# Patient Record
Sex: FEMALE | Race: WHITE | Employment: OTHER | URBAN - METROPOLITAN AREA
[De-identification: names, ages, dates, MRNs, and addresses within clinical notes are randomized per-mention and may not be internally consistent; named-entity substitution may affect disease eponyms.]

---

## 2017-12-01 PROBLEM — L71.9 ROSACEA: Status: ACTIVE | Noted: 2017-12-01

## 2018-12-26 DIAGNOSIS — E11.9 TYPE 2 DIABETES MELLITUS WITHOUT COMPLICATION, WITH LONG-TERM CURRENT USE OF INSULIN (HCC): ICD-10-CM

## 2018-12-26 DIAGNOSIS — Z79.4 TYPE 2 DIABETES MELLITUS WITHOUT COMPLICATION, WITH LONG-TERM CURRENT USE OF INSULIN (HCC): ICD-10-CM

## 2018-12-27 RX ORDER — GLIPIZIDE 10 MG/1
10 TABLET ORAL
Qty: 60 TABLET | Refills: 2 | Status: SHIPPED | OUTPATIENT
Start: 2018-12-27 | End: 2019-03-24 | Stop reason: SDUPTHER

## 2018-12-27 RX ORDER — LISINOPRIL AND HYDROCHLOROTHIAZIDE 25; 20 MG/1; MG/1
1 TABLET ORAL DAILY
Qty: 30 TABLET | Refills: 2 | Status: SHIPPED | OUTPATIENT
Start: 2018-12-27 | End: 2019-03-24 | Stop reason: SDUPTHER

## 2018-12-27 RX ORDER — ATORVASTATIN CALCIUM 10 MG/1
10 TABLET, FILM COATED ORAL DAILY
Qty: 30 TABLET | Refills: 2 | Status: SHIPPED | OUTPATIENT
Start: 2018-12-27 | End: 2019-03-24 | Stop reason: SDUPTHER

## 2019-01-07 ENCOUNTER — OFFICE VISIT (OUTPATIENT)
Dept: PRIMARY CARE CLINIC | Age: 64
End: 2019-01-07
Payer: COMMERCIAL

## 2019-01-07 ENCOUNTER — TELEPHONE (OUTPATIENT)
Dept: PRIMARY CARE CLINIC | Age: 64
End: 2019-01-07

## 2019-01-07 VITALS
SYSTOLIC BLOOD PRESSURE: 140 MMHG | BODY MASS INDEX: 44.76 KG/M2 | OXYGEN SATURATION: 98 % | DIASTOLIC BLOOD PRESSURE: 82 MMHG | HEART RATE: 90 BPM | WEIGHT: 221.6 LBS

## 2019-01-07 DIAGNOSIS — I10 ESSENTIAL HYPERTENSION: ICD-10-CM

## 2019-01-07 DIAGNOSIS — K74.69 OTHER CIRRHOSIS OF LIVER (HCC): ICD-10-CM

## 2019-01-07 DIAGNOSIS — Z79.4 TYPE 2 DIABETES MELLITUS WITHOUT COMPLICATION, WITH LONG-TERM CURRENT USE OF INSULIN (HCC): ICD-10-CM

## 2019-01-07 DIAGNOSIS — E11.9 TYPE 2 DIABETES MELLITUS WITHOUT COMPLICATION, WITH LONG-TERM CURRENT USE OF INSULIN (HCC): ICD-10-CM

## 2019-01-07 LAB
CREATININE URINE: 85.44 MG/DL
HBA1C MFR BLD: 11.2 %
MICROALBUMIN/CREAT 24H UR: 76.5 MG/G{CREAT}

## 2019-01-07 PROCEDURE — 36416 COLLJ CAPILLARY BLOOD SPEC: CPT | Performed by: FAMILY MEDICINE

## 2019-01-07 PROCEDURE — 99214 OFFICE O/P EST MOD 30 MIN: CPT | Performed by: FAMILY MEDICINE

## 2019-01-07 PROCEDURE — 83036 HEMOGLOBIN GLYCOSYLATED A1C: CPT | Performed by: FAMILY MEDICINE

## 2019-01-07 RX ORDER — MELOXICAM 15 MG/1
15 TABLET ORAL DAILY
Qty: 10 TABLET | Refills: 0 | Status: SHIPPED | OUTPATIENT
Start: 2019-01-07 | End: 2019-03-29 | Stop reason: ALTCHOICE

## 2019-01-07 ASSESSMENT — ENCOUNTER SYMPTOMS
RHINORRHEA: 0
SHORTNESS OF BREATH: 0
EYE REDNESS: 0
DIARRHEA: 0
ABDOMINAL PAIN: 0
SORE THROAT: 0
VOMITING: 0
COUGH: 0
WHEEZING: 0
NAUSEA: 0
EYE DISCHARGE: 0

## 2019-01-09 DIAGNOSIS — E11.9 TYPE 2 DIABETES MELLITUS WITHOUT COMPLICATION, WITH LONG-TERM CURRENT USE OF INSULIN (HCC): ICD-10-CM

## 2019-01-09 DIAGNOSIS — Z79.4 TYPE 2 DIABETES MELLITUS WITHOUT COMPLICATION, WITH LONG-TERM CURRENT USE OF INSULIN (HCC): ICD-10-CM

## 2019-03-07 RX ORDER — GABAPENTIN 300 MG/1
CAPSULE ORAL
Qty: 180 CAPSULE | Refills: 3 | Status: SHIPPED | OUTPATIENT
Start: 2019-03-07 | End: 2019-07-04 | Stop reason: SDUPTHER

## 2019-03-26 RX ORDER — GLIPIZIDE 10 MG/1
TABLET ORAL
Qty: 60 TABLET | Refills: 2 | Status: SHIPPED | OUTPATIENT
Start: 2019-03-26 | End: 2019-06-27 | Stop reason: SDUPTHER

## 2019-03-26 RX ORDER — SITAGLIPTIN AND METFORMIN HYDROCHLORIDE 1000; 50 MG/1; MG/1
TABLET, FILM COATED, EXTENDED RELEASE ORAL
Qty: 60 TABLET | Refills: 3 | Status: SHIPPED | OUTPATIENT
Start: 2019-03-26 | End: 2019-07-25 | Stop reason: SDUPTHER

## 2019-03-26 RX ORDER — LISINOPRIL AND HYDROCHLOROTHIAZIDE 25; 20 MG/1; MG/1
TABLET ORAL
Qty: 30 TABLET | Refills: 2 | Status: SHIPPED | OUTPATIENT
Start: 2019-03-26 | End: 2019-06-27 | Stop reason: SDUPTHER

## 2019-03-26 RX ORDER — ATORVASTATIN CALCIUM 10 MG/1
TABLET, FILM COATED ORAL
Qty: 30 TABLET | Refills: 2 | Status: SHIPPED | OUTPATIENT
Start: 2019-03-26 | End: 2019-06-27 | Stop reason: SDUPTHER

## 2019-03-29 ENCOUNTER — OFFICE VISIT (OUTPATIENT)
Dept: PRIMARY CARE CLINIC | Age: 64
End: 2019-03-29
Payer: COMMERCIAL

## 2019-03-29 VITALS
DIASTOLIC BLOOD PRESSURE: 80 MMHG | HEART RATE: 99 BPM | WEIGHT: 222 LBS | TEMPERATURE: 99.4 F | SYSTOLIC BLOOD PRESSURE: 150 MMHG | OXYGEN SATURATION: 96 % | BODY MASS INDEX: 44.84 KG/M2

## 2019-03-29 DIAGNOSIS — H66.002 ACUTE SUPPURATIVE OTITIS MEDIA OF LEFT EAR WITHOUT SPONTANEOUS RUPTURE OF TYMPANIC MEMBRANE, RECURRENCE NOT SPECIFIED: Primary | ICD-10-CM

## 2019-03-29 DIAGNOSIS — J20.9 ACUTE BRONCHITIS, UNSPECIFIED ORGANISM: ICD-10-CM

## 2019-03-29 PROCEDURE — 99213 OFFICE O/P EST LOW 20 MIN: CPT | Performed by: PHYSICIAN ASSISTANT

## 2019-03-29 RX ORDER — AMOXICILLIN AND CLAVULANATE POTASSIUM 875; 125 MG/1; MG/1
1 TABLET, FILM COATED ORAL 2 TIMES DAILY
Qty: 20 TABLET | Refills: 0 | Status: SHIPPED | OUTPATIENT
Start: 2019-03-29 | End: 2019-04-08

## 2019-03-29 RX ORDER — BENZONATATE 100 MG/1
100 CAPSULE ORAL 3 TIMES DAILY PRN
Qty: 30 CAPSULE | Refills: 0 | Status: SHIPPED | OUTPATIENT
Start: 2019-03-29 | End: 2019-08-09 | Stop reason: ALTCHOICE

## 2019-03-29 ASSESSMENT — ENCOUNTER SYMPTOMS
SHORTNESS OF BREATH: 0
CHEST TIGHTNESS: 0
WHEEZING: 0
COUGH: 1

## 2019-03-29 ASSESSMENT — PATIENT HEALTH QUESTIONNAIRE - PHQ9
SUM OF ALL RESPONSES TO PHQ QUESTIONS 1-9: 0
2. FEELING DOWN, DEPRESSED OR HOPELESS: 0
SUM OF ALL RESPONSES TO PHQ QUESTIONS 1-9: 0
SUM OF ALL RESPONSES TO PHQ9 QUESTIONS 1 & 2: 0
1. LITTLE INTEREST OR PLEASURE IN DOING THINGS: 0

## 2019-03-29 NOTE — PROGRESS NOTES
[address] [facility] PRIMARY CARE  [id number] [facility]  Dept: [phone]    Leda Radford is a 58 y.o. female who presents today for her medical conditions/complaints as noted below. Chief Complaint   Patient presents with    URI     Pt c/o chest congestion with a productive cough.  Otalgia     Left ear pain x1 day.  Nasal Congestion     x 1 day       HPI:     HPI   Cough for over 1 week, but yesterday her ears became plugged. Productive cough with clear sputum. Left ear pain started yesterday- throbbing, hard to sleep. Congestion started yesterday. Slight runny nose. Slight sinus pressure. Sight sore throat. +PND just started. Cough has not improved. No hx of asthma. No known fever- temp of 99.4 currently. Has not taken any OTC cold or pain meds.        LDL Cholesterol (mg/dL)   Date Value   12/31/2015 71   11/24/2015 129   05/28/2014 124     LDL Calculated (mg/dL)   Date Value   09/12/2018 84   12/30/2016 84       (goal LDL is <100)   AST (U/L)   Date Value   12/31/2015 33 (H)     ALT (U/L)   Date Value   12/31/2015 34 (H)     BUN (mg/dL)   Date Value   11/24/2015 16     BP Readings from Last 3 Encounters:   03/29/19 (!) 150/80   01/07/19 (!) 140/82   11/15/18 138/76          (goal 120/80)    Past Medical History:   Diagnosis Date    Abdominal swelling     Autoimmune hepatitis ([address]     Flatus     H/O acute sinusitis     H/O cholelithiasis     LLQ abdominal tenderness     RUQ abdominal pain     Tingling     Vaginal candidiasis       Past Surgical History:   Procedure Laterality Date    CHOLECYSTECTOMY, LAPAROSCOPIC  2012       Family History   Problem Relation Age of Onset    Cancer Mother         breast    Diabetes Mother     Stroke Mother     Cancer Father     Cancer Sister        Social History     Tobacco Use    Smoking status: Never Smoker    Smokeless tobacco: Never Used   Substance Use Topics    Alcohol use: Not on file      Current Outpatient Medications   Medication Sig Dispense Refill    amoxicillin-clavulanate (AUGMENTIN) 875-125 MG per tablet Take 1 tablet by mouth 2 times daily for 10 days 20 tablet 0    benzonatate (TESSALON) 100 MG capsule Take 1 capsule by mouth 3 times daily as needed for Cough 30 capsule 0    JANUMET XR  MG TB24 per extended release tablet take 1 tablet by mouth twice a day 60 tablet 3    atorvastatin (LIPITOR) 10 MG tablet take 1 tablet by mouth once daily 30 tablet 2    glipiZIDE (GLUCOTROL) 10 MG tablet take 1 tablet by mouth twice a day before meals 60 tablet 2    lisinopril-hydrochlorothiazide (PRINZIDE;ZESTORETIC) 20-25 MG per tablet take 1 tablet by mouth once daily 30 tablet 2    gabapentin (NEURONTIN) 300 MG capsule take 2 capsules by mouth three times a day 180 capsule 3    insulin lispro (HUMALOG) 100 UNIT/ML pen Inject into the skin 4 times daily (before meals) Indications: per sliding scale ac and hs,Also give pen needles for qid checks 5 pen 3    insulin glargine (TOUJEO SOLOSTAR) 300 UNIT/ML injection pen Inject 40 Units into the skin 2 times daily 8 pen 3    B-D ULTRAFINE III SHORT PEN 31G X 8 MM MISC use as directed three times a day 100 each 3    FREESTYLE LITE strip TEST three times a day 100 strip 2    Cholecalciferol (VITAMIN D3) 2000 UNITS CAPS Take 1 capsule by mouth daily      Lancets MISC Test 4 times daily for uncontrolled  each 3    aspirin 81 MG tablet Take 81 mg by mouth daily      Calcium-Vitamin D-Vitamin K (CALCIUM + D + K PO) Take by mouth Indications: take as directed      Lutein 20 MG TABS Take 1 tablet by mouth daily      Ascorbic Acid (VITAMIN C) 500 MG tablet Take by mouth daily Indications: as directed       No current facility-administered medications for this visit.       No Known Allergies    Health Maintenance   Topic Date Due    Hepatitis A vaccine (1 of 2 - Risk 2-dose series) 09[birth year]04/1955    HIV screen  09/04/1969    Hepatitis B Vaccine (1 of 3 - Risk 3-dose series) 09/04/1973    DTaP/Tdap/Td vaccine (1 - Tdap) 09/04/1973    Cervical cancer screen  09/04/1975    Shingles Vaccine (1 of 2) 09/04/2004    Diabetic foot exam  03/20/2018    A1C test (Diabetic or Prediabetic)  04/07/2019    Lipid screen  09/12/2019    Potassium monitoring  09/12/2019    Creatinine monitoring  09/12/2019    Diabetic microalbuminuria test  01/07/2020    Diabetic retinal exam  01/25/2020    Breast cancer screen  11/23/2020    Colon cancer screen colonoscopy  03/30/2022    Flu vaccine  Completed    Pneumococcal 0-64 years at Risk Vaccine  Completed    Hepatitis C screen  Completed       Subjective:      Review of Systems   HENT: Positive for congestion, ear pain, postnasal drip, rhinorrhea, sinus pressure and sore throat. Respiratory: Positive for cough. Negative for chest tightness, shortness of breath and wheezing. Objective:     BP (!) 150/80   Pulse 99   Temp 99.4 °F (37.4 °C)   Wt 222 lb (100.7 kg)   SpO2 96%   BMI 44.84 kg/m²   Physical Exam   Constitutional: She appears well-developed and well-nourished. No distress. HENT:   Head: Normocephalic and atraumatic. Right Ear: External ear and ear canal normal. A middle ear effusion (slight white posteriorly) is present. Left Ear: External ear and ear canal normal. Tympanic membrane is erythematous (superior TM). Nose: Right sinus exhibits no maxillary sinus tenderness and no frontal sinus tenderness. Left sinus exhibits no maxillary sinus tenderness and no frontal sinus tenderness. Mouth/Throat: No oropharyngeal exudate. Cardiovascular: Normal rate, regular rhythm and normal heart sounds. Pulmonary/Chest: Effort normal and breath sounds normal. No respiratory distress. She has no wheezes. Lymphadenopathy:     She has no cervical adenopathy. Skin: Skin is warm and dry. No rash noted. She is not diaphoretic. Nursing note and vitals reviewed.     Assessment: Diagnosis Orders   1. Acute suppurative otitis media of left ear without spontaneous rupture of tympanic membrane, recurrence not specified  amoxicillin-clavulanate (AUGMENTIN) 875-125 MG per tablet   2. Acute bronchitis, unspecified organism  amoxicillin-clavulanate (AUGMENTIN) 875-125 MG per tablet    benzonatate (TESSALON) 100 MG capsule        Plan:     -Rx for Augmentin for left OM and for possible bronchitis. Tessalon Perles prn for cough. Can take OTC meds for ear pain, such as ibuprofen. Return if symptoms worsen or fail to improve. No orders of the defined types were placed in this encounter. Orders Placed This Encounter   Medications    amoxicillin-clavulanate (AUGMENTIN) 875-125 MG per tablet     Sig: Take 1 tablet by mouth 2 times daily for 10 days     Dispense:  20 tablet     Refill:  0    benzonatate (TESSALON) 100 MG capsule     Sig: Take 1 capsule by mouth 3 times daily as needed for Cough     Dispense:  30 capsule     Refill:  0       Patient given educationalmaterials - see patient instructions. Discussed use, benefit, and side effectsof prescribed medications. All patient questions answered. Pt voiced understanding. Reviewed health maintenance. Instructed to continue current medications, diet andexercise. Patient agreed with treatment plan. Follow up as directed.      Electronicallysigned by Aimee Dejesus PA-C on 4/4/2019 at 11:25 PM

## 2019-04-04 ASSESSMENT — ENCOUNTER SYMPTOMS
SORE THROAT: 1
SINUS PRESSURE: 1
RHINORRHEA: 1

## 2019-04-15 ENCOUNTER — OFFICE VISIT (OUTPATIENT)
Dept: PRIMARY CARE CLINIC | Age: 64
End: 2019-04-15
Payer: COMMERCIAL

## 2019-04-15 VITALS
SYSTOLIC BLOOD PRESSURE: 136 MMHG | DIASTOLIC BLOOD PRESSURE: 80 MMHG | BODY MASS INDEX: 45.12 KG/M2 | OXYGEN SATURATION: 98 % | WEIGHT: 223.4 LBS | HEART RATE: 83 BPM

## 2019-04-15 DIAGNOSIS — E78.2 MIXED HYPERLIPIDEMIA: ICD-10-CM

## 2019-04-15 DIAGNOSIS — E11.9 TYPE 2 DIABETES MELLITUS WITHOUT COMPLICATION, WITH LONG-TERM CURRENT USE OF INSULIN (HCC): Primary | ICD-10-CM

## 2019-04-15 DIAGNOSIS — I10 ESSENTIAL HYPERTENSION: ICD-10-CM

## 2019-04-15 DIAGNOSIS — K74.69 OTHER CIRRHOSIS OF LIVER (HCC): ICD-10-CM

## 2019-04-15 DIAGNOSIS — Z79.4 TYPE 2 DIABETES MELLITUS WITHOUT COMPLICATION, WITH LONG-TERM CURRENT USE OF INSULIN (HCC): Primary | ICD-10-CM

## 2019-04-15 LAB — HBA1C MFR BLD: 11.6 %

## 2019-04-15 PROCEDURE — 83036 HEMOGLOBIN GLYCOSYLATED A1C: CPT | Performed by: FAMILY MEDICINE

## 2019-04-15 PROCEDURE — 99214 OFFICE O/P EST MOD 30 MIN: CPT | Performed by: FAMILY MEDICINE

## 2019-04-15 ASSESSMENT — ENCOUNTER SYMPTOMS
ABDOMINAL PAIN: 0
NAUSEA: 0
VOMITING: 0
DIARRHEA: 0
RHINORRHEA: 0
WHEEZING: 0
EYE DISCHARGE: 0
SORE THROAT: 0
COUGH: 1
EYE REDNESS: 0
SHORTNESS OF BREATH: 0

## 2019-04-15 NOTE — PROGRESS NOTES
[address] [facility] PRIMARY CARE  [id number] [facility]  Dept: [phone]    Alexi Leyva is a 58 y.o. female who presents today for her medical conditions/complaintsas noted below. Alexi Leyva is c/o of   Chief Complaint   Patient presents with    Diabetes     3 month follow up. Pt states her sugars have been up and down.  Ear Fullness     left ear fullness/hearing loss. Pt was here on 3/29 with URI sx. HPI:     HPI  Was sick all last week with URI and then stomach on top of it. Ear is still plugged up but otherwise getting better. No refills needed.   Due for food exam.     Hemoglobin A1C (%)   Date Value   04/15/2019 11.6   01/07/2019 11.2   10/05/2018 10.4             ( goal A1C is < 7)   No results found for: LABMICR  LDL Cholesterol (mg/dL)   Date Value   12/31/2015 71   11/24/2015 129   05/28/2014 124     LDL Calculated (mg/dL)   Date Value   09/12/2018 84   12/30/2016 84       (goal LDL is <100)   AST (U/L)   Date Value   12/31/2015 33 (H)     ALT (U/L)   Date Value   12/31/2015 34 (H)     BUN (mg/dL)   Date Value   11/24/2015 16     BP Readings from Last 3 Encounters:   04/15/19 136/80   03/29/19 (!) 150/80   01/07/19 (!) 140/82          (goal 140/90)    Past Medical History:   Diagnosis Date    Abdominal swelling     Autoimmune hepatitis ([address]     Flatus     H/O acute sinusitis     H/O cholelithiasis     LLQ abdominal tenderness     RUQ abdominal pain     Tingling     Vaginal candidiasis         Social History     Tobacco Use    Smoking status: Never Smoker    Smokeless tobacco: Never Used   Substance Use Topics    Alcohol use: Not on file      Current Outpatient Medications   Medication Sig Dispense Refill    insulin glargine (TOUJEO SOLOSTAR) 300 UNIT/ML injection pen Inject 40 Units into the skin 2 times daily 8 pen 3    Semaglutide (OZEMPIC) 0.25 or 0.5 MG/DOSE SOPN Inject 0.25 mg into the skin once a week 1 pen 3  JANUMET XR  MG TB24 per extended release tablet take 1 tablet by mouth twice a day 60 tablet 3    atorvastatin (LIPITOR) 10 MG tablet take 1 tablet by mouth once daily 30 tablet 2    glipiZIDE (GLUCOTROL) 10 MG tablet take 1 tablet by mouth twice a day before meals 60 tablet 2    lisinopril-hydrochlorothiazide (PRINZIDE;ZESTORETIC) 20-25 MG per tablet take 1 tablet by mouth once daily 30 tablet 2    gabapentin (NEURONTIN) 300 MG capsule take 2 capsules by mouth three times a day 180 capsule 3    insulin lispro (HUMALOG) 100 UNIT/ML pen Inject into the skin 4 times daily (before meals) Indications: per sliding scale ac and hs,Also give pen needles for qid checks 5 pen 3    B-D ULTRAFINE III SHORT PEN 31G X 8 MM MISC use as directed three times a day 100 each 3    FREESTYLE LITE strip TEST three times a day 100 strip 2    Cholecalciferol (VITAMIN D3) 2000 UNITS CAPS Take 1 capsule by mouth daily      Lancets MISC Test 4 times daily for uncontrolled  each 3    aspirin 81 MG tablet Take 81 mg by mouth daily      Lutein 20 MG TABS Take 1 tablet by mouth daily      Ascorbic Acid (VITAMIN C) 500 MG tablet Take by mouth daily Indications: as directed      benzonatate (TESSALON) 100 MG capsule Take 1 capsule by mouth 3 times daily as needed for Cough 30 capsule 0     No current facility-administered medications for this visit.       No Known Allergies    Health Maintenance   Topic Date Due    Hepatitis A vaccine (1 of 2 - Risk 2-dose series) 09[birth year]04/1955    HIV screen  09/04/1969    Hepatitis B Vaccine (1 of 3 - Risk 3-dose series) 09/04/1973    DTaP/Tdap/Td vaccine (1 - Tdap) 09/04/1973    Cervical cancer screen  09/04/1975    Shingles Vaccine (1 of 2) 09/04/2004    Diabetic foot exam  03/20/2018    A1C test (Diabetic or Prediabetic)  04/07/2019    Lipid screen  09/12/2019    Potassium monitoring  09/12/2019    Creatinine monitoring  09/12/2019    Diabetic microalbuminuria test calluses multiple- heels and MTPs. No cyanosis or clubbing. sensation intact on 4 of 6 test points with the 10 gram filament. Dorsalis pedis pulses intact bilaterally. Capillary refill at the toes was less than 2 seconds. No skin breakdown, erythema, blisters, scaling, or ulcers. No evidence of fungal infection. Skin: Skin is warm and dry. No rash noted. Psychiatric: She has a normal mood and affect. Her behavior is normal. Thought content normal.   Nursing note and vitals reviewed. Assessment:       Diagnosis Orders   1. Type 2 diabetes mellitus without complication, with long-term current use of insulin (HCC)  insulin glargine (TOUJEO SOLOSTAR) 300 UNIT/ML injection pen    MD COLLECTION CAPILLARY BLOOD SPECIMEN     DIABETES FOOT EXAM    POCT glycosylated hemoglobin (Hb A1C)   2. Other cirrhosis of liver (HCC)     3. Essential hypertension     4. Mixed hyperlipidemia          Plan:    has no f/u with Dr. David Aragon. Liver enzymes have looked okay last few checks. Continue 40 bid of Toujeo. Will try ozempic and if covered then will d/c janumet and only use metformin    Return in about 3 months (around 7/15/2019) for DM check, HTN f/u. Orders Placed This Encounter   Procedures    POCT glycosylated hemoglobin (Hb A1C)     DIABETES FOOT EXAM    MD COLLECTION CAPILLARY BLOOD SPECIMEN     Orders Placed This Encounter   Medications    insulin glargine (TOUJEO SOLOSTAR) 300 UNIT/ML injection pen     Sig: Inject 40 Units into the skin 2 times daily     Dispense:  8 pen     Refill:  3    Semaglutide (OZEMPIC) 0.25 or 0.5 MG/DOSE SOPN     Sig: Inject 0.25 mg into the skin once a week     Dispense:  1 pen     Refill:  3       Patient given educationalmaterials - see patient instructions. Discussed use, benefit, and side effectsof prescribed medications. All patient questions answered. Pt voiced understanding. Reviewed health maintenance. Instructed to continue current medications, diet andexercise. Patient agreed with treatment plan. Follow up as directed.      Electronicallysigned by Sruthi Pierre MD on 4/15/2019 at 9:46 AM

## 2019-04-18 ENCOUNTER — TELEPHONE (OUTPATIENT)
Dept: PRIMARY CARE CLINIC | Age: 64
End: 2019-04-18

## 2019-04-18 RX ORDER — SULFAMETHOXAZOLE AND TRIMETHOPRIM 800; 160 MG/1; MG/1
1 TABLET ORAL 2 TIMES DAILY
Qty: 10 TABLET | Refills: 0 | Status: SHIPPED | OUTPATIENT
Start: 2019-04-18 | End: 2019-04-23

## 2019-06-27 RX ORDER — GLIPIZIDE 10 MG/1
TABLET ORAL
Qty: 60 TABLET | Refills: 2 | Status: SHIPPED | OUTPATIENT
Start: 2019-06-27 | End: 2019-09-25 | Stop reason: SDUPTHER

## 2019-06-27 RX ORDER — LISINOPRIL AND HYDROCHLOROTHIAZIDE 25; 20 MG/1; MG/1
TABLET ORAL
Qty: 30 TABLET | Refills: 2 | Status: SHIPPED | OUTPATIENT
Start: 2019-06-27 | End: 2019-09-25 | Stop reason: SDUPTHER

## 2019-06-27 RX ORDER — ATORVASTATIN CALCIUM 10 MG/1
TABLET, FILM COATED ORAL
Qty: 30 TABLET | Refills: 2 | Status: SHIPPED | OUTPATIENT
Start: 2019-06-27 | End: 2019-09-25 | Stop reason: SDUPTHER

## 2019-07-08 RX ORDER — GABAPENTIN 300 MG/1
CAPSULE ORAL
Qty: 180 CAPSULE | Refills: 3 | Status: SHIPPED | OUTPATIENT
Start: 2019-07-08 | End: 2019-11-13 | Stop reason: SDUPTHER

## 2019-07-25 RX ORDER — SITAGLIPTIN AND METFORMIN HYDROCHLORIDE 1000; 50 MG/1; MG/1
TABLET, FILM COATED, EXTENDED RELEASE ORAL
Qty: 60 TABLET | Refills: 3 | Status: SHIPPED | OUTPATIENT
Start: 2019-07-25 | End: 2019-11-25 | Stop reason: SDUPTHER

## 2019-08-09 ENCOUNTER — OFFICE VISIT (OUTPATIENT)
Dept: PRIMARY CARE CLINIC | Age: 64
End: 2019-08-09
Payer: COMMERCIAL

## 2019-08-09 VITALS
DIASTOLIC BLOOD PRESSURE: 74 MMHG | BODY MASS INDEX: 45.32 KG/M2 | OXYGEN SATURATION: 98 % | WEIGHT: 224.4 LBS | SYSTOLIC BLOOD PRESSURE: 136 MMHG | HEART RATE: 82 BPM

## 2019-08-09 DIAGNOSIS — G47.9 SLEEP DISTURBANCES: ICD-10-CM

## 2019-08-09 DIAGNOSIS — R20.0 NUMBNESS: ICD-10-CM

## 2019-08-09 DIAGNOSIS — E11.9 TYPE 2 DIABETES MELLITUS WITHOUT COMPLICATION, WITH LONG-TERM CURRENT USE OF INSULIN (HCC): Primary | ICD-10-CM

## 2019-08-09 DIAGNOSIS — Z79.4 TYPE 2 DIABETES MELLITUS WITHOUT COMPLICATION, WITH LONG-TERM CURRENT USE OF INSULIN (HCC): Primary | ICD-10-CM

## 2019-08-09 DIAGNOSIS — F51.01 PRIMARY INSOMNIA: ICD-10-CM

## 2019-08-09 DIAGNOSIS — E78.2 MIXED HYPERLIPIDEMIA: ICD-10-CM

## 2019-08-09 DIAGNOSIS — I10 ESSENTIAL HYPERTENSION: ICD-10-CM

## 2019-08-09 LAB — HBA1C MFR BLD: 12 %

## 2019-08-09 PROCEDURE — 99214 OFFICE O/P EST MOD 30 MIN: CPT | Performed by: FAMILY MEDICINE

## 2019-08-09 PROCEDURE — 83036 HEMOGLOBIN GLYCOSYLATED A1C: CPT | Performed by: FAMILY MEDICINE

## 2019-08-09 ASSESSMENT — ENCOUNTER SYMPTOMS
EYE DISCHARGE: 0
SORE THROAT: 0
SHORTNESS OF BREATH: 0
WHEEZING: 0
VOMITING: 0
COUGH: 0
DIARRHEA: 0
NAUSEA: 0
RHINORRHEA: 0
ABDOMINAL PAIN: 0
EYE REDNESS: 0

## 2019-08-09 NOTE — PROGRESS NOTES
[address] [place] [address] PRIMARY CARE  [address] [place] [facility] [address]  [address] [address]  Dept: 1 [address] Chikis Riggins Box Radha is a 58 y.o. female who presents today for her medical conditions/complaintsas noted below. Faby Ramos is c/o of   Chief Complaint   Patient presents with    Diabetes     3 month follow up. Pt states her sugars are usually around 180. HPI:     HPI-insurance would not cover ozempic. Sugar has gone up again. Agreeable to go to specialist now. No low blood sugars. Having more trouble with her legs- more numb. Having fatigue during the day and insomnia. Doesn't think she snores.        Hemoglobin A1C (%)   Date Value   08/09/2019 12.0   04/15/2019 11.6   01/07/2019 11.2             ( goal A1C is < 7)   No results found for: LABMICR  LDL Cholesterol (mg/dL)   Date Value   12/31/2015 71   11/24/2015 129   05/28/2014 124     LDL Calculated (mg/dL)   Date Value   09/12/2018 84   12/30/2016 84       (goal LDL is <100)   AST (U/L)   Date Value   12/31/2015 33 (H)     ALT (U/L)   Date Value   12/31/2015 34 (H)     BUN (mg/dL)   Date Value   11/24/2015 16     BP Readings from Last 3 Encounters:   08/09/19 136/74   04/15/19 136/80   03/29/19 (!) 150/80          (goal 140/90)    Past Medical History:   Diagnosis Date    Abdominal swelling     Autoimmune hepatitis ([address]     Flatus     H/O acute sinusitis     H/O cholelithiasis     LLQ abdominal tenderness     RUQ abdominal pain     Tingling     Vaginal candidiasis         Social History     Tobacco Use    Smoking status: Never Smoker    Smokeless tobacco: Never Used   Substance Use Topics    Alcohol use: Not on file      Current Outpatient Medications   Medication Sig Dispense Refill    JANUMET XR  MG TB24 per extended release tablet take 1 tablet by mouth twice a day 60 tablet 3    gabapentin (NEURONTIN) 300 MG capsule take 2 capsules by mouth three times a day 180 capsule 3    lisinopril-hydrochlorothiazide (PRINZIDE;ZESTORETIC) 20-25 MG per tablet take 1 tablet by mouth once daily 30 tablet 2    atorvastatin (LIPITOR) 10 MG tablet take 1 tablet by mouth once daily 30 tablet 2    glipiZIDE (GLUCOTROL) 10 MG tablet take 1 tablet by mouth twice a day before meals 60 tablet 2    insulin glargine (TOUJEO SOLOSTAR) 300 UNIT/ML injection pen Inject 40 Units into the skin 2 times daily 8 pen 3    insulin lispro (HUMALOG) 100 UNIT/ML pen Inject into the skin 4 times daily (before meals) Indications: per sliding scale ac and hs,Also give pen needles for qid checks 5 pen 3    B-D ULTRAFINE III SHORT PEN 31G X 8 MM MISC use as directed three times a day 100 each 3    FREESTYLE LITE strip TEST three times a day 100 strip 2    Cholecalciferol (VITAMIN D3) 2000 UNITS CAPS Take 1 capsule by mouth daily      Lancets MISC Test 4 times daily for uncontrolled  each 3    aspirin 81 MG tablet Take 81 mg by mouth daily      Lutein 20 MG TABS Take 1 tablet by mouth daily      Ascorbic Acid (VITAMIN C) 500 MG tablet Take by mouth daily Indications: as directed       No current facility-administered medications for this visit.       No Known Allergies    Health Maintenance   Topic Date Due    Hepatitis A vaccine (1 of 2 - Risk 2-dose series) 09[birth year]04/1955    HIV screen  09/04/1969    Hepatitis B Vaccine (1 of 3 - Risk 3-dose series) 09/04/1973    DTaP/Tdap/Td vaccine (1 - Tdap) 09/04/1973    Cervical cancer screen  09/04/1975    Shingles Vaccine (1 of 2) 09/04/2004    A1C test (Diabetic or Prediabetic)  07/15/2019    Flu vaccine (1) 09/01/2019    Lipid screen  09/12/2019    Potassium monitoring  09/12/2019    Creatinine monitoring  09/12/2019    Diabetic microalbuminuria test  01/07/2020    Diabetic retinal exam  01/25/2020    Diabetic foot exam  04/15/2020    Breast cancer screen  11/23/2020    Colon cancer screen colonoscopy  03/30/2022    Pneumococcal 0-64 years Vaccine  Completed Specific Question:   Pre-Study Patient Questions: Answer:   Complains of daytime sleepiness     Order Specific Question:   Pre-Study Patient Questions: Answer:   Reports multiple awakenings throughout the night    MO COLLECTION CAPILLARY BLOOD SPECIMEN     No orders of the defined types were placed in this encounter. Patient given educationalmaterials - see patient instructions. Discussed use, benefit, and side effectsof prescribed medications. All patient questions answered. Pt voiced understanding. Reviewed health maintenance. Instructed to continue current medications, diet andexercise. Patient agreed with treatment plan. Follow up as directed.      Electronicallysigned by Morenita Jackson MD on 8/9/2019 at 11:17 AM

## 2019-09-19 LAB
AVERAGE GLUCOSE: 238
HBA1C MFR BLD: 11 %

## 2019-09-25 RX ORDER — LISINOPRIL AND HYDROCHLOROTHIAZIDE 25; 20 MG/1; MG/1
TABLET ORAL
Qty: 30 TABLET | Refills: 2 | Status: SHIPPED | OUTPATIENT
Start: 2019-09-25 | End: 2019-12-30

## 2019-09-25 RX ORDER — ATORVASTATIN CALCIUM 10 MG/1
TABLET, FILM COATED ORAL
Qty: 30 TABLET | Refills: 2 | Status: SHIPPED | OUTPATIENT
Start: 2019-09-25 | End: 2019-12-30

## 2019-09-25 RX ORDER — GLIPIZIDE 10 MG/1
TABLET ORAL
Qty: 60 TABLET | Refills: 2 | Status: SHIPPED | OUTPATIENT
Start: 2019-09-25 | End: 2019-12-30

## 2019-10-23 ENCOUNTER — HOSPITAL ENCOUNTER (OUTPATIENT)
Age: 64
Discharge: HOME | End: 2019-10-23
Payer: MEDICARE

## 2019-10-23 DIAGNOSIS — Z79.4 TYPE 2 DIABETES MELLITUS WITHOUT COMPLICATION, WITH LONG-TERM CURRENT USE OF INSULIN (HCC): ICD-10-CM

## 2019-10-23 DIAGNOSIS — I10 ESSENTIAL HYPERTENSION: ICD-10-CM

## 2019-10-23 DIAGNOSIS — E78.2 MIXED HYPERLIPIDEMIA: ICD-10-CM

## 2019-10-23 DIAGNOSIS — E11.9 TYPE 2 DIABETES MELLITUS WITHOUT COMPLICATION, WITH LONG-TERM CURRENT USE OF INSULIN (HCC): ICD-10-CM

## 2019-10-23 LAB
ABSOLUTE EOS #: 0.1 K/UL (ref 0–0.4)
ABSOLUTE IMMATURE GRANULOCYTE: ABNORMAL K/UL (ref 0–0.3)
ABSOLUTE LYMPH #: 1.2 K/UL (ref 1–4.8)
ABSOLUTE MONO #: 0.3 K/UL (ref 0.1–1.3)
ALBUMIN SERPL-MCNC: 3.7 G/DL (ref 3.5–5.2)
ALBUMIN SERPL-MCNC: 3.7 G/DL (ref 3.5–5.2)
ALBUMIN/GLOBULIN RATIO: ABNORMAL (ref 1–2.5)
ALBUMIN/GLOBULIN RATIO: ABNORMAL (ref 1–2.5)
ALP BLD-CCNC: 113 U/L (ref 35–104)
ALP BLD-CCNC: 113 U/L (ref 35–104)
ALT SERPL-CCNC: 28 U/L (ref 5–33)
ALT SERPL-CCNC: 28 U/L (ref 5–33)
ANION GAP SERPL CALCULATED.3IONS-SCNC: 11 MMOL/L (ref 9–17)
ANION GAP SERPL CALCULATED.3IONS-SCNC: 11 MMOL/L (ref 9–17)
AST SERPL-CCNC: 27 U/L
AST SERPL-CCNC: 27 U/L
BASOPHILS # BLD: 1 % (ref 0–2)
BASOPHILS ABSOLUTE: 0 K/UL (ref 0–0.2)
BILIRUB SERPL-MCNC: 0.42 MG/DL (ref 0.3–1.2)
BILIRUB SERPL-MCNC: 0.42 MG/DL (ref 0.3–1.2)
BUN BLDV-MCNC: 14 MG/DL (ref 8–23)
BUN BLDV-MCNC: 14 MG/DL (ref 8–23)
BUN/CREAT BLD: ABNORMAL (ref 9–20)
BUN/CREAT BLD: ABNORMAL (ref 9–20)
CALCIUM SERPL-MCNC: 9.9 MG/DL (ref 8.6–10.4)
CALCIUM SERPL-MCNC: 9.9 MG/DL (ref 8.6–10.4)
CHLORIDE BLD-SCNC: 100 MMOL/L (ref 98–107)
CHLORIDE BLD-SCNC: 100 MMOL/L (ref 98–107)
CHOLESTEROL/HDL RATIO: 2.2
CHOLESTEROL/HDL RATIO: 2.2
CHOLESTEROL: 184 MG/DL
CHOLESTEROL: 184 MG/DL
CO2: 26 MMOL/L (ref 20–31)
CO2: 26 MMOL/L (ref 20–31)
CREAT SERPL-MCNC: 0.53 MG/DL (ref 0.5–0.9)
CREAT SERPL-MCNC: 0.53 MG/DL (ref 0.5–0.9)
DIFFERENTIAL TYPE: ABNORMAL
EOSINOPHILS RELATIVE PERCENT: 3 % (ref 0–4)
GFR AFRICAN AMERICAN: >60 ML/MIN
GFR AFRICAN AMERICAN: >60 ML/MIN
GFR NON-AFRICAN AMERICAN: >60 ML/MIN
GFR NON-AFRICAN AMERICAN: >60 ML/MIN
GFR SERPL CREATININE-BSD FRML MDRD: ABNORMAL ML/MIN/{1.73_M2}
GLUCOSE BLD-MCNC: 236 MG/DL (ref 70–99)
GLUCOSE BLD-MCNC: 236 MG/DL (ref 70–99)
HCT VFR BLD CALC: 36.7 % (ref 36–46)
HDLC SERPL-MCNC: 85 MG/DL
HDLC SERPL-MCNC: 85 MG/DL
HEMOGLOBIN: 11.9 G/DL (ref 12–16)
IMMATURE GRANULOCYTES: ABNORMAL %
LDL CHOLESTEROL: 74 MG/DL (ref 0–130)
LDL CHOLESTEROL: 74 MG/DL (ref 0–130)
LYMPHOCYTES # BLD: 25 % (ref 24–44)
MCH RBC QN AUTO: 29.5 PG (ref 26–34)
MCHC RBC AUTO-ENTMCNC: 32.4 G/DL (ref 31–37)
MCV RBC AUTO: 91.2 FL (ref 80–100)
MONOCYTES # BLD: 6 % (ref 1–7)
NRBC AUTOMATED: ABNORMAL PER 100 WBC
PDW BLD-RTO: 14.6 % (ref 11.5–14.9)
PLATELET # BLD: 161 K/UL (ref 150–450)
PLATELET ESTIMATE: ABNORMAL
PMV BLD AUTO: 8.9 FL (ref 6–12)
POTASSIUM SERPL-SCNC: 4.6 MMOL/L (ref 3.7–5.3)
POTASSIUM SERPL-SCNC: 4.6 MMOL/L (ref 3.7–5.3)
RBC # BLD: 4.03 M/UL (ref 4–5.2)
RBC # BLD: ABNORMAL 10*6/UL
SEG NEUTROPHILS: 65 % (ref 36–66)
SEGMENTED NEUTROPHILS ABSOLUTE COUNT: 3.1 K/UL (ref 1.3–9.1)
SODIUM BLD-SCNC: 137 MMOL/L (ref 135–144)
SODIUM BLD-SCNC: 137 MMOL/L (ref 135–144)
THYROXINE, FREE: 1.05 NG/DL (ref 0.93–1.7)
TOTAL PROTEIN: 7.1 G/DL (ref 6.4–8.3)
TOTAL PROTEIN: 7.1 G/DL (ref 6.4–8.3)
TRIGL SERPL-MCNC: 127 MG/DL
TRIGL SERPL-MCNC: 127 MG/DL
TSH SERPL DL<=0.05 MIU/L-ACNC: 2.66 MIU/L (ref 0.3–5)
TSH SERPL DL<=0.05 MIU/L-ACNC: 2.66 MIU/L (ref 0.3–5)
VITAMIN B-12: 395 PG/ML (ref 232–1245)
VITAMIN D 25-HYDROXY: 31.1 NG/ML (ref 30–100)
VLDLC SERPL CALC-MCNC: NORMAL MG/DL (ref 1–30)
VLDLC SERPL CALC-MCNC: NORMAL MG/DL (ref 1–30)
WBC # BLD: 4.7 K/UL (ref 3.5–11)
WBC # BLD: ABNORMAL 10*3/UL

## 2019-10-23 PROCEDURE — 80061 LIPID PANEL: CPT

## 2019-10-23 PROCEDURE — 85025 COMPLETE CBC W/AUTO DIFF WBC: CPT

## 2019-10-23 PROCEDURE — 84443 ASSAY THYROID STIM HORMONE: CPT

## 2019-10-23 PROCEDURE — 84439 ASSAY OF FREE THYROXINE: CPT

## 2019-10-23 PROCEDURE — 80053 COMPREHEN METABOLIC PANEL: CPT

## 2019-10-23 PROCEDURE — 36415 COLL VENOUS BLD VENIPUNCTURE: CPT

## 2019-10-23 PROCEDURE — 82607 VITAMIN B-12: CPT

## 2019-10-23 PROCEDURE — 82306 VITAMIN D 25 HYDROXY: CPT

## 2019-11-04 LAB
AVERAGE GLUCOSE: 316
HBA1C MFR BLD: 10.8 %

## 2019-11-04 RX ORDER — PEN NEEDLE, DIABETIC 31 GX5/16"
NEEDLE, DISPOSABLE MISCELLANEOUS
Qty: 100 EACH | Refills: 3 | Status: SHIPPED | OUTPATIENT
Start: 2019-11-04 | End: 2020-07-06

## 2019-11-13 RX ORDER — GABAPENTIN 300 MG/1
CAPSULE ORAL
Qty: 180 CAPSULE | Refills: 3 | Status: SHIPPED | OUTPATIENT
Start: 2019-11-13 | End: 2019-11-15 | Stop reason: SDUPTHER

## 2019-11-15 ENCOUNTER — OFFICE VISIT (OUTPATIENT)
Dept: PRIMARY CARE CLINIC | Age: 64
End: 2019-11-15
Payer: MEDICARE

## 2019-11-15 VITALS
BODY MASS INDEX: 45.28 KG/M2 | HEART RATE: 88 BPM | DIASTOLIC BLOOD PRESSURE: 72 MMHG | WEIGHT: 224.2 LBS | OXYGEN SATURATION: 97 % | SYSTOLIC BLOOD PRESSURE: 132 MMHG

## 2019-11-15 DIAGNOSIS — I10 ESSENTIAL HYPERTENSION: ICD-10-CM

## 2019-11-15 DIAGNOSIS — Z79.4 TYPE 2 DIABETES MELLITUS WITHOUT COMPLICATION, WITH LONG-TERM CURRENT USE OF INSULIN (HCC): Primary | ICD-10-CM

## 2019-11-15 DIAGNOSIS — E11.9 TYPE 2 DIABETES MELLITUS WITHOUT COMPLICATION, WITH LONG-TERM CURRENT USE OF INSULIN (HCC): Primary | ICD-10-CM

## 2019-11-15 PROCEDURE — 2022F DILAT RTA XM EVC RTNOPTHY: CPT | Performed by: FAMILY MEDICINE

## 2019-11-15 PROCEDURE — 4040F PNEUMOC VAC/ADMIN/RCVD: CPT | Performed by: FAMILY MEDICINE

## 2019-11-15 PROCEDURE — 1036F TOBACCO NON-USER: CPT | Performed by: FAMILY MEDICINE

## 2019-11-15 PROCEDURE — 1123F ACP DISCUSS/DSCN MKR DOCD: CPT | Performed by: FAMILY MEDICINE

## 2019-11-15 PROCEDURE — G8427 DOCREV CUR MEDS BY ELIG CLIN: HCPCS | Performed by: FAMILY MEDICINE

## 2019-11-15 PROCEDURE — 90653 IIV ADJUVANT VACCINE IM: CPT | Performed by: FAMILY MEDICINE

## 2019-11-15 PROCEDURE — 3017F COLORECTAL CA SCREEN DOC REV: CPT | Performed by: FAMILY MEDICINE

## 2019-11-15 PROCEDURE — 1090F PRES/ABSN URINE INCON ASSESS: CPT | Performed by: FAMILY MEDICINE

## 2019-11-15 PROCEDURE — 3046F HEMOGLOBIN A1C LEVEL >9.0%: CPT | Performed by: FAMILY MEDICINE

## 2019-11-15 PROCEDURE — G0008 ADMIN INFLUENZA VIRUS VAC: HCPCS | Performed by: FAMILY MEDICINE

## 2019-11-15 PROCEDURE — G9899 SCRN MAM PERF RSLTS DOC: HCPCS | Performed by: FAMILY MEDICINE

## 2019-11-15 PROCEDURE — G8417 CALC BMI ABV UP PARAM F/U: HCPCS | Performed by: FAMILY MEDICINE

## 2019-11-15 PROCEDURE — 99213 OFFICE O/P EST LOW 20 MIN: CPT | Performed by: FAMILY MEDICINE

## 2019-11-15 PROCEDURE — G8482 FLU IMMUNIZE ORDER/ADMIN: HCPCS | Performed by: FAMILY MEDICINE

## 2019-11-15 PROCEDURE — G8400 PT W/DXA NO RESULTS DOC: HCPCS | Performed by: FAMILY MEDICINE

## 2019-11-15 RX ORDER — GABAPENTIN 300 MG/1
600 CAPSULE ORAL 3 TIMES DAILY
Qty: 180 CAPSULE | Refills: 3
Start: 2019-11-15 | End: 2020-02-24

## 2019-11-15 RX ORDER — DULAGLUTIDE 0.75 MG/.5ML
INJECTION, SOLUTION SUBCUTANEOUS
Refills: 0 | COMMUNITY
Start: 2019-09-20 | End: 2022-02-08 | Stop reason: SDUPTHER

## 2019-11-15 RX ORDER — ACETAMINOPHEN 160 MG
2 TABLET,DISINTEGRATING ORAL DAILY
Qty: 30 CAPSULE | COMMUNITY
Start: 2019-11-15

## 2019-11-15 ASSESSMENT — ENCOUNTER SYMPTOMS
WHEEZING: 0
NAUSEA: 0
SHORTNESS OF BREATH: 0
EYE DISCHARGE: 0
ABDOMINAL PAIN: 0
EYE REDNESS: 0
RHINORRHEA: 0
VOMITING: 0
COUGH: 0
SORE THROAT: 0
DIARRHEA: 0

## 2019-11-25 DIAGNOSIS — Z12.31 SCREENING MAMMOGRAM, ENCOUNTER FOR: Primary | ICD-10-CM

## 2019-11-25 RX ORDER — SITAGLIPTIN AND METFORMIN HYDROCHLORIDE 1000; 50 MG/1; MG/1
TABLET, FILM COATED, EXTENDED RELEASE ORAL
Qty: 60 TABLET | Refills: 3 | Status: SHIPPED | OUTPATIENT
Start: 2019-11-25 | End: 2020-03-09

## 2019-12-09 DIAGNOSIS — E11.9 TYPE 2 DIABETES MELLITUS WITHOUT COMPLICATION, WITH LONG-TERM CURRENT USE OF INSULIN (HCC): ICD-10-CM

## 2019-12-09 DIAGNOSIS — Z79.4 TYPE 2 DIABETES MELLITUS WITHOUT COMPLICATION, WITH LONG-TERM CURRENT USE OF INSULIN (HCC): ICD-10-CM

## 2019-12-09 RX ORDER — INSULIN GLARGINE 300 U/ML
INJECTION, SOLUTION SUBCUTANEOUS
Qty: 12 ML | Refills: 3 | Status: SHIPPED | OUTPATIENT
Start: 2019-12-09 | End: 2020-08-06

## 2019-12-17 LAB
AVERAGE GLUCOSE: 119
HBA1C MFR BLD: 9.8 %

## 2019-12-20 ENCOUNTER — HOSPITAL ENCOUNTER (OUTPATIENT)
Dept: WOMENS IMAGING | Age: 64
Discharge: HOME | End: 2019-12-22
Payer: MEDICARE

## 2019-12-20 DIAGNOSIS — Z12.31 SCREENING MAMMOGRAM, ENCOUNTER FOR: ICD-10-CM

## 2019-12-20 PROCEDURE — 77063 BREAST TOMOSYNTHESIS BI: CPT

## 2019-12-27 ENCOUNTER — TELEPHONE (OUTPATIENT)
Dept: ONCOLOGY | Age: 64
End: 2019-12-27

## 2019-12-30 RX ORDER — ATORVASTATIN CALCIUM 10 MG/1
TABLET, FILM COATED ORAL
Qty: 30 TABLET | Refills: 2 | Status: SHIPPED | OUTPATIENT
Start: 2019-12-30 | End: 2020-03-09

## 2019-12-30 RX ORDER — LISINOPRIL AND HYDROCHLOROTHIAZIDE 25; 20 MG/1; MG/1
TABLET ORAL
Qty: 30 TABLET | Refills: 2 | Status: SHIPPED | OUTPATIENT
Start: 2019-12-30 | End: 2020-03-09

## 2019-12-30 RX ORDER — GLIPIZIDE 10 MG/1
TABLET ORAL
Qty: 60 TABLET | Refills: 2 | Status: SHIPPED | OUTPATIENT
Start: 2019-12-30 | End: 2020-10-21

## 2020-01-31 RX ORDER — INSULIN LISPRO 100 [IU]/ML
INJECTION, SOLUTION INTRAVENOUS; SUBCUTANEOUS
Qty: 15 ML | Refills: 1 | Status: SHIPPED | OUTPATIENT
Start: 2020-01-31 | End: 2020-10-02

## 2020-02-24 RX ORDER — GABAPENTIN 300 MG/1
CAPSULE ORAL
Qty: 180 CAPSULE | Refills: 3 | Status: SHIPPED | OUTPATIENT
Start: 2020-02-24 | End: 2020-07-21

## 2020-03-09 RX ORDER — ATORVASTATIN CALCIUM 10 MG/1
TABLET, FILM COATED ORAL
Qty: 30 TABLET | Refills: 2 | Status: SHIPPED | OUTPATIENT
Start: 2020-03-09 | End: 2020-03-30

## 2020-03-09 RX ORDER — SITAGLIPTIN AND METFORMIN HYDROCHLORIDE 1000; 50 MG/1; MG/1
TABLET, FILM COATED, EXTENDED RELEASE ORAL
Qty: 60 TABLET | Refills: 2 | Status: SHIPPED | OUTPATIENT
Start: 2020-03-09 | End: 2020-03-30

## 2020-03-09 RX ORDER — LISINOPRIL AND HYDROCHLOROTHIAZIDE 25; 20 MG/1; MG/1
TABLET ORAL
Qty: 30 TABLET | Refills: 2 | Status: SHIPPED | OUTPATIENT
Start: 2020-03-09 | End: 2020-03-30

## 2020-03-16 ENCOUNTER — OFFICE VISIT (OUTPATIENT)
Dept: PRIMARY CARE CLINIC | Age: 65
End: 2020-03-16
Payer: COMMERCIAL

## 2020-03-16 VITALS
WEIGHT: 223 LBS | DIASTOLIC BLOOD PRESSURE: 76 MMHG | OXYGEN SATURATION: 98 % | SYSTOLIC BLOOD PRESSURE: 124 MMHG | HEART RATE: 87 BPM | BODY MASS INDEX: 45.04 KG/M2

## 2020-03-16 PROBLEM — E66.01 MORBIDLY OBESE (HCC): Status: ACTIVE | Noted: 2020-03-16

## 2020-03-16 LAB — HBA1C MFR BLD: 9.8 %

## 2020-03-16 PROCEDURE — 83036 HEMOGLOBIN GLYCOSYLATED A1C: CPT | Performed by: FAMILY MEDICINE

## 2020-03-16 PROCEDURE — 99213 OFFICE O/P EST LOW 20 MIN: CPT | Performed by: FAMILY MEDICINE

## 2020-03-16 PROCEDURE — 90471 IMMUNIZATION ADMIN: CPT | Performed by: FAMILY MEDICINE

## 2020-03-16 PROCEDURE — 90670 PCV13 VACCINE IM: CPT | Performed by: FAMILY MEDICINE

## 2020-03-16 ASSESSMENT — ENCOUNTER SYMPTOMS
SHORTNESS OF BREATH: 0
ABDOMINAL PAIN: 0
RHINORRHEA: 0
WHEEZING: 0
EYE REDNESS: 0
COUGH: 0
SORE THROAT: 0
VOMITING: 0
DIARRHEA: 0
EYE DISCHARGE: 0
NAUSEA: 0

## 2020-03-16 ASSESSMENT — PATIENT HEALTH QUESTIONNAIRE - PHQ9
SUM OF ALL RESPONSES TO PHQ9 QUESTIONS 1 & 2: 0
2. FEELING DOWN, DEPRESSED OR HOPELESS: 0
SUM OF ALL RESPONSES TO PHQ QUESTIONS 1-9: 0
SUM OF ALL RESPONSES TO PHQ QUESTIONS 1-9: 0
1. LITTLE INTEREST OR PLEASURE IN DOING THINGS: 0

## 2020-03-16 NOTE — PROGRESS NOTES
(PRINZIDE;ZESTORETIC) 20-25 MG per tablet take 1 tablet by mouth once daily 30 tablet 2    gabapentin (NEURONTIN) 300 MG capsule take 2 capsules by mouth three times a day 180 capsule 3    HUMALOG KWIKPEN 100 UNIT/ML SOPN inject PER SLIDING SCALE BEFORE MEALS AND AT BEDTIME 4 TIMES DAILY, MAX 25 UNITS DAILY 15 mL 1    glipiZIDE (GLUCOTROL) 10 MG tablet take 1 tablet by mouth twice a day before meals 60 tablet 2    TOUJEO SOLOSTAR 300 UNIT/ML SOPN inject 40 units subcutaneously twice a day 12 mL 3    TRULICITY 1.07 YR/5.2YP SOPN   0    Cholecalciferol (VITAMIN D3) 50 MCG (2000 UT) CAPS Take 2 capsules by mouth daily 30 capsule     B-D UF III MINI PEN NEEDLES 31G X 5 MM MISC use four times a day WITH HUMALOG 100 each 3    FREESTYLE LITE strip TEST three times a day 100 strip 2    Lancets MISC Test 4 times daily for uncontrolled  each 3    aspirin 81 MG tablet Take 81 mg by mouth daily      Lutein 20 MG TABS Take 1 tablet by mouth daily      Ascorbic Acid (VITAMIN C) 500 MG tablet Take by mouth daily Indications: as directed       No current facility-administered medications for this visit.       No Known Allergies    Health Maintenance   Topic Date Due    Hepatitis A vaccine (1 of 2 - Risk 2-dose series) 09[birth year]04/1955    HIV screen  09/04/1969    Hepatitis B vaccine (1 of 3 - Risk 3-dose series) 09/04/1973    DTaP/Tdap/Td vaccine (1 - Tdap) 09/04/1973    Cervical cancer screen  09/04/1975    Shingles Vaccine (1 of 2) 09/04/2004    DEXA (modify frequency per FRAX score)  09/04/2019    Pneumococcal 65+ years Vaccine (1 of 1 - PPSV23) 09/04/2019    Annual Wellness Visit (AWV)  10/23/2019    Diabetic microalbuminuria test  01/07/2020    A1C test (Diabetic or Prediabetic)  03/17/2020    Lipid screen  10/23/2020    Potassium monitoring  10/23/2020    Creatinine monitoring  10/23/2020    Diabetic foot exam  11/04/2020    Diabetic retinal exam  02/03/2021    Breast cancer screen  12/20/2021    Thought Content: Thought content normal.         Assessment:       Diagnosis Orders   1. Type 2 diabetes mellitus without complication, with long-term current use of insulin (HCC)     2. Other cirrhosis of liver ([facility]     3. Diabetic retinopathy associated with type 2 diabetes mellitus, macular edema presence unspecified, unspecified laterality, unspecified retinopathy severity ([facility]     4. Morbidly obese ([facility]     5. Essential hypertension          Plan:    continue to f/u with endo,     Return in about 3 months (around 6/16/2020) for HTN f/u. Orders Placed This Encounter   Procedures    Pneumococcal conjugate vaccine 13-valent     No orders of the defined types were placed in this encounter. Patient given educationalmaterials - see patient instructions. Discussed use, benefit, and side effectsof prescribed medications. All patient questions answered. Pt voiced understanding. Reviewed health maintenance. Instructed to continue current medications, diet andexercise. Patient agreed with treatment plan. Follow up as directed.      Electronicallysigned by Oswald Gandhi MD on 3/16/2020 at 1:39 PM

## 2020-03-30 RX ORDER — LISINOPRIL AND HYDROCHLOROTHIAZIDE 25; 20 MG/1; MG/1
TABLET ORAL
Qty: 30 TABLET | Refills: 2 | Status: SHIPPED | OUTPATIENT
Start: 2020-03-30 | End: 2020-09-30

## 2020-03-30 RX ORDER — SITAGLIPTIN AND METFORMIN HYDROCHLORIDE 1000; 50 MG/1; MG/1
TABLET, FILM COATED, EXTENDED RELEASE ORAL
Qty: 60 TABLET | Refills: 2 | Status: SHIPPED | OUTPATIENT
Start: 2020-03-30 | End: 2020-10-08

## 2020-03-30 RX ORDER — ATORVASTATIN CALCIUM 10 MG/1
TABLET, FILM COATED ORAL
Qty: 30 TABLET | Refills: 2 | Status: SHIPPED | OUTPATIENT
Start: 2020-03-30 | End: 2020-09-30

## 2020-07-06 RX ORDER — PEN NEEDLE, DIABETIC 31 GX5/16"
NEEDLE, DISPOSABLE MISCELLANEOUS
Qty: 100 EACH | Refills: 3 | Status: SHIPPED | OUTPATIENT
Start: 2020-07-06 | End: 2021-02-01

## 2020-07-21 RX ORDER — GABAPENTIN 300 MG/1
CAPSULE ORAL
Qty: 180 CAPSULE | Refills: 3 | Status: SHIPPED | OUTPATIENT
Start: 2020-07-21 | End: 2020-11-19

## 2020-08-06 RX ORDER — INSULIN GLARGINE 300 U/ML
INJECTION, SOLUTION SUBCUTANEOUS
Qty: 12 ML | Refills: 3 | Status: SHIPPED | OUTPATIENT
Start: 2020-08-06 | End: 2021-01-25

## 2020-08-07 ENCOUNTER — TELEPHONE (OUTPATIENT)
Dept: PRIMARY CARE CLINIC | Age: 65
End: 2020-08-07

## 2020-09-30 RX ORDER — ATORVASTATIN CALCIUM 10 MG/1
TABLET, FILM COATED ORAL
Qty: 30 TABLET | Refills: 2 | Status: SHIPPED | OUTPATIENT
Start: 2020-09-30 | End: 2020-12-28

## 2020-09-30 RX ORDER — LISINOPRIL AND HYDROCHLOROTHIAZIDE 25; 20 MG/1; MG/1
TABLET ORAL
Qty: 30 TABLET | Refills: 2 | Status: SHIPPED | OUTPATIENT
Start: 2020-09-30 | End: 2020-12-28

## 2020-10-02 RX ORDER — INSULIN LISPRO 100 [IU]/ML
INJECTION, SOLUTION INTRAVENOUS; SUBCUTANEOUS
Qty: 15 ML | Refills: 3 | Status: SHIPPED | OUTPATIENT
Start: 2020-10-02 | End: 2022-10-06

## 2020-10-08 RX ORDER — SITAGLIPTIN AND METFORMIN HYDROCHLORIDE 1000; 50 MG/1; MG/1
TABLET, FILM COATED, EXTENDED RELEASE ORAL
Qty: 60 TABLET | Refills: 2 | Status: SHIPPED | OUTPATIENT
Start: 2020-10-08 | End: 2021-01-11

## 2020-10-12 ENCOUNTER — OFFICE VISIT (OUTPATIENT)
Dept: PRIMARY CARE CLINIC | Age: 65
End: 2020-10-12
Payer: COMMERCIAL

## 2020-10-12 VITALS
BODY MASS INDEX: 44.84 KG/M2 | SYSTOLIC BLOOD PRESSURE: 142 MMHG | OXYGEN SATURATION: 98 % | WEIGHT: 222 LBS | DIASTOLIC BLOOD PRESSURE: 76 MMHG | HEART RATE: 89 BPM | TEMPERATURE: 97 F

## 2020-10-12 PROCEDURE — 99213 OFFICE O/P EST LOW 20 MIN: CPT | Performed by: PHYSICIAN ASSISTANT

## 2020-10-12 RX ORDER — TIZANIDINE 2 MG/1
2 TABLET ORAL NIGHTLY PRN
Qty: 30 TABLET | Refills: 0 | Status: SHIPPED | OUTPATIENT
Start: 2020-10-12 | End: 2022-01-14

## 2020-10-12 RX ORDER — IBUPROFEN 600 MG/1
600 TABLET ORAL EVERY 6 HOURS PRN
Qty: 60 TABLET | Refills: 0 | Status: SHIPPED | OUTPATIENT
Start: 2020-10-12

## 2020-10-12 NOTE — PROGRESS NOTES
[address] [place] [address] PRIMARY CARE  [address]  [address] [address]  Dept: [address] [place] [address]ha is a 76 y.o. female who presents today for her medical conditions/complaintsas noted below. Chief Complaint   Patient presents with    Back Pain     right side lower back pain. Pt c/o not being able to sleep or sit in a chair       HPI:     HPI   Sees endo, thinks she had a more recent A1C this summer. Back pain:   Had right sided lumbar pain for a long time. Says she had xrays a long time ago that showed arthritis (Pt has lumbar xray from 1/2012 in her chart that showed advanced degenerative changes). Says her pain worsened last Thursday. No known injury, pt is retired. However, she was lifting some potted mums last week and carrying more grocery bags. Aggravated with standing for long periods of time. No comfortable position- feels pain even when she is sitting. No sciatic pain. Has some lower leg diabetic neuropathy, but she said the numbness has not increased. Tried a heating pad, which helps. Tried an ice pack, which felt good. Tried a lidocaine patch, but that did not help. Has liver cirrhosis, so she did not want to take too many meds, but she took ibuprofen 1 tab 2x daily, which helped.    Massage and traction have helped her back pain in the past.         LDL Cholesterol (mg/dL)   Date Value   10/23/2019 74   10/23/2019 74   12/31/2015 71     LDL Calculated (mg/dL)   Date Value   09/12/2018 84   12/30/2016 84       (goal LDL is <100)   AST (U/L)   Date Value   10/23/2019 27     ALT (U/L)   Date Value   10/23/2019 28     BUN (mg/dL)   Date Value   10/23/2019 14     BP Readings from Last 3 Encounters:   10/12/20 (!) 142/76   03/16/20 124/76   11/15/19 132/72          (goal 120/80)    Past Medical History:   Diagnosis Date    Abdominal swelling     Autoimmune hepatitis ([address]     Flatus     H/O acute sinusitis     H/O cholelithiasis     LLQ abdominal tenderness     RUQ abdominal pain     Tingling     Vaginal candidiasis       Past Surgical History:   Procedure Laterality Date    CHOLECYSTECTOMY, LAPAROSCOPIC  2012       Family History   Problem Relation Age of Onset    Cancer Mother         breast    Diabetes Mother     Stroke Mother     Cancer Father     Cancer Sister        Social History     Tobacco Use    Smoking status: Never Smoker    Smokeless tobacco: Never Used   Substance Use Topics    Alcohol use: Not on file      Current Outpatient Medications   Medication Sig Dispense Refill    ibuprofen (ADVIL;MOTRIN) 600 MG tablet Take 1 tablet by mouth every 6 hours as needed for Pain With food 60 tablet 0    tiZANidine (ZANAFLEX) 2 MG tablet Take 1 tablet by mouth nightly as needed (back pain) 30 tablet 0    JANUMET XR  MG TB24 per extended release tablet take 1 tablet by mouth twice a day 60 tablet 2    HUMALOG KWIKPEN 100 UNIT/ML SOPN inject PER SLIDING SCALE BEFORE MEALS AND AT BEDTIME 4 TIMES DAILY, MAX 25 UNITS DAILY 15 mL 3    atorvastatin (LIPITOR) 10 MG tablet take 1 tablet by mouth once daily 30 tablet 2    lisinopril-hydroCHLOROthiazide (PRINZIDE;ZESTORETIC) 20-25 MG per tablet take 1 tablet by mouth once daily 30 tablet 2    TOUJEO SOLOSTAR 300 UNIT/ML SOPN inject 40 units subcutaneously twice a day 12 mL 3    gabapentin (NEURONTIN) 300 MG capsule take 2 capsules by mouth three times a day 180 capsule 3    B-D UF III MINI PEN NEEDLES 31G X 5 MM MISC use 1 PEN NEEDLE to inject MEDICATION subcutaneously four times a day with HUMALOG 100 each 3    glipiZIDE (GLUCOTROL) 10 MG tablet take 1 tablet by mouth twice a day before meals 60 tablet 2    TRULICITY 6.84 KV/8.9SQ SOPN   0    Cholecalciferol (VITAMIN D3) 50 MCG (2000 UT) CAPS Take 2 capsules by mouth daily 30 capsule     FREESTYLE LITE strip TEST three times a day 100 strip 2    Lancets MISC Test 4 times daily for uncontrolled  each 3    aspirin 81 MG tablet Take 81 mg by mouth daily      Lutein 20 MG TABS Take 1 tablet by mouth daily      Ascorbic Acid (VITAMIN C) 500 MG tablet Take by mouth daily Indications: as directed       No current facility-administered medications for this visit. No Known Allergies    Health Maintenance   Topic Date Due    Hepatitis A vaccine (1 of 2 - Risk 2-dose series) 09/04/1956    DTaP/Tdap/Td vaccine (1 - Tdap) 09/04/1973    Shingles Vaccine (1 of 2) 09/04/2004    DEXA (modify frequency per FRAX score)  09/04/2009    Diabetic microalbuminuria test  01/07/2020    A1C test (Diabetic or Prediabetic)  06/16/2020    Lipid screen  10/23/2020    Potassium monitoring  10/23/2020    Creatinine monitoring  10/23/2020    Diabetic foot exam  11/04/2020    Diabetic retinal exam  02/03/2021    Pneumococcal 65+ years Vaccine (2 of 2 - PPSV23) 03/16/2021    Breast cancer screen  12/20/2021    Colon cancer screen colonoscopy  03/30/2022    Flu vaccine  Completed    Hepatitis C screen  Completed    Hib vaccine  Aged Out    Meningococcal (ACWY) vaccine  Aged Out       Subjective:      Review of Systems   Musculoskeletal: Positive for back pain. Neurological: Positive for numbness (chronic in lower legs d/t DM). Objective:     BP (!) 142/76   Pulse 89   Temp 97 °F (36.1 °C)   Wt 222 lb (100.7 kg)   SpO2 98%   BMI 44.84 kg/m²   Physical Exam  Vitals signs and nursing note reviewed. Constitutional:       Appearance: Normal appearance. HENT:      Head: Normocephalic and atraumatic. Cardiovascular:      Rate and Rhythm: Normal rate and regular rhythm. Pulmonary:      Effort: Pulmonary effort is normal.      Breath sounds: Normal breath sounds. Musculoskeletal:      Lumbar back: She exhibits decreased range of motion, bony tenderness (around L2/L3, also tender on right SI joint) and pain (with right lateral flexion and left rotation).       Comments: Pt unable to get on exam table for SLR or reflex testing Neurological:      Mental Status: She is alert. Assessment:       Diagnosis Orders   1. Acute exacerbation of chronic low back pain  External Referral To Physical Therapy    ibuprofen (ADVIL;MOTRIN) 600 MG tablet    tiZANidine (ZANAFLEX) 2 MG tablet   2. Sacroiliac pain  External Referral To Physical Therapy    ibuprofen (ADVIL;MOTRIN) 600 MG tablet    tiZANidine (ZANAFLEX) 2 MG tablet        Plan:    -Advised pt to only take the ibuprofen short-term for 1-2 weeks d/t her hx of liver cirrhosis. -also given rx for zanaflex    -Referral to physical therapy. Return in about 2 weeks (around 10/26/2020) for BP check with Dr. Som Navarro . Orders Placed This Encounter   Procedures    External Referral To Physical Therapy     Referral Priority:   Routine     Referral Type:   Eval and Treat     Referral Reason:   Specialty Services Required     Requested Specialty:   Physical Therapy     Number of Visits Requested:   1     Orders Placed This Encounter   Medications    ibuprofen (ADVIL;MOTRIN) 600 MG tablet     Sig: Take 1 tablet by mouth every 6 hours as needed for Pain With food     Dispense:  60 tablet     Refill:  0    tiZANidine (ZANAFLEX) 2 MG tablet     Sig: Take 1 tablet by mouth nightly as needed (back pain)     Dispense:  30 tablet     Refill:  0       Patient given educationalmaterials - see patient instructions. Discussed use, benefit, and side effectsof prescribed medications. All patient questions answered. Pt voiced understanding. Reviewed health maintenance. Instructed to continue current medications, diet andexercise. Patient agreed with treatment plan. Follow up as directed.      Electronicallysigned by Sylvia Leyva PA-C on 10/12/2020 at 2:14 PM

## 2020-10-17 ENCOUNTER — TELEPHONE (OUTPATIENT)
Dept: PRIMARY CARE CLINIC | Age: 65
End: 2020-10-17

## 2020-10-17 ASSESSMENT — ENCOUNTER SYMPTOMS: BACK PAIN: 1

## 2020-10-17 NOTE — TELEPHONE ENCOUNTER
[place],   Did you call Dr. Mary Mcgill office to see if pt had a more recent A1C than March? Thanks!

## 2020-10-21 RX ORDER — GLIPIZIDE 10 MG/1
TABLET ORAL
Qty: 60 TABLET | Refills: 2 | Status: SHIPPED | OUTPATIENT
Start: 2020-10-21 | End: 2021-01-19

## 2020-10-26 ENCOUNTER — OFFICE VISIT (OUTPATIENT)
Dept: PRIMARY CARE CLINIC | Age: 65
End: 2020-10-26
Payer: COMMERCIAL

## 2020-10-26 VITALS
SYSTOLIC BLOOD PRESSURE: 156 MMHG | WEIGHT: 226 LBS | HEART RATE: 91 BPM | OXYGEN SATURATION: 98 % | BODY MASS INDEX: 45.65 KG/M2 | TEMPERATURE: 96.9 F | DIASTOLIC BLOOD PRESSURE: 78 MMHG

## 2020-10-26 LAB
CREATININE URINE POCT: NORMAL
HBA1C MFR BLD: 10.1 %
MICROALBUMIN/CREAT 24H UR: NORMAL MG/G{CREAT}
MICROALBUMIN/CREAT UR-RTO: NORMAL

## 2020-10-26 PROCEDURE — 99213 OFFICE O/P EST LOW 20 MIN: CPT | Performed by: FAMILY MEDICINE

## 2020-10-26 PROCEDURE — 82044 UR ALBUMIN SEMIQUANTITATIVE: CPT | Performed by: FAMILY MEDICINE

## 2020-10-26 PROCEDURE — 83036 HEMOGLOBIN GLYCOSYLATED A1C: CPT | Performed by: FAMILY MEDICINE

## 2020-10-26 ASSESSMENT — ENCOUNTER SYMPTOMS
VOMITING: 0
RHINORRHEA: 0
SHORTNESS OF BREATH: 0
ABDOMINAL PAIN: 0
DIARRHEA: 0
NAUSEA: 0
BACK PAIN: 1
WHEEZING: 0
EYE REDNESS: 0
SORE THROAT: 0
COUGH: 0
EYE DISCHARGE: 0

## 2020-10-26 NOTE — PROGRESS NOTES
for Pain With food 60 tablet 0    JANUMET XR  MG TB24 per extended release tablet take 1 tablet by mouth twice a day 60 tablet 2    HUMALOG KWIKPEN 100 UNIT/ML SOPN inject PER SLIDING SCALE BEFORE MEALS AND AT BEDTIME 4 TIMES DAILY, MAX 25 UNITS DAILY 15 mL 3    atorvastatin (LIPITOR) 10 MG tablet take 1 tablet by mouth once daily 30 tablet 2    lisinopril-hydroCHLOROthiazide (PRINZIDE;ZESTORETIC) 20-25 MG per tablet take 1 tablet by mouth once daily 30 tablet 2    TOUJEO SOLOSTAR 300 UNIT/ML SOPN inject 40 units subcutaneously twice a day 12 mL 3    gabapentin (NEURONTIN) 300 MG capsule take 2 capsules by mouth three times a day 180 capsule 3    B-D UF III MINI PEN NEEDLES 31G X 5 MM MISC use 1 PEN NEEDLE to inject MEDICATION subcutaneously four times a day with HUMALOG 836 each 3    TRULICITY 8.45 PA/8.0OB SOPN   0    Cholecalciferol (VITAMIN D3) 50 MCG (2000 UT) CAPS Take 2 capsules by mouth daily 30 capsule     FREESTYLE LITE strip TEST three times a day 100 strip 2    Lancets MISC Test 4 times daily for uncontrolled  each 3    aspirin 81 MG tablet Take 81 mg by mouth daily      Lutein 20 MG TABS Take 1 tablet by mouth daily      Ascorbic Acid (VITAMIN C) 500 MG tablet Take by mouth daily Indications: as directed      tiZANidine (ZANAFLEX) 2 MG tablet Take 1 tablet by mouth nightly as needed (back pain) (Patient not taking: Reported on 10/26/2020) 30 tablet 0     No current facility-administered medications for this visit.       No Known Allergies    Health Maintenance   Topic Date Due    Hepatitis A vaccine (1 of 2 - Risk 2-dose series) 09/04/1956    DTaP/Tdap/Td vaccine (1 - Tdap) 09/04/1973    Shingles Vaccine (1 of 2) 09/04/2004    DEXA (modify frequency per FRAX score)  09/04/2009    Diabetic microalbuminuria test  01/07/2020    A1C test (Diabetic or Prediabetic)  06/16/2020    Potassium monitoring  10/23/2020    Creatinine monitoring  10/23/2020    Lipid screen 10/23/2020    Diabetic foot exam  11/04/2020    Diabetic retinal exam  02/03/2021    Pneumococcal 65+ years Vaccine (2 of 2 - PPSV23) 03/16/2021    Breast cancer screen  12/20/2021    Colon cancer screen colonoscopy  03/30/2022    Flu vaccine  Completed    Hepatitis C screen  Completed    Hib vaccine  Aged Out    Meningococcal (ACWY) vaccine  Aged Out       Subjective:     Review of Systems   Constitutional: Negative for chills and fever. HENT: Negative for rhinorrhea and sore throat. Eyes: Negative for discharge and redness. Respiratory: Negative for cough, shortness of breath and wheezing. Cardiovascular: Negative for chest pain and palpitations. Gastrointestinal: Negative for abdominal pain, diarrhea, nausea and vomiting. Genitourinary: Positive for frequency (up at night). Negative for dysuria. Musculoskeletal: Positive for back pain. Negative for arthralgias and myalgias. Neurological: Negative for dizziness, light-headedness and headaches. Psychiatric/Behavioral: Positive for sleep disturbance (back is hurting). Objective:     BP (!) 166/86   Pulse 91   Temp 96.9 °F (36.1 °C)   Wt 226 lb (102.5 kg)   SpO2 98%   BMI 45.65 kg/m²   Physical Exam  Vitals signs and nursing note reviewed. Constitutional:       General: She is not in acute distress. Appearance: She is well-developed. She is not ill-appearing. HENT:      Head: Normocephalic and atraumatic. Right Ear: External ear normal.      Left Ear: External ear normal.   Eyes:      General: No scleral icterus. Right eye: No discharge. Left eye: No discharge. Conjunctiva/sclera: Conjunctivae normal.      Pupils: Pupils are equal, round, and reactive to light. Neck:      Thyroid: No thyromegaly. Trachea: No tracheal deviation. Cardiovascular:      Rate and Rhythm: Normal rate and regular rhythm. Heart sounds: Normal heart sounds.    Pulmonary:      Effort: Pulmonary effort is normal. No respiratory distress. Breath sounds: Normal breath sounds. No wheezing. Lymphadenopathy:      Cervical: No cervical adenopathy. Skin:     General: Skin is warm. Findings: No rash. Neurological:      Mental Status: She is alert and oriented to person, place, and time. Psychiatric:         Mood and Affect: Mood normal.         Behavior: Behavior normal.         Thought Content: Thought content normal.         Assessment:       Diagnosis Orders   1. Type 2 diabetes mellitus without complication, with long-term current use of insulin (HCC)  POCT glycosylated hemoglobin (Hb A1C)    POCT microalbumin   2. Essential hypertension     3. Sciatica of right side          Plan:    stretches for back pain  Massage therapy   Keep log of sugars after eating    Return in about 3 months (around 1/26/2021) for DM check. Orders Placed This Encounter   Procedures    POCT glycosylated hemoglobin (Hb A1C)    POCT microalbumin     No orders of the defined types were placed in this encounter. Patient given educationalmaterials - see patient instructions. Discussed use, benefit, and side effectsof prescribed medications. All patient questions answered. Pt voiced understanding. Reviewed health maintenance. Instructed to continue current medications, diet andexercise. Patient agreed with treatment plan. Follow up as directed.      Electronicallysigned by Aye Simeon MD on 10/26/2020 at 3:53 PM

## 2020-11-19 RX ORDER — GABAPENTIN 300 MG/1
CAPSULE ORAL
Qty: 180 CAPSULE | Refills: 3 | Status: SHIPPED | OUTPATIENT
Start: 2020-11-19 | End: 2021-03-23

## 2020-12-22 ENCOUNTER — HOSPITAL ENCOUNTER (OUTPATIENT)
Dept: WOMENS IMAGING | Age: 65
Discharge: HOME | End: 2020-12-24
Payer: COMMERCIAL

## 2020-12-22 PROCEDURE — 77063 BREAST TOMOSYNTHESIS BI: CPT

## 2020-12-28 RX ORDER — LISINOPRIL AND HYDROCHLOROTHIAZIDE 25; 20 MG/1; MG/1
TABLET ORAL
Qty: 30 TABLET | Refills: 2 | Status: SHIPPED | OUTPATIENT
Start: 2020-12-28 | End: 2021-03-25

## 2020-12-28 RX ORDER — ATORVASTATIN CALCIUM 10 MG/1
TABLET, FILM COATED ORAL
Qty: 30 TABLET | Refills: 2 | Status: SHIPPED | OUTPATIENT
Start: 2020-12-28 | End: 2021-03-25

## 2021-01-11 RX ORDER — SITAGLIPTIN AND METFORMIN HYDROCHLORIDE 1000; 50 MG/1; MG/1
TABLET, FILM COATED, EXTENDED RELEASE ORAL
Qty: 60 TABLET | Refills: 2 | Status: SHIPPED | OUTPATIENT
Start: 2021-01-11 | End: 2021-04-29

## 2021-01-19 RX ORDER — GLIPIZIDE 10 MG/1
TABLET ORAL
Qty: 60 TABLET | Refills: 2 | Status: SHIPPED | OUTPATIENT
Start: 2021-01-19 | End: 2021-07-19

## 2021-01-25 DIAGNOSIS — Z79.4 TYPE 2 DIABETES MELLITUS WITHOUT COMPLICATION, WITH LONG-TERM CURRENT USE OF INSULIN (HCC): ICD-10-CM

## 2021-01-25 DIAGNOSIS — E11.9 TYPE 2 DIABETES MELLITUS WITHOUT COMPLICATION, WITH LONG-TERM CURRENT USE OF INSULIN (HCC): ICD-10-CM

## 2021-01-25 RX ORDER — INSULIN GLARGINE 300 U/ML
INJECTION, SOLUTION SUBCUTANEOUS
Qty: 12 ML | Refills: 3 | Status: SHIPPED | OUTPATIENT
Start: 2021-01-25 | End: 2021-12-10

## 2021-02-01 RX ORDER — PEN NEEDLE, DIABETIC 31 GX5/16"
NEEDLE, DISPOSABLE MISCELLANEOUS
Qty: 100 EACH | Refills: 3 | Status: SHIPPED | OUTPATIENT
Start: 2021-02-01 | End: 2021-10-04

## 2021-02-22 ENCOUNTER — TELEPHONE (OUTPATIENT)
Dept: PRIMARY CARE CLINIC | Age: 66
End: 2021-02-22

## 2021-02-22 NOTE — TELEPHONE ENCOUNTER
Pt is scheduled to get her 1st covid vaccine on fri. A little scared because of her cirrhosis dx's. Asking if she should still get the vaccine?  Can ASTER @ [phone]

## 2021-03-04 ENCOUNTER — HOSPITAL ENCOUNTER (OUTPATIENT)
Age: 66
Setting detail: SPECIMEN
Discharge: HOME | End: 2021-03-04
Payer: COMMERCIAL

## 2021-03-04 DIAGNOSIS — E78.2 MIXED HYPERLIPIDEMIA: ICD-10-CM

## 2021-03-04 DIAGNOSIS — Z79.4 TYPE 2 DIABETES MELLITUS WITHOUT COMPLICATION, WITH LONG-TERM CURRENT USE OF INSULIN (HCC): ICD-10-CM

## 2021-03-04 DIAGNOSIS — M54.31 SCIATICA OF RIGHT SIDE: ICD-10-CM

## 2021-03-04 DIAGNOSIS — I10 ESSENTIAL HYPERTENSION: ICD-10-CM

## 2021-03-04 DIAGNOSIS — E11.9 TYPE 2 DIABETES MELLITUS WITHOUT COMPLICATION, WITH LONG-TERM CURRENT USE OF INSULIN (HCC): ICD-10-CM

## 2021-03-04 DIAGNOSIS — K74.69 OTHER CIRRHOSIS OF LIVER (HCC): ICD-10-CM

## 2021-03-04 LAB
ABSOLUTE EOS #: 0.18 K/UL (ref 0–0.44)
ABSOLUTE IMMATURE GRANULOCYTE: <0.03 K/UL (ref 0–0.3)
ABSOLUTE LYMPH #: 1.51 K/UL (ref 1.1–3.7)
ABSOLUTE MONO #: 0.31 K/UL (ref 0.1–1.2)
ALBUMIN SERPL-MCNC: 3.8 G/DL (ref 3.5–5.2)
ALBUMIN/GLOBULIN RATIO: 1.1 (ref 1–2.5)
ALP BLD-CCNC: 140 U/L (ref 35–104)
ALT SERPL-CCNC: 45 U/L (ref 5–33)
ANION GAP SERPL CALCULATED.3IONS-SCNC: 14 MMOL/L (ref 9–17)
AST SERPL-CCNC: 60 U/L
BASOPHILS # BLD: 1 % (ref 0–2)
BASOPHILS ABSOLUTE: 0.03 K/UL (ref 0–0.2)
BILIRUB SERPL-MCNC: 0.4 MG/DL (ref 0.3–1.2)
BUN BLDV-MCNC: 15 MG/DL (ref 8–23)
BUN/CREAT BLD: ABNORMAL (ref 9–20)
CALCIUM SERPL-MCNC: 9.8 MG/DL (ref 8.6–10.4)
CHLORIDE BLD-SCNC: 103 MMOL/L (ref 98–107)
CHOLESTEROL/HDL RATIO: 2
CHOLESTEROL: 185 MG/DL
CO2: 25 MMOL/L (ref 20–31)
CREAT SERPL-MCNC: 0.61 MG/DL (ref 0.5–0.9)
DIFFERENTIAL TYPE: ABNORMAL
EOSINOPHILS RELATIVE PERCENT: 3 % (ref 1–4)
GFR AFRICAN AMERICAN: >60 ML/MIN
GFR NON-AFRICAN AMERICAN: >60 ML/MIN
GFR SERPL CREATININE-BSD FRML MDRD: ABNORMAL ML/MIN/{1.73_M2}
GFR SERPL CREATININE-BSD FRML MDRD: ABNORMAL ML/MIN/{1.73_M2}
GLUCOSE BLD-MCNC: 111 MG/DL (ref 70–99)
HCT VFR BLD CALC: 36.7 % (ref 36.3–47.1)
HDLC SERPL-MCNC: 92 MG/DL
HEMOGLOBIN: 11.7 G/DL (ref 11.9–15.1)
IMMATURE GRANULOCYTES: 0 %
LDL CHOLESTEROL: 68 MG/DL (ref 0–130)
LYMPHOCYTES # BLD: 28 % (ref 24–43)
MCH RBC QN AUTO: 29.5 PG (ref 25.2–33.5)
MCHC RBC AUTO-ENTMCNC: 31.9 G/DL (ref 28.4–34.8)
MCV RBC AUTO: 92.4 FL (ref 82.6–102.9)
MONOCYTES # BLD: 6 % (ref 3–12)
NRBC AUTOMATED: 0 PER 100 WBC
PDW BLD-RTO: 13.9 % (ref 11.8–14.4)
PLATELET # BLD: ABNORMAL K/UL (ref 138–453)
PLATELET ESTIMATE: ABNORMAL
PLATELET, FLUORESCENCE: NORMAL K/UL (ref 138–453)
PMV BLD AUTO: ABNORMAL FL (ref 8.1–13.5)
POTASSIUM SERPL-SCNC: 5 MMOL/L (ref 3.7–5.3)
RBC # BLD: 3.97 M/UL (ref 3.95–5.11)
RBC # BLD: ABNORMAL 10*6/UL
SEG NEUTROPHILS: 62 % (ref 36–65)
SEGMENTED NEUTROPHILS ABSOLUTE COUNT: 3.28 K/UL (ref 1.5–8.1)
SODIUM BLD-SCNC: 142 MMOL/L (ref 135–144)
TOTAL PROTEIN: 7.2 G/DL (ref 6.4–8.3)
TRIGL SERPL-MCNC: 124 MG/DL
VITAMIN D 25-HYDROXY: 49.4 NG/ML (ref 30–100)
VLDLC SERPL CALC-MCNC: NORMAL MG/DL (ref 1–30)
WBC # BLD: 5.3 K/UL (ref 3.5–11.3)
WBC # BLD: ABNORMAL 10*3/UL

## 2021-03-08 ENCOUNTER — OFFICE VISIT (OUTPATIENT)
Dept: PRIMARY CARE CLINIC | Age: 66
End: 2021-03-08
Payer: COMMERCIAL

## 2021-03-08 VITALS
HEIGHT: 59 IN | DIASTOLIC BLOOD PRESSURE: 74 MMHG | TEMPERATURE: 97 F | WEIGHT: 219.2 LBS | OXYGEN SATURATION: 98 % | BODY MASS INDEX: 44.19 KG/M2 | HEART RATE: 84 BPM | SYSTOLIC BLOOD PRESSURE: 142 MMHG

## 2021-03-08 DIAGNOSIS — Z79.4 TYPE 2 DIABETES MELLITUS WITHOUT COMPLICATION, WITH LONG-TERM CURRENT USE OF INSULIN (HCC): Primary | ICD-10-CM

## 2021-03-08 DIAGNOSIS — R79.89 ELEVATED LFTS: ICD-10-CM

## 2021-03-08 DIAGNOSIS — E11.319 DIABETIC RETINOPATHY ASSOCIATED WITH TYPE 2 DIABETES MELLITUS, MACULAR EDEMA PRESENCE UNSPECIFIED, UNSPECIFIED LATERALITY, UNSPECIFIED RETINOPATHY SEVERITY (HCC): ICD-10-CM

## 2021-03-08 DIAGNOSIS — K74.69 OTHER CIRRHOSIS OF LIVER (HCC): ICD-10-CM

## 2021-03-08 DIAGNOSIS — E11.9 TYPE 2 DIABETES MELLITUS WITHOUT COMPLICATION, WITH LONG-TERM CURRENT USE OF INSULIN (HCC): Primary | ICD-10-CM

## 2021-03-08 DIAGNOSIS — E66.01 MORBIDLY OBESE (HCC): ICD-10-CM

## 2021-03-08 DIAGNOSIS — I10 ESSENTIAL HYPERTENSION: ICD-10-CM

## 2021-03-08 LAB — HBA1C MFR BLD: 9.5 %

## 2021-03-08 PROCEDURE — 99213 OFFICE O/P EST LOW 20 MIN: CPT | Performed by: FAMILY MEDICINE

## 2021-03-08 PROCEDURE — 83036 HEMOGLOBIN GLYCOSYLATED A1C: CPT | Performed by: FAMILY MEDICINE

## 2021-03-08 SDOH — ECONOMIC STABILITY: TRANSPORTATION INSECURITY
IN THE PAST 12 MONTHS, HAS THE LACK OF TRANSPORTATION KEPT YOU FROM MEDICAL APPOINTMENTS OR FROM GETTING MEDICATIONS?: NO

## 2021-03-08 SDOH — ECONOMIC STABILITY: FOOD INSECURITY: WITHIN THE PAST 12 MONTHS, YOU WORRIED THAT YOUR FOOD WOULD RUN OUT BEFORE YOU GOT MONEY TO BUY MORE.: NEVER TRUE

## 2021-03-08 SDOH — ECONOMIC STABILITY: FOOD INSECURITY: WITHIN THE PAST 12 MONTHS, THE FOOD YOU BOUGHT JUST DIDN'T LAST AND YOU DIDN'T HAVE MONEY TO GET MORE.: NEVER TRUE

## 2021-03-08 ASSESSMENT — ENCOUNTER SYMPTOMS
COUGH: 0
CONSTIPATION: 0
DIARRHEA: 0
NAUSEA: 0
SHORTNESS OF BREATH: 0
ABDOMINAL PAIN: 0
BLOOD IN STOOL: 0
VOMITING: 0

## 2021-03-08 ASSESSMENT — PATIENT HEALTH QUESTIONNAIRE - PHQ9
10. IF YOU CHECKED OFF ANY PROBLEMS, HOW DIFFICULT HAVE THESE PROBLEMS MADE IT FOR YOU TO DO YOUR WORK, TAKE CARE OF THINGS AT HOME, OR GET ALONG WITH OTHER PEOPLE: 2
6. FEELING BAD ABOUT YOURSELF - OR THAT YOU ARE A FAILURE OR HAVE LET YOURSELF OR YOUR FAMILY DOWN: 0
SUM OF ALL RESPONSES TO PHQ9 QUESTIONS 1 & 2: 6
7. TROUBLE CONCENTRATING ON THINGS, SUCH AS READING THE NEWSPAPER OR WATCHING TELEVISION: 1
2. FEELING DOWN, DEPRESSED OR HOPELESS: 3
3. TROUBLE FALLING OR STAYING ASLEEP: 3

## 2021-03-08 ASSESSMENT — COLUMBIA-SUICIDE SEVERITY RATING SCALE - C-SSRS
2. HAVE YOU ACTUALLY HAD ANY THOUGHTS OF KILLING YOURSELF?: NO
1. WITHIN THE PAST MONTH, HAVE YOU WISHED YOU WERE DEAD OR WISHED YOU COULD GO TO SLEEP AND NOT WAKE UP?: NO

## 2021-03-08 NOTE — PROGRESS NOTES
[address] [place] [address] PRIMARY CARE  [address]  [address] [address]  Dept: [address] is a 76 y.o. female Established patient, who presents today for her medical conditions/complaintsas noted below  Chief Complaint   Patient presents with    Diabetes     Patient checks blood sugar bid at home    Medication Check    Hypertension     Patient doesn't check blood pressure at home       HPI:     HPI  Pt doing okay ut has been more down the last few months. [place] [employer] is opened back up and sun is shining again and feeling a little better. Would like to wait and see how things go.     Reviewed prior notes None  Reviewed previous Labs    Hemoglobin A1C (%)   Date Value   03/08/2021 9.5   10/26/2020 10.1   03/16/2020 9.8             ( goal A1C is < 7)   No results found for: LABMICR  LDL Cholesterol (mg/dL)   Date Value   03/04/2021 68   10/23/2019 74   10/23/2019 74     LDL Calculated (mg/dL)   Date Value   09/12/2018 84   12/30/2016 84       (goal LDL is <100)   AST (U/L)   Date Value   03/04/2021 60 (H)     ALT (U/L)   Date Value   03/04/2021 45 (H)     BUN (mg/dL)   Date Value   03/04/2021 15     BP Readings from Last 3 Encounters:   03/08/21 (!) 160/80   10/26/20 (!) 156/78   10/12/20 (!) 142/76          (goal 140/90)    Past Medical History:   Diagnosis Date    Abdominal swelling     Autoimmune hepatitis ([address]     Flatus     H/O acute sinusitis     H/O cholelithiasis     LLQ abdominal tenderness     RUQ abdominal pain     Tingling     Vaginal candidiasis         Social History     Tobacco Use    Smoking status: Never Smoker    Smokeless tobacco: Never Used   Substance Use Topics    Alcohol use: Not on file      Current Outpatient Medications   Medication Sig Dispense Refill    B-D UF III MINI PEN NEEDLES 31G X 5 MM MISC use 1 PEN NEEDLE to inject MEDICATION subcutaneously four times a day with HUMALOG 100 each 3    TOUJEO SOLOSTAR 300 UNIT/ML SOPN inject 40 units subcutaneously twice a day 12 mL 3    glipiZIDE (GLUCOTROL) 10 MG tablet take 1 tablet by mouth twice a day with meals 60 tablet 2    JANUMET XR  MG TB24 per extended release tablet take 1 tablet by mouth twice a day 60 tablet 2    atorvastatin (LIPITOR) 10 MG tablet take 1 tablet by mouth once daily 30 tablet 2    lisinopril-hydroCHLOROthiazide (PRINZIDE;ZESTORETIC) 20-25 MG per tablet take 1 tablet by mouth once daily 30 tablet 2    gabapentin (NEURONTIN) 300 MG capsule take 2 capsules by mouth three times a day 180 capsule 3    ibuprofen (ADVIL;MOTRIN) 600 MG tablet Take 1 tablet by mouth every 6 hours as needed for Pain With food 60 tablet 0    HUMALOG KWIKPEN 100 UNIT/ML SOPN inject PER SLIDING SCALE BEFORE MEALS AND AT BEDTIME 4 TIMES DAILY, MAX 25 UNITS DAILY 15 mL 3    TRULICITY 7.62 KR/6.3DD SOPN   0    Cholecalciferol (VITAMIN D3) 50 MCG (2000 UT) CAPS Take 2 capsules by mouth daily 30 capsule     FREESTYLE LITE strip TEST three times a day 100 strip 2    Lancets MISC Test 4 times daily for uncontrolled  each 3    aspirin 81 MG tablet Take 81 mg by mouth daily      Lutein 20 MG TABS Take 1 tablet by mouth daily      Ascorbic Acid (VITAMIN C) 500 MG tablet Take by mouth daily Indications: as directed      tiZANidine (ZANAFLEX) 2 MG tablet Take 1 tablet by mouth nightly as needed (back pain) (Patient not taking: Reported on 3/8/2021) 30 tablet 0     No current facility-administered medications for this visit.       No Known Allergies    Health Maintenance   Topic Date Due    Hepatitis A vaccine (1 of 2 - Risk 2-dose series) Never done    DTaP/Tdap/Td vaccine (1 - Tdap) Never done    Shingles Vaccine (1 of 2) Never done    DEXA (modify frequency per FRAX score)  Never done    Diabetic foot exam  11/04/2020    Diabetic retinal exam  02/03/2021    Pneumococcal 65+ years Vaccine (2 of 2 - PPSV23) 03/16/2021    COVID-19 Vaccine (2 of 2 - Moderna series) 03/26/2021    A1C test (Diabetic or Prediabetic)  06/08/2021    Diabetic microalbuminuria test  10/26/2021    Lipid screen  03/04/2022    Potassium monitoring  03/04/2022    Creatinine monitoring  03/04/2022    Colon cancer screen colonoscopy  03/30/2022    Breast cancer screen  12/22/2022    Flu vaccine  Completed    Hepatitis C screen  Completed    Hib vaccine  Aged Out    Meningococcal (ACWY) vaccine  Aged Out       Subjective:     Review of Systems   Constitutional: Negative for chills and fever. HENT: Negative for congestion. Respiratory: Negative for cough and shortness of breath. Cardiovascular: Negative for chest pain and palpitations. Gastrointestinal: Negative for abdominal pain, blood in stool, constipation, diarrhea, nausea and vomiting. Genitourinary: Negative for flank pain. Neurological: Positive for headaches (ccas). Negative for dizziness and light-headedness. Psychiatric/Behavioral: Positive for sleep disturbance. Objective:     BP (!) 160/80   Pulse 84   Temp 97 °F (36.1 °C)   Ht 4' 11\" (1.499 m)   Wt 219 lb 3.2 oz (99.4 kg)   SpO2 98%   BMI 44.27 kg/m²   Physical Exam  Vitals signs and nursing note reviewed. Constitutional:       General: She is not in acute distress. Appearance: She is well-developed. She is not ill-appearing. HENT:      Head: Normocephalic and atraumatic. Right Ear: External ear normal.      Left Ear: External ear normal.   Eyes:      General: No scleral icterus. Right eye: No discharge. Left eye: No discharge. Conjunctiva/sclera: Conjunctivae normal.      Pupils: Pupils are equal, round, and reactive to light. Neck:      Thyroid: No thyromegaly. Trachea: No tracheal deviation. Cardiovascular:      Rate and Rhythm: Normal rate and regular rhythm. Heart sounds: Normal heart sounds. Pulmonary:      Effort: Pulmonary effort is normal. No respiratory distress.       Breath sounds: Normal breath sounds. No wheezing. Lymphadenopathy:      Cervical: No cervical adenopathy. Skin:     General: Skin is warm. Findings: No rash. Neurological:      Mental Status: She is alert and oriented to person, place, and time. Psychiatric:         Mood and Affect: Mood normal.         Behavior: Behavior normal.         Thought Content: Thought content normal.         Assessment:       Diagnosis Orders   1. Type 2 diabetes mellitus without complication, with long-term current use of insulin (HCC)  POCT glycosylated hemoglobin (Hb A1C)   2. Essential hypertension     3. Other cirrhosis of liver [facility])  External Referral To Gastroenterology   4. Diabetic retinopathy associated with type 2 diabetes mellitus, macular edema presence unspecified, unspecified laterality, unspecified retinopathy severity ([facility]     5. Morbidly obese ([facility]     6. Elevated LFTs  External Referral To Gastroenterology        Plan:      Return in about 3 months (around 6/8/2021) for DM check. Orders Placed This Encounter   Procedures    External Referral To Gastroenterology     Referral Priority:   Routine     Referral Type:   Consult for Advice and Opinion     Referral Reason:   Specialty Services Required     Referred to Provider:   Phil Patel MD     Requested Specialty:   Gastroenterology     Number of Visits Requested:   1    POCT glycosylated hemoglobin (Hb A1C)     No orders of the defined types were placed in this encounter. Patient given educationalmaterials - see patient instructions. Discussed use, benefit, and side effectsof prescribed medications. All patient questions answered. Pt voiced understanding. Reviewed health maintenance. Instructed to continue current medications, diet andexercise. Patient agreed with treatment plan. Follow up as directed.      Electronicallysigned by Nic Sofia MD on 3/8/2021 at 12:18 PM

## 2021-03-23 RX ORDER — GABAPENTIN 300 MG/1
CAPSULE ORAL
Qty: 180 CAPSULE | Refills: 3 | Status: SHIPPED | OUTPATIENT
Start: 2021-03-23 | End: 2021-07-21

## 2021-03-25 RX ORDER — LISINOPRIL AND HYDROCHLOROTHIAZIDE 25; 20 MG/1; MG/1
TABLET ORAL
Qty: 90 TABLET | Refills: 3 | Status: SHIPPED | OUTPATIENT
Start: 2021-03-25 | End: 2022-03-21

## 2021-03-25 RX ORDER — ATORVASTATIN CALCIUM 10 MG/1
TABLET, FILM COATED ORAL
Qty: 90 TABLET | Refills: 3 | Status: SHIPPED | OUTPATIENT
Start: 2021-03-25 | End: 2022-03-21

## 2021-03-29 ENCOUNTER — NURSE ONLY (OUTPATIENT)
Dept: PRIMARY CARE CLINIC | Age: 66
End: 2021-03-29

## 2021-03-29 VITALS
DIASTOLIC BLOOD PRESSURE: 78 MMHG | WEIGHT: 219.2 LBS | BODY MASS INDEX: 44.27 KG/M2 | TEMPERATURE: 97.6 F | OXYGEN SATURATION: 98 % | SYSTOLIC BLOOD PRESSURE: 132 MMHG

## 2021-03-29 DIAGNOSIS — I10 ESSENTIAL HYPERTENSION: Primary | ICD-10-CM

## 2021-03-29 NOTE — PROGRESS NOTES
Pt here for a 2 week BP check. Pt was here on 3/8/2021. Pts BP was 160/80 and then 142/74. Pt takes Lisinopril-hydrochlorothiazide 20-25mg daily. Today the patients BP was 132/78. Per Dr. Reyna Patel- continue same and follow up in 3 months for medication/HTN check.

## 2021-04-29 RX ORDER — SITAGLIPTIN AND METFORMIN HYDROCHLORIDE 1000; 50 MG/1; MG/1
TABLET, FILM COATED, EXTENDED RELEASE ORAL
Qty: 60 TABLET | Refills: 2 | Status: SHIPPED | OUTPATIENT
Start: 2021-04-29 | End: 2021-07-30

## 2021-06-09 ENCOUNTER — OFFICE VISIT (OUTPATIENT)
Dept: PRIMARY CARE CLINIC | Age: 66
End: 2021-06-09
Payer: COMMERCIAL

## 2021-06-09 VITALS
HEART RATE: 82 BPM | OXYGEN SATURATION: 98 % | WEIGHT: 213 LBS | SYSTOLIC BLOOD PRESSURE: 146 MMHG | DIASTOLIC BLOOD PRESSURE: 82 MMHG | BODY MASS INDEX: 43.02 KG/M2

## 2021-06-09 DIAGNOSIS — E11.9 TYPE 2 DIABETES MELLITUS WITHOUT COMPLICATION, WITH LONG-TERM CURRENT USE OF INSULIN (HCC): Primary | ICD-10-CM

## 2021-06-09 DIAGNOSIS — Z79.4 TYPE 2 DIABETES MELLITUS WITHOUT COMPLICATION, WITH LONG-TERM CURRENT USE OF INSULIN (HCC): Primary | ICD-10-CM

## 2021-06-09 LAB — HBA1C MFR BLD: 8.9 %

## 2021-06-09 PROCEDURE — 83036 HEMOGLOBIN GLYCOSYLATED A1C: CPT | Performed by: FAMILY MEDICINE

## 2021-06-09 PROCEDURE — 99213 OFFICE O/P EST LOW 20 MIN: CPT | Performed by: FAMILY MEDICINE

## 2021-06-09 PROCEDURE — 3052F HG A1C>EQUAL 8.0%<EQUAL 9.0%: CPT | Performed by: FAMILY MEDICINE

## 2021-06-09 ASSESSMENT — ENCOUNTER SYMPTOMS
SHORTNESS OF BREATH: 0
EYE DISCHARGE: 0
EYE REDNESS: 0
WHEEZING: 0
SORE THROAT: 0
COUGH: 0
NAUSEA: 0
ABDOMINAL PAIN: 0
DIARRHEA: 0
RHINORRHEA: 0
VOMITING: 0

## 2021-07-19 RX ORDER — GLIPIZIDE 10 MG/1
TABLET ORAL
Qty: 60 TABLET | Refills: 2 | Status: SHIPPED | OUTPATIENT
Start: 2021-07-19 | End: 2021-10-14

## 2021-07-19 NOTE — TELEPHONE ENCOUNTER
Natalia Pulido is calling to request a refill on the following medication(s):    Last Visit Date (If Applicable):  7/2/3454    Next Visit Date:    9/9/2021    Medication Request:  Requested Prescriptions     Pending Prescriptions Disp Refills    glipiZIDE (GLUCOTROL) 10 MG tablet [Pharmacy Med Name: GLIPIZIDE 10 MG TABLET] 60 tablet 2     Sig: take 1 tablet by mouth twice a day with meals

## 2021-07-21 RX ORDER — GABAPENTIN 300 MG/1
CAPSULE ORAL
Qty: 180 CAPSULE | Refills: 3 | Status: SHIPPED | OUTPATIENT
Start: 2021-07-21 | End: 2021-11-19

## 2021-07-30 RX ORDER — SITAGLIPTIN AND METFORMIN HYDROCHLORIDE 1000; 50 MG/1; MG/1
TABLET, FILM COATED, EXTENDED RELEASE ORAL
Qty: 60 TABLET | Refills: 2 | Status: SHIPPED | OUTPATIENT
Start: 2021-07-30 | End: 2021-11-05

## 2021-09-09 ENCOUNTER — OFFICE VISIT (OUTPATIENT)
Dept: PRIMARY CARE CLINIC | Age: 66
End: 2021-09-09
Payer: COMMERCIAL

## 2021-09-09 VITALS
BODY MASS INDEX: 42.54 KG/M2 | WEIGHT: 210.6 LBS | OXYGEN SATURATION: 97 % | DIASTOLIC BLOOD PRESSURE: 80 MMHG | HEART RATE: 84 BPM | SYSTOLIC BLOOD PRESSURE: 128 MMHG

## 2021-09-09 DIAGNOSIS — Z79.4 TYPE 2 DIABETES MELLITUS WITHOUT COMPLICATION, WITH LONG-TERM CURRENT USE OF INSULIN (HCC): Primary | ICD-10-CM

## 2021-09-09 DIAGNOSIS — E11.9 TYPE 2 DIABETES MELLITUS WITHOUT COMPLICATION, WITH LONG-TERM CURRENT USE OF INSULIN (HCC): Primary | ICD-10-CM

## 2021-09-09 DIAGNOSIS — M54.16 LUMBAR RADICULOPATHY: ICD-10-CM

## 2021-09-09 DIAGNOSIS — K74.69 OTHER CIRRHOSIS OF LIVER (HCC): ICD-10-CM

## 2021-09-09 DIAGNOSIS — I10 ESSENTIAL HYPERTENSION: ICD-10-CM

## 2021-09-09 DIAGNOSIS — Z23 NEED FOR INFLUENZA VACCINATION: ICD-10-CM

## 2021-09-09 LAB — HBA1C MFR BLD: 8.5 %

## 2021-09-09 PROCEDURE — 90471 IMMUNIZATION ADMIN: CPT | Performed by: FAMILY MEDICINE

## 2021-09-09 PROCEDURE — 90694 VACC AIIV4 NO PRSRV 0.5ML IM: CPT | Performed by: FAMILY MEDICINE

## 2021-09-09 PROCEDURE — 3052F HG A1C>EQUAL 8.0%<EQUAL 9.0%: CPT | Performed by: FAMILY MEDICINE

## 2021-09-09 PROCEDURE — 99214 OFFICE O/P EST MOD 30 MIN: CPT | Performed by: FAMILY MEDICINE

## 2021-09-09 PROCEDURE — 83036 HEMOGLOBIN GLYCOSYLATED A1C: CPT | Performed by: FAMILY MEDICINE

## 2021-09-09 ASSESSMENT — ENCOUNTER SYMPTOMS
SHORTNESS OF BREATH: 0
DIARRHEA: 0
VOMITING: 0
SORE THROAT: 0
EYE DISCHARGE: 0
COUGH: 0
WHEEZING: 0
ABDOMINAL PAIN: 0
EYE REDNESS: 0
NAUSEA: 0
RHINORRHEA: 0

## 2021-09-09 ASSESSMENT — PATIENT HEALTH QUESTIONNAIRE - PHQ9
SUM OF ALL RESPONSES TO PHQ QUESTIONS 1-9: 0
SUM OF ALL RESPONSES TO PHQ9 QUESTIONS 1 & 2: 0
1. LITTLE INTEREST OR PLEASURE IN DOING THINGS: 0
2. FEELING DOWN, DEPRESSED OR HOPELESS: 0

## 2021-09-09 NOTE — PROGRESS NOTES
[address] [place] [address] PRIMARY CARE  [address]  [address] [address]  Dept: [address]ha is a 78 y.o. female Established patient, who presents today for her medical conditions/complaints as noted below  Chief Complaint   Patient presents with    Diabetes     Patient checks blood sugar at home    Hypertension     Patient doesn't check B/P at home    Anxiety     Patient states that anxiety is controlled currently     Medication Check       HPI:     HPI  Sugars running a little lower at home. Has been feeling okay. No new issues. No probs with meds. No refills needed. Saw Gi doc in August.  No changes with them.         Reviewed prior notes None  Reviewed previous Labs    Hemoglobin A1C (%)   Date Value   09/09/2021 8.5   06/09/2021 8.9   03/08/2021 9.5             ( goal A1C is < 7)   No results found for: LABMICR  LDL Cholesterol (mg/dL)   Date Value   03/04/2021 68   10/23/2019 74   10/23/2019 74     LDL Calculated (mg/dL)   Date Value   09/12/2018 84   12/30/2016 84       (goal LDL is <100)   AST (U/L)   Date Value   03/04/2021 60 (H)     ALT (U/L)   Date Value   03/04/2021 45 (H)     BUN (mg/dL)   Date Value   03/04/2021 15     BP Readings from Last 3 Encounters:   09/09/21 128/80   06/09/21 (!) 146/82   03/29/21 132/78          (goal 140/90)    Past Medical History:   Diagnosis Date    Abdominal swelling     Autoimmune hepatitis ([address]     Flatus     H/O acute sinusitis     H/O cholelithiasis     LLQ abdominal tenderness     RUQ abdominal pain     Tingling     Vaginal candidiasis         Social History     Tobacco Use    Smoking status: Never Smoker    Smokeless tobacco: Never Used   Substance Use Topics    Alcohol use: Not on file      Current Outpatient Medications   Medication Sig Dispense Refill    JANUMET XR  MG TB24 per extended release tablet take 1 tablet by mouth twice a day 60 tablet 2    gabapentin (NEURONTIN) 300 microalbuminuria test  10/26/2021    Diabetic retinal exam  03/02/2022    Lipid screen  03/04/2022    Potassium monitoring  03/04/2022    Creatinine monitoring  03/04/2022    Colon cancer screen colonoscopy  03/30/2022    A1C test (Diabetic or Prediabetic)  09/09/2022    Breast cancer screen  12/22/2022    COVID-19 Vaccine  Completed    Hepatitis C screen  Completed    Hib vaccine  Aged Out    Meningococcal (ACWY) vaccine  Aged Out       Subjective:     Review of Systems   Constitutional: Negative for chills and fever. HENT: Negative for rhinorrhea and sore throat. Eyes: Negative for discharge and redness. Respiratory: Negative for cough, shortness of breath and wheezing. Cardiovascular: Negative for chest pain and palpitations. Gastrointestinal: Negative for abdominal pain, diarrhea, nausea and vomiting. Genitourinary: Negative for dysuria and frequency. Musculoskeletal: Negative for arthralgias and myalgias. Neurological: Negative for dizziness, light-headedness and headaches. Psychiatric/Behavioral: Negative for sleep disturbance. Objective:     /80   Pulse 84   Wt 210 lb 9.6 oz (95.5 kg)   SpO2 97%   BMI 42.54 kg/m²   Physical Exam  Vitals and nursing note reviewed. Constitutional:       General: She is not in acute distress. Appearance: She is well-developed. She is not ill-appearing. HENT:      Head: Normocephalic and atraumatic. Right Ear: External ear normal.      Left Ear: External ear normal.   Eyes:      General: No scleral icterus. Right eye: No discharge. Left eye: No discharge. Conjunctiva/sclera: Conjunctivae normal.      Pupils: Pupils are equal, round, and reactive to light. Neck:      Thyroid: No thyromegaly. Trachea: No tracheal deviation. Cardiovascular:      Rate and Rhythm: Normal rate and regular rhythm. Heart sounds: Normal heart sounds.    Pulmonary:      Effort: Pulmonary effort is normal. No respiratory distress. Breath sounds: Normal breath sounds. No wheezing. Lymphadenopathy:      Cervical: No cervical adenopathy. Skin:     General: Skin is warm. Findings: No rash. Neurological:      Mental Status: She is alert and oriented to person, place, and time. Psychiatric:         Mood and Affect: Mood normal.         Behavior: Behavior normal.         Thought Content: Thought content normal.         Assessment/Plan:   1. Type 2 diabetes mellitus without complication, with long-term current use of insulin (HCC)  -     POCT glycosylated hemoglobin (Hb A1C)  2. Essential hypertension  Assessment & Plan:   Well-controlled, continue current medications  3. Lumbar radiculopathy  Assessment & Plan:   Well-controlled, continue current medications  4. Other cirrhosis of liver [facility])  Assessment & Plan:   Monitored by specialist- no acute findings meriting change in the plan     Return in about 3 months (around 12/9/2021) for DM check, HTN f/u. Orders Placed This Encounter   Procedures    POCT glycosylated hemoglobin (Hb A1C)     No orders of the defined types were placed in this encounter. Patient given educational materials - see patient instructions. Discussed use, benefit, and side effects of prescribed medications. All patient questions answered. Pt voiced understanding. Reviewed health maintenance. Instructed to continue current medications, diet and exercise. Patient agreed with treatment plan. Follow up as directed.      Electronicallysigned by Ton Coyle MD on 9/9/2021 at 2:27 PM

## 2021-10-04 RX ORDER — PEN NEEDLE, DIABETIC 31 GX5/16"
NEEDLE, DISPOSABLE MISCELLANEOUS
Qty: 100 EACH | Refills: 3 | Status: SHIPPED | OUTPATIENT
Start: 2021-10-04 | End: 2022-07-08

## 2021-10-14 RX ORDER — GLIPIZIDE 10 MG/1
TABLET ORAL
Qty: 60 TABLET | Refills: 2 | Status: SHIPPED | OUTPATIENT
Start: 2021-10-14 | End: 2022-01-03

## 2021-11-05 RX ORDER — SITAGLIPTIN AND METFORMIN HYDROCHLORIDE 1000; 50 MG/1; MG/1
TABLET, FILM COATED, EXTENDED RELEASE ORAL
Qty: 60 TABLET | Refills: 2 | Status: SHIPPED | OUTPATIENT
Start: 2021-11-05 | End: 2022-02-07

## 2021-11-19 RX ORDER — GABAPENTIN 300 MG/1
CAPSULE ORAL
Qty: 180 CAPSULE | Refills: 3 | Status: SHIPPED | OUTPATIENT
Start: 2021-11-19 | End: 2022-03-24

## 2021-12-09 ENCOUNTER — OFFICE VISIT (OUTPATIENT)
Dept: PRIMARY CARE CLINIC | Age: 66
End: 2021-12-09
Payer: COMMERCIAL

## 2021-12-09 VITALS
SYSTOLIC BLOOD PRESSURE: 160 MMHG | BODY MASS INDEX: 41.77 KG/M2 | DIASTOLIC BLOOD PRESSURE: 68 MMHG | OXYGEN SATURATION: 97 % | HEART RATE: 84 BPM | WEIGHT: 206.8 LBS

## 2021-12-09 DIAGNOSIS — G89.29 CHRONIC RIGHT SHOULDER PAIN: ICD-10-CM

## 2021-12-09 DIAGNOSIS — Z79.4 TYPE 2 DIABETES MELLITUS WITHOUT COMPLICATION, WITH LONG-TERM CURRENT USE OF INSULIN (HCC): Primary | ICD-10-CM

## 2021-12-09 DIAGNOSIS — E11.9 TYPE 2 DIABETES MELLITUS WITHOUT COMPLICATION, WITH LONG-TERM CURRENT USE OF INSULIN (HCC): Primary | ICD-10-CM

## 2021-12-09 DIAGNOSIS — H91.92 HEARING LOSS OF LEFT EAR, UNSPECIFIED HEARING LOSS TYPE: ICD-10-CM

## 2021-12-09 DIAGNOSIS — M25.511 CHRONIC RIGHT SHOULDER PAIN: ICD-10-CM

## 2021-12-09 DIAGNOSIS — Z12.31 ENCOUNTER FOR SCREENING MAMMOGRAM FOR BREAST CANCER: ICD-10-CM

## 2021-12-09 DIAGNOSIS — M77.8 TENDONITIS OF SHOULDER, RIGHT: ICD-10-CM

## 2021-12-09 DIAGNOSIS — I10 ESSENTIAL HYPERTENSION: ICD-10-CM

## 2021-12-09 DIAGNOSIS — E55.9 VITAMIN D DEFICIENCY: ICD-10-CM

## 2021-12-09 LAB
CREATININE URINE POCT: 200
HBA1C MFR BLD: 9.6 %
MICROALBUMIN/CREAT 24H UR: 150 MG/G{CREAT}
MICROALBUMIN/CREAT UR-RTO: >300

## 2021-12-09 PROCEDURE — 3052F HG A1C>EQUAL 8.0%<EQUAL 9.0%: CPT | Performed by: FAMILY MEDICINE

## 2021-12-09 PROCEDURE — 82044 UR ALBUMIN SEMIQUANTITATIVE: CPT | Performed by: FAMILY MEDICINE

## 2021-12-09 PROCEDURE — 83036 HEMOGLOBIN GLYCOSYLATED A1C: CPT | Performed by: FAMILY MEDICINE

## 2021-12-09 PROCEDURE — 99214 OFFICE O/P EST MOD 30 MIN: CPT | Performed by: FAMILY MEDICINE

## 2021-12-09 ASSESSMENT — ENCOUNTER SYMPTOMS
DIARRHEA: 0
SORE THROAT: 0
SHORTNESS OF BREATH: 0
RHINORRHEA: 0
VOMITING: 0
NAUSEA: 0
ABDOMINAL PAIN: 0
EYE REDNESS: 0
WHEEZING: 0
EYE DISCHARGE: 0
COUGH: 0

## 2021-12-09 NOTE — PROGRESS NOTES
[facility] PRIMARY CARE  [address]  [address] [address]  Dept: [address]ha is a 78 y.o. female Established patient, who presents today for her medical conditions/complaints as noted below  Chief Complaint   Patient presents with    Diabetes     Patient checks blood sugar at home     Hypertension     Patient doesn't check B/P at home    Medication Check    Shoulder Pain     Right shoulder pain. ROM decreased       HPI:     HPI  Sugars at home running pretty good. Mornings are - usually in 100's. Afternoon 140's. Shoulder has been hurting. ROM is not very good. Has been about 6 months. No injury that she knows of.      C/o some left ear discomfort- hearing is decreased and feels like it is plugged. Can't hear as well out of it. No ringing. No pain, just hearing is strange on that side.       Reviewed prior notes None  Reviewed previous Labs    Hemoglobin A1C (%)   Date Value   09/09/2021 8.5   06/09/2021 8.9   03/08/2021 9.5             ( goal A1C is < 7)   No results found for: LABMICR  LDL Cholesterol (mg/dL)   Date Value   03/04/2021 68   10/23/2019 74   10/23/2019 74     LDL Calculated (mg/dL)   Date Value   09/12/2018 84   12/30/2016 84       (goal LDL is <100)   AST (U/L)   Date Value   03/04/2021 60 (H)     ALT (U/L)   Date Value   03/04/2021 45 (H)     BUN (mg/dL)   Date Value   03/04/2021 15     BP Readings from Last 3 Encounters:   12/09/21 (!) 172/76   09/09/21 128/80   06/09/21 (!) 146/82          (goal 140/90)    Past Medical History:   Diagnosis Date    Abdominal swelling     Autoimmune hepatitis ([address]     Flatus     H/O acute sinusitis     H/O cholelithiasis     LLQ abdominal tenderness     RUQ abdominal pain     Tingling     Vaginal candidiasis         Social History     Tobacco Use    Smoking status: Never Smoker    Smokeless tobacco: Never Used   Substance Use Topics    Alcohol use: Not on file Current Outpatient Medications   Medication Sig Dispense Refill    gabapentin (NEURONTIN) 300 MG capsule take 2 capsules by mouth three times a day 180 capsule 3    JANUMET XR  MG TB24 per extended release tablet take 1 tablet by mouth twice a day 60 tablet 2    glipiZIDE (GLUCOTROL) 10 MG tablet take 1 tablet by mouth twice a day with meals 60 tablet 2    B-D UF III MINI PEN NEEDLES 31G X 5 MM MISC use 1 PEN NEEDLE to inject MEDICATION subcutaneously four times a day with HUMALOG 100 each 3    atorvastatin (LIPITOR) 10 MG tablet take 1 tablet by mouth once daily 90 tablet 3    lisinopril-hydroCHLOROthiazide (PRINZIDE;ZESTORETIC) 20-25 MG per tablet take 1 tablet by mouth once daily 90 tablet 3    TOUJEO SOLOSTAR 300 UNIT/ML SOPN inject 40 units subcutaneously twice a day 12 mL 3    ibuprofen (ADVIL;MOTRIN) 600 MG tablet Take 1 tablet by mouth every 6 hours as needed for Pain With food 60 tablet 0    HUMALOG KWIKPEN 100 UNIT/ML SOPN inject PER SLIDING SCALE BEFORE MEALS AND AT BEDTIME 4 TIMES DAILY, MAX 25 UNITS DAILY 15 mL 3    TRULICITY 6.67 ZW/5.5FO SOPN   0    Cholecalciferol (VITAMIN D3) 50 MCG (2000 UT) CAPS Take 2 capsules by mouth daily 30 capsule     FREESTYLE LITE strip TEST three times a day 100 strip 2    Lancets MISC Test 4 times daily for uncontrolled  each 3    aspirin 81 MG tablet Take 81 mg by mouth daily      Lutein 20 MG TABS Take 1 tablet by mouth daily      Ascorbic Acid (VITAMIN C) 500 MG tablet Take by mouth daily Indications: as directed      tiZANidine (ZANAFLEX) 2 MG tablet Take 1 tablet by mouth nightly as needed (back pain) (Patient not taking: Reported on 6/9/2021) 30 tablet 0     No current facility-administered medications for this visit.      No Known Allergies    Health Maintenance   Topic Date Due    Hepatitis A vaccine (1 of 2 - Risk 2-dose series) Never done    DTaP/Tdap/Td vaccine (1 - Tdap) Never done    Shingles Vaccine (1 of 2) Never done    DEXA (modify frequency per FRAX score)  Never done    Diabetic foot exam  11/04/2020    Pneumococcal 65+ years Vaccine (2 of 2 - PPSV23) 03/16/2021    Diabetic microalbuminuria test  10/26/2021    Diabetic retinal exam  03/02/2022    Lipid screen  03/04/2022    Potassium monitoring  03/04/2022    Creatinine monitoring  03/04/2022    Colon cancer screen colonoscopy  03/30/2022    A1C test (Diabetic or Prediabetic)  09/09/2022    Breast cancer screen  12/22/2022    Flu vaccine  Completed    COVID-19 Vaccine  Completed    Hepatitis C screen  Completed    Hib vaccine  Aged Out    Meningococcal (ACWY) vaccine  Aged Out       Subjective:     Review of Systems   Constitutional: Negative for chills and fever. HENT: Negative for rhinorrhea and sore throat. Eyes: Negative for discharge and redness. Respiratory: Negative for cough, shortness of breath and wheezing. Cardiovascular: Negative for chest pain and palpitations. Gastrointestinal: Negative for abdominal pain, diarrhea, nausea and vomiting. Genitourinary: Negative for dysuria and frequency. Musculoskeletal: Positive for arthralgias (right shoulder). Negative for myalgias. Neurological: Negative for dizziness, light-headedness and headaches. Psychiatric/Behavioral: Positive for sleep disturbance (started taking some melatonin and that is helping). Objective:     BP (!) 172/76   Pulse 84   Wt 206 lb 12.8 oz (93.8 kg)   SpO2 97%   BMI 41.77 kg/m²   Physical Exam  Vitals and nursing note reviewed. Constitutional:       General: She is not in acute distress. Appearance: She is well-developed. She is not ill-appearing. HENT:      Head: Normocephalic and atraumatic. Right Ear: External ear normal.      Left Ear: External ear normal.   Eyes:      General: No scleral icterus. Right eye: No discharge. Left eye: No discharge.       Conjunctiva/sclera: Conjunctivae normal.      Pupils: Pupils are equal, round, and reactive to light. Neck:      Thyroid: No thyromegaly. Trachea: No tracheal deviation. Cardiovascular:      Rate and Rhythm: Normal rate and regular rhythm. Heart sounds: Normal heart sounds. Pulmonary:      Effort: Pulmonary effort is normal. No respiratory distress. Breath sounds: Normal breath sounds. No wheezing. Musculoskeletal:      Comments: Decreased ROM right shoulder and tender over AC joint and biceps tendon   Lymphadenopathy:      Cervical: No cervical adenopathy. Skin:     General: Skin is warm. Findings: No rash. Neurological:      Mental Status: She is alert and oriented to person, place, and time. Psychiatric:         Mood and Affect: Mood normal.         Behavior: Behavior normal.         Thought Content: Thought content normal.         Assessment/Plan:   1. Type 2 diabetes mellitus without complication, with long-term current use of insulin (HCC)  Assessment & Plan:   start humolog 8 units with each meal and continue toujeo as well. Orders:  -     POCT glycosylated hemoglobin (Hb A1C)  -     POCT microalbumin  -     Comprehensive Metabolic Panel; Future  -     CBC Auto Differential; Future  -     Vitamin D 25 Hydroxy; Future  2. Chronic right shoulder pain  Assessment & Plan:   Ibuprofen 600 mg three times a day for 3 days for shoulder and start stretches. 3. Essential hypertension  Assessment & Plan:   Well-controlled, continue current medications and recheck better  Orders:  -     Comprehensive Metabolic Panel; Future  -     CBC Auto Differential; Future  -     Vitamin D 25 Hydroxy; Future  4. Encounter for screening mammogram for breast cancer  -     TROY DIGITAL SCREEN W OR WO CAD BILATERAL; Future  5. Vitamin D deficiency  -     Comprehensive Metabolic Panel; Future  -     CBC Auto Differential; Future  -     Vitamin D 25 Hydroxy; Future  6. Tendonitis of shoulder, right     Return in about 3 months (around 3/9/2022) for DM check, HTN f/u.     Orders Placed This Encounter   Procedures    TROY DIGITAL SCREEN W OR WO CAD BILATERAL     Standing Status:   Future     Standing Expiration Date:   2/9/2023    Comprehensive Metabolic Panel     Standing Status:   Future     Standing Expiration Date:   12/10/2022    CBC Auto Differential     Standing Status:   Future     Standing Expiration Date:   12/10/2022    Vitamin D 25 Hydroxy     Standing Status:   Future     Standing Expiration Date:   12/9/2022    POCT glycosylated hemoglobin (Hb A1C)    POCT microalbumin     No orders of the defined types were placed in this encounter. Patient given educational materials - see patient instructions. Discussed use, benefit, and side effects of prescribed medications. All patient questions answered. Pt voiced understanding. Reviewed health maintenance. Instructed to continue current medications, diet and exercise. Patient agreed with treatment plan. Follow up as directed.      Electronicallysigned by Joe Matthews MD on 12/9/2021 at 1:35 PM

## 2021-12-10 DIAGNOSIS — Z79.4 TYPE 2 DIABETES MELLITUS WITHOUT COMPLICATION, WITH LONG-TERM CURRENT USE OF INSULIN (HCC): ICD-10-CM

## 2021-12-10 DIAGNOSIS — E11.9 TYPE 2 DIABETES MELLITUS WITHOUT COMPLICATION, WITH LONG-TERM CURRENT USE OF INSULIN (HCC): ICD-10-CM

## 2021-12-10 RX ORDER — BLOOD SUGAR DIAGNOSTIC
STRIP MISCELLANEOUS
Qty: 300 STRIP | Refills: 2 | Status: SHIPPED | OUTPATIENT
Start: 2021-12-10

## 2021-12-10 RX ORDER — INSULIN GLARGINE 300 U/ML
INJECTION, SOLUTION SUBCUTANEOUS
Qty: 12 ML | Refills: 2 | Status: SHIPPED | OUTPATIENT
Start: 2021-12-10

## 2021-12-30 ENCOUNTER — HOSPITAL ENCOUNTER (OUTPATIENT)
Dept: WOMENS IMAGING | Age: 66
Discharge: HOME | End: 2022-01-01
Payer: COMMERCIAL

## 2021-12-30 DIAGNOSIS — Z12.31 ENCOUNTER FOR SCREENING MAMMOGRAM FOR BREAST CANCER: ICD-10-CM

## 2021-12-30 PROCEDURE — 77063 BREAST TOMOSYNTHESIS BI: CPT

## 2022-01-03 RX ORDER — GLIPIZIDE 10 MG/1
TABLET ORAL
Qty: 60 TABLET | Refills: 2 | Status: SHIPPED | OUTPATIENT
Start: 2022-01-03 | End: 2022-04-06

## 2022-01-14 ENCOUNTER — PROCEDURE VISIT (OUTPATIENT)
Dept: PRIMARY CARE CLINIC | Age: 67
End: 2022-01-14
Payer: COMMERCIAL

## 2022-01-14 VITALS
DIASTOLIC BLOOD PRESSURE: 60 MMHG | HEIGHT: 59 IN | OXYGEN SATURATION: 98 % | HEART RATE: 84 BPM | TEMPERATURE: 97.2 F | SYSTOLIC BLOOD PRESSURE: 138 MMHG | BODY MASS INDEX: 42.25 KG/M2 | WEIGHT: 209.6 LBS

## 2022-01-14 DIAGNOSIS — L71.9 ROSACEA: Primary | ICD-10-CM

## 2022-01-14 DIAGNOSIS — L91.8 SKIN TAGS, MULTIPLE ACQUIRED: ICD-10-CM

## 2022-01-14 DIAGNOSIS — I78.1 SPIDER VEINS: ICD-10-CM

## 2022-01-14 DIAGNOSIS — L85.1 STUCCO KERATOSES: ICD-10-CM

## 2022-01-14 DIAGNOSIS — L57.0 ACTINIC KERATOSIS: ICD-10-CM

## 2022-01-14 DIAGNOSIS — D18.01 CHERRY ANGIOMA: ICD-10-CM

## 2022-01-14 DIAGNOSIS — D22.9 MULTIPLE BENIGN NEVI: ICD-10-CM

## 2022-01-14 DIAGNOSIS — L81.4 SOLAR LENTIGO: ICD-10-CM

## 2022-01-14 DIAGNOSIS — L82.1 SEBORRHEIC KERATOSES: ICD-10-CM

## 2022-01-14 DIAGNOSIS — L81.4 LENTIGINES: ICD-10-CM

## 2022-01-14 PROCEDURE — 99214 OFFICE O/P EST MOD 30 MIN: CPT | Performed by: PHYSICIAN ASSISTANT

## 2022-01-14 PROCEDURE — 17000 DESTRUCT PREMALG LESION: CPT | Performed by: PHYSICIAN ASSISTANT

## 2022-01-14 RX ORDER — AZELAIC ACID 0.15 G/G
GEL TOPICAL
Qty: 45 G | Refills: 1 | Status: SHIPPED | OUTPATIENT
Start: 2022-01-14

## 2022-01-14 NOTE — PROGRESS NOTES
[facility] Primary Care  [address]  Phone: [phone]  Fax: [phone]    Alberto Hartley is a 78 y.o. female who presents today for her Complete Skin Check. Personal history ofskin cancer:  No  Family history of skin cancer? Yes Type? Unspecified type on face/head in brother. Any Concerning Lesions? On Nose  Do you wear Sunscreen:  Yes  Do you work or play outsideregularly:  No    /60   Pulse 84   Temp 97.2 °F (36.2 °C) (Temporal)   Ht 4' 11\" (1.499 m)   Wt 209 lb 9.6 oz (95.1 kg)   SpO2 98%   BMI 42.33 kg/m²      Physical Exam   Dry all over   Face:  Solar lentigo on left temple. Rosacea on bilateral cheeks and submental area. One AK on right side of nose. Dry. Ears:  Dry. No suspicious lesions, no wounds or color changes  Neck:  No suspicious lesions, no wounds, no dryness or color changes, Has several skin tags around neck   Chest:  Chest appears Normal with few scattered red papules and spider veins throughout. Skin tags near right axilla. Abdomen:Spider veins scattered. Few cherry angiomas. Bruise on left side of abdomen. Stretch marks across the bottom of abdomen. Back:  Back appears normal with a few scattered cherry angiomas. Scattered SKs with few lentigines. One skin tag noted near left flank. Several spider veins throughout. Dry. Arms:  Lentigines throughout with several stucco keratoses on forearms. Several SKs throughout bilateral arms. Dry. No suspicious lesions, no wounds or color changes. Legs:  SKs scattered throughout bilateral legs. Spider veins throughout. Several well circumscribed nevi ranging from 1 mm-3 mm. Dry. No wounds or color changes. Hands:  Dry. No Callus, Ulcers, Abnormal nails or suspicious lesions  Feet:  Cracked heels on bilateral feet. Scar on left ankle. Dry. No Ulcers, has mycotic nails. no suspicious lesions  Breast: Peeling and dry skin under breasts. Diagnosis Orders   1.  Rosacea 2. Seborrheic keratoses     3. Stucco keratoses     4. Cherry angioma     5. Actinic keratosis  WA DESTRUC PREMALIGNANT, FIRST LESION   6. Spider veins     7. Solar lentigo     8. Skin tags, multiple acquired     9. Lentigines     10. Multiple benign nevi         Plan:  Sample for Xeracalm for dry heels. Order finacea for rosacea. Discussed good skin care. Discussed proper sun Protection. Patienteducation given on skin cancer and precancers.     Return in about 2 years (around 1/14/2024) for Complete skin exam.    [facility]

## 2022-01-14 NOTE — PROGRESS NOTES
[facility] Keratoses destruction procedure note:  1/14/2022  Mike Farley  1955    /60   Pulse 84   Temp 97.2 °F (36.2 °C) (Temporal)   Ht 4' 11\" (1.499 m)   Wt 209 lb 9.6 oz (95.1 kg)   SpO2 98%   BMI 42.33 kg/m²   ALL VITALS REVIEWED    No Known Allergies    Chief Complaint   Patient presents with    Procedure     Patient is here today for full body skin check      Written consent was obtained. Lesion(s) cleansed with Alcohol. Location:  Right side of nose. Histofreeze applied with applicator. Numberlesions treated: 1    Physical Exam  HENT:      Nose:        Comments: 1 AK treated. Patient tolerated procedure well. Diagnosis Orders   1. Rosacea     2. Seborrheic keratoses     3. Stucco keratoses     4. Cherry angioma     5. Actinic keratosis  AR DESTRUC PREMALIGNANT, FIRST LESION   6. Spider veins     7. Solar lentigo     8. Skin tags, multiple acquired     9. Lentigines     10. Multiple benign nevi         Follow Up: Wound care discussed. Keep well moisturized. Watch for signs of infection which would include:  Redness, swelling, fever. If signs appear, please return to office.   Return in about 2 years (around 1/14/2024) for Complete skin exam.    Electronically signed by Jessica Srivastava PA-C on 1/14/2022 at 11:49 AM

## 2022-02-07 RX ORDER — SITAGLIPTIN AND METFORMIN HYDROCHLORIDE 1000; 50 MG/1; MG/1
TABLET, FILM COATED, EXTENDED RELEASE ORAL
Qty: 60 TABLET | Refills: 2 | Status: SHIPPED | OUTPATIENT
Start: 2022-02-07 | End: 2022-05-05

## 2022-02-08 ENCOUNTER — TELEPHONE (OUTPATIENT)
Dept: PRIMARY CARE CLINIC | Age: 67
End: 2022-02-08

## 2022-02-08 RX ORDER — DULAGLUTIDE 0.75 MG/.5ML
0.75 INJECTION, SOLUTION SUBCUTANEOUS WEEKLY
Qty: 4 PEN | Refills: 5 | Status: SHIPPED | OUTPATIENT
Start: 2022-02-08 | End: 2022-03-11 | Stop reason: SDUPTHER

## 2022-02-08 NOTE — TELEPHONE ENCOUNTER
No longer seeing endo, too far and unable to make appt-they will not fill her meds anymore. Pt asking if you will take over her DM meds?  If so, she needs a refill on trulicity RA [place]    DM meds verified-  humalog 8 units 3 times daily with meals  trulicity once weekly  toujeo 40 units bid

## 2022-03-03 DIAGNOSIS — E55.9 VITAMIN D DEFICIENCY: ICD-10-CM

## 2022-03-03 DIAGNOSIS — Z79.4 TYPE 2 DIABETES MELLITUS WITHOUT COMPLICATION, WITH LONG-TERM CURRENT USE OF INSULIN (HCC): ICD-10-CM

## 2022-03-03 DIAGNOSIS — E11.9 TYPE 2 DIABETES MELLITUS WITHOUT COMPLICATION, WITH LONG-TERM CURRENT USE OF INSULIN (HCC): ICD-10-CM

## 2022-03-03 DIAGNOSIS — I10 ESSENTIAL HYPERTENSION: ICD-10-CM

## 2022-03-03 LAB
ABSOLUTE EOS #: 0.17 K/UL (ref 0–0.44)
ABSOLUTE IMMATURE GRANULOCYTE: <0.03 K/UL (ref 0–0.3)
ABSOLUTE LYMPH #: 1.28 K/UL (ref 1.1–3.7)
ABSOLUTE MONO #: 0.33 K/UL (ref 0.1–1.2)
ALBUMIN SERPL-MCNC: 4 G/DL (ref 3.5–5.2)
ALBUMIN/GLOBULIN RATIO: 1.4 (ref 1–2.5)
ALP BLD-CCNC: 174 U/L (ref 35–104)
ALT SERPL-CCNC: 41 U/L (ref 5–33)
ANION GAP SERPL CALCULATED.3IONS-SCNC: 10 MMOL/L (ref 9–17)
AST SERPL-CCNC: 55 U/L
BASOPHILS # BLD: 1 % (ref 0–2)
BASOPHILS ABSOLUTE: 0.04 K/UL (ref 0–0.2)
BILIRUB SERPL-MCNC: 0.47 MG/DL (ref 0.3–1.2)
BUN BLDV-MCNC: 22 MG/DL (ref 8–23)
BUN/CREAT BLD: ABNORMAL (ref 9–20)
CALCIUM SERPL-MCNC: 10.1 MG/DL (ref 8.6–10.4)
CHLORIDE BLD-SCNC: 100 MMOL/L (ref 98–107)
CO2: 28 MMOL/L (ref 20–31)
CREAT SERPL-MCNC: 0.6 MG/DL (ref 0.5–0.9)
DIFFERENTIAL TYPE: ABNORMAL
EOSINOPHILS RELATIVE PERCENT: 4 % (ref 1–4)
GFR AFRICAN AMERICAN: >60 ML/MIN
GFR NON-AFRICAN AMERICAN: >60 ML/MIN
GFR SERPL CREATININE-BSD FRML MDRD: ABNORMAL ML/MIN/{1.73_M2}
GFR SERPL CREATININE-BSD FRML MDRD: ABNORMAL ML/MIN/{1.73_M2}
GLUCOSE BLD-MCNC: 112 MG/DL (ref 70–99)
HCT VFR BLD CALC: 37.3 % (ref 36.3–47.1)
HEMOGLOBIN: 11.8 G/DL (ref 11.9–15.1)
IMMATURE GRANULOCYTES: 0 %
LYMPHOCYTES # BLD: 26 % (ref 24–43)
MCH RBC QN AUTO: 30.1 PG (ref 25.2–33.5)
MCHC RBC AUTO-ENTMCNC: 31.6 G/DL (ref 28.4–34.8)
MCV RBC AUTO: 95.2 FL (ref 82.6–102.9)
MONOCYTES # BLD: 7 % (ref 3–12)
NRBC AUTOMATED: 0 PER 100 WBC
PDW BLD-RTO: 14.2 % (ref 11.8–14.4)
PLATELET # BLD: 150 K/UL (ref 138–453)
PLATELET ESTIMATE: ABNORMAL
PMV BLD AUTO: 11.9 FL (ref 8.1–13.5)
POTASSIUM SERPL-SCNC: 4.8 MMOL/L (ref 3.7–5.3)
RBC # BLD: 3.92 M/UL (ref 3.95–5.11)
RBC # BLD: ABNORMAL 10*6/UL
SEG NEUTROPHILS: 62 % (ref 36–65)
SEGMENTED NEUTROPHILS ABSOLUTE COUNT: 3.05 K/UL (ref 1.5–8.1)
SODIUM BLD-SCNC: 138 MMOL/L (ref 135–144)
TOTAL PROTEIN: 6.9 G/DL (ref 6.4–8.3)
VITAMIN D 25-HYDROXY: 44 NG/ML
WBC # BLD: 4.9 K/UL (ref 3.5–11.3)
WBC # BLD: ABNORMAL 10*3/UL

## 2022-03-10 ENCOUNTER — OFFICE VISIT (OUTPATIENT)
Dept: PRIMARY CARE CLINIC | Age: 67
End: 2022-03-10
Payer: MEDICARE

## 2022-03-10 VITALS
SYSTOLIC BLOOD PRESSURE: 150 MMHG | WEIGHT: 209.2 LBS | HEART RATE: 83 BPM | OXYGEN SATURATION: 97 % | DIASTOLIC BLOOD PRESSURE: 80 MMHG | BODY MASS INDEX: 42.25 KG/M2

## 2022-03-10 DIAGNOSIS — Z79.4 TYPE 2 DIABETES MELLITUS WITHOUT COMPLICATION, WITH LONG-TERM CURRENT USE OF INSULIN (HCC): Primary | ICD-10-CM

## 2022-03-10 DIAGNOSIS — K74.69 OTHER CIRRHOSIS OF LIVER (HCC): ICD-10-CM

## 2022-03-10 DIAGNOSIS — E11.9 TYPE 2 DIABETES MELLITUS WITHOUT COMPLICATION, WITH LONG-TERM CURRENT USE OF INSULIN (HCC): Primary | ICD-10-CM

## 2022-03-10 DIAGNOSIS — E66.01 OBESITY, CLASS III, BMI 40-49.9 (MORBID OBESITY) (HCC): ICD-10-CM

## 2022-03-10 DIAGNOSIS — Z23 NEED FOR VACCINATION: ICD-10-CM

## 2022-03-10 DIAGNOSIS — E11.319 DIABETIC RETINOPATHY ASSOCIATED WITH TYPE 2 DIABETES MELLITUS, MACULAR EDEMA PRESENCE UNSPECIFIED, UNSPECIFIED LATERALITY, UNSPECIFIED RETINOPATHY SEVERITY (HCC): ICD-10-CM

## 2022-03-10 PROCEDURE — 90732 PPSV23 VACC 2 YRS+ SUBQ/IM: CPT | Performed by: FAMILY MEDICINE

## 2022-03-10 PROCEDURE — 99213 OFFICE O/P EST LOW 20 MIN: CPT | Performed by: FAMILY MEDICINE

## 2022-03-10 PROCEDURE — G0009 ADMIN PNEUMOCOCCAL VACCINE: HCPCS | Performed by: FAMILY MEDICINE

## 2022-03-10 RX ORDER — LIRAGLUTIDE 6 MG/ML
INJECTION, SOLUTION SUBCUTANEOUS
Qty: 4 PEN | Refills: 3 | Status: SHIPPED | OUTPATIENT
Start: 2022-03-10 | End: 2022-03-11

## 2022-03-10 SDOH — ECONOMIC STABILITY: FOOD INSECURITY: WITHIN THE PAST 12 MONTHS, YOU WORRIED THAT YOUR FOOD WOULD RUN OUT BEFORE YOU GOT MONEY TO BUY MORE.: NEVER TRUE

## 2022-03-10 SDOH — ECONOMIC STABILITY: FOOD INSECURITY: WITHIN THE PAST 12 MONTHS, THE FOOD YOU BOUGHT JUST DIDN'T LAST AND YOU DIDN'T HAVE MONEY TO GET MORE.: NEVER TRUE

## 2022-03-10 ASSESSMENT — ENCOUNTER SYMPTOMS
SORE THROAT: 0
WHEEZING: 0
RHINORRHEA: 0
DIARRHEA: 0
VOMITING: 0
ABDOMINAL PAIN: 0
EYE REDNESS: 0
COUGH: 0
NAUSEA: 0
EYE DISCHARGE: 0
SHORTNESS OF BREATH: 0

## 2022-03-10 ASSESSMENT — SOCIAL DETERMINANTS OF HEALTH (SDOH): HOW HARD IS IT FOR YOU TO PAY FOR THE VERY BASICS LIKE FOOD, HOUSING, MEDICAL CARE, AND HEATING?: NOT HARD AT ALL

## 2022-03-10 NOTE — PROGRESS NOTES
[facility] PRIMARY CARE  [address]  [address] [address]  Dept: [address] Radha is a 78 y.o. female Established patient, who presents today for her medical conditions/complaints as noted below  Chief Complaint   Patient presents with    Diabetes     Patient check blood sugar 2 times a day     Hypertension     Patient doens't check B/P at home    Medication Check       HPI:     HPI  Saw liver doc today and her liver enzymes were up.   Wants her on different weight loss program.        Reviewed prior notes None  Reviewed previous Labs    Hemoglobin A1C (%)   Date Value   12/09/2021 9.6   09/09/2021 8.5   06/09/2021 8.9             ( goal A1C is < 7)   No results found for: LABMICR  LDL Cholesterol (mg/dL)   Date Value   03/04/2021 68   10/23/2019 74   10/23/2019 74     LDL Calculated (mg/dL)   Date Value   09/12/2018 84   12/30/2016 84       (goal LDL is <100)   AST (U/L)   Date Value   03/03/2022 55 (H)     ALT (U/L)   Date Value   03/03/2022 41 (H)     BUN (mg/dL)   Date Value   03/03/2022 22     BP Readings from Last 3 Encounters:   03/10/22 (!) 200/78   01/14/22 138/60   12/09/21 (!) 160/68          (goal 140/90)    Past Medical History:   Diagnosis Date    Abdominal swelling     Autoimmune hepatitis ([address]     Flatus     H/O acute sinusitis     H/O cholelithiasis     LLQ abdominal tenderness     RUQ abdominal pain     Tingling     Vaginal candidiasis         Social History     Tobacco Use    Smoking status: Never Smoker    Smokeless tobacco: Never Used   Substance Use Topics    Alcohol use: Never     Alcohol/week: 0.0 standard drinks      Current Outpatient Medications   Medication Sig Dispense Refill    liraglutide-weight management (SAXENDA) 18 MG/3ML SOPN 0.6 mg daily for 1 week, then 1.2 mg daily for 1 week, and increase by 0.6 mg daily each week - max of 3.0 mg daily 4 pen 3    TRULICITY 5.65 ZG/2.7SA SOPN Inject 0.75 mg into the skin once a week 4 pen 5    JANUMET XR  MG TB24 per extended release tablet take 1 tablet by mouth twice a day 60 tablet 2    Azelaic Acid (FINACEA) 15 % GEL Apply once daily 45 g 1    glipiZIDE (GLUCOTROL) 10 MG tablet take 1 tablet by mouth twice a day with meals 60 tablet 2    FREESTYLE TEST STRIPS strip TEST three times a day 300 strip 2    TOUJEO MAX SOLOSTAR 300 UNIT/ML SOPN inject 40 units subcutaneously twice a day 12 mL 2    gabapentin (NEURONTIN) 300 MG capsule take 2 capsules by mouth three times a day 180 capsule 3    B-D UF III MINI PEN NEEDLES 31G X 5 MM MISC use 1 PEN NEEDLE to inject MEDICATION subcutaneously four times a day with HUMALOG 100 each 3    atorvastatin (LIPITOR) 10 MG tablet take 1 tablet by mouth once daily 90 tablet 3    lisinopril-hydroCHLOROthiazide (PRINZIDE;ZESTORETIC) 20-25 MG per tablet take 1 tablet by mouth once daily 90 tablet 3    ibuprofen (ADVIL;MOTRIN) 600 MG tablet Take 1 tablet by mouth every 6 hours as needed for Pain With food 60 tablet 0    HUMALOG KWIKPEN 100 UNIT/ML SOPN inject PER SLIDING SCALE BEFORE MEALS AND AT BEDTIME 4 TIMES DAILY, MAX 25 UNITS DAILY 15 mL 3    Cholecalciferol (VITAMIN D3) 50 MCG (2000 UT) CAPS Take 2 capsules by mouth daily 30 capsule     Lancets MISC Test 4 times daily for uncontrolled  each 3    aspirin 81 MG tablet Take 81 mg by mouth daily      Lutein 20 MG TABS Take 1 tablet by mouth daily      Ascorbic Acid (VITAMIN C) 500 MG tablet Take by mouth daily Indications: as directed       No current facility-administered medications for this visit.      No Known Allergies    Health Maintenance   Topic Date Due    Hepatitis A vaccine (1 of 2 - Risk 2-dose series) Never done    DTaP/Tdap/Td vaccine (1 - Tdap) Never done    Shingles Vaccine (1 of 2) Never done    DEXA (modify frequency per FRAX score)  Never done    Diabetic foot exam  11/04/2020    Pneumococcal 65+ years Vaccine (2 of 2 - PPSV23) 03/16/2021    Annual Wellness Visit (AWV)  03/03/2022    Lipid screen  03/04/2022    A1C test (Diabetic or Prediabetic)  03/09/2022    Colorectal Cancer Screen  03/30/2022    Depression Screen  09/09/2022    Diabetic microalbuminuria test  12/09/2022    Breast cancer screen  12/30/2022    Diabetic retinal exam  01/12/2023    Potassium monitoring  03/03/2023    Creatinine monitoring  03/03/2023    Flu vaccine  Completed    COVID-19 Vaccine  Completed    Hepatitis C screen  Completed    Hib vaccine  Aged Out    Meningococcal (ACWY) vaccine  Aged Out       Subjective:     Review of Systems   Constitutional: Negative for chills and fever. HENT: Negative for rhinorrhea and sore throat. Eyes: Negative for discharge and redness. Respiratory: Negative for cough, shortness of breath and wheezing. Cardiovascular: Negative for chest pain and palpitations. Gastrointestinal: Negative for abdominal pain, diarrhea, nausea and vomiting. Genitourinary: Negative for dysuria and frequency. Musculoskeletal: Negative for arthralgias and myalgias. Neurological: Negative for dizziness, light-headedness and headaches. Psychiatric/Behavioral: Negative for sleep disturbance. Objective:     BP (!) 200/78   Pulse 83   Wt 209 lb 3.2 oz (94.9 kg)   SpO2 97%   BMI 42.25 kg/m²   Physical Exam  Vitals and nursing note reviewed. Constitutional:       General: She is not in acute distress. Appearance: She is well-developed. She is not ill-appearing. HENT:      Head: Normocephalic and atraumatic. Right Ear: External ear normal.      Left Ear: External ear normal.   Eyes:      General: No scleral icterus. Right eye: No discharge. Left eye: No discharge. Conjunctiva/sclera: Conjunctivae normal.      Pupils: Pupils are equal, round, and reactive to light. Neck:      Thyroid: No thyromegaly. Trachea: No tracheal deviation.    Cardiovascular:      Rate and Rhythm: Normal rate and regular rhythm. Heart sounds: Normal heart sounds. Pulmonary:      Effort: Pulmonary effort is normal. No respiratory distress. Breath sounds: Normal breath sounds. No wheezing. Lymphadenopathy:      Cervical: No cervical adenopathy. Skin:     General: Skin is warm. Findings: No rash. Neurological:      Mental Status: She is alert and oriented to person, place, and time. Psychiatric:         Mood and Affect: Mood normal.         Behavior: Behavior normal.         Thought Content: Thought content normal.         Assessment/Plan:   1. Type 2 diabetes mellitus without complication, with long-term current use of insulin (HCC)  -     POCT glycosylated hemoglobin (Hb A1C)  2. Other cirrhosis of liver (HCC)  Assessment & Plan:   Monitored by specialist- no acute findings meriting change in the plan    3. Diabetic retinopathy associated with type 2 diabetes mellitus, macular edema presence unspecified, unspecified laterality, unspecified retinopathy severity ([facility]  Assessment & Plan:   Monitored by specialist- no acute findings meriting change in the plan    4. Obesity, Class III, BMI 40-49.9 (morbid obesity) ([facility]     Return in about 4 weeks (around 2022) for weight check, medication f/u. Orders Placed This Encounter   Procedures    POCT glycosylated hemoglobin (Hb A1C)     Orders Placed This Encounter   Medications    liraglutide-weight management (SAXENDA) 18 MG/3ML SOPN     Si.6 mg daily for 1 week, then 1.2 mg daily for 1 week, and increase by 0.6 mg daily each week - max of 3.0 mg daily     Dispense:  4 pen     Refill:  3       Patient given educational materials - see patient instructions. Discussed use, benefit, and side effects of prescribed medications. All patient questions answered. Pt voiced understanding. Reviewed health maintenance. Instructed to continue current medications, diet and exercise. Patient agreed with treatment plan. Follow up as directed. Electronicallysigned by Fermin Schaffer MD on 3/10/2022 at 2:29 PM

## 2022-03-11 ENCOUNTER — TELEPHONE (OUTPATIENT)
Dept: PRIMARY CARE CLINIC | Age: 67
End: 2022-03-11

## 2022-03-11 RX ORDER — DULAGLUTIDE 1.5 MG/.5ML
1.5 INJECTION, SOLUTION SUBCUTANEOUS WEEKLY
Qty: 4 PEN | Refills: 0 | Status: SHIPPED | OUTPATIENT
Start: 2022-03-11 | End: 2022-04-18 | Stop reason: SDUPTHER

## 2022-03-11 RX ORDER — DULAGLUTIDE 0.75 MG/.5ML
0.75 INJECTION, SOLUTION SUBCUTANEOUS WEEKLY
Qty: 4 PEN | Refills: 5 | Status: SHIPPED | OUTPATIENT
Start: 2022-03-11 | End: 2022-03-11 | Stop reason: DRUGHIGH

## 2022-03-11 NOTE — TELEPHONE ENCOUNTER
trulicity sent in, but let pt know that I was incorrect when I told her there was only that low dose of it. It does come in different dose pens- I was not aware, so I increased the dose to 1.5 and we will see how she tolerates that.

## 2022-03-11 NOTE — TELEPHONE ENCOUNTER
Pt calling to let you know that she cannot afford the Saxenda. The cost is 1,200 dollars, ins will not cover it. Asking if you would put her back on the Trulicity? Uses [facility].

## 2022-03-21 RX ORDER — ATORVASTATIN CALCIUM 10 MG/1
TABLET, FILM COATED ORAL
Qty: 90 TABLET | Refills: 3 | Status: SHIPPED | OUTPATIENT
Start: 2022-03-21

## 2022-03-21 RX ORDER — LISINOPRIL AND HYDROCHLOROTHIAZIDE 25; 20 MG/1; MG/1
TABLET ORAL
Qty: 90 TABLET | Refills: 3 | Status: SHIPPED | OUTPATIENT
Start: 2022-03-21

## 2022-03-24 RX ORDER — GABAPENTIN 300 MG/1
CAPSULE ORAL
Qty: 180 CAPSULE | Refills: 3 | Status: SHIPPED | OUTPATIENT
Start: 2022-03-24 | End: 2022-07-22

## 2022-04-06 RX ORDER — GLIPIZIDE 10 MG/1
TABLET ORAL
Qty: 60 TABLET | Refills: 2 | Status: SHIPPED | OUTPATIENT
Start: 2022-04-06 | End: 2022-05-19 | Stop reason: SINTOL

## 2022-04-18 ENCOUNTER — OFFICE VISIT (OUTPATIENT)
Dept: PRIMARY CARE CLINIC | Age: 67
End: 2022-04-18
Payer: MEDICARE

## 2022-04-18 VITALS
DIASTOLIC BLOOD PRESSURE: 72 MMHG | OXYGEN SATURATION: 97 % | HEART RATE: 77 BPM | HEIGHT: 59 IN | BODY MASS INDEX: 42.25 KG/M2 | WEIGHT: 209.6 LBS | SYSTOLIC BLOOD PRESSURE: 130 MMHG

## 2022-04-18 DIAGNOSIS — E11.21 DIABETIC NEPHROPATHY ASSOCIATED WITH TYPE 2 DIABETES MELLITUS (HCC): ICD-10-CM

## 2022-04-18 DIAGNOSIS — E55.9 VITAMIN D DEFICIENCY: ICD-10-CM

## 2022-04-18 DIAGNOSIS — Z00.00 INITIAL MEDICARE ANNUAL WELLNESS VISIT: Primary | ICD-10-CM

## 2022-04-18 DIAGNOSIS — Z00.00 MEDICARE ANNUAL WELLNESS VISIT, SUBSEQUENT: ICD-10-CM

## 2022-04-18 DIAGNOSIS — Z78.0 MENOPAUSE: ICD-10-CM

## 2022-04-18 PROBLEM — K76.0 NAFLD (NONALCOHOLIC FATTY LIVER DISEASE): Status: ACTIVE | Noted: 2022-03-10

## 2022-04-18 PROBLEM — R79.89 ELEVATED LFTS: Status: ACTIVE | Noted: 2022-03-10

## 2022-04-18 PROBLEM — M77.8 TENDONITIS OF SHOULDER, RIGHT: Status: RESOLVED | Noted: 2021-12-09 | Resolved: 2022-04-18

## 2022-04-18 PROBLEM — E11.42 DIABETIC PERIPHERAL NEUROPATHY ASSOCIATED WITH TYPE 2 DIABETES MELLITUS (HCC): Status: ACTIVE | Noted: 2022-04-18

## 2022-04-18 PROBLEM — E11.3399 MODERATE NONPROLIFERATIVE DIABETIC RETINOPATHY ASSOCIATED WITH TYPE 2 DIABETES MELLITUS (HCC): Status: ACTIVE | Noted: 2020-10-14

## 2022-04-18 PROCEDURE — G0438 PPPS, INITIAL VISIT: HCPCS | Performed by: FAMILY MEDICINE

## 2022-04-18 RX ORDER — PHENTERMINE HYDROCHLORIDE 37.5 MG/1
37.5 TABLET ORAL
Qty: 30 TABLET | Refills: 0 | Status: SHIPPED | OUTPATIENT
Start: 2022-04-18 | End: 2022-05-19 | Stop reason: SDUPTHER

## 2022-04-18 RX ORDER — DULAGLUTIDE 1.5 MG/.5ML
3 INJECTION, SOLUTION SUBCUTANEOUS WEEKLY
Qty: 4 PEN | Refills: 0
Start: 2022-04-18 | End: 2022-05-19

## 2022-04-18 ASSESSMENT — PATIENT HEALTH QUESTIONNAIRE - PHQ9
SUM OF ALL RESPONSES TO PHQ QUESTIONS 1-9: 0
7. TROUBLE CONCENTRATING ON THINGS, SUCH AS READING THE NEWSPAPER OR WATCHING TELEVISION: 0
1. LITTLE INTEREST OR PLEASURE IN DOING THINGS: 0
10. IF YOU CHECKED OFF ANY PROBLEMS, HOW DIFFICULT HAVE THESE PROBLEMS MADE IT FOR YOU TO DO YOUR WORK, TAKE CARE OF THINGS AT HOME, OR GET ALONG WITH OTHER PEOPLE: 0
8. MOVING OR SPEAKING SO SLOWLY THAT OTHER PEOPLE COULD HAVE NOTICED. OR THE OPPOSITE, BEING SO FIGETY OR RESTLESS THAT YOU HAVE BEEN MOVING AROUND A LOT MORE THAN USUAL: 0
6. FEELING BAD ABOUT YOURSELF - OR THAT YOU ARE A FAILURE OR HAVE LET YOURSELF OR YOUR FAMILY DOWN: 0
9. THOUGHTS THAT YOU WOULD BE BETTER OFF DEAD, OR OF HURTING YOURSELF: 0
3. TROUBLE FALLING OR STAYING ASLEEP: 0
SUM OF ALL RESPONSES TO PHQ QUESTIONS 1-9: 0
4. FEELING TIRED OR HAVING LITTLE ENERGY: 0
SUM OF ALL RESPONSES TO PHQ QUESTIONS 1-9: 0
2. FEELING DOWN, DEPRESSED OR HOPELESS: 0
5. POOR APPETITE OR OVEREATING: 0
SUM OF ALL RESPONSES TO PHQ9 QUESTIONS 1 & 2: 0
SUM OF ALL RESPONSES TO PHQ QUESTIONS 1-9: 0

## 2022-04-18 ASSESSMENT — COLUMBIA-SUICIDE SEVERITY RATING SCALE - C-SSRS
2. HAVE YOU ACTUALLY HAD ANY THOUGHTS OF KILLING YOURSELF?: NO
1. WITHIN THE PAST MONTH, HAVE YOU WISHED YOU WERE DEAD OR WISHED YOU COULD GO TO SLEEP AND NOT WAKE UP?: NO
6. HAVE YOU EVER DONE ANYTHING, STARTED TO DO ANYTHING, OR PREPARED TO DO ANYTHING TO END YOUR LIFE?: NO

## 2022-04-18 ASSESSMENT — LIFESTYLE VARIABLES: HOW OFTEN DO YOU HAVE A DRINK CONTAINING ALCOHOL: NEVER

## 2022-04-18 NOTE — PROGRESS NOTES
Medicare Annual Wellness Visit    Ralf Wadsworth is here for Medicare AWV (Patient was given the words respect elephant and green to remember. She was given the time of 3:45 to draw )    Assessment & Plan   Medicare annual wellness visit, subsequent  Menopause  -     Vitamin D 25 Hydroxy; Future  -     DEXA BONE DENSITY 2 SITES; Future  Diabetic nephropathy associated with type 2 diabetes mellitus ([facility]  -     CBC with Auto Differential; Future  -     Comprehensive Metabolic Panel; Future  -     Vitamin D 25 Hydroxy; Future  -     Dulaglutide (TRULICITY) 1.5 CE/5.2OD SOPN; Inject 3 mg into the skin once a week, Disp-4 pen, R-0Disregard rx for 1.5 mgAdjust Sig  -     phentermine (ADIPEX-P) 37.5 MG tablet; Take 1 tablet by mouth every morning (before breakfast) for 30 days. , Disp-30 tablet, R-0Normal  -     DEXA BONE DENSITY 2 SITES; Future  Vitamin D deficiency  -     Vitamin D 25 Hydroxy; Future  -     DEXA BONE DENSITY 2 SITES; Future      Recommendations for Preventive Services Due: see orders and patient instructions/AVS.  Recommended screening schedule for the next 5-10 years is provided to the patient in written form: see Patient Instructions/AVS.     Return in about 1 year (around 4/18/2023) for [address] Subjective   The following acute and/or chronic problems were also addressed today:  Weight loss and DM- start phentermine and increase trulicity    Patient's complete Health Risk Assessment and screening values have been reviewed and are found in Flowsheets. The following problems were reviewed today and where indicated follow up appointments were made and/or referrals ordered.     Positive Risk Factor Screenings with Interventions:               General Health and ACP:  General  In general, how would you say your health is?: Good  In the past 7 days, have you experienced any of the following: New or Increased Pain, New or Increased Fatigue, Loneliness, Social Isolation, Stress or Anger?: No  Do you get the social and emotional support that you need?: Yes  Do you have a Living Will?: (!) No    Advance Directives     Power of Nicho Tang Will ACP-Advance Directive ACP-Power of     Not on File Not on File Not on File Not on File      General Health Risk Interventions:  · does not have POA- HC    Health Habits/Nutrition:     Physical Activity: Sufficiently Active    Days of Exercise per Week: 3 days    Minutes of Exercise per Session: 60 min     Have you lost any weight without trying in the past 3 months?: No  Body mass index: (!) 42.33  Have you seen the dentist within the past year?: (!) No    Health Habits/Nutrition Interventions:  · Dental exam overdue:  does not have insurance so has been avoiding             Objective   Vitals:    04/18/22 0927   BP: 130/72   Pulse: 77   SpO2: 97%   Weight: 209 lb 9.6 oz (95.1 kg)   Height: 4' 11\" (1.499 m)      Body mass index is 42.33 kg/m². Physical Exam  Vitals and nursing note reviewed. Constitutional:       General: She is not in acute distress. Appearance: She is well-developed. She is not ill-appearing. HENT:      Head: Normocephalic and atraumatic. Right Ear: External ear normal.      Left Ear: External ear normal.   Eyes:      General: No scleral icterus. Right eye: No discharge. Left eye: No discharge. Conjunctiva/sclera: Conjunctivae normal.      Pupils: Pupils are equal, round, and reactive to light. Neck:      Thyroid: No thyromegaly. Trachea: No tracheal deviation. Cardiovascular:      Rate and Rhythm: Normal rate and regular rhythm. Heart sounds: Normal heart sounds. Pulmonary:      Effort: Pulmonary effort is normal. No respiratory distress. Breath sounds: Normal breath sounds. No wheezing. Lymphadenopathy:      Cervical: No cervical adenopathy. Skin:     General: Skin is warm. Findings: No rash.    Neurological:      Mental Status: She is alert and oriented to person, place, and time.   Psychiatric:         Mood and Affect: Mood normal.         Behavior: Behavior normal.         Thought Content: Thought content normal.            No Known Allergies  Prior to Visit Medications    Medication Sig Taking? Authorizing Provider   Vitamin Mixture (VITAMIN E COMPLETE PO) Take by mouth Yes Historical Provider, MD   Dulaglutide (TRULICITY) 1.5 GV/5.9RB SOPN Inject 3 mg into the skin once a week Yes Alexandre Marin MD   phentermine (ADIPEX-P) 37.5 MG tablet Take 1 tablet by mouth every morning (before breakfast) for 30 days.  Yes Alexandre Marin MD   glipiZIDE (GLUCOTROL) 10 MG tablet take 1 tablet by mouth twice a day with meals Yes Alexandre Marin MD   gabapentin (NEURONTIN) 300 MG capsule take 2 capsules by mouth three times a day Yes Alexandre Marin MD   atorvastatin (LIPITOR) 10 MG tablet take 1 tablet by mouth once daily Yes Alexandre Marin MD   lisinopril-hydroCHLOROthiazide (PRINZIDE;ZESTORETIC) 20-25 MG per tablet take 1 tablet by mouth once daily Yes Alexandre Marin MD   JANUMET XR  MG TB24 per extended release tablet take 1 tablet by mouth twice a day Yes Alexandre Marin MD   Azelaic Acid (FINACEA) 15 % GEL Apply once daily Yes Mumtaz Hays PA-C   FREESTYLE TEST STRIPS strip TEST three times a day Yes MD CHARLIE [place] MAX SOLOSTAR 300 UNIT/ML SOPN inject 40 units subcutaneously twice a day Yes Alexandre Marin MD   B-D UF III MINI PEN NEEDLES 31G X 5 MM MISC use 1 PEN NEEDLE to inject MEDICATION subcutaneously four times a day with HUMALOG Yes Alexandre Marin MD   ibuprofen (ADVIL;MOTRIN) 600 MG tablet Take 1 tablet by mouth every 6 hours as needed for Pain With food Yes Matty Wilson PA-C   HUMALOG KWIKPEN 100 UNIT/ML SOPN inject PER SLIDING SCALE BEFORE MEALS AND AT BEDTIME 4 TIMES DAILY, MAX 25 UNITS DAILY Yes Alexandre Marin MD   Cholecalciferol (VITAMIN D3) 50 MCG (2000 UT) CAPS Take 2 capsules by mouth daily Yes Alexandre Marin MD   Lancets MISC Test 4 times daily for uncontrolled BS Yes Alexandre Marin MD   aspirin 81 MG tablet Take 81 mg by mouth daily Yes Historical Provider, MD   Lutein 20 MG TABS Take 1 tablet by mouth daily Yes Historical Provider, MD   Ascorbic Acid (VITAMIN C) 500 MG tablet Take by mouth daily Indications: as directed Yes Historical Provider, MD       CareTeam (Including outside providers/suppliers regularly involved in providing care):   Patient Care Team:  Alexandre Marin MD as PCP - Radha Pereira MD as PCP - [facility] Empaneled Provider    Reviewed and updated this visit:  Tobacco  Allergies  Meds  Med Hx  Surg Hx  Soc Hx  Fam Hx

## 2022-04-18 NOTE — PATIENT INSTRUCTIONS
Increase trulicity to 3.0 weekly  Start phentermine  Personalized Preventive Plan for Pillo Ramesh - 4/18/2022  Medicare offers a range of preventive health benefits. Some of the tests and screenings are paid in full while other may be subject to a deductible, co-insurance, and/or copay. Some of these benefits include a comprehensive review of your medical history including lifestyle, illnesses that may run in your family, and various assessments and screenings as appropriate. After reviewing your medical record and screening and assessments performed today your provider may have ordered immunizations, labs, imaging, and/or referrals for you. A list of these orders (if applicable) as well as your Preventive Care list are included within your After Visit Summary for your review. Other Preventive Recommendations:    · A preventive eye exam performed by an eye specialist is recommended every 1-2 years to screen for glaucoma; cataracts, macular degeneration, and other eye disorders. · A preventive dental visit is recommended every 6 months. · Try to get at least 150 minutes of exercise per week or 10,000 steps per day on a pedometer . · Order or download the FREE \"Exercise & Physical Activity: Your Everyday Guide\" from [employer]. Call 5-323.683.9203 or search [employer] online. · You need 4498-5978 mg of calcium and 6058-6680 IU of vitamin D per day. It is possible to meet your calcium requirement with diet alone, but a vitamin D supplement is usually necessary to meet this goal.  · When exposed to the sun, use a sunscreen that protects against both UVA and UVB radiation with an SPF of 30 or greater. Reapply every 2 to 3 hours or after sweating, drying off with a towel, or swimming. · Always wear a seat belt when traveling in a car. Always wear a helmet when riding a bicycle or motorcycle.

## 2022-04-27 ENCOUNTER — HOSPITAL ENCOUNTER (OUTPATIENT)
Dept: WOMENS IMAGING | Age: 67
Discharge: HOME | End: 2022-04-29
Payer: MEDICARE

## 2022-04-27 DIAGNOSIS — E11.21 DIABETIC NEPHROPATHY ASSOCIATED WITH TYPE 2 DIABETES MELLITUS (HCC): ICD-10-CM

## 2022-04-27 DIAGNOSIS — E55.9 VITAMIN D DEFICIENCY: ICD-10-CM

## 2022-04-27 DIAGNOSIS — Z78.0 MENOPAUSE: ICD-10-CM

## 2022-04-27 PROCEDURE — 77080 DXA BONE DENSITY AXIAL: CPT

## 2022-05-05 RX ORDER — SITAGLIPTIN AND METFORMIN HYDROCHLORIDE 1000; 50 MG/1; MG/1
TABLET, FILM COATED, EXTENDED RELEASE ORAL
Qty: 60 TABLET | Refills: 2 | Status: SHIPPED | OUTPATIENT
Start: 2022-05-05 | End: 2022-08-02

## 2022-05-18 DIAGNOSIS — E11.21 DIABETIC NEPHROPATHY ASSOCIATED WITH TYPE 2 DIABETES MELLITUS (HCC): ICD-10-CM

## 2022-05-19 ENCOUNTER — OFFICE VISIT (OUTPATIENT)
Dept: PRIMARY CARE CLINIC | Age: 67
End: 2022-05-19
Payer: MEDICARE

## 2022-05-19 ENCOUNTER — TELEPHONE (OUTPATIENT)
Dept: PRIMARY CARE CLINIC | Age: 67
End: 2022-05-19

## 2022-05-19 VITALS
OXYGEN SATURATION: 98 % | SYSTOLIC BLOOD PRESSURE: 136 MMHG | DIASTOLIC BLOOD PRESSURE: 80 MMHG | WEIGHT: 197.2 LBS | BODY MASS INDEX: 39.76 KG/M2 | HEART RATE: 90 BPM | HEIGHT: 59 IN

## 2022-05-19 DIAGNOSIS — E16.2 HYPOGLYCEMIA: ICD-10-CM

## 2022-05-19 DIAGNOSIS — E11.21 DIABETIC NEPHROPATHY ASSOCIATED WITH TYPE 2 DIABETES MELLITUS (HCC): ICD-10-CM

## 2022-05-19 DIAGNOSIS — I10 ESSENTIAL HYPERTENSION: Primary | ICD-10-CM

## 2022-05-19 DIAGNOSIS — R63.5 WEIGHT GAIN: ICD-10-CM

## 2022-05-19 PROCEDURE — 99213 OFFICE O/P EST LOW 20 MIN: CPT | Performed by: FAMILY MEDICINE

## 2022-05-19 RX ORDER — PHENTERMINE HYDROCHLORIDE 37.5 MG/1
37.5 TABLET ORAL
Qty: 30 TABLET | Refills: 0 | Status: SHIPPED | OUTPATIENT
Start: 2022-05-19 | End: 2022-06-15 | Stop reason: SDUPTHER

## 2022-05-19 RX ORDER — DULAGLUTIDE 1.5 MG/.5ML
INJECTION, SOLUTION SUBCUTANEOUS
Qty: 4 ML | Refills: 3 | Status: SHIPPED | OUTPATIENT
Start: 2022-05-19 | End: 2022-07-27 | Stop reason: DRUGHIGH

## 2022-05-19 ASSESSMENT — ENCOUNTER SYMPTOMS
COUGH: 0
WHEEZING: 0
NAUSEA: 0
SHORTNESS OF BREATH: 0
EYE DISCHARGE: 0
RHINORRHEA: 0
DIARRHEA: 0
ABDOMINAL PAIN: 0
SORE THROAT: 0
EYE REDNESS: 0
VOMITING: 0

## 2022-05-19 NOTE — PROGRESS NOTES
[address] [place] [address] PRIMARY CARE  [address]  [address] [address]  Dept: [facility] Stephen Riggins Box Radha is a 78 y.o. female Established patient, who presents today for her medical conditions/complaints as noted below. Chief Complaint   Patient presents with    Weight Loss     1 month follow up adipex        HPI:     HPI- has lost weight with adipex. Clothes fitting different. Gets dry mouth- using biotene, drinking a lot of water. Almost has to force herself to eat because food doesn't really look good. BS going down in the mornings because she is not eating.       Reviewed prior notes: None   Reviewed previous:  na    LDL Cholesterol (mg/dL)   Date Value   2021 68   10/23/2019 74   10/23/2019 74     LDL Calculated (mg/dL)   Date Value   2018 84   2016 84       (goal LDL is <100)   AST (U/L)   Date Value   2022 55 (H)     ALT (U/L)   Date Value   2022 41 (H)     BUN (mg/dL)   Date Value   2022 22     Hemoglobin A1C (%)   Date Value   2021 9.6     TSH (mIU/L)   Date Value   10/23/2019 2.66     BP Readings from Last 3 Encounters:   22 136/80   22 130/72   03/10/22 (!) 150/80          (goal 120/80)    Past Medical History:   Diagnosis Date    Abdominal swelling     Autoimmune hepatitis ([address]     Diabetic retinopathy ([facility] [address] [facility] 2015    Flatus     H/O acute sinusitis     H/O cholelithiasis     LLQ abdominal tenderness     RUQ abdominal pain     Tendonitis of shoulder, right 2021    Tingling     Vaginal candidiasis       Past Surgical History:   Procedure Laterality Date    ANKLE FRACTURE SURGERY Left 2006    no retained metal    CATARACT REMOVAL WITH IMPLANT Bilateral 2018     SECTION      x2, 1988, 1991    CHOLECYSTECTOMY, LAPAROSCOPIC         Family History   Problem Relation Age of Onset    Breast Cancer Mother     Diabetes Mother     Stroke Mother     Cancer Father prostate    Cancer Sister         cervical    Breast Cancer Sister     Diabetes Other         multiple relatives on Mom's side       Social History     Tobacco Use    Smoking status: Never Smoker    Smokeless tobacco: Never Used   Substance Use Topics    Alcohol use: Never     Alcohol/week: 0.0 standard drinks      Current Outpatient Medications   Medication Sig Dispense Refill    phentermine (ADIPEX-P) 37.5 MG tablet Take 1 tablet by mouth every morning (before breakfast) for 30 days.  30 tablet 0    JANUMET XR  MG TB24 per extended release tablet take 1 tablet by mouth twice a day 60 tablet 2    Vitamin Mixture (VITAMIN E COMPLETE PO) Take by mouth      Dulaglutide (TRULICITY) 1.5 PN/3.2CA SOPN Inject 3 mg into the skin once a week 4 pen 0    gabapentin (NEURONTIN) 300 MG capsule take 2 capsules by mouth three times a day 180 capsule 3    atorvastatin (LIPITOR) 10 MG tablet take 1 tablet by mouth once daily 90 tablet 3    lisinopril-hydroCHLOROthiazide (PRINZIDE;ZESTORETIC) 20-25 MG per tablet take 1 tablet by mouth once daily 90 tablet 3    Azelaic Acid (FINACEA) 15 % GEL Apply once daily 45 g 1    FREESTYLE TEST STRIPS strip TEST three times a day 300 strip 2    TOUJEO MAX SOLOSTAR 300 UNIT/ML SOPN inject 40 units subcutaneously twice a day 12 mL 2    B-D UF III MINI PEN NEEDLES 31G X 5 MM MISC use 1 PEN NEEDLE to inject MEDICATION subcutaneously four times a day with HUMALOG 100 each 3    ibuprofen (ADVIL;MOTRIN) 600 MG tablet Take 1 tablet by mouth every 6 hours as needed for Pain With food 60 tablet 0    HUMALOG KWIKPEN 100 UNIT/ML SOPN inject PER SLIDING SCALE BEFORE MEALS AND AT BEDTIME 4 TIMES DAILY, MAX 25 UNITS DAILY 15 mL 3    Cholecalciferol (VITAMIN D3) 50 MCG (2000 UT) CAPS Take 2 capsules by mouth daily 30 capsule     Lancets MISC Test 4 times daily for uncontrolled  each 3    aspirin 81 MG tablet Take 81 mg by mouth daily      Lutein 20 MG TABS Take 1 tablet by mouth daily      Ascorbic Acid (VITAMIN C) 500 MG tablet Take by mouth daily Indications: as directed       No current facility-administered medications for this visit. No Known Allergies    Health Maintenance   Topic Date Due    Hepatitis A vaccine (1 of 2 - Risk 2-dose series) Never done    DTaP/Tdap/Td vaccine (1 - Tdap) Never done    Diabetic foot exam  11/04/2020    Lipids  03/04/2022    A1C test (Diabetic or Prediabetic)  03/09/2022    Colorectal Cancer Screen  03/30/2022    Shingles vaccine (2 of 2) 06/13/2022    Breast cancer screen  12/30/2022    Diabetic retinal exam  03/30/2023    Depression Screen  04/18/2023    Annual Wellness Visit (AWV)  04/19/2023    DEXA (modify frequency per FRAX score)  Completed    Flu vaccine  Completed    Pneumococcal 65+ years Vaccine  Completed    COVID-19 Vaccine  Completed    Hepatitis C screen  Completed    Hib vaccine  Aged Out    Meningococcal (ACWY) vaccine  Aged Out       Subjective:      Review of Systems   Constitutional: Negative for chills and fever. HENT: Negative for rhinorrhea and sore throat. Eyes: Negative for discharge and redness. Respiratory: Negative for cough, shortness of breath and wheezing. Cardiovascular: Negative for chest pain and palpitations. Gastrointestinal: Negative for abdominal pain, diarrhea, nausea and vomiting. Genitourinary: Negative for dysuria and frequency. Musculoskeletal: Negative for arthralgias and myalgias. Neurological: Positive for light-headedness (just with low sugar). Negative for dizziness and headaches. Psychiatric/Behavioral: Negative for sleep disturbance. Objective:     /80   Pulse 90   Ht 4' 11\" (1.499 m)   Wt 197 lb 3.2 oz (89.4 kg)   SpO2 98%   BMI 39.83 kg/m²   Physical Exam  Vitals and nursing note reviewed. Constitutional:       General: She is not in acute distress. Appearance: She is well-developed. She is not ill-appearing.    HENT:      Head: Normocephalic and atraumatic. Right Ear: External ear normal.      Left Ear: External ear normal.   Eyes:      General: No scleral icterus. Right eye: No discharge. Left eye: No discharge. Conjunctiva/sclera: Conjunctivae normal.   Neck:      Thyroid: No thyromegaly. Trachea: No tracheal deviation. Cardiovascular:      Rate and Rhythm: Normal rate and regular rhythm. Heart sounds: Normal heart sounds. Pulmonary:      Effort: Pulmonary effort is normal. No respiratory distress. Breath sounds: Normal breath sounds. No wheezing. Lymphadenopathy:      Cervical: No cervical adenopathy. Skin:     General: Skin is warm. Findings: No rash. Neurological:      Mental Status: She is alert and oriented to person, place, and time. Psychiatric:         Mood and Affect: Mood normal.         Behavior: Behavior normal.         Thought Content: Thought content normal.         Assessment/Plan:   1. Essential hypertension  Assessment & Plan:   Well-controlled, continue current medications  2. Hypoglycemia  Assessment & Plan:   due to new diet with adipex and glipizide- d/c glipizide and watch sugars. 3. Diabetic nephropathy associated with type 2 diabetes mellitus (HCC)  -     phentermine (ADIPEX-P) 37.5 MG tablet; Take 1 tablet by mouth every morning (before breakfast) for 30 days. , Disp-30 tablet, R-0Normal  4. Weight gain       Return in about 4 weeks (around 6/16/2022) for weight check. Data Unavailable     No orders of the defined types were placed in this encounter. Orders Placed This Encounter   Medications    phentermine (ADIPEX-P) 37.5 MG tablet     Sig: Take 1 tablet by mouth every morning (before breakfast) for 30 days. Dispense:  30 tablet     Refill:  0       Patient given educational materials - see patient instructions. Discussed use, benefit, and side effects of prescribed medications. All patient questions answered. Pt voiced understanding.  Reviewed health maintenance. Instructed to continue current medications, diet and exercise. Patient agreed with treatment plan. Follow up as directed.      Electronically signed by Adriana Patel MD on 5/19/2022 at 11:11 AM

## 2022-05-19 NOTE — TELEPHONE ENCOUNTER
----- Message from Tri Chávez sent at 5/18/2022  4:35 PM EDT -----  Subject: Medication Problem    QUESTIONS  Name of Medication? phentermine (ADIPEX-P) 37.5 MG tablet  Patient-reported dosage and instructions? 37.5MG taken once a day before   breakfast  What question or problem do you have with the medication? ECC received a   call from Pt? Jensen Jorgensen. Pt needs to come into the office to get   weighed by a RN sometime late tomorrow if possible due to the Rx she takes   for weight loss. Preferred Pharmacy? [facility] [phone]  Pharmacy phone number (if available)? [phone]  Additional Information for Provider?   ---------------------------------------------------------------------------  --------------  CALL BACK INFO  What is the best way for the office to contact you? OK to leave message on   voicemail  Preferred Call Back Phone Number? [phone]  ---------------------------------------------------------------------------  --------------  SCRIPT ANSWERS  Relationship to Patient?  Self

## 2022-06-03 DIAGNOSIS — E11.21 DIABETIC NEPHROPATHY ASSOCIATED WITH TYPE 2 DIABETES MELLITUS (HCC): ICD-10-CM

## 2022-06-03 DIAGNOSIS — Z78.0 MENOPAUSE: ICD-10-CM

## 2022-06-03 DIAGNOSIS — E55.9 VITAMIN D DEFICIENCY: ICD-10-CM

## 2022-06-03 LAB
ABSOLUTE EOS #: 0.19 K/UL (ref 0–0.44)
ABSOLUTE IMMATURE GRANULOCYTE: <0.03 K/UL (ref 0–0.3)
ABSOLUTE LYMPH #: 1.21 K/UL (ref 1.1–3.7)
ABSOLUTE MONO #: 0.29 K/UL (ref 0.1–1.2)
BASOPHILS # BLD: 1 % (ref 0–2)
BASOPHILS ABSOLUTE: 0.04 K/UL (ref 0–0.2)
DIFFERENTIAL TYPE: ABNORMAL
EOSINOPHILS RELATIVE PERCENT: 4 % (ref 1–4)
HCT VFR BLD CALC: 34.2 % (ref 36.3–47.1)
HEMOGLOBIN: 11.3 G/DL (ref 11.9–15.1)
IMMATURE GRANULOCYTES: 0 %
LYMPHOCYTES # BLD: 28 % (ref 24–43)
MCH RBC QN AUTO: 29.9 PG (ref 25.2–33.5)
MCHC RBC AUTO-ENTMCNC: 33 G/DL (ref 28.4–34.8)
MCV RBC AUTO: 90.5 FL (ref 82.6–102.9)
MONOCYTES # BLD: 7 % (ref 3–12)
NRBC AUTOMATED: 0 PER 100 WBC
PDW BLD-RTO: 13.9 % (ref 11.8–14.4)
PLATELET # BLD: 151 K/UL (ref 138–453)
PLATELET ESTIMATE: ABNORMAL
PMV BLD AUTO: 11.3 FL (ref 8.1–13.5)
RBC # BLD: 3.78 M/UL (ref 3.95–5.11)
RBC # BLD: ABNORMAL 10*6/UL
SEG NEUTROPHILS: 60 % (ref 36–65)
SEGMENTED NEUTROPHILS ABSOLUTE COUNT: 2.59 K/UL (ref 1.5–8.1)
WBC # BLD: 4.3 K/UL (ref 3.5–11.3)
WBC # BLD: ABNORMAL 10*3/UL

## 2022-06-04 LAB
ALBUMIN SERPL-MCNC: 4 G/DL (ref 3.5–5.2)
ALBUMIN/GLOBULIN RATIO: 1.2 (ref 1–2.5)
ALP BLD-CCNC: 150 U/L (ref 35–104)
ALT SERPL-CCNC: 33 U/L (ref 5–33)
ANION GAP SERPL CALCULATED.3IONS-SCNC: 12 MMOL/L (ref 9–17)
AST SERPL-CCNC: 36 U/L
BILIRUB SERPL-MCNC: 0.4 MG/DL (ref 0.3–1.2)
BUN BLDV-MCNC: 16 MG/DL (ref 8–23)
BUN/CREAT BLD: ABNORMAL (ref 9–20)
CALCIUM SERPL-MCNC: 10.2 MG/DL (ref 8.6–10.4)
CHLORIDE BLD-SCNC: 103 MMOL/L (ref 98–107)
CO2: 26 MMOL/L (ref 20–31)
CREAT SERPL-MCNC: 0.69 MG/DL (ref 0.5–0.9)
GFR AFRICAN AMERICAN: >60 ML/MIN
GFR NON-AFRICAN AMERICAN: >60 ML/MIN
GFR SERPL CREATININE-BSD FRML MDRD: ABNORMAL ML/MIN/{1.73_M2}
GFR SERPL CREATININE-BSD FRML MDRD: ABNORMAL ML/MIN/{1.73_M2}
GLUCOSE BLD-MCNC: 171 MG/DL (ref 70–99)
POTASSIUM SERPL-SCNC: 5.2 MMOL/L (ref 3.7–5.3)
SODIUM BLD-SCNC: 141 MMOL/L (ref 135–144)
TOTAL PROTEIN: 7.3 G/DL (ref 6.4–8.3)
VITAMIN D 25-HYDROXY: 58.9 NG/ML

## 2022-06-15 ENCOUNTER — OFFICE VISIT (OUTPATIENT)
Dept: PRIMARY CARE CLINIC | Age: 67
End: 2022-06-15
Payer: MEDICARE

## 2022-06-15 VITALS
DIASTOLIC BLOOD PRESSURE: 82 MMHG | SYSTOLIC BLOOD PRESSURE: 170 MMHG | HEART RATE: 86 BPM | OXYGEN SATURATION: 98 % | WEIGHT: 194.6 LBS | BODY MASS INDEX: 39.3 KG/M2

## 2022-06-15 DIAGNOSIS — Z79.4 TYPE 2 DIABETES MELLITUS WITHOUT COMPLICATION, WITH LONG-TERM CURRENT USE OF INSULIN (HCC): Primary | ICD-10-CM

## 2022-06-15 DIAGNOSIS — E11.21 DIABETIC NEPHROPATHY ASSOCIATED WITH TYPE 2 DIABETES MELLITUS (HCC): ICD-10-CM

## 2022-06-15 DIAGNOSIS — E66.01 MORBIDLY OBESE (HCC): ICD-10-CM

## 2022-06-15 DIAGNOSIS — E11.9 TYPE 2 DIABETES MELLITUS WITHOUT COMPLICATION, WITH LONG-TERM CURRENT USE OF INSULIN (HCC): Primary | ICD-10-CM

## 2022-06-15 LAB — HBA1C MFR BLD: 7.7 %

## 2022-06-15 PROCEDURE — 3051F HG A1C>EQUAL 7.0%<8.0%: CPT | Performed by: FAMILY MEDICINE

## 2022-06-15 PROCEDURE — 1123F ACP DISCUSS/DSCN MKR DOCD: CPT | Performed by: FAMILY MEDICINE

## 2022-06-15 PROCEDURE — 99213 OFFICE O/P EST LOW 20 MIN: CPT | Performed by: FAMILY MEDICINE

## 2022-06-15 PROCEDURE — 83036 HEMOGLOBIN GLYCOSYLATED A1C: CPT | Performed by: FAMILY MEDICINE

## 2022-06-15 RX ORDER — PHENTERMINE HYDROCHLORIDE 37.5 MG/1
37.5 TABLET ORAL
Qty: 30 TABLET | Refills: 0 | Status: SHIPPED | OUTPATIENT
Start: 2022-06-15 | End: 2022-07-15

## 2022-06-15 ASSESSMENT — ENCOUNTER SYMPTOMS
VOMITING: 0
WHEEZING: 0
ABDOMINAL PAIN: 0
EYE DISCHARGE: 0
EYE REDNESS: 0
RHINORRHEA: 0
SHORTNESS OF BREATH: 0
SORE THROAT: 0
NAUSEA: 0
COUGH: 0
DIARRHEA: 0

## 2022-06-15 NOTE — PROGRESS NOTES
[address] [place] [address] PRIMARY CARE  [address]  [address] [address]  Dept: [address] Stephen Riggins Box Radha is a 78 y.o. female Established patient, who presents today for her medical conditions/complaints as noted below. Chief Complaint   Patient presents with    Diabetes     Patient checks blood sugar at home. The last few mornings, blood sugar is low     Hypertension     Patient doesn't check B/P at home     Other     Weight check. Patient on 2nd month of adipex       HPI:     HPI    She presents to the office for a 1-month follow-up visit. She has been on Adipex for 2 months and has lost 15 pounds since starting it. She admits appetite loss with Adipex. She states she is trying to eat healthy foods. She exercises by walking, swimming, and working in the yards. She reports one episode of tachycardia while out shopping 4-5 days ago that resolved after resting. She admits dry mouth - uses Biotene, drinks more water. Denies constipation, trouble sleeping, dizziness, behavior changes, nausea, or vomiting. Doing good and wants to continue med. She notices more space in her clothes. She has a history of DM for which she checks her blood glucose levels 2 times a day. She states it has been running low in the 50s in the AM and around 120-135 in the PM. She eats dinner around 6-7 PM and tries to eat a snack at night. She states she ate toast last night to see if it would increase her morning reading and it was 120.      Last did blood work on 6/3/22 with Dr. Chato Rodrigez    Reviewed prior notes: None   Reviewed previous:  Labs    LDL Cholesterol (mg/dL)   Date Value   03/04/2021 68   10/23/2019 74   10/23/2019 74     LDL Calculated (mg/dL)   Date Value   09/12/2018 84   12/30/2016 84       (goal LDL is <100)   AST (U/L)   Date Value   06/03/2022 36 (H)   06/03/2022 36 (H)     ALT (U/L)   Date Value   06/03/2022 33   06/03/2022 33     BUN (mg/dL)   Date Value   06/03/2022 16 2022 16     Hemoglobin A1C (%)   Date Value   06/15/2022 7.7     TSH (mIU/L)   Date Value   10/23/2019 2.66     BP Readings from Last 3 Encounters:   06/15/22 (!) 178/90   22 136/80   22 130/72          (goal 120/80)    Past Medical History:   Diagnosis Date    Abdominal swelling     Autoimmune hepatitis ([facility] [address] [facility]     Diabetic retinopathy ([facility] [address] 2015    Flatus     H/O acute sinusitis     H/O cholelithiasis     LLQ abdominal tenderness     RUQ abdominal pain     Tendonitis of shoulder, right 2021    Tingling     Vaginal candidiasis       Past Surgical History:   Procedure Laterality Date    ANKLE FRACTURE SURGERY Left 2006    no retained metal    CATARACT REMOVAL WITH IMPLANT Bilateral 2018     SECTION      x2, 1988,     CHOLECYSTECTOMY, LAPAROSCOPIC         Family History   Problem Relation Age of Onset    Breast Cancer Mother     Diabetes Mother     Stroke Mother     Cancer Father         prostate    Cancer Sister         cervical    Breast Cancer Sister     Diabetes Other         multiple relatives on Mom's side       Social History     Tobacco Use    Smoking status: Never Smoker    Smokeless tobacco: Never Used   Substance Use Topics    Alcohol use: Never     Alcohol/week: 0.0 standard drinks      Current Outpatient Medications   Medication Sig Dispense Refill    phentermine (ADIPEX-P) 37.5 MG tablet Take 1 tablet by mouth every morning (before breakfast) for 30 days.  30 tablet 0    TRULICITY 1.5 VH/6.6RK SOPN inject 0.5 milliliters ( 1 AND 1/2 milligrams ) subcutaneously every week 4 mL 3    JANUMET XR  MG TB24 per extended release tablet take 1 tablet by mouth twice a day 60 tablet 2    Vitamin Mixture (VITAMIN E COMPLETE PO) Take by mouth      gabapentin (NEURONTIN) 300 MG capsule take 2 capsules by mouth three times a day 180 capsule 3    atorvastatin (LIPITOR) 10 MG tablet take 1 tablet by mouth once daily 90 tablet 3    lisinopril-hydroCHLOROthiazide (PRINZIDE;ZESTORETIC) 20-25 MG per tablet take 1 tablet by mouth once daily 90 tablet 3    Azelaic Acid (FINACEA) 15 % GEL Apply once daily 45 g 1    FREESTYLE TEST STRIPS strip TEST three times a day 300 strip 2    TOUJEO MAX SOLOSTAR 300 UNIT/ML SOPN inject 40 units subcutaneously twice a day 12 mL 2    B-D UF III MINI PEN NEEDLES 31G X 5 MM MISC use 1 PEN NEEDLE to inject MEDICATION subcutaneously four times a day with HUMALOG 100 each 3    ibuprofen (ADVIL;MOTRIN) 600 MG tablet Take 1 tablet by mouth every 6 hours as needed for Pain With food 60 tablet 0    HUMALOG KWIKPEN 100 UNIT/ML SOPN inject PER SLIDING SCALE BEFORE MEALS AND AT BEDTIME 4 TIMES DAILY, MAX 25 UNITS DAILY 15 mL 3    Cholecalciferol (VITAMIN D3) 50 MCG (2000 UT) CAPS Take 2 capsules by mouth daily 30 capsule     Lancets MISC Test 4 times daily for uncontrolled  each 3    aspirin 81 MG tablet Take 81 mg by mouth daily      Lutein 20 MG TABS Take 1 tablet by mouth daily      Ascorbic Acid (VITAMIN C) 500 MG tablet Take by mouth daily Indications: as directed       No current facility-administered medications for this visit.      No Known Allergies    Health Maintenance   Topic Date Due    Hepatitis A vaccine (1 of 2 - Risk 2-dose series) Never done    DTaP/Tdap/Td vaccine (1 - Tdap) Never done    Diabetic foot exam  11/04/2020    Lipids  03/04/2022    Colorectal Cancer Screen  03/30/2022    Shingles vaccine (2 of 2) 06/13/2022    A1C test (Diabetic or Prediabetic)  09/15/2022    Breast cancer screen  12/30/2022    Diabetic retinal exam  03/30/2023    Depression Screen  04/18/2023    Annual Wellness Visit (AWV)  04/19/2023    DEXA (modify frequency per FRAX score)  Completed    Flu vaccine  Completed    Pneumococcal 65+ years Vaccine  Completed    COVID-19 Vaccine  Completed    Hepatitis C screen  Completed    Hib vaccine  Aged Out    Meningococcal (ACWY) vaccine  Aged Out Assessment/Plan:   1. Type 2 diabetes mellitus without complication, with long-term current use of insulin (HCC)  -     POCT glycosylated hemoglobin (Hb A1C)  2. Diabetic nephropathy associated with type 2 diabetes mellitus (HCC)  -     phentermine (ADIPEX-P) 37.5 MG tablet; Take 1 tablet by mouth every morning (before breakfast) for 30 days. , Disp-30 tablet, R-0Normal  3. Morbidly obese ([address]       Return in about 1 month (around 7/15/2022) for DM check, HTN f/u. Data Unavailable     Orders Placed This Encounter   Procedures    POCT glycosylated hemoglobin (Hb A1C)     Orders Placed This Encounter   Medications    phentermine (ADIPEX-P) 37.5 MG tablet     Sig: Take 1 tablet by mouth every morning (before breakfast) for 30 days. Dispense:  30 tablet     Refill:  0       Patient given educational materials - see patient instructions. Discussed use, benefit, and side effects of prescribed medications. All patient questions answered. Pt voiced understanding. Reviewed health maintenance. Instructed to continue current medications, diet and exercise. Patient agreed with treatment plan. Follow up as directed.      Electronically signed by Aide Betts MD on 6/15/2022 at 11:51 AM

## 2022-07-08 RX ORDER — PEN NEEDLE, DIABETIC 31 GX5/16"
NEEDLE, DISPOSABLE MISCELLANEOUS
Qty: 100 EACH | Refills: 3 | Status: SHIPPED | OUTPATIENT
Start: 2022-07-08

## 2022-07-22 RX ORDER — GABAPENTIN 300 MG/1
CAPSULE ORAL
Qty: 180 CAPSULE | Refills: 3 | Status: SHIPPED | OUTPATIENT
Start: 2022-07-22 | End: 2022-11-19

## 2022-07-27 ENCOUNTER — OFFICE VISIT (OUTPATIENT)
Dept: PRIMARY CARE CLINIC | Age: 67
End: 2022-07-27
Payer: MEDICARE

## 2022-07-27 VITALS
SYSTOLIC BLOOD PRESSURE: 147 MMHG | WEIGHT: 197 LBS | OXYGEN SATURATION: 97 % | DIASTOLIC BLOOD PRESSURE: 75 MMHG | HEART RATE: 84 BPM | BODY MASS INDEX: 39.79 KG/M2

## 2022-07-27 DIAGNOSIS — E11.21 DIABETIC NEPHROPATHY ASSOCIATED WITH TYPE 2 DIABETES MELLITUS (HCC): ICD-10-CM

## 2022-07-27 DIAGNOSIS — I10 ESSENTIAL HYPERTENSION: ICD-10-CM

## 2022-07-27 PROCEDURE — 99213 OFFICE O/P EST LOW 20 MIN: CPT | Performed by: FAMILY MEDICINE

## 2022-07-27 PROCEDURE — 1123F ACP DISCUSS/DSCN MKR DOCD: CPT | Performed by: FAMILY MEDICINE

## 2022-07-27 PROCEDURE — 3051F HG A1C>EQUAL 7.0%<8.0%: CPT | Performed by: FAMILY MEDICINE

## 2022-07-27 RX ORDER — DULAGLUTIDE 3 MG/.5ML
3 INJECTION, SOLUTION SUBCUTANEOUS WEEKLY
Qty: 4 PEN | Refills: 1 | Status: SHIPPED | OUTPATIENT
Start: 2022-07-27

## 2022-07-27 ASSESSMENT — ENCOUNTER SYMPTOMS
EYE DISCHARGE: 0
ABDOMINAL PAIN: 0
EYE REDNESS: 0
SHORTNESS OF BREATH: 0
SORE THROAT: 0
VOMITING: 0
COUGH: 0
DIARRHEA: 0
RHINORRHEA: 0
NAUSEA: 0
WHEEZING: 0

## 2022-07-27 NOTE — PROGRESS NOTES
[address] [place] [address] PRIMARY CARE  [address]  [address] [address]  Dept: [facility] Stephen Riggins Box Radha is a 78 y.o. female Established patient, who presents today for her medical conditions/complaints as noted below  Chief Complaint   Patient presents with    Other     Patient finished 3 months of adipex. Hypertension     Patient doesn't check B/P at home        HPI:     HPI  Pt not checking BP at home. Doing okay with meds. Off phentermine for about a week. Has been feeling okay.         Reviewed prior notes:  None  Reviewed previous:  Labs    Hemoglobin A1C (%)   Date Value   06/15/2022 7.7   12/09/2021 9.6   09/09/2021 8.5             ( goal A1C is < 7)   No results found for: LABMICR  LDL Cholesterol (mg/dL)   Date Value   03/04/2021 68   10/23/2019 74   10/23/2019 74     LDL Calculated (mg/dL)   Date Value   09/12/2018 84   12/30/2016 84       (goal LDL is <100)   AST (U/L)   Date Value   06/03/2022 36 (H)   06/03/2022 36 (H)     ALT (U/L)   Date Value   06/03/2022 33   06/03/2022 33     BUN (mg/dL)   Date Value   06/03/2022 16   06/03/2022 16     BP Readings from Last 3 Encounters:   07/27/22 (!) 160/78   06/15/22 (!) 170/82   05/19/22 136/80          (goal 140/90)    Past Medical History:   Diagnosis Date    Abdominal swelling     Autoimmune hepatitis ([address]     Diabetic retinopathy ([facility] [address] 11/28/2015    Flatus     H/O acute sinusitis     H/O cholelithiasis     LLQ abdominal tenderness     RUQ abdominal pain     Tendonitis of shoulder, right 12/9/2021    Tingling     Vaginal candidiasis         Social History     Tobacco Use    Smoking status: Never    Smokeless tobacco: Never   Substance Use Topics    Alcohol use: Never     Alcohol/week: 0.0 standard drinks      Current Outpatient Medications   Medication Sig Dispense Refill    Dulaglutide (TRULICITY) 3 SC/2.2IL SOPN Inject 3 mg into the skin once a week 4 pen 1    gabapentin (NEURONTIN) 300 MG capsule take 2 capsules by mouth three times a day 180 capsule 3    B-D UF III MINI PEN NEEDLES 31G X 5 MM MISC use 1 PEN NEEDLE to inject MEDICATION subcutaneously four times a day with HUMALOG 100 each 3    JANUMET XR  MG TB24 per extended release tablet take 1 tablet by mouth twice a day 60 tablet 2    Vitamin Mixture (VITAMIN E COMPLETE PO) Take by mouth      atorvastatin (LIPITOR) 10 MG tablet take 1 tablet by mouth once daily 90 tablet 3    lisinopril-hydroCHLOROthiazide (PRINZIDE;ZESTORETIC) 20-25 MG per tablet take 1 tablet by mouth once daily 90 tablet 3    FREESTYLE TEST STRIPS strip TEST three times a day 300 strip 2    TOUJEO MAX SOLOSTAR 300 UNIT/ML SOPN inject 40 units subcutaneously twice a day 12 mL 2    ibuprofen (ADVIL;MOTRIN) 600 MG tablet Take 1 tablet by mouth every 6 hours as needed for Pain With food 60 tablet 0    HUMALOG KWIKPEN 100 UNIT/ML SOPN inject PER SLIDING SCALE BEFORE MEALS AND AT BEDTIME 4 TIMES DAILY, MAX 25 UNITS DAILY 15 mL 3    Cholecalciferol (VITAMIN D3) 50 MCG (2000 UT) CAPS Take 2 capsules by mouth daily 30 capsule     Lancets MISC Test 4 times daily for uncontrolled  each 3    aspirin 81 MG tablet Take 81 mg by mouth daily      Lutein 20 MG TABS Take 1 tablet by mouth daily      Ascorbic Acid (VITAMIN C) 500 MG tablet Take by mouth daily Indications: as directed      Azelaic Acid (FINACEA) 15 % GEL Apply once daily (Patient not taking: Reported on 7/27/2022) 45 g 1     No current facility-administered medications for this visit.      No Known Allergies    Health Maintenance   Topic Date Due    Hepatitis A vaccine (1 of 2 - Risk 2-dose series) Never done    DTaP/Tdap/Td vaccine (1 - Tdap) Never done    Diabetic foot exam  11/04/2020    Lipids  03/04/2022    COVID-19 Vaccine (4 - Booster for Moderna series) 03/19/2022    Colorectal Cancer Screen  03/30/2022    Flu vaccine (1) 09/01/2022    Breast cancer screen  12/30/2022    Diabetic retinal exam  03/30/2023    Depression Screen  04/18/2023    Annual Wellness Visit (AWV)  04/19/2023    A1C test (Diabetic or Prediabetic)  06/15/2023    DEXA (modify frequency per FRAX score)  Completed    Shingles vaccine  Completed    Pneumococcal 65+ years Vaccine  Completed    Hepatitis C screen  Completed    Hib vaccine  Aged Out    Meningococcal (ACWY) vaccine  Aged Out       Subjective:     Review of Systems   Constitutional:  Negative for chills, diaphoresis and fever. HENT:  Negative for rhinorrhea and sore throat. Eyes:  Negative for discharge and redness. Respiratory:  Negative for cough, shortness of breath and wheezing. Cardiovascular:  Negative for chest pain and palpitations. Gastrointestinal:  Negative for abdominal pain, diarrhea, nausea and vomiting. Genitourinary:  Positive for frequency (her normal). Negative for dysuria. Musculoskeletal:  Negative for arthralgias and myalgias. Neurological:  Negative for dizziness, light-headedness and headaches. Psychiatric/Behavioral:  Negative for sleep disturbance. Objective:     BP (!) 160/78   Pulse 84   Wt 197 lb (89.4 kg)   SpO2 97%   BMI 39.79 kg/m²   Physical Exam  Vitals and nursing note reviewed. Constitutional:       General: She is not in acute distress. Appearance: She is well-developed. She is not ill-appearing. HENT:      Head: Normocephalic and atraumatic. Right Ear: External ear normal.      Left Ear: External ear normal.   Eyes:      General: No scleral icterus. Right eye: No discharge. Left eye: No discharge. Conjunctiva/sclera: Conjunctivae normal.   Neck:      Thyroid: No thyromegaly. Trachea: No tracheal deviation. Cardiovascular:      Rate and Rhythm: Normal rate and regular rhythm. Heart sounds: Normal heart sounds. Pulmonary:      Effort: Pulmonary effort is normal. No respiratory distress. Breath sounds: Normal breath sounds. No wheezing.    Lymphadenopathy:      Cervical: No cervical adenopathy. Skin:     General: Skin is warm. Findings: No rash. Neurological:      Mental Status: She is alert and oriented to person, place, and time. Psychiatric:         Mood and Affect: Mood normal.         Behavior: Behavior normal.         Thought Content: Thought content normal.       Assessment/Plan:   1. Diabetic nephropathy associated with type 2 diabetes mellitus ([facility]  2. Essential hypertension  Assessment & Plan:   Borderline controlled, continue current medications   Return in about 3 months (around 10/27/2022) for DM check, HTN f/u. Data Unavailable     No orders of the defined types were placed in this encounter. Orders Placed This Encounter   Medications    Dulaglutide (TRULICITY) 3 CASTRO/6.5UT SOPN     Sig: Inject 3 mg into the skin once a week     Dispense:  4 pen     Refill:  1       Patient given educational materials - see patient instructions. Discussed use, benefit, and side effects of prescribed medications. All patient questions answered. Pt voiced understanding. Reviewed health maintenance. Instructed to continue current medications, diet and exercise. Patient agreed with treatment plan. Follow up as directed.      Electronicallysigned by Richy Trinh MD on 7/27/2022 at 11:25 AM

## 2022-08-02 RX ORDER — SITAGLIPTIN AND METFORMIN HYDROCHLORIDE 1000; 50 MG/1; MG/1
TABLET, FILM COATED, EXTENDED RELEASE ORAL
Qty: 60 TABLET | Refills: 2 | Status: SHIPPED | OUTPATIENT
Start: 2022-08-02 | End: 2022-11-02

## 2022-10-06 RX ORDER — INSULIN LISPRO 100 [IU]/ML
INJECTION, SOLUTION INTRAVENOUS; SUBCUTANEOUS
Qty: 15 ML | Refills: 3 | Status: SHIPPED | OUTPATIENT
Start: 2022-10-06

## 2022-11-02 RX ORDER — SITAGLIPTIN AND METFORMIN HYDROCHLORIDE 1000; 50 MG/1; MG/1
TABLET, FILM COATED, EXTENDED RELEASE ORAL
Qty: 60 TABLET | Refills: 2 | Status: SHIPPED | OUTPATIENT
Start: 2022-11-02

## 2022-11-14 DIAGNOSIS — Z12.31 ENCOUNTER FOR SCREENING MAMMOGRAM FOR BREAST CANCER: Primary | ICD-10-CM

## 2022-11-15 DIAGNOSIS — Z79.4 TYPE 2 DIABETES MELLITUS WITHOUT COMPLICATION, WITH LONG-TERM CURRENT USE OF INSULIN (HCC): ICD-10-CM

## 2022-11-15 DIAGNOSIS — E11.9 TYPE 2 DIABETES MELLITUS WITHOUT COMPLICATION, WITH LONG-TERM CURRENT USE OF INSULIN (HCC): ICD-10-CM

## 2022-11-16 RX ORDER — INSULIN GLARGINE 300 U/ML
INJECTION, SOLUTION SUBCUTANEOUS
Qty: 12 ML | Refills: 2 | Status: SHIPPED | OUTPATIENT
Start: 2022-11-16

## 2022-11-23 RX ORDER — GABAPENTIN 300 MG/1
CAPSULE ORAL
Qty: 180 CAPSULE | Refills: 3 | Status: SHIPPED | OUTPATIENT
Start: 2022-11-23 | End: 2023-03-23

## 2023-01-03 ENCOUNTER — HOSPITAL ENCOUNTER (OUTPATIENT)
Dept: WOMENS IMAGING | Age: 68
Discharge: HOME | End: 2023-01-05
Payer: MEDICARE

## 2023-01-03 DIAGNOSIS — Z12.31 ENCOUNTER FOR SCREENING MAMMOGRAM FOR BREAST CANCER: ICD-10-CM

## 2023-01-03 PROCEDURE — 77063 BREAST TOMOSYNTHESIS BI: CPT

## 2023-01-03 RX ORDER — DULAGLUTIDE 3 MG/.5ML
INJECTION, SOLUTION SUBCUTANEOUS
Qty: 4 ML | Refills: 2 | Status: SHIPPED | OUTPATIENT
Start: 2023-01-03

## 2023-01-11 ENCOUNTER — OFFICE VISIT (OUTPATIENT)
Dept: PRIMARY CARE CLINIC | Age: 68
End: 2023-01-11

## 2023-01-11 VITALS
WEIGHT: 196.2 LBS | HEART RATE: 80 BPM | SYSTOLIC BLOOD PRESSURE: 160 MMHG | OXYGEN SATURATION: 98 % | BODY MASS INDEX: 39.63 KG/M2 | DIASTOLIC BLOOD PRESSURE: 82 MMHG

## 2023-01-11 DIAGNOSIS — K74.69 OTHER CIRRHOSIS OF LIVER (HCC): ICD-10-CM

## 2023-01-11 DIAGNOSIS — E55.9 VITAMIN D DEFICIENCY: ICD-10-CM

## 2023-01-11 DIAGNOSIS — E11.9 TYPE 2 DIABETES MELLITUS WITHOUT COMPLICATION, WITH LONG-TERM CURRENT USE OF INSULIN (HCC): Primary | ICD-10-CM

## 2023-01-11 DIAGNOSIS — E11.21 DIABETIC NEPHROPATHY ASSOCIATED WITH TYPE 2 DIABETES MELLITUS (HCC): ICD-10-CM

## 2023-01-11 DIAGNOSIS — Z79.4 TYPE 2 DIABETES MELLITUS WITHOUT COMPLICATION, WITH LONG-TERM CURRENT USE OF INSULIN (HCC): Primary | ICD-10-CM

## 2023-01-11 LAB
CREATININE URINE POCT: 200
HBA1C MFR BLD: 10.2 %
MICROALBUMIN/CREAT 24H UR: 150 MG/G{CREAT}
MICROALBUMIN/CREAT UR-RTO: NORMAL

## 2023-01-11 RX ORDER — DULAGLUTIDE 4.5 MG/.5ML
4.5 INJECTION, SOLUTION SUBCUTANEOUS WEEKLY
Qty: 4 ADJUSTABLE DOSE PRE-FILLED PEN SYRINGE | Refills: 3 | Status: SHIPPED | OUTPATIENT
Start: 2023-01-11

## 2023-01-11 RX ORDER — VITAMIN E 268 MG
400 CAPSULE ORAL DAILY
COMMUNITY

## 2023-01-11 ASSESSMENT — ENCOUNTER SYMPTOMS
DIARRHEA: 0
COUGH: 0
VOMITING: 0
SHORTNESS OF BREATH: 0
NAUSEA: 0

## 2023-01-11 ASSESSMENT — PATIENT HEALTH QUESTIONNAIRE - PHQ9
SUM OF ALL RESPONSES TO PHQ QUESTIONS 1-9: 0
SUM OF ALL RESPONSES TO PHQ9 QUESTIONS 1 & 2: 0
1. LITTLE INTEREST OR PLEASURE IN DOING THINGS: 0
SUM OF ALL RESPONSES TO PHQ QUESTIONS 1-9: 0
2. FEELING DOWN, DEPRESSED OR HOPELESS: 0
SUM OF ALL RESPONSES TO PHQ QUESTIONS 1-9: 0
SUM OF ALL RESPONSES TO PHQ QUESTIONS 1-9: 0

## 2023-01-11 NOTE — PROGRESS NOTES
[facility] PRIMARY CARE  [address]  [address] [address]  Dept: [phone] is a 75 y.o. female Established patient, who presents today for her medical conditions/complaints as noted below  Chief Complaint   Patient presents with    Hypertension     Patient doesn't check B/P at home    Diabetes     Patient checks blood sugar at home    Hyperlipidemia    Fall     Patient c/o right leg leg that radiates down back of leg. Patient fell twice in December. HPI:     HPI  Pt feeling okay other than the fall. Daniel Kays right before [place] and pulled hamstring and had significant bruising in her leg- clearing up now. Using cane for balance now to make sure. Pt actually did not fall the first time- just tripped on a tree branch and pulled her muscle and the next time a few weeks later fell. No other injuries. DM- sugars running high- holidays and dog passed away. Has not been eating well.        Reviewed prior notes:  None  Reviewed previous:  Labs    Hemoglobin A1C (%)   Date Value   01/11/2023 10.2   06/15/2022 7.7   12/09/2021 9.6             ( goal A1C is < 7)   No results found for: LABMICR  LDL Cholesterol (mg/dL)   Date Value   03/04/2021 68   10/23/2019 74   10/23/2019 74     LDL Calculated (mg/dL)   Date Value   09/12/2018 84   12/30/2016 84       (goal LDL is <100)   AST (U/L)   Date Value   06/03/2022 36 (H)   06/03/2022 36 (H)     ALT (U/L)   Date Value   06/03/2022 33   06/03/2022 33     BUN (mg/dL)   Date Value   06/03/2022 16   06/03/2022 16     BP Readings from Last 3 Encounters:   01/11/23 (!) 160/82   07/27/22 (!) 147/75   06/15/22 (!) 170/82          (goal 140/90)    Past Medical History:   Diagnosis Date    Abdominal swelling     Autoimmune hepatitis ([address]     Diabetic retinopathy ([facility] 11/28/2015    Flatus     H/O acute sinusitis     H/O cholelithiasis     LLQ abdominal tenderness     RUQ abdominal pain     Tendonitis of shoulder, right 12/9/2021    Tingling     Vaginal candidiasis         Social History     Tobacco Use    Smoking status: Never    Smokeless tobacco: Never   Substance Use Topics    Alcohol use: Never     Alcohol/week: 0.0 standard drinks      Current Outpatient Medications   Medication Sig Dispense Refill    vitamin E 180 MG (400 UNIT) CAPS capsule Take 400 Units by mouth daily      Dulaglutide (TRULICITY) 4.5 OE/0.8NQ SOPN Inject 4.5 mg into the skin once a week 4 Adjustable Dose Pre-filled Pen Syringe 3    gabapentin (NEURONTIN) 300 MG capsule take 2 capsules by mouth three times a day 180 capsule 3    TOUJEO MAX SOLOSTAR 300 UNIT/ML SOPN inject 40 units subcutaneously twice a day 12 mL 2    JANUMET XR  MG TB24 per extended release tablet take 1 tablet by mouth twice a day 60 tablet 2    HUMALOG KWIKPEN 100 UNIT/ML SOPN inject PER SLIDING SCALE BEFORE MEALS AND AT BEDTIME 4 TIMES DAILY, MAX 25 UNITS DAILY 15 mL 3    B-D UF III MINI PEN NEEDLES 31G X 5 MM MISC use 1 PEN NEEDLE to inject MEDICATION subcutaneously four times a day with HUMALOG 100 each 3    Vitamin Mixture (VITAMIN E COMPLETE PO) Take by mouth      atorvastatin (LIPITOR) 10 MG tablet take 1 tablet by mouth once daily 90 tablet 3    lisinopril-hydroCHLOROthiazide (PRINZIDE;ZESTORETIC) 20-25 MG per tablet take 1 tablet by mouth once daily 90 tablet 3    FREESTYLE TEST STRIPS strip TEST three times a day 300 strip 2    ibuprofen (ADVIL;MOTRIN) 600 MG tablet Take 1 tablet by mouth every 6 hours as needed for Pain With food 60 tablet 0    Cholecalciferol (VITAMIN D3) 50 MCG (2000 UT) CAPS Take 2 capsules by mouth daily 30 capsule     Lancets MISC Test 4 times daily for uncontrolled  each 3    aspirin 81 MG tablet Take 81 mg by mouth daily      Lutein 20 MG TABS Take 1 tablet by mouth daily      Ascorbic Acid (VITAMIN C) 500 MG tablet Take by mouth daily Indications: as directed      Azelaic Acid (FINACEA) 15 % GEL Apply once daily (Patient not taking: No sig reported) 45 g 1     No current facility-administered medications for this visit. No Known Allergies    Health Maintenance   Topic Date Due    Hepatitis A vaccine (1 of 2 - Risk 2-dose series) Never done    Hepatitis B vaccine (1 of 3 - Risk 3-dose series) 09/04/1973    DTaP/Tdap/Td vaccine (1 - Tdap) Never done    Diabetic foot exam  11/04/2020    COVID-19 Vaccine (4 - Booster for Moderna series) 01/14/2022    Lipids  03/04/2022    Colorectal Cancer Screen  03/30/2022    Diabetic Alb to Cr ratio (uACR) test  12/09/2022    Diabetic retinal exam  03/30/2023    A1C test (Diabetic or Prediabetic)  04/11/2023    Depression Screen  04/18/2023    Annual Wellness Visit (AWV)  04/19/2023    GFR test (Diabetes, CKD 3-4, OR last GFR 15-59)  06/03/2023    Breast cancer screen  01/03/2024    DEXA (modify frequency per FRAX score)  Completed    Flu vaccine  Completed    Shingles vaccine  Completed    Pneumococcal 65+ years Vaccine  Completed    Hepatitis C screen  Completed    Hib vaccine  Aged Out    Meningococcal (ACWY) vaccine  Aged Out       Subjective:     Review of Systems   Constitutional:  Negative for chills and fever. HENT:  Negative for congestion. Respiratory:  Negative for cough and shortness of breath. Cardiovascular:  Negative for chest pain and palpitations. Gastrointestinal:  Negative for diarrhea, nausea and vomiting. Neurological:  Negative for dizziness, light-headedness and headaches. Psychiatric/Behavioral:  Positive for sleep disturbance (up to bathroom). Objective:     BP (!) 160/82   Pulse 80   Wt 196 lb 3.2 oz (89 kg)   SpO2 98%   BMI 39.63 kg/m²   Physical Exam  Vitals and nursing note reviewed. Constitutional:       General: She is not in acute distress. Appearance: She is well-developed. She is not ill-appearing. HENT:      Head: Normocephalic and atraumatic.       Right Ear: External ear normal.      Left Ear: External ear normal.   Eyes: General: No scleral icterus. Right eye: No discharge. Left eye: No discharge. Conjunctiva/sclera: Conjunctivae normal.   Neck:      Thyroid: No thyromegaly. Trachea: No tracheal deviation. Cardiovascular:      Rate and Rhythm: Normal rate and regular rhythm. Heart sounds: Normal heart sounds. Pulmonary:      Effort: Pulmonary effort is normal. No respiratory distress. Breath sounds: Normal breath sounds. No wheezing. Lymphadenopathy:      Cervical: No cervical adenopathy. Skin:     General: Skin is warm. Findings: No rash. Neurological:      Mental Status: She is alert and oriented to person, place, and time. Psychiatric:         Mood and Affect: Mood normal.         Behavior: Behavior normal.         Thought Content: Thought content normal.       Assessment/Plan:   1. Type 2 diabetes mellitus without complication, with long-term current use of insulin (HCC)  Comments:  icrease trulicity to 4.5   Orders:  -     POCT glycosylated hemoglobin (Hb A1C)  -     POCT microalbumin  -     Vitamin D 25 Hydroxy; Future  -     Comprehensive Metabolic Panel; Future  -     CBC with Auto Differential; Future  2. Other cirrhosis of liver ([facility]  Assessment & Plan:   Monitored by specialist- no acute findings meriting change in the plan    Orders:  -     Vitamin D 25 Hydroxy; Future  -     Comprehensive Metabolic Panel; Future  -     CBC with Auto Differential; Future  3. Diabetic nephropathy associated with type 2 diabetes mellitus ([facility]  Assessment & Plan:   Well-controlled, continue current medications    Orders:  -     Vitamin D 25 Hydroxy; Future  -     Comprehensive Metabolic Panel; Future  -     CBC with Auto Differential; Future  4. Vitamin D deficiency  -     Vitamin D 25 Hydroxy; Future  -     Comprehensive Metabolic Panel; Future  -     CBC with Auto Differential; Future     Return in about 3 months (around 4/11/2023) for DM check, HTN f/u, MWV.   April 19th or later Orders Placed This Encounter   Procedures    Vitamin D 25 Hydroxy     Standing Status:   Future     Standing Expiration Date:   1/11/2024    Comprehensive Metabolic Panel     Standing Status:   Future     Standing Expiration Date:   1/12/2024    CBC with Auto Differential     Standing Status:   Future     Standing Expiration Date:   1/12/2024    POCT glycosylated hemoglobin (Hb A1C)    POCT microalbumin     Orders Placed This Encounter   Medications    Dulaglutide (TRULICITY) 4.5 OG/3.0OR SOPN     Sig: Inject 4.5 mg into the skin once a week     Dispense:  4 Adjustable Dose Pre-filled Pen Syringe     Refill:  3       Patient given educational materials - see patient instructions. Discussed use, benefit, and side effects of prescribed medications. All patient questions answered. Pt voiced understanding. Reviewed health maintenance. Instructed to continue current medications, diet and exercise. Patient agreed with treatment plan. Follow up as directed.      Electronicallysigned by Lamont Kaye MD on 1/11/2023 at 10:12 AM

## 2023-01-30 RX ORDER — SITAGLIPTIN AND METFORMIN HYDROCHLORIDE 1000; 50 MG/1; MG/1
TABLET, FILM COATED, EXTENDED RELEASE ORAL
Qty: 60 TABLET | Refills: 2 | Status: SHIPPED | OUTPATIENT
Start: 2023-01-30

## 2023-02-17 DIAGNOSIS — Z79.4 TYPE 2 DIABETES MELLITUS WITHOUT COMPLICATION, WITH LONG-TERM CURRENT USE OF INSULIN (HCC): ICD-10-CM

## 2023-02-17 DIAGNOSIS — E11.9 TYPE 2 DIABETES MELLITUS WITHOUT COMPLICATION, WITH LONG-TERM CURRENT USE OF INSULIN (HCC): ICD-10-CM

## 2023-02-17 DIAGNOSIS — K74.69 OTHER CIRRHOSIS OF LIVER (HCC): ICD-10-CM

## 2023-02-17 DIAGNOSIS — E55.9 VITAMIN D DEFICIENCY: ICD-10-CM

## 2023-02-17 DIAGNOSIS — E11.21 DIABETIC NEPHROPATHY ASSOCIATED WITH TYPE 2 DIABETES MELLITUS (HCC): ICD-10-CM

## 2023-02-17 LAB
ABSOLUTE EOS #: 0.14 K/UL (ref 0–0.44)
ABSOLUTE IMMATURE GRANULOCYTE: <0.03 K/UL (ref 0–0.3)
ABSOLUTE LYMPH #: 0.98 K/UL (ref 1.1–3.7)
ABSOLUTE MONO #: 0.25 K/UL (ref 0.1–1.2)
ALBUMIN SERPL-MCNC: 3.9 G/DL (ref 3.5–5.2)
ALBUMIN/GLOBULIN RATIO: 1.2 (ref 1–2.5)
ALP BLD-CCNC: 181 U/L (ref 35–104)
ALT SERPL-CCNC: 32 U/L (ref 5–33)
ANION GAP SERPL CALCULATED.3IONS-SCNC: 12 MMOL/L (ref 9–17)
AST SERPL-CCNC: 40 U/L
BASOPHILS # BLD: 1 % (ref 0–2)
BASOPHILS ABSOLUTE: 0.03 K/UL (ref 0–0.2)
BILIRUB SERPL-MCNC: 0.4 MG/DL (ref 0.3–1.2)
BUN BLDV-MCNC: 19 MG/DL (ref 8–23)
CALCIUM SERPL-MCNC: 9.7 MG/DL (ref 8.6–10.4)
CHLORIDE BLD-SCNC: 105 MMOL/L (ref 98–107)
CO2: 27 MMOL/L (ref 20–31)
CREAT SERPL-MCNC: 0.7 MG/DL (ref 0.5–0.9)
EOSINOPHILS RELATIVE PERCENT: 4 % (ref 1–4)
GFR SERPL CREATININE-BSD FRML MDRD: >60 ML/MIN/1.73M2
GLUCOSE BLD-MCNC: 113 MG/DL (ref 70–99)
HCT VFR BLD CALC: 35.2 % (ref 36.3–47.1)
HEMOGLOBIN: 11.3 G/DL (ref 11.9–15.1)
IMMATURE GRANULOCYTES: 0 %
LYMPHOCYTES # BLD: 29 % (ref 24–43)
MCH RBC QN AUTO: 29.4 PG (ref 25.2–33.5)
MCHC RBC AUTO-ENTMCNC: 32.1 G/DL (ref 28.4–34.8)
MCV RBC AUTO: 91.7 FL (ref 82.6–102.9)
MONOCYTES # BLD: 7 % (ref 3–12)
NRBC AUTOMATED: 0 PER 100 WBC
PDW BLD-RTO: 13.8 % (ref 11.8–14.4)
PLATELET # BLD: 142 K/UL (ref 138–453)
PMV BLD AUTO: 11.5 FL (ref 8.1–13.5)
POTASSIUM SERPL-SCNC: 4.7 MMOL/L (ref 3.7–5.3)
RBC # BLD: 3.84 M/UL (ref 3.95–5.11)
SEG NEUTROPHILS: 59 % (ref 36–65)
SEGMENTED NEUTROPHILS ABSOLUTE COUNT: 1.97 K/UL (ref 1.5–8.1)
SODIUM BLD-SCNC: 144 MMOL/L (ref 135–144)
TOTAL PROTEIN: 7.1 G/DL (ref 6.4–8.3)
VITAMIN D 25-HYDROXY: 59.6 NG/ML
WBC # BLD: 3.4 K/UL (ref 3.5–11.3)

## 2023-03-22 RX ORDER — GABAPENTIN 300 MG/1
CAPSULE ORAL
Qty: 180 CAPSULE | Refills: 3 | Status: SHIPPED | OUTPATIENT
Start: 2023-03-22 | End: 2023-07-20

## 2023-03-27 RX ORDER — LISINOPRIL AND HYDROCHLOROTHIAZIDE 25; 20 MG/1; MG/1
TABLET ORAL
Qty: 90 TABLET | Refills: 3 | Status: SHIPPED | OUTPATIENT
Start: 2023-03-27

## 2023-03-27 RX ORDER — ATORVASTATIN CALCIUM 10 MG/1
TABLET, FILM COATED ORAL
Qty: 90 TABLET | Refills: 3 | Status: SHIPPED | OUTPATIENT
Start: 2023-03-27

## 2023-04-03 RX ORDER — FLURBIPROFEN SODIUM 0.3 MG/ML
SOLUTION/ DROPS OPHTHALMIC
Qty: 100 EACH | Refills: 3 | Status: SHIPPED | OUTPATIENT
Start: 2023-04-03

## 2023-06-05 ENCOUNTER — TELEPHONE (OUTPATIENT)
Dept: PRIMARY CARE CLINIC | Age: 68
End: 2023-06-05

## 2023-06-05 NOTE — TELEPHONE ENCOUNTER
If she has further symptoms then should be seen, otherwise should just remind me about it at her appt on the 26th so we can check her urine then.

## 2023-06-05 NOTE — TELEPHONE ENCOUNTER
Patient states last week she had a lot of dysuria and pelvic pressure. Friday night she states she urinated and the toilet had a lot of bright red blood but since then she has felt much better and has not noticed anymore bleeding. Asking if you want to see her or send something in - or if you think she has cleared whatever was causing the discomfort since she is feeling better. Eunice. Please advise.

## 2023-06-05 NOTE — TELEPHONE ENCOUNTER
Patient advised, \"If further symptoms be seen, otherwise should just remind Dr. Candelaria about it at her appt on the 26th so we can check her urine. Voices understanding.

## 2023-06-26 ENCOUNTER — OFFICE VISIT (OUTPATIENT)
Dept: PRIMARY CARE CLINIC | Age: 68
End: 2023-06-26
Payer: MEDICARE

## 2023-06-26 VITALS
HEIGHT: 59 IN | WEIGHT: 193.2 LBS | OXYGEN SATURATION: 97 % | BODY MASS INDEX: 38.95 KG/M2 | SYSTOLIC BLOOD PRESSURE: 166 MMHG | DIASTOLIC BLOOD PRESSURE: 64 MMHG | HEART RATE: 81 BPM

## 2023-06-26 DIAGNOSIS — E11.22 TYPE 2 DIABETES MELLITUS WITH CHRONIC KIDNEY DISEASE, WITH LONG-TERM CURRENT USE OF INSULIN, UNSPECIFIED CKD STAGE (HCC): ICD-10-CM

## 2023-06-26 DIAGNOSIS — Z00.00 MEDICARE ANNUAL WELLNESS VISIT, SUBSEQUENT: Primary | ICD-10-CM

## 2023-06-26 DIAGNOSIS — Z79.4 TYPE 2 DIABETES MELLITUS WITH HYPERGLYCEMIA, WITH LONG-TERM CURRENT USE OF INSULIN (HCC): ICD-10-CM

## 2023-06-26 DIAGNOSIS — E11.9 TYPE 2 DIABETES MELLITUS WITHOUT COMPLICATION, WITH LONG-TERM CURRENT USE OF INSULIN (HCC): ICD-10-CM

## 2023-06-26 DIAGNOSIS — Z79.4 TYPE 2 DIABETES MELLITUS WITH CHRONIC KIDNEY DISEASE, WITH LONG-TERM CURRENT USE OF INSULIN, UNSPECIFIED CKD STAGE (HCC): ICD-10-CM

## 2023-06-26 DIAGNOSIS — R31.0 GROSS HEMATURIA: ICD-10-CM

## 2023-06-26 DIAGNOSIS — Z12.11 SCREENING FOR COLON CANCER: ICD-10-CM

## 2023-06-26 DIAGNOSIS — Z79.4 TYPE 2 DIABETES MELLITUS WITHOUT COMPLICATION, WITH LONG-TERM CURRENT USE OF INSULIN (HCC): ICD-10-CM

## 2023-06-26 DIAGNOSIS — E11.65 TYPE 2 DIABETES MELLITUS WITH HYPERGLYCEMIA, WITH LONG-TERM CURRENT USE OF INSULIN (HCC): ICD-10-CM

## 2023-06-26 LAB
BILIRUBIN, POC: NEGATIVE
BLOOD URINE, POC: NORMAL
CLARITY, POC: NORMAL
COLOR, POC: NORMAL
GLUCOSE URINE, POC: NEGATIVE
HBA1C MFR BLD: 9.8 %
KETONES, POC: NEGATIVE
LEUKOCYTE EST, POC: NORMAL
NITRITE, POC: NEGATIVE
PH, POC: 5.5
PROTEIN, POC: >300
SPECIFIC GRAVITY, POC: >1.03
UROBILINOGEN, POC: 0.2

## 2023-06-26 PROCEDURE — 3077F SYST BP >= 140 MM HG: CPT | Performed by: FAMILY MEDICINE

## 2023-06-26 PROCEDURE — 83037 HB GLYCOSYLATED A1C HOME DEV: CPT | Performed by: FAMILY MEDICINE

## 2023-06-26 PROCEDURE — 3078F DIAST BP <80 MM HG: CPT | Performed by: FAMILY MEDICINE

## 2023-06-26 PROCEDURE — G0439 PPPS, SUBSEQ VISIT: HCPCS | Performed by: FAMILY MEDICINE

## 2023-06-26 PROCEDURE — 81003 URINALYSIS AUTO W/O SCOPE: CPT | Performed by: FAMILY MEDICINE

## 2023-06-26 PROCEDURE — 3046F HEMOGLOBIN A1C LEVEL >9.0%: CPT | Performed by: FAMILY MEDICINE

## 2023-06-26 PROCEDURE — 1123F ACP DISCUSS/DSCN MKR DOCD: CPT | Performed by: FAMILY MEDICINE

## 2023-06-26 RX ORDER — BLOOD-GLUCOSE SENSOR
EACH MISCELLANEOUS
Qty: 2 EACH | Refills: 5 | Status: SHIPPED | OUTPATIENT
Start: 2023-06-26

## 2023-06-26 RX ORDER — SITAGLIPTIN AND METFORMIN HYDROCHLORIDE 1000; 50 MG/1; MG/1
1 TABLET, FILM COATED, EXTENDED RELEASE ORAL 2 TIMES DAILY
Qty: 60 TABLET | Refills: 5 | Status: SHIPPED | OUTPATIENT
Start: 2023-06-26

## 2023-06-26 RX ORDER — BLOOD-GLUCOSE,RECEIVER,CONT
EACH MISCELLANEOUS
Qty: 1 EACH | Refills: 0 | Status: SHIPPED | OUTPATIENT
Start: 2023-06-26

## 2023-06-26 SDOH — ECONOMIC STABILITY: FOOD INSECURITY: WITHIN THE PAST 12 MONTHS, YOU WORRIED THAT YOUR FOOD WOULD RUN OUT BEFORE YOU GOT MONEY TO BUY MORE.: NEVER TRUE

## 2023-06-26 SDOH — ECONOMIC STABILITY: HOUSING INSECURITY
IN THE LAST 12 MONTHS, WAS THERE A TIME WHEN YOU DID NOT HAVE A STEADY PLACE TO SLEEP OR SLEPT IN A SHELTER (INCLUDING NOW)?: NO

## 2023-06-26 SDOH — ECONOMIC STABILITY: INCOME INSECURITY: HOW HARD IS IT FOR YOU TO PAY FOR THE VERY BASICS LIKE FOOD, HOUSING, MEDICAL CARE, AND HEATING?: NOT HARD AT ALL

## 2023-06-26 SDOH — ECONOMIC STABILITY: FOOD INSECURITY: WITHIN THE PAST 12 MONTHS, THE FOOD YOU BOUGHT JUST DIDN'T LAST AND YOU DIDN'T HAVE MONEY TO GET MORE.: NEVER TRUE

## 2023-06-26 ASSESSMENT — LIFESTYLE VARIABLES
HOW OFTEN DO YOU HAVE A DRINK CONTAINING ALCOHOL: NEVER
HOW MANY STANDARD DRINKS CONTAINING ALCOHOL DO YOU HAVE ON A TYPICAL DAY: PATIENT DOES NOT DRINK

## 2023-06-26 ASSESSMENT — PATIENT HEALTH QUESTIONNAIRE - PHQ9
1. LITTLE INTEREST OR PLEASURE IN DOING THINGS: 0
SUM OF ALL RESPONSES TO PHQ QUESTIONS 1-9: 0

## 2023-06-29 ENCOUNTER — HOSPITAL ENCOUNTER (OUTPATIENT)
Dept: WOMENS IMAGING | Age: 68
Discharge: HOME | End: 2023-07-01
Payer: MEDICARE

## 2023-06-29 DIAGNOSIS — R31.0 GROSS HEMATURIA: ICD-10-CM

## 2023-06-29 PROCEDURE — 76770 US EXAM ABDO BACK WALL COMP: CPT

## 2023-06-30 DIAGNOSIS — R93.89 ENDOMETRIAL THICKENING ON ULTRASOUND: Primary | ICD-10-CM

## 2023-07-06 ENCOUNTER — HOSPITAL ENCOUNTER (OUTPATIENT)
Dept: ULTRASOUND IMAGING | Age: 68
Discharge: HOME | End: 2023-07-08
Payer: MEDICARE

## 2023-07-06 DIAGNOSIS — R93.89 ENDOMETRIAL THICKENING ON ULTRASOUND: ICD-10-CM

## 2023-07-06 PROCEDURE — 76857 US EXAM PELVIC LIMITED: CPT

## 2023-07-07 DIAGNOSIS — R93.89 ENDOMETRIAL THICKENING ON ULTRASOUND: Primary | ICD-10-CM

## 2023-07-07 DIAGNOSIS — Z78.0 POSTMENOPAUSE: ICD-10-CM

## 2023-07-13 LAB — NONINV COLON CA DNA+OCC BLD SCRN STL QL: POSITIVE

## 2023-07-19 ENCOUNTER — OFFICE VISIT (OUTPATIENT)
Dept: OBGYN CLINIC | Age: 68
End: 2023-07-19
Payer: MEDICARE

## 2023-07-19 VITALS
SYSTOLIC BLOOD PRESSURE: 158 MMHG | HEART RATE: 87 BPM | HEIGHT: 59 IN | BODY MASS INDEX: 39.92 KG/M2 | WEIGHT: 198 LBS | DIASTOLIC BLOOD PRESSURE: 78 MMHG

## 2023-07-19 DIAGNOSIS — R93.89 THICKENED ENDOMETRIUM: Primary | ICD-10-CM

## 2023-07-19 DIAGNOSIS — R19.5 POSITIVE COLORECTAL CANCER SCREENING USING COLOGUARD TEST: Primary | ICD-10-CM

## 2023-07-19 PROCEDURE — 3078F DIAST BP <80 MM HG: CPT | Performed by: SPECIALIST

## 2023-07-19 PROCEDURE — 3074F SYST BP LT 130 MM HG: CPT | Performed by: SPECIALIST

## 2023-07-19 PROCEDURE — 1123F ACP DISCUSS/DSCN MKR DOCD: CPT | Performed by: SPECIALIST

## 2023-07-19 PROCEDURE — 99203 OFFICE O/P NEW LOW 30 MIN: CPT | Performed by: SPECIALIST

## 2023-07-19 ASSESSMENT — ENCOUNTER SYMPTOMS
EYE PAIN: 0
NAUSEA: 0
ABDOMINAL PAIN: 0
CONSTIPATION: 0
DIARRHEA: 0
APNEA: 0
COUGH: 0
ABDOMINAL DISTENTION: 0
VOMITING: 0

## 2023-07-20 PROBLEM — R93.89 THICKENED ENDOMETRIUM: Status: ACTIVE | Noted: 2023-07-20

## 2023-07-24 RX ORDER — DULAGLUTIDE 4.5 MG/.5ML
INJECTION, SOLUTION SUBCUTANEOUS
Qty: 12 ML | Refills: 0 | Status: SHIPPED | OUTPATIENT
Start: 2023-07-24

## 2023-07-24 RX ORDER — GABAPENTIN 300 MG/1
CAPSULE ORAL
Qty: 180 CAPSULE | Refills: 3 | Status: SHIPPED | OUTPATIENT
Start: 2023-07-24 | End: 2023-11-21

## 2023-07-31 RX ORDER — MISOPROSTOL 200 UG/1
200 TABLET ORAL SEE ADMIN INSTRUCTIONS
Qty: 6 TABLET | Refills: 0 | Status: SHIPPED | OUTPATIENT
Start: 2023-07-31 | End: 2023-08-02

## 2023-08-03 RX ORDER — MISOPROSTOL 200 UG/1
TABLET ORAL
Qty: 6 TABLET | Refills: 0 | OUTPATIENT
Start: 2023-08-03

## 2023-08-08 ENCOUNTER — PROCEDURE VISIT (OUTPATIENT)
Dept: OBGYN CLINIC | Age: 68
End: 2023-08-08
Payer: MEDICARE

## 2023-08-08 ENCOUNTER — TELEPHONE (OUTPATIENT)
Dept: OBGYN CLINIC | Age: 68
End: 2023-08-08

## 2023-08-08 VITALS
BODY MASS INDEX: 39.11 KG/M2 | HEART RATE: 94 BPM | DIASTOLIC BLOOD PRESSURE: 86 MMHG | SYSTOLIC BLOOD PRESSURE: 138 MMHG | HEIGHT: 59 IN | WEIGHT: 194 LBS

## 2023-08-08 DIAGNOSIS — R93.89 THICKENED ENDOMETRIUM: Primary | ICD-10-CM

## 2023-08-08 PROCEDURE — 3078F DIAST BP <80 MM HG: CPT | Performed by: SPECIALIST

## 2023-08-08 PROCEDURE — 99213 OFFICE O/P EST LOW 20 MIN: CPT | Performed by: SPECIALIST

## 2023-08-08 PROCEDURE — 3074F SYST BP LT 130 MM HG: CPT | Performed by: SPECIALIST

## 2023-08-08 PROCEDURE — 1123F ACP DISCUSS/DSCN MKR DOCD: CPT | Performed by: SPECIALIST

## 2023-08-10 ENCOUNTER — TELEPHONE (OUTPATIENT)
Dept: OBGYN CLINIC | Age: 68
End: 2023-08-10

## 2023-08-11 ASSESSMENT — ENCOUNTER SYMPTOMS
ABDOMINAL DISTENTION: 0
VOMITING: 0
APNEA: 0
COUGH: 0
DIARRHEA: 0
EYE PAIN: 0
CONSTIPATION: 0
ABDOMINAL PAIN: 0
NAUSEA: 0

## 2023-08-14 ENCOUNTER — TELEPHONE (OUTPATIENT)
Dept: OBGYN CLINIC | Age: 68
End: 2023-08-14

## 2023-08-15 ENCOUNTER — HOSPITAL ENCOUNTER (OUTPATIENT)
Dept: PREADMISSION TESTING | Age: 68
Discharge: HOME | End: 2023-08-15
Attending: SPECIALIST | Admitting: SPECIALIST

## 2023-08-16 ENCOUNTER — HOSPITAL ENCOUNTER (OUTPATIENT)
Dept: PREADMISSION TESTING | Age: 68
Discharge: HOME | End: 2023-08-20

## 2023-08-16 VITALS — WEIGHT: 191 LBS | HEIGHT: 59 IN | BODY MASS INDEX: 38.51 KG/M2

## 2023-08-25 ENCOUNTER — OFFICE VISIT (OUTPATIENT)
Dept: OBGYN CLINIC | Age: 68
End: 2023-08-25
Payer: MEDICARE

## 2023-08-25 VITALS
BODY MASS INDEX: 39.51 KG/M2 | SYSTOLIC BLOOD PRESSURE: 140 MMHG | HEIGHT: 59 IN | WEIGHT: 196 LBS | DIASTOLIC BLOOD PRESSURE: 72 MMHG

## 2023-08-25 DIAGNOSIS — E11.65 TYPE 2 DIABETES MELLITUS WITH HYPERGLYCEMIA, WITH LONG-TERM CURRENT USE OF INSULIN (HCC): ICD-10-CM

## 2023-08-25 DIAGNOSIS — N95.0 PMB (POSTMENOPAUSAL BLEEDING): Primary | ICD-10-CM

## 2023-08-25 DIAGNOSIS — I10 ESSENTIAL HYPERTENSION: ICD-10-CM

## 2023-08-25 DIAGNOSIS — Z79.4 TYPE 2 DIABETES MELLITUS WITH HYPERGLYCEMIA, WITH LONG-TERM CURRENT USE OF INSULIN (HCC): ICD-10-CM

## 2023-08-25 DIAGNOSIS — E11.3393 MODERATE NONPROLIFERATIVE DIABETIC RETINOPATHY OF BOTH EYES WITHOUT MACULAR EDEMA ASSOCIATED WITH TYPE 2 DIABETES MELLITUS (HCC): ICD-10-CM

## 2023-08-25 DIAGNOSIS — R79.89 ELEVATED LFTS: ICD-10-CM

## 2023-08-25 DIAGNOSIS — R93.89 THICKENED ENDOMETRIUM: ICD-10-CM

## 2023-08-25 DIAGNOSIS — K76.0 NAFLD (NONALCOHOLIC FATTY LIVER DISEASE): ICD-10-CM

## 2023-08-25 DIAGNOSIS — E11.42 DIABETIC PERIPHERAL NEUROPATHY ASSOCIATED WITH TYPE 2 DIABETES MELLITUS (HCC): ICD-10-CM

## 2023-08-25 PROBLEM — N93.9 ABNORMAL UTERINE AND VAGINAL BLEEDING, UNSPECIFIED: Status: ACTIVE | Noted: 2023-08-25

## 2023-08-25 PROCEDURE — 1123F ACP DISCUSS/DSCN MKR DOCD: CPT | Performed by: OBSTETRICS & GYNECOLOGY

## 2023-08-25 PROCEDURE — 3077F SYST BP >= 140 MM HG: CPT | Performed by: OBSTETRICS & GYNECOLOGY

## 2023-08-25 PROCEDURE — 99203 OFFICE O/P NEW LOW 30 MIN: CPT | Performed by: OBSTETRICS & GYNECOLOGY

## 2023-08-25 PROCEDURE — 3078F DIAST BP <80 MM HG: CPT | Performed by: OBSTETRICS & GYNECOLOGY

## 2023-08-25 PROCEDURE — 3046F HEMOGLOBIN A1C LEVEL >9.0%: CPT | Performed by: OBSTETRICS & GYNECOLOGY

## 2023-08-25 NOTE — PROGRESS NOTES
Nonvisualized. Left Ovary:  Left ovary is within normal limits. Free Fluid: No evidence of free fluid. IMPRESSION:  Abnormally thickened and hypervascular endometrium. Findings may represent  endometrial hyperplasia or malignancy. Recommend endometrial sampling. Lab Results:  Results for orders placed or performed in visit on 06/26/23   Cologuard (Fecal DNA Colorectal Cancer Screening)    Specimen: Stool   Result Value Ref Range    FIT-DNA (Cologuard) Positive (A) Negative   POCT glycosylated hemoglobin (Hb A1C)   Result Value Ref Range    Hemoglobin A1C 9.8 %   POCT Urinalysis No Micro (Auto)   Result Value Ref Range    Color, UA      Clarity, UA      Glucose, UA POC negative     Bilirubin, UA negative     Ketones, UA negative     Spec Grav, UA >1.030     Blood, UA POC large     pH, UA 5.5     Protein, UA POC >300     Urobilinogen, UA 0.2     Leukocytes, UA small     Nitrite, UA negative            Assessment:   Diagnosis Orders   1. PMB (postmenopausal bleeding)        2. Thickened endometrium        3. Type 2 diabetes mellitus with hyperglycemia, with long-term current use of insulin ([address])        4. Diabetic peripheral neuropathy associated with type 2 diabetes mellitus (HCC)        5. Elevated LFTs        6. NAFLD (nonalcoholic fatty liver disease)        7. Moderate nonproliferative diabetic retinopathy of both eyes without macular edema associated with type 2 diabetes mellitus ([address])        8.  Essential hypertension          Chief Complaint   Patient presents with    Other     Discuss surgery         Patient Active Problem List    Diagnosis Date Noted    Hypoglycemia 05/19/2022     Priority: Medium    Abnormal uterine and vaginal bleeding, unspecified 08/25/2023    Thickened endometrium 07/20/2023    Type 2 diabetes mellitus with hyperglycemia 06/26/2023    Type 2 diabetes mellitus with chronic kidney disease 06/26/2023    Diabetic peripheral neuropathy associated with type 2 diabetes

## 2023-08-28 NOTE — PRE-PROCEDURE INSTRUCTIONS
No answer, left message ? Unable to leave message ? When were you told to arrive at hospital ?  0900    Do you have a  ?y    Are you on any blood thinners ?    y                 If yes when did you stop taking ?asp 81 mg stopped 7 days    Do you have your prep Rx filled and instruction ?  y    Nothing to eat the day before , only clear liquids. Are you experiencing any covid symptoms ? n    Do you have any infections or rash we should be aware of ? Do you have the Hibiclens soap to use the night before and the morning of surgery ? Nothing to eat or drink after midnight, only a sip of water to take any medication instructed to take the night before.   Wear comfortable clothing, leave any valuables at home, remove any jewelry and body piercing . y

## 2023-08-29 ENCOUNTER — ANESTHESIA EVENT (OUTPATIENT)
Dept: OPERATING ROOM | Age: 68
End: 2023-08-29
Payer: MEDICARE

## 2023-08-30 ENCOUNTER — HOSPITAL ENCOUNTER (OUTPATIENT)
Age: 68
Setting detail: OUTPATIENT SURGERY
Discharge: HOME | End: 2023-08-30
Attending: SURGERY | Admitting: SURGERY
Payer: MEDICARE

## 2023-08-30 ENCOUNTER — ANESTHESIA (OUTPATIENT)
Dept: OPERATING ROOM | Age: 68
End: 2023-08-30
Payer: MEDICARE

## 2023-08-30 VITALS
RESPIRATION RATE: 16 BRPM | DIASTOLIC BLOOD PRESSURE: 67 MMHG | HEART RATE: 79 BPM | TEMPERATURE: 96.8 F | SYSTOLIC BLOOD PRESSURE: 145 MMHG | HEIGHT: 59 IN | WEIGHT: 191 LBS | OXYGEN SATURATION: 94 % | BODY MASS INDEX: 38.51 KG/M2

## 2023-08-30 DIAGNOSIS — R19.5 POSITIVE COLORECTAL CANCER SCREENING USING COLOGUARD TEST: ICD-10-CM

## 2023-08-30 LAB
GLUCOSE BLD-MCNC: 131 MG/DL (ref 65–105)
GLUCOSE BLD-MCNC: 192 MG/DL (ref 65–105)

## 2023-08-30 PROCEDURE — 7100000031 HC ASPR PHASE II RECOVERY - ADDTL 15 MIN: Performed by: SURGERY

## 2023-08-30 PROCEDURE — 7100000010 HC PHASE II RECOVERY - FIRST 15 MIN: Performed by: SURGERY

## 2023-08-30 PROCEDURE — 82947 ASSAY GLUCOSE BLOOD QUANT: CPT

## 2023-08-30 PROCEDURE — 7100000000 HC PACU RECOVERY - FIRST 15 MIN: Performed by: SURGERY

## 2023-08-30 PROCEDURE — 3700000001 HC ADD 15 MINUTES (ANESTHESIA): Performed by: SURGERY

## 2023-08-30 PROCEDURE — 3700000000 HC ANESTHESIA ATTENDED CARE: Performed by: SURGERY

## 2023-08-30 PROCEDURE — 3609010300 HC COLONOSCOPY W/BIOPSY SINGLE/MULTIPLE: Performed by: SURGERY

## 2023-08-30 PROCEDURE — 2580000003 HC RX 258: Performed by: ANESTHESIOLOGY

## 2023-08-30 PROCEDURE — 7100000030 HC ASPR PHASE II RECOVERY - FIRST 15 MIN: Performed by: SURGERY

## 2023-08-30 PROCEDURE — 2500000003 HC RX 250 WO HCPCS: Performed by: NURSE ANESTHETIST, CERTIFIED REGISTERED

## 2023-08-30 PROCEDURE — 2709999900 HC NON-CHARGEABLE SUPPLY: Performed by: SURGERY

## 2023-08-30 PROCEDURE — 7100000011 HC PHASE II RECOVERY - ADDTL 15 MIN: Performed by: SURGERY

## 2023-08-30 PROCEDURE — 7100000001 HC PACU RECOVERY - ADDTL 15 MIN: Performed by: SURGERY

## 2023-08-30 PROCEDURE — 88305 TISSUE EXAM BY PATHOLOGIST: CPT

## 2023-08-30 PROCEDURE — 6360000002 HC RX W HCPCS: Performed by: NURSE ANESTHETIST, CERTIFIED REGISTERED

## 2023-08-30 RX ORDER — LIDOCAINE HYDROCHLORIDE 10 MG/ML
1 INJECTION, SOLUTION EPIDURAL; INFILTRATION; INTRACAUDAL; PERINEURAL
Status: DISCONTINUED | OUTPATIENT
Start: 2023-08-30 | End: 2023-08-30 | Stop reason: HOSPADM

## 2023-08-30 RX ORDER — MIDAZOLAM HYDROCHLORIDE 1 MG/ML
INJECTION INTRAMUSCULAR; INTRAVENOUS PRN
Status: DISCONTINUED | OUTPATIENT
Start: 2023-08-30 | End: 2023-08-30 | Stop reason: SDUPTHER

## 2023-08-30 RX ORDER — SODIUM CHLORIDE 9 MG/ML
INJECTION, SOLUTION INTRAVENOUS CONTINUOUS
Status: DISCONTINUED | OUTPATIENT
Start: 2023-08-30 | End: 2023-08-30 | Stop reason: HOSPADM

## 2023-08-30 RX ORDER — LIDOCAINE HYDROCHLORIDE 20 MG/ML
INJECTION, SOLUTION EPIDURAL; INFILTRATION; INTRACAUDAL; PERINEURAL PRN
Status: DISCONTINUED | OUTPATIENT
Start: 2023-08-30 | End: 2023-08-30 | Stop reason: SDUPTHER

## 2023-08-30 RX ORDER — PROPOFOL 10 MG/ML
INJECTION, EMULSION INTRAVENOUS PRN
Status: DISCONTINUED | OUTPATIENT
Start: 2023-08-30 | End: 2023-08-30 | Stop reason: SDUPTHER

## 2023-08-30 RX ORDER — SODIUM CHLORIDE 9 MG/ML
INJECTION, SOLUTION INTRAVENOUS PRN
Status: DISCONTINUED | OUTPATIENT
Start: 2023-08-30 | End: 2023-08-30 | Stop reason: HOSPADM

## 2023-08-30 RX ORDER — SODIUM CHLORIDE 0.9 % (FLUSH) 0.9 %
5-40 SYRINGE (ML) INJECTION PRN
Status: DISCONTINUED | OUTPATIENT
Start: 2023-08-30 | End: 2023-08-30 | Stop reason: HOSPADM

## 2023-08-30 RX ORDER — ONDANSETRON 2 MG/ML
INJECTION INTRAMUSCULAR; INTRAVENOUS PRN
Status: DISCONTINUED | OUTPATIENT
Start: 2023-08-30 | End: 2023-08-30 | Stop reason: SDUPTHER

## 2023-08-30 RX ORDER — SODIUM CHLORIDE 0.9 % (FLUSH) 0.9 %
5-40 SYRINGE (ML) INJECTION EVERY 12 HOURS SCHEDULED
Status: DISCONTINUED | OUTPATIENT
Start: 2023-08-30 | End: 2023-08-30 | Stop reason: HOSPADM

## 2023-08-30 RX ADMIN — SODIUM CHLORIDE: 9 INJECTION, SOLUTION INTRAVENOUS at 10:14

## 2023-08-30 RX ADMIN — LIDOCAINE HYDROCHLORIDE 80 MG: 20 INJECTION, SOLUTION EPIDURAL; INFILTRATION; INTRACAUDAL; PERINEURAL at 11:34

## 2023-08-30 RX ADMIN — MIDAZOLAM 2 MG: 1 INJECTION INTRAMUSCULAR; INTRAVENOUS at 11:36

## 2023-08-30 RX ADMIN — PROPOFOL 200 MG: 10 INJECTION, EMULSION INTRAVENOUS at 11:34

## 2023-08-30 RX ADMIN — PROPOFOL 50 MG: 10 INJECTION, EMULSION INTRAVENOUS at 11:36

## 2023-08-30 RX ADMIN — ONDANSETRON 4 MG: 2 INJECTION INTRAMUSCULAR; INTRAVENOUS at 11:54

## 2023-08-30 RX ADMIN — PROPOFOL 50 MG: 10 INJECTION, EMULSION INTRAVENOUS at 11:35

## 2023-08-30 ASSESSMENT — ENCOUNTER SYMPTOMS
SORE THROAT: 0
BACK PAIN: 0
COUGH: 0
SHORTNESS OF BREATH: 0
WHEEZING: 0

## 2023-08-30 ASSESSMENT — PAIN SCALES - GENERAL
PAINLEVEL_OUTOF10: 0

## 2023-08-30 ASSESSMENT — PAIN - FUNCTIONAL ASSESSMENT: PAIN_FUNCTIONAL_ASSESSMENT: NONE - DENIES PAIN

## 2023-08-30 NOTE — H&P
HISTORY and [id number]       NAME:  Loi Reyna  MRN: [id number]   YOB: 1955   Date: 8/30/2023   Age: 75 y.o. Gender: female       COMPLAINT AND PRESENT HISTORY:     Loi Reyna is 75 y.o.  female, here for COLONOSCOPY DIAGNOSTIC related to positive colorectal cancer screening using Cologuard test. Pt has had previous colonoscopy over 10 years ago. Denies abdominal pain, dysphagia, heartburn. Denies nausea, vomiting, constipation. Pt has occasional diarrhea with specific foods such as eggs. Pt has noticed blood in toilet a couple months ago. pt is being worked up with GYN provider with possible vaginal bleeding. She is unable to distinguish whether the blood in the toilet was from rectum or vagina. Denies dark tarry stools. Denies changes in appetite and unintended weight loss. Denies family history of colon cancer. BS this am 126 prior to arrival. Completed and followed prescribed prep. NPO p MN. Took gabapentin and lisinopril HCTZ this am with sip of water. Stopped aspirin 7 days ago. Denies recent or current chest pain/pressure, palpitations, SOB, recent URI, fever or chills.         RECENT LABS, IMAGING AND TESTING     Lab Results   Component Value Date    WBC 3.4 (L) 02/17/2023    RBC 3.84 (L) 02/17/2023    HGB 11.3 (L) 02/17/2023    HCT 35.2 (L) 02/17/2023    MCV 91.7 02/17/2023    MCH 29.4 02/17/2023    MCHC 32.1 02/17/2023    RDW 13.8 02/17/2023     02/17/2023    MPV 11.5 02/17/2023        Lab Results   Component Value Date     02/17/2023    K 4.7 02/17/2023     02/17/2023    CO2 27 02/17/2023    BUN 19 02/17/2023    CREATININE 0.70 02/17/2023    GLUCOSE 113 (H) 02/17/2023    CALCIUM 9.7 02/17/2023    PROT 7.1 02/17/2023    LABALBU 3.9 02/17/2023    BILITOT 0.4 02/17/2023    ALKPHOS 181 (H) 02/17/2023    AST 40 (H) 02/17/2023    ALT 32 02/17/2023         PAST MEDICAL HISTORY     Past Medical History:   Diagnosis Date    Abdominal

## 2023-08-30 NOTE — OP NOTE
Patient: Vipin Capone  YOB: 1955  MRN: [id number]    Date of Procedure: 8/30/2023  PROCEDURE NOTE    DATE OF PROCEDURE: 8/30/2023    SURGEON: Malou Fuller MD    ASSISTANT: None    PREOPERATIVE DIAGNOSIS: Positive Cologuard test    POSTOPERATIVE DIAGNOSIS: Rectal mucosal varicosities. Hypertrophied anal papilla. Sigmoid diverticulosis. Left colon polyp. Inflamed fold ascending colon. Cecal polyp. OPERATION: Total colonoscopy to cecum. Polypectomies with large cold biopsy forceps. Multiple biopsies inflamed fold ascending colon. ANESTHESIA: General    ESTIMATED BLOOD LOSS: None    COMPLICATIONS: None     SPECIMENS:  Was Obtained:     HISTORY: The patient is a 75y.o. year old female with history of above preop diagnosis. I recommended colonoscopy with possible biopsy or polypectomy and I explained the risk, benefits, expected outcome, and alternatives to the procedure. Risks included but are not limited to bleeding, infection, respiratory distress, hypotension, and perforation of the colon and possibility of missing a lesion. The patient understands and is in agreement. PROCEDURE: The patient was given IV conscious sedation. The patient's SPO2 remained above 90% throughout the procedure. Digital rectal exam was normal.  The colonoscope was inserted through the anus into the rectum and advanced under direct vision to the cecum without difficulty. Terminal ileum was examined for approximately 2 inches. The prep was fair. Findings:    Cecum/Ascending colon: abnormal: Cecal polyp removed with large cold biopsy forceps. Inflamed fold ascending colon multiple biopsies obtained    Transverse colon: normal    Descending/Sigmoid colon: abnormal: Left colon polyp removed with cold biopsy forceps. Sigmoid diverticulosis. Rectum/Anus: examined in normal and retroflexed positions and was abnormal: Rectal mucosal varicosities. Hypertrophied anal papilla.     Withdrawal Time

## 2023-08-31 ENCOUNTER — TELEPHONE (OUTPATIENT)
Dept: PRIMARY CARE CLINIC | Age: 68
End: 2023-08-31

## 2023-08-31 LAB — SURGICAL PATHOLOGY REPORT: NORMAL

## 2023-09-05 ENCOUNTER — HOSPITAL ENCOUNTER (OUTPATIENT)
Age: 68
Setting detail: SPECIMEN
Discharge: HOME | End: 2023-09-05

## 2023-09-05 ENCOUNTER — OFFICE VISIT (OUTPATIENT)
Dept: OBGYN CLINIC | Age: 68
End: 2023-09-05
Payer: MEDICARE

## 2023-09-05 VITALS
WEIGHT: 196 LBS | SYSTOLIC BLOOD PRESSURE: 138 MMHG | DIASTOLIC BLOOD PRESSURE: 84 MMHG | BODY MASS INDEX: 39.51 KG/M2 | HEIGHT: 59 IN

## 2023-09-05 DIAGNOSIS — Z01.419 WELL FEMALE EXAM WITH ROUTINE GYNECOLOGICAL EXAM: Primary | ICD-10-CM

## 2023-09-05 DIAGNOSIS — Z11.51 SPECIAL SCREENING EXAMINATION FOR HUMAN PAPILLOMAVIRUS (HPV): ICD-10-CM

## 2023-09-05 DIAGNOSIS — Z12.31 ENCOUNTER FOR SCREENING MAMMOGRAM FOR MALIGNANT NEOPLASM OF BREAST: ICD-10-CM

## 2023-09-05 PROCEDURE — 3079F DIAST BP 80-89 MM HG: CPT | Performed by: NURSE PRACTITIONER

## 2023-09-05 PROCEDURE — 99397 PER PM REEVAL EST PAT 65+ YR: CPT | Performed by: NURSE PRACTITIONER

## 2023-09-05 PROCEDURE — 3075F SYST BP GE 130 - 139MM HG: CPT | Performed by: NURSE PRACTITIONER

## 2023-09-05 NOTE — PROGRESS NOTES
History and Physical  [address] [phone]   Fax [phone]  Masood Brady  2023              70 y.o. Chief Complaint   Patient presents with    Annual Exam       No LMP recorded. Patient is postmenopausal.             Primary Care Physician: Lena Childress MD    The patient was seen and examined. She has no chief complaint today and is here for her annual exam.  Her bowels are regular. There are no voiding complaints. She denies any bloating. She denies vaginal discharge and was counseled on STD's and the need for barrier contraception. HPI : Masood Brady is a 70 y.o. female     Annual exam  D&C scheduled with DR Armond Ying 9/15/2023 for PMB and thickened endometrial lining. Transferred from [address] [place] [address]. Last pap smear   Denies hx of abnormal pap smears. Recent colonoscopy 2023 completed DR Dionna Danielle. Has follow up to discuss test results.   ________________________________________________________________________  oRbin Sebago History    Para Term  AB Living   2 2 2 0 0 2   SAB IAB Ectopic Molar Multiple Live Births   0 0 0 0 0 2      # Outcome Date GA Lbr Durga/2nd Weight Sex Delivery Anes PTL Lv   2 Term      CS-LTranv   ARUN   1 Term      CS-LTranv   ARUN     Past Medical History:   Diagnosis Date    Abdominal swelling     Diabetic retinopathy ([address]) 2015    Flatus     H/O acute sinusitis     H/O cholelithiasis     Liver cirrhosis (HCC)     LLQ abdominal tenderness     Poor venous access     \"THEY LIKE TO ROLL & RUN AWAY\"    Positive colorectal cancer screening using Cologuard test 2023    Positive colorectal cancer screening using Cologuard test 2023    RUQ abdominal pain     Tendonitis of shoulder, right 2021    Tingling     Vaginal bleeding 2023    \"SPOTTING, THICKENED UTERINE LINING\"DUE TO HAVE VAGINAL /UTERINE BIOPSY FOR\"    Vaginal candidiasis

## 2023-09-07 ENCOUNTER — OFFICE VISIT (OUTPATIENT)
Dept: OBGYN CLINIC | Age: 68
End: 2023-09-07
Payer: MEDICARE

## 2023-09-07 ENCOUNTER — PREP FOR PROCEDURE (OUTPATIENT)
Dept: OBGYN CLINIC | Age: 68
End: 2023-09-07

## 2023-09-07 VITALS
SYSTOLIC BLOOD PRESSURE: 130 MMHG | HEIGHT: 59 IN | WEIGHT: 198 LBS | BODY MASS INDEX: 39.92 KG/M2 | DIASTOLIC BLOOD PRESSURE: 70 MMHG

## 2023-09-07 DIAGNOSIS — K76.0 NAFLD (NONALCOHOLIC FATTY LIVER DISEASE): ICD-10-CM

## 2023-09-07 DIAGNOSIS — Z01.818 PREOP TESTING: Primary | ICD-10-CM

## 2023-09-07 DIAGNOSIS — R79.89 ELEVATED LFTS: ICD-10-CM

## 2023-09-07 DIAGNOSIS — N95.0 PMB (POSTMENOPAUSAL BLEEDING): Primary | ICD-10-CM

## 2023-09-07 DIAGNOSIS — R93.89 THICKENED ENDOMETRIUM: ICD-10-CM

## 2023-09-07 DIAGNOSIS — I10 ESSENTIAL HYPERTENSION: ICD-10-CM

## 2023-09-07 DIAGNOSIS — E11.3393 MODERATE NONPROLIFERATIVE DIABETIC RETINOPATHY OF BOTH EYES WITHOUT MACULAR EDEMA ASSOCIATED WITH TYPE 2 DIABETES MELLITUS (HCC): ICD-10-CM

## 2023-09-07 DIAGNOSIS — N93.9 ABNORMAL UTERINE AND VAGINAL BLEEDING, UNSPECIFIED: ICD-10-CM

## 2023-09-07 DIAGNOSIS — E11.42 DIABETIC PERIPHERAL NEUROPATHY ASSOCIATED WITH TYPE 2 DIABETES MELLITUS (HCC): ICD-10-CM

## 2023-09-07 PROCEDURE — 1123F ACP DISCUSS/DSCN MKR DOCD: CPT | Performed by: OBSTETRICS & GYNECOLOGY

## 2023-09-07 PROCEDURE — 3078F DIAST BP <80 MM HG: CPT | Performed by: OBSTETRICS & GYNECOLOGY

## 2023-09-07 PROCEDURE — 99213 OFFICE O/P EST LOW 20 MIN: CPT | Performed by: OBSTETRICS & GYNECOLOGY

## 2023-09-07 PROCEDURE — 3046F HEMOGLOBIN A1C LEVEL >9.0%: CPT | Performed by: OBSTETRICS & GYNECOLOGY

## 2023-09-07 PROCEDURE — 3075F SYST BP GE 130 - 139MM HG: CPT | Performed by: OBSTETRICS & GYNECOLOGY

## 2023-09-07 RX ORDER — SODIUM CHLORIDE 9 MG/ML
INJECTION, SOLUTION INTRAVENOUS PRN
Status: CANCELLED | OUTPATIENT
Start: 2023-09-07

## 2023-09-07 RX ORDER — SODIUM CHLORIDE 0.9 % (FLUSH) 0.9 %
5-40 SYRINGE (ML) INJECTION EVERY 12 HOURS SCHEDULED
Status: CANCELLED | OUTPATIENT
Start: 2023-09-07

## 2023-09-07 RX ORDER — SODIUM CHLORIDE, SODIUM LACTATE, POTASSIUM CHLORIDE, CALCIUM CHLORIDE 600; 310; 30; 20 MG/100ML; MG/100ML; MG/100ML; MG/100ML
INJECTION, SOLUTION INTRAVENOUS CONTINUOUS
Status: CANCELLED | OUTPATIENT
Start: 2023-09-07

## 2023-09-07 RX ORDER — SODIUM CHLORIDE 0.9 % (FLUSH) 0.9 %
5-40 SYRINGE (ML) INJECTION PRN
Status: CANCELLED | OUTPATIENT
Start: 2023-09-07

## 2023-09-07 NOTE — PROGRESS NOTES
documentation will be forwarded to the pre-operative holding area. The patient is aware that this procedure may not alleviate her symptoms. That there may be a necessity for a second surgery and that there may be an incomplete removal of abnormal tissue.                  ________________________________________D. O. Date:_______________  Lisa Haro DO          The patient, Paulette Cheema is a 70 y.o. female, was seen with a total time spent of 20 minutes for the visit on this date of service by the E/M provider. The time component had both face to face and non face to face time spent in determining the total time component. Counseling and education regarding her diagnosis listed below and her options regarding those diagnoses were also included in determining her time component. Diagnosis Orders   1. PMB (postmenopausal bleeding)        2. Thickened endometrium        3. NAFLD (nonalcoholic fatty liver disease)        4. Abnormal uterine and vaginal bleeding, unspecified        5. Moderate nonproliferative diabetic retinopathy of both eyes without macular edema associated with type 2 diabetes mellitus ([address])        6. Diabetic peripheral neuropathy associated with type 2 diabetes mellitus (HCC)        7. Elevated LFTs        8. Essential hypertension             The patient had her preventative health maintenance recommendations and follow-up reviewed with her at the completion of her visit.

## 2023-09-07 NOTE — H&P (VIEW-ONLY)
[address] [facility]  [address]; [address]  [place], [address]  [phone] [place] [phone] Fax        Reid Lopez  1955  Primary Care Physician: Mallika Garcia MD        [address]: [facility]      2023  MEDICAID CONSENT COMPLETED: No      HPI: Reid Lopez is a 70 y.o. female   she is being seen for the problems listed below and is planning surgical intervention. She was counseled on all conservative medical and surgical options as well as definitive procedures as well. 1. PMB (postmenopausal bleeding)    2. Thickened endometrium    3. NAFLD (nonalcoholic fatty liver disease)    4. Abnormal uterine and vaginal bleeding, unspecified    5. Moderate nonproliferative diabetic retinopathy of both eyes without macular edema associated with type 2 diabetes mellitus ([address])    6. Diabetic peripheral neuropathy associated with type 2 diabetes mellitus (HCC)    7. Elevated LFTs    8.  Essential hypertension          Relevant Past History:   Patient Active Problem List    Diagnosis Date Noted    Hypoglycemia 2022     Priority: Medium    Abnormal uterine and vaginal bleeding, unspecified 2023    Thickened endometrium 2023    Type 2 diabetes mellitus with hyperglycemia 2023    Type 2 diabetes mellitus with chronic kidney disease 2023    Diabetic peripheral neuropathy associated with type 2 diabetes mellitus ([address]) 2022    Diabetic renal disease ([address]) 2022    Elevated LFTs 03/10/2022    NAFLD (nonalcoholic fatty liver disease) 03/10/2022    Seborrheic keratoses 2022    Stucco keratoses 2022    Cherry angioma 2022    Actinic keratosis 2022    Chronic right shoulder pain 2021    Moderate nonproliferative diabetic retinopathy associated with type 2 diabetes mellitus ([address]) 10/14/2020    Morbidly obese ([address]) 2020    Rosacea 2017    Anxiety 2015    Cirrhosis ([address]) documentation will be forwarded to the pre-operative holding area. The patient is aware that this procedure may not alleviate her symptoms. That there may be a necessity for a second surgery and that there may be an incomplete removal of abnormal tissue.                  ________________________________________D. O.  Date:_____________Kathya Clinton DO

## 2023-09-08 ENCOUNTER — HOSPITAL ENCOUNTER (OUTPATIENT)
Dept: PREADMISSION TESTING | Age: 68
End: 2023-09-08
Payer: MEDICARE

## 2023-09-08 ENCOUNTER — HOSPITAL ENCOUNTER (OUTPATIENT)
Dept: GENERAL RADIOLOGY | Age: 68
End: 2023-09-08
Payer: MEDICARE

## 2023-09-08 VITALS
SYSTOLIC BLOOD PRESSURE: 190 MMHG | BODY MASS INDEX: 38.91 KG/M2 | OXYGEN SATURATION: 98 % | DIASTOLIC BLOOD PRESSURE: 77 MMHG | HEART RATE: 82 BPM | WEIGHT: 193 LBS | HEIGHT: 59 IN | TEMPERATURE: 98.2 F | RESPIRATION RATE: 18 BRPM

## 2023-09-08 DIAGNOSIS — Z01.818 PREOP TESTING: ICD-10-CM

## 2023-09-08 LAB
ABO + RH BLD: NORMAL
ANION GAP SERPL CALCULATED.3IONS-SCNC: 10 MMOL/L (ref 9–17)
ARM BAND NUMBER: NORMAL
BACTERIA URNS QL MICRO: ABNORMAL
BASOPHILS # BLD: 0 K/UL (ref 0–0.2)
BASOPHILS NFR BLD: 0 % (ref 0–2)
BILIRUB UR QL STRIP: NEGATIVE
BLOOD BANK SAMPLE EXPIRATION: NORMAL
BLOOD GROUP ANTIBODIES SERPL: NEGATIVE
BUN SERPL-MCNC: 13 MG/DL (ref 8–23)
CALCIUM SERPL-MCNC: 9.6 MG/DL (ref 8.6–10.4)
CASTS #/AREA URNS LPF: ABNORMAL /LPF
CHLORIDE SERPL-SCNC: 103 MMOL/L (ref 98–107)
CLARITY UR: ABNORMAL
CO2 SERPL-SCNC: 27 MMOL/L (ref 20–31)
COLOR UR: YELLOW
CREAT SERPL-MCNC: 0.6 MG/DL (ref 0.5–0.9)
EKG ATRIAL RATE: 82 BPM
EKG P AXIS: 63 DEGREES
EKG P-R INTERVAL: 166 MS
EKG Q-T INTERVAL: 370 MS
EKG QRS DURATION: 92 MS
EKG QTC CALCULATION (BAZETT): 432 MS
EKG R AXIS: 3 DEGREES
EKG T AXIS: 39 DEGREES
EKG VENTRICULAR RATE: 82 BPM
EOSINOPHIL # BLD: 0.16 K/UL (ref 0–0.4)
EOSINOPHILS RELATIVE PERCENT: 5 % (ref 0–4)
EPI CELLS #/AREA URNS HPF: ABNORMAL /HPF
ERYTHROCYTE [DISTWIDTH] IN BLOOD BY AUTOMATED COUNT: 15 % (ref 11.5–14.9)
GFR SERPL CREATININE-BSD FRML MDRD: >60 ML/MIN/1.73M2
GLUCOSE SERPL-MCNC: 243 MG/DL (ref 70–99)
GLUCOSE UR STRIP-MCNC: NEGATIVE MG/DL
HCT VFR BLD AUTO: 33.6 % (ref 36–46)
HGB BLD-MCNC: 10.8 G/DL (ref 12–16)
HGB UR QL STRIP.AUTO: ABNORMAL
HPV I/H RISK 4 DNA CVX QL NAA+PROBE: NOT DETECTED
HPV SAMPLE: NORMAL
HPV, INTERPRETATION: NORMAL
HPV16 DNA CVX QL NAA+PROBE: NOT DETECTED
HPV18 DNA CVX QL NAA+PROBE: NOT DETECTED
KETONES UR STRIP-MCNC: NEGATIVE MG/DL
LEUKOCYTE ESTERASE UR QL STRIP: ABNORMAL
LYMPHOCYTES NFR BLD: 0.87 K/UL (ref 1–4.8)
LYMPHOCYTES RELATIVE PERCENT: 28 % (ref 24–44)
MCH RBC QN AUTO: 29 PG (ref 26–34)
MCHC RBC AUTO-ENTMCNC: 32.1 G/DL (ref 31–37)
MCV RBC AUTO: 90.3 FL (ref 80–100)
MONOCYTES NFR BLD: 0.16 K/UL (ref 0.1–1.3)
MONOCYTES NFR BLD: 5 % (ref 1–7)
MORPHOLOGY: ABNORMAL
NEUTROPHILS NFR BLD: 62 % (ref 36–66)
NEUTS SEG NFR BLD: 1.91 K/UL (ref 1.3–9.1)
NITRITE UR QL STRIP: POSITIVE
PH UR STRIP: 6.5 [PH] (ref 5–8)
PLATELET # BLD AUTO: 135 K/UL (ref 150–450)
PMV BLD AUTO: 9.2 FL (ref 6–12)
POTASSIUM SERPL-SCNC: 5 MMOL/L (ref 3.7–5.3)
PROT UR STRIP-MCNC: ABNORMAL MG/DL
RBC # BLD AUTO: 3.72 M/UL (ref 4–5.2)
RBC #/AREA URNS HPF: ABNORMAL /HPF
SODIUM SERPL-SCNC: 140 MMOL/L (ref 135–144)
SP GR UR STRIP: 1.02 (ref 1–1.03)
SPECIMEN DESCRIPTION: NORMAL
UROBILINOGEN UR STRIP-ACNC: NORMAL EU/DL (ref 0–1)
WBC #/AREA URNS HPF: ABNORMAL /HPF
WBC OTHER # BLD: 3.1 K/UL (ref 3.5–11)

## 2023-09-08 PROCEDURE — 87086 URINE CULTURE/COLONY COUNT: CPT

## 2023-09-08 PROCEDURE — 93005 ELECTROCARDIOGRAM TRACING: CPT | Performed by: ANESTHESIOLOGY

## 2023-09-08 PROCEDURE — 86850 RBC ANTIBODY SCREEN: CPT

## 2023-09-08 PROCEDURE — 80048 BASIC METABOLIC PNL TOTAL CA: CPT

## 2023-09-08 PROCEDURE — 85025 COMPLETE CBC W/AUTO DIFF WBC: CPT

## 2023-09-08 PROCEDURE — 87088 URINE BACTERIA CULTURE: CPT

## 2023-09-08 PROCEDURE — 87186 SC STD MICRODIL/AGAR DIL: CPT

## 2023-09-08 PROCEDURE — 86900 BLOOD TYPING SEROLOGIC ABO: CPT

## 2023-09-08 PROCEDURE — 81001 URINALYSIS AUTO W/SCOPE: CPT

## 2023-09-08 PROCEDURE — 71046 X-RAY EXAM CHEST 2 VIEWS: CPT

## 2023-09-08 PROCEDURE — 36415 COLL VENOUS BLD VENIPUNCTURE: CPT

## 2023-09-08 PROCEDURE — 86901 BLOOD TYPING SEROLOGIC RH(D): CPT

## 2023-09-08 NOTE — DISCHARGE INSTRUCTIONS
Pre-op Instructions For Out-Patient Surgery    Medication Instructions:  Please stop herbs and any supplements now (includes vitamins and minerals). Please contact your surgeon and prescribing physician for pre-op instructions for any blood thinners. Aspirin and Ibuprofen as directed. If you have inhalers/aerosol treatments at home, please use them the morning of your surgery and bring the inhalers with you to the hospital.    Please take the following medications the morning of your surgery with a sip of water:    none    Surgery Instructions:  After midnight before surgery:  Do not eat or drink anything, including water, mints, gum, and hard candy. You may brush your teeth without swallowing. No smoking, chewing tobacco, or street drugs. Please shower or bathe before surgery. If you were given Surgical Scrub Chlorhexidine Gluconate Liquid (CHG), please shower the night before and the morning of your surgery following the detailed instructions you received during your pre-admission visit. Please do not wear any cologne, lotion, powder, deodorant, jewelry, piercings, perfume, makeup, nail polish, hair accessories, or hair spray on the day of surgery. Wear loose comfortable clothing. Leave your valuables at home but bring a payment source for any after-surgery prescriptions you plan to fill at [facility]. Bring a storage case for any glasses/contacts. An adult who is responsible for you MUST drive you home and should be with you for the first 24 hours after surgery. If having out-patient knee and foot surgeries, please arrange for planned crutches, walker, or wheelchair before arriving to the hospital.    The Day of Surgery:  Arrive at [facility] Surgery Entrance at the time directed by your surgeon and check in at the desk. If you have a living will or healthcare power of , please bring a copy.     You will be taken to the pre-op holding area where you will be prepared for surgery. A physical assessment will be performed by a nurse practitioner or house officer. Your IV will be started and you will meet your anesthesiologist.    When you go to surgery, your family will be directed to the surgical waiting room, where the doctor should speak with them after your surgery. After surgery, you will be taken to the recovery area. When you are alert and stable, you will receive instructions and be prepared for discharge. If you use a Bi-PAP or C-PAP machine, please bring it with you and leave it in the car in case it is needed in recovery room.

## 2023-09-09 LAB
MICROORGANISM SPEC CULT: ABNORMAL
SPECIMEN DESCRIPTION: ABNORMAL

## 2023-09-11 ENCOUNTER — TELEPHONE (OUTPATIENT)
Dept: OBGYN CLINIC | Age: 68
End: 2023-09-11

## 2023-09-11 LAB
CYTOLOGY REPORT: NORMAL
EKG ATRIAL RATE: 82 BPM
EKG P AXIS: 63 DEGREES
EKG P-R INTERVAL: 166 MS
EKG Q-T INTERVAL: 370 MS
EKG QRS DURATION: 92 MS
EKG QTC CALCULATION (BAZETT): 432 MS
EKG R AXIS: 3 DEGREES
EKG T AXIS: 39 DEGREES
EKG VENTRICULAR RATE: 82 BPM

## 2023-09-11 RX ORDER — CIPROFLOXACIN 500 MG/1
500 TABLET, FILM COATED ORAL 2 TIMES DAILY
Qty: 14 TABLET | Refills: 0 | Status: SHIPPED | OUTPATIENT
Start: 2023-09-11 | End: 2023-09-18

## 2023-09-11 NOTE — TELEPHONE ENCOUNTER
Per Dr Mariann Almonte pt notified of urine culture result and pt to get Ciprofloxacin 500 mg po bid x 7 days #14 sent to pt pharm. Proper  hygiene reviewed with pt . Pt voiced understanding to all results and recommendations.

## 2023-09-11 NOTE — TELEPHONE ENCOUNTER
----- Message from Conner Antoine DO sent at 9/11/2023  1:18 PM EDT -----  Ciprofloxacin 500 mg po bid x 7 days #14  Review hygiene

## 2023-09-14 ENCOUNTER — ANESTHESIA EVENT (OUTPATIENT)
Dept: OPERATING ROOM | Age: 68
End: 2023-09-14
Payer: MEDICARE

## 2023-09-14 NOTE — PRE-PROCEDURE INSTRUCTIONS
No answer, left message ? Unable to leave message ? When were you told to arrive at hospital ?  [address] [facility]    Do you have a  ? yes    Are you on any blood thinners ? yes                If yes when did you stop taking ? 9/08/2023    Do you have your prep Rx filled and instruction ? N/A    Nothing to eat the day before , only clear liquids. Are you experiencing any covid symptoms ? no    Do you have any infections or rash we should be aware of ? no      Do you have the Hibiclens soap to use the night before and the morning of surgery ? Nothing to eat or drink after midnight, only a sip of water to take any medication instructed to take the night before. Wear comfortable clothing, leave any valuables at home, remove any jewelry and body piercing .

## 2023-09-15 ENCOUNTER — ANESTHESIA (OUTPATIENT)
Dept: OPERATING ROOM | Age: 68
End: 2023-09-15
Payer: MEDICARE

## 2023-09-15 ENCOUNTER — HOSPITAL ENCOUNTER (OUTPATIENT)
Age: 68
Setting detail: OUTPATIENT SURGERY
Discharge: HOME | End: 2023-09-15
Attending: OBSTETRICS & GYNECOLOGY | Admitting: OBSTETRICS & GYNECOLOGY
Payer: MEDICARE

## 2023-09-15 VITALS
HEART RATE: 90 BPM | HEIGHT: 59 IN | WEIGHT: 193 LBS | DIASTOLIC BLOOD PRESSURE: 70 MMHG | RESPIRATION RATE: 16 BRPM | BODY MASS INDEX: 38.91 KG/M2 | OXYGEN SATURATION: 96 % | TEMPERATURE: 97.5 F | SYSTOLIC BLOOD PRESSURE: 177 MMHG

## 2023-09-15 DIAGNOSIS — D69.6 THROMBOCYTOPENIA (HCC): Primary | ICD-10-CM

## 2023-09-15 LAB
BASOPHILS # BLD: 0.03 K/UL (ref 0–0.2)
BASOPHILS NFR BLD: 1 % (ref 0–2)
EOSINOPHIL # BLD: 0.1 K/UL (ref 0–0.4)
EOSINOPHILS RELATIVE PERCENT: 3 % (ref 0–4)
ERYTHROCYTE [DISTWIDTH] IN BLOOD BY AUTOMATED COUNT: 15.2 % (ref 11.5–14.9)
GLUCOSE BLD-MCNC: 182 MG/DL (ref 65–105)
HCT VFR BLD AUTO: 32.2 % (ref 36–46)
HGB BLD-MCNC: 10.5 G/DL (ref 12–16)
LYMPHOCYTES NFR BLD: 0.85 K/UL (ref 1–4.8)
LYMPHOCYTES RELATIVE PERCENT: 25 % (ref 24–44)
MCH RBC QN AUTO: 29.3 PG (ref 26–34)
MCHC RBC AUTO-ENTMCNC: 32.5 G/DL (ref 31–37)
MCV RBC AUTO: 90.3 FL (ref 80–100)
MONOCYTES NFR BLD: 0.2 K/UL (ref 0.1–1.3)
MONOCYTES NFR BLD: 6 % (ref 1–7)
MORPHOLOGY: ABNORMAL
NEUTROPHILS NFR BLD: 65 % (ref 36–66)
NEUTS SEG NFR BLD: 2.22 K/UL (ref 1.3–9.1)
PLATELET # BLD AUTO: 111 K/UL (ref 150–450)
PMV BLD AUTO: 9.5 FL (ref 6–12)
RBC # BLD AUTO: 3.57 M/UL (ref 4–5.2)
WBC OTHER # BLD: 3.4 K/UL (ref 3.5–11)

## 2023-09-15 PROCEDURE — 82947 ASSAY GLUCOSE BLOOD QUANT: CPT

## 2023-09-15 PROCEDURE — 85025 COMPLETE CBC W/AUTO DIFF WBC: CPT

## 2023-09-15 PROCEDURE — 36415 COLL VENOUS BLD VENIPUNCTURE: CPT

## 2023-09-15 PROCEDURE — 2580000003 HC RX 258: Performed by: ANESTHESIOLOGY

## 2023-09-15 RX ORDER — SODIUM CHLORIDE, SODIUM LACTATE, POTASSIUM CHLORIDE, CALCIUM CHLORIDE 600; 310; 30; 20 MG/100ML; MG/100ML; MG/100ML; MG/100ML
INJECTION, SOLUTION INTRAVENOUS CONTINUOUS
Status: DISCONTINUED | OUTPATIENT
Start: 2023-09-15 | End: 2023-09-15

## 2023-09-15 RX ORDER — SODIUM CHLORIDE 9 MG/ML
INJECTION, SOLUTION INTRAVENOUS PRN
Status: DISCONTINUED | OUTPATIENT
Start: 2023-09-15 | End: 2023-09-15 | Stop reason: HOSPADM

## 2023-09-15 RX ORDER — SODIUM CHLORIDE 0.9 % (FLUSH) 0.9 %
5-40 SYRINGE (ML) INJECTION PRN
Status: CANCELLED | OUTPATIENT
Start: 2023-09-15

## 2023-09-15 RX ORDER — LIDOCAINE HYDROCHLORIDE 10 MG/ML
1 INJECTION, SOLUTION EPIDURAL; INFILTRATION; INTRACAUDAL; PERINEURAL
Status: DISCONTINUED | OUTPATIENT
Start: 2023-09-15 | End: 2023-09-15 | Stop reason: HOSPADM

## 2023-09-15 RX ORDER — ONDANSETRON 2 MG/ML
4 INJECTION INTRAMUSCULAR; INTRAVENOUS
Status: CANCELLED | OUTPATIENT
Start: 2023-09-15 | End: 2023-09-16

## 2023-09-15 RX ORDER — SODIUM CHLORIDE 9 MG/ML
INJECTION, SOLUTION INTRAVENOUS CONTINUOUS
Status: DISCONTINUED | OUTPATIENT
Start: 2023-09-15 | End: 2023-09-15 | Stop reason: HOSPADM

## 2023-09-15 RX ORDER — FENTANYL CITRATE 0.05 MG/ML
25 INJECTION, SOLUTION INTRAMUSCULAR; INTRAVENOUS EVERY 5 MIN PRN
Status: CANCELLED | OUTPATIENT
Start: 2023-09-15

## 2023-09-15 RX ORDER — SODIUM CHLORIDE 0.9 % (FLUSH) 0.9 %
5-40 SYRINGE (ML) INJECTION EVERY 12 HOURS SCHEDULED
Status: DISCONTINUED | OUTPATIENT
Start: 2023-09-15 | End: 2023-09-15 | Stop reason: HOSPADM

## 2023-09-15 RX ORDER — METOCLOPRAMIDE HYDROCHLORIDE 5 MG/ML
10 INJECTION INTRAMUSCULAR; INTRAVENOUS
Status: CANCELLED | OUTPATIENT
Start: 2023-09-15 | End: 2023-09-16

## 2023-09-15 RX ORDER — SODIUM CHLORIDE 0.9 % (FLUSH) 0.9 %
5-40 SYRINGE (ML) INJECTION PRN
Status: DISCONTINUED | OUTPATIENT
Start: 2023-09-15 | End: 2023-09-15 | Stop reason: HOSPADM

## 2023-09-15 RX ORDER — DIPHENHYDRAMINE HYDROCHLORIDE 50 MG/ML
12.5 INJECTION INTRAMUSCULAR; INTRAVENOUS
Status: CANCELLED | OUTPATIENT
Start: 2023-09-15 | End: 2023-09-16

## 2023-09-15 RX ORDER — GABAPENTIN 100 MG/1
100 CAPSULE ORAL ONCE
Status: DISCONTINUED | OUTPATIENT
Start: 2023-09-15 | End: 2023-09-15

## 2023-09-15 RX ORDER — SODIUM CHLORIDE 9 MG/ML
INJECTION, SOLUTION INTRAVENOUS PRN
Status: CANCELLED | OUTPATIENT
Start: 2023-09-15

## 2023-09-15 RX ORDER — ACETAMINOPHEN 500 MG
1000 TABLET ORAL ONCE
Status: DISCONTINUED | OUTPATIENT
Start: 2023-09-15 | End: 2023-09-15

## 2023-09-15 RX ORDER — SODIUM CHLORIDE 0.9 % (FLUSH) 0.9 %
5-40 SYRINGE (ML) INJECTION EVERY 12 HOURS SCHEDULED
Status: CANCELLED | OUTPATIENT
Start: 2023-09-15

## 2023-09-15 RX ADMIN — SODIUM CHLORIDE: 9 INJECTION, SOLUTION INTRAVENOUS at 10:25

## 2023-09-15 ASSESSMENT — PAIN - FUNCTIONAL ASSESSMENT: PAIN_FUNCTIONAL_ASSESSMENT: NONE - DENIES PAIN

## 2023-09-15 NOTE — PROGRESS NOTES
This patient was cancelled from preop for falling platelets and after discussion with the patients PCP. Current Ecoli UTI on Ciprofloxacin. Pt to complete her RX abx for her UTI and obtain a CBC with Differential in 7 days with a Platelet function assay. Both labs have been ordered.     Electronically signed by Lisa Haro DO on 9/15/2023 at 10:58 AM

## 2023-09-15 NOTE — ANESTHESIA PRE PROCEDURE
Department of Anesthesiology  Preprocedure Note       Name:  Vipin Capone   Age:  70 y.o.  :  1955                                          MRN:  [id number]         Date:  9/15/2023      Surgeon: Byron Vincent):  Ashley Goff DO    Procedure: Procedure(s):  DILATATION AND CURETTAGE HYSTEROSCOPY WITH MYOSURE POLYPECTOMY    Medications prior to admission:   Prior to Admission medications    Medication Sig Start Date End Date Taking? Authorizing Provider   ciprofloxacin (CIPRO) 500 MG tablet Take 1 tablet by mouth 2 times daily for 7 days 23  JOE Xiao - NP   gabapentin (NEURONTIN) 300 MG capsule take 2 capsules by mouth three times a day 23  Rickey Weaver MD   TRULICITY 4.5 LZ/7.3BT SOPN INJECT 4.5MG INTO THE SKIN  ONCE A WEEK  Patient taking differently: 4.5 mg once a week MONDAY'23   Rickey Weaver MD   Continuous Blood Gluc Sensor (DEXCOM G7 SENSOR) MISC Use to monitor sugars 6-8 times a day and monitor for high and low sugars. Patient taking differently: Use to monitor sugars 6-8 times a day and monitor for high and low sugars.  HASN'T BEEN INSTRUCTED YET ON USE. 23   Rickey Weaver MD   SITagliptin-metFORMIN (JANUMET XR)  MG TB24 per extended release tablet Take 1 tablet by mouth 2 times daily 23   Rickey Weaver MD   B-D UF III MINI PEN NEEDLES 31G X 5 MM MISC use 1 PEN NEEDLE to inject MEDICATION subcutaneously four times a day with HUMALOG 4/3/23   Rickey Weaver MD   lisinopril-hydroCHLOROthiazide (PRINZIDE;ZESTORETIC) 20-25 MG per tablet take 1 tablet by mouth once daily 3/27/23   Rickey Weaver MD   atorvastatin (LIPITOR) 10 MG tablet take 1 tablet by mouth once daily 3/27/23   Rickey Weaver MD   vitamin E 180 MG (400 UNIT) CAPS capsule Take 1 capsule by mouth daily    Historical Provider, MD CHARLIE BURKS SOLOSTAR 300 UNIT/ML SOPN inject 40 units subcutaneously twice a day

## 2023-09-15 NOTE — H&P
History & Physical UPDATE NOTE   09/15/23       Gynecologic Surgery Pre-Operative Attestation/Update Note:        Facility: [facility]  Date: 9/15/2023  Time: 9:58 AM      Patient Name: Gerber Rodriguez  Patient : 1955  Room/Bed: [facility] [address] OR Pool/NONE  Admission Date/Time: 9/15/2023  9:22 AM  MRN #: [id number]  [facility] #: [id number]        Past Medical History:   Diagnosis Date    Abdominal swelling     Diabetic retinopathy ([address]) 2015    Flatus     H/O acute sinusitis     H/O cholelithiasis     Hypertension     Liver cirrhosis (HCC)     LLQ abdominal tenderness     Poor venous access     \"THEY LIKE TO ROLL & RUN AWAY\"    Positive colorectal cancer screening using Cologuard test 2023    Positive colorectal cancer screening using Cologuard test 2023    RUQ abdominal pain     Tendonitis of shoulder, right 2021    Tingling     Vaginal bleeding 2023    \"SPOTTING, THICKENED UTERINE LINING\"DUE TO HAVE VAGINAL /UTERINE BIOPSY FOR\"    Vaginal candidiasis          Past Surgical History:   Procedure Laterality Date    ANKLE FRACTURE SURGERY Left 2006    no retained metal    CATARACT REMOVAL WITH IMPLANT Bilateral 2018     SECTION      x2, ,  \"SADDLE [address]"    CHOLECYSTECTOMY, LAPAROSCOPIC      COLONOSCOPY N/A 2023    COLONOSCOPY WITH BIOPSY performed by Taylor Perez MD at [address] [facility]         No current facility-administered medications on file prior to encounter. Current Outpatient Medications on File Prior to Encounter   Medication Sig Dispense Refill    gabapentin (NEURONTIN) 300 MG capsule take 2 capsules by mouth three times a day 180 capsule 3    TRULICITY 4.5 SI/0.9BT SOPN INJECT 4.5MG INTO THE SKIN  ONCE A WEEK (Patient taking differently: 4.5 mg once a week MONDAY'S) 12 mL 0    Continuous Blood Gluc Sensor (DEXCOM G7 SENSOR) MISC Use to monitor sugars 6-8 times a day and monitor for high and low sugars.  (Patient taking differently: Use to HPV, Genotype 16 09/05/2023 Not Detected  Not Detected Final    HPV, Genotype 18 09/05/2023 Not Detected  Not Detected Final    HPV, High Risk Other 09/05/2023 Not Detected  Not Detected Final    HPV, Interpretation 09/05/2023        Final    Comment: This test amplifies and detects DNA of 14 high-risk HPV types associated with cervical   cancer and its precursor lesions (HPV types 16,18, 31, 33, 35, 39, 45, 51, 52, 56, 58, 59,   66, and 68). Sensitivity may be affected by specimen collection methods, stage of infection, and the   presence of interfering substances. Results should be interpreted in conjunction with other available laboratory and clinical   data. A negative high-risk HPV result does not exclude the possibility of future cytologic HSIL or   underlying CIN2-3 or cancer. This test is intended for medical purposes only and is not valid for the evaluation of   suspected sexual abuse or for other forensic purposes.     ]      The patient was counseled at length about the risks of bruce Covid-19 in the devin-operative and post-operative states including the recovery window of their procedure. The patient was made aware that bruce Covid-19 after a surgical procedure may worsen their prognosis for recovering from the virus and lend to a higher morbidity and or mortality risk. The patient was given the options of postponing their procedure. All of the risks, benefits, and alternatives were discussed. The patient  does wish to proceed with the procedure. Assessment:       Diagnosis Orders   1. PMB (postmenopausal bleeding)          2. Thickened endometrium          3. NAFLD (nonalcoholic fatty liver disease)          4. Abnormal uterine and vaginal bleeding, unspecified          5. Moderate nonproliferative diabetic retinopathy of both eyes without macular edema associated with type 2 diabetes mellitus ([address])          6.  Diabetic peripheral neuropathy associated with type 2

## 2023-09-15 NOTE — PROGRESS NOTES
09/15/23  10:53 AM      I reviewed the follow up CBC and the patients platelets continue to trend downward to 111k. I called the patients PCP, Dr. Derek Jaime and she requested after discussion with myself that the procedure be rescheduled and a CBC with differential be obtained out patint in 7 days. The patient can return to my office to get back on the surgery schedule if her platelets return to normal. If they remian low a Platelet function assay will be ordered.     Electronically signed by Rodrigo Patrick DO on 9/15/2023 at 10:56 AM

## 2023-09-19 ENCOUNTER — HOSPITAL ENCOUNTER (OUTPATIENT)
Age: 68
Discharge: HOME | End: 2023-09-19
Payer: MEDICARE

## 2023-09-19 DIAGNOSIS — D69.6 THROMBOCYTOPENIA (HCC): ICD-10-CM

## 2023-09-19 LAB
BASOPHILS # BLD: 0 K/UL (ref 0–0.2)
BASOPHILS NFR BLD: 0 % (ref 0–2)
CLOSURE TME COLL+ADP BLD: 116 SEC (ref 67–112)
COLLAGEN EPINEPHRINE TIME: 163 SEC (ref 85–172)
EOSINOPHIL # BLD: 0.19 K/UL (ref 0–0.4)
EOSINOPHILS RELATIVE PERCENT: 6 % (ref 0–4)
ERYTHROCYTE [DISTWIDTH] IN BLOOD BY AUTOMATED COUNT: 15.1 % (ref 11.5–14.9)
HCT VFR BLD AUTO: 33.3 % (ref 36–46)
HGB BLD-MCNC: 10.7 G/DL (ref 12–16)
LYMPHOCYTES NFR BLD: 0.8 K/UL (ref 1–4.8)
LYMPHOCYTES RELATIVE PERCENT: 25 % (ref 24–44)
MCH RBC QN AUTO: 29.1 PG (ref 26–34)
MCHC RBC AUTO-ENTMCNC: 32.2 G/DL (ref 31–37)
MCV RBC AUTO: 90.5 FL (ref 80–100)
MONOCYTES NFR BLD: 0.22 K/UL (ref 0.1–1.3)
MONOCYTES NFR BLD: 7 % (ref 1–7)
MORPHOLOGY: NORMAL
NEUTROPHILS NFR BLD: 62 % (ref 36–66)
NEUTS SEG NFR BLD: 1.99 K/UL (ref 1.3–9.1)
PLATELET # BLD AUTO: 121 K/UL (ref 150–450)
PLATELET FUNCTION INTERP: ABNORMAL
PMV BLD AUTO: 8.8 FL (ref 6–12)
RBC # BLD AUTO: 3.68 M/UL (ref 4–5.2)
WBC OTHER # BLD: 3.2 K/UL (ref 3.5–11)

## 2023-09-19 PROCEDURE — 85576 BLOOD PLATELET AGGREGATION: CPT

## 2023-09-19 PROCEDURE — 85025 COMPLETE CBC W/AUTO DIFF WBC: CPT

## 2023-09-19 PROCEDURE — 36415 COLL VENOUS BLD VENIPUNCTURE: CPT

## 2023-10-02 ENCOUNTER — ANESTHESIA EVENT (OUTPATIENT)
Dept: OPERATING ROOM | Age: 68
End: 2023-10-02
Payer: MEDICARE

## 2023-10-02 ENCOUNTER — HOSPITAL ENCOUNTER (OUTPATIENT)
Age: 68
Setting detail: OUTPATIENT SURGERY
Discharge: HOME | End: 2023-10-02
Attending: OBSTETRICS & GYNECOLOGY | Admitting: OBSTETRICS & GYNECOLOGY
Payer: MEDICARE

## 2023-10-02 ENCOUNTER — ANESTHESIA (OUTPATIENT)
Dept: OPERATING ROOM | Age: 68
End: 2023-10-02
Payer: MEDICARE

## 2023-10-02 VITALS
DIASTOLIC BLOOD PRESSURE: 79 MMHG | TEMPERATURE: 96.8 F | HEIGHT: 59 IN | SYSTOLIC BLOOD PRESSURE: 177 MMHG | HEART RATE: 78 BPM | OXYGEN SATURATION: 96 % | RESPIRATION RATE: 16 BRPM | WEIGHT: 193 LBS | BODY MASS INDEX: 38.91 KG/M2

## 2023-10-02 DIAGNOSIS — M53.3 SACROILIAC PAIN: ICD-10-CM

## 2023-10-02 DIAGNOSIS — R93.89 THICKENED ENDOMETRIUM: ICD-10-CM

## 2023-10-02 DIAGNOSIS — N95.0 PMB (POSTMENOPAUSAL BLEEDING): ICD-10-CM

## 2023-10-02 DIAGNOSIS — N95.0 PMB (POSTMENOPAUSAL BLEEDING): Primary | ICD-10-CM

## 2023-10-02 DIAGNOSIS — G89.29 ACUTE EXACERBATION OF CHRONIC LOW BACK PAIN: ICD-10-CM

## 2023-10-02 DIAGNOSIS — M54.50 ACUTE EXACERBATION OF CHRONIC LOW BACK PAIN: ICD-10-CM

## 2023-10-02 PROBLEM — Z98.890 STATUS POST D&C: Status: ACTIVE | Noted: 2023-10-02

## 2023-10-02 LAB
GLUCOSE BLD-MCNC: 153 MG/DL (ref 65–105)
GLUCOSE BLD-MCNC: 95 MG/DL (ref 65–105)

## 2023-10-02 PROCEDURE — 2709999900 HC NON-CHARGEABLE SUPPLY: Performed by: OBSTETRICS & GYNECOLOGY

## 2023-10-02 PROCEDURE — 7100000030 HC ASPR PHASE II RECOVERY - FIRST 15 MIN: Performed by: OBSTETRICS & GYNECOLOGY

## 2023-10-02 PROCEDURE — 82947 ASSAY GLUCOSE BLOOD QUANT: CPT

## 2023-10-02 PROCEDURE — 7100000000 HC PACU RECOVERY - FIRST 15 MIN: Performed by: OBSTETRICS & GYNECOLOGY

## 2023-10-02 PROCEDURE — 3600000013 HC SURGERY LEVEL 3 ADDTL 15MIN: Performed by: OBSTETRICS & GYNECOLOGY

## 2023-10-02 PROCEDURE — 7100000031 HC ASPR PHASE II RECOVERY - ADDTL 15 MIN: Performed by: OBSTETRICS & GYNECOLOGY

## 2023-10-02 PROCEDURE — 6360000002 HC RX W HCPCS: Performed by: NURSE ANESTHETIST, CERTIFIED REGISTERED

## 2023-10-02 PROCEDURE — 88305 TISSUE EXAM BY PATHOLOGIST: CPT

## 2023-10-02 PROCEDURE — 7100000010 HC PHASE II RECOVERY - FIRST 15 MIN: Performed by: OBSTETRICS & GYNECOLOGY

## 2023-10-02 PROCEDURE — 7100000001 HC PACU RECOVERY - ADDTL 15 MIN: Performed by: OBSTETRICS & GYNECOLOGY

## 2023-10-02 PROCEDURE — 2580000003 HC RX 258: Performed by: ANESTHESIOLOGY

## 2023-10-02 PROCEDURE — 3600000003 HC SURGERY LEVEL 3 BASE: Performed by: OBSTETRICS & GYNECOLOGY

## 2023-10-02 PROCEDURE — 2500000003 HC RX 250 WO HCPCS: Performed by: NURSE ANESTHETIST, CERTIFIED REGISTERED

## 2023-10-02 PROCEDURE — 3700000001 HC ADD 15 MINUTES (ANESTHESIA): Performed by: OBSTETRICS & GYNECOLOGY

## 2023-10-02 PROCEDURE — 3700000000 HC ANESTHESIA ATTENDED CARE: Performed by: OBSTETRICS & GYNECOLOGY

## 2023-10-02 PROCEDURE — 7100000011 HC PHASE II RECOVERY - ADDTL 15 MIN: Performed by: OBSTETRICS & GYNECOLOGY

## 2023-10-02 RX ORDER — FENTANYL CITRATE 0.05 MG/ML
25 INJECTION, SOLUTION INTRAMUSCULAR; INTRAVENOUS EVERY 5 MIN PRN
Status: DISCONTINUED | OUTPATIENT
Start: 2023-10-02 | End: 2023-10-02 | Stop reason: HOSPADM

## 2023-10-02 RX ORDER — MIDAZOLAM HYDROCHLORIDE 1 MG/ML
INJECTION INTRAMUSCULAR; INTRAVENOUS PRN
Status: DISCONTINUED | OUTPATIENT
Start: 2023-10-02 | End: 2023-10-02 | Stop reason: SDUPTHER

## 2023-10-02 RX ORDER — SODIUM CHLORIDE 9 MG/ML
INJECTION, SOLUTION INTRAVENOUS PRN
Status: DISCONTINUED | OUTPATIENT
Start: 2023-10-02 | End: 2023-10-02 | Stop reason: HOSPADM

## 2023-10-02 RX ORDER — SODIUM CHLORIDE 0.9 % (FLUSH) 0.9 %
5-40 SYRINGE (ML) INJECTION EVERY 12 HOURS SCHEDULED
Status: DISCONTINUED | OUTPATIENT
Start: 2023-10-02 | End: 2023-10-02 | Stop reason: HOSPADM

## 2023-10-02 RX ORDER — IBUPROFEN 600 MG/1
600 TABLET ORAL EVERY 6 HOURS PRN
Qty: 30 TABLET | Refills: 0 | Status: SHIPPED | OUTPATIENT
Start: 2023-10-02

## 2023-10-02 RX ORDER — SODIUM CHLORIDE 9 MG/ML
INJECTION, SOLUTION INTRAVENOUS CONTINUOUS
Status: DISCONTINUED | OUTPATIENT
Start: 2023-10-02 | End: 2023-10-02 | Stop reason: HOSPADM

## 2023-10-02 RX ORDER — FENTANYL CITRATE 50 UG/ML
INJECTION, SOLUTION INTRAMUSCULAR; INTRAVENOUS PRN
Status: DISCONTINUED | OUTPATIENT
Start: 2023-10-02 | End: 2023-10-02 | Stop reason: SDUPTHER

## 2023-10-02 RX ORDER — ONDANSETRON 2 MG/ML
INJECTION INTRAMUSCULAR; INTRAVENOUS PRN
Status: DISCONTINUED | OUTPATIENT
Start: 2023-10-02 | End: 2023-10-02 | Stop reason: SDUPTHER

## 2023-10-02 RX ORDER — DEXAMETHASONE SODIUM PHOSPHATE 4 MG/ML
INJECTION, SOLUTION INTRA-ARTICULAR; INTRALESIONAL; INTRAMUSCULAR; INTRAVENOUS; SOFT TISSUE PRN
Status: DISCONTINUED | OUTPATIENT
Start: 2023-10-02 | End: 2023-10-02 | Stop reason: SDUPTHER

## 2023-10-02 RX ORDER — METOCLOPRAMIDE HYDROCHLORIDE 5 MG/ML
10 INJECTION INTRAMUSCULAR; INTRAVENOUS
Status: DISCONTINUED | OUTPATIENT
Start: 2023-10-02 | End: 2023-10-02 | Stop reason: HOSPADM

## 2023-10-02 RX ORDER — PROPOFOL 10 MG/ML
INJECTION, EMULSION INTRAVENOUS PRN
Status: DISCONTINUED | OUTPATIENT
Start: 2023-10-02 | End: 2023-10-02 | Stop reason: SDUPTHER

## 2023-10-02 RX ORDER — DIPHENHYDRAMINE HYDROCHLORIDE 50 MG/ML
12.5 INJECTION INTRAMUSCULAR; INTRAVENOUS
Status: DISCONTINUED | OUTPATIENT
Start: 2023-10-02 | End: 2023-10-02 | Stop reason: HOSPADM

## 2023-10-02 RX ORDER — ONDANSETRON 2 MG/ML
4 INJECTION INTRAMUSCULAR; INTRAVENOUS
Status: DISCONTINUED | OUTPATIENT
Start: 2023-10-02 | End: 2023-10-02 | Stop reason: HOSPADM

## 2023-10-02 RX ORDER — SODIUM CHLORIDE 0.9 % (FLUSH) 0.9 %
5-40 SYRINGE (ML) INJECTION PRN
Status: DISCONTINUED | OUTPATIENT
Start: 2023-10-02 | End: 2023-10-02 | Stop reason: HOSPADM

## 2023-10-02 RX ORDER — LIDOCAINE HYDROCHLORIDE 10 MG/ML
INJECTION, SOLUTION EPIDURAL; INFILTRATION; INTRACAUDAL; PERINEURAL PRN
Status: DISCONTINUED | OUTPATIENT
Start: 2023-10-02 | End: 2023-10-02 | Stop reason: SDUPTHER

## 2023-10-02 RX ORDER — ACETAMINOPHEN 500 MG
1000 TABLET ORAL 3 TIMES DAILY
Qty: 30 TABLET | Refills: 0 | Status: SHIPPED | OUTPATIENT
Start: 2023-10-02

## 2023-10-02 RX ADMIN — SODIUM CHLORIDE: 9 INJECTION, SOLUTION INTRAVENOUS at 11:31

## 2023-10-02 RX ADMIN — DEXAMETHASONE SODIUM PHOSPHATE 4 MG: 4 INJECTION INTRA-ARTICULAR; INTRALESIONAL; INTRAMUSCULAR; INTRAVENOUS; SOFT TISSUE at 12:59

## 2023-10-02 RX ADMIN — PROPOFOL 150 MG: 10 INJECTION, EMULSION INTRAVENOUS at 12:28

## 2023-10-02 RX ADMIN — FENTANYL CITRATE 25 MCG: 50 INJECTION, SOLUTION INTRAMUSCULAR; INTRAVENOUS at 12:59

## 2023-10-02 RX ADMIN — ONDANSETRON 4 MG: 2 INJECTION INTRAMUSCULAR; INTRAVENOUS at 12:59

## 2023-10-02 RX ADMIN — FENTANYL CITRATE 25 MCG: 50 INJECTION, SOLUTION INTRAMUSCULAR; INTRAVENOUS at 12:28

## 2023-10-02 RX ADMIN — PROPOFOL 50 MG: 10 INJECTION, EMULSION INTRAVENOUS at 12:31

## 2023-10-02 RX ADMIN — FENTANYL CITRATE 25 MCG: 50 INJECTION, SOLUTION INTRAMUSCULAR; INTRAVENOUS at 12:34

## 2023-10-02 RX ADMIN — LIDOCAINE HYDROCHLORIDE 40 MG: 10 INJECTION, SOLUTION EPIDURAL; INFILTRATION; INTRACAUDAL; PERINEURAL at 12:28

## 2023-10-02 RX ADMIN — MIDAZOLAM 2 MG: 1 INJECTION INTRAMUSCULAR; INTRAVENOUS at 12:23

## 2023-10-02 RX ADMIN — FENTANYL CITRATE 25 MCG: 50 INJECTION, SOLUTION INTRAMUSCULAR; INTRAVENOUS at 12:31

## 2023-10-02 ASSESSMENT — PAIN SCALES - GENERAL: PAINLEVEL_OUTOF10: 0

## 2023-10-02 ASSESSMENT — PAIN - FUNCTIONAL ASSESSMENT: PAIN_FUNCTIONAL_ASSESSMENT: 0-10

## 2023-10-02 NOTE — ANESTHESIA PRE PROCEDURE
3.2 09/19/2023 01:07 PM    RBC 3.68 09/19/2023 01:07 PM    RBC 4.45 06/01/2012 10:18 AM    HGB 10.7 09/19/2023 01:07 PM    HCT 33.3 09/19/2023 01:07 PM    MCV 90.5 09/19/2023 01:07 PM    RDW 15.1 09/19/2023 01:07 PM     09/19/2023 01:07 PM     06/01/2012 10:18 AM       CMP:   Lab Results   Component Value Date/Time     09/08/2023 10:12 AM    K 5.0 09/08/2023 10:12 AM     09/08/2023 10:12 AM    CO2 27 09/08/2023 10:12 AM    BUN 13 09/08/2023 10:12 AM    CREATININE 0.6 09/08/2023 10:12 AM    GFRAA >60 06/03/2022 10:22 AM    GFRAA >60 06/03/2022 10:22 AM    LABGLOM >60 09/08/2023 10:12 AM    GLUCOSE 243 09/08/2023 10:12 AM    GLUCOSE 393 06/01/2012 10:18 AM    PROT 7.1 02/17/2023 11:52 AM    CALCIUM 9.6 09/08/2023 10:12 AM    BILITOT 0.4 02/17/2023 11:52 AM    ALKPHOS 181 02/17/2023 11:52 AM    AST 40 02/17/2023 11:52 AM    ALT 32 02/17/2023 11:52 AM       POC Tests: No results for input(s): \"POCGLU\", \"POCNA\", \"POCK\", \"POCCL\", \"POCBUN\", \"POCHEMO\", \"POCHCT\" in the last 72 hours.     Coags:   Lab Results   Component Value Date/Time    PROTIME 10.9 11/26/2012 09:24 AM    INR 1.0 11/26/2012 09:24 AM    APTT 27.8 11/26/2012 09:24 AM       HCG (If Applicable): No results found for: \"PREGTESTUR\", \"PREGSERUM\", \"HCG\", \"HCGQUANT\"     ABGs: No results found for: \"PHART\", \"PO2ART\", \"LJF3UBT\", \"VXW7HOQ\", \"BEART\", \"I5ZSBJVN\"     Type & Screen (If Applicable):  No results found for: \"LABABO\", \"LABRH\"    Drug/Infectious Status (If Applicable):  Lab Results   Component Value Date/Time    HEPCAB NONREACTIVE 12/31/2015 09:11 AM       COVID-19 Screening (If Applicable): No results found for: \"COVID19\"        Anesthesia Evaluation  Patient summary reviewed and Nursing notes reviewed no history of anesthetic complications:   Airway: Mallampati: II  TM distance: >3 FB   Neck ROM: full  Mouth opening: > = 3 FB   Dental:          Pulmonary:Negative Pulmonary ROS breath sounds clear to

## 2023-10-02 NOTE — DISCHARGE INSTRUCTIONS
[address]  [address] [place], [address]  [phone]          POST-OP INSTRUCTIONS FOR D&C AND/OR HYSTEROSCOPY or Endometrial Sampling      AFTER SURGERY:    After surgery you will remain in the recovery room for approximately two hours. Once you are alert and awake and able to ambulate, you will be discharged home. If you did not receive a pain medication prescription you may call our office with a pharmacy phone number and we will call in a medication for cramping. Upon leaving the Out-Patient Department you will need someone to drive you home. UNDER NO CIRCUMSTANCES SHOULD YOU DRIVE YOURSELF HOME.    AT HOME:    Once arriving at home, plan on resting the entire evening with very limited activity. If you have small children, you should make arrangements for another adult to be responsible for their care. It is not uncommon to have nausea after anesthesia. We recommend a clear liquid diet the evening of surgery, advancing to a regular diet 24 hours after surgery or when the nausea resolves. You may resume your normal activities the day following surgery, avoiding heavy lifting or straining. If you are not taking any pain medications, you may be able to drive and leave the house the day following surgery. If you are uncomfortable enough to require pain medication, we recommend that you continue to limit your activities, increase your rest and post-pone driving and all other activities until you no longer require pain medication. It is recommended that you do not douche, use tampons or have intercourse for the first two weeks following your surgery. You may have cramping for a few days and bleeding or spotting for several weeks.       WHEN TO CALL:    For any of the following problems you should call the office during working hours at [phone]  to reach a physician:     temperature greater than 100.6   constant, severe or increasing abdominal or

## 2023-10-02 NOTE — OP NOTE
counseling.       Holden Clinton DO      Electronically signed by Holden Clinton DO on 10/2/23 at 1:08 PM EDT

## 2023-10-02 NOTE — H&P
History & Physical UPDATE NOTE   10/02/23       Gynecologic Surgery Pre-Operative Attestation/Update Note:        Facility: [facility]  Date: 10/2/2023  Time: 12:14 PM      Patient Name: Gerber Rodriguez  Patient : 1955  Room/Bed: [address] [facility] [address] OR Pool/NONE  Admission Date/Time: 10/2/2023 10:35 AM  MRN #: [id number]  [facility] #: [id number]        Past Medical History:   Diagnosis Date    Abdominal swelling     Diabetic retinopathy ([address]) 2015    Flatus     H/O acute sinusitis     H/O cholelithiasis     Hypertension     Liver cirrhosis (HCC)     LLQ abdominal tenderness     Poor venous access     \"THEY LIKE TO ROLL & RUN AWAY\"    Positive colorectal cancer screening using Cologuard test 2023    Positive colorectal cancer screening using Cologuard test 2023    RUQ abdominal pain     Tendonitis of shoulder, right 2021    Tingling     Vaginal bleeding 2023    \"SPOTTING, THICKENED UTERINE LINING\"DUE TO HAVE VAGINAL /UTERINE BIOPSY FOR\"    Vaginal candidiasis          Past Surgical History:   Procedure Laterality Date    ANKLE FRACTURE SURGERY Left 2006    no retained metal    CATARACT REMOVAL WITH IMPLANT Bilateral 2018     SECTION      x2, ,  \"SADDLE [address]"    CHOLECYSTECTOMY, LAPAROSCOPIC      COLONOSCOPY N/A 2023    COLONOSCOPY WITH BIOPSY performed by Taylor Perez MD at [address] [facility]         No current facility-administered medications on file prior to encounter. Current Outpatient Medications on File Prior to Encounter   Medication Sig Dispense Refill    gabapentin (NEURONTIN) 300 MG capsule take 2 capsules by mouth three times a day 180 capsule 3    TRULICITY 4.5 BX/6.3IU SOPN INJECT 4.5MG INTO THE SKIN  ONCE A WEEK (Patient taking differently: 4.5 mg once a week MONDAY'S) 12 mL 0    Continuous Blood Gluc Sensor (DEXCOM G7 SENSOR) MISC Use to monitor sugars 6-8 times a day and monitor for high and low sugars.  (Patient taking differently: Use to

## 2023-10-04 LAB — SURGICAL PATHOLOGY REPORT: NORMAL

## 2023-10-14 DIAGNOSIS — Z79.4 TYPE 2 DIABETES MELLITUS WITHOUT COMPLICATION, WITH LONG-TERM CURRENT USE OF INSULIN (HCC): ICD-10-CM

## 2023-10-14 DIAGNOSIS — E11.9 TYPE 2 DIABETES MELLITUS WITHOUT COMPLICATION, WITH LONG-TERM CURRENT USE OF INSULIN (HCC): ICD-10-CM

## 2023-10-17 ENCOUNTER — OFFICE VISIT (OUTPATIENT)
Dept: OBGYN CLINIC | Age: 68
End: 2023-10-17
Payer: MEDICARE

## 2023-10-17 VITALS
WEIGHT: 201 LBS | HEIGHT: 59 IN | BODY MASS INDEX: 40.52 KG/M2 | DIASTOLIC BLOOD PRESSURE: 82 MMHG | SYSTOLIC BLOOD PRESSURE: 124 MMHG

## 2023-10-17 DIAGNOSIS — N95.0 PMB (POSTMENOPAUSAL BLEEDING): Primary | ICD-10-CM

## 2023-10-17 DIAGNOSIS — Z98.890 STATUS POST D&C: ICD-10-CM

## 2023-10-17 DIAGNOSIS — R93.89 THICKENED ENDOMETRIUM: ICD-10-CM

## 2023-10-17 PROCEDURE — 3079F DIAST BP 80-89 MM HG: CPT | Performed by: OBSTETRICS & GYNECOLOGY

## 2023-10-17 PROCEDURE — 1123F ACP DISCUSS/DSCN MKR DOCD: CPT | Performed by: OBSTETRICS & GYNECOLOGY

## 2023-10-17 PROCEDURE — 99213 OFFICE O/P EST LOW 20 MIN: CPT | Performed by: OBSTETRICS & GYNECOLOGY

## 2023-10-17 PROCEDURE — 3074F SYST BP LT 130 MM HG: CPT | Performed by: OBSTETRICS & GYNECOLOGY

## 2023-10-17 RX ORDER — BLOOD SUGAR DIAGNOSTIC
STRIP MISCELLANEOUS
Qty: 300 STRIP | Refills: 2 | Status: SHIPPED | OUTPATIENT
Start: 2023-10-17

## 2023-10-17 RX ORDER — INSULIN GLARGINE 300 U/ML
INJECTION, SOLUTION SUBCUTANEOUS
Qty: 12 ML | Refills: 2 | Status: SHIPPED | OUTPATIENT
Start: 2023-10-17

## 2023-10-17 NOTE — PROGRESS NOTES
Tracey Portillo  10/17/2023  10:53 AM      Tracey Portillo  Procedure:   PROCEDURE DATE: 10/2/2023      Pre-op Diagnosis: Pre-Op Diagnosis Codes: * Thickened endometrium [R93.89]     * PMB (postmenopausal bleeding) [N95.0]     *Elevated BMI     Post-op Diagnosis: Same; Short cervix flush to cuff; Very difficult visualization and small cervix Concern for uterine carcinoma     Procedure: Procedure(s):  ENDOMETRIAL BIOPSY AND EXAM UNDER ANESTHESIA  Failed D&C Hysteroscopy         Tracey Portillo is a 70 y.o. female       The patient was seen, she denies any complaints. She denied any shortness of breath, chest pain or dizziness. She denied any nausea, vomiting, or diarrhea. There is no fever, chills, or rigors. The patient denies any vaginal bleeding, discharge or odor. All of her pre-operative complaints are now resolved. Blood pressure 124/82, height 4' 11\" (1.499 m), weight 201 lb (91.2 kg). Abdominal Exam: soft non-tender. Good bowel sounds. No guarding, rebound or rigidity. No costal vertebral angle tenderness bilateral. No hernias    Incision: na    Extremities: No edema or calf pain noted bilaterally. Pelvic Exam:      Results for orders placed or performed during the hospital encounter of 10/02/23   SURGICAL PATHOLOGY REPORT   Result Value Ref Range    Surgical Pathology Report       Path Number: [id number]    -- Diagnosis --  ENDOMETRIUM, BIOPSY:  -SCANT FRAGMENTS OF BENIGN SQUAMOUS EPITHELIUM AND BLOOD  -SEE COMMENT    -- Diagnosis Comment --    Within the observed sections there is no endometrial mucosa present  for evaluation. Clinical correlation is required. Zeeshan Sharma D.O.  **Electronically Signed Out**         jennifer/10/4/2023     Clinical Information  Pre-op Diagnosis:  THICKENED ENDOMETRIUM, PMB   Operative Findings:  ENDOMETRIAL BIOPSY   Operation Performed:  ENDOMETRIAL BIOPSY AND EXAM UNDER ANESTHESIA  tm         Source of Specimen  A: ENDOMETRIAL
86

## 2023-11-16 ENCOUNTER — OFFICE VISIT (OUTPATIENT)
Dept: GYNECOLOGIC ONCOLOGY | Age: 68
End: 2023-11-16
Payer: MEDICARE

## 2023-11-16 VITALS
WEIGHT: 201.6 LBS | OXYGEN SATURATION: 98 % | SYSTOLIC BLOOD PRESSURE: 202 MMHG | HEART RATE: 88 BPM | HEIGHT: 59 IN | TEMPERATURE: 98.1 F | BODY MASS INDEX: 40.64 KG/M2 | DIASTOLIC BLOOD PRESSURE: 66 MMHG

## 2023-11-16 DIAGNOSIS — N88.2 CERVICAL STENOSIS (UTERINE CERVIX): ICD-10-CM

## 2023-11-16 DIAGNOSIS — Z80.49 FAMILY HISTORY OF UTERINE CANCER: ICD-10-CM

## 2023-11-16 DIAGNOSIS — I10 POORLY-CONTROLLED HYPERTENSION: ICD-10-CM

## 2023-11-16 DIAGNOSIS — K75.81 LIVER CIRRHOSIS SECONDARY TO NASH (HCC): ICD-10-CM

## 2023-11-16 DIAGNOSIS — R54 ADVANCED AGE: ICD-10-CM

## 2023-11-16 DIAGNOSIS — Z80.3 FAMILY HISTORY OF BREAST CANCER: ICD-10-CM

## 2023-11-16 DIAGNOSIS — E66.01 OBESITY, CLASS III, BMI 40-49.9 (MORBID OBESITY) (HCC): ICD-10-CM

## 2023-11-16 DIAGNOSIS — E46 MALNUTRITION, UNSPECIFIED TYPE (HCC): ICD-10-CM

## 2023-11-16 DIAGNOSIS — N95.0 POSTMENOPAUSAL BLEEDING: Primary | ICD-10-CM

## 2023-11-16 DIAGNOSIS — K75.81 NASH (NONALCOHOLIC STEATOHEPATITIS): ICD-10-CM

## 2023-11-16 DIAGNOSIS — K74.60 LIVER CIRRHOSIS SECONDARY TO NASH (HCC): ICD-10-CM

## 2023-11-16 PROCEDURE — 1123F ACP DISCUSS/DSCN MKR DOCD: CPT | Performed by: OBSTETRICS & GYNECOLOGY

## 2023-11-16 PROCEDURE — 99205 OFFICE O/P NEW HI 60 MIN: CPT | Performed by: OBSTETRICS & GYNECOLOGY

## 2023-11-16 PROCEDURE — G2212 PROLONG OUTPT/OFFICE VIS: HCPCS | Performed by: OBSTETRICS & GYNECOLOGY

## 2023-11-16 PROCEDURE — 3077F SYST BP >= 140 MM HG: CPT | Performed by: OBSTETRICS & GYNECOLOGY

## 2023-11-16 PROCEDURE — 3078F DIAST BP <80 MM HG: CPT | Performed by: OBSTETRICS & GYNECOLOGY

## 2023-11-16 RX ORDER — MISOPROSTOL 100 UG/1
200 TABLET ORAL ONCE
Qty: 2 TABLET | Refills: 0 | Status: SHIPPED | OUTPATIENT
Start: 2023-11-16 | End: 2024-01-17 | Stop reason: ALTCHOICE

## 2023-11-17 ENCOUNTER — TELEPHONE (OUTPATIENT)
Dept: GYNECOLOGIC ONCOLOGY | Age: 68
End: 2023-11-17

## 2023-11-17 NOTE — TELEPHONE ENCOUNTER
Patient was notified of surgery dates and times    Date/Provider/Location  [facility] arrive at 9:10am  Dr. Madeleine MAGALLON    PAT 12/28/23 @8:00m patient aware to be fasting for this appt  Post Op 1/17/24 @10am with Rebecca Jackson    Patient voiced understanding

## 2023-11-20 ENCOUNTER — PREP FOR PROCEDURE (OUTPATIENT)
Dept: GYNECOLOGIC ONCOLOGY | Age: 68
End: 2023-11-20

## 2023-11-20 RX ORDER — ENOXAPARIN SODIUM 100 MG/ML
40 INJECTION SUBCUTANEOUS ONCE
Status: CANCELLED | OUTPATIENT
Start: 2023-11-20 | End: 2023-11-20

## 2023-11-20 RX ORDER — SODIUM CHLORIDE 9 MG/ML
INJECTION, SOLUTION INTRAVENOUS CONTINUOUS
Status: CANCELLED | OUTPATIENT
Start: 2023-11-20

## 2023-11-20 RX ORDER — SODIUM CHLORIDE 9 MG/ML
INJECTION, SOLUTION INTRAVENOUS PRN
Status: CANCELLED | OUTPATIENT
Start: 2023-11-20

## 2023-11-20 RX ORDER — SODIUM CHLORIDE 0.9 % (FLUSH) 0.9 %
5-40 SYRINGE (ML) INJECTION EVERY 12 HOURS SCHEDULED
Status: CANCELLED | OUTPATIENT
Start: 2023-11-20

## 2023-11-20 RX ORDER — SODIUM CHLORIDE 0.9 % (FLUSH) 0.9 %
5-40 SYRINGE (ML) INJECTION PRN
Status: CANCELLED | OUTPATIENT
Start: 2023-11-20

## 2023-11-27 ENCOUNTER — TELEPHONE (OUTPATIENT)
Dept: PRIMARY CARE CLINIC | Age: 68
End: 2023-11-27

## 2023-11-27 RX ORDER — GABAPENTIN 300 MG/1
CAPSULE ORAL
Qty: 180 CAPSULE | Refills: 3 | Status: SHIPPED | OUTPATIENT
Start: 2023-11-27 | End: 2024-03-26

## 2023-11-27 NOTE — TELEPHONE ENCOUNTER
----- Message from Jg Esquivel sent at 11/27/2023  1:07 PM EST -----  Subject: Medication Problem    Medication: gabapentin (NEURONTIN) 300 MG capsule  Dosage: 2 three times a day  Ordering Provider: undefined    Question/Problem: only has one left and refill has not been authorized as   of yet.  patient anxious she have to go without      Pharmacy: [address] [place] [address] #[id number] - Erna Mary [phone]    ---------------------------------------------------------------------------  --------------  Kirti Marker INFO  [phone]; OK to leave message on voicemail  ---------------------------------------------------------------------------  --------------    SCRIPT ANSWERS  Relationship to Patient: Self

## 2023-11-28 DIAGNOSIS — N88.2 CERVICAL STENOSIS (UTERINE CERVIX): ICD-10-CM

## 2023-11-30 RX ORDER — MISOPROSTOL 100 UG/1
TABLET ORAL
Qty: 2 TABLET | Refills: 0 | OUTPATIENT
Start: 2023-11-30

## 2023-12-04 ENCOUNTER — OFFICE VISIT (OUTPATIENT)
Dept: PRIMARY CARE CLINIC | Age: 68
End: 2023-12-04
Payer: MEDICARE

## 2023-12-04 VITALS — DIASTOLIC BLOOD PRESSURE: 72 MMHG | HEART RATE: 81 BPM | OXYGEN SATURATION: 98 % | SYSTOLIC BLOOD PRESSURE: 150 MMHG

## 2023-12-04 DIAGNOSIS — E11.22 TYPE 2 DIABETES MELLITUS WITH CHRONIC KIDNEY DISEASE, WITH LONG-TERM CURRENT USE OF INSULIN, UNSPECIFIED CKD STAGE (HCC): ICD-10-CM

## 2023-12-04 DIAGNOSIS — E55.9 VITAMIN D DEFICIENCY: ICD-10-CM

## 2023-12-04 DIAGNOSIS — Z79.4 TYPE 2 DIABETES MELLITUS WITH CHRONIC KIDNEY DISEASE, WITH LONG-TERM CURRENT USE OF INSULIN, UNSPECIFIED CKD STAGE (HCC): ICD-10-CM

## 2023-12-04 DIAGNOSIS — I10 ESSENTIAL HYPERTENSION: ICD-10-CM

## 2023-12-04 DIAGNOSIS — D69.6 THROMBOCYTOPENIA, UNSPECIFIED (HCC): ICD-10-CM

## 2023-12-04 DIAGNOSIS — Z79.4 TYPE 2 DIABETES MELLITUS WITHOUT COMPLICATION, WITH LONG-TERM CURRENT USE OF INSULIN (HCC): Primary | ICD-10-CM

## 2023-12-04 DIAGNOSIS — Z12.31 ENCOUNTER FOR SCREENING MAMMOGRAM FOR BREAST CANCER: ICD-10-CM

## 2023-12-04 DIAGNOSIS — E11.9 TYPE 2 DIABETES MELLITUS WITHOUT COMPLICATION, WITH LONG-TERM CURRENT USE OF INSULIN (HCC): Primary | ICD-10-CM

## 2023-12-04 LAB — HBA1C MFR BLD: 9.2 %

## 2023-12-04 PROCEDURE — 3046F HEMOGLOBIN A1C LEVEL >9.0%: CPT | Performed by: FAMILY MEDICINE

## 2023-12-04 PROCEDURE — 3077F SYST BP >= 140 MM HG: CPT | Performed by: FAMILY MEDICINE

## 2023-12-04 PROCEDURE — 99214 OFFICE O/P EST MOD 30 MIN: CPT | Performed by: FAMILY MEDICINE

## 2023-12-04 PROCEDURE — 1123F ACP DISCUSS/DSCN MKR DOCD: CPT | Performed by: FAMILY MEDICINE

## 2023-12-04 PROCEDURE — 83036 HEMOGLOBIN GLYCOSYLATED A1C: CPT | Performed by: FAMILY MEDICINE

## 2023-12-04 PROCEDURE — 3078F DIAST BP <80 MM HG: CPT | Performed by: FAMILY MEDICINE

## 2023-12-04 ASSESSMENT — ENCOUNTER SYMPTOMS
SINUS PRESSURE: 0
VOMITING: 0
SHORTNESS OF BREATH: 0
NAUSEA: 0
ABDOMINAL PAIN: 0
CHEST TIGHTNESS: 0
SORE THROAT: 0
RHINORRHEA: 0
CONSTIPATION: 0
DIARRHEA: 0

## 2023-12-04 NOTE — PROGRESS NOTES
[address] PRIMARY CARE  [place] [address]  Dept: [address] is a 70 y.o. female Established patient, who presents today for her medical conditions/complaints as noted below. Chief Complaint   Patient presents with    Diabetes     Needs assistance with dexcom setup. Results    Hypertension       HPI:   Pleasant 71 y/o female here for follow up on DM and HTN. Occasionally checking sugars at home, it was 164 this morning. States the her blood sugars have been \"all over the place\" due to stress and weight gain. Denies symptoms of hypoglycemia. Stressed about the thickened endometrium. She has another D&C scheduled for January 8th. Not checking BP at home, but takes her medication regularly. Seeing her gastroenterologist on December 12th. He needs labs so she would like to have these ordered today.        Reviewed prior notes: None   Reviewed previous:  Labs    LDL Cholesterol (mg/dL)   Date Value   03/04/2021 68   10/23/2019 74   10/23/2019 74     LDL Calculated (mg/dL)   Date Value   09/12/2018 84   12/30/2016 84       (goal LDL is <100)   AST (U/L)   Date Value   02/17/2023 40 (H)     ALT (U/L)   Date Value   02/17/2023 32     BUN (mg/dL)   Date Value   09/08/2023 13     Hemoglobin A1C (%)   Date Value   12/04/2023 9.2     TSH (mIU/L)   Date Value   10/23/2019 2.66     BP Readings from Last 3 Encounters:   12/04/23 (!) 164/68   11/16/23 (!) 202/66   10/17/23 124/82          (goal 120/80)    Past Medical History:   Diagnosis Date    Abdominal swelling     Diabetic retinopathy ([address]) 11/28/2015    Flatus     H/O acute sinusitis     H/O cholelithiasis     Hypertension     Liver cirrhosis (HCC)     LLQ abdominal tenderness     Poor venous access     \"THEY LIKE TO ROLL & RUN AWAY\"    Positive colorectal cancer screening using Cologuard test 07/06/2023    Positive colorectal cancer screening using Cologuard test 07/06/2023

## 2023-12-06 ENCOUNTER — TELEPHONE (OUTPATIENT)
Dept: GYNECOLOGIC ONCOLOGY | Age: 68
End: 2023-12-06

## 2023-12-08 RX ORDER — INSULIN LISPRO 100 [IU]/ML
INJECTION, SOLUTION INTRAVENOUS; SUBCUTANEOUS
Qty: 15 ML | Refills: 3 | Status: SHIPPED | OUTPATIENT
Start: 2023-12-08

## 2023-12-12 ENCOUNTER — TELEPHONE (OUTPATIENT)
Dept: PRIMARY CARE CLINIC | Age: 68
End: 2023-12-12

## 2023-12-12 RX ORDER — SEMAGLUTIDE 1.34 MG/ML
0.5 INJECTION, SOLUTION SUBCUTANEOUS WEEKLY
Qty: 1 ADJUSTABLE DOSE PRE-FILLED PEN SYRINGE | Refills: 3 | Status: SHIPPED | OUTPATIENT
Start: 2023-12-12

## 2023-12-12 NOTE — TELEPHONE ENCOUNTER
I will send it, but it is on national backorder so usually can't get it. And I think it was insurance coverage originally. Let pt know. The starting dose will probably not do much for her sugars, but if she tolerates it then we can increase as long as she can get it.

## 2023-12-12 NOTE — TELEPHONE ENCOUNTER
Pt saw Dr. Alex Sharif today. He asked pt why she is Trulicity and not Ozempic? Stated that the [address] helps with liver issues, that she has. Pt beleives that you tried it once, but it was an ins issue. Asking if you could try and send it through again and see what happens? Thinks this would be a lot better for her. Alex Meadows doesn't feel the Trulicity is doing much for pt. Uses [facility] on [address] listed.

## 2023-12-13 ENCOUNTER — TELEPHONE (OUTPATIENT)
Dept: PRIMARY CARE CLINIC | Age: 68
End: 2023-12-13

## 2023-12-13 NOTE — TELEPHONE ENCOUNTER
I was just able to get in and see Dr. Sb Greene note. He says pt needs to be on GLP-1, like Ozempic. Please call pt and see if he was aware that Trulicity IS a GLP-1, and is that okay or does he want us to try to get the Ozempic specifically. We did before and insurance did not cover it. I sent in a new Rx, but would rather keep her on what she is since her sugar is better. If we have to switch her, then we will have to go down on the dose and start all over. Please check with pt and GI office as to what they want us to do. Also ask pt if she got her labs done, as I don't see where they did anything except the one that Dr. Viv Medeiros ordered.

## 2023-12-13 NOTE — TELEPHONE ENCOUNTER
Patient is scheduled for surgery on 01/08/2024 - they are requesting surgical clearance. Form in media. Please advise.

## 2023-12-15 DIAGNOSIS — Z79.4 TYPE 2 DIABETES MELLITUS WITH CHRONIC KIDNEY DISEASE, WITH LONG-TERM CURRENT USE OF INSULIN, UNSPECIFIED CKD STAGE (HCC): ICD-10-CM

## 2023-12-15 DIAGNOSIS — Z79.4 TYPE 2 DIABETES MELLITUS WITHOUT COMPLICATION, WITH LONG-TERM CURRENT USE OF INSULIN (HCC): ICD-10-CM

## 2023-12-15 DIAGNOSIS — D69.6 THROMBOCYTOPENIA, UNSPECIFIED (HCC): ICD-10-CM

## 2023-12-15 DIAGNOSIS — E11.9 TYPE 2 DIABETES MELLITUS WITHOUT COMPLICATION, WITH LONG-TERM CURRENT USE OF INSULIN (HCC): ICD-10-CM

## 2023-12-15 DIAGNOSIS — E11.22 TYPE 2 DIABETES MELLITUS WITH CHRONIC KIDNEY DISEASE, WITH LONG-TERM CURRENT USE OF INSULIN, UNSPECIFIED CKD STAGE (HCC): ICD-10-CM

## 2023-12-15 DIAGNOSIS — E55.9 VITAMIN D DEFICIENCY: ICD-10-CM

## 2023-12-15 DIAGNOSIS — I10 ESSENTIAL HYPERTENSION: ICD-10-CM

## 2023-12-15 LAB
ABSOLUTE IMMATURE GRANULOCYTE: <0.03 K/UL (ref 0–0.3)
ALBUMIN SERPL-MCNC: 3.5 G/DL (ref 3.5–5.2)
ALBUMIN/GLOBULIN RATIO: 1 (ref 1–2.5)
ALP BLD-CCNC: 230 U/L (ref 35–104)
ALT SERPL-CCNC: 33 U/L (ref 5–33)
ANION GAP SERPL CALCULATED.3IONS-SCNC: 10 MMOL/L (ref 9–17)
AST SERPL-CCNC: 63 U/L
BASOPHILS ABSOLUTE: 0.04 K/UL (ref 0–0.2)
BASOPHILS RELATIVE PERCENT: 1 % (ref 0–2)
BILIRUB SERPL-MCNC: 0.6 MG/DL (ref 0.3–1.2)
BUN BLDV-MCNC: 13 MG/DL (ref 8–23)
CALCIUM SERPL-MCNC: 9.2 MG/DL (ref 8.6–10.4)
CHLORIDE BLD-SCNC: 99 MMOL/L (ref 98–107)
CHOLESTEROL/HDL RATIO: 2
CHOLESTEROL: 188 MG/DL
CO2: 30 MMOL/L (ref 20–31)
CREAT SERPL-MCNC: 0.7 MG/DL (ref 0.5–0.9)
EOSINOPHILS ABSOLUTE: 0.05 K/UL (ref 0–0.44)
EOSINOPHILS RELATIVE PERCENT: 1 % (ref 1–4)
GFR SERPL CREATININE-BSD FRML MDRD: >60 ML/MIN/1.73M2
GLUCOSE BLD-MCNC: 125 MG/DL (ref 70–99)
HCT VFR BLD CALC: 33.9 % (ref 36.3–47.1)
HDLC SERPL-MCNC: 95 MG/DL
HEMOGLOBIN: 11 G/DL (ref 11.9–15.1)
IMMATURE GRANULOCYTES: 0 %
LDL CHOLESTEROL: 77 MG/DL (ref 0–130)
LYMPHOCYTES ABSOLUTE: 1.16 K/UL (ref 1.1–3.7)
LYMPHOCYTES RELATIVE PERCENT: 29 % (ref 24–43)
MCH RBC QN AUTO: 28.9 PG (ref 25.2–33.5)
MCHC RBC AUTO-ENTMCNC: 32.4 G/DL (ref 28.4–34.8)
MCV RBC AUTO: 89 FL (ref 82.6–102.9)
MONOCYTES ABSOLUTE: 0.59 K/UL (ref 0.1–1.2)
MONOCYTES RELATIVE PERCENT: 15 % (ref 3–12)
NEUTROPHILS ABSOLUTE: 2.18 K/UL (ref 1.5–8.1)
NEUTROPHILS RELATIVE PERCENT: 54 % (ref 36–65)
NRBC AUTOMATED: 0 PER 100 WBC
PDW BLD-RTO: 14 % (ref 11.8–14.4)
PLATELET # BLD: 108 K/UL (ref 138–453)
PMV BLD AUTO: 12.1 FL (ref 8.1–13.5)
POTASSIUM SERPL-SCNC: 4.3 MMOL/L (ref 3.7–5.3)
RBC # BLD: 3.81 M/UL (ref 3.95–5.11)
SODIUM BLD-SCNC: 139 MMOL/L (ref 135–144)
TOTAL PROTEIN: 7.1 G/DL (ref 6.4–8.3)
TRIGL SERPL-MCNC: 79 MG/DL
TSH SERPL DL<=0.05 MIU/L-ACNC: 1.72 UIU/ML (ref 0.3–5)
VITAMIN D 25-HYDROXY: 78.1 NG/ML
WBC # BLD: 4 K/UL (ref 3.5–11.3)

## 2023-12-18 RX ORDER — SODIUM CHLORIDE, SODIUM LACTATE, POTASSIUM CHLORIDE, CALCIUM CHLORIDE 600; 310; 30; 20 MG/100ML; MG/100ML; MG/100ML; MG/100ML
INJECTION, SOLUTION INTRAVENOUS CONTINUOUS
OUTPATIENT
Start: 2023-12-18

## 2023-12-18 NOTE — DISCHARGE INSTRUCTIONS
Pre-operative Instructions    Please arrive at the surgery center by 9:10 AM on 1/8/2024  (or as directed by your surgeon's office). See Directons to Surgery Center below. FASTING    NOTHING TO EAT OR DRINK AFTER MIDNIGHT the night prior to surgery (This includes gum, candy, mints, chewing tobacco, etc). MEDICATIONS    What to STOP: ANY BLOOD THINNING MEDICATION(S) as directed by your surgeon or prescribing physician. FAILURE TO STOP CERTAIN MEDICATIONS MAY INTERFERE WITH YOUR SCHEDULED SURGERY. According to the medication list you provided today, PLEASE STOP: aspirin, ibuprofen (Motrin)    2. What to CONTINUE leading up to your surgery:   Please take all your other daily medications except the medications listed above that you were instructed to hold. 3. What to [place] with SMALL SIP OF WATER: gabapentin (Neurontin), - hold prinzide morning of surgery    PLEASE NOTE: ANY \"STOPPED\" MEDICATIONS MAY BE DUE TO CLEANING UP MEDICATION LIST DURING THIS VISIT. IF APPLICABLE:  -If you have been given a blood band, you must bring it with you the day of surgery, unclasped.  -Use routine inhalers and bring inhalers the day of surgery.   -Bring C-Pap/Bi-pap with you morning of surgery if planning on staying in the hospital overnight.  -Do not take diabetic medications on the day of surgery.  -Any weekly antidiabetic injections (ozempic, trulicity) must be stopped one week prior to surgery and plan discussed with prescribing provider. OTHER IMPORTANT REMINDERS    1) You may be required to provide a urine sample upon your arrival to the pre-op area, so please take this into consideration. 2) If  NOT planning on staying in the hospital overnight : A. You will need an adult family member /friend to drive you home after your procedure.  Taxi cabs or any form of public transportation ALONE is not acceptable.   -Your  must be 18 years of age or older and able to sign off on your discharge instructions.     -It is preferable that the friend or family member stay at the hospital throughout your procedure.  B. Someone must remain with you once you have arrived home for the first 24 hours after your surgery if you receive anesthesia or medication.  If you do not have someone to stay with you, your procedure may be cancelled.    4) Do not wear any jewelry or body piercings day of surgery.     5) In case of illness - If you have cold or flu like symptoms (high fever, runny nose, sore throat, cough, etc.) rash, nausea, vomiting, loose stools, and/or recent contact with someone who has a contagious disease (Covid-19, chicken pox, measles, etc.) PLEASE notify your surgeon as soon as possible.       12/18/23  8:23 AM      ___________________  _______________________  Signature (Provider)              Signature (Patient)     Day of Surgery/Procedure    As a patient at [facility] you can expect quality medical and nursing care that is centered on your individual needs.  Our goal is to make your surgical experience as comfortable as possible  .  Directions to the Surgery Center    [facility] is located at [address] [place] [address].   Please pull into the Emergency/Surgery Center parking lot or there is additional parking across the street.  You will enter the facility under through the glass doors and proceed to registration check-in which is right inside the door. Thereafter you will be directed to the Surgery Center ON THE FIRST FLOOR.     Patient Instructions    ·Please shower the night before and the morning of surgery with an antibacterial soap.  Please use the cleaning solution (bottle) given to you the night before your surgery after your shower. Unless otherwise told by your physician, please do not shave legs or any part of your body below your neck the night before or day

## 2023-12-18 NOTE — RESULT ENCOUNTER NOTE
Adv pt results all about the same.  Cont same- see other message about the ozempic vs trulicity also.

## 2023-12-26 RX ORDER — SITAGLIPTIN AND METFORMIN HYDROCHLORIDE 1000; 50 MG/1; MG/1
1 TABLET, FILM COATED, EXTENDED RELEASE ORAL 2 TIMES DAILY
Qty: 60 TABLET | Refills: 5 | Status: SHIPPED | OUTPATIENT
Start: 2023-12-26

## 2023-12-26 NOTE — TELEPHONE ENCOUNTER
LAST VISIT:   12/4/2023     Future Appointments   Date Time Provider [address]   12/28/2023  8:00 AM [facility] PAT RM 1 [facility]   1/3/2024  2:00  [address] DIGITAL RM [facility] MAMMO [address] Radiolog   1/17/2024 10:00 AM Jorge Espinal PA-C GYN Oncology [facility]   1/23/2024  9:45 AM Hayley, [address]   3/6/2024 10:00 AM Eliza Doty MD [facility]

## 2023-12-28 ENCOUNTER — HOSPITAL ENCOUNTER (OUTPATIENT)
Dept: PREADMISSION TESTING | Age: 68
Discharge: HOME | End: 2023-12-28
Payer: MEDICARE

## 2023-12-28 VITALS
HEART RATE: 80 BPM | TEMPERATURE: 97.5 F | BODY MASS INDEX: 37.9 KG/M2 | HEIGHT: 59 IN | WEIGHT: 188 LBS | SYSTOLIC BLOOD PRESSURE: 179 MMHG | RESPIRATION RATE: 14 BRPM | DIASTOLIC BLOOD PRESSURE: 74 MMHG | OXYGEN SATURATION: 97 %

## 2023-12-28 LAB
ALBUMIN SERPL-MCNC: 3.4 G/DL (ref 3.5–5.2)
ALBUMIN/GLOB SERPL: 1 {RATIO} (ref 1–2.5)
ALP SERPL-CCNC: 257 U/L (ref 35–104)
ALT SERPL-CCNC: 26 U/L (ref 5–33)
ANION GAP SERPL CALCULATED.3IONS-SCNC: 9 MMOL/L (ref 9–17)
AST SERPL-CCNC: 32 U/L
BASOPHILS # BLD: 0.03 K/UL (ref 0–0.2)
BASOPHILS NFR BLD: 1 % (ref 0–2)
BILIRUB SERPL-MCNC: 0.4 MG/DL (ref 0.3–1.2)
BUN SERPL-MCNC: 18 MG/DL (ref 8–23)
CALCIUM SERPL-MCNC: 9.6 MG/DL (ref 8.6–10.4)
CHLORIDE SERPL-SCNC: 101 MMOL/L (ref 98–107)
CHOLEST SERPL-MCNC: 173 MG/DL
CHOLESTEROL/HDL RATIO: 2
CO2 SERPL-SCNC: 28 MMOL/L (ref 20–31)
CREAT SERPL-MCNC: 0.7 MG/DL (ref 0.5–0.9)
EOSINOPHIL # BLD: 0.16 K/UL (ref 0–0.44)
EOSINOPHILS RELATIVE PERCENT: 4 % (ref 1–4)
ERYTHROCYTE [DISTWIDTH] IN BLOOD BY AUTOMATED COUNT: 13.5 % (ref 11.8–14.4)
EST. AVERAGE GLUCOSE BLD GHB EST-MCNC: 200 MG/DL
GFR SERPL CREATININE-BSD FRML MDRD: >60 ML/MIN/1.73M2
GLUCOSE SERPL-MCNC: 194 MG/DL (ref 70–99)
HBA1C MFR BLD: 8.6 % (ref 4–6)
HCT VFR BLD AUTO: 34 % (ref 36.3–47.1)
HDLC SERPL-MCNC: 87 MG/DL
HGB BLD-MCNC: 11 G/DL (ref 11.9–15.1)
IMM GRANULOCYTES # BLD AUTO: <0.03 K/UL (ref 0–0.3)
IMM GRANULOCYTES NFR BLD: 0 %
LDLC SERPL CALC-MCNC: 69 MG/DL (ref 0–130)
LYMPHOCYTES NFR BLD: 0.85 K/UL (ref 1.1–3.7)
LYMPHOCYTES RELATIVE PERCENT: 23 % (ref 24–43)
MCH RBC QN AUTO: 28.7 PG (ref 25.2–33.5)
MCHC RBC AUTO-ENTMCNC: 32.4 G/DL (ref 28.4–34.8)
MCV RBC AUTO: 88.8 FL (ref 82.6–102.9)
MONOCYTES NFR BLD: 0.22 K/UL (ref 0.1–1.2)
MONOCYTES NFR BLD: 6 % (ref 3–12)
NEUTROPHILS NFR BLD: 66 % (ref 36–65)
NEUTS SEG NFR BLD: 2.5 K/UL (ref 1.5–8.1)
NRBC BLD-RTO: 0 PER 100 WBC
PLATELET # BLD AUTO: 155 K/UL (ref 138–453)
PMV BLD AUTO: 11.1 FL (ref 8.1–13.5)
POTASSIUM SERPL-SCNC: 4.1 MMOL/L (ref 3.7–5.3)
PROT SERPL-MCNC: 6.9 G/DL (ref 6.4–8.3)
RBC # BLD AUTO: 3.83 M/UL (ref 3.95–5.11)
SODIUM SERPL-SCNC: 138 MMOL/L (ref 135–144)
TRIGL SERPL-MCNC: 83 MG/DL
TSH SERPL DL<=0.05 MIU/L-ACNC: 2.31 UIU/ML (ref 0.3–5)
WBC OTHER # BLD: 3.8 K/UL (ref 3.5–11.3)

## 2023-12-28 PROCEDURE — 84443 ASSAY THYROID STIM HORMONE: CPT

## 2023-12-28 PROCEDURE — 36415 COLL VENOUS BLD VENIPUNCTURE: CPT

## 2023-12-28 PROCEDURE — 80061 LIPID PANEL: CPT

## 2023-12-28 PROCEDURE — 83036 HEMOGLOBIN GLYCOSYLATED A1C: CPT

## 2023-12-28 PROCEDURE — 85025 COMPLETE CBC W/AUTO DIFF WBC: CPT

## 2023-12-28 PROCEDURE — 80053 COMPREHEN METABOLIC PANEL: CPT

## 2023-12-28 ASSESSMENT — PAIN DESCRIPTION - ORIENTATION: ORIENTATION: LEFT

## 2023-12-28 ASSESSMENT — PAIN DESCRIPTION - PAIN TYPE: TYPE: CHRONIC PAIN

## 2023-12-28 ASSESSMENT — PAIN DESCRIPTION - LOCATION: LOCATION: HIP

## 2023-12-28 ASSESSMENT — PAIN SCALES - GENERAL: PAINLEVEL_OUTOF10: 7

## 2023-12-28 ASSESSMENT — PAIN DESCRIPTION - FREQUENCY: FREQUENCY: CONTINUOUS

## 2023-12-28 ASSESSMENT — PAIN DESCRIPTION - DESCRIPTORS: DESCRIPTORS: BURNING

## 2024-01-03 ENCOUNTER — HOSPITAL ENCOUNTER (OUTPATIENT)
Dept: WOMENS IMAGING | Age: 69
Discharge: HOME | End: 2024-01-05
Payer: MEDICARE

## 2024-01-03 DIAGNOSIS — Z12.31 ENCOUNTER FOR SCREENING MAMMOGRAM FOR BREAST CANCER: ICD-10-CM

## 2024-01-03 PROCEDURE — 77063 BREAST TOMOSYNTHESIS BI: CPT

## 2024-01-03 NOTE — RESULT ENCOUNTER NOTE
Called and informed patient of results and instructions.  Patient voiced understanding and appreciation.

## 2024-01-05 NOTE — RESULT ENCOUNTER NOTE
Adv pt results okay Where Do You Want The Question To Include Opioid Counseling Located?: Medication Tab

## 2024-01-08 ENCOUNTER — ANESTHESIA EVENT (OUTPATIENT)
Dept: OPERATING ROOM | Age: 69
End: 2024-01-08
Payer: MEDICARE

## 2024-01-08 ENCOUNTER — HOSPITAL ENCOUNTER (OUTPATIENT)
Age: 69
Setting detail: OUTPATIENT SURGERY
Discharge: HOME | End: 2024-01-08
Attending: OBSTETRICS & GYNECOLOGY | Admitting: OBSTETRICS & GYNECOLOGY
Payer: MEDICARE

## 2024-01-08 ENCOUNTER — ANESTHESIA (OUTPATIENT)
Dept: OPERATING ROOM | Age: 69
End: 2024-01-08
Payer: MEDICARE

## 2024-01-08 VITALS
DIASTOLIC BLOOD PRESSURE: 76 MMHG | RESPIRATION RATE: 20 BRPM | TEMPERATURE: 97.5 F | HEART RATE: 80 BPM | OXYGEN SATURATION: 94 % | SYSTOLIC BLOOD PRESSURE: 168 MMHG

## 2024-01-08 DIAGNOSIS — G89.18 POST-OP PAIN: Primary | ICD-10-CM

## 2024-01-08 PROBLEM — Z98.890 S/P DILATATION AND CURETTAGE: Status: ACTIVE | Noted: 2024-01-08

## 2024-01-08 LAB
GLUCOSE BLD-MCNC: 103 MG/DL (ref 65–105)
GLUCOSE BLD-MCNC: 118 MG/DL (ref 65–105)
GLUCOSE BLD-MCNC: 154 MG/DL (ref 74–100)
POTASSIUM BLD-SCNC: 4.1 MMOL/L (ref 3.5–4.5)

## 2024-01-08 PROCEDURE — 3600000004 HC SURGERY LEVEL 4 BASE: Performed by: OBSTETRICS & GYNECOLOGY

## 2024-01-08 PROCEDURE — 2580000003 HC RX 258: Performed by: OBSTETRICS & GYNECOLOGY

## 2024-01-08 PROCEDURE — 2720000010 HC SURG SUPPLY STERILE: Performed by: OBSTETRICS & GYNECOLOGY

## 2024-01-08 PROCEDURE — 7100000011 HC PHASE II RECOVERY - ADDTL 15 MIN: Performed by: OBSTETRICS & GYNECOLOGY

## 2024-01-08 PROCEDURE — 3600000014 HC SURGERY LEVEL 4 ADDTL 15MIN: Performed by: OBSTETRICS & GYNECOLOGY

## 2024-01-08 PROCEDURE — 3700000000 HC ANESTHESIA ATTENDED CARE: Performed by: OBSTETRICS & GYNECOLOGY

## 2024-01-08 PROCEDURE — 2709999900 HC NON-CHARGEABLE SUPPLY: Performed by: OBSTETRICS & GYNECOLOGY

## 2024-01-08 PROCEDURE — 7100000000 HC PACU RECOVERY - FIRST 15 MIN: Performed by: OBSTETRICS & GYNECOLOGY

## 2024-01-08 PROCEDURE — 3700000001 HC ADD 15 MINUTES (ANESTHESIA): Performed by: OBSTETRICS & GYNECOLOGY

## 2024-01-08 PROCEDURE — 82947 ASSAY GLUCOSE BLOOD QUANT: CPT

## 2024-01-08 PROCEDURE — 7100000010 HC PHASE II RECOVERY - FIRST 15 MIN: Performed by: OBSTETRICS & GYNECOLOGY

## 2024-01-08 PROCEDURE — 2580000003 HC RX 258: Performed by: ANESTHESIOLOGY

## 2024-01-08 PROCEDURE — 6360000002 HC RX W HCPCS: Performed by: PHYSICIAN ASSISTANT

## 2024-01-08 PROCEDURE — 7100000001 HC PACU RECOVERY - ADDTL 15 MIN: Performed by: OBSTETRICS & GYNECOLOGY

## 2024-01-08 PROCEDURE — 2500000003 HC RX 250 WO HCPCS

## 2024-01-08 PROCEDURE — 84132 ASSAY OF SERUM POTASSIUM: CPT

## 2024-01-08 PROCEDURE — 6360000002 HC RX W HCPCS

## 2024-01-08 RX ORDER — SODIUM CHLORIDE 0.9 % (FLUSH) 0.9 %
5-40 SYRINGE (ML) INJECTION EVERY 12 HOURS SCHEDULED
Status: DISCONTINUED | OUTPATIENT
Start: 2024-01-08 | End: 2024-01-08 | Stop reason: HOSPADM

## 2024-01-08 RX ORDER — FENTANYL CITRATE 50 UG/ML
INJECTION, SOLUTION INTRAMUSCULAR; INTRAVENOUS PRN
Status: DISCONTINUED | OUTPATIENT
Start: 2024-01-08 | End: 2024-01-08 | Stop reason: SDUPTHER

## 2024-01-08 RX ORDER — DEXAMETHASONE SODIUM PHOSPHATE 10 MG/ML
INJECTION INTRAMUSCULAR; INTRAVENOUS PRN
Status: DISCONTINUED | OUTPATIENT
Start: 2024-01-08 | End: 2024-01-08 | Stop reason: SDUPTHER

## 2024-01-08 RX ORDER — TRAMADOL HYDROCHLORIDE 50 MG/1
50 TABLET ORAL EVERY 6 HOURS PRN
Qty: 3 TABLET | Refills: 0 | Status: SHIPPED | OUTPATIENT
Start: 2024-01-08 | End: 2024-01-09

## 2024-01-08 RX ORDER — PROPOFOL 10 MG/ML
INJECTION, EMULSION INTRAVENOUS PRN
Status: DISCONTINUED | OUTPATIENT
Start: 2024-01-08 | End: 2024-01-08 | Stop reason: SDUPTHER

## 2024-01-08 RX ORDER — SENNOSIDES 8.6 MG
2 TABLET ORAL 2 TIMES DAILY
Qty: 60 TABLET | Refills: 1 | Status: SHIPPED | OUTPATIENT
Start: 2024-01-08

## 2024-01-08 RX ORDER — GLYCOPYRROLATE 1 MG/5 ML
SYRINGE (ML) INTRAVENOUS PRN
Status: DISCONTINUED | OUTPATIENT
Start: 2024-01-08 | End: 2024-01-08 | Stop reason: SDUPTHER

## 2024-01-08 RX ORDER — LIDOCAINE HYDROCHLORIDE 10 MG/ML
INJECTION, SOLUTION EPIDURAL; INFILTRATION; INTRACAUDAL; PERINEURAL PRN
Status: DISCONTINUED | OUTPATIENT
Start: 2024-01-08 | End: 2024-01-08 | Stop reason: SDUPTHER

## 2024-01-08 RX ORDER — SODIUM CHLORIDE 9 MG/ML
INJECTION, SOLUTION INTRAVENOUS CONTINUOUS
Status: DISCONTINUED | OUTPATIENT
Start: 2024-01-08 | End: 2024-01-08 | Stop reason: HOSPADM

## 2024-01-08 RX ORDER — PHENYLEPHRINE HCL IN 0.9% NACL 1 MG/10 ML
SYRINGE (ML) INTRAVENOUS PRN
Status: DISCONTINUED | OUTPATIENT
Start: 2024-01-08 | End: 2024-01-08 | Stop reason: SDUPTHER

## 2024-01-08 RX ORDER — SODIUM CHLORIDE 0.9 % (FLUSH) 0.9 %
5-40 SYRINGE (ML) INJECTION PRN
Status: DISCONTINUED | OUTPATIENT
Start: 2024-01-08 | End: 2024-01-08 | Stop reason: HOSPADM

## 2024-01-08 RX ORDER — SODIUM CHLORIDE, SODIUM LACTATE, POTASSIUM CHLORIDE, CALCIUM CHLORIDE 600; 310; 30; 20 MG/100ML; MG/100ML; MG/100ML; MG/100ML
INJECTION, SOLUTION INTRAVENOUS CONTINUOUS
Status: DISCONTINUED | OUTPATIENT
Start: 2024-01-08 | End: 2024-01-08 | Stop reason: HOSPADM

## 2024-01-08 RX ORDER — FENTANYL CITRATE 50 UG/ML
25 INJECTION, SOLUTION INTRAMUSCULAR; INTRAVENOUS EVERY 5 MIN PRN
Status: DISCONTINUED | OUTPATIENT
Start: 2024-01-08 | End: 2024-01-08 | Stop reason: HOSPADM

## 2024-01-08 RX ORDER — FENTANYL CITRATE 50 UG/ML
50 INJECTION, SOLUTION INTRAMUSCULAR; INTRAVENOUS EVERY 5 MIN PRN
Status: DISCONTINUED | OUTPATIENT
Start: 2024-01-08 | End: 2024-01-08 | Stop reason: HOSPADM

## 2024-01-08 RX ORDER — MAGNESIUM HYDROXIDE 1200 MG/15ML
LIQUID ORAL CONTINUOUS PRN
Status: DISCONTINUED | OUTPATIENT
Start: 2024-01-08 | End: 2024-01-08 | Stop reason: HOSPADM

## 2024-01-08 RX ORDER — ENOXAPARIN SODIUM 100 MG/ML
40 INJECTION SUBCUTANEOUS ONCE
Status: COMPLETED | OUTPATIENT
Start: 2024-01-08 | End: 2024-01-08

## 2024-01-08 RX ORDER — ONDANSETRON 2 MG/ML
INJECTION INTRAMUSCULAR; INTRAVENOUS PRN
Status: DISCONTINUED | OUTPATIENT
Start: 2024-01-08 | End: 2024-01-08 | Stop reason: SDUPTHER

## 2024-01-08 RX ORDER — SODIUM CHLORIDE 9 MG/ML
INJECTION, SOLUTION INTRAVENOUS PRN
Status: DISCONTINUED | OUTPATIENT
Start: 2024-01-08 | End: 2024-01-08 | Stop reason: HOSPADM

## 2024-01-08 RX ORDER — ROCURONIUM BROMIDE 10 MG/ML
INJECTION, SOLUTION INTRAVENOUS PRN
Status: DISCONTINUED | OUTPATIENT
Start: 2024-01-08 | End: 2024-01-08 | Stop reason: SDUPTHER

## 2024-01-08 RX ORDER — MIDAZOLAM HYDROCHLORIDE 1 MG/ML
INJECTION INTRAMUSCULAR; INTRAVENOUS PRN
Status: DISCONTINUED | OUTPATIENT
Start: 2024-01-08 | End: 2024-01-08 | Stop reason: SDUPTHER

## 2024-01-08 RX ORDER — ONDANSETRON 2 MG/ML
4 INJECTION INTRAMUSCULAR; INTRAVENOUS
Status: DISCONTINUED | OUTPATIENT
Start: 2024-01-08 | End: 2024-01-08 | Stop reason: HOSPADM

## 2024-01-08 RX ORDER — KETAMINE HCL IN NACL, ISO-OSM 100MG/10ML
SYRINGE (ML) INJECTION PRN
Status: DISCONTINUED | OUTPATIENT
Start: 2024-01-08 | End: 2024-01-08 | Stop reason: SDUPTHER

## 2024-01-08 RX ADMIN — ONDANSETRON 4 MG: 2 INJECTION INTRAMUSCULAR; INTRAVENOUS at 13:00

## 2024-01-08 RX ADMIN — SODIUM CHLORIDE, POTASSIUM CHLORIDE, SODIUM LACTATE AND CALCIUM CHLORIDE: 600; 310; 30; 20 INJECTION, SOLUTION INTRAVENOUS at 09:19

## 2024-01-08 RX ADMIN — FENTANYL CITRATE 50 MCG: 50 INJECTION, SOLUTION INTRAMUSCULAR; INTRAVENOUS at 12:53

## 2024-01-08 RX ADMIN — SUGAMMADEX 200 MG: 100 INJECTION, SOLUTION INTRAVENOUS at 13:59

## 2024-01-08 RX ADMIN — Medication 0.1 MG: at 12:56

## 2024-01-08 RX ADMIN — Medication 100 MCG: at 13:25

## 2024-01-08 RX ADMIN — ROCURONIUM BROMIDE 30 MG: 10 INJECTION, SOLUTION INTRAVENOUS at 12:46

## 2024-01-08 RX ADMIN — PROPOFOL 50 MG: 10 INJECTION, EMULSION INTRAVENOUS at 12:50

## 2024-01-08 RX ADMIN — FENTANYL CITRATE 25 MCG: 50 INJECTION, SOLUTION INTRAMUSCULAR; INTRAVENOUS at 13:46

## 2024-01-08 RX ADMIN — FENTANYL CITRATE 50 MCG: 50 INJECTION, SOLUTION INTRAMUSCULAR; INTRAVENOUS at 12:46

## 2024-01-08 RX ADMIN — Medication 2000 MG: at 09:20

## 2024-01-08 RX ADMIN — MIDAZOLAM 2 MG: 1 INJECTION INTRAMUSCULAR; INTRAVENOUS at 12:42

## 2024-01-08 RX ADMIN — ENOXAPARIN SODIUM 40 MG: 100 INJECTION SUBCUTANEOUS at 09:19

## 2024-01-08 RX ADMIN — PROPOFOL 150 MG: 10 INJECTION, EMULSION INTRAVENOUS at 12:46

## 2024-01-08 RX ADMIN — DEXAMETHASONE SODIUM PHOSPHATE 10 MG: 10 INJECTION INTRAMUSCULAR; INTRAVENOUS at 13:00

## 2024-01-08 RX ADMIN — LIDOCAINE HYDROCHLORIDE 50 MG: 10 INJECTION, SOLUTION EPIDURAL; INFILTRATION; INTRACAUDAL; PERINEURAL at 12:46

## 2024-01-08 RX ADMIN — Medication 20 MG: at 13:06

## 2024-01-08 ASSESSMENT — PAIN SCALES - GENERAL: PAINLEVEL_OUTOF10: 0

## 2024-01-08 NOTE — BRIEF OP NOTE
Brief Operative Note  Department of Obstetrics and Gynecology  [facility]     Patient: Bobbi Phillips   : 1955  MRN: [id number]       Acct: [id number]   Date of Procedure: 24     Pre-operative Diagnosis: 68 y.o. female    Post menopausal bleeding  Thickened endometrium  Hypertension  Cirrhosis  BMI 37    Post-operative Diagnosis:   68 y.o. female    Post menopausal bleeding  Thickened endometrium  Hypertension  Cirrhosis  BMI 37    Procedure: exam under anesthesia, hysteroscopy with Aveta    Surgeon: Dr. Horn     Assistant(s): Kimberli Verde MD, PGY1; Madan Menon; MS4  Anesthesia: general via ETT    Findings:  small uterus palpated bimanually, atrophic appearing external genitalia without lesions, atrophic vaginal mucosa, cervix without lesions, unable to sound uterus, stenotic endocervical canal, small fibrotic endometrial cavity, unable to visualize bilateral ostia  Total IV fluids/Blood products:  700 ml crystalloid  Urine Output:  voided prior to procedure  Estimated blood loss:  100 ml  Fluid Deficit: 750 ml  Drains:  none  Specimens:  none  Instrument and Sponge Count: Correct  Complications:  none  Condition:  good, transferred to post anesthesia recovery    See full operative report for further details.    Kimberli Verde MD  Ob/Gyn Resident  2024, 2:12 PM      Attending Physician Statement  I have discussed the care of Bobbi Phillips, including pertinent history and exam findings, with the resident. I have seen and examined the patient and the key elements of all parts of the encounter have been performed by me. I agree with the assessment, plan and orders as documented by the resident.     Mena Horn MD  Gynecologic Oncology

## 2024-01-08 NOTE — ANESTHESIA PRE PROCEDURE
Department of Anesthesiology  Preprocedure Note       Name:  Bobbi Phillips   Age:  68 y.o.  :  1955                                          MRN:  [id number]         Date:  2024      Surgeon: Surgeon(s):  Mena Horn MD    Procedure: Procedure(s):  EUA, DILATATION AND CURETTAGE HYSTEROSCOPY, AVETA, ANY OTHER INDICATED PROCEDURES    Medications prior to admission:   Prior to Admission medications    Medication Sig Start Date End Date Taking? Authorizing Provider   JANUMET XR  MG TB24 per extended release tablet take 1 tablet by mouth twice a day 23   Stacie Doty MD   Semaglutide,0.25 or 0.5MG/DOS, (OZEMPIC, 0.25 OR 0.5 MG/DOSE,) 2 MG/1.5ML SOPN Inject 0.5 mg into the skin once a week 23   Stacie Doty MD   HUMALOG KWIKPEN 100 UNIT/ML SOPN inject PER SLIDING SCALE BEFORE MEALS AND AT BEDTIME 4 TIMES DAILY, MAX 25 UNITS DAILY 23   Stacie Doty MD   gabapentin (NEURONTIN) 300 MG capsule take 2 capsules by mouth three times a day 11/27/23 3/26/24  Stacie Doty MD   miSOPROStol (CYTOTEC) 100 MCG tablet Place 2 tablets vaginally once for 1 dose Insert into vagina the night before the procedure. 23  Tiffanie Brown DO   FREESTYLE TEST STRIPS strip use 1 TEST STRIP to TEST BLOOD SUGAR three times a day 10/17/23   Stacie Doty MD   TOUJEO MAX SOLOSTAR 300 UNIT/ML SOPN inject 40 units subcutaneously twice a day 10/17/23   Stacie Doty MD   ibuprofen (ADVIL;MOTRIN) 600 MG tablet Take 1 tablet by mouth every 6 hours as needed for Pain With food 10/2/23   Matilde Martinez DO   Continuous Blood Gluc Sensor (DEXCOM G7 SENSOR) MISC Use to monitor sugars 6-8 times a day and monitor for high and low sugars. 23   Stacie Doty MD   B-D UF III MINI PEN NEEDLES 31G X 5 MM MISC use 1 PEN NEEDLE to inject MEDICATION subcutaneously four times a day with HUMALOG 4/3/23   Stacie Doty MD

## 2024-01-08 NOTE — ANESTHESIA POSTPROCEDURE EVALUATION
Department of Anesthesiology  Postprocedure Note    Patient: Bobbi Phillips  MRN: [id number]  YOB: 1955  Date of evaluation: 1/8/2024    Procedure Summary     Date: 01/08/24 Room / Location: [address]    Anesthesia Start: 1236 Anesthesia Stop: 1412    Procedure: EUA, DIAGNOSTIC HYSTEROSCOPY Diagnosis:       Post-menopausal bleeding      Thickened endometrium      (Post-menopausal bleeding [N95.0])      (Thickened endometrium [R93.89])    Surgeons: Mena Horn MD Responsible Provider: Duke Mcginnis MD    Anesthesia Type: general ASA Status: 3          Anesthesia Type: No value filed.    Domenica Phase I: Domenica Score: 10    Domenica Phase II:    POST-OP ANESTHESIA NOTE       BP (!) 115/103   Pulse 78   Temp 97.6 °F (36.4 °C) (Temporal)   Resp 17   SpO2 93%    Pain Assessment: None - Denies Pain  Pain Level: 0       Anesthesia Post Evaluation    Patient location during evaluation: PACU  Patient participation: complete - patient participated  Level of consciousness: awake  Pain score: 0  Airway patency: patent  Nausea & Vomiting: no nausea and no vomiting  Cardiovascular status: hemodynamically stable  Respiratory status: acceptable  Hydration status: stable  Pain management: adequate        No notable events documented.

## 2024-01-08 NOTE — H&P
documentation will be forwarded to the pre-operative holding area.     The patient is aware that this procedure may not alleviate her symptoms. That there may be a necessity for a second surgery and that there may be an incomplete removal of abnormal tissue.    Kimberli Verde MD  Gyn Onc Resident  [facility], [facility]  1/8/2024, 12:21 PM     Attending Physician Statement  I have discussed the care of Bobbi Phillips, including pertinent history and exam findings, with the resident. I have seen and examined the patient and the key elements of all parts of the encounter have been performed by me. I agree with the assessment, plan and orders as documented by the resident.     Mena Horn MD  Gynecologic Oncology

## 2024-01-09 ENCOUNTER — TELEPHONE (OUTPATIENT)
Dept: GYNECOLOGIC ONCOLOGY | Age: 69
End: 2024-01-09

## 2024-01-09 NOTE — OP NOTE
OPERATIVE NOTE  PATIENT: Bobbi Phillips  YOB: 1955  MRN: [id number]  DATE OF PROCEDURE: 01/08/2024     PRE-OP DIAGNOSIS:   Post-menopausal bleeding  Thickened endometrium  Liver cirrhosis, attributed to Hepatic Steatosis  Insulin dependent Diabetes Mellitus   Poorly controlled Hypertension  Class III obesity (BMI 41)      POST-OP DIAGNOSIS:   Post-menopausal bleeding  Thickened endometrium  Liver cirrhosis, attributed to Hepatic Steatosis  Insulin dependent Diabetes Mellitus   Poorly controlled Hypertension  Class III obesity (BMI 41)        PROCEDURE:  Exam under anesthesia + Diagnostic hysteroscopy     SURGEON: Mena Horn MD     ASSISTANT: Kimberli eVrde MD     ANESTHESIA: General     ESTIMATED BLOOD LOSS (mL): 100cc     COMPLICATIONS: None noted at the end of the procedure     SPECIMENS: None     INDICATIONS:  67yo in menopause (age 55), with symptoms of post-menopausal bleeding. Pelvic ultrasound demonstrates findings of a 17?? (no units documented) endometrial stripe, with internal vascularity. Endometrial sampling unsuccessful to date, attributed to cervix stenosis. Recommendations were made to proceed with surgical management. She has been advised of the potential risks, benefits and alternatives to the procedure. Informed consent was obtained     FINDINGS:   Normal vulva and vagina. Diffuse vaginal atrophy noted, with abrasion noted upon minimal manipulation. Scarring noted upon dilation of the cervix on hysteroscopy. No obvious endometrial cavity evident.     DETAILED DESCRIPTION OF PROCEDURE:   The patient was identified and transported to the operating room with IV fluids running. Appropriate monitors were affixed. Bilateral SCDs were on and in place prior to induction of anesthesia. Following induction of general anesthesia, an LMA without incident, by the anesthesia team. She was placed in the dorsal lithotomy position, properly secured and then she was prepped and draped in the

## 2024-01-09 NOTE — TELEPHONE ENCOUNTER
Called patient to inquire on post procedure status:  Fever: Denies  Pain: Tolerable, decreasing ultram intake  Bleeding: Bleeding continues but is decreasing, has changed pad 3 times since procedure for hygenic purposes.  GI/: Having BM's without difficulty, urinating without difficulty, burning is improving.  Other: Throat scratchy, no difficulty breathing or swallowing  Post procedure instructions reviewed, including when to go to ER.  Follow up appointment date/time/location reviewed.  Encouraged patient to call office with any questions or concerns.  Patient voiced understanding and appreciation.

## 2024-01-17 ENCOUNTER — OFFICE VISIT (OUTPATIENT)
Dept: GYNECOLOGIC ONCOLOGY | Age: 69
End: 2024-01-17
Payer: MEDICARE

## 2024-01-17 VITALS
TEMPERATURE: 98.2 F | OXYGEN SATURATION: 97 % | DIASTOLIC BLOOD PRESSURE: 60 MMHG | HEART RATE: 95 BPM | BODY MASS INDEX: 38.9 KG/M2 | SYSTOLIC BLOOD PRESSURE: 157 MMHG | WEIGHT: 192.6 LBS

## 2024-01-17 DIAGNOSIS — N95.0 POST-MENOPAUSAL BLEEDING: Primary | ICD-10-CM

## 2024-01-17 DIAGNOSIS — R93.89 THICKENED ENDOMETRIUM: ICD-10-CM

## 2024-01-17 PROCEDURE — 1123F ACP DISCUSS/DSCN MKR DOCD: CPT | Performed by: OBSTETRICS & GYNECOLOGY

## 2024-01-17 PROCEDURE — 3078F DIAST BP <80 MM HG: CPT | Performed by: OBSTETRICS & GYNECOLOGY

## 2024-01-17 PROCEDURE — 3077F SYST BP >= 140 MM HG: CPT | Performed by: OBSTETRICS & GYNECOLOGY

## 2024-01-17 PROCEDURE — 99215 OFFICE O/P EST HI 40 MIN: CPT | Performed by: OBSTETRICS & GYNECOLOGY

## 2024-01-18 PROBLEM — K57.30 DIVERTICULAR DISEASE OF COLON: Status: ACTIVE | Noted: 2024-01-18

## 2024-01-22 ENCOUNTER — HOSPITAL ENCOUNTER (OUTPATIENT)
Dept: ULTRASOUND IMAGING | Age: 69
Discharge: HOME | End: 2024-01-24
Payer: MEDICARE

## 2024-01-22 DIAGNOSIS — N95.0 POST-MENOPAUSAL BLEEDING: ICD-10-CM

## 2024-01-22 DIAGNOSIS — F41.9 ANXIETY DUE TO INVASIVE PROCEDURE: ICD-10-CM

## 2024-01-22 DIAGNOSIS — R93.89 THICKENED ENDOMETRIUM: ICD-10-CM

## 2024-01-22 DIAGNOSIS — N88.2 CERVICAL STENOSIS (UTERINE CERVIX): ICD-10-CM

## 2024-01-22 DIAGNOSIS — Z01.818 PREOP TESTING: Primary | ICD-10-CM

## 2024-01-22 PROCEDURE — 76856 US EXAM PELVIC COMPLETE: CPT

## 2024-01-22 RX ORDER — DIAZEPAM 2 MG/1
2 TABLET ORAL ONCE
Qty: 1 TABLET | Refills: 0 | Status: SHIPPED | OUTPATIENT
Start: 2024-01-22 | End: 2024-01-22

## 2024-01-22 NOTE — RESULT ENCOUNTER NOTE
I have called and reviewed the patient's ultrasound results with the patient accordingly per MD recommendations proceed with MRI pelvis for further evaluation and characterization of the endometrium.  I reviewed this with the patient and she is agreeable to care plan.      The patient does report to have anxiety related to the MRI testing claustrophobia, we discussed options for open MRI scanner she has the name and location of this facility.  She feels comfortable scheduling this testing on her own today I provided the scheduling number.  I will also send a one-time dose of diazepam for procedure related anxiety.  We will follow-up on the MRI results at her scheduled appointment on February 15, 2024.

## 2024-01-29 ENCOUNTER — HOSPITAL ENCOUNTER (OUTPATIENT)
Dept: GENERAL RADIOLOGY | Facility: CLINIC | Age: 69
Discharge: HOME | End: 2024-01-31
Payer: MEDICARE

## 2024-01-29 ENCOUNTER — HOSPITAL ENCOUNTER (OUTPATIENT)
Age: 69
Setting detail: SPECIMEN
Discharge: HOME | End: 2024-01-29

## 2024-01-29 ENCOUNTER — HOSPITAL ENCOUNTER (OUTPATIENT)
Dept: MRI IMAGING | Facility: CLINIC | Age: 69
Discharge: HOME | End: 2024-01-31
Payer: MEDICARE

## 2024-01-29 DIAGNOSIS — N88.2 CERVICAL STENOSIS (UTERINE CERVIX): ICD-10-CM

## 2024-01-29 DIAGNOSIS — R93.89 THICKENED ENDOMETRIUM: ICD-10-CM

## 2024-01-29 DIAGNOSIS — M79.5 FOREIGN BODY (FB) IN SOFT TISSUE: ICD-10-CM

## 2024-01-29 DIAGNOSIS — N95.0 POST-MENOPAUSAL BLEEDING: ICD-10-CM

## 2024-01-29 LAB — CREAT BLD-MCNC: 0.8 MG/DL (ref 0.6–1.4)

## 2024-01-29 PROCEDURE — 2580000003 HC RX 258: Performed by: PHYSICIAN ASSISTANT

## 2024-01-29 PROCEDURE — 73552 X-RAY EXAM OF FEMUR 2/>: CPT

## 2024-01-29 PROCEDURE — 72197 MRI PELVIS W/O & W/DYE: CPT

## 2024-01-29 PROCEDURE — A9579 GAD-BASE MR CONTRAST NOS,1ML: HCPCS | Performed by: PHYSICIAN ASSISTANT

## 2024-01-29 PROCEDURE — 6360000004 HC RX CONTRAST MEDICATION: Performed by: PHYSICIAN ASSISTANT

## 2024-01-29 RX ORDER — SODIUM CHLORIDE 0.9 % (FLUSH) 0.9 %
10 SYRINGE (ML) INJECTION PRN
Status: DISCONTINUED | OUTPATIENT
Start: 2024-01-29 | End: 2024-02-01 | Stop reason: HOSPADM

## 2024-01-29 RX ADMIN — SODIUM CHLORIDE, PRESERVATIVE FREE 10 ML: 5 INJECTION INTRAVENOUS at 14:32

## 2024-01-29 RX ADMIN — GADOTERIDOL 20 ML: 279.3 INJECTION, SOLUTION INTRAVENOUS at 14:32

## 2024-02-15 ENCOUNTER — OFFICE VISIT (OUTPATIENT)
Dept: GYNECOLOGIC ONCOLOGY | Age: 69
End: 2024-02-15
Payer: MEDICARE

## 2024-02-15 ENCOUNTER — TELEPHONE (OUTPATIENT)
Dept: GYNECOLOGIC ONCOLOGY | Age: 69
End: 2024-02-15

## 2024-02-15 VITALS
OXYGEN SATURATION: 98 % | BODY MASS INDEX: 39.51 KG/M2 | TEMPERATURE: 98.4 F | HEART RATE: 89 BPM | WEIGHT: 195.6 LBS | DIASTOLIC BLOOD PRESSURE: 65 MMHG | SYSTOLIC BLOOD PRESSURE: 148 MMHG

## 2024-02-15 DIAGNOSIS — K75.81 LIVER CIRRHOSIS SECONDARY TO NASH (HCC): ICD-10-CM

## 2024-02-15 DIAGNOSIS — Z80.3 FAMILY HISTORY OF BREAST CANCER: ICD-10-CM

## 2024-02-15 DIAGNOSIS — K74.60 LIVER CIRRHOSIS SECONDARY TO NASH (HCC): ICD-10-CM

## 2024-02-15 DIAGNOSIS — R54 ADVANCED AGE: ICD-10-CM

## 2024-02-15 DIAGNOSIS — R93.89 THICKENED ENDOMETRIUM: ICD-10-CM

## 2024-02-15 DIAGNOSIS — K75.81 NASH (NONALCOHOLIC STEATOHEPATITIS): ICD-10-CM

## 2024-02-15 DIAGNOSIS — N95.0 POST-MENOPAUSAL BLEEDING: Primary | ICD-10-CM

## 2024-02-15 DIAGNOSIS — E66.01 OBESITY, CLASS III, BMI 40-49.9 (MORBID OBESITY) (HCC): ICD-10-CM

## 2024-02-15 DIAGNOSIS — N88.2 CERVICAL STENOSIS (UTERINE CERVIX): ICD-10-CM

## 2024-02-15 DIAGNOSIS — Z80.49 FAMILY HISTORY OF UTERINE CANCER: ICD-10-CM

## 2024-02-15 DIAGNOSIS — E46 MALNUTRITION, UNSPECIFIED TYPE (HCC): ICD-10-CM

## 2024-02-15 DIAGNOSIS — I10 POORLY-CONTROLLED HYPERTENSION: ICD-10-CM

## 2024-02-15 PROCEDURE — 3078F DIAST BP <80 MM HG: CPT | Performed by: OBSTETRICS & GYNECOLOGY

## 2024-02-15 PROCEDURE — 99215 OFFICE O/P EST HI 40 MIN: CPT | Performed by: OBSTETRICS & GYNECOLOGY

## 2024-02-15 PROCEDURE — 1123F ACP DISCUSS/DSCN MKR DOCD: CPT | Performed by: OBSTETRICS & GYNECOLOGY

## 2024-02-15 PROCEDURE — 3077F SYST BP >= 140 MM HG: CPT | Performed by: OBSTETRICS & GYNECOLOGY

## 2024-02-15 NOTE — TELEPHONE ENCOUNTER
Patient was scheduled in office for surgery    Surgery date/time   3/4/24@11:40am arrive at 9:40am  PAT same day  Ost op 3/18/24@1:00pm with A pura Ramos    Patient voiced understanding and appreciation    Surgical pamphlet was given to patient , writer asked if patient had any questions. Patient was satisfied with scheduled surgery date/time

## 2024-02-19 ENCOUNTER — PREP FOR PROCEDURE (OUTPATIENT)
Dept: GYNECOLOGIC ONCOLOGY | Age: 69
End: 2024-02-19

## 2024-02-19 RX ORDER — SODIUM CHLORIDE 0.9 % (FLUSH) 0.9 %
5-40 SYRINGE (ML) INJECTION EVERY 12 HOURS SCHEDULED
Status: CANCELLED | OUTPATIENT
Start: 2024-02-19

## 2024-02-19 RX ORDER — SODIUM CHLORIDE 9 MG/ML
INJECTION, SOLUTION INTRAVENOUS CONTINUOUS
Status: CANCELLED | OUTPATIENT
Start: 2024-02-19

## 2024-02-19 RX ORDER — ENOXAPARIN SODIUM 100 MG/ML
40 INJECTION SUBCUTANEOUS ONCE
Status: CANCELLED | OUTPATIENT
Start: 2024-02-19 | End: 2024-02-19

## 2024-02-19 RX ORDER — SODIUM CHLORIDE 9 MG/ML
INJECTION, SOLUTION INTRAVENOUS PRN
Status: CANCELLED | OUTPATIENT
Start: 2024-02-19

## 2024-02-19 RX ORDER — SODIUM CHLORIDE 0.9 % (FLUSH) 0.9 %
5-40 SYRINGE (ML) INJECTION PRN
Status: CANCELLED | OUTPATIENT
Start: 2024-02-19

## 2024-02-26 NOTE — H&P
GYN Onc Pre-Op H&P  [facility]    Patient Name: Bobbi Phillips     Patient : 1955  Room/Bed: [facility] OR [facility]/NONE  Admission Date/Time: 3/4/2024  9:32 AM  Primary Care Physician: Stacie Doty MD  MRN: [id number]    Date: 3/4/2024  Time: 1:12 PM    The patient was seen in pre-op holding. She is here for Robotic Laparoscopic Hysterectomy, Bilateral Salpingo-Oophorectomy,  Possible Staging, any other indicated procedures.    Bobbi Phillips is a 68 y.o.  with a history of postmenopausal bleeding and thickened endometrial stripe with heterogeneous, hypervascular appearance but no discrete lesion on Pelvic US (24) subsequently shown to be thickened to 17 mm on pelvic MRI (24). In office endometrial sampling was unsuccessful due to cervical stenosis. An exam under anesthesia (10/2023) was performed with endometrial biopsy with pathology that showed scant fragments of benign squamous epithelium and blood. Her provider at that time was not certain his biopsy passed more than 3 cm into the cervix. Dilation and curettage was attempted, but was unable to be completed secondary to poor visibility and limited by pelvic anatomy. Another exam under anesthesia was completed 24 and diagnostic hysteroscopy was able to be completed and no sampling was obtained due to the atrophic tissue. Patient electing to proceed to the OR for definitive management.    The procedure risks and complications were reviewed.  The labs, Consent, and H&P were reviewed and updated.  The patient was counseled on the possibility of  the need of a second surgery.  The patient voiced understanding and had all of her questions answered. The possibility of incomplete removal of abnormal tissue was discussed.    OBSTETRICAL HISTORY:   OB History    Para Term  AB Living   2 2 2 0 0 2   SAB IAB Ectopic Molar Multiple Live Births   0 0 0 0 0 2      # Outcome Date GA Lbr Durga/2nd Weight Sex

## 2024-03-04 ENCOUNTER — APPOINTMENT (OUTPATIENT)
Dept: CT IMAGING | Age: 69
End: 2024-03-04
Attending: OBSTETRICS & GYNECOLOGY
Payer: MEDICARE

## 2024-03-04 ENCOUNTER — HOSPITAL ENCOUNTER (OUTPATIENT)
Age: 69
Setting detail: OUTPATIENT SURGERY
Discharge: HOME | End: 2024-03-04
Attending: OBSTETRICS & GYNECOLOGY | Admitting: OBSTETRICS & GYNECOLOGY
Payer: MEDICARE

## 2024-03-04 ENCOUNTER — ANESTHESIA EVENT (OUTPATIENT)
Dept: OPERATING ROOM | Age: 69
End: 2024-03-04
Payer: MEDICARE

## 2024-03-04 ENCOUNTER — ANESTHESIA (OUTPATIENT)
Dept: OPERATING ROOM | Age: 69
End: 2024-03-04
Payer: MEDICARE

## 2024-03-04 VITALS
DIASTOLIC BLOOD PRESSURE: 81 MMHG | WEIGHT: 195 LBS | SYSTOLIC BLOOD PRESSURE: 170 MMHG | BODY MASS INDEX: 39.31 KG/M2 | HEIGHT: 59 IN | OXYGEN SATURATION: 95 % | RESPIRATION RATE: 13 BRPM | HEART RATE: 74 BPM | TEMPERATURE: 97.9 F

## 2024-03-04 LAB
ABO + RH BLD: NORMAL
ALBUMIN SERPL-MCNC: 3.5 G/DL (ref 3.5–5.2)
ALBUMIN/GLOB SERPL: 1.1 {RATIO} (ref 1–2.5)
ALP SERPL-CCNC: 203 U/L (ref 35–104)
ALT SERPL-CCNC: 23 U/L (ref 5–33)
ANION GAP SERPL CALCULATED.3IONS-SCNC: 11 MMOL/L (ref 9–17)
ARM BAND NUMBER: NORMAL
AST SERPL-CCNC: 38 U/L
BASOPHILS # BLD: 0.03 K/UL (ref 0–0.2)
BASOPHILS NFR BLD: 1 % (ref 0–2)
BILIRUB SERPL-MCNC: 0.6 MG/DL (ref 0.3–1.2)
BLOOD BANK SAMPLE EXPIRATION: NORMAL
BLOOD GROUP ANTIBODIES SERPL: NEGATIVE
BUN SERPL-MCNC: 16 MG/DL (ref 8–23)
CALCIUM SERPL-MCNC: 9.5 MG/DL (ref 8.6–10.4)
CHLORIDE SERPL-SCNC: 103 MMOL/L (ref 98–107)
CO2 SERPL-SCNC: 26 MMOL/L (ref 20–31)
CREAT SERPL-MCNC: 0.6 MG/DL (ref 0.5–0.9)
EOSINOPHIL # BLD: 0.15 K/UL (ref 0–0.44)
EOSINOPHILS RELATIVE PERCENT: 5 % (ref 1–4)
ERYTHROCYTE [DISTWIDTH] IN BLOOD BY AUTOMATED COUNT: 15.6 % (ref 11.8–14.4)
GFR SERPL CREATININE-BSD FRML MDRD: >60 ML/MIN/1.73M2
GLUCOSE SERPL-MCNC: 129 MG/DL (ref 70–99)
HCT VFR BLD AUTO: 29.4 % (ref 36.3–47.1)
HGB BLD-MCNC: 9.5 G/DL (ref 11.9–15.1)
IMM GRANULOCYTES # BLD AUTO: <0.03 K/UL (ref 0–0.3)
IMM GRANULOCYTES NFR BLD: 0 %
LYMPHOCYTES NFR BLD: 0.76 K/UL (ref 1.1–3.7)
LYMPHOCYTES RELATIVE PERCENT: 25 % (ref 24–43)
MCH RBC QN AUTO: 28 PG (ref 25.2–33.5)
MCHC RBC AUTO-ENTMCNC: 32.3 G/DL (ref 28.4–34.8)
MCV RBC AUTO: 86.7 FL (ref 82.6–102.9)
MONOCYTES NFR BLD: 0.2 K/UL (ref 0.1–1.2)
MONOCYTES NFR BLD: 7 % (ref 3–12)
NEUTROPHILS NFR BLD: 63 % (ref 36–65)
NEUTS SEG NFR BLD: 1.93 K/UL (ref 1.5–8.1)
NRBC BLD-RTO: 0 PER 100 WBC
PLATELET # BLD AUTO: ABNORMAL K/UL (ref 138–453)
PLATELET, FLUORESCENCE: 129 K/UL (ref 138–453)
PLATELETS.RETICULATED NFR BLD AUTO: 3.2 % (ref 1.1–10.3)
POTASSIUM SERPL-SCNC: 3.9 MMOL/L (ref 3.7–5.3)
PROT SERPL-MCNC: 6.8 G/DL (ref 6.4–8.3)
RBC # BLD AUTO: 3.39 M/UL (ref 3.95–5.11)
RBC # BLD: ABNORMAL 10*6/UL
SODIUM SERPL-SCNC: 140 MMOL/L (ref 135–144)
WBC OTHER # BLD: 3.1 K/UL (ref 3.5–11.3)

## 2024-03-04 PROCEDURE — 3700000001 HC ADD 15 MINUTES (ANESTHESIA): Performed by: OBSTETRICS & GYNECOLOGY

## 2024-03-04 PROCEDURE — 6360000002 HC RX W HCPCS: Performed by: NURSE ANESTHETIST, CERTIFIED REGISTERED

## 2024-03-04 PROCEDURE — 2580000003 HC RX 258: Performed by: NURSE ANESTHETIST, CERTIFIED REGISTERED

## 2024-03-04 PROCEDURE — 7100000010 HC PHASE II RECOVERY - FIRST 15 MIN: Performed by: OBSTETRICS & GYNECOLOGY

## 2024-03-04 PROCEDURE — 2709999900 HC NON-CHARGEABLE SUPPLY: Performed by: OBSTETRICS & GYNECOLOGY

## 2024-03-04 PROCEDURE — 86850 RBC ANTIBODY SCREEN: CPT

## 2024-03-04 PROCEDURE — 86900 BLOOD TYPING SEROLOGIC ABO: CPT

## 2024-03-04 PROCEDURE — 80053 COMPREHEN METABOLIC PANEL: CPT

## 2024-03-04 PROCEDURE — 2720000010 HC SURG SUPPLY STERILE: Performed by: OBSTETRICS & GYNECOLOGY

## 2024-03-04 PROCEDURE — 6360000002 HC RX W HCPCS: Performed by: PHYSICIAN ASSISTANT

## 2024-03-04 PROCEDURE — 2500000003 HC RX 250 WO HCPCS: Performed by: NURSE ANESTHETIST, CERTIFIED REGISTERED

## 2024-03-04 PROCEDURE — 85055 RETICULATED PLATELET ASSAY: CPT

## 2024-03-04 PROCEDURE — S2900 ROBOTIC SURGICAL SYSTEM: HCPCS | Performed by: OBSTETRICS & GYNECOLOGY

## 2024-03-04 PROCEDURE — 6360000004 HC RX CONTRAST MEDICATION: Performed by: OBSTETRICS & GYNECOLOGY

## 2024-03-04 PROCEDURE — 7100000000 HC PACU RECOVERY - FIRST 15 MIN: Performed by: OBSTETRICS & GYNECOLOGY

## 2024-03-04 PROCEDURE — 3600000019 HC SURGERY ROBOT ADDTL 15MIN: Performed by: OBSTETRICS & GYNECOLOGY

## 2024-03-04 PROCEDURE — 3700000000 HC ANESTHESIA ATTENDED CARE: Performed by: OBSTETRICS & GYNECOLOGY

## 2024-03-04 PROCEDURE — 7100000001 HC PACU RECOVERY - ADDTL 15 MIN: Performed by: OBSTETRICS & GYNECOLOGY

## 2024-03-04 PROCEDURE — 3600000009 HC SURGERY ROBOT BASE: Performed by: OBSTETRICS & GYNECOLOGY

## 2024-03-04 PROCEDURE — 7100000011 HC PHASE II RECOVERY - ADDTL 15 MIN: Performed by: OBSTETRICS & GYNECOLOGY

## 2024-03-04 PROCEDURE — 74177 CT ABD & PELVIS W/CONTRAST: CPT

## 2024-03-04 PROCEDURE — 86901 BLOOD TYPING SEROLOGIC RH(D): CPT

## 2024-03-04 PROCEDURE — 6360000004 HC RX CONTRAST MEDICATION: Performed by: STUDENT IN AN ORGANIZED HEALTH CARE EDUCATION/TRAINING PROGRAM

## 2024-03-04 PROCEDURE — 85025 COMPLETE CBC W/AUTO DIFF WBC: CPT

## 2024-03-04 RX ORDER — ONDANSETRON 2 MG/ML
INJECTION INTRAMUSCULAR; INTRAVENOUS PRN
Status: DISCONTINUED | OUTPATIENT
Start: 2024-03-04 | End: 2024-03-04 | Stop reason: SDUPTHER

## 2024-03-04 RX ORDER — NALOXONE HYDROCHLORIDE 0.4 MG/ML
INJECTION, SOLUTION INTRAMUSCULAR; INTRAVENOUS; SUBCUTANEOUS PRN
Status: DISCONTINUED | OUTPATIENT
Start: 2024-03-04 | End: 2024-03-04 | Stop reason: SDUPTHER

## 2024-03-04 RX ORDER — SODIUM CHLORIDE, SODIUM LACTATE, POTASSIUM CHLORIDE, CALCIUM CHLORIDE 600; 310; 30; 20 MG/100ML; MG/100ML; MG/100ML; MG/100ML
INJECTION, SOLUTION INTRAVENOUS CONTINUOUS PRN
Status: DISCONTINUED | OUTPATIENT
Start: 2024-03-04 | End: 2024-03-04 | Stop reason: SDUPTHER

## 2024-03-04 RX ORDER — CEFAZOLIN SODIUM 1 G/3ML
INJECTION, POWDER, FOR SOLUTION INTRAMUSCULAR; INTRAVENOUS PRN
Status: DISCONTINUED | OUTPATIENT
Start: 2024-03-04 | End: 2024-03-04 | Stop reason: SDUPTHER

## 2024-03-04 RX ORDER — FENTANYL CITRATE 50 UG/ML
INJECTION, SOLUTION INTRAMUSCULAR; INTRAVENOUS PRN
Status: DISCONTINUED | OUTPATIENT
Start: 2024-03-04 | End: 2024-03-04 | Stop reason: SDUPTHER

## 2024-03-04 RX ORDER — SODIUM CHLORIDE 0.9 % (FLUSH) 0.9 %
5-40 SYRINGE (ML) INJECTION PRN
Status: CANCELLED | OUTPATIENT
Start: 2024-03-04

## 2024-03-04 RX ORDER — MIDAZOLAM HYDROCHLORIDE 1 MG/ML
INJECTION INTRAMUSCULAR; INTRAVENOUS PRN
Status: DISCONTINUED | OUTPATIENT
Start: 2024-03-04 | End: 2024-03-04 | Stop reason: SDUPTHER

## 2024-03-04 RX ORDER — SODIUM CHLORIDE 9 MG/ML
INJECTION, SOLUTION INTRAVENOUS CONTINUOUS
Status: DISCONTINUED | OUTPATIENT
Start: 2024-03-04 | End: 2024-03-04 | Stop reason: HOSPADM

## 2024-03-04 RX ORDER — SODIUM CHLORIDE 0.9 % (FLUSH) 0.9 %
5-40 SYRINGE (ML) INJECTION PRN
Status: DISCONTINUED | OUTPATIENT
Start: 2024-03-04 | End: 2024-03-04 | Stop reason: HOSPADM

## 2024-03-04 RX ORDER — PROPOFOL 10 MG/ML
INJECTION, EMULSION INTRAVENOUS PRN
Status: DISCONTINUED | OUTPATIENT
Start: 2024-03-04 | End: 2024-03-04 | Stop reason: SDUPTHER

## 2024-03-04 RX ORDER — ENOXAPARIN SODIUM 100 MG/ML
40 INJECTION SUBCUTANEOUS ONCE
Status: COMPLETED | OUTPATIENT
Start: 2024-03-04 | End: 2024-03-04

## 2024-03-04 RX ORDER — DEXAMETHASONE SODIUM PHOSPHATE 10 MG/ML
INJECTION INTRAMUSCULAR; INTRAVENOUS PRN
Status: DISCONTINUED | OUTPATIENT
Start: 2024-03-04 | End: 2024-03-04 | Stop reason: SDUPTHER

## 2024-03-04 RX ORDER — SODIUM CHLORIDE 0.9 % (FLUSH) 0.9 %
5-40 SYRINGE (ML) INJECTION EVERY 12 HOURS SCHEDULED
Status: CANCELLED | OUTPATIENT
Start: 2024-03-04

## 2024-03-04 RX ORDER — ROCURONIUM BROMIDE 10 MG/ML
INJECTION, SOLUTION INTRAVENOUS PRN
Status: DISCONTINUED | OUTPATIENT
Start: 2024-03-04 | End: 2024-03-04 | Stop reason: SDUPTHER

## 2024-03-04 RX ORDER — SODIUM CHLORIDE 9 MG/ML
INJECTION, SOLUTION INTRAVENOUS PRN
Status: DISCONTINUED | OUTPATIENT
Start: 2024-03-04 | End: 2024-03-04 | Stop reason: HOSPADM

## 2024-03-04 RX ORDER — SODIUM CHLORIDE 0.9 % (FLUSH) 0.9 %
5-40 SYRINGE (ML) INJECTION EVERY 12 HOURS SCHEDULED
Status: DISCONTINUED | OUTPATIENT
Start: 2024-03-04 | End: 2024-03-04 | Stop reason: HOSPADM

## 2024-03-04 RX ORDER — ONDANSETRON 2 MG/ML
4 INJECTION INTRAMUSCULAR; INTRAVENOUS
Status: CANCELLED | OUTPATIENT
Start: 2024-03-04 | End: 2024-03-05

## 2024-03-04 RX ORDER — LIDOCAINE HYDROCHLORIDE 10 MG/ML
INJECTION, SOLUTION EPIDURAL; INFILTRATION; INTRACAUDAL; PERINEURAL PRN
Status: DISCONTINUED | OUTPATIENT
Start: 2024-03-04 | End: 2024-03-04

## 2024-03-04 RX ORDER — HYDRALAZINE HYDROCHLORIDE 20 MG/ML
10 INJECTION INTRAMUSCULAR; INTRAVENOUS
Status: CANCELLED | OUTPATIENT
Start: 2024-03-04

## 2024-03-04 RX ORDER — SODIUM CHLORIDE 9 MG/ML
INJECTION, SOLUTION INTRAVENOUS PRN
Status: CANCELLED | OUTPATIENT
Start: 2024-03-04

## 2024-03-04 RX ORDER — LIDOCAINE HYDROCHLORIDE 10 MG/ML
INJECTION, SOLUTION EPIDURAL; INFILTRATION; INTRACAUDAL; PERINEURAL PRN
Status: DISCONTINUED | OUTPATIENT
Start: 2024-03-04 | End: 2024-03-04 | Stop reason: SDUPTHER

## 2024-03-04 RX ADMIN — LIDOCAINE HYDROCHLORIDE 50 MG: 10 INJECTION, SOLUTION EPIDURAL; INFILTRATION; INTRACAUDAL; PERINEURAL at 13:35

## 2024-03-04 RX ADMIN — IOHEXOL 50 ML: 240 INJECTION, SOLUTION INTRATHECAL; INTRAVASCULAR; INTRAVENOUS; ORAL at 16:13

## 2024-03-04 RX ADMIN — DEXAMETHASONE SODIUM PHOSPHATE 10 MG: 10 INJECTION INTRAMUSCULAR; INTRAVENOUS at 14:11

## 2024-03-04 RX ADMIN — CEFAZOLIN 2 G: 1 INJECTION, POWDER, FOR SOLUTION INTRAMUSCULAR; INTRAVENOUS at 13:43

## 2024-03-04 RX ADMIN — FENTANYL CITRATE 50 MCG: 50 INJECTION, SOLUTION INTRAMUSCULAR; INTRAVENOUS at 13:34

## 2024-03-04 RX ADMIN — Medication 2000 MG: at 11:16

## 2024-03-04 RX ADMIN — MIDAZOLAM 2 MG: 1 INJECTION INTRAMUSCULAR; INTRAVENOUS at 13:29

## 2024-03-04 RX ADMIN — SUGAMMADEX 200 MG: 100 INJECTION, SOLUTION INTRAVENOUS at 14:16

## 2024-03-04 RX ADMIN — IOPAMIDOL 75 ML: 755 INJECTION, SOLUTION INTRAVENOUS at 16:11

## 2024-03-04 RX ADMIN — FENTANYL CITRATE 50 MCG: 50 INJECTION, SOLUTION INTRAMUSCULAR; INTRAVENOUS at 13:35

## 2024-03-04 RX ADMIN — NALOXONE HYDROCHLORIDE 0.08 MG: 0.4 INJECTION, SOLUTION INTRAMUSCULAR; INTRAVENOUS; SUBCUTANEOUS at 14:22

## 2024-03-04 RX ADMIN — ENOXAPARIN SODIUM 40 MG: 100 INJECTION SUBCUTANEOUS at 11:17

## 2024-03-04 RX ADMIN — PROPOFOL 200 MG: 10 INJECTION, EMULSION INTRAVENOUS at 13:35

## 2024-03-04 RX ADMIN — ROCURONIUM BROMIDE 50 MG: 10 INJECTION INTRAVENOUS at 13:36

## 2024-03-04 RX ADMIN — ONDANSETRON 4 MG: 2 INJECTION INTRAMUSCULAR; INTRAVENOUS at 14:11

## 2024-03-04 RX ADMIN — SODIUM CHLORIDE, POTASSIUM CHLORIDE, SODIUM LACTATE AND CALCIUM CHLORIDE: 600; 310; 30; 20 INJECTION, SOLUTION INTRAVENOUS at 13:28

## 2024-03-04 ASSESSMENT — PAIN - FUNCTIONAL ASSESSMENT
PAIN_FUNCTIONAL_ASSESSMENT: 0-10
PAIN_FUNCTIONAL_ASSESSMENT: WONG-BAKER FACES

## 2024-03-04 ASSESSMENT — PAIN SCALES - GENERAL: PAINLEVEL_OUTOF10: 0

## 2024-03-04 NOTE — ANESTHESIA POSTPROCEDURE EVALUATION
Department of Anesthesiology  Postprocedure Note    Patient: Bobbi Phillips  MRN: [id number]  YOB: 1955  Date of evaluation: 3/4/2024    Procedure Summary       Date: 03/04/24 Room / Location: [address]    Anesthesia Start: 1328 Anesthesia Stop: 1428    Procedure: ABORTED XI ROBOTIC LAPAROSCOPIC HYSTERECTOMY , BILATERAL SALPINGO-OOPHORECTOMY, POSSIBLE STAGING, ANY OTHER INDICATED PROCEDURES. CASE NOT COMPLETED DUE TO PATIENT STATUS Diagnosis:       Post-menopausal bleeding      Thickened endometrium      (Post-menopausal bleeding [N95.0])      (Thickened endometrium [R93.89])    Surgeons: Mena Horn MD Responsible Provider: Liban Montero MD    Anesthesia Type: general ASA Status: 3            Anesthesia Type: No value filed.    Domenica Phase I: Domenica Score: 8    Domenica Phase II:      Anesthesia Post Evaluation    Patient location during evaluation: bedside  Patient participation: complete - patient participated  Level of consciousness: awake  Airway patency: patent  Nausea & Vomiting: no nausea and no vomiting  Cardiovascular status: blood pressure returned to baseline  Respiratory status: acceptable  Hydration status: euvolemic  Comments: BP (!) 157/72   Pulse 79   Temp 98.2 °F (36.8 °C) (Temporal)   Resp 18   Ht 1.499 m (4' 11\")   Wt 88.5 kg (195 lb)   SpO2 99%   BMI 39.39 kg/m²     Case aborted due to concerns for ascites and bilateral upper extremity edema, along with change in hemoglobin and PLTs in the past 2 months. Dr. Horn would like the patient to be worked up further prior to completing the procedure  Pain management: adequate    No notable events documented.

## 2024-03-04 NOTE — ANESTHESIA PRE PROCEDURE
Department of Anesthesiology  Preprocedure Note       Name:  Bobbi Phillips   Age:  68 y.o.  :  1955                                          MRN:  [id number]         Date:  3/4/2024      Surgeon: Surgeon(s):  Mena Horn MD    Procedure: Procedure(s):  XI ROBOTIC LAPAROSCOPIC HYSTERECTOMY , BILATERAL SALPINGO-OOPHORECTOMY, POSSIBLE STAGING, ANY OTHER INDICATED PROCEDURES ( C-MAX BED) * SHORT STAY*    Medications prior to admission:   Prior to Admission medications    Medication Sig Start Date End Date Taking? Authorizing Provider   JANUMET XR  MG TB24 per extended release tablet take 1 tablet by mouth twice a day 23   Stacie Doty MD   Semaglutide,0.25 or 0.5MG/DOS, (OZEMPIC, 0.25 OR 0.5 MG/DOSE,) 2 MG/1.5ML SOPN Inject 0.5 mg into the skin once a week 23   Stacie Doty MD   HUMALOG KWIKPEN 100 UNIT/ML SOPN inject PER SLIDING SCALE BEFORE MEALS AND AT BEDTIME 4 TIMES DAILY, MAX 25 UNITS DAILY 23   Stacie Doty MD   gabapentin (NEURONTIN) 300 MG capsule take 2 capsules by mouth three times a day 11/27/23 3/26/24  Stacie Doty MD   FREESTYLE TEST STRIPS strip use 1 TEST STRIP to TEST BLOOD SUGAR three times a day 10/17/23   Stacie Doty MD   TOUJEO MAX SOLOSTAR 300 UNIT/ML SOPN inject 40 units subcutaneously twice a day 10/17/23   Stacie Doty MD   Continuous Blood Gluc Sensor (DEXCOM G7 SENSOR) MISC Use to monitor sugars 6-8 times a day and monitor for high and low sugars. 23   Stacie Doty MD   B-D UF III MINI PEN NEEDLES 31G X 5 MM MISC use 1 PEN NEEDLE to inject MEDICATION subcutaneously four times a day with HUMALOG 4/3/23   Stacie Doty MD   lisinopril-hydroCHLOROthiazide (PRINZIDE;ZESTORETIC) 20-25 MG per tablet take 1 tablet by mouth once daily 3/27/23   Stacie Doty MD   atorvastatin (LIPITOR) 10 MG tablet take 1 tablet by mouth once daily 3/27/23   Stacie Doty

## 2024-03-04 NOTE — DISCHARGE INSTRUCTIONS
No alcoholic beverages, no driving or operating machinery, no making important decisions for 24 hours.   You may have a normal diet but should eat lightly day of surgery.  Drink plenty of fluids.  Urinate within 8 hours after surgery, if unable to urinate call your doctorNo alcoholic beverages, no driving or operating machinery, no making important decisions for 24 hours.   You may have a normal diet but should eat lightly day of surgery.  Drink plenty of fluids.  Urinate within 8 hours after surgery, if unable to urinate call your doctorCall your doctor for the following:   Chills   Temperature greater than 101   Pain that is not tolerable despite taking pain medicine as ordered   There is increased swelling, redness or warmth at surgical site   There is increased drainage or bleeding from surgical site   Do not remove surgical dressing unless instructed to do so by your surgeon           C

## 2024-03-05 PROBLEM — E66.9 OBESITY, CLASS II, BMI 35-39.9: Status: ACTIVE | Noted: 2024-03-05

## 2024-03-05 PROBLEM — E66.812 OBESITY, CLASS II, BMI 35-39.9: Status: ACTIVE | Noted: 2024-03-05

## 2024-03-05 PROBLEM — Z79.4 INSULIN-REQUIRING OR DEPENDENT TYPE II DIABETES MELLITUS (HCC): Status: ACTIVE | Noted: 2024-03-05

## 2024-03-05 PROBLEM — E11.9 INSULIN-REQUIRING OR DEPENDENT TYPE II DIABETES MELLITUS (HCC): Status: ACTIVE | Noted: 2024-03-05

## 2024-03-05 PROBLEM — N95.0 POST-MENOPAUSAL BLEEDING: Status: ACTIVE | Noted: 2024-03-05

## 2024-03-05 NOTE — OP NOTE
OPERATIVE NOTE  PATIENT: Bobbi Phillips  YOB: 1955  MRN: [id number]  DATE OF PROCEDURE: 03/04/2024     PRE-OP DIAGNOSIS:   Post-menopausal bleeding  Thickened endometrium  Liver cirrhosis, attributed to Hepatic Steatosis  Insulin dependent Diabetes Mellitus   Poorly controlled Hypertension  Class III obesity (BMI 39)      POST-OP DIAGNOSIS:   Post-menopausal bleeding  Thickened endometrium  Liver cirrhosis, attributed to Hepatic Steatosis  Insulin dependent Diabetes Mellitus   Poorly controlled Hypertension  Class III obesity (BMI 39)        PROCEDURE:  Aborted robotic hysterectomy. Procedure not completed due to patient status  Exam under anesthesia     SURGEON: Mena Horn MD     ASSISTANT: Freddy Savage, DO Swati Sin MD     ANESTHESIA: General     COMPLICATIONS: Aborted procedure due to heavy bleeding from IV, in the setting of thrombocytopenia. New findings of distended abdomen and fluid shift, concerning for the development of ascites. Given the patient's history of compensated liver cirrhosis, attributed to Hepatic Steatosis, taken together with findings of new onset thrombocytopenia (Platelets 129 today) and new onset anemia (Hemoglobin 9.5), a decision has been made to abort the procedure for today and complete a further evaluation.     INDICATIONS:  67yo in menopause (age 55), with symptoms of post-menopausal bleeding. MRI pelvis demonstrates findings of a thickened, heterogeneous endometrial stripe, with internal vascularity. Endometrial sampling unsuccessful to date, attributed to cervix and uterine stenosis. Recommendations were made to proceed with surgical management. She has been advised of the potential risks, benefits and alternatives to the procedure. Informed consent was obtained.     FINDINGS:   New findings of distended abdomen and fluid shift, concerning for the development of ascites.     DETAILED DESCRIPTION OF PROCEDURE:   The patient was identified and

## 2024-03-06 ENCOUNTER — TELEPHONE (OUTPATIENT)
Dept: GYNECOLOGIC ONCOLOGY | Age: 69
End: 2024-03-06

## 2024-03-06 NOTE — TELEPHONE ENCOUNTER
I called the patient and reviewed her CT abdomen pelvis findings and recommendations per attending Dr. Horn.    CT abdomen pelvis findings consistent with small volume ascites and cirrhosis noted.  Her pre surgery lab work consistent with thrombocytopenia and anemia.  Recommend the patient follow-up with her gastroenterologist Dr. Davis for further evaluation given these findings.    I advised the patient I have spoken to Dr. Davis myself and updated on the recent CT abdomen pelvis findings and lab work.  Patient will plan for EGD for further workup and return to clinic to discuss findings and care plan in regards to her gynecologic condition.    Patient voiced understanding and appreciation.  She is scheduled for follow-up April 2024 after completion of her gastroenterology evaluation.

## 2024-03-08 ENCOUNTER — TELEPHONE (OUTPATIENT)
Dept: GYNECOLOGIC ONCOLOGY | Age: 69
End: 2024-03-08

## 2024-03-11 NOTE — TELEPHONE ENCOUNTER
I have called and spoken with the patient regarding her questions and concerns.  She reports being scheduled for EGD procedure on 4/9 and we have patient scheduled for follow-up on April 18, 2024. She is concerned waiting until 4/9 for her EGD given CT findings. I expressed empathy for patient however advised patient scheduling is determined by treating GI physician who is aware of her recent imaging and lab work. She is encouraged to notify GI physician office if she were to develop new onset symptoms or concerns which could necessitate sooner appointment if appropriate. Patient voiced understanding and appreciation of discussion.

## 2024-03-18 RX ORDER — GABAPENTIN 300 MG/1
CAPSULE ORAL
Qty: 180 CAPSULE | Refills: 3 | Status: SHIPPED | OUTPATIENT
Start: 2024-03-18 | End: 2024-07-16

## 2024-03-21 RX ORDER — ATORVASTATIN CALCIUM 10 MG/1
TABLET, FILM COATED ORAL
Qty: 90 TABLET | Refills: 3 | Status: SHIPPED | OUTPATIENT
Start: 2024-03-21

## 2024-03-21 RX ORDER — LISINOPRIL AND HYDROCHLOROTHIAZIDE 25; 20 MG/1; MG/1
TABLET ORAL
Qty: 90 TABLET | Refills: 3 | Status: SHIPPED | OUTPATIENT
Start: 2024-03-21

## 2024-03-27 RX ORDER — SEMAGLUTIDE 0.68 MG/ML
INJECTION, SOLUTION SUBCUTANEOUS
Qty: 6 ML | Refills: 0 | Status: SHIPPED | OUTPATIENT
Start: 2024-03-27

## 2024-03-29 ENCOUNTER — APPOINTMENT (OUTPATIENT)
Dept: CT IMAGING | Age: 69
End: 2024-03-29
Payer: MEDICARE

## 2024-03-29 ENCOUNTER — APPOINTMENT (OUTPATIENT)
Dept: GENERAL RADIOLOGY | Age: 69
End: 2024-03-29
Payer: MEDICARE

## 2024-03-29 ENCOUNTER — HOSPITAL ENCOUNTER (EMERGENCY)
Age: 69
Discharge: HOME | End: 2024-03-30
Attending: EMERGENCY MEDICINE
Payer: MEDICARE

## 2024-03-29 VITALS
SYSTOLIC BLOOD PRESSURE: 160 MMHG | BODY MASS INDEX: 39.11 KG/M2 | RESPIRATION RATE: 24 BRPM | WEIGHT: 194 LBS | HEIGHT: 59 IN | DIASTOLIC BLOOD PRESSURE: 58 MMHG | HEART RATE: 109 BPM | TEMPERATURE: 99.1 F | OXYGEN SATURATION: 97 %

## 2024-03-29 DIAGNOSIS — E83.42 HYPOMAGNESEMIA: ICD-10-CM

## 2024-03-29 DIAGNOSIS — M54.50 ACUTE LOW BACK PAIN, UNSPECIFIED BACK PAIN LATERALITY, UNSPECIFIED WHETHER SCIATICA PRESENT: ICD-10-CM

## 2024-03-29 DIAGNOSIS — J11.1 INFLUENZA: ICD-10-CM
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
## 2024-03-29 DIAGNOSIS — N30.00 ACUTE CYSTITIS WITHOUT HEMATURIA: ICD-10-CM

## 2024-03-29 LAB
ALBUMIN SERPL-MCNC: 3.4 G/DL (ref 3.5–5.2)
ALP SERPL-CCNC: 218 U/L (ref 35–104)
ALT SERPL-CCNC: 30 U/L (ref 5–33)
ANION GAP SERPL CALCULATED.3IONS-SCNC: 18 MMOL/L (ref 9–17)
AST SERPL-CCNC: 61 U/L
BACTERIA URNS QL MICRO: ABNORMAL
BASOPHILS # BLD: 0 K/UL (ref 0–0.2)
BASOPHILS NFR BLD: 0 % (ref 0–2)
BILIRUB DIRECT SERPL-MCNC: 0.3 MG/DL
BILIRUB INDIRECT SERPL-MCNC: 0.8 MG/DL (ref 0–1)
BILIRUB SERPL-MCNC: 1.1 MG/DL (ref 0.3–1.2)
BILIRUB UR QL STRIP: NEGATIVE
BUN SERPL-MCNC: 15 MG/DL (ref 8–23)
CALCIUM SERPL-MCNC: 9.7 MG/DL (ref 8.6–10.4)
CASTS #/AREA URNS LPF: ABNORMAL /LPF
CHLORIDE SERPL-SCNC: 97 MMOL/L (ref 98–107)
CK SERPL-CCNC: 269 U/L (ref 26–192)
CLARITY UR: ABNORMAL
CO2 SERPL-SCNC: 20 MMOL/L (ref 20–31)
COLOR UR: ABNORMAL
CREAT SERPL-MCNC: 0.7 MG/DL (ref 0.5–0.9)
EOSINOPHIL # BLD: 0 K/UL (ref 0–0.4)
EOSINOPHILS RELATIVE PERCENT: 0 % (ref 0–4)
EPI CELLS #/AREA URNS HPF: ABNORMAL /HPF
ERYTHROCYTE [DISTWIDTH] IN BLOOD BY AUTOMATED COUNT: 16.2 % (ref 11.5–14.9)
FLUAV RNA RESP QL NAA+PROBE: NOT DETECTED
FLUBV RNA RESP QL NAA+PROBE: DETECTED
GFR SERPL CREATININE-BSD FRML MDRD: >90 ML/MIN/1.73M2
GLUCOSE SERPL-MCNC: 228 MG/DL (ref 70–99)
GLUCOSE UR STRIP-MCNC: NEGATIVE MG/DL
HCT VFR BLD AUTO: 35.4 % (ref 36–46)
HGB BLD-MCNC: 11.3 G/DL (ref 12–16)
HGB UR QL STRIP.AUTO: ABNORMAL
KETONES UR STRIP-MCNC: ABNORMAL MG/DL
LEUKOCYTE ESTERASE UR QL STRIP: ABNORMAL
LIPASE SERPL-CCNC: 22 U/L (ref 13–60)
LYMPHOCYTES NFR BLD: 0.73 K/UL (ref 1–4.8)
LYMPHOCYTES RELATIVE PERCENT: 8 % (ref 24–44)
MAGNESIUM SERPL-MCNC: 1.3 MG/DL (ref 1.6–2.6)
MCH RBC QN AUTO: 28.1 PG (ref 26–34)
MCHC RBC AUTO-ENTMCNC: 31.8 G/DL (ref 31–37)
MCV RBC AUTO: 88.4 FL (ref 80–100)
MONOCYTES NFR BLD: 0.09 K/UL (ref 0.1–1.3)
MONOCYTES NFR BLD: 1 % (ref 1–7)
MORPHOLOGY: ABNORMAL
NEUTROPHILS NFR BLD: 91 % (ref 36–66)
NEUTS SEG NFR BLD: 8.28 K/UL (ref 1.3–9.1)
NITRITE UR QL STRIP: NEGATIVE
PH UR STRIP: 5.5 [PH] (ref 5–8)
PHOSPHATE SERPL-MCNC: 2.5 MG/DL (ref 2.6–4.5)
PLATELET # BLD AUTO: 98 K/UL (ref 150–450)
PMV BLD AUTO: 8.6 FL (ref 6–12)
POTASSIUM SERPL-SCNC: 4.4 MMOL/L (ref 3.7–5.3)
PROT SERPL-MCNC: 7.2 G/DL (ref 6.4–8.3)
PROT UR STRIP-MCNC: ABNORMAL MG/DL
RBC # BLD AUTO: 4.01 M/UL (ref 4–5.2)
RBC #/AREA URNS HPF: ABNORMAL /HPF
SARS-COV-2 RNA RESP QL NAA+PROBE: NOT DETECTED
SODIUM SERPL-SCNC: 135 MMOL/L (ref 135–144)
SOURCE: ABNORMAL
SP GR UR STRIP: 1.03 (ref 1–1.03)
SPECIMEN DESCRIPTION: ABNORMAL
TROPONIN I SERPL HS-MCNC: 20 NG/L (ref 0–14)
TROPONIN I SERPL HS-MCNC: 21 NG/L (ref 0–14)
UROBILINOGEN UR STRIP-ACNC: NORMAL EU/DL (ref 0–1)
WBC #/AREA URNS HPF: ABNORMAL /HPF
WBC OTHER # BLD: 9.1 K/UL (ref 3.5–11)

## 2024-03-29 PROCEDURE — 72128 CT CHEST SPINE W/O DYE: CPT

## 2024-03-29 PROCEDURE — 96367 TX/PROPH/DG ADDL SEQ IV INF: CPT

## 2024-03-29 PROCEDURE — 72170 X-RAY EXAM OF PELVIS: CPT

## 2024-03-29 PROCEDURE — 72131 CT LUMBAR SPINE W/O DYE: CPT

## 2024-03-29 PROCEDURE — 6370000000 HC RX 637 (ALT 250 FOR IP): Performed by: EMERGENCY MEDICINE

## 2024-03-29 PROCEDURE — 6360000004 HC RX CONTRAST MEDICATION: Performed by: EMERGENCY MEDICINE

## 2024-03-29 PROCEDURE — 87077 CULTURE AEROBIC IDENTIFY: CPT

## 2024-03-29 PROCEDURE — 84484 ASSAY OF TROPONIN QUANT: CPT

## 2024-03-29 PROCEDURE — 80048 BASIC METABOLIC PNL TOTAL CA: CPT

## 2024-03-29 PROCEDURE — 87186 SC STD MICRODIL/AGAR DIL: CPT

## 2024-03-29 PROCEDURE — 80076 HEPATIC FUNCTION PANEL: CPT

## 2024-03-29 PROCEDURE — 83690 ASSAY OF LIPASE: CPT

## 2024-03-29 PROCEDURE — 2580000003 HC RX 258: Performed by: EMERGENCY MEDICINE

## 2024-03-29 PROCEDURE — 87086 URINE CULTURE/COLONY COUNT: CPT

## 2024-03-29 PROCEDURE — 83735 ASSAY OF MAGNESIUM: CPT

## 2024-03-29 PROCEDURE — 36415 COLL VENOUS BLD VENIPUNCTURE: CPT

## 2024-03-29 PROCEDURE — 96375 TX/PRO/DX INJ NEW DRUG ADDON: CPT

## 2024-03-29 PROCEDURE — 71260 CT THORAX DX C+: CPT

## 2024-03-29 PROCEDURE — 99285 EMERGENCY DEPT VISIT HI MDM: CPT

## 2024-03-29 PROCEDURE — 84100 ASSAY OF PHOSPHORUS: CPT

## 2024-03-29 PROCEDURE — 6360000002 HC RX W HCPCS: Performed by: EMERGENCY MEDICINE

## 2024-03-29 PROCEDURE — 70450 CT HEAD/BRAIN W/O DYE: CPT

## 2024-03-29 PROCEDURE — 81001 URINALYSIS AUTO W/SCOPE: CPT

## 2024-03-29 PROCEDURE — 71045 X-RAY EXAM CHEST 1 VIEW: CPT

## 2024-03-29 PROCEDURE — 96365 THER/PROPH/DIAG IV INF INIT: CPT

## 2024-03-29 PROCEDURE — 72125 CT NECK SPINE W/O DYE: CPT

## 2024-03-29 PROCEDURE — 85025 COMPLETE CBC W/AUTO DIFF WBC: CPT

## 2024-03-29 PROCEDURE — 82550 ASSAY OF CK (CPK): CPT

## 2024-03-29 PROCEDURE — 87636 SARSCOV2 & INF A&B AMP PRB: CPT

## 2024-03-29 RX ORDER — MAGNESIUM SULFATE 1 G/100ML
1000 INJECTION INTRAVENOUS ONCE
Status: COMPLETED | OUTPATIENT
Start: 2024-03-29 | End: 2024-03-30

## 2024-03-29 RX ORDER — CYCLOBENZAPRINE HCL 10 MG
10 TABLET ORAL ONCE
Status: COMPLETED | OUTPATIENT
Start: 2024-03-29 | End: 2024-03-29

## 2024-03-29 RX ORDER — 0.9 % SODIUM CHLORIDE 0.9 %
1000 INTRAVENOUS SOLUTION INTRAVENOUS ONCE
Status: COMPLETED | OUTPATIENT
Start: 2024-03-29 | End: 2024-03-30

## 2024-03-29 RX ORDER — SODIUM CHLORIDE 0.9 % (FLUSH) 0.9 %
10 SYRINGE (ML) INJECTION PRN
Status: DISCONTINUED | OUTPATIENT
Start: 2024-03-29 | End: 2024-03-30 | Stop reason: HOSPADM

## 2024-03-29 RX ORDER — MORPHINE SULFATE 2 MG/ML
2 INJECTION, SOLUTION INTRAMUSCULAR; INTRAVENOUS ONCE
Status: COMPLETED | OUTPATIENT
Start: 2024-03-29 | End: 2024-03-29

## 2024-03-29 RX ORDER — 0.9 % SODIUM CHLORIDE 0.9 %
100 INTRAVENOUS SOLUTION INTRAVENOUS ONCE
Status: COMPLETED | OUTPATIENT
Start: 2024-03-29 | End: 2024-03-29

## 2024-03-29 RX ADMIN — IOPAMIDOL 75 ML: 755 INJECTION, SOLUTION INTRAVENOUS at 21:24

## 2024-03-29 RX ADMIN — CEFTRIAXONE SODIUM 1000 MG: 1 INJECTION, POWDER, FOR SOLUTION INTRAMUSCULAR; INTRAVENOUS at 22:33

## 2024-03-29 RX ADMIN — SODIUM CHLORIDE 100 ML: 9 INJECTION, SOLUTION INTRAVENOUS at 21:24

## 2024-03-29 RX ADMIN — SODIUM CHLORIDE 1000 ML: 9 INJECTION, SOLUTION INTRAVENOUS at 20:38

## 2024-03-29 RX ADMIN — SODIUM CHLORIDE, PRESERVATIVE FREE 10 ML: 5 INJECTION INTRAVENOUS at 21:24

## 2024-03-29 RX ADMIN — CYCLOBENZAPRINE 10 MG: 10 TABLET, FILM COATED ORAL at 22:33

## 2024-03-29 RX ADMIN — MORPHINE SULFATE 2 MG: 2 INJECTION, SOLUTION INTRAMUSCULAR; INTRAVENOUS at 20:27

## 2024-03-29 RX ADMIN — MAGNESIUM SULFATE HEPTAHYDRATE 1000 MG: 1 INJECTION, SOLUTION INTRAVENOUS at 22:59

## 2024-03-29 ASSESSMENT — ENCOUNTER SYMPTOMS
FACIAL SWELLING: 0
CHEST TIGHTNESS: 0
NAUSEA: 1
COLOR CHANGE: 0
VOMITING: 1
TROUBLE SWALLOWING: 0
SHORTNESS OF BREATH: 0
EYE PAIN: 0
BACK PAIN: 1
PHOTOPHOBIA: 0
ABDOMINAL PAIN: 0
VOICE CHANGE: 0

## 2024-03-29 ASSESSMENT — LIFESTYLE VARIABLES
HOW MANY STANDARD DRINKS CONTAINING ALCOHOL DO YOU HAVE ON A TYPICAL DAY: PATIENT DOES NOT DRINK
HOW OFTEN DO YOU HAVE A DRINK CONTAINING ALCOHOL: NEVER

## 2024-03-29 ASSESSMENT — PAIN DESCRIPTION - LOCATION: LOCATION: BACK

## 2024-03-29 ASSESSMENT — PAIN SCALES - GENERAL
PAINLEVEL_OUTOF10: 8
PAINLEVEL_OUTOF10: 9

## 2024-03-29 ASSESSMENT — PAIN - FUNCTIONAL ASSESSMENT: PAIN_FUNCTIONAL_ASSESSMENT: 0-10

## 2024-03-29 ASSESSMENT — PAIN DESCRIPTION - ORIENTATION: ORIENTATION: LOWER;MID

## 2024-03-30 RX ORDER — CEPHALEXIN 500 MG/1
500 CAPSULE ORAL 3 TIMES DAILY
Qty: 15 CAPSULE | Refills: 0 | Status: ON HOLD | OUTPATIENT
Start: 2024-03-30 | End: 2024-04-04

## 2024-03-30 RX ORDER — IBUPROFEN 600 MG/1
600 TABLET ORAL 3 TIMES DAILY PRN
Qty: 30 TABLET | Refills: 0 | Status: ON HOLD | OUTPATIENT
Start: 2024-03-30

## 2024-03-30 RX ORDER — CYCLOBENZAPRINE HCL 10 MG
10 TABLET ORAL 3 TIMES DAILY PRN
Qty: 21 TABLET | Refills: 0 | Status: ON HOLD | OUTPATIENT
Start: 2024-03-30 | End: 2024-04-09

## 2024-03-30 ASSESSMENT — HEART SCORE: ECG: NORMAL

## 2024-03-30 NOTE — ED PROVIDER NOTES
EMERGENCY DEPARTMENT ENCOUNTER    Pt Name: Bobbi Phillips  MRN: [id number]  Birthdate 1955  Date of evaluation: 3/29/24  CHIEF COMPLAINT       Chief Complaint   Patient presents with    Fall     HISTORY OF PRESENT ILLNESS   68-year-old female presenting to the ER complaining of a fall.  Patient was rolling out of bed and fell on the floor.  Patient is any pain in her lower back.  Patient is having a hard time ambulating because of the pain.    The history is provided by the patient.   Fall  The accident occurred 1 to 2 hours ago. Associated symptoms include nausea and vomiting. Pertinent negatives include no abdominal pain and no headaches.           REVIEW OF SYSTEMS     Review of Systems   Constitutional:  Negative for activity change, appetite change and fatigue.   HENT:  Negative for facial swelling, trouble swallowing and voice change.    Eyes:  Negative for photophobia and pain.   Respiratory:  Negative for chest tightness and shortness of breath.    Cardiovascular:  Negative for chest pain and palpitations.   Gastrointestinal:  Positive for nausea and vomiting. Negative for abdominal pain.   Genitourinary:  Negative for dysuria and urgency.   Musculoskeletal:  Positive for back pain. Negative for arthralgias.   Skin:  Negative for color change and rash.   Neurological:  Negative for dizziness, syncope and headaches.   Psychiatric/Behavioral:  Negative for behavioral problems and hallucinations.      PASTMEDICAL HISTORY     Past Medical History:   Diagnosis Date    Abdominal swelling     Claustrophobia     COVID-19 vaccine administered     Diabetes (HCC)     DEXCOM LT ARM    Diabetic retinopathy (HCC) 11/28/2015    Flatus     H/O acute sinusitis     H/O cholelithiasis     Hypertension     Liver cirrhosis (HCC)     LLQ abdominal tenderness     Poor venous access     \"THEY LIKE TO ROLL & RUN AWAY\"    Positive colorectal cancer screening using Cologuard test 07/06/2023    had colonoscopy after this and no  Tract Infection    cyclobenzaprine (FLEXERIL) tablet 10 mg    ibuprofen (ADVIL;MOTRIN) 600 MG tablet     Sig: Take 1 tablet by mouth 3 times daily as needed for Pain     Dispense:  30 tablet     Refill:  0    cyclobenzaprine (FLEXERIL) 10 MG tablet     Sig: Take 1 tablet by mouth 3 times daily as needed for Muscle spasms     Dispense:  21 tablet     Refill:  0    cephALEXin (KEFLEX) 500 MG capsule     Sig: Take 1 capsule by mouth 3 times daily for 5 days     Dispense:  15 capsule     Refill:  0     DISCHARGE PRESCRIPTIONS:  Discharge Medication List as of 3/30/2024 12:22 AM        START taking these medications    Details   ibuprofen (ADVIL;MOTRIN) 600 MG tablet Take 1 tablet by mouth 3 times daily as needed for Pain, Disp-30 tablet, R-0Normal      cyclobenzaprine (FLEXERIL) 10 MG tablet Take 1 tablet by mouth 3 times daily as needed for Muscle spasms, Disp-21 tablet, R-0Normal      cephALEXin (KEFLEX) 500 MG capsule Take 1 capsule by mouth 3 times daily for 5 days, Disp-15 capsule, R-0Normal           PHYSICIAN CONSULTS ORDERED THIS ENCOUNTER:  None  FINAL IMPRESSION      1. Fall, initial encounter    2. Hypomagnesemia    3. Acute low back pain, unspecified back pain laterality, unspecified whether sciatica present    4. Acute cystitis without hematuria    5. Influenza          DISPOSITION/PLAN   DISPOSITION Decision To Discharge 03/30/2024 12:21:57 AM      OUTPATIENT FOLLOW UP THE PATIENT:  Stacie Doty MD  [address] [facility]  [facility] [address] [phone]    Schedule an appointment as soon as possible for a visit in 2 days      [facility] ED  [address] [address]  [phone]  Go to   As needed, If symptoms worsen      DO Dalila Aguayo Sami, DO  03/30/24 0108

## 2024-03-30 NOTE — ED NOTES
ED physician made aware that coags were not able to be obtained after multiple attempts by ED staff and phlebo.

## 2024-03-30 NOTE — ED NOTES
Pt states she slipped off of her couch and laid on the floor. Pt is unsure of how long she stayed there but thinks it was \"hours\". Pt c/o left hip pain and nausea. IV access established, pain meds administered.

## 2024-03-30 NOTE — ED NOTES
Mode of arrival (squad #, walk in, police, etc) : walk-in        Chief complaint(s): fall        Arrival Note (brief scenario, treatment PTA, etc).: patient had a fall at home, on floor for unknown amount of time but patient thinks it was hours. Patient is alert and oriented. Patient reports back pain 9/10.        C= \"Have you ever felt that you should Cut down on your drinking?\"  No  A= \"Have people Annoyed you by criticizing your drinking?\"  No  G= \"Have you ever felt bad or Guilty about your drinking?\"  No  E= \"Have you ever had a drink as an Eye-opener first thing in the morning to steady your nerves or to help a hangover?\"  No      Deferred []      Reason for deferring: N/A    *If yes to two or more: probable alcohol abuse.*

## 2024-03-30 NOTE — ED NOTES
Pt ambulated to restroom with assistance. Per sons, pt uses a cane at home. Pt states she feels ready to go. Family voiced concerns. Will discuss with physician.

## 2024-03-31 LAB
MICROORGANISM SPEC CULT: ABNORMAL
SPECIMEN DESCRIPTION: ABNORMAL

## 2024-04-02 ENCOUNTER — TELEPHONE (OUTPATIENT)
Dept: PRIMARY CARE CLINIC | Age: 69
End: 2024-04-02

## 2024-04-02 NOTE — TELEPHONE ENCOUNTER
Patients son called to f/u on Bobbi. She fell on Friday March 29th 2024. Pt has no fractures just bruising, she did have a UTI, and recovering from Influenza B.

## 2024-04-03 ENCOUNTER — TELEPHONE (OUTPATIENT)
Dept: PRIMARY CARE CLINIC | Age: 69
End: 2024-04-03

## 2024-04-03 ENCOUNTER — HOSPITAL ENCOUNTER (INPATIENT)
Age: 69
LOS: 20 days | Discharge: NURSING, REHAB OR LONG-TERM CARE | DRG: 871 | End: 2024-04-23
Attending: EMERGENCY MEDICINE | Admitting: INTERNAL MEDICINE
Payer: MEDICARE

## 2024-04-03 ENCOUNTER — APPOINTMENT (OUTPATIENT)
Dept: ULTRASOUND IMAGING | Age: 69
DRG: 871 | End: 2024-04-03
Payer: MEDICARE

## 2024-04-03 ENCOUNTER — APPOINTMENT (OUTPATIENT)
Dept: CT IMAGING | Age: 69
DRG: 871 | End: 2024-04-03
Payer: MEDICARE

## 2024-04-03 DIAGNOSIS — N85.8 UTERINE MASS: ICD-10-CM

## 2024-04-03 DIAGNOSIS — K65.2 SBP (SPONTANEOUS BACTERIAL PERITONITIS) (HCC): ICD-10-CM

## 2024-04-03 DIAGNOSIS — R73.9 HYPERGLYCEMIA: ICD-10-CM

## 2024-04-03 DIAGNOSIS — R01.1 CARDIAC MURMUR: ICD-10-CM

## 2024-04-03 DIAGNOSIS — R79.89 ELEVATED D-DIMER: ICD-10-CM

## 2024-04-03 DIAGNOSIS — R52 INTRACTABLE PAIN: ICD-10-CM

## 2024-04-03 DIAGNOSIS — N10 ACUTE PYELONEPHRITIS: Primary | ICD-10-CM

## 2024-04-03 DIAGNOSIS — S39.012S STRAIN OF LUMBAR REGION, SEQUELA: ICD-10-CM

## 2024-04-03 DIAGNOSIS — N17.9 AKI (ACUTE KIDNEY INJURY) (HCC): ICD-10-CM

## 2024-04-03 LAB
ABSOLUTE BANDS: 0.47 K/UL (ref 0–1)
ALBUMIN SERPL-MCNC: 2.8 G/DL (ref 3.5–5.2)
ALP SERPL-CCNC: 281 U/L (ref 35–104)
ALT SERPL-CCNC: 29 U/L (ref 5–33)
ANION GAP SERPL CALCULATED.3IONS-SCNC: 14 MMOL/L (ref 9–17)
AST SERPL-CCNC: 39 U/L
BACTERIA URNS QL MICRO: ABNORMAL
BANDS: 5 % (ref 0–10)
BASOPHILS # BLD: 0 K/UL (ref 0–0.2)
BASOPHILS NFR BLD: 0 % (ref 0–2)
BILIRUB SERPL-MCNC: 1 MG/DL (ref 0.3–1.2)
BILIRUB UR QL STRIP: NEGATIVE
BUN SERPL-MCNC: 62 MG/DL (ref 8–23)
CALCIUM SERPL-MCNC: 9.5 MG/DL (ref 8.6–10.4)
CHLORIDE SERPL-SCNC: 94 MMOL/L (ref 98–107)
CLARITY UR: ABNORMAL
CO2 SERPL-SCNC: 22 MMOL/L (ref 20–31)
COLOR UR: YELLOW
CREAT SERPL-MCNC: 1.3 MG/DL (ref 0.5–0.9)
D DIMER PPP FEU-MCNC: 11.25 UG/ML FEU (ref 0–0.59)
EOSINOPHIL # BLD: 0.09 K/UL (ref 0–0.4)
EOSINOPHILS RELATIVE PERCENT: 1 % (ref 0–4)
EPI CELLS #/AREA URNS HPF: ABNORMAL /HPF
ERYTHROCYTE [DISTWIDTH] IN BLOOD BY AUTOMATED COUNT: 16.4 % (ref 11.5–14.9)
GFR SERPL CREATININE-BSD FRML MDRD: 45 ML/MIN/1.73M2
GLUCOSE BLD-MCNC: 381 MG/DL (ref 65–105)
GLUCOSE SERPL-MCNC: 411 MG/DL (ref 70–99)
GLUCOSE UR STRIP-MCNC: ABNORMAL MG/DL
HCT VFR BLD AUTO: 32.6 % (ref 36–46)
HGB BLD-MCNC: 10.6 G/DL (ref 12–16)
HGB UR QL STRIP.AUTO: ABNORMAL
KETONES UR STRIP-MCNC: NEGATIVE MG/DL
LEUKOCYTE ESTERASE UR QL STRIP: ABNORMAL
LIPASE SERPL-CCNC: 85 U/L (ref 13–60)
LYMPHOCYTES NFR BLD: 0.66 K/UL (ref 1–4.8)
LYMPHOCYTES RELATIVE PERCENT: 7 % (ref 24–44)
MCH RBC QN AUTO: 27.8 PG (ref 26–34)
MCHC RBC AUTO-ENTMCNC: 32.6 G/DL (ref 31–37)
MCV RBC AUTO: 85.2 FL (ref 80–100)
MONOCYTES NFR BLD: 0.47 K/UL (ref 0.1–1.3)
MONOCYTES NFR BLD: 5 % (ref 1–7)
MORPHOLOGY: ABNORMAL
MORPHOLOGY: ABNORMAL
NEUTROPHILS NFR BLD: 82 % (ref 36–66)
NEUTS SEG NFR BLD: 7.71 K/UL (ref 1.3–9.1)
NITRITE UR QL STRIP: NEGATIVE
PH UR STRIP: 5.5 [PH] (ref 5–8)
PLATELET # BLD AUTO: 112 K/UL (ref 150–450)
PMV BLD AUTO: 9.6 FL (ref 6–12)
POTASSIUM SERPL-SCNC: 4 MMOL/L (ref 3.7–5.3)
PROT SERPL-MCNC: 7.2 G/DL (ref 6.4–8.3)
PROT UR STRIP-MCNC: ABNORMAL MG/DL
RBC # BLD AUTO: 3.83 M/UL (ref 4–5.2)
RBC #/AREA URNS HPF: ABNORMAL /HPF
SODIUM SERPL-SCNC: 130 MMOL/L (ref 135–144)
SP GR UR STRIP: 1.02 (ref 1–1.03)
UROBILINOGEN UR STRIP-ACNC: NORMAL EU/DL (ref 0–1)
WBC #/AREA URNS HPF: ABNORMAL /HPF
WBC OTHER # BLD: 9.4 K/UL (ref 3.5–11)

## 2024-04-03 PROCEDURE — 6360000002 HC RX W HCPCS: Performed by: NURSE PRACTITIONER

## 2024-04-03 PROCEDURE — 80053 COMPREHEN METABOLIC PANEL: CPT

## 2024-04-03 PROCEDURE — 1200000000 HC SEMI PRIVATE

## 2024-04-03 PROCEDURE — 87086 URINE CULTURE/COLONY COUNT: CPT

## 2024-04-03 PROCEDURE — 2580000003 HC RX 258: Performed by: EMERGENCY MEDICINE

## 2024-04-03 PROCEDURE — 85025 COMPLETE CBC W/AUTO DIFF WBC: CPT

## 2024-04-03 PROCEDURE — 6360000002 HC RX W HCPCS: Performed by: EMERGENCY MEDICINE

## 2024-04-03 PROCEDURE — 96374 THER/PROPH/DIAG INJ IV PUSH: CPT

## 2024-04-03 PROCEDURE — 2580000003 HC RX 258: Performed by: INTERNAL MEDICINE

## 2024-04-03 PROCEDURE — 6370000000 HC RX 637 (ALT 250 FOR IP): Performed by: INTERNAL MEDICINE

## 2024-04-03 PROCEDURE — 36415 COLL VENOUS BLD VENIPUNCTURE: CPT

## 2024-04-03 PROCEDURE — 82947 ASSAY GLUCOSE BLOOD QUANT: CPT

## 2024-04-03 PROCEDURE — 81001 URINALYSIS AUTO W/SCOPE: CPT

## 2024-04-03 PROCEDURE — 72131 CT LUMBAR SPINE W/O DYE: CPT

## 2024-04-03 PROCEDURE — 83690 ASSAY OF LIPASE: CPT

## 2024-04-03 PROCEDURE — 85379 FIBRIN DEGRADATION QUANT: CPT

## 2024-04-03 PROCEDURE — 6360000004 HC RX CONTRAST MEDICATION: Performed by: EMERGENCY MEDICINE

## 2024-04-03 PROCEDURE — 6370000000 HC RX 637 (ALT 250 FOR IP): Performed by: EMERGENCY MEDICINE

## 2024-04-03 PROCEDURE — 99223 1ST HOSP IP/OBS HIGH 75: CPT | Performed by: INTERNAL MEDICINE

## 2024-04-03 PROCEDURE — 99285 EMERGENCY DEPT VISIT HI MDM: CPT

## 2024-04-03 PROCEDURE — 74177 CT ABD & PELVIS W/CONTRAST: CPT

## 2024-04-03 RX ORDER — ONDANSETRON 4 MG/1
4 TABLET, ORALLY DISINTEGRATING ORAL EVERY 8 HOURS PRN
Status: DISCONTINUED | OUTPATIENT
Start: 2024-04-03 | End: 2024-04-23 | Stop reason: HOSPADM

## 2024-04-03 RX ORDER — KETOROLAC TROMETHAMINE 30 MG/ML
15 INJECTION, SOLUTION INTRAMUSCULAR; INTRAVENOUS ONCE
Status: COMPLETED | OUTPATIENT
Start: 2024-04-03 | End: 2024-04-03

## 2024-04-03 RX ORDER — ENOXAPARIN SODIUM 100 MG/ML
1 INJECTION SUBCUTANEOUS 2 TIMES DAILY
Status: DISCONTINUED | OUTPATIENT
Start: 2024-04-04 | End: 2024-04-03

## 2024-04-03 RX ORDER — 0.9 % SODIUM CHLORIDE 0.9 %
100 INTRAVENOUS SOLUTION INTRAVENOUS ONCE
Status: COMPLETED | OUTPATIENT
Start: 2024-04-03 | End: 2024-04-03

## 2024-04-03 RX ORDER — ATORVASTATIN CALCIUM 10 MG/1
10 TABLET, FILM COATED ORAL DAILY
Status: DISCONTINUED | OUTPATIENT
Start: 2024-04-04 | End: 2024-04-23 | Stop reason: HOSPADM

## 2024-04-03 RX ORDER — HYDRALAZINE HYDROCHLORIDE 20 MG/ML
10 INJECTION INTRAMUSCULAR; INTRAVENOUS EVERY 6 HOURS PRN
Status: DISCONTINUED | OUTPATIENT
Start: 2024-04-03 | End: 2024-04-23 | Stop reason: HOSPADM

## 2024-04-03 RX ORDER — MAGNESIUM SULFATE HEPTAHYDRATE 40 MG/ML
2000 INJECTION, SOLUTION INTRAVENOUS PRN
Status: DISCONTINUED | OUTPATIENT
Start: 2024-04-03 | End: 2024-04-23 | Stop reason: HOSPADM

## 2024-04-03 RX ORDER — GABAPENTIN 300 MG/1
300 CAPSULE ORAL 3 TIMES DAILY
Status: DISCONTINUED | OUTPATIENT
Start: 2024-04-03 | End: 2024-04-05

## 2024-04-03 RX ORDER — HEPARIN SODIUM 1000 [USP'U]/ML
80 INJECTION, SOLUTION INTRAVENOUS; SUBCUTANEOUS ONCE
Status: COMPLETED | OUTPATIENT
Start: 2024-04-04 | End: 2024-04-04

## 2024-04-03 RX ORDER — ASPIRIN 81 MG/1
81 TABLET ORAL DAILY
Status: DISCONTINUED | OUTPATIENT
Start: 2024-04-04 | End: 2024-04-10

## 2024-04-03 RX ORDER — SODIUM CHLORIDE 9 MG/ML
INJECTION, SOLUTION INTRAVENOUS CONTINUOUS
Status: DISCONTINUED | OUTPATIENT
Start: 2024-04-03 | End: 2024-04-08

## 2024-04-03 RX ORDER — HEPARIN SODIUM 1000 [USP'U]/ML
80 INJECTION, SOLUTION INTRAVENOUS; SUBCUTANEOUS PRN
Status: DISCONTINUED | OUTPATIENT
Start: 2024-04-03 | End: 2024-04-05

## 2024-04-03 RX ORDER — SODIUM CHLORIDE 0.9 % (FLUSH) 0.9 %
5-40 SYRINGE (ML) INJECTION EVERY 12 HOURS SCHEDULED
Status: DISCONTINUED | OUTPATIENT
Start: 2024-04-03 | End: 2024-04-23 | Stop reason: HOSPADM

## 2024-04-03 RX ORDER — MORPHINE SULFATE 2 MG/ML
2 INJECTION, SOLUTION INTRAMUSCULAR; INTRAVENOUS EVERY 4 HOURS PRN
Status: DISCONTINUED | OUTPATIENT
Start: 2024-04-03 | End: 2024-04-04 | Stop reason: SDUPTHER

## 2024-04-03 RX ORDER — INSULIN LISPRO 100 [IU]/ML
0-4 INJECTION, SOLUTION INTRAVENOUS; SUBCUTANEOUS NIGHTLY
Status: DISCONTINUED | OUTPATIENT
Start: 2024-04-03 | End: 2024-04-23 | Stop reason: HOSPADM

## 2024-04-03 RX ORDER — HEPARIN SODIUM 10000 [USP'U]/100ML
5-30 INJECTION, SOLUTION INTRAVENOUS CONTINUOUS
Status: DISCONTINUED | OUTPATIENT
Start: 2024-04-04 | End: 2024-04-04

## 2024-04-03 RX ORDER — POLYETHYLENE GLYCOL 3350 17 G/17G
17 POWDER, FOR SOLUTION ORAL DAILY PRN
Status: DISCONTINUED | OUTPATIENT
Start: 2024-04-03 | End: 2024-04-23 | Stop reason: HOSPADM

## 2024-04-03 RX ORDER — METHOCARBAMOL 500 MG/1
1000 TABLET, FILM COATED ORAL ONCE
Status: COMPLETED | OUTPATIENT
Start: 2024-04-03 | End: 2024-04-03

## 2024-04-03 RX ORDER — INSULIN GLARGINE 100 [IU]/ML
40 INJECTION, SOLUTION SUBCUTANEOUS 2 TIMES DAILY
Status: DISCONTINUED | OUTPATIENT
Start: 2024-04-03 | End: 2024-04-13

## 2024-04-03 RX ORDER — POTASSIUM CHLORIDE 20 MEQ/1
40 TABLET, EXTENDED RELEASE ORAL PRN
Status: DISCONTINUED | OUTPATIENT
Start: 2024-04-03 | End: 2024-04-23 | Stop reason: HOSPADM

## 2024-04-03 RX ORDER — SODIUM CHLORIDE 9 MG/ML
INJECTION, SOLUTION INTRAVENOUS PRN
Status: DISCONTINUED | OUTPATIENT
Start: 2024-04-03 | End: 2024-04-23 | Stop reason: HOSPADM

## 2024-04-03 RX ORDER — POTASSIUM CHLORIDE 7.45 MG/ML
10 INJECTION INTRAVENOUS PRN
Status: DISCONTINUED | OUTPATIENT
Start: 2024-04-03 | End: 2024-04-23 | Stop reason: HOSPADM

## 2024-04-03 RX ORDER — HEPARIN SODIUM 1000 [USP'U]/ML
40 INJECTION, SOLUTION INTRAVENOUS; SUBCUTANEOUS PRN
Status: DISCONTINUED | OUTPATIENT
Start: 2024-04-03 | End: 2024-04-05

## 2024-04-03 RX ORDER — INSULIN LISPRO 100 [IU]/ML
0-8 INJECTION, SOLUTION INTRAVENOUS; SUBCUTANEOUS
Status: DISCONTINUED | OUTPATIENT
Start: 2024-04-04 | End: 2024-04-23 | Stop reason: HOSPADM

## 2024-04-03 RX ORDER — MORPHINE SULFATE 2 MG/ML
2 INJECTION, SOLUTION INTRAMUSCULAR; INTRAVENOUS ONCE
Status: COMPLETED | OUTPATIENT
Start: 2024-04-03 | End: 2024-04-03

## 2024-04-03 RX ORDER — DEXTROSE MONOHYDRATE 100 MG/ML
INJECTION, SOLUTION INTRAVENOUS CONTINUOUS PRN
Status: DISCONTINUED | OUTPATIENT
Start: 2024-04-03 | End: 2024-04-23 | Stop reason: HOSPADM

## 2024-04-03 RX ORDER — ENOXAPARIN SODIUM 100 MG/ML
40 INJECTION SUBCUTANEOUS DAILY
Status: DISCONTINUED | OUTPATIENT
Start: 2024-04-04 | End: 2024-04-03

## 2024-04-03 RX ORDER — ONDANSETRON 2 MG/ML
4 INJECTION INTRAMUSCULAR; INTRAVENOUS EVERY 6 HOURS PRN
Status: DISCONTINUED | OUTPATIENT
Start: 2024-04-03 | End: 2024-04-23 | Stop reason: HOSPADM

## 2024-04-03 RX ORDER — 0.9 % SODIUM CHLORIDE 0.9 %
1000 INTRAVENOUS SOLUTION INTRAVENOUS ONCE
Status: COMPLETED | OUTPATIENT
Start: 2024-04-03 | End: 2024-04-03

## 2024-04-03 RX ORDER — SODIUM CHLORIDE 0.9 % (FLUSH) 0.9 %
5-40 SYRINGE (ML) INJECTION PRN
Status: DISCONTINUED | OUTPATIENT
Start: 2024-04-03 | End: 2024-04-23 | Stop reason: HOSPADM

## 2024-04-03 RX ORDER — SODIUM CHLORIDE 0.9 % (FLUSH) 0.9 %
10 SYRINGE (ML) INJECTION PRN
Status: DISCONTINUED | OUTPATIENT
Start: 2024-04-03 | End: 2024-04-23 | Stop reason: HOSPADM

## 2024-04-03 RX ADMIN — MORPHINE SULFATE 2 MG: 2 INJECTION, SOLUTION INTRAMUSCULAR; INTRAVENOUS at 18:49

## 2024-04-03 RX ADMIN — KETOROLAC TROMETHAMINE 15 MG: 30 INJECTION, SOLUTION INTRAMUSCULAR; INTRAVENOUS at 15:10

## 2024-04-03 RX ADMIN — GABAPENTIN 300 MG: 300 CAPSULE ORAL at 21:59

## 2024-04-03 RX ADMIN — IOPAMIDOL 75 ML: 755 INJECTION, SOLUTION INTRAVENOUS at 17:03

## 2024-04-03 RX ADMIN — MORPHINE SULFATE 2 MG: 2 INJECTION, SOLUTION INTRAMUSCULAR; INTRAVENOUS at 22:26

## 2024-04-03 RX ADMIN — SODIUM CHLORIDE, PRESERVATIVE FREE 10 ML: 5 INJECTION INTRAVENOUS at 17:03

## 2024-04-03 RX ADMIN — SODIUM CHLORIDE 100 ML: 9 INJECTION, SOLUTION INTRAVENOUS at 17:03

## 2024-04-03 RX ADMIN — INSULIN GLARGINE 40 UNITS: 100 INJECTION, SOLUTION SUBCUTANEOUS at 21:58

## 2024-04-03 RX ADMIN — SODIUM CHLORIDE 1000 ML: 9 INJECTION, SOLUTION INTRAVENOUS at 19:03

## 2024-04-03 RX ADMIN — INSULIN LISPRO 4 UNITS: 100 INJECTION, SOLUTION INTRAVENOUS; SUBCUTANEOUS at 23:16

## 2024-04-03 RX ADMIN — CEFTRIAXONE SODIUM 1000 MG: 1 INJECTION, POWDER, FOR SOLUTION INTRAMUSCULAR; INTRAVENOUS at 18:51

## 2024-04-03 RX ADMIN — METHOCARBAMOL 1000 MG: 500 TABLET ORAL at 15:44

## 2024-04-03 RX ADMIN — SODIUM CHLORIDE: 9 INJECTION, SOLUTION INTRAVENOUS at 22:23

## 2024-04-03 RX ADMIN — HYDRALAZINE HYDROCHLORIDE 10 MG: 20 INJECTION INTRAMUSCULAR; INTRAVENOUS at 21:59

## 2024-04-03 RX ADMIN — SODIUM CHLORIDE, PRESERVATIVE FREE 10 ML: 5 INJECTION INTRAVENOUS at 23:17

## 2024-04-03 ASSESSMENT — PAIN SCALES - GENERAL
PAINLEVEL_OUTOF10: 10
PAINLEVEL_OUTOF10: 10

## 2024-04-03 ASSESSMENT — PAIN SCALES - WONG BAKER: WONGBAKER_NUMERICALRESPONSE: HURTS A LITTLE BIT

## 2024-04-03 ASSESSMENT — PAIN - FUNCTIONAL ASSESSMENT: PAIN_FUNCTIONAL_ASSESSMENT: 0-10

## 2024-04-03 NOTE — PROGRESS NOTES
Pharmacy Medication History Note      List of current medications patient is taking is complete.     Source of information: patient, dispense report, [place]ARRS     Changes made to medication list:  Medications flagged for removal (include reason, ex. noncompliance):  None     Medications removed (include reason, ex. therapy complete or physician discontinued):  None     Medications added/doses adjusted:  None     Other notes (ex. Recent course of antibiotics, Coumadin dosing):  The patient started a 5 day course of cephalexin on 3/30/24.   Per OARRS, the patient last filled gabapentin on 3/25/24 with quantity 180 for 30 days.   Per OARRS, the patient received cyclobenzaprine 10 mg with quantity 21 for 7 days on 3/30/24.     Denies use of other OTC or herbal medications.      Allergies clarified    Medication list provided to the patient: no  Medication education provided to the patient: none    Prior to Admission Medications   Prescriptions Last Dose Informant Patient Reported? Taking?   Ascorbic Acid (VITAMIN C) 500 MG tablet   Yes No   Sig: Take by mouth daily Indications: as directed   B-D UF III MINI PEN NEEDLES 31G X 5 MM MISC   No No   Sig: use 1 PEN NEEDLE to inject MEDICATION subcutaneously four times a day with HUMALOG   Cholecalciferol (VITAMIN D3) 50 MCG (2000 UT) CAPS   No No   Sig: Take 2 capsules by mouth daily   Continuous Blood Gluc Sensor (DEXCOM G7 SENSOR) MISC   No No   Sig: Use to monitor sugars 6-8 times a day and monitor for high and low sugars.   FREESTYLE TEST STRIPS strip   No No   Sig: use 1 TEST STRIP to TEST BLOOD SUGAR three times a day   HUMALOG KWIKPEN 100 UNIT/ML SOPN   No No   Sig: inject PER SLIDING SCALE BEFORE MEALS AND AT BEDTIME 4 TIMES DAILY, MAX 25 UNITS DAILY   JANUMET XR  MG TB24 per extended release tablet   No No   Sig: take 1 tablet by mouth twice a day   Lancets MISC   No No   Sig: Test 4 times daily for uncontrolled BS   Lutein 20 MG TABS   Yes No   Sig: Take 1

## 2024-04-03 NOTE — H&P
[facility]  Nikolas Tan MD; Ray Hwang MD; Brad Maciel MD; MD Courtney Lr MD; Germania Osborne MD; Marlin Gutierrez MD      HISTORY AND PHYSICAL EXAMINATION            Date:   4/3/2024  Patient name:  Bobbi Phillips  MRN:   [id number]  Account:  [id number]  YOB: 1955  PCP:    Stacie Doty MD  Code Status:    Prior    Chief Complaint:     Chief Complaint   Patient presents with    Back Pain         History Obtained From:     Patient, EMR, nursing staff    HPI     This patient is a 68 y.o. Non- / non  femalewho presents with back pain  History of type 2 diabetes, with diabetic retinopathy and neuropathy, hypertension,  Patient is poor historian she reports she has been given some pain medication and is unable to relate sequence of events accurately  Per EMR patient has chronic low back pain, she had fall 5 days ago and was seen in ER CT thoracic and lumbar spine nil acute but UA positive for UTI culture showed Klebsiella sensitive to Keflex which is what she was discharged on.  Patient reported subsequent improvement in back pain however reports pain became worse again today  Moderate intensity progressive persistent no aggravating leaving factors noted  Denies any associated fevers diarrhea or vomiting    ER evaluation showed UA suggestive of persistent UTI, severe hyperglycemia, KIM    Review of Systems:     Denies any shortness of breath or cough  Denies chest pain or palpitations  Positive for flank pain, lower back pain  Denies any new numbness tremors or weakness.    A 10 point review of systems was performed and and negative except as mentioned in HPI  Positive and Negative as described in HPI.      Past Medical History:     Past Medical History:   Diagnosis Date    Abdominal swelling     Claustrophobia     COVID-19 vaccine administered     Diabetes (HCC)     DEXCOM LT ARM    Diabetic retinopathy (HCC) 11/28/2015     Lutein 20 MG TABS Take 1 tablet by mouth daily      Ascorbic Acid (VITAMIN C) 500 MG tablet Take by mouth daily Indications: as directed         Impressions :     1. Principal Problem:    KIM (acute kidney injury) (HCC)  Resolved Problems:    * No resolved hospital problems. *        2.  has a past medical history of Abdominal swelling, Claustrophobia, COVID-19 vaccine administered, Diabetes (HCC), Diabetic retinopathy (HCC) (11/28/2015), Flatus, H/O acute sinusitis, H/O cholelithiasis, Hypertension, Liver cirrhosis (HCC), LLQ abdominal tenderness, Poor venous access, Positive colorectal cancer screening using Cologuard test (07/06/2023), Post-menopausal bleeding, RUQ abdominal pain, Tendonitis of shoulder, right (12/09/2021), Tingling, Under care of service provider (12/28/2023), Under care of service provider (12/28/2023), Vaginal bleeding (08/2023), Vaginal candidiasis, and Wears glasses.     Plans:   This patient is a 68 y.o. Non- / non  femalewho presents with back pain  History of type 2 diabetes, with diabetic retinopathy and neuropathy, hypertension,  Patient is poor historian she reports she has been given some pain medication and is unable to relate sequence of events accurately  Per EMR patient has chronic low back pain, she had fall 5 days ago and was seen in ER CT thoracic and lumbar spine nil acute but UA positive for UTI culture showed Klebsiella sensitive to Keflex which is what she was discharged on.  Patient reported subsequent improvement in back pain however reports pain became worse again today      ER evaluation showed UA suggestive of persistent UTI, severe hyperglycemia, KIM  UTI suspected pyelonephritis started on Rocephin recent urine cultures show Klebsiella sensitive to Rocephin  Hyponatremia sodium 130 starting hydration with normal saline monitor sodium  Hyperglycemia related to poorly controlled type 2 diabetes-resuming Lantus, moderate intensity sliding scale will

## 2024-04-03 NOTE — ED TRIAGE NOTES
Mode of arrival (squad #, walk in, police, etc) : LS1        Chief complaint(s): back pain        Arrival Note (brief scenario, treatment PTA, etc).: pt having back pain, pt fell last Friday, pt took gabapentin and flexeril PTA unknown time it was taken, pt screaming and yelling upon arrival, pt has cirrhosis, pt does not follow up with pain management          C= \"Have you ever felt that you should Cut down on your drinking?\"  No  A= \"Have people Annoyed you by criticizing your drinking?\"  No  G= \"Have you ever felt bad or Guilty about your drinking?\"  No  E= \"Have you ever had a drink as an Eye-opener first thing in the morning to steady your nerves or to help a hangover?\"  No      Deferred []      Reason for deferring: N/A    *If yes to two or more: probable alcohol abuse.*

## 2024-04-03 NOTE — TELEPHONE ENCOUNTER
Pt's son Phillip called in, states pt is hallucinating and screaming in pain and wanted to know what to do. He was advised to take her to the ER or call 911.

## 2024-04-03 NOTE — ED NOTES
Report given to EMILY Toure from ER.   Report method in person   The following was reviewed with receiving RN:   Current vital signs:  BP (!) 112/95   Pulse 86   Temp 97.8 °F (36.6 °C) (Oral)   Resp 16   SpO2 92%                MEWS Score: 4     Any medication or safety alerts were reviewed. Any pending diagnostics and notifications were also reviewed, as well as any safety concerns or issues, abnormal labs, abnormal imaging, and abnormal assessment findings. Questions were answered.

## 2024-04-03 NOTE — ED PROVIDER NOTES
EMERGENCY DEPARTMENT ENCOUNTER    Pt Name: Bobbi Phillips  MRN: [id number]  Birthdate 1955  Date of evaluation: 4/3/24  CHIEF COMPLAINT       Chief Complaint   Patient presents with    Back Pain     HISTORY OF PRESENT ILLNESS   Patient is presenting with acute on chronic low back pain.  She had a fall 5 days ago and was at this emergency department.  She had no acute pathology identified on imaging.  At that time she was diagnosed with a urinary tract infection.  Patient said that her pain was starting to improve over the last few days.  She woke up today with worsening pain in her lower back.  It is bilateral.  She describes sharp pain when she moves.  There is no radiation of the pain.  She does have dysuria and increased frequency.    The history is provided by the patient.           REVIEW OF SYSTEMS     Review of Systems   Constitutional:  Negative for chills and fever.   Respiratory:  Negative for cough and shortness of breath.    Cardiovascular:  Negative for chest pain.   Gastrointestinal:  Negative for abdominal pain, nausea and vomiting.   Genitourinary:  Positive for dysuria, flank pain and frequency. Negative for urgency.   Musculoskeletal:  Positive for back pain.   Neurological:  Negative for dizziness, light-headedness and headaches.   Psychiatric/Behavioral:  Negative for behavioral problems.      PASTMEDICAL HISTORY     Past Medical History:   Diagnosis Date    Abdominal swelling     Claustrophobia     COVID-19 vaccine administered     Diabetes (HCC)     DEXCOM LT ARM    Diabetic retinopathy (HCC) 11/28/2015    Flatus     H/O acute sinusitis     H/O cholelithiasis     Hypertension     Liver cirrhosis (HCC)     LLQ abdominal tenderness     Poor venous access     \"THEY LIKE TO ROLL & RUN AWAY\"    Positive colorectal cancer screening using Cologuard test 07/06/2023    had colonoscopy after this and no cancer was found    Post-menopausal bleeding     RUQ abdominal pain     Tendonitis of shoulder,  hyperglycemia.  She was given a bolus of IV fluids and started on maintenance IV fluids.    Imaging that is independently reviewed and interpreted by me as: CT abdomen/pelvis shows fluid in the endometrial cavity with a 3 cm soft tissue attenuation projecting over the endometrium suggesting a mass.  This mass could be a fibroid or potential malignancy.  Pelvic ultrasound has been ordered.    3)  Treatment and Disposition         Patient was given 100 mcg of fentanyl by EMS.  On arrival, I gave her Robaxin and Toradol.  Now that her kidney function is observed, I would refrain from using NSAIDs.  She did admit to improvement although she is still in pain.  She will be started on IV Rocephin and admitted for pyelonephritis.      CRITICAL CARE:       PROCEDURES:    Procedures      DATA FOR LAB AND RADIOLOGY TESTS ORDERED BELOW ARE REVIEWED BY THE ED CLINICIAN:    RADIOLOGY: All x-rays, CT, MRI, and formal ultrasound images (except ED bedside ultrasound) are read by the radiologist, see reports below, unless otherwise noted in MDM or here.  Reports below are reviewed by myself.  CT ABDOMEN PELVIS W IV CONTRAST Additional Contrast? None   Final Result   1. Hepatic cirrhosis and portal hypertension with splenomegaly and a small   amount of ascites.   2. Thickening of the jejunum proximally which could be related to portal   hypertension and ascites, but small-bowel enteritis is a possibility.   3. There is again fluid in the endometrial cavity with the 3 cm soft tissue   attenuation structure projecting over the endometrium suggesting a mass in   this area which could represent a fibroid or malignancy.  Pelvic ultrasound   is recommended for further evaluation.         CT LUMBAR SPINE WO CONTRAST   Final Result   No acute fracture or subluxation      Multilevel degenerative changes with canal stenosis at L3-4             LABS: Lab orders shown below, the results are reviewed by myself, and all abnormals are listed

## 2024-04-04 ENCOUNTER — APPOINTMENT (OUTPATIENT)
Dept: CT IMAGING | Age: 69
DRG: 871 | End: 2024-04-04
Payer: MEDICARE

## 2024-04-04 ENCOUNTER — APPOINTMENT (OUTPATIENT)
Dept: GENERAL RADIOLOGY | Age: 69
DRG: 871 | End: 2024-04-04
Payer: MEDICARE

## 2024-04-04 ENCOUNTER — APPOINTMENT (OUTPATIENT)
Age: 69
DRG: 871 | End: 2024-04-04
Attending: INTERNAL MEDICINE
Payer: MEDICARE

## 2024-04-04 ENCOUNTER — HOSPITAL ENCOUNTER (INPATIENT)
Dept: VASCULAR LAB | Age: 69
Discharge: HOME | DRG: 871 | End: 2024-04-06
Payer: MEDICARE

## 2024-04-04 VITALS — HEIGHT: 59 IN | BODY MASS INDEX: 39.11 KG/M2 | WEIGHT: 194 LBS

## 2024-04-04 LAB
ABSOLUTE BANDS: 1.64 K/UL (ref 0–1)
AMMONIA PLAS-SCNC: 61 UMOL/L (ref 11–51)
ANION GAP SERPL CALCULATED.3IONS-SCNC: 15 MMOL/L (ref 9–17)
ANTI-XA UNFRAC HEPARIN: 0.56 IU/L (ref 0.3–0.7)
ANTI-XA UNFRAC HEPARIN: <0.1 IU/L (ref 0.3–0.7)
BANDS: 8 % (ref 0–10)
BASOPHILS # BLD: 0 K/UL (ref 0–0.2)
BASOPHILS NFR BLD: 0 % (ref 0–2)
BNP SERPL-MCNC: 1731 PG/ML
BODY TEMPERATURE: 37
BUN SERPL-MCNC: 50 MG/DL (ref 8–23)
CALCIUM SERPL-MCNC: 9.2 MG/DL (ref 8.6–10.4)
CHLORIDE SERPL-SCNC: 96 MMOL/L (ref 98–107)
CO2 SERPL-SCNC: 21 MMOL/L (ref 20–31)
COHGB MFR BLD: 2.5 % (ref 0–5)
CREAT SERPL-MCNC: 1 MG/DL (ref 0.5–0.9)
ECHO BSA: 1.91 M2
EKG ATRIAL RATE: 104 BPM
EKG P AXIS: 69 DEGREES
EKG P-R INTERVAL: 180 MS
EKG Q-T INTERVAL: 340 MS
EKG QRS DURATION: 98 MS
EKG QTC CALCULATION (BAZETT): 447 MS
EKG R AXIS: 1 DEGREES
EKG T AXIS: 62 DEGREES
EKG VENTRICULAR RATE: 104 BPM
EOSINOPHIL # BLD: 0 K/UL (ref 0–0.4)
EOSINOPHILS RELATIVE PERCENT: 0 % (ref 0–4)
ERYTHROCYTE [DISTWIDTH] IN BLOOD BY AUTOMATED COUNT: 16.5 % (ref 11.5–14.9)
ERYTHROCYTE [DISTWIDTH] IN BLOOD BY AUTOMATED COUNT: 17 % (ref 11.5–14.9)
FERRITIN SERPL-MCNC: 140 NG/ML (ref 13–150)
FOLATE SERPL-MCNC: 8.6 NG/ML (ref 4.8–24.2)
GAS FLOW.O2 O2 DELIVERY SYS: ABNORMAL L/MIN
GFR SERPL CREATININE-BSD FRML MDRD: 61 ML/MIN/1.73M2
GLUCOSE BLD-MCNC: 253 MG/DL (ref 65–105)
GLUCOSE BLD-MCNC: 340 MG/DL (ref 65–105)
GLUCOSE BLD-MCNC: 351 MG/DL (ref 65–105)
GLUCOSE BLD-MCNC: 353 MG/DL (ref 65–105)
GLUCOSE SERPL-MCNC: 362 MG/DL (ref 70–99)
HCO3 VENOUS: 20.9 MMOL/L (ref 24–30)
HCT VFR BLD AUTO: 32.6 % (ref 36–46)
HCT VFR BLD AUTO: 33.5 % (ref 36–46)
HGB BLD-MCNC: 10.3 G/DL (ref 12–16)
HGB BLD-MCNC: 10.9 G/DL (ref 12–16)
INR PPP: 1.2
IRON SATN MFR SERPL: 13 % (ref 20–55)
IRON SERPL-MCNC: 27 UG/DL (ref 37–145)
LACTATE BLDV-SCNC: 1.8 MMOL/L (ref 0.5–1.9)
LYMPHOCYTES NFR BLD: 1.23 K/UL (ref 1–4.8)
LYMPHOCYTES RELATIVE PERCENT: 6 % (ref 24–44)
MCH RBC QN AUTO: 26.9 PG (ref 26–34)
MCH RBC QN AUTO: 27.3 PG (ref 26–34)
MCHC RBC AUTO-ENTMCNC: 31.7 G/DL (ref 31–37)
MCHC RBC AUTO-ENTMCNC: 32.6 G/DL (ref 31–37)
MCV RBC AUTO: 83.8 FL (ref 80–100)
MCV RBC AUTO: 84.8 FL (ref 80–100)
METHEMOGLOBIN: 0.1 % (ref 0–1.9)
MICROORGANISM SPEC CULT: NO GROWTH
MONOCYTES NFR BLD: 0.61 K/UL (ref 0.1–1.3)
MONOCYTES NFR BLD: 3 % (ref 1–7)
MORPHOLOGY: ABNORMAL
NEGATIVE BASE EXCESS, VEN: 2.8 MMOL/L (ref 0–2)
NEUTROPHILS NFR BLD: 83 % (ref 36–66)
NEUTS SEG NFR BLD: 17.02 K/UL (ref 1.3–9.1)
NUCLEATED RED BLOOD CELLS: 1 PER 100 WBC
O2 SAT, VEN: 96 % (ref 60–85)
PARTIAL THROMBOPLASTIN TIME: 29.6 SEC (ref 24–36)
PCO2, VEN: 32.7 MM HG (ref 39–55)
PH VENOUS: 7.42 (ref 7.32–7.42)
PLATELET # BLD AUTO: 126 K/UL (ref 150–450)
PLATELET # BLD AUTO: 130 K/UL (ref 150–450)
PMV BLD AUTO: 9.1 FL (ref 6–12)
PMV BLD AUTO: 9.2 FL (ref 6–12)
PO2, VEN: 76.5 MM HG (ref 30–50)
POTASSIUM SERPL-SCNC: 3.9 MMOL/L (ref 3.7–5.3)
PROTHROMBIN TIME: 15.5 SEC (ref 11.8–14.6)
RBC # BLD AUTO: 3.84 M/UL (ref 4–5.2)
RBC # BLD AUTO: 4 M/UL (ref 4–5.2)
SODIUM SERPL-SCNC: 132 MMOL/L (ref 135–144)
SPECIMEN DESCRIPTION: NORMAL
TIBC SERPL-MCNC: 205 UG/DL (ref 250–450)
TROPONIN I SERPL HS-MCNC: 17 NG/L (ref 0–14)
TROPONIN I SERPL HS-MCNC: 27 NG/L (ref 0–14)
TSH SERPL DL<=0.05 MIU/L-ACNC: 1.68 UIU/ML (ref 0.3–5)
UNSATURATED IRON BINDING CAPACITY: 178 UG/DL (ref 112–347)
VIT B12 SERPL-MCNC: >2000 PG/ML (ref 232–1245)
WBC OTHER # BLD: 13.6 K/UL (ref 3.5–11)
WBC OTHER # BLD: 20.5 K/UL (ref 3.5–11)

## 2024-04-04 PROCEDURE — 2500000003 HC RX 250 WO HCPCS: Performed by: NURSE PRACTITIONER

## 2024-04-04 PROCEDURE — 99233 SBSQ HOSP IP/OBS HIGH 50: CPT | Performed by: INTERNAL MEDICINE

## 2024-04-04 PROCEDURE — 93970 EXTREMITY STUDY: CPT

## 2024-04-04 PROCEDURE — 2500000003 HC RX 250 WO HCPCS: Performed by: INTERNAL MEDICINE

## 2024-04-04 PROCEDURE — 82140 ASSAY OF AMMONIA: CPT

## 2024-04-04 PROCEDURE — 85027 COMPLETE CBC AUTOMATED: CPT

## 2024-04-04 PROCEDURE — 93005 ELECTROCARDIOGRAM TRACING: CPT | Performed by: NURSE PRACTITIONER

## 2024-04-04 PROCEDURE — 71045 X-RAY EXAM CHEST 1 VIEW: CPT

## 2024-04-04 PROCEDURE — 82805 BLOOD GASES W/O2 SATURATION: CPT

## 2024-04-04 PROCEDURE — 6360000002 HC RX W HCPCS: Performed by: INTERNAL MEDICINE

## 2024-04-04 PROCEDURE — 6360000002 HC RX W HCPCS: Performed by: NURSE PRACTITIONER

## 2024-04-04 PROCEDURE — 82947 ASSAY GLUCOSE BLOOD QUANT: CPT

## 2024-04-04 PROCEDURE — 85730 THROMBOPLASTIN TIME PARTIAL: CPT

## 2024-04-04 PROCEDURE — 80048 BASIC METABOLIC PNL TOTAL CA: CPT

## 2024-04-04 PROCEDURE — 6370000000 HC RX 637 (ALT 250 FOR IP): Performed by: INTERNAL MEDICINE

## 2024-04-04 PROCEDURE — 83880 ASSAY OF NATRIURETIC PEPTIDE: CPT

## 2024-04-04 PROCEDURE — 83550 IRON BINDING TEST: CPT

## 2024-04-04 PROCEDURE — 82728 ASSAY OF FERRITIN: CPT

## 2024-04-04 PROCEDURE — 82607 VITAMIN B-12: CPT

## 2024-04-04 PROCEDURE — 2580000003 HC RX 258: Performed by: INTERNAL MEDICINE

## 2024-04-04 PROCEDURE — 84443 ASSAY THYROID STIM HORMONE: CPT

## 2024-04-04 PROCEDURE — 2000000000 HC ICU R&B

## 2024-04-04 PROCEDURE — 93970 EXTREMITY STUDY: CPT | Performed by: SURGERY

## 2024-04-04 PROCEDURE — 71260 CT THORAX DX C+: CPT

## 2024-04-04 PROCEDURE — 2580000003 HC RX 258: Performed by: EMERGENCY MEDICINE

## 2024-04-04 PROCEDURE — 83605 ASSAY OF LACTIC ACID: CPT

## 2024-04-04 PROCEDURE — 6360000004 HC RX CONTRAST MEDICATION: Performed by: INTERNAL MEDICINE

## 2024-04-04 PROCEDURE — 85025 COMPLETE CBC W/AUTO DIFF WBC: CPT

## 2024-04-04 PROCEDURE — 36415 COLL VENOUS BLD VENIPUNCTURE: CPT

## 2024-04-04 PROCEDURE — 82105 ALPHA-FETOPROTEIN SERUM: CPT

## 2024-04-04 PROCEDURE — 85610 PROTHROMBIN TIME: CPT

## 2024-04-04 PROCEDURE — 82746 ASSAY OF FOLIC ACID SERUM: CPT

## 2024-04-04 PROCEDURE — 83540 ASSAY OF IRON: CPT

## 2024-04-04 PROCEDURE — 85520 HEPARIN ASSAY: CPT

## 2024-04-04 PROCEDURE — 2060000000 HC ICU INTERMEDIATE R&B

## 2024-04-04 PROCEDURE — 84484 ASSAY OF TROPONIN QUANT: CPT

## 2024-04-04 RX ORDER — METOPROLOL TARTRATE 1 MG/ML
5 INJECTION, SOLUTION INTRAVENOUS ONCE
Status: COMPLETED | OUTPATIENT
Start: 2024-04-04 | End: 2024-04-04

## 2024-04-04 RX ORDER — ENOXAPARIN SODIUM 100 MG/ML
40 INJECTION SUBCUTANEOUS DAILY
Status: DISCONTINUED | OUTPATIENT
Start: 2024-04-05 | End: 2024-04-10

## 2024-04-04 RX ORDER — SODIUM CHLORIDE 0.9 % (FLUSH) 0.9 %
10 SYRINGE (ML) INJECTION PRN
Status: COMPLETED | OUTPATIENT
Start: 2024-04-04 | End: 2024-04-04

## 2024-04-04 RX ORDER — 0.9 % SODIUM CHLORIDE 0.9 %
100 INTRAVENOUS SOLUTION INTRAVENOUS ONCE
Status: COMPLETED | OUTPATIENT
Start: 2024-04-04 | End: 2024-04-04

## 2024-04-04 RX ORDER — MORPHINE SULFATE 2 MG/ML
2 INJECTION, SOLUTION INTRAMUSCULAR; INTRAVENOUS EVERY 4 HOURS PRN
Status: DISCONTINUED | OUTPATIENT
Start: 2024-04-04 | End: 2024-04-04

## 2024-04-04 RX ADMIN — MORPHINE SULFATE 2 MG: 2 INJECTION, SOLUTION INTRAMUSCULAR; INTRAVENOUS at 02:19

## 2024-04-04 RX ADMIN — SODIUM CHLORIDE: 9 INJECTION, SOLUTION INTRAVENOUS at 13:03

## 2024-04-04 RX ADMIN — METOPROLOL TARTRATE 5 MG: 1 INJECTION, SOLUTION INTRAVENOUS at 00:31

## 2024-04-04 RX ADMIN — SODIUM CHLORIDE, PRESERVATIVE FREE 10 ML: 5 INJECTION INTRAVENOUS at 02:47

## 2024-04-04 RX ADMIN — HYDRALAZINE HYDROCHLORIDE 10 MG: 20 INJECTION INTRAMUSCULAR; INTRAVENOUS at 10:09

## 2024-04-04 RX ADMIN — MORPHINE SULFATE 2 MG: 2 INJECTION, SOLUTION INTRAMUSCULAR; INTRAVENOUS at 06:28

## 2024-04-04 RX ADMIN — SODIUM CHLORIDE 2.5 MG/HR: 9 INJECTION, SOLUTION INTRAVENOUS at 13:43

## 2024-04-04 RX ADMIN — SODIUM CHLORIDE 2.5 MG/HR: 9 INJECTION, SOLUTION INTRAVENOUS at 19:45

## 2024-04-04 RX ADMIN — IOPAMIDOL 75 ML: 755 INJECTION, SOLUTION INTRAVENOUS at 02:47

## 2024-04-04 RX ADMIN — WATER: 100 IRRIGANT IRRIGATION at 15:05

## 2024-04-04 RX ADMIN — HEPARIN SODIUM 7040 UNITS: 1000 INJECTION, SOLUTION INTRAVENOUS; SUBCUTANEOUS at 04:57

## 2024-04-04 RX ADMIN — INSULIN GLARGINE 40 UNITS: 100 INJECTION, SOLUTION SUBCUTANEOUS at 10:10

## 2024-04-04 RX ADMIN — INSULIN LISPRO 6 UNITS: 100 INJECTION, SOLUTION INTRAVENOUS; SUBCUTANEOUS at 08:57

## 2024-04-04 RX ADMIN — INSULIN GLARGINE 40 UNITS: 100 INJECTION, SOLUTION SUBCUTANEOUS at 21:06

## 2024-04-04 RX ADMIN — ANTI-FUNGAL POWDER MICONAZOLE NITRATE TALC FREE: 1.42 POWDER TOPICAL at 20:40

## 2024-04-04 RX ADMIN — SODIUM CHLORIDE 100 ML: 9 INJECTION, SOLUTION INTRAVENOUS at 02:46

## 2024-04-04 RX ADMIN — INSULIN LISPRO 8 UNITS: 100 INJECTION, SOLUTION INTRAVENOUS; SUBCUTANEOUS at 17:22

## 2024-04-04 RX ADMIN — HEPARIN SODIUM 18 UNITS/KG/HR: 10000 INJECTION, SOLUTION INTRAVENOUS at 05:10

## 2024-04-04 RX ADMIN — HYDRALAZINE HYDROCHLORIDE 10 MG: 20 INJECTION INTRAMUSCULAR; INTRAVENOUS at 08:36

## 2024-04-04 RX ADMIN — INSULIN LISPRO 8 UNITS: 100 INJECTION, SOLUTION INTRAVENOUS; SUBCUTANEOUS at 12:55

## 2024-04-04 RX ADMIN — CEFTRIAXONE SODIUM 1000 MG: 1 INJECTION, POWDER, FOR SOLUTION INTRAMUSCULAR; INTRAVENOUS at 17:27

## 2024-04-04 ASSESSMENT — PAIN SCALES - WONG BAKER: WONGBAKER_NUMERICALRESPONSE: HURTS A LITTLE BIT

## 2024-04-04 NOTE — PROGRESS NOTES
Pt has an order from the ER for a pelvic ultrasound for endometrial thickening prior to being admitted. Please note this is not a new finding as it has been imaged by multiple modalities including ultrasound since January. If it is necessary for this to be repeated please contact us at [phone].

## 2024-04-04 NOTE — PROGRESS NOTES
Patient seen and evaluated earlier in the shift. Left leg appears swollen and tender, warm to touch, right leg also seems tender. Patient is poor historian but states yes to pain.    D-Dimer ordered and resulted at 11.25.    Heparin gtt ordered.     Patient does not consistently state yes or no to chest pain or SOB when asked. Does not appear significantly short of breath but has increased respirations when attempting assessment.     Tachycardia 100-120. Temp 99.5. /76 after dose of hydralazine.   IV lopressor ordered, prn morphine. EKG.     Last Trop was 21 on 3/29, Stat troponin and CTA chest ordered to rule out PE or cardiac thrombus.    Patient is currently on Medsur unit, placed order for telemetry and transfer to PCU. Currently no beds available at higher level of care.

## 2024-04-04 NOTE — FLOWSHEET NOTE
04/03/24 2130 04/04/24 0018 04/04/24 0114   Treatment Team Notification   Reason for Communication Evaluate  (regarding elevated blood pressure, blood sugar, and redness to left lower extremity) Abnormal vitals  (regarding BP, pulse, and temp) Evaluate  (regarding unsuccessful attempt to complete CTPE at this time due to CT unable to use L hand IV and unsuccessful attempts to place new IV; house supervisor notifed per NP request and successfully placed new IV)   Type of Critical Result  --   --   --    Name of Team Member Notified DANY Abreu NP Brittany Goldi, NP   Treatment Team Role Advanced Practice Nurse Advanced Practice Nurse Advanced Practice Nurse   Method of Communication Secure Message Secure Message Secure Message   Response See orders See orders No new orders   Notification Time 2130 0018 0114 04/04/24 0619   Treatment Team Notification   Reason for Communication Evaluate;Change in status  (request for diet modification and achs glucose checks)   Type of Critical Result Laboratory  (wbc up from 9 to 20.7)   Name of Team Member Notified Pepper Hollis NP   Treatment Team Role Advanced Practice Nurse   Method of Communication Secure Message   Response Waiting for response   Notification Time 0619

## 2024-04-04 NOTE — CARE COORDINATION
Case Management Assessment  Initial Evaluation    Date/Time of Evaluation: 4/4/2024 5:11 PM  Assessment Completed by: Lexus Gómez RN    If patient is discharged prior to next notation, then this note serves as note for discharge by case management.    Patient Name: Bobbi Phillips                   YOB: 1955  Diagnosis: Acute pyelonephritis [N10]  Hyperglycemia [R73.9]  KIM (acute kidney injury) (HCC) [N17.9]  Intractable pain [R52]  Strain of lumbar region, sequela [S39.012S]                   Date / Time: 4/3/2024  1:22 PM    Patient Admission Status: Inpatient   Readmission Risk (Low < 19, Mod (19-27), High > 27): Readmission Risk Score: 16.1    Current PCP: Stacie Doty MD  PCP verified by CM? Yes    Chart Reviewed: Yes      History Provided by: Child/Family  Patient Orientation: (S) Unable to Assess, Unresponsive (sleeping)    Patient Cognition: (S) Other (see comment) (AMS)    Hospitalization in the last 30 days (Readmission):  No    If yes, Readmission Assessment in CM Navigator will be completed.    Advance Directives:      Code Status: Full Code   Patient's Primary Decision Maker is: Legal Next of Kin    Primary Decision Maker: Phillip Peters *HIPAA* - Child - [phone]    Primary Decision Maker: TYLER PETERS *HIPAA* - Child - [phone]    Discharge Planning:    Patient lives with: Children (son) Type of Home: House  Primary Care Giver: Self  Patient Support Systems include: Children, Family Members   Current Financial resources: Medicare  Current community resources: None  Current services prior to admission: Durable Medical Equipment            Current DME: Glucometer, Walker, Bedside Commode, Cane            Type of Home Care services:  None    ADLS  Prior functional level: Independent in ADLs/IADLs  Current functional level:      PT AM-PAC:   /24  OT AM-PAC:   /24    Family can provide assistance at DC: Yes  Would you like Case Management to discuss the discharge plan

## 2024-04-04 NOTE — CONSULTS
[facility] PULMONARY & CRITICAL CARE SPECIALISTS   Pulmonary and Critical Care CONSULT NOTE:      DATE OF CONSULT 4/4/2024    REASON FOR CONSULTATION:  Critical care management      PCP Stacie Doty MD     CHIEF COMPLAINT: Worsening back pain    HISTORY OF PRESENT ILLNESS:   This is a 68-year-old female who originally presented to the ER March 29 with a fall, I believe some degree of altered mental status, urinary tract infection and tested positive for influenza B.  She was sent home with some pain medication treatment for her back and Keflex for urinary tract infection.  Influenza was not treated.  The exact details or rationale for this or not clearly delineated.    She returns now with worsening back pain.  Urinalysis is still abnormal.  She is altered mentally and hypoxic on room air so was placed on oxygen.  She is minimally responsive following morphine administration for her back.  She has underlying cirrhosis related to obesity and diabetes.  She does not drink alcohol according to the nurse and family.    The rest of her medical history is not entirely clear.    She had a CT of her abdomen performed last hospital visit which showed a uterine mass potentially fibroids.    Other medical diagnoses listed in her problem list include hypertension, cirrhosis, and diabetes.    Sister currently at bedside tells me that the patient's mental status is normal at baseline but has been confused ever since she got some pain medication a week ago from the ER.    Patient apparently lives at home with her son.    Sister also mention that the patient was post to undergo a hysterectomy recently at [facility] but the surgery was aborted due to presence of ascites.      ALLERGIES:  Allergies   Allergen Reactions    Tylenol [Acetaminophen] Other (See Comments)     intolerance due to cirrhosis       HOME MEDICATIONS:  Medications Prior to Admission: ibuprofen (ADVIL;MOTRIN) 600 MG tablet, Take 1 tablet by  04/04/24  0519   PROBNP 1,731*           ABGs: No results found for: \"PHART\", \"PO2ART\", \"BQM4SFY\"    No results found for: \"IFIO2\", \"MODE\", \"SETTIDVOL\", \"SETPEEP\"      Impression:  UTI-recent culture positive for Klebsiella pneumonia  Metabolic encephalopathy  Cirrhosis with ascites and hyperammonemia, cirrhosis secondary to nonalcoholic steatohepatitis  Back pain following fall recently  Influenza B tested + March 29, onset of symptoms unknown  Hypoxia likely secondary to untreated sleep apnea  Morbid obesity  Diabetes mellitus uncontrolled  Aortic stenosis murmur-no echo for review  Hypertension poorly controlled  Uterine mass likely fibroid  Full code    Plan:    No obvious reason for intensive care unit level of care  No objection to antibiotics and low-grade hydration  Avoid narcotics if possible-seems very sensitive with poor substrate  Outpatient sleep study should be encouraged  No objection to Cardene, already failed hydralazine 2 doses  Check echo out of completeness due to aortic stenosis murmur without echo ever documented    Jasson Silva MD  Pulmonary and Critical Care   [phone] Perfect Serve  [phone] Cell

## 2024-04-04 NOTE — PROGRESS NOTES
[facility]   OCCUPATIONAL THERAPY MISSED TREATMENT NOTE   INPATIENT   Date: 24  Patient Name: Bobbi Phillips       Room:   MRN: [id number]   Account #: [id number]    : 1955  (68 y.o.)  Gender: female                 REASON FOR MISSED TREATMENT:  Hold OT evaluation    -    Per RN Tea, patient not appropriate for OT assessment this AM. Patient minimally responsive and not following commands. OT will continue to follow and see patient for OT evaluation when medically appropriate.  0840    Electronically signed by PANCHITO Pink on 24 at 8:41 AM EDT

## 2024-04-04 NOTE — PROGRESS NOTES
Pharmacy monitoring of Heparin infusion  Heparin Infusion New Order    Patient on heparin for:   Elevated d-dimer.    Was patient on previous oral anticoagulant/dose?:  No , Confirmed with RN/Pt/Pts family/Surescripts?: Surescripts.    Factor Xa inhibitor/LMWH use within the past 72 hours? NO  If yes, date of last administration: N/A.    Recent Labs     04/03/24  1610   HGB 10.6*   *       Heparin to be monitored using anti-Xa for duration of therapy.(aPTT should be used for patients who have received a DOAC in the past 72 hours)?      Have admin instructions been updated to reflect appropriate protocol? YES    Have appropriate labs been ordered for corresponding protocol? YES   *Discontinue anti-Xa lab monitoring if using aPTT protocol    Is the starting rate/last rate adjustment appropriate? Yes.    Concurrent anticoagulants: No.  (Note it is not appropriate to be on most anticoagulants while on a heparin drip)    Review platelets from the past few days.  Any concerns?: Yes 112K monitor platelets.    Please record any appropriate additional notes here: Admitted for KIM recommended switching lovenox therapeutic dose to heparin drip.  Callum Thapa, [facility] BCPS  4/4/2024   12:05 AM

## 2024-04-04 NOTE — ACP (ADVANCE CARE PLANNING)
Advance Care Planning     Advance Care Planning Activator (Inpatient)  Conversation Note      Date of ACP Conversation: 4/4/2024     Conversation Conducted with:  Healthcare Decision Maker: Next of Kin by law (only applies in absence of above) (name) Jus Lofton    ACP Activator: Lexus Gómez RN        Health Care Decision Maker:     Current Designated Health Care Decision Maker:     Primary Decision Maker: Phillip Lofton *HIPAA* - Child - [phone]    Primary Decision Maker: TYLER LOFTON *HIPAA* - Child - [phone]    Today we documented Decision Maker(s) consistent with Legal Next of Kin hierarchy.    Care Preferences    Ventilation:  \"If you were in your present state of health and suddenly became very ill and were unable to breathe on your own, what would your preference be about the use of a ventilator (breathing machine) if it were available to you?\"      Would the patient desire the use of ventilator (breathing machine)?: yes    \"If your health worsens and it becomes clear that your chance of recovery is unlikely, what would your preference be about the use of a ventilator (breathing machine) if it were available to you?\"     Would the patient desire the use of ventilator (breathing machine)?: Yes      Resuscitation  \"CPR works best to restart the heart when there is a sudden event, like a heart attack, in someone who is otherwise healthy. Unfortunately, CPR does not typically restart the heart for people who have serious health conditions or who are very sick.\"    \"In the event your heart stopped as a result of an underlying serious health condition, would you want attempts to be made to restart your heart (answer \"yes\" for attempt to resuscitate) or would you prefer a natural death (answer \"no\" for do not attempt to resuscitate)?\" yes       [] Yes   [] No   Educated Patient / Decision Maker regarding differences between Advance Directives and portable DNR orders.    Length of ACP  Conversation in minutes:      Conversation Outcomes:  ACP discussion completed    Follow-up plan:    [] Schedule follow-up conversation to continue planning  [] Referred individual to Provider for additional questions/concerns   [] Advised patient/agent/surrogate to review completed ACP document and update if needed with changes in condition, patient preferences or care setting    [] This note routed to one or more involved healthcare providers

## 2024-04-04 NOTE — PLAN OF CARE
Problem: Discharge Planning  Goal: Discharge to home or other facility with appropriate resources  Outcome: Progressing  Flowsheets (Taken 4/4/2024 0758)  Discharge to home or other facility with appropriate resources:   Identify barriers to discharge with patient and caregiver   Arrange for needed discharge resources and transportation as appropriate   Identify discharge learning needs (meds, wound care, etc)   Arrange for interpreters to assist at discharge as needed   Refer to discharge planning if patient needs post-hospital services based on physician order or complex needs related to functional status, cognitive ability or social support system     Problem: Pain  Goal: Verbalizes/displays adequate comfort level or baseline comfort level  Outcome: Progressing  Flowsheets (Taken 4/4/2024 1200)  Verbalizes/displays adequate comfort level or baseline comfort level:   Encourage patient to monitor pain and request assistance   Assess pain using appropriate pain scale   Administer analgesics based on type and severity of pain and evaluate response   Implement non-pharmacological measures as appropriate and evaluate response   Notify Licensed Independent Practitioner if interventions unsuccessful or patient reports new pain   Consider cultural and social influences on pain and pain management     Problem: Skin/Tissue Integrity  Goal: Absence of new skin breakdown  Description: 1.  Monitor for areas of redness and/or skin breakdown  2.  Assess vascular access sites hourly  3.  Every 4-6 hours minimum:  Change oxygen saturation probe site  4.  Every 4-6 hours:  If on nasal continuous positive airway pressure, respiratory therapy assess nares and determine need for appliance change or resting period.  Outcome: Progressing     Problem: Safety - Adult  Goal: Free from fall injury  Outcome: Progressing     Problem: Chronic Conditions and Co-morbidities  Goal: Patient's chronic conditions and co-morbidity symptoms are  monitored and maintained or improved  Outcome: Progressing  Flowsheets (Taken 4/4/2024 6617)  Care Plan - Patient's Chronic Conditions and Co-Morbidity Symptoms are Monitored and Maintained or Improved:   Collaborate with multidisciplinary team to address chronic and comorbid conditions and prevent exacerbation or deterioration   Monitor and assess patient's chronic conditions and comorbid symptoms for stability, deterioration, or improvement   Update acute care plan with appropriate goals if chronic or comorbid symptoms are exacerbated and prevent overall improvement and discharge

## 2024-04-04 NOTE — PROGRESS NOTES
[facility] Internal Medicine  Nikolas Tan MD; Ray Hwang MD; Brad Maciel MD; MD Courtney Lr MD; Germania Osborne MD; Marlin Gutierrez MD      HISTORY AND PHYSICAL EXAMINATION            Date:   4/4/2024  Patient name:  Bobbi Phillips  MRN:   [id number]  Account:  [id number]  YOB: 1955  PCP:    Stacie Doty MD  Code Status:    Full Code    Chief Complaint:     Chief Complaint   Patient presents with    Back Pain         History Obtained From:     Patient, EMR, nursing staff    HPI     This patient is a 68 y.o. Non- / non  femalewho presents with back pain  History of type 2 diabetes, with diabetic retinopathy and neuropathy, hypertension,  Patient is poor historian she reports she has been given some pain medication and is unable to relate sequence of events accurately  Per EMR patient has chronic low back pain, she had fall 5 days ago and was seen in ER CT thoracic and lumbar spine nil acute but UA positive for UTI culture showed Klebsiella sensitive to Keflex which is what she was discharged on.  Patient reported subsequent improvement in back pain however reports pain became worse again today  Moderate intensity progressive persistent no aggravating leaving factors noted  Denies any associated fevers diarrhea or vomiting    ER evaluation showed UA suggestive of persistent UTI, severe hyperglycemia, KIM    Review of Systems:     Denies any shortness of breath or cough  Denies chest pain or palpitations  Positive for flank pain, lower back pain  Denies any new numbness tremors or weakness.    A 10 point review of systems was performed and and negative except as mentioned in HPI  Positive and Negative as described in HPI.  4/4  Patient, appear very tired, fatigue, sleepy,  Open eyes to command, and then dosing off  Although oriented time place person  Sister at bedside she could able to recognize her sister  Was given morphine  IVPB (Peripheral Line)  10 mEq IntraVENous PRN Courtney Carrillo MD        magnesium sulfate 2000 mg in water 50 mL IVPB  2,000 mg IntraVENous PRN Courtney Carrillo MD        ondansetron (ZOFRAN-ODT) disintegrating tablet 4 mg  4 mg Oral Q8H PRN Courtney Carrillo MD        Or    ondansetron (ZOFRAN) injection 4 mg  4 mg IntraVENous Q6H PRN Courtney Carrillo MD        polyethylene glycol (GLYCOLAX) packet 17 g  17 g Oral Daily PRN Courtney Carrillo MD        atorvastatin (LIPITOR) tablet 10 mg  10 mg Oral Daily Courtney Carrillo MD        aspirin EC tablet 81 mg  81 mg Oral Daily Courtney Carrillo MD        gabapentin (NEURONTIN) capsule 300 mg  300 mg Oral TID Courtney Carrillo MD   300 mg at 04/03/24 2159    insulin glargine (LANTUS) injection vial 40 Units  40 Units SubCUTAneous BID Courtney Carrillo MD   40 Units at 04/03/24 2158    insulin lispro (HUMALOG) injection vial 0-8 Units  0-8 Units SubCUTAneous TID WC Courtney Carrillo MD   6 Units at 04/04/24 0857    insulin lispro (HUMALOG) injection vial 0-4 Units  0-4 Units SubCUTAneous Nightly Courtney Carrillo MD   4 Units at 04/03/24 2316    glucose chewable tablet 16 g  4 tablet Oral PRN Courtney Carrillo MD        dextrose bolus 10% 125 mL  125 mL IntraVENous PRN Courtney Carrillo MD        Or    dextrose bolus 10% 250 mL  250 mL IntraVENous PRN Courtney Carrillo MD        glucagon injection 1 mg  1 mg SubCUTAneous PRN Courtney Carrillo MD        dextrose 10 % infusion   IntraVENous Continuous PRN Courtney Carrillo MD        cefTRIAXone (ROCEPHIN) 1,000 mg in sodium chloride 0.9 % 50 mL IVPB (mini-bag)  1,000 mg IntraVENous Q24H Courtney Carrillo MD        hydrALAZINE (APRESOLINE) injection 10 mg  10 mg IntraVENous Q6H PRN Cande Hollis APRN - NP   10 mg at 04/04/24 0836    heparin (porcine) injection 7,040 Units  80 Units/kg IntraVENous PRN Cande Hollis APRN - NP        heparin (porcine) injection 3,520 Units  40 Units/kg IntraVENous PRN Cande Hollis, JOE Baker NP

## 2024-04-04 NOTE — PROGRESS NOTES
04/04/24 1718   Encounter Summary   Encounter Overview/Reason  Spiritual/Emotional Needs   Referral/Consult From: Rounding   Last Encounter  04/04/24   Spiritual/Emotional needs   Type Spiritual Support   Assessment/Intervention/Outcome   Assessment Unable to assess   Intervention Prayer (assurance of)/[place]

## 2024-04-04 NOTE — PROGRESS NOTES
Hypertension, hyperglycemia persist despite intervention. Ammonia level elevated. Call out to Dr. Haywood to update on patient condition. Orders received. New consult placed to critical care team. Dr. Silva notified of consult.

## 2024-04-05 ENCOUNTER — APPOINTMENT (OUTPATIENT)
Age: 69
DRG: 871 | End: 2024-04-05
Attending: INTERNAL MEDICINE
Payer: MEDICARE

## 2024-04-05 ENCOUNTER — APPOINTMENT (OUTPATIENT)
Dept: INTERVENTIONAL RADIOLOGY/VASCULAR | Age: 69
DRG: 871 | End: 2024-04-05
Payer: MEDICARE

## 2024-04-05 PROBLEM — N10 ACUTE PYELONEPHRITIS: Status: ACTIVE | Noted: 2024-04-05

## 2024-04-05 LAB
ABSOLUTE BANDS: 1.28 K/UL (ref 0–1)
ALBUMIN FLD-MCNC: 0.6 G/DL
AMYLASE FLD-CCNC: 5 U/L
ANION GAP SERPL CALCULATED.3IONS-SCNC: 14 MMOL/L (ref 9–17)
APPEARANCE FLD: ABNORMAL
ATYPICAL LYMPHOCYTE ABSOLUTE COUNT: 0.21 K/UL
ATYPICAL LYMPHOCYTES: 1 %
BANDS: 6 % (ref 0–10)
BASOPHILS # BLD: 0.21 K/UL (ref 0–0.2)
BASOPHILS NFR BLD: 1 % (ref 0–2)
BLASTS NFR FLD: 0 %
BODY FLD TYPE: ABNORMAL
BUN SERPL-MCNC: 56 MG/DL (ref 8–23)
CALCIUM SERPL-MCNC: 8.6 MG/DL (ref 8.6–10.4)
CHLORIDE SERPL-SCNC: 101 MMOL/L (ref 98–107)
CO2 SERPL-SCNC: 21 MMOL/L (ref 20–31)
COLOR FLD: YELLOW
CREAT SERPL-MCNC: 1.1 MG/DL (ref 0.5–0.9)
EKG ATRIAL RATE: 104 BPM
EKG P AXIS: 69 DEGREES
EKG P-R INTERVAL: 180 MS
EKG Q-T INTERVAL: 340 MS
EKG QRS DURATION: 98 MS
EKG QTC CALCULATION (BAZETT): 447 MS
EKG R AXIS: 1 DEGREES
EKG T AXIS: 62 DEGREES
EKG VENTRICULAR RATE: 104 BPM
EOSINOPHIL # BLD: 0 K/UL (ref 0–0.4)
EOSINOPHIL NFR FLD: 0 %
EOSINOPHILS RELATIVE PERCENT: 0 % (ref 0–4)
ERYTHROCYTE [DISTWIDTH] IN BLOOD BY AUTOMATED COUNT: 16.8 % (ref 11.5–14.9)
GFR SERPL CREATININE-BSD FRML MDRD: 54 ML/MIN/1.73M2
GLUCOSE BLD-MCNC: 186 MG/DL (ref 65–105)
GLUCOSE BLD-MCNC: 204 MG/DL (ref 65–105)
GLUCOSE BLD-MCNC: 239 MG/DL (ref 65–105)
GLUCOSE BLD-MCNC: 241 MG/DL (ref 65–105)
GLUCOSE FLD-MCNC: 258 MG/DL
GLUCOSE SERPL-MCNC: 247 MG/DL (ref 70–99)
HCT VFR BLD AUTO: 30.9 % (ref 36–46)
HGB BLD-MCNC: 9.9 G/DL (ref 12–16)
LACTATE BLDV-SCNC: 1.5 MMOL/L (ref 0.5–2.2)
LDH FLD L TO P-CCNC: 72 U/L
LIPASE SERPL-CCNC: 34 U/L (ref 13–60)
LYMPHOCYTES NFR BLD: 1.71 K/UL (ref 1–4.8)
LYMPHOCYTES NFR FLD: 26 %
LYMPHOCYTES RELATIVE PERCENT: 8 % (ref 24–44)
MCH RBC QN AUTO: 27 PG (ref 26–34)
MCHC RBC AUTO-ENTMCNC: 32.2 G/DL (ref 31–37)
MCV RBC AUTO: 84 FL (ref 80–100)
MONOCYTES NFR BLD: 1.28 K/UL (ref 0.1–1.3)
MONOCYTES NFR BLD: 6 % (ref 1–7)
MONOCYTES NFR FLD: 8 %
MORPHOLOGY: ABNORMAL
NEUTROPHILS NFR BLD: 78 % (ref 36–66)
NEUTROPHILS NFR FLD: 60 %
NEUTS SEG NFR BLD: 16.71 K/UL (ref 1.3–9.1)
PLATELET # BLD AUTO: 136 K/UL (ref 150–450)
PMV BLD AUTO: 9.2 FL (ref 6–12)
POTASSIUM SERPL-SCNC: 3.7 MMOL/L (ref 3.7–5.3)
PROT FLD-MCNC: 1 G/DL
RBC # BLD AUTO: 3.68 M/UL (ref 4–5.2)
RBC # FLD: 1944 CELLS/UL
SODIUM SERPL-SCNC: 136 MMOL/L (ref 135–144)
SPECIMEN TYPE: NORMAL
UNIDENT CELLS NFR FLD: 6 %
WBC # FLD: 1166 CELLS/UL
WBC OTHER # BLD: 21.4 K/UL (ref 3.5–11)

## 2024-04-05 PROCEDURE — 6370000000 HC RX 637 (ALT 250 FOR IP): Performed by: INTERNAL MEDICINE

## 2024-04-05 PROCEDURE — 36415 COLL VENOUS BLD VENIPUNCTURE: CPT

## 2024-04-05 PROCEDURE — 88305 TISSUE EXAM BY PATHOLOGIST: CPT

## 2024-04-05 PROCEDURE — APPNB60 APP NON BILLABLE TIME 46-60 MINS: Performed by: NURSE PRACTITIONER

## 2024-04-05 PROCEDURE — 2709999900 IR US GUIDED PARACENTESIS

## 2024-04-05 PROCEDURE — 84157 ASSAY OF PROTEIN OTHER: CPT

## 2024-04-05 PROCEDURE — 82150 ASSAY OF AMYLASE: CPT

## 2024-04-05 PROCEDURE — 49083 ABD PARACENTESIS W/IMAGING: CPT

## 2024-04-05 PROCEDURE — 6370000000 HC RX 637 (ALT 250 FOR IP): Performed by: NURSE PRACTITIONER

## 2024-04-05 PROCEDURE — 87070 CULTURE OTHR SPECIMN AEROBIC: CPT

## 2024-04-05 PROCEDURE — 6360000002 HC RX W HCPCS: Performed by: INTERNAL MEDICINE

## 2024-04-05 PROCEDURE — 2060000000 HC ICU INTERMEDIATE R&B

## 2024-04-05 PROCEDURE — 93010 ELECTROCARDIOGRAM REPORT: CPT | Performed by: INTERNAL MEDICINE

## 2024-04-05 PROCEDURE — 0W9G3ZZ DRAINAGE OF PERITONEAL CAVITY, PERCUTANEOUS APPROACH: ICD-10-PCS | Performed by: RADIOLOGY

## 2024-04-05 PROCEDURE — 88112 CYTOPATH CELL ENHANCE TECH: CPT

## 2024-04-05 PROCEDURE — 99233 SBSQ HOSP IP/OBS HIGH 50: CPT | Performed by: INTERNAL MEDICINE

## 2024-04-05 PROCEDURE — 97530 THERAPEUTIC ACTIVITIES: CPT

## 2024-04-05 PROCEDURE — 80048 BASIC METABOLIC PNL TOTAL CA: CPT

## 2024-04-05 PROCEDURE — 83615 LACTATE (LD) (LDH) ENZYME: CPT

## 2024-04-05 PROCEDURE — 83605 ASSAY OF LACTIC ACID: CPT

## 2024-04-05 PROCEDURE — 2580000003 HC RX 258: Performed by: EMERGENCY MEDICINE

## 2024-04-05 PROCEDURE — 82042 OTHER SOURCE ALBUMIN QUAN EA: CPT

## 2024-04-05 PROCEDURE — 93306 TTE W/DOPPLER COMPLETE: CPT

## 2024-04-05 PROCEDURE — 82945 GLUCOSE OTHER FLUID: CPT

## 2024-04-05 PROCEDURE — 83690 ASSAY OF LIPASE: CPT

## 2024-04-05 PROCEDURE — 82947 ASSAY GLUCOSE BLOOD QUANT: CPT

## 2024-04-05 PROCEDURE — 99222 1ST HOSP IP/OBS MODERATE 55: CPT | Performed by: INTERNAL MEDICINE

## 2024-04-05 PROCEDURE — 97162 PT EVAL MOD COMPLEX 30 MIN: CPT

## 2024-04-05 PROCEDURE — 2580000003 HC RX 258: Performed by: INTERNAL MEDICINE

## 2024-04-05 PROCEDURE — 87205 SMEAR GRAM STAIN: CPT

## 2024-04-05 PROCEDURE — 87075 CULTR BACTERIA EXCEPT BLOOD: CPT

## 2024-04-05 PROCEDURE — 85025 COMPLETE CBC W/AUTO DIFF WBC: CPT

## 2024-04-05 PROCEDURE — 89051 BODY FLUID CELL COUNT: CPT

## 2024-04-05 RX ORDER — LISINOPRIL 20 MG/1
20 TABLET ORAL DAILY
Status: DISCONTINUED | OUTPATIENT
Start: 2024-04-05 | End: 2024-04-09

## 2024-04-05 RX ORDER — GABAPENTIN 100 MG/1
100 CAPSULE ORAL 3 TIMES DAILY
Status: DISCONTINUED | OUTPATIENT
Start: 2024-04-05 | End: 2024-04-23 | Stop reason: HOSPADM

## 2024-04-05 RX ORDER — METOPROLOL TARTRATE 1 MG/ML
5 INJECTION, SOLUTION INTRAVENOUS EVERY 6 HOURS PRN
Status: DISCONTINUED | OUTPATIENT
Start: 2024-04-05 | End: 2024-04-23 | Stop reason: HOSPADM

## 2024-04-05 RX ORDER — HYDROCODONE BITARTRATE AND ACETAMINOPHEN 5; 325 MG/1; MG/1
1 TABLET ORAL EVERY 6 HOURS PRN
Status: DISCONTINUED | OUTPATIENT
Start: 2024-04-05 | End: 2024-04-23 | Stop reason: HOSPADM

## 2024-04-05 RX ORDER — LACTULOSE 10 G/15ML
20 SOLUTION ORAL 3 TIMES DAILY
Status: DISCONTINUED | OUTPATIENT
Start: 2024-04-05 | End: 2024-04-19

## 2024-04-05 RX ORDER — MORPHINE SULFATE 2 MG/ML
2 INJECTION, SOLUTION INTRAMUSCULAR; INTRAVENOUS EVERY 4 HOURS PRN
Status: DISCONTINUED | OUTPATIENT
Start: 2024-04-05 | End: 2024-04-05

## 2024-04-05 RX ORDER — HYDROCHLOROTHIAZIDE 25 MG/1
25 TABLET ORAL DAILY
Status: DISCONTINUED | OUTPATIENT
Start: 2024-04-05 | End: 2024-04-12

## 2024-04-05 RX ADMIN — GABAPENTIN 300 MG: 300 CAPSULE ORAL at 09:33

## 2024-04-05 RX ADMIN — INSULIN LISPRO 2 UNITS: 100 INJECTION, SOLUTION INTRAVENOUS; SUBCUTANEOUS at 13:05

## 2024-04-05 RX ADMIN — ASPIRIN 81 MG: 81 TABLET, COATED ORAL at 09:33

## 2024-04-05 RX ADMIN — SODIUM CHLORIDE 2.5 MG/HR: 9 INJECTION, SOLUTION INTRAVENOUS at 07:42

## 2024-04-05 RX ADMIN — CEFTRIAXONE SODIUM 1000 MG: 1 INJECTION, POWDER, FOR SOLUTION INTRAMUSCULAR; INTRAVENOUS at 18:26

## 2024-04-05 RX ADMIN — INSULIN LISPRO 2 UNITS: 100 INJECTION, SOLUTION INTRAVENOUS; SUBCUTANEOUS at 09:07

## 2024-04-05 RX ADMIN — LISINOPRIL 20 MG: 20 TABLET ORAL at 13:05

## 2024-04-05 RX ADMIN — RIFAXIMIN 400 MG: 200 TABLET ORAL at 21:46

## 2024-04-05 RX ADMIN — HYDROCHLOROTHIAZIDE 25 MG: 25 TABLET ORAL at 13:05

## 2024-04-05 RX ADMIN — SODIUM CHLORIDE: 9 INJECTION, SOLUTION INTRAVENOUS at 10:25

## 2024-04-05 RX ADMIN — GABAPENTIN 100 MG: 100 CAPSULE ORAL at 21:38

## 2024-04-05 RX ADMIN — INSULIN GLARGINE 40 UNITS: 100 INJECTION, SOLUTION SUBCUTANEOUS at 09:07

## 2024-04-05 RX ADMIN — INSULIN GLARGINE 40 UNITS: 100 INJECTION, SOLUTION SUBCUTANEOUS at 21:38

## 2024-04-05 RX ADMIN — HYDROCODONE BITARTRATE AND ACETAMINOPHEN 1 TABLET: 5; 325 TABLET ORAL at 17:33

## 2024-04-05 RX ADMIN — ENOXAPARIN SODIUM 40 MG: 100 INJECTION SUBCUTANEOUS at 09:07

## 2024-04-05 RX ADMIN — GABAPENTIN 300 MG: 300 CAPSULE ORAL at 13:05

## 2024-04-05 RX ADMIN — ATORVASTATIN CALCIUM 10 MG: 10 TABLET, FILM COATED ORAL at 09:33

## 2024-04-05 RX ADMIN — INSULIN LISPRO 2 UNITS: 100 INJECTION, SOLUTION INTRAVENOUS; SUBCUTANEOUS at 16:43

## 2024-04-05 RX ADMIN — LACTULOSE 20 G: 20 SOLUTION ORAL at 21:38

## 2024-04-05 RX ADMIN — LACTULOSE 20 G: 20 SOLUTION ORAL at 16:43

## 2024-04-05 ASSESSMENT — PAIN DESCRIPTION - LOCATION: LOCATION: ABDOMEN;BACK

## 2024-04-05 ASSESSMENT — PAIN SCALES - GENERAL: PAINLEVEL_OUTOF10: 10

## 2024-04-05 ASSESSMENT — PAIN DESCRIPTION - DESCRIPTORS: DESCRIPTORS: ACHING;CRAMPING;DISCOMFORT

## 2024-04-05 NOTE — OR NURSING
Patient brought to the IR suite via bed, after consent obtained, patient placed on the procedure table, monitoring equipment hooked up.  Patient then positioned/prepped/draped

## 2024-04-05 NOTE — CARE COORDINATION
ONGOING DISCHARGE PLAN:    Patient is out of room at time of visit.     Previous CM spoke with patient regarding discharge plan and patient confirms that plan is still to go home with son. Pt is normally independent and drives.     DME: cane,walker, BSC, dexcom    VNS: none     IV Rocephin , cardene gtt,     Pt down to IR for paracentesis today - GI following.  150 ml removed , r/o SBP     Will continue to follow for additional discharge needs.    If patient is discharged prior to next notation, then this note serves as note for discharge by case management.    Electronically signed by Tasha Leroy RN on 4/5/2024 at 4:03 PM

## 2024-04-05 NOTE — PROGRESS NOTES
Patient tolerated procedure well without distress. 160 mLs of clear, yellow fluid removed. Area cleansed & dry dressing applied. Transport notified to take patient back to [facility].  EMILY Lewis updated.

## 2024-04-05 NOTE — PROGRESS NOTES
injection 5-40 mL  5-40 mL IntraVENous 2 times per day Courtney Carrillo MD   10 mL at 04/03/24 2317    sodium chloride flush 0.9 % injection 5-40 mL  5-40 mL IntraVENous PRN Courtney Carrillo MD        0.9 % sodium chloride infusion   IntraVENous PRN Courtney Carrillo MD        potassium chloride (KLOR-CON M) extended release tablet 40 mEq  40 mEq Oral PRN Courtney Carrillo MD        Or    potassium bicarb-citric acid (EFFER-K) effervescent tablet 40 mEq  40 mEq Oral PRN Courtney Carrillo MD        Or    potassium chloride 10 mEq/100 mL IVPB (Peripheral Line)  10 mEq IntraVENous PRN Courtney Carrillo MD        magnesium sulfate 2000 mg in water 50 mL IVPB  2,000 mg IntraVENous PRN Courtney Carrillo MD        ondansetron (ZOFRAN-ODT) disintegrating tablet 4 mg  4 mg Oral Q8H PRN Courtney Carrillo MD        Or    ondansetron (ZOFRAN) injection 4 mg  4 mg IntraVENous Q6H PRN Courtney Carrillo MD        polyethylene glycol (GLYCOLAX) packet 17 g  17 g Oral Daily PRN Courtney Carrillo MD        atorvastatin (LIPITOR) tablet 10 mg  10 mg Oral Daily Courtney Carrillo MD   10 mg at 04/05/24 0933    aspirin EC tablet 81 mg  81 mg Oral Daily Courtney Carrillo MD   81 mg at 04/05/24 0933    gabapentin (NEURONTIN) capsule 300 mg  300 mg Oral TID Courtney Carrillo MD   300 mg at 04/05/24 1305    insulin glargine (LANTUS) injection vial 40 Units  40 Units SubCUTAneous BID Courtney Carrillo MD   40 Units at 04/05/24 0907    insulin lispro (HUMALOG) injection vial 0-8 Units  0-8 Units SubCUTAneous TID WC Courtney Carrillo MD   2 Units at 04/05/24 1305    insulin lispro (HUMALOG) injection vial 0-4 Units  0-4 Units SubCUTAneous Nightly Courtney Carrillo MD   4 Units at 04/03/24 2316    glucose chewable tablet 16 g  4 tablet Oral PRN Courtney Carrillo MD        dextrose bolus 10% 125 mL  125 mL IntraVENous PRN Courtney Carrillo MD        Or    dextrose bolus 10% 250 mL  250 mL IntraVENous PRN Courtney Carrillo MD        glucagon injection 1 mg  1 mg  SubCUTAneous PRN Courtney Carrillo MD        dextrose 10 % infusion   IntraVENous Continuous PRN Courtney Carrillo MD        cefTRIAXone (ROCEPHIN) 1,000 mg in sodium chloride 0.9 % 50 mL IVPB (mini-bag)  1,000 mg IntraVENous Q24H Courtney Carrillo MD   Stopped at 04/04/24 1823    hydrALAZINE (APRESOLINE) injection 10 mg  10 mg IntraVENous Q6H PRN Cande Hollis APRN - NP   10 mg at 04/04/24 1009       Impressions :     1. Principal Problem:    KIM (acute kidney injury) (HCC)  Resolved Problems:    * No resolved hospital problems. *        2.  has a past medical history of Abdominal swelling, Claustrophobia, COVID-19 vaccine administered, Diabetes (HCC), Diabetic retinopathy (HCC) (11/28/2015), Flatus, H/O acute sinusitis, H/O cholelithiasis, Hypertension, Liver cirrhosis (HCC), LLQ abdominal tenderness, Poor venous access, Positive colorectal cancer screening using Cologuard test (07/06/2023), Post-menopausal bleeding, RUQ abdominal pain, Tendonitis of shoulder, right (12/09/2021), Tingling, Under care of service provider (12/28/2023), Under care of service provider (12/28/2023), Vaginal bleeding (08/2023), Vaginal candidiasis, and Wears glasses.     Plans:   This patient is a 68 y.o. Non- / non  femalewho presents with back pain  History of type 2 diabetes, with diabetic retinopathy and neuropathy, hypertension,  Patient is poor historian she reports she has been given some pain medication and is unable to relate sequence of events accurately  Per EMR patient has chronic low back pain, she had fall 5 days ago and was seen in ER CT thoracic and lumbar spine nil acute but UA positive for UTI culture showed Klebsiella sensitive to Keflex which is what she was discharged on.  Patient reported subsequent improvement in back pain however reports pain became worse again today      ER evaluation showed UA suggestive of persistent UTI, severe hyperglycemia, KIM  UTI suspected pyelonephritis started on Rocephin

## 2024-04-05 NOTE — CONSULTS
Gastroenterology Consult Note    Patient:   Bobbi Phillips   Admit date:  4/3/2024  Facility:   [facility] [place]  Referring/PCP: Stacie Doty MD  Date:     4/5/2024  Consultant:   JOE Verde - DANY, Hany Granado MD    Subjective:     This 68 y.o. female was admitted 4/3/2024 with a diagnosis of \"Acute pyelonephritis [N10]  Hyperglycemia [R73.9]  KIM (acute kidney injury) (HCC) [N17.9]  Intractable pain [R52]  Strain of lumbar region, sequela [S39.012S]\" and is seen in consultation regarding   Chief Complaint   Patient presents with    Back Pain     68/F w/ pmhx of T2DM, cirrhosis 2/2 BONNER, HTN presents to ED with back pain.  Patient was seen in the ED approximately 1 week ago for fall.  Patient was found to have UTI at that time and discharged on Keflex.  Initially patient had improvement of back pain but again began to worsen which prompted her return to ED.  UA in ED showed persistent UTI, KIM, hyperglycemia.    GI consulted for cirrhosis, BONNER.  Patient follows with Dr. Davis.  Patient has been on GLP-1 agonist.  Her most recent hgbA1C was 9.2.  She had recent EGD.  No reports available.  Had colonoscopy 8/23 with polypectomy w/ Dr. Campos (tubular adenoma x 1, hyperplastic polyp x 1), rectal varices.  Patient is slightly altered this am, drowsy.  Her ammonia 61.    She reports some upper abdominal and back pain.  No peritoneal signs.    CT abd/pelvis on admission showed hepatic cirrhosis, portal hypertension, splenomegaly, small ascites.  Also noted some thickening of jejunum proximally which could be related to portal hypertension/ascites.  Possible uterine mass noted.  Patient was planned for hysterectomy for post-menopausal bleeding, mass but this was aborted d/t ascites, thrombocytopenia, anemia.    , ALT 29, AST 39, Bili 1.0  Hgb stable, 9.9.  Plt 136  PT 15.5, INR 1.2    Past Medical History:  Past Medical History:   Diagnosis Date    Abdominal swelling

## 2024-04-05 NOTE — PROGRESS NOTES
Pt arrived from ICU per bed with ICU RN and staff. Telemetry applied. No acute distress noted. Pt awakens to verbal stimuli without difficulty. Ox4. VSS and charted. Call light within reach. Bed low and locked.

## 2024-04-05 NOTE — PROGRESS NOTES
[facility] PULMONARY,CRITICAL CARE & SLEEP   Kalyan Collazo MD/John Silva MD/ Art Scott MD/Al Rowan APRN AGAP-BC, NP-C      Lori Rangel APRN NP-C     Lashae Burris APRN NP-C                                           Pulmonary Critical Care Progress Note    Patient - Bobbi Phillips   Age - 68 y.o.   - 1955  MRN - [id number]  [id number]  Date of Admission - 4/3/2024  1:22 PM    Consulting Service/Physician:       Primary Care Physician: Stacie Doty MD    SUBJECTIVE:     Chief Complaint:   Chief Complaint   Patient presents with    Back Pain   Metabolic encephalopathy  Subjective:    Patient much clearer today.  She was oriented x 3.  She is arousable but still little drowsy.  Not in any distress.  She is complaining of back pain and upper epigastric pain with tenderness in the same location.    Patient is still on Cardene at 5.  Current systolic blood pressure in the 160s.  She normally takes lisinopril hydrochlorothiazide combination pill for her blood pressure which she is not currently getting here.  She is taking oral intake.    GI considering paracentesis noted.    Patient otherwise medically stable.  The back pain seems to be an acute on chronic issue.    VITALS  BP (!) 174/53   Pulse (!) 106   Temp 98.2 °F (36.8 °C) (Oral)   Resp 18   Ht 1.499 m (4' 11\")   SpO2 95%   BMI 39.18 kg/m²   Wt Readings from Last 3 Encounters:   24 88 kg (194 lb)   24 88 kg (194 lb)   24 88.5 kg (195 lb)     I/O (24 Hours)    Intake/Output Summary (Last 24 hours) at 2024 1245  Last data filed at 2024 0712  Gross per 24 hour   Intake 887.47 ml   Output 1000 ml   Net -112.53 ml     Ventilator:      Exam:   Physical Exam   Constitutional: Much more alert and interactive today, slightly drowsy but otherwise unremarkable, obese  HENT: Unremarkable  Neck: Neck supple.   Cardiovascular:  Regular rate and rhythm.  Normal heart

## 2024-04-05 NOTE — PROGRESS NOTES
Physical Therapy  Facility/Department: [facility] ICU  Physical Therapy Initial Assessment    Name: Bobbi Phillips  : 1955  MRN: [id number]  Date of Service: 2024    Discharge Recommendations:  Patient would benefit from continued therapy after discharge          Patient Diagnosis(es): The primary encounter diagnosis was Acute pyelonephritis. Diagnoses of Strain of lumbar region, sequela, Hyperglycemia, Intractable pain, KIM (acute kidney injury) (HCC), Elevated d-dimer, and Cardiac murmur were also pertinent to this visit.  Past Medical History:  has a past medical history of Abdominal swelling, Claustrophobia, COVID-19 vaccine administered, Diabetes (HCC), Diabetic retinopathy (HCC), Flatus, H/O acute sinusitis, H/O cholelithiasis, Hypertension, Liver cirrhosis (HCC), LLQ abdominal tenderness, Poor venous access, Positive colorectal cancer screening using Cologuard test, Post-menopausal bleeding, RUQ abdominal pain, Tendonitis of shoulder, right, Tingling, Under care of service provider, Under care of service provider, Vaginal bleeding, Vaginal candidiasis, and Wears glasses.  Past Surgical History:  has a past surgical history that includes Cholecystectomy, laparoscopic ();  section; Ankle fracture surgery (Left, ); Cataract removal with implant (Bilateral, ); Colonoscopy (N/A, 2023); Dilation and curettage of uterus (N/A, 10/02/2023); Cardiac catheterization (); hysteroscopy (2024); and Dilation and curettage of uterus (N/A, 2024).    Assessment   Assessment: Pt show decline in level of functioning and will benefit from physical therapy intervention to improve strength, balance and functional mobility.  Treatment Diagnosis: impaired functional mobility  Specific Instructions for Next Treatment: ther exs and ther activities as tolerated, monitor vitals carefully  Therapy Prognosis: Fair  Decision Making: Medium Complexity  History: Pt admitted to ICU due to worsening

## 2024-04-06 ENCOUNTER — APPOINTMENT (OUTPATIENT)
Dept: CT IMAGING | Age: 69
DRG: 871 | End: 2024-04-06
Payer: MEDICARE

## 2024-04-06 PROBLEM — D64.9 ANEMIA: Status: ACTIVE | Noted: 2024-04-06

## 2024-04-06 PROBLEM — K65.2 SBP (SPONTANEOUS BACTERIAL PERITONITIS) (HCC): Status: ACTIVE | Noted: 2024-04-06

## 2024-04-06 PROBLEM — K76.82 HEPATIC ENCEPHALOPATHY (HCC): Status: ACTIVE | Noted: 2024-04-06

## 2024-04-06 LAB
ABSOLUTE BANDS: 0.64 K/UL (ref 0–1)
AFP SERPL-MCNC: <1.8 UG/L
AMMONIA PLAS-SCNC: 129 UMOL/L (ref 11–51)
ANION GAP SERPL CALCULATED.3IONS-SCNC: 14 MMOL/L (ref 9–17)
BANDS: 3 % (ref 0–10)
BASOPHILS # BLD: 0 K/UL (ref 0–0.2)
BASOPHILS NFR BLD: 0 % (ref 0–2)
BUN SERPL-MCNC: 54 MG/DL (ref 8–23)
CALCIUM SERPL-MCNC: 8.8 MG/DL (ref 8.6–10.4)
CHLORIDE SERPL-SCNC: 97 MMOL/L (ref 98–107)
CO2 SERPL-SCNC: 21 MMOL/L (ref 20–31)
COHGB MFR BLD: 5.3 % (ref 0–5)
CREAT SERPL-MCNC: 1.1 MG/DL (ref 0.5–0.9)
EOSINOPHIL # BLD: 0 K/UL (ref 0–0.4)
EOSINOPHILS RELATIVE PERCENT: 0 % (ref 0–4)
ERYTHROCYTE [DISTWIDTH] IN BLOOD BY AUTOMATED COUNT: 17.4 % (ref 11.5–14.9)
GFR SERPL CREATININE-BSD FRML MDRD: 54 ML/MIN/1.73M2
GLUCOSE BLD-MCNC: 138 MG/DL (ref 65–105)
GLUCOSE BLD-MCNC: 235 MG/DL (ref 65–105)
GLUCOSE BLD-MCNC: 236 MG/DL (ref 65–105)
GLUCOSE BLD-MCNC: 274 MG/DL (ref 65–105)
GLUCOSE SERPL-MCNC: 159 MG/DL (ref 70–99)
HCO3 VENOUS: 18.9 MMOL/L (ref 24–30)
HCT VFR BLD AUTO: 32.2 % (ref 36–46)
HGB BLD-MCNC: 10.1 G/DL (ref 12–16)
LYMPHOCYTES NFR BLD: 0.86 K/UL (ref 1–4.8)
LYMPHOCYTES RELATIVE PERCENT: 4 % (ref 24–44)
MAGNESIUM SERPL-MCNC: 1.9 MG/DL (ref 1.6–2.6)
MCH RBC QN AUTO: 26.8 PG (ref 26–34)
MCHC RBC AUTO-ENTMCNC: 31.5 G/DL (ref 31–37)
MCV RBC AUTO: 85.1 FL (ref 80–100)
METHEMOGLOBIN: 1.1 % (ref 0–1.9)
MONOCYTES NFR BLD: 0.64 K/UL (ref 0.1–1.3)
MONOCYTES NFR BLD: 3 % (ref 1–7)
MORPHOLOGY: ABNORMAL
MORPHOLOGY: ABNORMAL
NEGATIVE BASE EXCESS, VEN: 4.1 MMOL/L (ref 0–2)
NEUTROPHILS NFR BLD: 90 % (ref 36–66)
NEUTS SEG NFR BLD: 19.26 K/UL (ref 1.3–9.1)
O2 SAT, VEN: 93.7 % (ref 60–85)
PCO2, VEN: 23.5 MM HG (ref 39–55)
PH VENOUS: 7.51 (ref 7.32–7.42)
PLATELET # BLD AUTO: 150 K/UL (ref 150–450)
PMV BLD AUTO: 8.9 FL (ref 6–12)
PO2, VEN: 168 MM HG (ref 30–50)
POTASSIUM SERPL-SCNC: 3.4 MMOL/L (ref 3.7–5.3)
RBC # BLD AUTO: 3.78 M/UL (ref 4–5.2)
SODIUM SERPL-SCNC: 132 MMOL/L (ref 135–144)
TEXT FOR RESPIRATORY: ABNORMAL
WBC OTHER # BLD: 21.4 K/UL (ref 3.5–11)

## 2024-04-06 PROCEDURE — 82805 BLOOD GASES W/O2 SATURATION: CPT

## 2024-04-06 PROCEDURE — 6360000002 HC RX W HCPCS: Performed by: INTERNAL MEDICINE

## 2024-04-06 PROCEDURE — 2580000003 HC RX 258: Performed by: EMERGENCY MEDICINE

## 2024-04-06 PROCEDURE — 99231 SBSQ HOSP IP/OBS SF/LOW 25: CPT | Performed by: INTERNAL MEDICINE

## 2024-04-06 PROCEDURE — 84630 ASSAY OF ZINC: CPT

## 2024-04-06 PROCEDURE — 83735 ASSAY OF MAGNESIUM: CPT

## 2024-04-06 PROCEDURE — 82800 BLOOD PH: CPT

## 2024-04-06 PROCEDURE — 6370000000 HC RX 637 (ALT 250 FOR IP): Performed by: INTERNAL MEDICINE

## 2024-04-06 PROCEDURE — 6370000000 HC RX 637 (ALT 250 FOR IP): Performed by: NURSE PRACTITIONER

## 2024-04-06 PROCEDURE — 2580000003 HC RX 258: Performed by: INTERNAL MEDICINE

## 2024-04-06 PROCEDURE — 97110 THERAPEUTIC EXERCISES: CPT

## 2024-04-06 PROCEDURE — 2060000000 HC ICU INTERMEDIATE R&B

## 2024-04-06 PROCEDURE — 6360000002 HC RX W HCPCS: Performed by: NURSE PRACTITIONER

## 2024-04-06 PROCEDURE — 2500000003 HC RX 250 WO HCPCS: Performed by: INTERNAL MEDICINE

## 2024-04-06 PROCEDURE — 82947 ASSAY GLUCOSE BLOOD QUANT: CPT

## 2024-04-06 PROCEDURE — P9047 ALBUMIN (HUMAN), 25%, 50ML: HCPCS | Performed by: NURSE PRACTITIONER

## 2024-04-06 PROCEDURE — 99232 SBSQ HOSP IP/OBS MODERATE 35: CPT | Performed by: INTERNAL MEDICINE

## 2024-04-06 PROCEDURE — 80048 BASIC METABOLIC PNL TOTAL CA: CPT

## 2024-04-06 PROCEDURE — APPSS30 APP SPLIT SHARED TIME 16-30 MINUTES: Performed by: NURSE PRACTITIONER

## 2024-04-06 PROCEDURE — 70450 CT HEAD/BRAIN W/O DYE: CPT

## 2024-04-06 PROCEDURE — 82140 ASSAY OF AMMONIA: CPT

## 2024-04-06 PROCEDURE — 85025 COMPLETE CBC W/AUTO DIFF WBC: CPT

## 2024-04-06 PROCEDURE — 36415 COLL VENOUS BLD VENIPUNCTURE: CPT

## 2024-04-06 PROCEDURE — 97530 THERAPEUTIC ACTIVITIES: CPT

## 2024-04-06 RX ORDER — ALBUMIN (HUMAN) 12.5 G/50ML
100 SOLUTION INTRAVENOUS ONCE
Status: COMPLETED | OUTPATIENT
Start: 2024-04-06 | End: 2024-04-06

## 2024-04-06 RX ORDER — LACTULOSE 10 G/15ML
20 SOLUTION ORAL ONCE
Status: COMPLETED | OUTPATIENT
Start: 2024-04-06 | End: 2024-04-06

## 2024-04-06 RX ORDER — BISACODYL 10 MG
10 SUPPOSITORY, RECTAL RECTAL ONCE
Status: COMPLETED | OUTPATIENT
Start: 2024-04-06 | End: 2024-04-06

## 2024-04-06 RX ADMIN — HYDROCHLOROTHIAZIDE 25 MG: 25 TABLET ORAL at 08:59

## 2024-04-06 RX ADMIN — ANTI-FUNGAL POWDER MICONAZOLE NITRATE TALC FREE: 1.42 POWDER TOPICAL at 09:00

## 2024-04-06 RX ADMIN — INSULIN GLARGINE 40 UNITS: 100 INJECTION, SOLUTION SUBCUTANEOUS at 08:59

## 2024-04-06 RX ADMIN — METOPROLOL TARTRATE 5 MG: 1 INJECTION, SOLUTION INTRAVENOUS at 21:35

## 2024-04-06 RX ADMIN — BISACODYL 10 MG: 10 SUPPOSITORY RECTAL at 10:29

## 2024-04-06 RX ADMIN — SODIUM CHLORIDE: 9 INJECTION, SOLUTION INTRAVENOUS at 03:48

## 2024-04-06 RX ADMIN — ALBUMIN (HUMAN) 100 G: 0.25 INJECTION, SOLUTION INTRAVENOUS at 15:06

## 2024-04-06 RX ADMIN — RIFAXIMIN 550 MG: 550 TABLET ORAL at 13:41

## 2024-04-06 RX ADMIN — INSULIN LISPRO 4 UNITS: 100 INJECTION, SOLUTION INTRAVENOUS; SUBCUTANEOUS at 17:11

## 2024-04-06 RX ADMIN — INSULIN GLARGINE 40 UNITS: 100 INJECTION, SOLUTION SUBCUTANEOUS at 21:28

## 2024-04-06 RX ADMIN — HYDROCODONE BITARTRATE AND ACETAMINOPHEN 1 TABLET: 5; 325 TABLET ORAL at 05:42

## 2024-04-06 RX ADMIN — HYDROCODONE BITARTRATE AND ACETAMINOPHEN 1 TABLET: 5; 325 TABLET ORAL at 12:17

## 2024-04-06 RX ADMIN — ANTI-FUNGAL POWDER MICONAZOLE NITRATE TALC FREE: 1.42 POWDER TOPICAL at 21:29

## 2024-04-06 RX ADMIN — CEFTRIAXONE SODIUM 1000 MG: 1 INJECTION, POWDER, FOR SOLUTION INTRAMUSCULAR; INTRAVENOUS at 18:27

## 2024-04-06 RX ADMIN — RIFAXIMIN 550 MG: 550 TABLET ORAL at 08:59

## 2024-04-06 RX ADMIN — LACTULOSE 20 G: 20 SOLUTION ORAL at 13:41

## 2024-04-06 RX ADMIN — GABAPENTIN 100 MG: 100 CAPSULE ORAL at 08:59

## 2024-04-06 RX ADMIN — GABAPENTIN 100 MG: 100 CAPSULE ORAL at 13:41

## 2024-04-06 RX ADMIN — ASPIRIN 81 MG: 81 TABLET, COATED ORAL at 08:59

## 2024-04-06 RX ADMIN — METOPROLOL TARTRATE 5 MG: 1 INJECTION, SOLUTION INTRAVENOUS at 04:01

## 2024-04-06 RX ADMIN — SODIUM CHLORIDE, PRESERVATIVE FREE 10 ML: 5 INJECTION INTRAVENOUS at 22:07

## 2024-04-06 RX ADMIN — LACTULOSE 20 G: 20 SOLUTION ORAL at 10:28

## 2024-04-06 RX ADMIN — LACTULOSE 20 G: 20 SOLUTION ORAL at 09:00

## 2024-04-06 RX ADMIN — POTASSIUM BICARBONATE 40 MEQ: 782 TABLET, EFFERVESCENT ORAL at 10:29

## 2024-04-06 RX ADMIN — HYDROCODONE BITARTRATE AND ACETAMINOPHEN 1 TABLET: 5; 325 TABLET ORAL at 18:26

## 2024-04-06 RX ADMIN — LISINOPRIL 20 MG: 20 TABLET ORAL at 08:59

## 2024-04-06 RX ADMIN — ENOXAPARIN SODIUM 40 MG: 100 INJECTION SUBCUTANEOUS at 08:59

## 2024-04-06 RX ADMIN — ATORVASTATIN CALCIUM 10 MG: 10 TABLET, FILM COATED ORAL at 08:59

## 2024-04-06 RX ADMIN — INSULIN LISPRO 2 UNITS: 100 INJECTION, SOLUTION INTRAVENOUS; SUBCUTANEOUS at 12:13

## 2024-04-06 ASSESSMENT — PAIN DESCRIPTION - DESCRIPTORS
DESCRIPTORS: ACHING
DESCRIPTORS: ACHING

## 2024-04-06 ASSESSMENT — PAIN DESCRIPTION - LOCATION
LOCATION: BACK

## 2024-04-06 ASSESSMENT — PAIN SCALES - GENERAL
PAINLEVEL_OUTOF10: 10
PAINLEVEL_OUTOF10: 3
PAINLEVEL_OUTOF10: 7
PAINLEVEL_OUTOF10: 6

## 2024-04-06 ASSESSMENT — PAIN DESCRIPTION - ORIENTATION: ORIENTATION: MID

## 2024-04-06 NOTE — PROGRESS NOTES
Pulmonary Progress Note  Pulmonary and Critical Care Specialists      Patient - Bobbi Phillips,  Age - 68 y.o.    - 1955      Room Number - 2097/2097-01   N -  [id number]   [facility] # - [id number]  Date of Admission -  4/3/2024  1:22 PM    Consulting Service/Physician   Consulting - Courtney Carrillo MD  Primary Care Physician - Stacie Doty MD     SUBJECTIVE   Patient currently is resting quietly.  She is in progressive unit.  She does not look like she is in any distress.  She is able to communicate.  O2 saturation is 99% on 2 L   OBJECTIVE   VITALS    height is 1.499 m (4' 11\"). Her oral temperature is 98.3 °F (36.8 °C). Her blood pressure is 136/86 and her pulse is 97. Her respiration is 20 and oxygen saturation is 99%.     Body mass index is 39.18 kg/m².  Temperature Range: Temp: 98.3 °F (36.8 °C) Temp  Av.7 °F (37.1 °C)  Min: 98 °F (36.7 °C)  Max: 100 °F (37.8 °C)  BP Range:  Systolic (24hrs), Av , Min:117 , Max:188     Diastolic (24hrs), Av, Min:44, Max:86    Pulse Range: Pulse  Av.4  Min: 81  Max: 105  Respiration Range: Resp  Av.3  Min: 14  Max: 24  Current Pulse Ox::  SpO2: 99 %  24HR Pulse Ox Range:  SpO2  Av.2 %  Min: 89 %  Max: 100 %  Oxygen Amount and Delivery: O2 Flow Rate (L/min): 2 L/min    Wt Readings from Last 3 Encounters:   24 88 kg (194 lb)   24 88 kg (194 lb)   24 88.5 kg (195 lb)       I/O (24 Hours)    Intake/Output Summary (Last 24 hours) at 2024 1330  Last data filed at 2024 1042  Gross per 24 hour   Intake --   Output 650 ml   Net -650 ml       EXAM     General Appearance  Awake, alert, oriented, in no acute distress  HEENT - normocephalic, atraumatic.  Neck - Supple,  trachea midline   Lungs -coarse breath sounds anteriorly  Heart Exam:PMI normal.  Patient has heart tones are distant hear an obvious clicks, gallops, or rubs  Abdomen Exam: Abdomen soft, non-tender. BS normal. No masses,  No organomegaly  Extremity Exam:

## 2024-04-06 NOTE — PROGRESS NOTES
Physical Therapy    [facility]     Date: 24  Patient Name: Bobbi Phillips       Room:   MRN: [id number]  Account: [id number]   : 1955  (68 y.o.) Gender: female   Patient Height Height: 149.9 cm (4' 11\")  Patient Weight     Goals modified as below.     24 1900   Short Term Goals   Time Frame for Short Term Goals 7 to 9 visits   Short Term Goal 1 Improve strength in both lower extremities to 4+/5   Short Term Goal 2 Pt will be able to perform bed mobility with minimal assistance   Short Term Goal 3 Pt able to perform sit<.stand and pivot ransfers consitently with min A   Short Term Goal 4 Progress pt to ambulation with RW distance fo 10 ft x2, min A x 1 to 2 person for ssafety   Short Term Goal 5 Pt to tolerate activity for 30 minutes to improve fucntion

## 2024-04-06 NOTE — CARE COORDINATION
Writer is following for potential discharge placement. Writer met with pt and sister Judit in room. Judit states pt sons are on their way to the hospital.    Writer placed call to pt son Andrea regarding placement. Writer explained the need for three choices for facilities for this pt. Facility of choice list left in room with contact information when choice is made.  Electronically signed by HUANG Ayers on 4/6/2024 at 11:22 AM

## 2024-04-06 NOTE — PROGRESS NOTES
[facility]    Gastroenterology Daily Progress Note      Patient:   Bobbi Phillips   :    1955   Facility:   [facility]  Date:     2024  Consultant:   JOE Toribio - CNP, CNP      SUBJECTIVE  68 y.o. female admitted 4/3/2024 with Acute pyelonephritis [N10]  Hyperglycemia [R73.9]  KIM (acute kidney injury) (HCC) [N17.9]  Intractable pain [R52]  Strain of lumbar region, sequela [S39.012S] and seen for hepatic encephalopathy with cirrhosis. The pt was seen and examined. .the pt remains disoriented to year, sleepy but will awaken and answer questions. No BM overnight. Ammonia not checked this am. Pt reports back pain but no abdominal pain or nausea. She is s/p paracentesis yesterday; fluid positive for SBP.         OBJECTIVE  Scheduled Meds:   rifAXIMin  550 mg Oral TID    lisinopril  20 mg Oral Daily    hydroCHLOROthiazide  25 mg Oral Daily    lactulose  20 g Oral TID    gabapentin  100 mg Oral TID    enoxaparin  40 mg SubCUTAneous Daily    miconazole   Topical BID    sodium chloride flush  5-40 mL IntraVENous 2 times per day    atorvastatin  10 mg Oral Daily    aspirin  81 mg Oral Daily    insulin glargine  40 Units SubCUTAneous BID    insulin lispro  0-8 Units SubCUTAneous TID WC    insulin lispro  0-4 Units SubCUTAneous Nightly    cefTRIAXone (ROCEPHIN) IV  1,000 mg IntraVENous Q24H       Vital Signs:  BP (!) 153/63   Pulse 81   Temp 98.9 °F (37.2 °C) (Axillary)   Resp 20   Ht 1.499 m (4' 11\")   SpO2 100%   BMI 39.18 kg/m²    ROS not completed due to AMS  Physical Exam:     General Appearance: alert and oriented to person, place and disoriented to year, well-developed and well-nourished, in no acute distress  Skin: warm and dry, no rash or erythema  Head: normocephalic and atraumatic  Eyes: pupils equal, round, and reactive to light, extraocular eye movements intact, conjunctivae normal  ENT: hearing grossly normal bilaterally  Neck: neck supple and non tender

## 2024-04-06 NOTE — PLAN OF CARE
Problem: Discharge Planning  Goal: Discharge to home or other facility with appropriate resources  Outcome: Progressing     Problem: Pain  Goal: Verbalizes/displays adequate comfort level or baseline comfort level  Outcome: Progressing  Note: Pt displays adequate pain management with non-pharmacological management.     Problem: Skin/Tissue Integrity  Goal: Absence of new skin breakdown  Description: 1.  Monitor for areas of redness and/or skin breakdown  2.  Assess vascular access sites hourly  3.  Every 4-6 hours minimum:  Change oxygen saturation probe site  4.  Every 4-6 hours:  If on nasal continuous positive airway pressure, respiratory therapy assess nares and determine need for appliance change or resting period.  Outcome: Progressing  Note: Pt remains free of new skin breakdown this shift.     Problem: Safety - Adult  Goal: Free from fall injury  Outcome: Progressing  Note: Pt remains free of falls or injuries this shift.     Problem: Chronic Conditions and Co-morbidities  Goal: Patient's chronic conditions and co-morbidity symptoms are monitored and maintained or improved  Outcome: Progressing

## 2024-04-06 NOTE — PLAN OF CARE
Problem: Discharge Planning  Goal: Discharge to home or other facility with appropriate resources  4/6/2024 1436 by Fariba Farfan RN  Outcome: Progressing     Problem: Pain  Goal: Verbalizes/displays adequate comfort level or baseline comfort level  4/6/2024 1436 by Fariba Farfan RN  Outcome: Progressing     Problem: Skin/Tissue Integrity  Goal: Absence of new skin breakdown  Description: 1.  Monitor for areas of redness and/or skin breakdown  2.  Assess vascular access sites hourly  3.  Every 4-6 hours minimum:  Change oxygen saturation probe site  4.  Every 4-6 hours:  If on nasal continuous positive airway pressure, respiratory therapy assess nares and determine need for appliance change or resting period.  4/6/2024 1436 by Fariba Farfan RN  Outcome: Progressing     Problem: Safety - Adult  Goal: Free from fall injury  4/6/2024 1436 by Fariba Farfan RN  Outcome: Progressing     Problem: Chronic Conditions and Co-morbidities  Goal: Patient's chronic conditions and co-morbidity symptoms are monitored and maintained or improved  4/6/2024 1436 by Fariba Farfan RN  Outcome: Progressing

## 2024-04-06 NOTE — PROGRESS NOTES
[facility]  Nikolas Tan MD; Ray Hwang MD; Brad Maciel MD; MD Courtney Lr MD; Germania Osborne MD; Marlin Gutierrez MD      Progress note            Date:   4/6/2024  Patient name:  Bobbi Phillips  MRN:   [id number]  Account:  [id number]  YOB: 1955  PCP:    Stacie Doty MD  Code Status:    Full Code    Chief Complaint:     Chief Complaint   Patient presents with    Back Pain         History Obtained From:     Patient, EMR, nursing staff    HPI     This patient is a 68 y.o. Non- / non  femalewho presents with back pain  History of type 2 diabetes, with diabetic retinopathy and neuropathy, hypertension,  Patient is poor historian she reports she has been given some pain medication and is unable to relate sequence of events accurately  Per EMR patient has chronic low back pain, she had fall 5 days ago and was seen in ER CT thoracic and lumbar spine nil acute but UA positive for UTI culture showed Klebsiella sensitive to Keflex which is what she was discharged on.  Patient reported subsequent improvement in back pain however reports pain became worse again today  Moderate intensity progressive persistent no aggravating leaving factors noted  Denies any associated fevers diarrhea or vomiting    ER evaluation showed UA suggestive of persistent UTI, severe hyperglycemia, KIM    Review of Systems:     Denies any shortness of breath or cough  Denies chest pain or palpitations  Positive for flank pain, lower back pain  Denies any new numbness tremors or weakness.    A 10 point review of systems was performed and and negative except as mentioned in HPI  Positive and Negative as described in HPI.  4/4  Patient, appear very tired, fatigue, sleepy,  Open eyes to command, and then dosing off  Although oriented time place person  Sister at bedside she could able to recognize her sister  Was given morphine yesterday    Past Medical History:  dextrose bolus 10% 125 mL  125 mL IntraVENous PRN Courtney Carrillo MD        Or    dextrose bolus 10% 250 mL  250 mL IntraVENous PRN Courtney Carrillo MD        glucagon injection 1 mg  1 mg SubCUTAneous PRN Courtney Carrillo MD        dextrose 10 % infusion   IntraVENous Continuous PRN Courtney Carrillo MD        cefTRIAXone (ROCEPHIN) 1,000 mg in sodium chloride 0.9 % 50 mL IVPB (mini-bag)  1,000 mg IntraVENous Q24H Courtney Carrillo MD   Paused at 04/05/24 1845    hydrALAZINE (APRESOLINE) injection 10 mg  10 mg IntraVENous Q6H PRN Cande Hollis APRN - NP   10 mg at 04/04/24 1009       Impressions :     1. Principal Problem:    KIM (acute kidney injury) (HCC)  Active Problems:    Liver cirrhosis secondary to BONNER (HCC)    Acute pyelonephritis  Resolved Problems:    * No resolved hospital problems. *        2.  has a past medical history of Abdominal swelling, Claustrophobia, COVID-19 vaccine administered, Diabetes (HCC), Diabetic retinopathy (HCC) (11/28/2015), Flatus, H/O acute sinusitis, H/O cholelithiasis, Hypertension, Liver cirrhosis (HCC), LLQ abdominal tenderness, Poor venous access, Positive colorectal cancer screening using Cologuard test (07/06/2023), Post-menopausal bleeding, RUQ abdominal pain, Tendonitis of shoulder, right (12/09/2021), Tingling, Under care of service provider (12/28/2023), Under care of service provider (12/28/2023), Vaginal bleeding (08/2023), Vaginal candidiasis, and Wears glasses.     Plans:   This patient is a 68 y.o. Non- / non  femalewho presents with back pain  History of type 2 diabetes, with diabetic retinopathy and neuropathy, hypertension,  Patient is poor historian she reports she has been given some pain medication and is unable to relate sequence of events accurately  Per EMR patient has chronic low back pain, she had fall 5 days ago and was seen in ER CT thoracic and lumbar spine nil acute but UA positive for UTI culture showed Klebsiella sensitive to

## 2024-04-06 NOTE — PROGRESS NOTES
Physical Therapy  Facility/Department: [facility] PROGRESSIVE CARE  Daily Treatment Note  NAME: Bobbi Phillips  : 1955  MRN: [id number]    Date of Service: 2024    Discharge Recommendations:  Patient would benefit from continued therapy after discharge        Patient Diagnosis(es): The primary encounter diagnosis was Acute pyelonephritis. Diagnoses of Strain of lumbar region, sequela, Hyperglycemia, Intractable pain, KIM (acute kidney injury) (HCC), Elevated d-dimer, and Cardiac murmur were also pertinent to this visit.    Assessment   Activity Tolerance: Patient limited by pain;Patient limited by endurance     Plan    Physical Therapy Plan  General Plan: 1 time a day 7 days a week  Specific Instructions for Next Treatment: ther exs and ther activities as tolerated, monitor vitals carefully  Current Treatment Recommendations: Strengthening;Balance training;Functional mobility training     Restrictions  Restrictions/Precautions  Restrictions/Precautions: General Precautions (Up with assistance, droplet isolation)     Subjective    Subjective  Subjective: RN Fariba agreeable to PT. Pt alert in bed; eyes remained close most of session but pt able to follow most simple commands and answer appropriately.  Pain: 7/10 low back pain- RN notified, repositioned for comfort  Orientation  Overall Orientation Status: Within Functional Limits  Orientation Level: Oriented to place;Oriented to time;Oriented to person;Disoriented to situation     Objective   Bed Mobility Training  Bed Mobility Training: Yes  Rolling: Moderate assistance;Assist X1;Additional time  Supine to Sit: Moderate assistance;Assist X1;Additional time (completed with HOB elevated, cues for sequencing, increased time to complete due to pain)  Sit to Supine: Additional time;Assist X1;Maximum assistance (for BLEs)  Scooting: Additional time;Maximum assistance;Assist X2 (maxAx2 to scoot towards HOB while supine)  Balance  Sitting: Intact (pt sat EOB x 10 minutes

## 2024-04-07 LAB
ABSOLUTE BANDS: 1.27 K/UL (ref 0–1)
ALBUMIN SERPL-MCNC: 3 G/DL (ref 3.5–5.2)
ALP SERPL-CCNC: 289 U/L (ref 35–104)
ALT SERPL-CCNC: 24 U/L (ref 5–33)
AMMONIA PLAS-SCNC: 83 UMOL/L (ref 11–51)
ANION GAP SERPL CALCULATED.3IONS-SCNC: 12 MMOL/L (ref 9–17)
AST SERPL-CCNC: 43 U/L
BANDS: 6 % (ref 0–10)
BASOPHILS # BLD: 0 K/UL (ref 0–0.2)
BASOPHILS NFR BLD: 0 % (ref 0–2)
BILIRUB SERPL-MCNC: 1 MG/DL (ref 0.3–1.2)
BUN SERPL-MCNC: 53 MG/DL (ref 8–23)
CALCIUM SERPL-MCNC: 8.8 MG/DL (ref 8.6–10.4)
CHLORIDE SERPL-SCNC: 105 MMOL/L (ref 98–107)
CO2 SERPL-SCNC: 20 MMOL/L (ref 20–31)
CREAT SERPL-MCNC: 1.2 MG/DL (ref 0.5–0.9)
DATE, STOOL #1: ABNORMAL
ECHO AO ROOT DIAM: 2.6 CM
ECHO AO ROOT INDEX: 1.43 CM/M2
ECHO AV AREA PEAK VELOCITY: 1.4 CM2
ECHO AV AREA VTI: 1 CM2
ECHO AV AREA/BSA PEAK VELOCITY: 0.8 CM2/M2
ECHO AV AREA/BSA VTI: 0.5 CM2/M2
ECHO AV MEAN GRADIENT: 11 MMHG
ECHO AV MEAN VELOCITY: 1.5 M/S
ECHO AV PEAK GRADIENT: 18 MMHG
ECHO AV PEAK VELOCITY: 2.1 M/S
ECHO AV VELOCITY RATIO: 0.71
ECHO AV VTI: 33.8 CM
ECHO BSA: 1.91 M2
ECHO EST RA PRESSURE: 8 MMHG
ECHO LA DIAMETER INDEX: 1.7 CM/M2
ECHO LA DIAMETER: 3.1 CM
ECHO LA TO AORTIC ROOT RATIO: 1.19
ECHO LV E' LATERAL VELOCITY: 10 CM/S
ECHO LV E' SEPTAL VELOCITY: 8 CM/S
ECHO LV FRACTIONAL SHORTENING: 30 % (ref 28–44)
ECHO LV INTERNAL DIMENSION DIASTOLE INDEX: 2.75 CM/M2
ECHO LV INTERNAL DIMENSION DIASTOLIC: 5 CM (ref 3.9–5.3)
ECHO LV INTERNAL DIMENSION SYSTOLIC INDEX: 1.92 CM/M2
ECHO LV INTERNAL DIMENSION SYSTOLIC: 3.5 CM
ECHO LV IVSD: 1.2 CM (ref 0.6–0.9)
ECHO LV MASS 2D: 220.3 G (ref 67–162)
ECHO LV MASS INDEX 2D: 121 G/M2 (ref 43–95)
ECHO LV POSTERIOR WALL DIASTOLIC: 1.1 CM (ref 0.6–0.9)
ECHO LV RELATIVE WALL THICKNESS RATIO: 0.44
ECHO LVOT AREA: 2 CM2
ECHO LVOT AV VTI INDEX: 0.52
ECHO LVOT DIAM: 1.6 CM
ECHO LVOT MEAN GRADIENT: 4 MMHG
ECHO LVOT PEAK GRADIENT: 9 MMHG
ECHO LVOT PEAK VELOCITY: 1.5 M/S
ECHO LVOT STROKE VOLUME INDEX: 19.3 ML/M2
ECHO LVOT SV: 35.2 ML
ECHO LVOT VTI: 17.5 CM
ECHO MV E VELOCITY: 0.58 M/S
ECHO MV E/E' LATERAL: 5.8
ECHO MV E/E' RATIO (AVERAGED): 6.53
ECHO RIGHT VENTRICULAR SYSTOLIC PRESSURE (RVSP): 48 MMHG
ECHO RV TAPSE: 2.7 CM (ref 1.7–?)
ECHO TV REGURGITANT MAX VELOCITY: 3.17 M/S
ECHO TV REGURGITANT PEAK GRADIENT: 40 MMHG
EOSINOPHIL # BLD: 0.42 K/UL (ref 0–0.4)
EOSINOPHILS RELATIVE PERCENT: 2 % (ref 0–4)
ERYTHROCYTE [DISTWIDTH] IN BLOOD BY AUTOMATED COUNT: 17.3 % (ref 11.5–14.9)
GFR SERPL CREATININE-BSD FRML MDRD: 49 ML/MIN/1.73M2
GLUCOSE BLD-MCNC: 133 MG/DL (ref 65–105)
GLUCOSE BLD-MCNC: 135 MG/DL (ref 65–105)
GLUCOSE BLD-MCNC: 153 MG/DL (ref 65–105)
GLUCOSE BLD-MCNC: 165 MG/DL (ref 65–105)
GLUCOSE SERPL-MCNC: 140 MG/DL (ref 70–99)
HCT VFR BLD AUTO: 28.5 % (ref 36–46)
HEMOCCULT SP1 STL QL: POSITIVE
HGB BLD-MCNC: 9.2 G/DL (ref 12–16)
LYMPHOCYTES NFR BLD: 0.42 K/UL (ref 1–4.8)
LYMPHOCYTES RELATIVE PERCENT: 2 % (ref 24–44)
MCH RBC QN AUTO: 27.4 PG (ref 26–34)
MCHC RBC AUTO-ENTMCNC: 32.2 G/DL (ref 31–37)
MCV RBC AUTO: 85.1 FL (ref 80–100)
MONOCYTES NFR BLD: 0.63 K/UL (ref 0.1–1.3)
MONOCYTES NFR BLD: 3 % (ref 1–7)
MORPHOLOGY: ABNORMAL
NEUTROPHILS NFR BLD: 87 % (ref 36–66)
NEUTS SEG NFR BLD: 18.36 K/UL (ref 1.3–9.1)
PLATELET # BLD AUTO: 102 K/UL (ref 150–450)
PMV BLD AUTO: 8.5 FL (ref 6–12)
POTASSIUM SERPL-SCNC: 3.5 MMOL/L (ref 3.7–5.3)
PROT SERPL-MCNC: 6.7 G/DL (ref 6.4–8.3)
RBC # BLD AUTO: 3.34 M/UL (ref 4–5.2)
SODIUM SERPL-SCNC: 137 MMOL/L (ref 135–144)
TIME, STOOL #1: ABNORMAL
WBC OTHER # BLD: 21.1 K/UL (ref 3.5–11)

## 2024-04-07 PROCEDURE — 6370000000 HC RX 637 (ALT 250 FOR IP): Performed by: INTERNAL MEDICINE

## 2024-04-07 PROCEDURE — 82947 ASSAY GLUCOSE BLOOD QUANT: CPT

## 2024-04-07 PROCEDURE — 80053 COMPREHEN METABOLIC PANEL: CPT

## 2024-04-07 PROCEDURE — 2580000003 HC RX 258: Performed by: EMERGENCY MEDICINE

## 2024-04-07 PROCEDURE — 99231 SBSQ HOSP IP/OBS SF/LOW 25: CPT | Performed by: INTERNAL MEDICINE

## 2024-04-07 PROCEDURE — APPSS30 APP SPLIT SHARED TIME 16-30 MINUTES: Performed by: NURSE PRACTITIONER

## 2024-04-07 PROCEDURE — 85025 COMPLETE CBC W/AUTO DIFF WBC: CPT

## 2024-04-07 PROCEDURE — 6370000000 HC RX 637 (ALT 250 FOR IP): Performed by: NURSE PRACTITIONER

## 2024-04-07 PROCEDURE — 2060000000 HC ICU INTERMEDIATE R&B

## 2024-04-07 PROCEDURE — 6360000002 HC RX W HCPCS: Performed by: INTERNAL MEDICINE

## 2024-04-07 PROCEDURE — 82272 OCCULT BLD FECES 1-3 TESTS: CPT

## 2024-04-07 PROCEDURE — 2580000003 HC RX 258: Performed by: INTERNAL MEDICINE

## 2024-04-07 PROCEDURE — 82140 ASSAY OF AMMONIA: CPT

## 2024-04-07 PROCEDURE — 99232 SBSQ HOSP IP/OBS MODERATE 35: CPT | Performed by: INTERNAL MEDICINE

## 2024-04-07 PROCEDURE — 36415 COLL VENOUS BLD VENIPUNCTURE: CPT

## 2024-04-07 RX ADMIN — HYDROCHLOROTHIAZIDE 25 MG: 25 TABLET ORAL at 10:14

## 2024-04-07 RX ADMIN — SODIUM CHLORIDE, PRESERVATIVE FREE 10 ML: 5 INJECTION INTRAVENOUS at 21:59

## 2024-04-07 RX ADMIN — SODIUM CHLORIDE: 9 INJECTION, SOLUTION INTRAVENOUS at 05:16

## 2024-04-07 RX ADMIN — LACTULOSE 20 G: 20 SOLUTION ORAL at 10:28

## 2024-04-07 RX ADMIN — GABAPENTIN 100 MG: 100 CAPSULE ORAL at 21:58

## 2024-04-07 RX ADMIN — HYDROCODONE BITARTRATE AND ACETAMINOPHEN 1 TABLET: 5; 325 TABLET ORAL at 05:16

## 2024-04-07 RX ADMIN — CEFTRIAXONE SODIUM 1000 MG: 1 INJECTION, POWDER, FOR SOLUTION INTRAMUSCULAR; INTRAVENOUS at 18:33

## 2024-04-07 RX ADMIN — POTASSIUM CHLORIDE 40 MEQ: 1500 TABLET, EXTENDED RELEASE ORAL at 06:33

## 2024-04-07 RX ADMIN — RIFAXIMIN 550 MG: 550 TABLET ORAL at 10:16

## 2024-04-07 RX ADMIN — ASPIRIN 81 MG: 81 TABLET, COATED ORAL at 10:15

## 2024-04-07 RX ADMIN — ANTI-FUNGAL POWDER MICONAZOLE NITRATE TALC FREE: 1.42 POWDER TOPICAL at 10:16

## 2024-04-07 RX ADMIN — LISINOPRIL 20 MG: 20 TABLET ORAL at 10:14

## 2024-04-07 RX ADMIN — LACTULOSE 20 G: 20 SOLUTION ORAL at 21:58

## 2024-04-07 RX ADMIN — INSULIN GLARGINE 40 UNITS: 100 INJECTION, SOLUTION SUBCUTANEOUS at 10:15

## 2024-04-07 RX ADMIN — INSULIN GLARGINE 40 UNITS: 100 INJECTION, SOLUTION SUBCUTANEOUS at 21:58

## 2024-04-07 RX ADMIN — GABAPENTIN 100 MG: 100 CAPSULE ORAL at 10:14

## 2024-04-07 RX ADMIN — GABAPENTIN 100 MG: 100 CAPSULE ORAL at 13:03

## 2024-04-07 RX ADMIN — Medication 1000 ML: at 00:50

## 2024-04-07 RX ADMIN — RIFAXIMIN 550 MG: 550 TABLET ORAL at 22:00

## 2024-04-07 RX ADMIN — LACTULOSE 20 G: 20 SOLUTION ORAL at 13:03

## 2024-04-07 RX ADMIN — ATORVASTATIN CALCIUM 10 MG: 10 TABLET, FILM COATED ORAL at 10:15

## 2024-04-07 RX ADMIN — RIFAXIMIN 550 MG: 550 TABLET ORAL at 13:03

## 2024-04-07 RX ADMIN — ENOXAPARIN SODIUM 40 MG: 100 INJECTION SUBCUTANEOUS at 10:15

## 2024-04-07 RX ADMIN — ANTI-FUNGAL POWDER MICONAZOLE NITRATE TALC FREE: 1.42 POWDER TOPICAL at 21:59

## 2024-04-07 ASSESSMENT — PAIN DESCRIPTION - DESCRIPTORS: DESCRIPTORS: ACHING

## 2024-04-07 ASSESSMENT — PAIN SCALES - GENERAL
PAINLEVEL_OUTOF10: 3
PAINLEVEL_OUTOF10: 8

## 2024-04-07 ASSESSMENT — PAIN DESCRIPTION - LOCATION: LOCATION: SHOULDER

## 2024-04-07 ASSESSMENT — PAIN DESCRIPTION - ORIENTATION: ORIENTATION: RIGHT;LEFT

## 2024-04-07 NOTE — PROGRESS NOTES
0800 Writer at bedside, patient is lethargic and continually falls back asleep after being awakened. Pt A/O to self and time but has delayed speech is unable to track writer's fingers with eyes.     NP SHRADDHA Garcia called to bedside to assess pt for change in mentation and increased lethargy.  NP orders stat CT head and stat VBG and ammonia levels.

## 2024-04-07 NOTE — PROGRESS NOTES
[facility]    Gastroenterology Daily Progress Note      Patient:   Bobbi Phillips   :    1955   Facility:   [facility]  Date:     2024  Consultant:   JOE Toribio - CNP, CNP      SUBJECTIVE  68 y.o. female admitted 4/3/2024 with Acute pyelonephritis [N10]  Hyperglycemia [R73.9]  KIM (acute kidney injury) (HCC) [N17.9]  Intractable pain [R52]  Strain of lumbar region, sequela [S39.012S] and seen for cirrhosis with sbp and hepatic encephalopathy. The pt was seen and examined. She is alert and oriented, ammonia at 83, did receive lactulose enema last night -ammonia at that time 129 and pt had further encephalopathy. She is having non bloody stool in FMS. No c/o abdominal pain or nausea. Hgb 9.2 afp normal creat 1.2..        OBJECTIVE  Scheduled Meds:   rifAXIMin  550 mg Oral TID    lisinopril  20 mg Oral Daily    hydroCHLOROthiazide  25 mg Oral Daily    lactulose  20 g Oral TID    gabapentin  100 mg Oral TID    enoxaparin  40 mg SubCUTAneous Daily    miconazole   Topical BID    sodium chloride flush  5-40 mL IntraVENous 2 times per day    atorvastatin  10 mg Oral Daily    aspirin  81 mg Oral Daily    insulin glargine  40 Units SubCUTAneous BID    insulin lispro  0-8 Units SubCUTAneous TID WC    insulin lispro  0-4 Units SubCUTAneous Nightly    cefTRIAXone (ROCEPHIN) IV  1,000 mg IntraVENous Q24H       Vital Signs:  BP (!) 155/67   Pulse 95   Temp 98.4 °F (36.9 °C) (Axillary)   Resp 18   Ht 1.499 m (4' 11\")   SpO2 98%   BMI 39.18 kg/m²    Review of Systems - History obtained from chart review and the patient  General ROS: negative  Respiratory ROS: no cough, shortness of breath, or wheezing  Cardiovascular ROS: no chest pain or dyspnea on exertion  Gastrointestinal ROS: no abdominal pain, change in bowel habits, or black or bloody stools   Physical Exam:     General Appearance: ill appearing alert and oriented to person, place and time, well-developed and

## 2024-04-07 NOTE — PROGRESS NOTES
Pulmonary Progress Note  Pulmonary and Critical Care Specialists      Patient - Bobbi Phillips,  Age - 68 y.o.    - 1955      Room Number - 2097/2097-01   N -  [id number]   [facility] # - [id number]  Date of Admission -  4/3/2024  1:22 PM        Consulting Service/Physician   Consulting - Courtney Carrillo MD  Primary Care Physician - Stacie Doty MD     SUBJECTIVE   Patient had some episode of confusion.  CT scan revealed no acute process    Currently the patient's mentation is a bit better.  She is resting quietly able to communicate with family members.  There are 2 sons, 1 sister, and a couple or dear friends of the family for over 30 years present    OBJECTIVE   VITALS    height is 1.499 m (4' 11\"). Her oral temperature is 99 °F (37.2 °C). Her blood pressure is 139/67 and her pulse is 103 (abnormal). Her respiration is 20 and oxygen saturation is 98%.     Body mass index is 39.18 kg/m².  Temperature Range: Temp: 99 °F (37.2 °C) Temp  Av.2 °F (36.8 °C)  Min: 97.2 °F (36.2 °C)  Max: 99 °F (37.2 °C)  BP Range:  Systolic (24hrs), Av , Min:139 , Max:187     Diastolic (24hrs), Av, Min:56, Max:77    Pulse Range: Pulse  Av.9  Min: 93  Max: 111  Respiration Range: Resp  Av.3  Min: 16  Max: 20  Current Pulse Ox::  SpO2: 98 %  24HR Pulse Ox Range:  SpO2  Av.6 %  Min: 98 %  Max: 100 %  Oxygen Amount and Delivery: O2 Flow Rate (L/min): 2 L/min    Wt Readings from Last 3 Encounters:   24 88 kg (194 lb)   24 88 kg (194 lb)   24 88.5 kg (195 lb)       I/O (24 Hours)    Intake/Output Summary (Last 24 hours) at 2024 1600  Last data filed at 2024 0911  Gross per 24 hour   Intake 1200 ml   Output 1050 ml   Net 150 ml       EXAM     General Appearance  Awake, alert, oriented, in no acute distress  HEENT - normocephalic, atraumatic.  Neck - Supple,  trachea midline   Lungs -coarse breath sounds no crackles rales or wheezes  Heart Exam:PMI normal. No lifts, heaves,

## 2024-04-07 NOTE — PROGRESS NOTES
[facility]  Nikolas Tan MD; Ray Hwang MD; Brad Maciel MD; MD Courtney Lr MD; Germania Osborne MD; Marlin Gutierrez MD      Progress note            Date:   4/7/2024  Patient name:  Bobbi Phillips  MRN:   [id number]  Account:  [id number]  YOB: 1955  PCP:    Stacie Doty MD  Code Status:    Full Code    Chief Complaint:     Chief Complaint   Patient presents with    Back Pain         History Obtained From:     Patient, EMR, nursing staff    HPI     This patient is a 68 y.o. Non- / non  femalewho presents with back pain  History of type 2 diabetes, with diabetic retinopathy and neuropathy, hypertension,  Patient is poor historian she reports she has been given some pain medication and is unable to relate sequence of events accurately  Per EMR patient has chronic low back pain, she had fall 5 days ago and was seen in ER CT thoracic and lumbar spine nil acute but UA positive for UTI culture showed Klebsiella sensitive to Keflex which is what she was discharged on.  Patient reported subsequent improvement in back pain however reports pain became worse again today  Moderate intensity progressive persistent no aggravating leaving factors noted  Denies any associated fevers diarrhea or vomiting    ER evaluation showed UA suggestive of persistent UTI, severe hyperglycemia, KIM    Review of Systems:     Denies any shortness of breath or cough  Denies chest pain or palpitations  Positive for flank pain, lower back pain  Denies any new numbness tremors or weakness.    A 10 point review of systems was performed and and negative except as mentioned in HPI  Positive and Negative as described in HPI.  4/4  Patient, appear very tired, fatigue, sleepy,  Open eyes to command, and then dosing off  Although oriented time place person  Sister at bedside she could able to recognize her sister  Was given morphine yesterday    Past Medical History:

## 2024-04-07 NOTE — PROGRESS NOTES
Writer reaches out to NP SHRADDHA Garcia to let her know that pt's ammonia level came back at 129. Writer states that pt is too lethargic to take PO lactulose and inquires about FMS for lactulose enema. NP agrees with FMS and lactulose enema, see new orders.

## 2024-04-07 NOTE — PLAN OF CARE
Problem: Neurosensory - Adult  Goal: Achieves stable or improved neurological status  Outcome: Progressing     Problem: Neurosensory - Adult  Goal: Achieves maximal functionality and self care  Outcome: Progressing      Cystoscopy, Prostatic Urethral Lift (UroLift)

## 2024-04-07 NOTE — PLAN OF CARE
Problem: Discharge Planning  Goal: Discharge to home or other facility with appropriate resources  Outcome: Progressing    Problem: Skin/Tissue Integrity  Goal: Absence of new skin breakdown  Description: 1.  Monitor for areas of redness and/or skin breakdown  2.  Assess vascular access sites hourly  3.  Every 4-6 hours minimum:  Change oxygen saturation probe site  4.  Every 4-6 hours:  If on nasal continuous positive airway pressure, respiratory therapy assess nares and determine need for appliance change or resting period.  Outcome: Progressing     Problem: Safety - Adult  Goal: Free from fall injury  Outcome: Progressing     Problem: Chronic Conditions and Co-morbidities  Goal: Patient's chronic conditions and co-morbidity symptoms are monitored and maintained or improved  Outcome: Progressing  Problem: ABCDS Injury Assessment  Goal: Absence of physical injury  Outcome: Progressing     Problem: Neurosensory - Adult  Goal: Achieves stable or improved neurological status  4/7/2024 1507 by Fariba Fafran RN  Outcome: Progressing  Achieves stable or improved neurological status:   Assess for and report changes in neurological status   Initiate measures to prevent increased intracranial pressure     Care Plan - Patient's Chronic Conditions and Co-Morbidity Symptoms are Monitored and Maintained or Improved:   Monitor and assess patient's chronic conditions and comorbid symptoms for stability, deterioration, or improvement   Collaborate with multidisciplinary team to address chronic and comorbid conditions and prevent exacerbation or deterioration   Update acute care plan with appropriate goals if chronic or comorbid symptoms are exacerbated and prevent overall improvement and discharge        Problem: Pain  Goal: Verbalizes/displays adequate comfort level or baseline comfort level  Outcome: Progressing

## 2024-04-07 NOTE — PLAN OF CARE
Problem: Discharge Planning  Goal: Discharge to home or other facility with appropriate resources  4/6/2024 2352 by Ana Bazan RN  Outcome: Progressing  Flowsheets (Taken 4/6/2024 2000)  Discharge to home or other facility with appropriate resources:   Identify barriers to discharge with patient and caregiver   Arrange for needed discharge resources and transportation as appropriate   Identify discharge learning needs (meds, wound care, etc)   Refer to discharge planning if patient needs post-hospital services based on physician order or complex needs related to functional status, cognitive ability or social support system     Problem: Pain  Goal: Verbalizes/displays adequate comfort level or baseline comfort level  4/6/2024 2352 by Ana Bazan RN  Outcome: Progressing  Flowsheets (Taken 4/6/2024 2031)  Verbalizes/displays adequate comfort level or baseline comfort level:   Encourage patient to monitor pain and request assistance   Assess pain using appropriate pain scale   Administer analgesics based on type and severity of pain and evaluate response   Implement non-pharmacological measures as appropriate and evaluate response     Problem: Skin/Tissue Integrity  Goal: Absence of new skin breakdown  Description: 1.  Monitor for areas of redness and/or skin breakdown  2.  Assess vascular access sites hourly  3.  Every 4-6 hours minimum:  Change oxygen saturation probe site  4.  Every 4-6 hours:  If on nasal continuous positive airway pressure, respiratory therapy assess nares and determine need for appliance change or resting period.  4/6/2024 2352 by Aan Bazan RN  Outcome: Progressing     Problem: Safety - Adult  Goal: Free from fall injury  4/6/2024 2352 by Ana Bazan RN  Outcome: Progressing  Flowsheets (Taken 4/6/2024 2224)  Free From Fall Injury: Instruct family/caregiver on patient safety     Problem: Chronic Conditions and Co-morbidities  Goal: Patient's chronic conditions and co-morbidity  symptoms are monitored and maintained or improved  4/6/2024 2352 by Ana Bazan, RN  Outcome: Progressing  Flowsheets (Taken 4/6/2024 2000)  Care Plan - Patient's Chronic Conditions and Co-Morbidity Symptoms are Monitored and Maintained or Improved:   Monitor and assess patient's chronic conditions and comorbid symptoms for stability, deterioration, or improvement   Collaborate with multidisciplinary team to address chronic and comorbid conditions and prevent exacerbation or deterioration   Update acute care plan with appropriate goals if chronic or comorbid symptoms are exacerbated and prevent overall improvement and discharge     Problem: ABCDS Injury Assessment  Goal: Absence of physical injury  Outcome: Progressing

## 2024-04-08 LAB
ANION GAP SERPL CALCULATED.3IONS-SCNC: 17 MMOL/L (ref 9–17)
BASOPHILS # BLD: 0 K/UL (ref 0–0.2)
BASOPHILS NFR BLD: 0 % (ref 0–2)
BUN SERPL-MCNC: 58 MG/DL (ref 8–23)
CALCIUM SERPL-MCNC: 8.6 MG/DL (ref 8.6–10.4)
CHLORIDE SERPL-SCNC: 97 MMOL/L (ref 98–107)
CK SERPL-CCNC: 35 U/L (ref 26–192)
CO2 SERPL-SCNC: 16 MMOL/L (ref 20–31)
CREAT SERPL-MCNC: 1.9 MG/DL (ref 0.5–0.9)
EOSINOPHIL # BLD: 0.17 K/UL (ref 0–0.4)
EOSINOPHILS RELATIVE PERCENT: 1 % (ref 0–4)
ERYTHROCYTE [DISTWIDTH] IN BLOOD BY AUTOMATED COUNT: 17.5 % (ref 11.5–14.9)
GFR SERPL CREATININE-BSD FRML MDRD: 28 ML/MIN/1.73M2
GLUCOSE BLD-MCNC: 134 MG/DL (ref 65–105)
GLUCOSE BLD-MCNC: 157 MG/DL (ref 65–105)
GLUCOSE BLD-MCNC: 189 MG/DL (ref 65–105)
GLUCOSE SERPL-MCNC: 174 MG/DL (ref 70–99)
HCT VFR BLD AUTO: 29.5 % (ref 36–46)
HGB BLD-MCNC: 9.5 G/DL (ref 12–16)
LYMPHOCYTES NFR BLD: 1.51 K/UL (ref 1–4.8)
LYMPHOCYTES RELATIVE PERCENT: 9 % (ref 24–44)
MCH RBC QN AUTO: 27.1 PG (ref 26–34)
MCHC RBC AUTO-ENTMCNC: 32.3 G/DL (ref 31–37)
MCV RBC AUTO: 83.8 FL (ref 80–100)
MONOCYTES NFR BLD: 0.34 K/UL (ref 0.1–1.3)
MONOCYTES NFR BLD: 2 % (ref 1–7)
MORPHOLOGY: ABNORMAL
NEUTROPHILS NFR BLD: 88 % (ref 36–66)
NEUTS SEG NFR BLD: 14.78 K/UL (ref 1.3–9.1)
PLATELET # BLD AUTO: 89 K/UL (ref 150–450)
PMV BLD AUTO: 9.5 FL (ref 6–12)
POTASSIUM SERPL-SCNC: 4.4 MMOL/L (ref 3.7–5.3)
RBC # BLD AUTO: 3.52 M/UL (ref 4–5.2)
SODIUM SERPL-SCNC: 130 MMOL/L (ref 135–144)
WBC OTHER # BLD: 16.8 K/UL (ref 3.5–11)

## 2024-04-08 PROCEDURE — 99232 SBSQ HOSP IP/OBS MODERATE 35: CPT | Performed by: INTERNAL MEDICINE

## 2024-04-08 PROCEDURE — 6360000002 HC RX W HCPCS: Performed by: NURSE PRACTITIONER

## 2024-04-08 PROCEDURE — APPSS30 APP SPLIT SHARED TIME 16-30 MINUTES: Performed by: NURSE PRACTITIONER

## 2024-04-08 PROCEDURE — 6370000000 HC RX 637 (ALT 250 FOR IP): Performed by: INTERNAL MEDICINE

## 2024-04-08 PROCEDURE — 99231 SBSQ HOSP IP/OBS SF/LOW 25: CPT | Performed by: INTERNAL MEDICINE

## 2024-04-08 PROCEDURE — 51798 US URINE CAPACITY MEASURE: CPT

## 2024-04-08 PROCEDURE — 85025 COMPLETE CBC W/AUTO DIFF WBC: CPT

## 2024-04-08 PROCEDURE — 6360000002 HC RX W HCPCS: Performed by: INTERNAL MEDICINE

## 2024-04-08 PROCEDURE — P9047 ALBUMIN (HUMAN), 25%, 50ML: HCPCS | Performed by: NURSE PRACTITIONER

## 2024-04-08 PROCEDURE — 2580000003 HC RX 258: Performed by: EMERGENCY MEDICINE

## 2024-04-08 PROCEDURE — 2500000003 HC RX 250 WO HCPCS: Performed by: INTERNAL MEDICINE

## 2024-04-08 PROCEDURE — 97530 THERAPEUTIC ACTIVITIES: CPT

## 2024-04-08 PROCEDURE — 6370000000 HC RX 637 (ALT 250 FOR IP): Performed by: NURSE PRACTITIONER

## 2024-04-08 PROCEDURE — 97166 OT EVAL MOD COMPLEX 45 MIN: CPT

## 2024-04-08 PROCEDURE — 82550 ASSAY OF CK (CPK): CPT

## 2024-04-08 PROCEDURE — 2060000000 HC ICU INTERMEDIATE R&B

## 2024-04-08 PROCEDURE — 36415 COLL VENOUS BLD VENIPUNCTURE: CPT

## 2024-04-08 PROCEDURE — 2580000003 HC RX 258: Performed by: INTERNAL MEDICINE

## 2024-04-08 PROCEDURE — 97535 SELF CARE MNGMENT TRAINING: CPT

## 2024-04-08 PROCEDURE — 82947 ASSAY GLUCOSE BLOOD QUANT: CPT

## 2024-04-08 PROCEDURE — 80048 BASIC METABOLIC PNL TOTAL CA: CPT

## 2024-04-08 RX ORDER — ALBUMIN (HUMAN) 12.5 G/50ML
100 SOLUTION INTRAVENOUS ONCE
Status: COMPLETED | OUTPATIENT
Start: 2024-04-08 | End: 2024-04-08

## 2024-04-08 RX ORDER — ALBUMIN (HUMAN) 12.5 G/50ML
100 SOLUTION INTRAVENOUS ONCE
Status: DISCONTINUED | OUTPATIENT
Start: 2024-04-08 | End: 2024-04-08 | Stop reason: CLARIF

## 2024-04-08 RX ADMIN — RIFAXIMIN 550 MG: 550 TABLET ORAL at 15:35

## 2024-04-08 RX ADMIN — ALBUMIN (HUMAN) 100 G: 0.25 INJECTION, SOLUTION INTRAVENOUS at 12:05

## 2024-04-08 RX ADMIN — LACTULOSE 20 G: 20 SOLUTION ORAL at 15:37

## 2024-04-08 RX ADMIN — GABAPENTIN 100 MG: 100 CAPSULE ORAL at 15:35

## 2024-04-08 RX ADMIN — INSULIN GLARGINE 40 UNITS: 100 INJECTION, SOLUTION SUBCUTANEOUS at 20:59

## 2024-04-08 RX ADMIN — ATORVASTATIN CALCIUM 10 MG: 10 TABLET, FILM COATED ORAL at 10:30

## 2024-04-08 RX ADMIN — INSULIN GLARGINE 40 UNITS: 100 INJECTION, SOLUTION SUBCUTANEOUS at 10:27

## 2024-04-08 RX ADMIN — ENOXAPARIN SODIUM 40 MG: 100 INJECTION SUBCUTANEOUS at 12:12

## 2024-04-08 RX ADMIN — LACTULOSE 20 G: 20 SOLUTION ORAL at 10:31

## 2024-04-08 RX ADMIN — ANTI-FUNGAL POWDER MICONAZOLE NITRATE TALC FREE: 1.42 POWDER TOPICAL at 10:00

## 2024-04-08 RX ADMIN — LISINOPRIL 20 MG: 20 TABLET ORAL at 10:30

## 2024-04-08 RX ADMIN — HYDROCODONE BITARTRATE AND ACETAMINOPHEN 1 TABLET: 5; 325 TABLET ORAL at 06:00

## 2024-04-08 RX ADMIN — CEFTRIAXONE SODIUM 1000 MG: 1 INJECTION, POWDER, FOR SOLUTION INTRAMUSCULAR; INTRAVENOUS at 18:49

## 2024-04-08 RX ADMIN — GABAPENTIN 100 MG: 100 CAPSULE ORAL at 20:59

## 2024-04-08 RX ADMIN — HYDROCHLOROTHIAZIDE 25 MG: 25 TABLET ORAL at 10:30

## 2024-04-08 RX ADMIN — HYDRALAZINE HYDROCHLORIDE 10 MG: 20 INJECTION INTRAMUSCULAR; INTRAVENOUS at 16:55

## 2024-04-08 RX ADMIN — HYDROCODONE BITARTRATE AND ACETAMINOPHEN 1 TABLET: 5; 325 TABLET ORAL at 18:37

## 2024-04-08 RX ADMIN — RIFAXIMIN 550 MG: 550 TABLET ORAL at 10:31

## 2024-04-08 RX ADMIN — ASPIRIN 81 MG: 81 TABLET, COATED ORAL at 12:09

## 2024-04-08 RX ADMIN — SODIUM CHLORIDE: 9 INJECTION, SOLUTION INTRAVENOUS at 09:28

## 2024-04-08 RX ADMIN — LACTULOSE 20 G: 20 SOLUTION ORAL at 20:59

## 2024-04-08 RX ADMIN — GABAPENTIN 100 MG: 100 CAPSULE ORAL at 10:30

## 2024-04-08 RX ADMIN — HYDROCODONE BITARTRATE AND ACETAMINOPHEN 1 TABLET: 5; 325 TABLET ORAL at 12:09

## 2024-04-08 RX ADMIN — RIFAXIMIN 550 MG: 550 TABLET ORAL at 20:59

## 2024-04-08 RX ADMIN — SODIUM BICARBONATE: 84 INJECTION, SOLUTION INTRAVENOUS at 20:56

## 2024-04-08 RX ADMIN — ANTI-FUNGAL POWDER MICONAZOLE NITRATE TALC FREE: 1.42 POWDER TOPICAL at 20:59

## 2024-04-08 ASSESSMENT — PAIN DESCRIPTION - LOCATION
LOCATION: GENERALIZED;BACK
LOCATION: GENERALIZED
LOCATION: GENERALIZED;BACK

## 2024-04-08 ASSESSMENT — PAIN SCALES - GENERAL
PAINLEVEL_OUTOF10: 10

## 2024-04-08 ASSESSMENT — PAIN DESCRIPTION - DESCRIPTORS: DESCRIPTORS: ACHING

## 2024-04-08 NOTE — CARE COORDINATION
DISCHARGE PLANNING NOTE:    Patient's family left facility choice list at bedside. Referrals faxed: 1) [facility], 2) [facility], 3) [facility], 4) [facility].    Electronically signed by Taya Lopez RN on 4/7/2024 at 11:44 AM

## 2024-04-08 NOTE — CARE COORDINATION
Writer is following for potential discharge placement.    Berenice with [facility] left voicemail stating facility denies this pt.    Writer placed call to [facility]. No answer, voicemail left for return call.    Writer placed message to Ana with [facility]. Facility accepts this pt.    Writer placed call to [facility]. No answer, voicemail left for return call.  Electronically signed by HUANG Ayers on 4/8/2024 at 1:55 PM

## 2024-04-08 NOTE — PROGRESS NOTES
[facility] PULMONARY,CRITICAL CARE & SLEEP   Kalyan Collazo MD/John Silva MD/Al Rowan APRN AGACNP-BC, NP-C      Lashae DIAZ NP-C    Mike DIAZ NP-C                                         Pulmonary Progress Note    Patient - Bobbi Phillips   Age - 68 y.o.   - 1955  MRN - [id number]  Acct # - [id number]  Date of Admission - 4/3/2024  1:22 PM    Consulting Service/Physician:       Primary Care Physician: Stacie Doty MD    SUBJECTIVE:     Chief Complaint:   Chief Complaint   Patient presents with    Back Pain     Subjective:    She does still have some shortness of breath, she feels this is mainly related to her abdomen pressing into her chest.  She is scheduled for repeat paracentesis on Wednesday.  She denies any significant phlegm or cough.  She does continue to require 2 L however her pulse ox is in the high 90s.  She is more alert per family.    VITALS  BP (!) 156/69   Pulse 91   Temp 98.4 °F (36.9 °C) (Oral)   Resp 18   Ht 1.499 m (4' 11\")   SpO2 100%   BMI 39.18 kg/m²   Wt Readings from Last 3 Encounters:   24 88 kg (194 lb)   24 88 kg (194 lb)   24 88.5 kg (195 lb)     I/O (24 Hours)    Intake/Output Summary (Last 24 hours) at 2024 1227  Last data filed at 2024 0149  Gross per 24 hour   Intake --   Output 550 ml   Net -550 ml     Ventilator:      Exam:   Physical Exam   Constitutional: Awake, somewhat slow to react, but appropriate, not in any distress  HENT: Unremarkable, nasal cannula in place  Head: Normocephalic and atraumatic.   Eyes: EOM are normal. Pupils are equal, round, and reactive to light.   Neck: Neck supple.   Cardiovascular:  Regular rate and rhythm.  Positive for systolic murmur /6, no JVD.    Pulmonary/Chest: Lung sounds are clear but diminished in bases, respirations easy at rest on 2 L, pulse ox in the high 90s  Abdominal: Abdomen positive for ascites, distended,

## 2024-04-08 NOTE — PROGRESS NOTES
[facility]   Occupational Therapy Evaluation  Date: 24  Patient Name: Bobbi Phillips       Room: 7-01  MRN: [id number]  Account: [id number]   : 1955  (68 y.o.) Gender: female     Discharge Recommendations:  Further Occupational Therapy is recommended upon facility discharge.    OT Equipment Recommendations  Other: TBD    Referring Practitioner: Brad Maciel MD  Diagnosis: Acute kidney injury   Additional Pertinent Hx: Per H&P Note: 68 y.o. femalewho presents with back pain  History of type 2 diabetes, with diabetic retinopathy and neuropathy, hypertension,  Patient is poor historian she reports she has been given some pain medication and is unable to relate sequence of events accurately  Per EMR patient has chronic low back pain, she had fall 5 days ago and was seen in ER CT thoracic and lumbar spine nil acute but UA positive for UTI culture showed Klebsiella sensitive to Keflex which is what she was discharged on.    Treatment Diagnosis: Impaired self-care status    Past Medical History:  has a past medical history of Abdominal swelling, Claustrophobia, COVID-19 vaccine administered, Diabetes (HCC), Diabetic retinopathy (HCC), Flatus, H/O acute sinusitis, H/O cholelithiasis, Hypertension, Liver cirrhosis (HCC), LLQ abdominal tenderness, Poor venous access, Positive colorectal cancer screening using Cologuard test, Post-menopausal bleeding, RUQ abdominal pain, Tendonitis of shoulder, right, Tingling, Under care of service provider, Under care of service provider, Vaginal bleeding, Vaginal candidiasis, and Wears glasses.    Past Surgical History:   has a past surgical history that includes Cholecystectomy, laparoscopic ();  section; Ankle fracture surgery (Left, ); Cataract removal with implant (Bilateral, ); Colonoscopy (N/A, 2023); Dilation and curettage of uterus (N/A, 10/02/2023); Cardiac catheterization (); hysteroscopy (2024);

## 2024-04-08 NOTE — PLAN OF CARE
Problem: Discharge Planning  Goal: Discharge to home or other facility with appropriate resources  Outcome: Progressing     Problem: Pain  Goal: Verbalizes/displays adequate comfort level or baseline comfort level  Outcome: Progressing     Problem: Safety - Adult  Goal: Free from fall injury  Outcome: Progressing     Problem: Chronic Conditions and Co-morbidities  Goal: Patient's chronic conditions and co-morbidity symptoms are monitored and maintained or improved  Outcome: Progressing     Problem: ABCDS Injury Assessment  Goal: Absence of physical injury  Outcome: Progressing     Problem: Neurosensory - Adult  Goal: Achieves stable or improved neurological status  Outcome: Progressing  Goal: Achieves maximal functionality and self care  Outcome: Progressing

## 2024-04-08 NOTE — PROGRESS NOTES
4/8/24 10:00 [phone] Hospital or Facility: [facility] From: Lisa Chin RE: CESAR TIARA 1955 RM: [id number] Your note indicated to do this Paracentesis on 4/9/24. There is no IR physicians at [place] on Tuesdays. Do you want to push to Wednesday or today? Need Callback: NEEDS CALLBACK RADIOLOGY ROUTINERead 10:00 AM       4/8/24 10:01 AM4/8/24 10:01 AM Do on Wednesday

## 2024-04-08 NOTE — PLAN OF CARE
Problem: Discharge Planning  Goal: Discharge to home or other facility with appropriate resources  4/8/2024 1542 by Mary Goff RN  Outcome: Progressing     Problem: Pain  Goal: Verbalizes/displays adequate comfort level or baseline comfort level  4/8/2024 1542 by Mary Goff RN  Outcome: Progressing     Problem: Skin/Tissue Integrity  Goal: Absence of new skin breakdown  Description: 1.  Monitor for areas of redness and/or skin breakdown  2.  Assess vascular access sites hourly  3.  Every 4-6 hours minimum:  Change oxygen saturation probe site  4.  Every 4-6 hours:  If on nasal continuous positive airway pressure, respiratory therapy assess nares and determine need for appliance change or resting period.  4/8/2024 1542 by Mary Goff RN  Outcome: Progressing     Problem: Safety - Adult  Goal: Free from fall injury  4/8/2024 1542 by Mary Goff RN  Outcome: Progressing     Problem: Chronic Conditions and Co-morbidities  Goal: Patient's chronic conditions and co-morbidity symptoms are monitored and maintained or improved  4/8/2024 1542 by Mary Goff RN  Outcome: Progressing     Problem: ABCDS Injury Assessment  Goal: Absence of physical injury  4/8/2024 1542 by Mary Goff RN  Outcome: Progressing     Problem: Neurosensory - Adult  Goal: Achieves stable or improved neurological status  4/8/2024 1542 by Mary Goff RN  Outcome: Progressing     Problem: Neurosensory - Adult  Goal: Achieves maximal functionality and self care  4/8/2024 1542 by Mary Goff RN  Outcome: Progressing

## 2024-04-08 NOTE — PROGRESS NOTES
was discharged on.  Patient reported subsequent improvement in back pain however reports pain became worse again today      ER evaluation showed UA suggestive of persistent UTI, severe hyperglycemia, KIM  UTI suspected pyelonephritis started on Rocephin recent urine cultures show Klebsiella sensitive to Rocephin  Hyponatremia sodium 130 starting hydration with normal saline monitor sodium  Hyperglycemia related to poorly controlled type 2 diabetes-resuming Lantus, moderate intensity sliding scale will monitor blood sugars  Elevated liver enzymes-known hepatic cirrhosis and portal hypertension, thickening of proximal jejunum on CT scan-patient had EGD in [facility] system earlier this month-will try to obtain results.   Uterine mass on CT abdomen-patient reports this is a known mass and she has seen gynecology previously she  refused pelvic ultrasound today-okay to evaluate outpatient  Chronic anemia and thrombocytopenia.  Ordering anemia panel.  Monitor platelet count    Status post paracentesis approximate 150 mL taken out to rule out SBP  Very sedated barely can keep the eyes open we will cut back the gabapentin from 300 mg 3 times a day to 100 mg 3 times      DVT pplx Lovenox      April 7  68-year-old female with underlying history of [place] presented with back pain, also has multiple medical problems like type 2 diabetes, diabetic retinopathy neuropathy, hypertension, CT of the thoracic and lumbar spine did not show abnormalities, UA positive for UTI Klebsiella sensitive to Keflex, on Rocephin IV, possible pyelonephritis, hyponatremia due to underlying [place],  Paracentesis was done and found to have,  Spontaneous bacterial peritonitis, on Rocephin, gastroenterology on board,  Elevated liver enzymes,  Uterine mass possibly will be evaluated as outpatient,  Chronic anemia and thrombocytopenia due to underlying liver disease,  Overall prognosis poor  KIM< cr 1.2->1.9, BUN 58 get nephrology ,    , baseline 0.7  Ammonia

## 2024-04-08 NOTE — PROGRESS NOTES
Physician Progress Note      PATIENT:               CESAR MENJIVAR  CSN #:                  [id number]  :                       1955  ADMIT DATE:       4/3/2024 1:22 PM  DISCH DATE:  RESPONDING  PROVIDER #:        Germania Osborne MD          QUERY TEXT:    Pt admitted with UTI, Metabolic Encephalopathy . Pt noted to have WBC 9.4,   13.6,  20.5, 21.4, tachycardia    . If possible, please make selection   below , document in the progress notes and discharge summary if you are   evaluating and /or treating any of the following:    The medical record reflects the following:  Risk Factors: UTI, Cirrhosis with ascites and hyperammonemia, poorly   controlled type 2 diabetes  Clinical Indicators: WBC 9.4, 13.6,  20.5, 21.4, tachycardia  ,   Metabolic Encephalopathy ,  UA - cloudy trace glucose , wbc- too many , rbc   too many , few bacteria , moderate le.  UA positive for UTI Klebsiella   sensitive to Keflex, on Rocephin IV, possible pyelonephritis, Paracentesis was   done and found to have, Spontaneous bacterial peritonitis, on Rocephin  Treatment: admission , imaging labs, IV fluids, IV antibiotics - Rocephin    Thank you,  Becky BOBBYN, RN, CRCR  Clinical   P: [phone]  F: [phone]  Options provided:  -- Sepsis, present on admission  -- Sepsis, developed following admission  -- Sepsis was ruled out  -- Other - I will add my own diagnosis  -- Disagree - Not applicable / Not valid  -- Disagree - Clinically unable to determine / Unknown  -- Refer to Clinical Documentation Reviewer    PROVIDER RESPONSE TEXT:    This patient has sepsis which was present on admission.    Query created by: Becky Magallon on 2024 9:50 AM      Electronically signed by:  Germania Osborne MD 2024 8:55 AM

## 2024-04-08 NOTE — PROGRESS NOTES
[facility]    Gastroenterology Daily Progress Note      Patient:   Bobbi Phillips   :    1955   Facility:   [facility]  Date:     2024  Consultant:   JOE Toribio - CNP, CNP      SUBJECTIVE  68 y.o. female admitted 4/3/2024 with Acute pyelonephritis [N10]  Hyperglycemia [R73.9]  KIM (acute kidney injury) (HCC) [N17.9]  Intractable pain [R52]  Strain of lumbar region, sequela [S39.012S] and seen for cirrhosis with sbp, encephalopathy. The pt was seen and examined. She is alert and oriented, brown stool in fms. C/o back pain. Bmp labs not resulted yet. Zinc level pending. .        OBJECTIVE  Scheduled Meds:   rifAXIMin  550 mg Oral TID    lisinopril  20 mg Oral Daily    hydroCHLOROthiazide  25 mg Oral Daily    lactulose  20 g Oral TID    gabapentin  100 mg Oral TID    enoxaparin  40 mg SubCUTAneous Daily    miconazole   Topical BID    sodium chloride flush  5-40 mL IntraVENous 2 times per day    atorvastatin  10 mg Oral Daily    aspirin  81 mg Oral Daily    insulin glargine  40 Units SubCUTAneous BID    insulin lispro  0-8 Units SubCUTAneous TID WC    insulin lispro  0-4 Units SubCUTAneous Nightly    cefTRIAXone (ROCEPHIN) IV  1,000 mg IntraVENous Q24H       Vital Signs:  /71   Pulse 94   Temp 97.6 °F (36.4 °C) (Oral)   Resp 18   Ht 1.499 m (4' 11\")   SpO2 100%   BMI 39.18 kg/m²    Review of Systems - History obtained from chart review and the patient  General ROS: back pain  Respiratory ROS: no cough, shortness of breath, or wheezing  Cardiovascular ROS: no chest pain or dyspnea on exertion  Gastrointestinal ROS: no abdominal pain, change in bowel habits, or black or bloody stools   Physical Exam:       General Appearance: ill appearing alert and oriented to person, place and time, well-developed and well-nourished, in no acute distress  Skin: warm and dry, no rash or erythema  Head: normocephalic and atraumatic  Eyes: pupils equal, round, and reactive to  Evidence of coronary artery  calcification.  No evidence of mediastinal hemorrhage or acute process.  No  mediastinal mass or pathologic lymphadenopathy.     Lungs/pleura: At the posterior aspect of base of both lungs and in the  inferior lingula of left lung there are minimal streaky atelectatic changes.  In the rest of both lungs, there is no other diagnostic finding or acute  process.  No evidence of pleural effusion or pneumothorax.  No  pneumomediastinum.     Upper Abdomen: Evidence of cirrhotic morphology of liver with nodular surface  of liver.  At the anterior and right lateral aspect of liver there is  evidence of ascites with thickness of about 3 cm.  Evidence of significant  splenomegaly but the lower portion of spleen is not included.  No hiatal  hernia.  No pneumoperitoneum.     Soft Tissues/Bones: No definable fracture in the bony thorax.  Evidence of  mild-to-moderate multilevel degenerative disc disease in the thoracic spine  without obvious thoracic spinal stenosis.  No acute process in the visualized  soft tissues of chest wall.     IMPRESSION:  1. No evidence of pulmonary embolism or acute cardiopulmonary process.  No  thoracic aortic aneurysm or dissection.  2. Liver cirrhosis with ascites and splenomegaly.                Specimen Collected: 04/04/24 03:24 EDT                 ASSESSMENT/plan  Ams with hepatic encephalopathy, improved  Continue lactulose tid and xifaxin     2.SBP  Ascitic fluid culture and cytology are pending  Continue rocephin  Will give 100gm albumin 25% today, BMP results are pending  Paracentesis tomorrow to recheck cell count    3.elevated lft with BONNER cirrhosis  2gm low salt diet  Avoid sedatives and narcotics  Zinc level pending      4. UTI  with suspected pyelonephritis and influenza     5 KIM     6.uterine mass; mgt per primary service     7.chronic anemia and thrombocytopenia no overt bleeding   Trend hh  Ppi    ..MELD 3.0: 12 at 4/5/2024  4:57 AM  MELD-Na: 9 at

## 2024-04-09 ENCOUNTER — APPOINTMENT (OUTPATIENT)
Dept: GENERAL RADIOLOGY | Age: 69
DRG: 871 | End: 2024-04-09
Payer: MEDICARE

## 2024-04-09 ENCOUNTER — APPOINTMENT (OUTPATIENT)
Dept: ULTRASOUND IMAGING | Age: 69
DRG: 871 | End: 2024-04-09
Payer: MEDICARE

## 2024-04-09 LAB
ABSOLUTE BANDS: 0.44 K/UL (ref 0–1)
ALBUMIN SERPL-MCNC: 3.2 G/DL (ref 3.5–5.2)
ALP SERPL-CCNC: 313 U/L (ref 35–104)
ALT SERPL-CCNC: 23 U/L (ref 5–33)
AMMONIA PLAS-SCNC: 64 UMOL/L (ref 11–51)
ANION GAP SERPL CALCULATED.3IONS-SCNC: 16 MMOL/L (ref 9–17)
ARTERIAL PATENCY WRIST A: ABNORMAL
AST SERPL-CCNC: 47 U/L
BACTERIA URNS QL MICRO: ABNORMAL
BANDS: 3 % (ref 0–10)
BASOPHILS # BLD: 0 K/UL (ref 0–0.2)
BASOPHILS NFR BLD: 0 % (ref 0–2)
BDY SITE: ABNORMAL
BILIRUB SERPL-MCNC: 1.4 MG/DL (ref 0.3–1.2)
BILIRUB UR QL STRIP: NEGATIVE
BNP SERPL-MCNC: 2156 PG/ML
BODY TEMPERATURE: 37
BUN SERPL-MCNC: 52 MG/DL (ref 8–23)
CALCIUM SERPL-MCNC: 8.7 MG/DL (ref 8.6–10.4)
CASTS #/AREA URNS LPF: ABNORMAL /LPF
CHLORIDE SERPL-SCNC: 102 MMOL/L (ref 98–107)
CHLORIDE UR-SCNC: 45 MMOL/L
CLARITY UR: ABNORMAL
CO2 SERPL-SCNC: 17 MMOL/L (ref 20–31)
COHGB MFR BLD: 1.6 % (ref 0–5)
COLOR UR: ABNORMAL
CREAT SERPL-MCNC: 1.3 MG/DL (ref 0.5–0.9)
CREAT UR-MCNC: 49.6 MG/DL (ref 28–217)
EKG Q-T INTERVAL: 328 MS
EKG QRS DURATION: 96 MS
EKG QTC CALCULATION (BAZETT): 469 MS
EKG R AXIS: 16 DEGREES
EKG T AXIS: 66 DEGREES
EKG VENTRICULAR RATE: 123 BPM
EOSINOPHIL # BLD: 0 K/UL (ref 0–0.4)
EOSINOPHILS RELATIVE PERCENT: 0 % (ref 0–4)
EPI CELLS #/AREA URNS HPF: ABNORMAL /HPF
ERYTHROCYTE [DISTWIDTH] IN BLOOD BY AUTOMATED COUNT: 17.4 % (ref 11.5–14.9)
GAS FLOW.O2 O2 DELIVERY SYS: ABNORMAL L/MIN
GFR SERPL CREATININE-BSD FRML MDRD: 45 ML/MIN/1.73M2
GLUCOSE BLD-MCNC: 105 MG/DL (ref 65–105)
GLUCOSE BLD-MCNC: 122 MG/DL (ref 65–105)
GLUCOSE BLD-MCNC: 139 MG/DL (ref 65–105)
GLUCOSE BLD-MCNC: 146 MG/DL (ref 65–105)
GLUCOSE BLD-MCNC: 52 MG/DL (ref 65–105)
GLUCOSE BLD-MCNC: 55 MG/DL (ref 65–105)
GLUCOSE BLD-MCNC: 70 MG/DL (ref 65–105)
GLUCOSE BLD-MCNC: 94 MG/DL (ref 65–105)
GLUCOSE SERPL-MCNC: 125 MG/DL (ref 70–99)
GLUCOSE UR STRIP-MCNC: ABNORMAL MG/DL
HCO3 ARTERIAL: 17.9 MMOL/L (ref 22–26)
HCT VFR BLD AUTO: 29.3 % (ref 36–46)
HGB BLD-MCNC: 9.3 G/DL (ref 12–16)
HGB UR QL STRIP.AUTO: ABNORMAL
KETONES UR STRIP-MCNC: NEGATIVE MG/DL
LEUKOCYTE ESTERASE UR QL STRIP: ABNORMAL
LYMPHOCYTES NFR BLD: 0.73 K/UL (ref 1–4.8)
LYMPHOCYTES RELATIVE PERCENT: 5 % (ref 24–44)
MCH RBC QN AUTO: 27.2 PG (ref 26–34)
MCHC RBC AUTO-ENTMCNC: 31.6 G/DL (ref 31–37)
MCV RBC AUTO: 86.2 FL (ref 80–100)
METAMYELOCYTES ABSOLUTE COUNT: 0.15 K/UL
METAMYELOCYTES: 1 %
METHEMOGLOBIN: 1.2 % (ref 0–1.9)
MONOCYTES NFR BLD: 0.29 K/UL (ref 0.1–1.3)
MONOCYTES NFR BLD: 2 % (ref 1–7)
MORPHOLOGY: ABNORMAL
MORPHOLOGY: ABNORMAL
NEGATIVE BASE EXCESS, ART: 6 MMOL/L (ref 0–2)
NEUTROPHILS NFR BLD: 89 % (ref 36–66)
NEUTS SEG NFR BLD: 12.9 K/UL (ref 1.3–9.1)
NITRITE UR QL STRIP: NEGATIVE
NUCLEATED RED BLOOD CELLS: 2 PER 100 WBC
O2 SAT, ARTERIAL: 94.3 % (ref 95–98)
PCO2 ARTERIAL: 25.8 MMHG (ref 35–45)
PH ARTERIAL: 7.45 (ref 7.35–7.45)
PH UR STRIP: 5.5 [PH] (ref 5–8)
PLATELET # BLD AUTO: 101 K/UL (ref 150–450)
PMV BLD AUTO: 9.3 FL (ref 6–12)
PO2 ARTERIAL: 79.8 MMHG (ref 80–100)
POTASSIUM SERPL-SCNC: 3.7 MMOL/L (ref 3.7–5.3)
POTASSIUM, UR: 12.9 MMOL/L
PROT SERPL-MCNC: 6.7 G/DL (ref 6.4–8.3)
PROT UR STRIP-MCNC: ABNORMAL MG/DL
PT. POSITION: ABNORMAL
RBC # BLD AUTO: 3.4 M/UL (ref 4–5.2)
RBC #/AREA URNS HPF: ABNORMAL /HPF
RESPIRATORY RATE: 16
SODIUM SERPL-SCNC: 135 MMOL/L (ref 135–144)
SODIUM UR-SCNC: 57 MMOL/L
SP GR UR STRIP: 1.01 (ref 1–1.03)
SURGICAL PATHOLOGY REPORT: NORMAL
TEXT FOR RESPIRATORY: ABNORMAL
UROBILINOGEN UR STRIP-ACNC: NORMAL EU/DL (ref 0–1)
WBC #/AREA URNS HPF: ABNORMAL /HPF
WBC OTHER # BLD: 14.5 K/UL (ref 3.5–11)

## 2024-04-09 PROCEDURE — 83880 ASSAY OF NATRIURETIC PEPTIDE: CPT

## 2024-04-09 PROCEDURE — 93005 ELECTROCARDIOGRAM TRACING: CPT

## 2024-04-09 PROCEDURE — 2500000003 HC RX 250 WO HCPCS: Performed by: INTERNAL MEDICINE

## 2024-04-09 PROCEDURE — 99231 SBSQ HOSP IP/OBS SF/LOW 25: CPT | Performed by: INTERNAL MEDICINE

## 2024-04-09 PROCEDURE — 76775 US EXAM ABDO BACK WALL LIM: CPT

## 2024-04-09 PROCEDURE — 85025 COMPLETE CBC W/AUTO DIFF WBC: CPT

## 2024-04-09 PROCEDURE — APPSS30 APP SPLIT SHARED TIME 16-30 MINUTES: Performed by: NURSE PRACTITIONER

## 2024-04-09 PROCEDURE — 82805 BLOOD GASES W/O2 SATURATION: CPT

## 2024-04-09 PROCEDURE — 97530 THERAPEUTIC ACTIVITIES: CPT

## 2024-04-09 PROCEDURE — 6370000000 HC RX 637 (ALT 250 FOR IP): Performed by: INTERNAL MEDICINE

## 2024-04-09 PROCEDURE — 2580000003 HC RX 258: Performed by: INTERNAL MEDICINE

## 2024-04-09 PROCEDURE — 74018 RADEX ABDOMEN 1 VIEW: CPT

## 2024-04-09 PROCEDURE — 6360000002 HC RX W HCPCS: Performed by: INTERNAL MEDICINE

## 2024-04-09 PROCEDURE — 93010 ELECTROCARDIOGRAM REPORT: CPT | Performed by: INTERNAL MEDICINE

## 2024-04-09 PROCEDURE — 82140 ASSAY OF AMMONIA: CPT

## 2024-04-09 PROCEDURE — 84300 ASSAY OF URINE SODIUM: CPT

## 2024-04-09 PROCEDURE — 80053 COMPREHEN METABOLIC PANEL: CPT

## 2024-04-09 PROCEDURE — 82570 ASSAY OF URINE CREATININE: CPT

## 2024-04-09 PROCEDURE — 36415 COLL VENOUS BLD VENIPUNCTURE: CPT

## 2024-04-09 PROCEDURE — 71045 X-RAY EXAM CHEST 1 VIEW: CPT

## 2024-04-09 PROCEDURE — 99233 SBSQ HOSP IP/OBS HIGH 50: CPT | Performed by: INTERNAL MEDICINE

## 2024-04-09 PROCEDURE — 2060000000 HC ICU INTERMEDIATE R&B

## 2024-04-09 PROCEDURE — 6370000000 HC RX 637 (ALT 250 FOR IP): Performed by: NURSE PRACTITIONER

## 2024-04-09 PROCEDURE — 51702 INSERT TEMP BLADDER CATH: CPT

## 2024-04-09 PROCEDURE — 82436 ASSAY OF URINE CHLORIDE: CPT

## 2024-04-09 PROCEDURE — 99223 1ST HOSP IP/OBS HIGH 75: CPT | Performed by: NURSE PRACTITIONER

## 2024-04-09 PROCEDURE — 84133 ASSAY OF URINE POTASSIUM: CPT

## 2024-04-09 PROCEDURE — 6360000002 HC RX W HCPCS: Performed by: NURSE PRACTITIONER

## 2024-04-09 PROCEDURE — 99223 1ST HOSP IP/OBS HIGH 75: CPT | Performed by: INTERNAL MEDICINE

## 2024-04-09 PROCEDURE — 81001 URINALYSIS AUTO W/SCOPE: CPT

## 2024-04-09 PROCEDURE — 36600 WITHDRAWAL OF ARTERIAL BLOOD: CPT

## 2024-04-09 PROCEDURE — 93005 ELECTROCARDIOGRAM TRACING: CPT | Performed by: NURSE PRACTITIONER

## 2024-04-09 RX ORDER — LISINOPRIL 10 MG/1
10 TABLET ORAL DAILY
Status: DISCONTINUED | OUTPATIENT
Start: 2024-04-10 | End: 2024-04-11

## 2024-04-09 RX ADMIN — SODIUM BICARBONATE: 84 INJECTION, SOLUTION INTRAVENOUS at 17:56

## 2024-04-09 RX ADMIN — SODIUM CHLORIDE, PRESERVATIVE FREE 10 ML: 5 INJECTION INTRAVENOUS at 20:46

## 2024-04-09 RX ADMIN — ANTI-FUNGAL POWDER MICONAZOLE NITRATE TALC FREE: 1.42 POWDER TOPICAL at 09:48

## 2024-04-09 RX ADMIN — DEXTROSE MONOHYDRATE 125 ML: 100 INJECTION, SOLUTION INTRAVENOUS at 21:27

## 2024-04-09 RX ADMIN — ANTI-FUNGAL POWDER MICONAZOLE NITRATE TALC FREE: 1.42 POWDER TOPICAL at 20:46

## 2024-04-09 RX ADMIN — Medication 16 G: at 20:44

## 2024-04-09 RX ADMIN — LACTULOSE 20 G: 20 SOLUTION ORAL at 15:56

## 2024-04-09 RX ADMIN — CEFTRIAXONE SODIUM 1000 MG: 1 INJECTION, POWDER, FOR SOLUTION INTRAMUSCULAR; INTRAVENOUS at 17:57

## 2024-04-09 RX ADMIN — SODIUM BICARBONATE: 84 INJECTION, SOLUTION INTRAVENOUS at 06:01

## 2024-04-09 RX ADMIN — RIFAXIMIN 550 MG: 550 TABLET ORAL at 21:42

## 2024-04-09 RX ADMIN — GABAPENTIN 100 MG: 100 CAPSULE ORAL at 15:57

## 2024-04-09 RX ADMIN — METOPROLOL TARTRATE 5 MG: 1 INJECTION, SOLUTION INTRAVENOUS at 02:12

## 2024-04-09 RX ADMIN — METOPROLOL TARTRATE 25 MG: 25 TABLET, FILM COATED ORAL at 20:44

## 2024-04-09 RX ADMIN — SODIUM CHLORIDE, PRESERVATIVE FREE 10 ML: 5 INJECTION INTRAVENOUS at 09:48

## 2024-04-09 RX ADMIN — HYDRALAZINE HYDROCHLORIDE 10 MG: 20 INJECTION INTRAMUSCULAR; INTRAVENOUS at 04:24

## 2024-04-09 RX ADMIN — LACTULOSE 20 G: 20 SOLUTION ORAL at 20:45

## 2024-04-09 RX ADMIN — GABAPENTIN 100 MG: 100 CAPSULE ORAL at 20:44

## 2024-04-09 RX ADMIN — RIFAXIMIN 550 MG: 550 TABLET ORAL at 17:46

## 2024-04-09 RX ADMIN — SODIUM BICARBONATE: 84 INJECTION, SOLUTION INTRAVENOUS at 16:49

## 2024-04-09 RX ADMIN — ENOXAPARIN SODIUM 40 MG: 100 INJECTION SUBCUTANEOUS at 09:48

## 2024-04-09 ASSESSMENT — PAIN SCALES - GENERAL
PAINLEVEL_OUTOF10: 10
PAINLEVEL_OUTOF10: 0
PAINLEVEL_OUTOF10: 0
PAINLEVEL_OUTOF10: 10

## 2024-04-09 ASSESSMENT — PAIN DESCRIPTION - LOCATION
LOCATION: BACK
LOCATION: BACK

## 2024-04-09 NOTE — PROGRESS NOTES
04/09/24 1441   Encounter Summary   Encounter Overview/Reason  Spiritual/Emotional Needs   Service Provided For: Patient   Referral/Consult From: Rounding   Complexity of Encounter Low   Spiritual/Emotional needs   Type Spiritual Support   Assessment/Intervention/Outcome   Assessment Unable to assess  (patient sleeping)   Intervention Prayer (assurance of)/[place]

## 2024-04-09 NOTE — PROGRESS NOTES
Patient taken to Radiology for additional testing. Writer went with patient with telemetry on. Patient has ICU orders.

## 2024-04-09 NOTE — PROGRESS NOTES
Physical Therapy  [facility]    Date: 24  Patient Name: Bobbi Phillips       Room:   MRN: [id number]   Account: [id number]   : 1955  (68 y.o.) Gender: female     Referring Practitioner: Brad Maciel MD  Diagnosis: Acute kidney injury  Past Medical History:  has a past medical history of Abdominal swelling, Claustrophobia, COVID-19 vaccine administered, Diabetes (HCC), Diabetic retinopathy (HCC), Flatus, H/O acute sinusitis, H/O cholelithiasis, Hypertension, Liver cirrhosis (HCC), LLQ abdominal tenderness, Poor venous access, Positive colorectal cancer screening using Cologuard test, Post-menopausal bleeding, RUQ abdominal pain, Tendonitis of shoulder, right, Tingling, Under care of service provider, Under care of service provider, Vaginal bleeding, Vaginal candidiasis, and Wears glasses.   Past Surgical History:   has a past surgical history that includes Cholecystectomy, laparoscopic ();  section; Ankle fracture surgery (Left, ); Cataract removal with implant (Bilateral, ); Colonoscopy (N/A, 2023); Dilation and curettage of uterus (N/A, 10/02/2023); Cardiac catheterization (); hysteroscopy (2024); and Dilation and curettage of uterus (N/A, 2024).  Additional Pertinent Hx: Pt admitted to ICU due to worsening back pain on 24 (On 3.29.24 pt came to ER for AMS, fall, UTI and tested positive for Influenza B, but was not admitted to the hospital.)    Overall Orientation Status: Within Functional Limits  Restrictions/Precautions  Restrictions/Precautions: General Precautions  Implants present? :  (Denied)    Subjective: Pt lying in bed stating \"I am hot\". Agreeable to therapy  Comments: EMILY Wan approved therapy, co-treat with ANA Connolly       Pain Assessment: 0-10  Pain Level: 10  Pain Location: Back     Oxygen Therapy  O2 Device: Nasal cannula  O2 Flow Rate (L/min): 2 L/min    Bed Mobility  Rolling: Maximal

## 2024-04-09 NOTE — CONSULTS
NEPHROLOGY CONSULT     Patient :  Bobbi Phillips; 68 y.o. MRN# [id number]  Location:  2016/2016-01  Attending:  Courtney Carrillo MD  Admit Date:  4/3/2024   Hospital Day: 6      Reason for Consult:  KIM      Chief Complaint:  Back pain, hematuria   History Obtained From:  patient    History of Present Illness:    This is a 68 y.o. 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   Patient was seen in the ED approximately 1 week ago for fall. Patient was found to have UTI at that time and discharged on Keflex.   Patient denies N/V/diarrhea, fever chills.  C/o dysuria, flank pain and frequency.  Patient started on IV Abx.  Scr on admission 1.3 mg/dl, scr peaked to 1.9 mg/dl and therefor Nephrology consultation requested.  Bladder scan positive for urinary retention, nichols catheter was placed.  Scr improved to 1.3 mg/dl this morning.  Patient noted to be lethargic and decrease in responsiveness this morning and was transferred to ICU for AMS which was suspected to be due to use Narcotics.    Patient takes NSAIDs   There had exposure to IV contrast on 4/3/24. There is no history  of paraprotein disease. Pt denies any history of recurrent UTI or kidney stones.  Medication review shows use of ACE-inhibitor/diuretics.    Past Medical History:        Diagnosis Date    Abdominal swelling     Claustrophobia     COVID-19 vaccine administered     Diabetes (HCC)     DEXCOM LT ARM    Diabetic retinopathy (HCC) 11/28/2015    Flatus     H/O acute sinusitis     H/O cholelithiasis     Hypertension     Liver cirrhosis (HCC)     LLQ abdominal tenderness     Poor venous access     \"THEY LIKE TO ROLL & RUN AWAY\"    Positive colorectal cancer screening using Cologuard test 07/06/2023    had colonoscopy after this and no cancer was found    Post-menopausal bleeding     RUQ abdominal pain     Tendonitis of shoulder, right 12/09/2021    Tingling     Under care of service provider  PULSE:  Pulse: (!) 106  CURRENT BLOOD PRESSURE:  BP: (!) 119/44  24HR BLOOD PRESSURE RANGE:  Systolic (24hrs), Av , Min:102 , Max:182   ; Diastolic (24hrs), Av, Min:39, Max:83    24HR INTAKE/OUTPUT:    Intake/Output Summary (Last 24 hours) at 2024 1546  Last data filed at 2024 1021  Gross per 24 hour   Intake --   Output 4551 ml   Net -4551 ml     No data found.    Physical Exam:  GENERAL APPEARANCE: Alert and cooperative, and appears to be in no acute distress.  HEAD: normocephalic  EYES:  EOMI. Not pale, anicteric   NOSE:  No nasal discharge.    CARDIAC: Normal S1 and S2. No S3, S4 or murmurs. Rhythm is regular.  LUNGS: Clear to auscultation and percussion without rales, rhonchi, wheezing or diminished breath sounds.  NECK: Neck supple, non-tender without lymphadenopathy, masses or thyromegaly. JVD-neg  GI soft non tender  MUSKULOSKELETAL: Adequately aligned spine. No joint erythema or tenderness.   EXTREMITIES: No edema. Peripheral pulses intact.   NEURO: Non focal      Labs:   CBC:  Recent Labs     24  0554 24  0916 24  0524   WBC 21.1* 16.8* 14.5*   RBC 3.34* 3.52* 3.40*   HGB 9.2* 9.5* 9.3*   HCT 28.5* 29.5* 29.3*   MCV 85.1 83.8 86.2   MCH 27.4 27.1 27.2   MCHC 32.2 32.3 31.6   RDW 17.3* 17.5* 17.4*   * 89* 101*   MPV 8.5 9.5 9.3      BMP:   Recent Labs     24  0554 24  1115 24  0524    130* 135   K 3.5* 4.4 3.7    97* 102   CO2 20 16* 17*   BUN 53* 58* 52*   CREATININE 1.2* 1.9* 1.3*   GLUCOSE 140* 174* 125*   CALCIUM 8.8 8.6 8.7      Phosphorus:  No results for input(s): \"PHOS\" in the last 72 hours.  Magnesium: No results for input(s): \"MG\" in the last 72 hours.  Albumin:   Recent Labs     24  0554 24  0524   LABALBU 3.0* 3.2*       IRON:  No results for input(s): \"IRON\" in the last 72 hours.  Iron Saturation:  Invalid input(s): \"PERCENTFE\"  TIBC:  No results for input(s): \"TIBC\" in the last 72 hours.  FERRITIN:  No results

## 2024-04-09 NOTE — PROGRESS NOTES
Danielle Beasley notified of patient now being alert and oriented. New order received for regular low sodium diet, discontinue lactulose enemas and restart oral lactulose

## 2024-04-09 NOTE — PROGRESS NOTES
normal.   Abdominal: Distended but nontender  Musculoskeletal: Normal range of motion. She exhibits no edema or tenderness.   Neurological: No focal deficits, mild encephalopathy with slow cognition but otherwise answering questions appropriately and arousable, cooperative   skin: Skin is warm and dry. No rash noted.   Extremities: No edema or discoloration  Infusions:      sodium bicarbonate 75 mEq in sodium chloride 0.45 % 1,000 mL infusion 100 mL/hr at 04/09/24 0601    niCARdipine Stopped (04/05/24 1404)    sodium chloride      dextrose       Meds:     Current Facility-Administered Medications:     lactulose enema, , Rectal, TID, Monae Beasley, JOE - CNP    sodium bicarbonate 75 mEq in sodium chloride 0.45 % 1,000 mL infusion, , IntraVENous, Continuous, Bryson Cordova MD, Last Rate: 100 mL/hr at 04/09/24 0601, New Bag at 04/09/24 0601    rifAXIMin (XIFAXAN) tablet 550 mg, 550 mg, Oral, TID, Chapito Guerrero APRN - NP, 550 mg at 04/08/24 2059    lisinopril (PRINIVIL;ZESTRIL) tablet 20 mg, 20 mg, Oral, Daily, Jasson Silva MD, 20 mg at 04/08/24 1030    hydroCHLOROthiazide (HYDRODIURIL) tablet 25 mg, 25 mg, Oral, Daily, Jasson Silva MD, 25 mg at 04/08/24 1030    metoprolol (LOPRESSOR) injection 5 mg, 5 mg, IntraVENous, Q6H PRN, Jasson Silva MD, 5 mg at 04/09/24 0212    [Held by provider] lactulose (CHRONULAC) 10 GM/15ML solution 20 g, 20 g, Oral, TID, Chapito Guerrero APRN - NP, 20 g at 04/08/24 2059    HYDROcodone-acetaminophen (NORCO) 5-325 MG per tablet 1 tablet, 1 tablet, Oral, Q6H PRN, Jasson Silva MD, 1 tablet at 04/08/24 1837    gabapentin (NEURONTIN) capsule 100 mg, 100 mg, Oral, TID, Brad Maciel MD, 100 mg at 04/08/24 2059    niCARdipine (CARDENE) 25 mg in sodium chloride 0.9 % 250 mL infusion, 2.5-15 mg/hr, IntraVENous, Continuous, Maximiliano Haywood MD, Stopped at 04/05/24 1404    enoxaparin (LOVENOX) injection 40 mg, 40 mg, SubCUTAneous, Daily, Maximiliano Haywood MD, 40 mg  at 04/09/24 0948    perflutren lipid microspheres (DEFINITY) injection 1.5 mL, 1.5 mL, IntraVENous, ONCE PRN, Jasson Silva MD    miconazole (MICOTIN) 2 % powder, , Topical, BID, Maximiliano Haywood MD, Given at 04/09/24 0948    sodium chloride flush 0.9 % injection 10 mL, 10 mL, IntraVENous, PRN, Abdoulaye, Abdoulaye Mac I, DO, 10 mL at 04/03/24 1703    sodium chloride flush 0.9 % injection 5-40 mL, 5-40 mL, IntraVENous, 2 times per day, Courtney Carrillo MD, 10 mL at 04/09/24 0948    sodium chloride flush 0.9 % injection 5-40 mL, 5-40 mL, IntraVENous, PRN, Courtney Carrillo MD    0.9 % sodium chloride infusion, , IntraVENous, PRN, Courtney Carrillo MD    potassium chloride (KLOR-CON M) extended release tablet 40 mEq, 40 mEq, Oral, PRN, 40 mEq at 04/07/24 0633 **OR** potassium bicarb-citric acid (EFFER-K) effervescent tablet 40 mEq, 40 mEq, Oral, PRN, 40 mEq at 04/06/24 1029 **OR** potassium chloride 10 mEq/100 mL IVPB (Peripheral Line), 10 mEq, IntraVENous, PRN, Courtney Carrillo MD    magnesium sulfate 2000 mg in water 50 mL IVPB, 2,000 mg, IntraVENous, PRN, Courtney Carrillo MD    ondansetron (ZOFRAN-ODT) disintegrating tablet 4 mg, 4 mg, Oral, Q8H PRN **OR** ondansetron (ZOFRAN) injection 4 mg, 4 mg, IntraVENous, Q6H PRN, Courtney Carrillo MD    polyethylene glycol (GLYCOLAX) packet 17 g, 17 g, Oral, Daily PRN, Courtney Carrillo MD    atorvastatin (LIPITOR) tablet 10 mg, 10 mg, Oral, Daily, Courtney Carrillo MD, 10 mg at 04/08/24 1030    aspirin EC tablet 81 mg, 81 mg, Oral, Daily, Courtney Carrillo MD, 81 mg at 04/08/24 1209    insulin glargine (LANTUS) injection vial 40 Units, 40 Units, SubCUTAneous, BID, Courtney Carrillo MD, 40 Units at 04/08/24 2059    insulin lispro (HUMALOG) injection vial 0-8 Units, 0-8 Units, SubCUTAneous, TID WC, Courtney Carrillo MD, 4 Units at 04/06/24 1711    insulin lispro (HUMALOG) injection vial 0-4 Units, 0-4 Units, SubCUTAneous, Nightly, Courtney Carrillo MD, 4 Units at 04/03/24 2630

## 2024-04-09 NOTE — PROGRESS NOTES
Hannah Garcia, DANY notified of patient's HR and BP. Awaiting for a response at this time     NP ordered EKG. Results sent.

## 2024-04-09 NOTE — CONSULTS
[facility]    Consult                        Today's Date: 4/9/2024  Patient Name: Bobbi Phillips  Date of admission: 4/3/2024  1:22 PM  Patient's age: 68 y.o., 1955  Admission Dx: Acute pyelonephritis [N10]  Hyperglycemia [R73.9]  KIM (acute kidney injury) (HCC) [N17.9]  Intractable pain [R52]  Strain of lumbar region, sequela [S39.012S]    Reason for Consult:  Cardiac evaluation    Requesting Physician: Courtney Carrillo MD    CHIEF COMPLAINT:  tachycardia     History Obtained From:  patient, electronic medical record    HISTORY OF PRESENT ILLNESS:    This patient is a 68 y.o. Non- / non  femalewho presents with back pain  History of type 2 diabetes, with diabetic retinopathy and neuropathy, hypertension,  Patient is poor historian she reports she has been given some pain medication and is unable to relate sequence of events accurately  Per EMR patient has chronic low back pain, she had fall 5 days ago and was seen in ER CT thoracic and lumbar spine nil acute but UA positive for UTI culture showed Klebsiella sensitive to Keflex which is what she was discharged on.  Patient reported subsequent improvement in back pain however reports pain became worse again today  Moderate intensity progressive persistent no aggravating leaving factors noted  Denies any associated fevers diarrhea or vomiting     ER evaluation showed UA suggestive of persistent UTI, severe hyperglycemia, KIM.      Pt resting quietly in bed.  Not answering questions.  ST , appears sob.  On 02 per NC.      Past Medical History:   has a past medical history of Abdominal swelling, Claustrophobia, COVID-19 vaccine administered, Diabetes (HCC), Diabetic retinopathy (HCC), Flatus, H/O acute sinusitis, H/O cholelithiasis, Hypertension, Liver cirrhosis (HCC), LLQ abdominal tenderness, Poor venous access, Positive colorectal cancer screening using Cologuard test, Post-menopausal bleeding, RUQ abdominal pain,

## 2024-04-09 NOTE — PROGRESS NOTES
[facility] Internal Medicine  Nikolas Tan MD; Ray Hwang MD; Brad Maciel MD; MD Courtney Lr MD; Germania Osborne MD; Marlin Gutierrez MD      Progress note            Date:   4/9/2024  Patient name:  Bobbi Phillips  MRN:   [id number]  Account:  [id number]  YOB: 1955  PCP:    Stacie Doty MD  Code Status:    Full Code    Chief Complaint:     Chief Complaint   Patient presents with    Back Pain         History Obtained From:     Patient, EMR, nursing staff    HPI     This patient is a 68 y.o. Non- / non  femalewho presents with back pain  History of type 2 diabetes, with diabetic retinopathy and neuropathy, hypertension,  Patient is poor historian she reports she has been given some pain medication and is unable to relate sequence of events accurately  Per EMR patient has chronic low back pain, she had fall 5 days ago and was seen in ER CT thoracic and lumbar spine nil acute but UA positive for UTI culture showed Klebsiella sensitive to Keflex which is what she was discharged on.  Patient reported subsequent improvement in back pain however reports pain became worse again today  Moderate intensity progressive persistent no aggravating leaving factors noted  Denies any associated fevers diarrhea or vomiting    ER evaluation showed UA suggestive of persistent UTI, severe hyperglycemia, KIM    Review of Systems:     Denies any shortness of breath or cough  Denies chest pain or palpitations  Positive for flank pain, lower back pain  Denies any new numbness tremors or weakness.    A 10 point review of systems was performed and and negative except as mentioned in HPI  Positive and Negative as described in HPI.  4/4  Patient, appear very tired, fatigue, sleepy,  Open eyes to command, and then dosing off  Although oriented time place person  Sister at bedside she could able to recognize her sister  Was given morphine yesterday    Past Medical History:  chloride 0.9 % 50 mL IVPB (mini-bag)  1,000 mg IntraVENous Q24H Courtney Carrillo MD   Stopped at 04/08/24 1925    hydrALAZINE (APRESOLINE) injection 10 mg  10 mg IntraVENous Q6H PRN Cande Hollis APRN - NP   10 mg at 04/09/24 0424       Impressions :     1. Principal Problem:    KIM (acute kidney injury) (HCC)  Active Problems:    Liver cirrhosis secondary to BONNER (HCC)    Thrombocytopenia (HCC)    Acute pyelonephritis    SBP (spontaneous bacterial peritonitis) (HCC)    Anemia    Hepatic encephalopathy (HCC)  Resolved Problems:    * No resolved hospital problems. *        2.  has a past medical history of Abdominal swelling, Claustrophobia, COVID-19 vaccine administered, Diabetes (HCC), Diabetic retinopathy (HCC) (11/28/2015), Flatus, H/O acute sinusitis, H/O cholelithiasis, Hypertension, Liver cirrhosis (HCC), LLQ abdominal tenderness, Poor venous access, Positive colorectal cancer screening using Cologuard test (07/06/2023), Post-menopausal bleeding, RUQ abdominal pain, Tendonitis of shoulder, right (12/09/2021), Tingling, Under care of service provider (12/28/2023), Under care of service provider (12/28/2023), Vaginal bleeding (08/2023), Vaginal candidiasis, and Wears glasses.     Plans:   This patient is a 68 y.o. Non- / non  femalewho presents with back pain  History of type 2 diabetes, with diabetic retinopathy and neuropathy, hypertension,  Patient is poor historian she reports she has been given some pain medication and is unable to relate sequence of events accurately  Per EMR patient has chronic low back pain, she had fall 5 days ago and was seen in ER CT thoracic and lumbar spine nil acute but UA positive for UTI culture showed Klebsiella sensitive to Keflex which is what she was discharged on.  Patient reported subsequent improvement in back pain however reports pain became worse again today      ER evaluation showed UA suggestive of persistent UTI, severe hyperglycemia, KIM  UTI

## 2024-04-09 NOTE — PROGRESS NOTES
[facility]    Gastroenterology Daily Progress Note      Patient:   Bobbi Phillips   :    1955   Facility:   [facility]  Date:     2024  Consultant:   JOE Toribio - CNP, CNP      SUBJECTIVE  68 y.o. female admitted 4/3/2024 with Acute pyelonephritis [N10]  Hyperglycemia [R73.9]  KIM (acute kidney injury) (HCC) [N17.9]  Intractable pain [R52]  Strain of lumbar region, sequela [S39.012S] and seen for cirrhosis with sbp and hepatic encephalopathy. The pt was seen and examined. The pt is lethargic and oriented to person only. I&O not completed last night. The stool in fms is brown per primary rn, this is likely from yesterday, unclear if pt had bm overnight. Pt has been tachycardic, leucocytosis is trending down. Creat 1.3. unable to complete repeat paracentesis today, IR not available. .pt did receive multiple doses of norco yesterday.        OBJECTIVE  Scheduled Meds:   rifAXIMin  550 mg Oral TID    lisinopril  20 mg Oral Daily    hydroCHLOROthiazide  25 mg Oral Daily    lactulose  20 g Oral TID    gabapentin  100 mg Oral TID    enoxaparin  40 mg SubCUTAneous Daily    miconazole   Topical BID    sodium chloride flush  5-40 mL IntraVENous 2 times per day    atorvastatin  10 mg Oral Daily    aspirin  81 mg Oral Daily    insulin glargine  40 Units SubCUTAneous BID    insulin lispro  0-8 Units SubCUTAneous TID WC    insulin lispro  0-4 Units SubCUTAneous Nightly    cefTRIAXone (ROCEPHIN) IV  1,000 mg IntraVENous Q24H       Vital Signs:  /68   Pulse (!) 128   Temp 99 °F (37.2 °C) (Axillary)   Resp 20   Ht 1.499 m (4' 11\")   SpO2 94%   BMI 39.18 kg/m²    ROS not complete pt has AMS  Physical Exam:     General Appearance: ill appearing lethargic knows last name only, well-developed and well-nourished, in no acute distress  Skin: warm and dry, no rash or erythema  Head: normocephalic and atraumatic  Eyes: pupils equal, round, and reactive to light, extraocular eye

## 2024-04-09 NOTE — CONSULTS
[facility] -   Infectious diseases evaluation  admission date 4/3/2024    reason for consultation:   SBP    Impression :   Current:  Suspected SBP status post paracentesis 4/5/2024 fluid analysis suggestive of peritonitis, no growth on culture  Recent UTI on 3/29/2024, Klebsiella pneumoniae growth on cultures  Metabolic encephalopathy  Hematuria  Liver cirrhosis  Ascites  Recent influenza B tested positive on March 29, 2024  Diabetes mellitus  Uterine mass      HENCE:   Continue IV ceftriaxone for now  Plan for repeat paracentesis  Follow repeat urine culture  Follow CBC and renal function  Supportive care    Infection Control Recommendations   [place] Precautions      Antimicrobial Stewardship Recommendations   Simplification of therapy  Targeted therapy      History of Present Illness:   Initial history:  Bobbi Phillips is a 68 y.o.-year-old female presented to ER on 3/29/2024 after a fall with reported mental status changes, was positive for influenza B and suspected UTI.  She was discharged on Keflex .  She returned to the hospital on 4/3/2024 with back pain, altered mental status and hypoxia.  CT abdomen/pelvis, chest and lumbar spine suggestive of uterine mass, liver cirrhosis and portal hypertension, small amount of ascites.  No pulmonary embolism  4/ 3/2024 urinalysis showed moderate leukocyte esterase, too numerous to count WBC, no growth on cultures    Status post paracentesis 4/5/2024 peritoneal fluid analysis showed 1, 166 WBC with 60% neutrophils, no growth on fluid culture.  The patient has been on ceftriaxone since 4/3/2024.  He had leukocytosis, WBC was up to 20.5 on 4/4/2024, down to 14.5 on 4/9/2024.  Renal function got worse, creatinine was up to 1.9 on 4/8/2024 down to 1.3 on 4/9/2024.  On 3/29/2024 urine culture grew Klebsiella pneumoniae that was sensitive to cefazolin.  Interval changes  4/9/2024   The patient was transferred to ICU today due

## 2024-04-09 NOTE — PLAN OF CARE
Problem: Safety - Adult  Goal: Free from fall injury  4/9/2024 0312 by Melony Moreno, RN  Outcome: Progressing  Note: Pt assessed as a fall risk this shift. Remains free from falls and accidental injury at this time. Fall precautions in place.  Floor free from obstacles, and bed is locked and in lowest position. Adequate lighting provided.  Pt encouraged to call before getting OOB for any need.  Bed alarm activated. Will continue to monitor needs during hourly rounding, and reinforce education on use of call light.      Problem: Chronic Conditions and Co-morbidities  Goal: Patient's chronic conditions and co-morbidity symptoms are monitored and maintained or improved  4/9/2024 0312 by Melony Moreno, RN  Outcome: Progressing     Problem: Skin/Tissue Integrity  Goal: Absence of new skin breakdown  Description: 1.  Monitor for areas of redness and/or skin breakdown  2.  Assess vascular access sites hourly  3.  Every 4-6 hours minimum:  Change oxygen saturation probe site  4.  Every 4-6 hours:  If on nasal continuous positive airway pressure, respiratory therapy assess nares and determine need for appliance change or resting period.  4/9/2024 0312 by Melony Moreno, RN  Outcome: Not Progressing  Note: Patient educated on the importance of turning and repositioning every two hours. Coccyx red. Zinc cream applied.     Problem: Pain  Goal: Verbalizes/displays adequate comfort level or baseline comfort level  4/9/2024 0312 by Melony Moreno, RN  Outcome: Progressing  Note: Pt medicated with pain medication prn.  Assessed all pain characteristics including level, type, location, frequency, and onset.  Non-pharmacologic interventions offered to pt as well.  Pt states pain is tolerable at this time.       Problem: Discharge Planning  Goal: Discharge to home or other facility with appropriate resources  4/9/2024 0312 by Melony Moreno, RN  Outcome: Progressing

## 2024-04-09 NOTE — PROGRESS NOTES
Secure message sent to Dr. Maciel about patients pain, no new orders received. Writer also asked if he would like gabapentin held or gave this afternoon, he would like it to be given

## 2024-04-09 NOTE — PROGRESS NOTES
Pt seen at bedside by primary Dr. Osborne and GI Nurse Practitioner Danielle VARGAS Patient is lethargic this morning. Oriented only to self at this time. Pt unable to stay awake to take PO pills. All PO medications held this morning.

## 2024-04-09 NOTE — PROGRESS NOTES
Patient significantly improved could have been due to opioid use now back to 2 L not on BiPAP at  Discussed care with nursing staff okay to move to progressive recheck ammonia in a.m. continue lactulose  Brad Maciel MD  4/9/2024

## 2024-04-09 NOTE — PROGRESS NOTES
Dr. Price, urology, notified of bladder scan showing 951 mL.  Awaiting a response at this time.    Call returned from Dr. Price. Orders to place a nichols and hand irrigate as needed.

## 2024-04-09 NOTE — CARE COORDINATION
Writer is following for potential discharge placement.  At this time, [facility] can accept this pt.    Writer met with pt son Phillip and Andrea regarding placement. Phillip states he is agreeable to [facility] if they can accept.   Electronically signed by HUANG Ayers on 4/9/2024 at 12:22 PM

## 2024-04-09 NOTE — CONSULTS
Urology Consultation    Patient:  Bobbi Phillips  MRN: [id number]  YOB: 1955    CHIEF COMPLAINT:    Gross hematuria    HISTORY OF PRESENT ILLNESS:   The patient is a 68 y.o. female who presents after a fall at home.  We are asked to evaluate for gross hematuria.  She had Kerns catheter placed on admission.  She denies seeing a previous urologist.  She is tolerating the Kerns at this time.  Her biggest complaint is weakness.    Patient's old records, notes and chart reviewed and summarized above.    Past Medical History:    Past Medical History:   Diagnosis Date    Abdominal swelling     Claustrophobia     COVID-19 vaccine administered     Diabetes (HCC)     DEXCOM LT ARM    Diabetic retinopathy (HCC) 2015    Flatus     H/O acute sinusitis     H/O cholelithiasis     Hypertension     Liver cirrhosis (HCC)     LLQ abdominal tenderness     Poor venous access     \"THEY LIKE TO ROLL & RUN AWAY\"    Positive colorectal cancer screening using Cologuard test 2023    had colonoscopy after this and no cancer was found    Post-menopausal bleeding     RUQ abdominal pain     Tendonitis of shoulder, right 2021    Tingling     Under care of service provider 2023    [facility] in [place]-last visit dec 2023    Under care of service provider 2023    [facility] in [place]-last visit dec 2023    Vaginal bleeding 2023    \"SPOTTING, THICKENED UTERINE LINING\"DUE TO HAVE VAGINAL /UTERINE BIOPSY FOR\"    Vaginal candidiasis     Wears glasses     READING       Past Surgical History:    Past Surgical History:   Procedure Laterality Date    ANKLE FRACTURE SURGERY Left 2006    no retained metal    CARDIAC CATHETERIZATION  2006    CATARACT REMOVAL WITH IMPLANT Bilateral 2018     SECTION      x2, 1988,  \"SADDLE BLOCK\"    CHOLECYSTECTOMY, LAPAROSCOPIC  2012    COLONOSCOPY N/A 2023    COLONOSCOPY WITH BIOPSY performed by Carlos Campos MD at [facility] OR     Use    Smoking status: Never    Smokeless tobacco: Never   Vaping Use    Vaping Use: Never used   Substance and Sexual Activity    Alcohol use: Never     Alcohol/week: 0.0 standard drinks of alcohol    Drug use: Never    Sexual activity: Not Currently   Other Topics Concern    Not on file   Social History Narrative    Not on file     Social Determinants of Health     Financial Resource Strain: Low Risk  (6/26/2023)    Overall Financial Resource Strain (CARDIA)     Difficulty of Paying Living Expenses: Not hard at all   Food Insecurity: Not on file (6/26/2023)   Transportation Needs: Unknown (6/26/2023)    PRAPARE - Transportation     Lack of Transportation (Medical): Not on file     Lack of Transportation (Non-Medical): No   Physical Activity: Insufficiently Active (6/26/2023)    Exercise Vital Sign     Days of Exercise per Week: 2 days     Minutes of Exercise per Session: 30 min   Stress: Not on file   Social Connections: Not on file   Intimate Partner Violence: Not on file   Housing Stability: Unknown (6/26/2023)    Housing Stability Vital Sign     Unable to Pay for Housing in the Last Year: Not on file     Number of Places Lived in the Last Year: Not on file     Unstable Housing in the Last Year: No       Family History:    Family History   Problem Relation Age of Onset    Breast Cancer Mother 42    Diabetes Mother     Stroke Mother     Heart Disease Father     Cancer Father         prostate    Cancer Sister         cervical    Breast Cancer Sister 71    Diabetes Other         multiple relatives on Mom's side       Physical Exam:      This a 68 y.o. female   Patient Vitals for the past 24 hrs:   BP Temp Temp src Pulse Resp SpO2   04/09/24 1500 (!) 119/44 -- -- (!) 106 16 97 %   04/09/24 1400 119/68 -- -- (!) 107 (!) 31 97 %   04/09/24 1315 (!) 122/39 -- -- (!) 107 13 --   04/09/24 1305 (!) 141/50 97.5 °F (36.4 °C) Axillary (!) 110 16 --   04/09/24 0743 135/68 99 °F (37.2 °C) Axillary (!) 128 20 94 %   04/09/24

## 2024-04-09 NOTE — PLAN OF CARE
Problem: Discharge Planning  Goal: Discharge to home or other facility with appropriate resources  4/9/2024 1641 by Mary Busch RN  Outcome: Progressing  4/9/2024 0312 by Melony Moreno RN  Outcome: Progressing     Problem: Pain  Goal: Verbalizes/displays adequate comfort level or baseline comfort level  4/9/2024 1641 by Mary Busch RN  Outcome: Progressing  4/9/2024 0312 by Melony Moreno RN  Outcome: Progressing  Note: Pt medicated with pain medication prn.  Assessed all pain characteristics including level, type, location, frequency, and onset.  Non-pharmacologic interventions offered to pt as well.  Pt states pain is tolerable at this time.       Problem: Skin/Tissue Integrity  Goal: Absence of new skin breakdown  Description: 1.  Monitor for areas of redness and/or skin breakdown  2.  Assess vascular access sites hourly  3.  Every 4-6 hours minimum:  Change oxygen saturation probe site  4.  Every 4-6 hours:  If on nasal continuous positive airway pressure, respiratory therapy assess nares and determine need for appliance change or resting period.  4/9/2024 1641 by Mary Busch RN  Outcome: Progressing  4/9/2024 0312 by Melony Moreno RN  Outcome: Not Progressing  Note: Patient educated on the importance of turning and repositioning every two hours. Coccyx red. Zinc cream applied.     Problem: Safety - Adult  Goal: Free from fall injury  4/9/2024 1641 by Mary Busch RN  Outcome: Progressing  4/9/2024 0312 by Melony Moreno RN  Outcome: Progressing  Note: Pt assessed as a fall risk this shift. Remains free from falls and accidental injury at this time. Fall precautions in place.  Floor free from obstacles, and bed is locked and in lowest position. Adequate lighting provided.  Pt encouraged to call before getting OOB for any need.  Bed alarm activated. Will continue to monitor needs during hourly rounding, and reinforce education on use of call light.      Problem: Skin/Tissue Integrity  Goal: Absence

## 2024-04-09 NOTE — PROGRESS NOTES
[facility]   INPATIENT OCCUPATIONAL THERAPY  PROGRESS NOTE  Date: 2024  Patient Name: Bobbi Phillips       Room:   MRN: [id number]    : 1955  (68 y.o.)  Gender: female   Referring Practitioner: Brad Maciel MD  Diagnosis: Acute kidney injury      Discharge Recommendations:  Further Occupational Therapy is recommended upon facility discharge.    OT Equipment Recommendations  Other: TBD    Restrictions/Precautions  Restrictions/Precautions  Restrictions/Precautions: General Precautions  Implants present? :  (Denied)    O2 Device: Nasal cannula    Subjective  Subjective  Subjective: \"You guys are silly.\" Pt was agreeable to OT/PT with PTA Jackie S  Subjective  Pain: Severe lower back pain; worsened with movement 10/10  Comments: Ok per EMILY Hendrix for OT/PT    Objective  Orientation  Overall Orientation Status: Within Functional Limits  Cognition  Overall Cognitive Status: Exceptions  Arousal/Alertness: Delayed responses to stimuli  Following Commands: Follows one step commands with repetition;Follows one step commands with increased time  Attention Span: Attends with cues to redirect  Safety Judgement: Decreased awareness of need for assistance;Decreased awareness of need for safety  Problem Solving: Assistance required to generate solutions;Assistance required to implement solutions;Assistance required to identify errors made;Assistance required to correct errors made;Decreased awareness of errors  Insights: Decreased awareness of deficits  Initiation: Requires cues for all  Sequencing: Requires cues for all    Activities of Daily Living  ADL  Feeding: NPO  Grooming: Moderate assistance  Grooming Skilled Clinical Factors: Mod A with use of mouth sponge  UE Bathing: Maximum assistance  LE Bathing: Dependent/Total  UE Dressing: Maximum assistance  LE Dressing: Dependent/Total  Toileting: Dependent/Total  Toileting Skilled Clinical Factors: Fecal management system/ nichols

## 2024-04-10 ENCOUNTER — APPOINTMENT (OUTPATIENT)
Dept: INTERVENTIONAL RADIOLOGY/VASCULAR | Age: 69
DRG: 871 | End: 2024-04-10
Payer: MEDICARE

## 2024-04-10 ENCOUNTER — ANESTHESIA EVENT (OUTPATIENT)
Dept: ENDOSCOPY | Age: 69
End: 2024-04-10
Payer: MEDICARE

## 2024-04-10 ENCOUNTER — ANESTHESIA (OUTPATIENT)
Dept: ENDOSCOPY | Age: 69
End: 2024-04-10
Payer: MEDICARE

## 2024-04-10 LAB
ABSOLUTE BANDS: 2.48 K/UL (ref 0–1)
ALBUMIN FLD-MCNC: 1 G/DL
ALBUMIN SERPL-MCNC: 2.9 G/DL (ref 3.5–5.2)
ALP SERPL-CCNC: 350 U/L (ref 35–104)
ALT SERPL-CCNC: 37 U/L (ref 5–33)
ANION GAP SERPL CALCULATED.3IONS-SCNC: 14 MMOL/L (ref 9–17)
APPEARANCE FLD: ABNORMAL
AST SERPL-CCNC: 87 U/L
BANDS: 18 % (ref 0–10)
BASOPHILS # BLD: 0 K/UL (ref 0–0.2)
BASOPHILS NFR BLD: 0 % (ref 0–2)
BILIRUB SERPL-MCNC: 1.2 MG/DL (ref 0.3–1.2)
BLASTS NFR FLD: 0 %
BODY FLD TYPE: ABNORMAL
BUN SERPL-MCNC: 49 MG/DL (ref 8–23)
CALCIUM SERPL-MCNC: 8.8 MG/DL (ref 8.6–10.4)
CASE NUMBER:: NORMAL
CHLORIDE SERPL-SCNC: 99 MMOL/L (ref 98–107)
CO2 SERPL-SCNC: 21 MMOL/L (ref 20–31)
COLOR FLD: YELLOW
CREAT SERPL-MCNC: 1 MG/DL (ref 0.5–0.9)
EKG Q-T INTERVAL: 328 MS
EKG QRS DURATION: 96 MS
EKG QTC CALCULATION (BAZETT): 469 MS
EKG R AXIS: 16 DEGREES
EKG T AXIS: 66 DEGREES
EKG VENTRICULAR RATE: 123 BPM
EOSINOPHIL # BLD: 0.55 K/UL (ref 0–0.4)
EOSINOPHIL NFR FLD: 0 %
EOSINOPHILS RELATIVE PERCENT: 4 % (ref 0–4)
ERYTHROCYTE [DISTWIDTH] IN BLOOD BY AUTOMATED COUNT: 17.2 % (ref 11.5–14.9)
GFR SERPL CREATININE-BSD FRML MDRD: 61 ML/MIN/1.73M2
GLUCOSE BLD-MCNC: 100 MG/DL (ref 65–105)
GLUCOSE BLD-MCNC: 104 MG/DL (ref 65–105)
GLUCOSE BLD-MCNC: 111 MG/DL (ref 65–105)
GLUCOSE BLD-MCNC: 119 MG/DL (ref 65–105)
GLUCOSE BLD-MCNC: 63 MG/DL (ref 65–105)
GLUCOSE BLD-MCNC: 99 MG/DL (ref 65–105)
GLUCOSE SERPL-MCNC: 100 MG/DL (ref 70–99)
HCT VFR BLD AUTO: 25.4 % (ref 36–46)
HCT VFR BLD AUTO: 28 % (ref 36–46)
HCT VFR BLD AUTO: 28.6 % (ref 36–46)
HGB BLD-MCNC: 8.2 G/DL (ref 12–16)
HGB BLD-MCNC: 9.1 G/DL (ref 12–16)
HGB BLD-MCNC: 9.3 G/DL (ref 12–16)
LYMPHOCYTES NFR BLD: 0.41 K/UL (ref 1–4.8)
LYMPHOCYTES NFR FLD: 7 %
LYMPHOCYTES RELATIVE PERCENT: 3 % (ref 24–44)
MCH RBC QN AUTO: 27.3 PG (ref 26–34)
MCHC RBC AUTO-ENTMCNC: 32.5 G/DL (ref 31–37)
MCV RBC AUTO: 84.1 FL (ref 80–100)
MONOCYTES NFR BLD: 0.28 K/UL (ref 0.1–1.3)
MONOCYTES NFR BLD: 2 % (ref 1–7)
MONOCYTES NFR FLD: 11 %
MORPHOLOGY: ABNORMAL
NEUTROPHILS NFR BLD: 73 % (ref 36–66)
NEUTROPHILS NFR FLD: 82 %
NEUTS SEG NFR BLD: 10.08 K/UL (ref 1.3–9.1)
PLATELET # BLD AUTO: 115 K/UL (ref 150–450)
PMV BLD AUTO: 9.2 FL (ref 6–12)
POTASSIUM SERPL-SCNC: 3.4 MMOL/L (ref 3.7–5.3)
PROT FLD-MCNC: 1.5 G/DL
PROT SERPL-MCNC: 6.7 G/DL (ref 6.4–8.3)
RBC # BLD AUTO: 3.33 M/UL (ref 4–5.2)
RBC # FLD: 1222 CELLS/UL
SODIUM SERPL-SCNC: 134 MMOL/L (ref 135–144)
SPECIMEN DESCRIPTION: NORMAL
SPECIMEN TYPE: NORMAL
SPECIMEN TYPE: NORMAL
WBC # FLD: 1055 CELLS/UL
WBC OTHER # BLD: 13.8 K/UL (ref 3.5–11)
ZINC SERPL-MCNC: 54 UG/DL (ref 60–120)

## 2024-04-10 PROCEDURE — 3700000001 HC ADD 15 MINUTES (ANESTHESIA): Performed by: INTERNAL MEDICINE

## 2024-04-10 PROCEDURE — 0DJ08ZZ INSPECTION OF UPPER INTESTINAL TRACT, VIA NATURAL OR ARTIFICIAL OPENING ENDOSCOPIC: ICD-10-PCS | Performed by: INTERNAL MEDICINE

## 2024-04-10 PROCEDURE — 2000000000 HC ICU R&B

## 2024-04-10 PROCEDURE — 3700000000 HC ANESTHESIA ATTENDED CARE: Performed by: INTERNAL MEDICINE

## 2024-04-10 PROCEDURE — 6360000002 HC RX W HCPCS: Performed by: NURSE ANESTHETIST, CERTIFIED REGISTERED

## 2024-04-10 PROCEDURE — 6370000000 HC RX 637 (ALT 250 FOR IP): Performed by: INTERNAL MEDICINE

## 2024-04-10 PROCEDURE — 6370000000 HC RX 637 (ALT 250 FOR IP)

## 2024-04-10 PROCEDURE — 2580000003 HC RX 258: Performed by: INTERNAL MEDICINE

## 2024-04-10 PROCEDURE — 6360000002 HC RX W HCPCS: Performed by: INTERNAL MEDICINE

## 2024-04-10 PROCEDURE — 2500000003 HC RX 250 WO HCPCS: Performed by: INTERNAL MEDICINE

## 2024-04-10 PROCEDURE — 85014 HEMATOCRIT: CPT

## 2024-04-10 PROCEDURE — 89051 BODY FLUID CELL COUNT: CPT

## 2024-04-10 PROCEDURE — 2709999900 HC NON-CHARGEABLE SUPPLY: Performed by: INTERNAL MEDICINE

## 2024-04-10 PROCEDURE — 87070 CULTURE OTHR SPECIMN AEROBIC: CPT

## 2024-04-10 PROCEDURE — 43239 EGD BIOPSY SINGLE/MULTIPLE: CPT | Performed by: INTERNAL MEDICINE

## 2024-04-10 PROCEDURE — 0W9G3ZZ DRAINAGE OF PERITONEAL CAVITY, PERCUTANEOUS APPROACH: ICD-10-PCS | Performed by: RADIOLOGY

## 2024-04-10 PROCEDURE — 2709999900 IR US GUIDED PARACENTESIS

## 2024-04-10 PROCEDURE — 7100000000 HC PACU RECOVERY - FIRST 15 MIN: Performed by: INTERNAL MEDICINE

## 2024-04-10 PROCEDURE — 2500000003 HC RX 250 WO HCPCS: Performed by: NURSE ANESTHETIST, CERTIFIED REGISTERED

## 2024-04-10 PROCEDURE — 88305 TISSUE EXAM BY PATHOLOGIST: CPT

## 2024-04-10 PROCEDURE — A4216 STERILE WATER/SALINE, 10 ML: HCPCS | Performed by: INTERNAL MEDICINE

## 2024-04-10 PROCEDURE — C9113 INJ PANTOPRAZOLE SODIUM, VIA: HCPCS | Performed by: INTERNAL MEDICINE

## 2024-04-10 PROCEDURE — 2580000003 HC RX 258: Performed by: ANESTHESIOLOGY

## 2024-04-10 PROCEDURE — 36415 COLL VENOUS BLD VENIPUNCTURE: CPT

## 2024-04-10 PROCEDURE — 82947 ASSAY GLUCOSE BLOOD QUANT: CPT

## 2024-04-10 PROCEDURE — 84157 ASSAY OF PROTEIN OTHER: CPT

## 2024-04-10 PROCEDURE — 6360000002 HC RX W HCPCS

## 2024-04-10 PROCEDURE — 87075 CULTR BACTERIA EXCEPT BLOOD: CPT

## 2024-04-10 PROCEDURE — 49083 ABD PARACENTESIS W/IMAGING: CPT

## 2024-04-10 PROCEDURE — 82042 OTHER SOURCE ALBUMIN QUAN EA: CPT

## 2024-04-10 PROCEDURE — 87205 SMEAR GRAM STAIN: CPT

## 2024-04-10 PROCEDURE — 3609012400 HC EGD TRANSORAL BIOPSY SINGLE/MULTIPLE: Performed by: INTERNAL MEDICINE

## 2024-04-10 PROCEDURE — 80053 COMPREHEN METABOLIC PANEL: CPT

## 2024-04-10 PROCEDURE — 7100000001 HC PACU RECOVERY - ADDTL 15 MIN: Performed by: INTERNAL MEDICINE

## 2024-04-10 PROCEDURE — 85018 HEMOGLOBIN: CPT

## 2024-04-10 PROCEDURE — 88112 CYTOPATH CELL ENHANCE TECH: CPT

## 2024-04-10 PROCEDURE — 6360000002 HC RX W HCPCS: Performed by: NURSE PRACTITIONER

## 2024-04-10 PROCEDURE — 99291 CRITICAL CARE FIRST HOUR: CPT | Performed by: INTERNAL MEDICINE

## 2024-04-10 PROCEDURE — 2580000003 HC RX 258

## 2024-04-10 PROCEDURE — 85025 COMPLETE CBC W/AUTO DIFF WBC: CPT

## 2024-04-10 PROCEDURE — 99233 SBSQ HOSP IP/OBS HIGH 50: CPT | Performed by: INTERNAL MEDICINE

## 2024-04-10 PROCEDURE — 2060000000 HC ICU INTERMEDIATE R&B

## 2024-04-10 RX ORDER — PROPOFOL 10 MG/ML
INJECTION, EMULSION INTRAVENOUS PRN
Status: DISCONTINUED | OUTPATIENT
Start: 2024-04-10 | End: 2024-04-10 | Stop reason: SDUPTHER

## 2024-04-10 RX ORDER — ONDANSETRON 2 MG/ML
INJECTION INTRAMUSCULAR; INTRAVENOUS PRN
Status: DISCONTINUED | OUTPATIENT
Start: 2024-04-10 | End: 2024-04-10 | Stop reason: SDUPTHER

## 2024-04-10 RX ORDER — SODIUM CHLORIDE 9 MG/ML
INJECTION, SOLUTION INTRAVENOUS CONTINUOUS
Status: DISCONTINUED | OUTPATIENT
Start: 2024-04-10 | End: 2024-04-10 | Stop reason: HOSPADM

## 2024-04-10 RX ORDER — SODIUM CHLORIDE 9 MG/ML
INJECTION, SOLUTION INTRAVENOUS CONTINUOUS
Status: DISCONTINUED | OUTPATIENT
Start: 2024-04-10 | End: 2024-04-11

## 2024-04-10 RX ORDER — DEXAMETHASONE SODIUM PHOSPHATE 4 MG/ML
INJECTION, SOLUTION INTRA-ARTICULAR; INTRALESIONAL; INTRAMUSCULAR; INTRAVENOUS; SOFT TISSUE PRN
Status: DISCONTINUED | OUTPATIENT
Start: 2024-04-10 | End: 2024-04-10 | Stop reason: SDUPTHER

## 2024-04-10 RX ORDER — SUCCINYLCHOLINE/SOD CL,ISO/PF 200MG/10ML
SYRINGE (ML) INTRAVENOUS PRN
Status: DISCONTINUED | OUTPATIENT
Start: 2024-04-10 | End: 2024-04-10 | Stop reason: SDUPTHER

## 2024-04-10 RX ORDER — OXYCODONE HYDROCHLORIDE 5 MG/1
5 TABLET ORAL EVERY 4 HOURS PRN
Status: DISCONTINUED | OUTPATIENT
Start: 2024-04-10 | End: 2024-04-23 | Stop reason: HOSPADM

## 2024-04-10 RX ORDER — LIDOCAINE 40 MG/G
CREAM TOPICAL PRN
Status: DISCONTINUED | OUTPATIENT
Start: 2024-04-10 | End: 2024-04-23 | Stop reason: HOSPADM

## 2024-04-10 RX ORDER — LIDOCAINE HYDROCHLORIDE 20 MG/ML
INJECTION, SOLUTION EPIDURAL; INFILTRATION; INTRACAUDAL; PERINEURAL PRN
Status: DISCONTINUED | OUTPATIENT
Start: 2024-04-10 | End: 2024-04-10 | Stop reason: SDUPTHER

## 2024-04-10 RX ORDER — LANOLIN ALCOHOL/MO/W.PET/CERES
6 CREAM (GRAM) TOPICAL NIGHTLY PRN
Status: DISCONTINUED | OUTPATIENT
Start: 2024-04-10 | End: 2024-04-23 | Stop reason: HOSPADM

## 2024-04-10 RX ADMIN — SODIUM BICARBONATE: 84 INJECTION, SOLUTION INTRAVENOUS at 10:18

## 2024-04-10 RX ADMIN — DEXAMETHASONE SODIUM PHOSPHATE 4 MG: 4 INJECTION, SOLUTION INTRAMUSCULAR; INTRAVENOUS at 12:57

## 2024-04-10 RX ADMIN — METOPROLOL TARTRATE 25 MG: 25 TABLET, FILM COATED ORAL at 20:58

## 2024-04-10 RX ADMIN — SODIUM BICARBONATE: 84 INJECTION, SOLUTION INTRAVENOUS at 15:11

## 2024-04-10 RX ADMIN — INSULIN GLARGINE 40 UNITS: 100 INJECTION, SOLUTION SUBCUTANEOUS at 21:08

## 2024-04-10 RX ADMIN — LACTULOSE 20 G: 20 SOLUTION ORAL at 20:59

## 2024-04-10 RX ADMIN — PROPOFOL 100 MG: 10 INJECTION, EMULSION INTRAVENOUS at 12:53

## 2024-04-10 RX ADMIN — ONDANSETRON 4 MG: 2 INJECTION INTRAMUSCULAR; INTRAVENOUS at 03:25

## 2024-04-10 RX ADMIN — DEXTROSE MONOHYDRATE 125 ML: 100 INJECTION, SOLUTION INTRAVENOUS at 16:04

## 2024-04-10 RX ADMIN — SODIUM BICARBONATE: 84 INJECTION, SOLUTION INTRAVENOUS at 03:40

## 2024-04-10 RX ADMIN — HYDRALAZINE HYDROCHLORIDE 10 MG: 20 INJECTION INTRAMUSCULAR; INTRAVENOUS at 22:05

## 2024-04-10 RX ADMIN — OCTREOTIDE ACETATE 25 MCG/HR: 500 INJECTION, SOLUTION INTRAVENOUS; SUBCUTANEOUS at 09:46

## 2024-04-10 RX ADMIN — ONDANSETRON 4 MG: 2 INJECTION INTRAMUSCULAR; INTRAVENOUS at 12:57

## 2024-04-10 RX ADMIN — GABAPENTIN 100 MG: 100 CAPSULE ORAL at 14:58

## 2024-04-10 RX ADMIN — HYDRALAZINE HYDROCHLORIDE 10 MG: 20 INJECTION INTRAMUSCULAR; INTRAVENOUS at 09:03

## 2024-04-10 RX ADMIN — GABAPENTIN 100 MG: 100 CAPSULE ORAL at 20:58

## 2024-04-10 RX ADMIN — LIDOCAINE HYDROCHLORIDE 80 MG: 20 INJECTION, SOLUTION EPIDURAL; INFILTRATION; INTRACAUDAL; PERINEURAL at 12:53

## 2024-04-10 RX ADMIN — PROMETHAZINE HYDROCHLORIDE 12.5 MG: 25 INJECTION INTRAMUSCULAR; INTRAVENOUS at 21:29

## 2024-04-10 RX ADMIN — METOPROLOL TARTRATE 5 MG: 1 INJECTION, SOLUTION INTRAVENOUS at 20:57

## 2024-04-10 RX ADMIN — LACTULOSE 20 G: 20 SOLUTION ORAL at 14:58

## 2024-04-10 RX ADMIN — SODIUM CHLORIDE, PRESERVATIVE FREE 10 ML: 5 INJECTION INTRAVENOUS at 21:08

## 2024-04-10 RX ADMIN — ANTI-FUNGAL POWDER MICONAZOLE NITRATE TALC FREE: 1.42 POWDER TOPICAL at 21:09

## 2024-04-10 RX ADMIN — SODIUM CHLORIDE: 9 INJECTION, SOLUTION INTRAVENOUS at 11:23

## 2024-04-10 RX ADMIN — SODIUM CHLORIDE, PRESERVATIVE FREE 40 MG: 5 INJECTION INTRAVENOUS at 09:20

## 2024-04-10 RX ADMIN — Medication 100 MG: at 12:53

## 2024-04-10 RX ADMIN — RIFAXIMIN 550 MG: 550 TABLET ORAL at 20:59

## 2024-04-10 RX ADMIN — ONDANSETRON 4 MG: 2 INJECTION INTRAMUSCULAR; INTRAVENOUS at 16:03

## 2024-04-10 RX ADMIN — SODIUM CHLORIDE: 9 INJECTION, SOLUTION INTRAVENOUS at 21:18

## 2024-04-10 RX ADMIN — OXYCODONE HYDROCHLORIDE 5 MG: 5 TABLET ORAL at 20:58

## 2024-04-10 RX ADMIN — Medication 6 MG: at 00:51

## 2024-04-10 RX ADMIN — SODIUM CHLORIDE, PRESERVATIVE FREE 40 MG: 5 INJECTION INTRAVENOUS at 20:57

## 2024-04-10 RX ADMIN — CEFTRIAXONE SODIUM 1000 MG: 1 INJECTION, POWDER, FOR SOLUTION INTRAMUSCULAR; INTRAVENOUS at 17:37

## 2024-04-10 RX ADMIN — ONDANSETRON 4 MG: 2 INJECTION INTRAMUSCULAR; INTRAVENOUS at 09:03

## 2024-04-10 RX ADMIN — HYDRALAZINE HYDROCHLORIDE 10 MG: 20 INJECTION INTRAMUSCULAR; INTRAVENOUS at 15:18

## 2024-04-10 ASSESSMENT — PAIN DESCRIPTION - LOCATION
LOCATION: ABDOMEN
LOCATION: BACK

## 2024-04-10 ASSESSMENT — PAIN DESCRIPTION - ORIENTATION: ORIENTATION: UPPER

## 2024-04-10 ASSESSMENT — PAIN SCALES - GENERAL
PAINLEVEL_OUTOF10: 3
PAINLEVEL_OUTOF10: 4
PAINLEVEL_OUTOF10: 10
PAINLEVEL_OUTOF10: 9
PAINLEVEL_OUTOF10: 5

## 2024-04-10 ASSESSMENT — PAIN DESCRIPTION - DESCRIPTORS
DESCRIPTORS: ACHING
DESCRIPTORS: CRAMPING

## 2024-04-10 ASSESSMENT — PAIN - FUNCTIONAL ASSESSMENT
PAIN_FUNCTIONAL_ASSESSMENT: 0-10
PAIN_FUNCTIONAL_ASSESSMENT: 0-10

## 2024-04-10 NOTE — PROGRESS NOTES
Chart reviewed.  Hematuria noted.  Urology input noted.  Currently just treating with Kerns irrigation    Patient did have paracentesis 2 L fluid removal.  Total white blood cell count 1055 with 82% neutrophils>> consistent with spontaneous bacterial peritonitis.  Already on ceftriaxone.    I was not aware until just now but apparently the patient had significant hematemesis this morning or last night.  I did not receive any phone calls.  She is currently an EGD.  We will see what they find and if she is stable afterwards.  According to the staff she was lethargic and difficult to arouse more than yesterday.  She was oriented x 4 when she was awake according to staff.    Patient had influenza almost 2 weeks ago.  Other than untreated sleep apnea no real active pulmonary issues.    I will try to catch her when she comes back from EGD.  Patient has had prior history of varices.      Jasson Silva MD  Pulmonary and Critical Care  [phone] [facility]  [phone] Cell    Addendum:  Patient seen in the endoscopy suite.  Currently just seeing some small nonbleeding duodenal ulcers.  No significant esophageal varices still investigating for potential gastric varices but nothing showing obvious bleeding.  Patient currently intubated without any blood coming from the endotracheal tube.  Patient was alert and oriented prior to surgery according to the endoscopy staff.  I do not expect to find any problem here.  We will simply observe.    Findings discussed with the ICU nurse as well    Jasson Silva MD  Pulmonary and Critical Care  [phone] [facility]  [phone] Cell

## 2024-04-10 NOTE — PROGRESS NOTES
[facility]   OCCUPATIONAL THERAPY MISSED TREATMENT NOTE   INPATIENT   Date: 4/10/24  Patient Name: Bobbi Phillips       Room:   MRN: [id number]   Account #: [id number]    : 1955  (68 y.o.)  Gender: female   Referring Practitioner: Brad Maciel MD  Diagnosis: Acute kidney injury    REASON FOR MISSED TREATMENT:  Hold treatment per nursing request   -    Pt medically unstable, being transferred to intermediate ICU.  0856    Electronically signed by PANCHITO Perez on 4/10/24 at 9:15 AM EDT

## 2024-04-10 NOTE — PROGRESS NOTES
Dr. Villegas called unit regarding secure message sent by writer notifying him of hematuria. Dr. Villegas aware that patient is having bloody clots. Dr. Villegas gave order for PRN Q1H hand irrigation through nichols.

## 2024-04-10 NOTE — OP NOTE
PROCEDURE NOTE    DATE OF PROCEDURE: 4/10/2024     SURGEON: Enedelia Crow MD  Facility: \"[facility]  ASSISTANT: None  Anesthesia: General anesthesia  PREOPERATIVE DIAGNOSIS:   Coffee-ground emesis    Diagnosis:  As below    POSTOPERATIVE DIAGNOSIS: As described below    OPERATION: Upper GI endoscopy with Biopsy    ANESTHESIA: Moderate Sedation     ESTIMATED BLOOD LOSS: Less than 50 ml    COMPLICATIONS: None.     SPECIMENS:  Was Not Obtained    HISTORY: The patient is a 68 y.o. year old female with history of above preop diagnosis.  I recommended esophagogastroduodenoscopy with possible biopsy and I explained the risk, benefits, expected outcome, and alternatives to the procedure.  Risks included but are not limited to bleeding, infection, respiratory distress, hypotension, and perforation of the esophagus, stomach, or duodenum.  Patient understands and is in agreement.      The patient was counseled at length about the risks of bruce Covid-19 during their perioperative period and any recovery window from their procedure.  The patient was made aware that bruce Covid-19  may worsen their prognosis for recovering from their procedure  and lend to a higher morbidity and/or mortality risk.  All material risks, benefits, and reasonable alternatives including postponing the procedure were discussed. The patient does wish to proceed with the procedure at this time.         PROCEDURE: The patient was given IV conscious sedation.  The patient's SPO2 remained above 90% throughout the procedure.The gastroscope was inserted orally and advanced under direct vision through the esophagus, through the stomach, through the pylorus, and into the descending duodenum.      Post sedation note :The patient's SPO2 remained above 90% throughout the procedure.the vital signs remained stable , and no immediate complication form the procedure noted, patient will be ready for d/c when criteria is met

## 2024-04-10 NOTE — PROGRESS NOTES
0850: Patient back on unit from IR. Continues to have large amounts ramos/ blood tinged emesis./64 . Dr. Osborne paged and notified.     Dr. Maciel on unit and available to assess patient. Orders placed for labs, protonix BID, sandostatin drip and transfer to ICU.     Call placed to house supervisor to request bed.

## 2024-04-10 NOTE — FLOWSHEET NOTE
GI NP Monae notified of pt having new onset n/v with coffee ground emesis since overnight.  RN requested imaging, no orders received at this time.       04/10/24 0814   Treatment Team Notification   Reason for Communication Evaluate   Name of Team Member Notified Monae Beasley NP   Treatment Team Role Advanced Practice Nurse   Method of Communication Secure Message   Response No new orders   Notification Time 0824     Suhail Cancino RN

## 2024-04-10 NOTE — PROGRESS NOTES
NEPHROLOGY PROGRESS NOTE    Patient :  Bobbi Phillips; 68 y.o. MRN# [id number]  Location:  2096/2096-01  Attending:  Courtney Carrillo MD  Admit Date:  4/3/2024   Hospital Day: 7      Reason for Consult:  KIM      Chief Complaint:  Back pain, hematuria   History Obtained From:  patient     Interval history: Patient was seen and examined this morning and she was moaning and complaining of low back pain.  She is however awake and oriented x 3.  She is on bicarbonate drip at 100 mL/h.  She has diarrhea and has a fecal management system in place.    History of Present Illness:    This is a 68 y.o. 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   Patient was seen in the ED approximately 1 week ago for fall. Patient was found to have UTI at that time and discharged on Keflex.   Patient denies N/V/diarrhea, fever chills.  C/o dysuria, flank pain and frequency.  Patient started on IV Abx.  Scr on admission 1.3 mg/dl, scr peaked to 1.9 mg/dl and therefor Nephrology consultation requested.  Bladder scan positive for urinary retention, nichols catheter was placed.  Scr improved to 1.3 mg/dl this morning.  Patient noted to be lethargic and decrease in responsiveness this morning and was transferred to ICU for AMS which was suspected to be due to use Narcotics.    Patient takes NSAIDs   There had exposure to IV contrast on 4/3/24. There is no history  of paraprotein disease. Pt denies any history of recurrent UTI or kidney stones.  Medication review shows use of ACE-inhibitor/diuretics.      Current Medications:    melatonin tablet 6 mg, Nightly PRN  lidocaine (LMX) 4 % cream, PRN  lisinopril (PRINIVIL;ZESTRIL) tablet 10 mg, Daily  metoprolol tartrate (LOPRESSOR) tablet 25 mg, BID  sodium bicarbonate 75 mEq in sodium chloride 0.45 % 1,000 mL infusion, Continuous  rifAXIMin (XIFAXAN) tablet 550 mg, TID  hydroCHLOROthiazide (HYDRODIURIL) tablet 25 mg, Daily  metoprolol  (24hrs), Av, Min:39, Max:75    24HR INTAKE/OUTPUT:    Intake/Output Summary (Last 24 hours) at 4/10/2024 0715  Last data filed at 4/10/2024 0556  Gross per 24 hour   Intake 2162.79 ml   Output 2850 ml   Net -687.21 ml       No data found.    Physical Exam:  GENERAL APPEARANCE: Alert and cooperative, and appears to be in no acute distress.  HEAD: normocephalic  EYES:  EOMI. Not pale, anicteric   NOSE:  No nasal discharge.    CARDIAC: Normal S1 and S2. No S3, S4 or murmurs. Rhythm is regular.  LUNGS: Clear to auscultation and percussion without rales, rhonchi, wheezing or diminished breath sounds.  NECK: Neck supple, non-tender without lymphadenopathy, masses or thyromegaly. JVD-neg  GI soft non tender  MUSKULOSKELETAL: Adequately aligned spine. No joint erythema or tenderness.   EXTREMITIES: No edema. Peripheral pulses intact.   NEURO: Non focal      Labs:   CBC:  Recent Labs     24  0916 24  0524 04/10/24  0553   WBC 16.8* 14.5* 13.8*   RBC 3.52* 3.40* 3.33*   HGB 9.5* 9.3* 9.1*   HCT 29.5* 29.3* 28.0*   MCV 83.8 86.2 84.1   MCH 27.1 27.2 27.3   MCHC 32.3 31.6 32.5   RDW 17.5* 17.4* 17.2*   PLT 89* 101* 115*   MPV 9.5 9.3 9.2        BMP:   Recent Labs     24  1115 24  0524 04/10/24  0553   * 135 134*   K 4.4 3.7 3.4*   CL 97* 102 99   CO2 16* 17* 21   BUN 58* 52* 49*   CREATININE 1.9* 1.3* 1.0*   GLUCOSE 174* 125* 100*   CALCIUM 8.6 8.7 8.8        Phosphorus:  No results for input(s): \"PHOS\" in the last 72 hours.  Magnesium: No results for input(s): \"MG\" in the last 72 hours.  Albumin:   Recent Labs     24  0524 04/10/24  0553   [facility] 3.2* 2.9*         IRON:  No results for input(s): \"IRON\" in the last 72 hours.  Iron Saturation:  Invalid input(s): \"PERCENTFE\"  TIBC:  No results for input(s): \"TIBC\" in the last 72 hours.  FERRITIN:  No results for input(s): \"FERRITIN\" in the last 72 hours.  SPEP: No results for input(s): \"SPEP\" in the last 72 hours.   Recent Labs

## 2024-04-10 NOTE — CARE COORDINATION
Writer is following for potential discharge placement.  [facility] has accepted this pt.    Writer met with pt kashif Fisher and Andrea in room, confirm that plan is still for [facility] when pt is medically stable.    No further questions at this time.  Electronically signed by HUANG Ayers on 4/10/2024 at 4:51 PM

## 2024-04-10 NOTE — PROGRESS NOTES
Writer transferred pt to 2096 with all their belongings and on monitor. Report given over the phone to RN.

## 2024-04-10 NOTE — OR NURSING
Alert and oriented. US in use. Lt abd prepped draped. Numbed and accessed by Dr Aparicio Obtained 2 L of clear yellow fluid. Dressing applied Tolerated well Report called to Roshan DIAZ. Specimen to lab

## 2024-04-10 NOTE — CARE COORDINATION
DISCHARGE PLANNING NOTE:    Pt off the floor for EGD.    Pt had paracentesis at today 2 liter removed.     Electronically signed by Tasha Leroy RN on 4/10/2024 at 12:52 PM

## 2024-04-10 NOTE — PLAN OF CARE
Problem: Discharge Planning  Goal: Discharge to home or other facility with appropriate resources  4/10/2024 0504 by Nannette Pugh RN  Outcome: Progressing  Flowsheets  Taken 4/9/2024 2230 by Nannette Pugh RN  Discharge to home or other facility with appropriate resources:   Identify barriers to discharge with patient and caregiver   Arrange for needed discharge resources and transportation as appropriate   Identify discharge learning needs (meds, wound care, etc)   Refer to discharge planning if patient needs post-hospital services based on physician order or complex needs related to functional status, cognitive ability or social support system  Taken 4/9/2024 2000 by Jaylene Hurley RN  Discharge to home or other facility with appropriate resources:   Identify barriers to discharge with patient and caregiver   Arrange for needed discharge resources and transportation as appropriate   Refer to discharge planning if patient needs post-hospital services based on physician order or complex needs related to functional status, cognitive ability or social support system     Problem: Pain  Goal: Verbalizes/displays adequate comfort level or baseline comfort level  4/10/2024 0504 by Nannette Pugh RN  Outcome: Progressing  Flowsheets (Taken 4/9/2024 2000 by Jaylene Hurley RN)  Verbalizes/displays adequate comfort level or baseline comfort level:   Encourage patient to monitor pain and request assistance   Assess pain using appropriate pain scale   Administer analgesics based on type and severity of pain and evaluate response     Problem: Skin/Tissue Integrity  Goal: Absence of new skin breakdown  Description: 1.  Monitor for areas of redness and/or skin breakdown  2.  Assess vascular access sites hourly  3.  Every 4-6 hours minimum:  Change oxygen saturation probe site  4.  Every 4-6 hours:  If on nasal continuous positive airway pressure, respiratory therapy assess nares and determine need for appliance change or  resting period.  4/10/2024 0504 by Nannette Pugh RN  Outcome: Progressing     Problem: Safety - Adult  Goal: Free from fall injury  4/10/2024 0504 by Nannette Pugh RN  Outcome: Progressing     Problem: Chronic Conditions and Co-morbidities  Goal: Patient's chronic conditions and co-morbidity symptoms are monitored and maintained or improved  4/10/2024 0504 by Nannette Pugh RN  Outcome: Progressing  Flowsheets  Taken 4/9/2024 2230 by Nannette Pugh RN  Care Plan - Patient's Chronic Conditions and Co-Morbidity Symptoms are Monitored and Maintained or Improved:   Monitor and assess patient's chronic conditions and comorbid symptoms for stability, deterioration, or improvement   Collaborate with multidisciplinary team to address chronic and comorbid conditions and prevent exacerbation or deterioration   Update acute care plan with appropriate goals if chronic or comorbid symptoms are exacerbated and prevent overall improvement and discharge  Taken 4/9/2024 2000 by Jaylene Hurley RN  Care Plan - Patient's Chronic Conditions and Co-Morbidity Symptoms are Monitored and Maintained or Improved:   Monitor and assess patient's chronic conditions and comorbid symptoms for stability, deterioration, or improvement   Collaborate with multidisciplinary team to address chronic and comorbid conditions and prevent exacerbation or deterioration   Update acute care plan with appropriate goals if chronic or comorbid symptoms are exacerbated and prevent overall improvement and discharge     Problem: ABCDS Injury Assessment  Goal: Absence of physical injury  4/10/2024 0504 by Nannette Pugh RN  Outcome: Progressing     Problem: Neurosensory - Adult  Goal: Achieves stable or improved neurological status  Outcome: Progressing  Flowsheets (Taken 4/9/2024 2230)  Achieves stable or improved neurological status:   Assess for and report changes in neurological status   Initiate measures to prevent increased intracranial pressure     Problem:

## 2024-04-10 NOTE — ANESTHESIA POSTPROCEDURE EVALUATION
Department of Anesthesiology  Postprocedure Note    Patient: Bobbi Phillips  MRN: [id number]  YOB: 1955  Date of evaluation: 4/10/2024    Procedure Summary       Date: 04/10/24 Room / Location: [place] [id number] / [facility]    Anesthesia Start: 1247 Anesthesia Stop: 1323    Procedure: ESOPHAGOGASTRODUODENOSCOPY BIOPSY (Esophagus) Diagnosis:       Hematemesis, unspecified whether nausea present      (Hematemesis, unspecified whether nausea present [K92.0])    Surgeons: Enedelia Crow MD Responsible Provider: Viji Garduno MD    Anesthesia Type: general ASA Status: 3 - Emergent            Anesthesia Type: No value filed.    Domenica Phase I: Domenica Score: 9    Domenica Phase II:      Anesthesia Post Evaluation    Comments: POST- ANESTHESIA EVALUATION       Pt Name: Bobbi Phillips  MRN: [id number]  YOB: 1955  Date of evaluation: 4/10/2024  Time:  2:02 PM      BP (!) 136/57   Pulse 92   Temp 97 °F (36.1 °C)   Resp 15   Ht 1.499 m (4' 11\")   SpO2 93%   BMI 39.18 kg/m²      Consciousness Level  Awake  Cardiopulmonary Status  Stable  Pain Adequately Treated YES  Nausea / Vomiting  NO  Adequate Hydration  YES  Anesthesia Related Complications NONE      Electronically signed by Viji Garduno MD on 4/10/2024 at 2:02 PM      No notable events documented.

## 2024-04-10 NOTE — PROGRESS NOTES
[facility] Internal Medicine  Nikolas Tan MD; Ray Hwang MD; Brad Maciel MD; MD Courtney Lr MD; Germania Osborne MD; Marlin Gutierrez MD      Progress note            Date:   4/10/2024  Patient name:  Bobbi Phillips  MRN:   [id number]  Account:  [id number]  YOB: 1955  PCP:    Stacie Doty MD  Code Status:    Full Code    Chief Complaint:     Chief Complaint   Patient presents with    Back Pain         History Obtained From:     Patient, EMR, nursing staff    HPI     This patient is a 68 y.o. Non- / non  femalewho presents with back pain  History of type 2 diabetes, with diabetic retinopathy and neuropathy, hypertension,  Patient is poor historian she reports she has been given some pain medication and is unable to relate sequence of events accurately  Per EMR patient has chronic low back pain, she had fall 5 days ago and was seen in ER CT thoracic and lumbar spine nil acute but UA positive for UTI culture showed Klebsiella sensitive to Keflex which is what she was discharged on.  Patient reported subsequent improvement in back pain however reports pain became worse again today  Moderate intensity progressive persistent no aggravating leaving factors noted  Denies any associated fevers diarrhea or vomiting    ER evaluation showed UA suggestive of persistent UTI, severe hyperglycemia, KIM    Review of Systems:     Denies any shortness of breath or cough  Denies chest pain or palpitations  Positive for flank pain, lower back pain  Denies any new numbness tremors or weakness.    A 10 point review of systems was performed and and negative except as mentioned in HPI  Positive and Negative as described in HPI.  4/4  Patient, appear very tired, fatigue, sleepy,  Open eyes to command, and then dosing off  Although oriented time place person  Sister at bedside she could able to recognize her sister  Was given morphine yesterday    Past Medical History:  bid  Stat HH  Transfer to icu  High risk of mortality and morbidity  Cc 35 mins          Brad Maciel MD  4/10/2024  9:00 AM

## 2024-04-10 NOTE — PROGRESS NOTES
Pt is vomiting coffe ground vomit  Likey gastritis,liver disease could be variceal bleed slow  sandostatin drip  Iv protonix 40 bid  Stat HH  Transfer to icu  Brad Maciel MD  4/10/2024

## 2024-04-10 NOTE — ANESTHESIA PRE PROCEDURE
• [MAR Hold] magnesium sulfate 2000 mg in water 50 mL IVPB  2,000 mg IntraVENous PRN Courtney Carrillo MD       • [MAR Hold] ondansetron (ZOFRAN-ODT) disintegrating tablet 4 mg  4 mg Oral Q8H PRN Courtney Carrillo MD        Or   • [MAR Hold] ondansetron (ZOFRAN) injection 4 mg  4 mg IntraVENous Q6H PRN Courtney Carrillo MD   4 mg at 04/10/24 0903   • [MAR Hold] polyethylene glycol (GLYCOLAX) packet 17 g  17 g Oral Daily PRN Courtney Carrillo MD       • [Held by provider] atorvastatin (LIPITOR) tablet 10 mg  10 mg Oral Daily Courtney Carrillo MD   10 mg at 04/08/24 1030   • [MAR Hold] insulin glargine (LANTUS) injection vial 40 Units  40 Units SubCUTAneous BID Courtney Carrillo MD   40 Units at 04/08/24 2059   • [MAR Hold] insulin lispro (HUMALOG) injection vial 0-8 Units  0-8 Units SubCUTAneous TID WC Courtney Carrillo MD   4 Units at 04/06/24 1711   • [MAR Hold] insulin lispro (HUMALOG) injection vial 0-4 Units  0-4 Units SubCUTAneous Nightly Courtney Carrillo MD   4 Units at 04/03/24 2316   • [MAR Hold] glucose chewable tablet 16 g  4 tablet Oral PRN Courtney Carrillo MD   16 g at 04/09/24 2044   • [MAR Hold] dextrose bolus 10% 125 mL  125 mL IntraVENous PRN Courtney Carrillo MD   Stopped at 04/09/24 2135    Or   • [MAR Hold] dextrose bolus 10% 250 mL  250 mL IntraVENous PRN Courtney Carrillo MD       • [MAR Hold] glucagon injection 1 mg  1 mg SubCUTAneous PRN Courtney Carrillo MD       • [MAR Hold] dextrose 10 % infusion   IntraVENous Continuous PRN Courtney Carrillo MD       • [MAR Hold] hydrALAZINE (APRESOLINE) injection 10 mg  10 mg IntraVENous Q6H PRN Cande Hollis APRN - NP   10 mg at 04/10/24 0903       Allergies:    Allergies   Allergen Reactions   • Tylenol [Acetaminophen] Other (See Comments)     intolerance due to cirrhosis       Problem List:    Patient Active Problem List   Diagnosis Code   • Anxiety F41.9   • Liver cirrhosis secondary to BONNER (HCC) K75.81, K74.60   • Disc degeneration, lumbar M51.36   •

## 2024-04-10 NOTE — PROGRESS NOTES
Comprehensive Nutrition Assessment    Type and Reason for Visit:  Initial    Nutrition Recommendations/Plan:   Continue diet as ordered.  Follow for diet advancement.      Malnutrition Assessment:  Malnutrition Status:  Insufficient data (04/10/24 3346)    Context:  Acute Illness     Findings of the 6 clinical characteristics of malnutrition:  Energy Intake:  Unable to assess  Weight Loss:  No significant weight loss     Body Fat Loss:  No significant body fat loss     Muscle Mass Loss:       Fluid Accumulation:  Mild Generalized   Strength:  Not Performed    Nutrition Assessment:    On 4-3 pt to ED with complaint of bilateral back pain & upper abdominal pain. History of DM with retinopathy & neuropathy, underlying cirrhosis related to obesity & diabetes. Pt had been on a 4 carbohydrate diet from 4-4 till 4-9 at lunch. Pt had large amounts of ramos blood tinged emesis. Went for EGD today revealing esophagitis with no sara esophageal varices, stomach full of solid food which GI couldn't clear, and duodenum large ulcer on the ball but no active bleeding. GI ordered full liquid diet.    Nutrition Related Findings:    Edema: +1 Generalized. Paracentesis with 2 liters removed. Labs & meds reviewed. Wound Type: None       Current Nutrition Intake & Therapies:    Average Meal Intake: Unable to assess (diet just ordered)     ADULT DIET; Full Liquid  ADULT ORAL NUTRITION SUPPLEMENT; Breakfast, Lunch, Dinner; Low Calorie/High Protein Oral Supplement    Anthropometric Measures:  Height: 149.9 cm (4' 11\")  Ideal Body Weight (IBW): 95 lbs (43 kg)    Admission Body Weight: 88 kg (194 lb)  Current Body Weight: 88 kg (194 lb), 204.2 % IBW. Weight Source: Not Specified  Current BMI (kg/m2): 39.2                          BMI Categories: Obese Class 2 (BMI 35.0 -39.9)    Estimated Daily Nutrient Needs:  Energy Requirements Based On: Formula  Weight Used for Energy Requirements: Admission  Energy (kcal/day): 3497-5326 kcals based

## 2024-04-10 NOTE — PROGRESS NOTES
Department of Urology  Urology Progress Note    Patient:  Bobbi Phillips  MRN: [id number]  YOB: 1955    Subjective:  Not feeling well this morning, vomited all night.   Feels fatigued and weak.   Her nichols catheter is draining bloody urine- cherry red.  Her hgb is stable at 9.1.      Patient Vitals for the past 24 hrs:   BP Temp Temp src Pulse Resp SpO2   04/10/24 0745 (!) 174/62 98.4 °F (36.9 °C) -- 95 18 95 %   04/10/24 0436 (!) 153/53 98.2 °F (36.8 °C) -- 94 20 92 %   04/10/24 0040 (!) 153/62 97.7 °F (36.5 °C) -- 96 19 93 %   04/09/24 2230 121/75 98.1 °F (36.7 °C) Oral 95 18 98 %   04/09/24 2200 (!) 109/46 -- -- 98 18 96 %   04/09/24 2145 (!) 163/61 -- -- 98 21 94 %   04/09/24 2130 (!) 175/60 -- -- (!) 105 18 93 %   04/09/24 2115 (!) 160/47 -- -- 99 20 93 %   04/09/24 2100 (!) 174/53 -- -- (!) 102 21 94 %   04/09/24 2045 (!) 148/50 -- -- (!) 102 20 93 %   04/09/24 2030 (!) 163/48 -- -- (!) 101 21 93 %   04/09/24 2015 (!) 142/51 -- -- (!) 101 21 97 %   04/09/24 2000 (!) 118/47 98.3 °F (36.8 °C) Axillary 97 17 94 %   04/09/24 1945 (!) 136/50 -- -- 99 18 97 %   04/09/24 1930 (!) 129/42 -- -- 99 17 97 %   04/09/24 1915 (!) 144/46 -- -- 99 19 96 %   04/09/24 1900 (!) 148/44 -- -- 100 17 96 %   04/09/24 1845 (!) 130/43 -- -- 98 15 96 %   04/09/24 1830 (!) 107/44 -- -- 99 16 95 %   04/09/24 1815 (!) 128/44 -- -- 98 17 96 %   04/09/24 1800 (!) 146/52 -- -- (!) 103 20 97 %   04/09/24 1700 -- -- -- (!) 102 16 96 %   04/09/24 1600 (!) 168/47 -- -- (!) 104 19 97 %   04/09/24 1548 (!) 122/50 -- -- (!) 106 18 94 %   04/09/24 1500 (!) 119/44 -- -- (!) 106 16 97 %   04/09/24 1400 119/68 -- -- (!) 107 (!) 31 97 %   04/09/24 1315 (!) 122/39 -- -- (!) 107 13 --   04/09/24 1305 (!) 141/50 97.5 °F (36.4 °C) Axillary (!) 110 16 --       Intake/Output Summary (Last 24 hours) at 4/10/2024 0808  Last data filed at 4/10/2024 0556  Gross per 24 hour   Intake 2162.79 ml   Output 2850 ml   Net -687.21 ml       Recent Labs

## 2024-04-10 NOTE — PROGRESS NOTES
Physical Therapy  DATE: 4/10/2024    NAME: Bobbi Phillips  MRN: [id number]   : 1955    Patient not seen this date for Physical Therapy due to:      [x] Cancel by RN or physician due to: checked with RN Stephy @ 5189. Pt medically unstable, being transferred to intermediate ICU. Will continue to follow.     [] Hemodialysis    [] Critical Lab Value Level     [] Blood transfusion in progress    [] Acute or unstable cardiovascular status   _MAP < 55 or more than >115  _HR < 40 or > 130    [] Acute or unstable pulmonary status   -FiO2 > 60%   _RR < 5 or >40    _O2 sats < 85%    [] Strict Bedrest    [] Off Unit for surgery or procedure    [] Off Unit for testing       [] Pending imaging to R/O fracture    [] Refusal by Patient      [] Other      [] PT being discontinued at this time. Patient independent. No further needs.     [] PT being discontinued at this time as the patient has been transferred to hospice care. No further needs.      Electronically signed by Jackie Fung PTA on 4/10/24 at 10:16 AM EDT

## 2024-04-11 LAB
AMMONIA PLAS-SCNC: 62 UMOL/L (ref 11–51)
ANION GAP SERPL CALCULATED.3IONS-SCNC: 11 MMOL/L (ref 9–17)
BUN SERPL-MCNC: 47 MG/DL (ref 8–23)
CALCIUM SERPL-MCNC: 8.8 MG/DL (ref 8.6–10.4)
CHLORIDE SERPL-SCNC: 103 MMOL/L (ref 98–107)
CO2 SERPL-SCNC: 22 MMOL/L (ref 20–31)
CREAT SERPL-MCNC: 1 MG/DL (ref 0.5–0.9)
ERYTHROCYTE [DISTWIDTH] IN BLOOD BY AUTOMATED COUNT: 17.5 % (ref 11.5–14.9)
GFR SERPL CREATININE-BSD FRML MDRD: 61 ML/MIN/1.73M2
GLUCOSE BLD-MCNC: 109 MG/DL (ref 65–105)
GLUCOSE BLD-MCNC: 115 MG/DL (ref 65–105)
GLUCOSE BLD-MCNC: 120 MG/DL (ref 65–105)
GLUCOSE BLD-MCNC: 132 MG/DL (ref 65–105)
GLUCOSE BLD-MCNC: 150 MG/DL (ref 65–105)
GLUCOSE BLD-MCNC: 66 MG/DL (ref 65–105)
GLUCOSE BLD-MCNC: 69 MG/DL (ref 65–105)
GLUCOSE BLD-MCNC: 87 MG/DL (ref 65–105)
GLUCOSE SERPL-MCNC: 89 MG/DL (ref 70–99)
HCT VFR BLD AUTO: 27.5 % (ref 36–46)
HCT VFR BLD AUTO: 28.6 % (ref 36–46)
HCT VFR BLD AUTO: 29.6 % (ref 36–46)
HGB BLD-MCNC: 9 G/DL (ref 12–16)
HGB BLD-MCNC: 9.3 G/DL (ref 12–16)
HGB BLD-MCNC: 9.3 G/DL (ref 12–16)
MCH RBC QN AUTO: 28 PG (ref 26–34)
MCHC RBC AUTO-ENTMCNC: 32.6 G/DL (ref 31–37)
MCV RBC AUTO: 85.8 FL (ref 80–100)
MICROORGANISM SPEC CULT: NORMAL
MICROORGANISM/AGENT SPEC: NORMAL
PLATELET # BLD AUTO: 178 K/UL (ref 150–450)
PMV BLD AUTO: 8.2 FL (ref 6–12)
POTASSIUM SERPL-SCNC: 3.8 MMOL/L (ref 3.7–5.3)
RBC # BLD AUTO: 3.33 M/UL (ref 4–5.2)
SODIUM SERPL-SCNC: 136 MMOL/L (ref 135–144)
SPECIMEN DESCRIPTION: NORMAL
SURGICAL PATHOLOGY REPORT: NORMAL
WBC OTHER # BLD: 12.5 K/UL (ref 3.5–11)

## 2024-04-11 PROCEDURE — 99233 SBSQ HOSP IP/OBS HIGH 50: CPT | Performed by: INTERNAL MEDICINE

## 2024-04-11 PROCEDURE — 6360000002 HC RX W HCPCS: Performed by: INTERNAL MEDICINE

## 2024-04-11 PROCEDURE — 6370000000 HC RX 637 (ALT 250 FOR IP): Performed by: INTERNAL MEDICINE

## 2024-04-11 PROCEDURE — 99233 SBSQ HOSP IP/OBS HIGH 50: CPT | Performed by: NURSE PRACTITIONER

## 2024-04-11 PROCEDURE — 6360000002 HC RX W HCPCS

## 2024-04-11 PROCEDURE — 99231 SBSQ HOSP IP/OBS SF/LOW 25: CPT | Performed by: INTERNAL MEDICINE

## 2024-04-11 PROCEDURE — 2580000003 HC RX 258: Performed by: INTERNAL MEDICINE

## 2024-04-11 PROCEDURE — 82140 ASSAY OF AMMONIA: CPT

## 2024-04-11 PROCEDURE — 85027 COMPLETE CBC AUTOMATED: CPT

## 2024-04-11 PROCEDURE — 85014 HEMATOCRIT: CPT

## 2024-04-11 PROCEDURE — C9113 INJ PANTOPRAZOLE SODIUM, VIA: HCPCS | Performed by: INTERNAL MEDICINE

## 2024-04-11 PROCEDURE — 51700 IRRIGATION OF BLADDER: CPT

## 2024-04-11 PROCEDURE — 80048 BASIC METABOLIC PNL TOTAL CA: CPT

## 2024-04-11 PROCEDURE — 2580000003 HC RX 258

## 2024-04-11 PROCEDURE — A4216 STERILE WATER/SALINE, 10 ML: HCPCS | Performed by: INTERNAL MEDICINE

## 2024-04-11 PROCEDURE — 2500000003 HC RX 250 WO HCPCS: Performed by: INTERNAL MEDICINE

## 2024-04-11 PROCEDURE — 2060000000 HC ICU INTERMEDIATE R&B

## 2024-04-11 PROCEDURE — 36415 COLL VENOUS BLD VENIPUNCTURE: CPT

## 2024-04-11 PROCEDURE — APPSS30 APP SPLIT SHARED TIME 16-30 MINUTES: Performed by: NURSE PRACTITIONER

## 2024-04-11 PROCEDURE — 85018 HEMOGLOBIN: CPT

## 2024-04-11 PROCEDURE — 82947 ASSAY GLUCOSE BLOOD QUANT: CPT

## 2024-04-11 RX ORDER — FLUCONAZOLE 100 MG/1
200 TABLET ORAL DAILY
Status: COMPLETED | OUTPATIENT
Start: 2024-04-11 | End: 2024-04-13

## 2024-04-11 RX ORDER — METOCLOPRAMIDE HYDROCHLORIDE 5 MG/ML
10 INJECTION INTRAMUSCULAR; INTRAVENOUS
Status: DISCONTINUED | OUTPATIENT
Start: 2024-04-11 | End: 2024-04-13

## 2024-04-11 RX ORDER — LISINOPRIL 20 MG/1
20 TABLET ORAL DAILY
Status: DISCONTINUED | OUTPATIENT
Start: 2024-04-12 | End: 2024-04-20

## 2024-04-11 RX ADMIN — METOPROLOL TARTRATE 25 MG: 25 TABLET, FILM COATED ORAL at 11:16

## 2024-04-11 RX ADMIN — SODIUM CHLORIDE, PRESERVATIVE FREE 40 MG: 5 INJECTION INTRAVENOUS at 19:59

## 2024-04-11 RX ADMIN — FLUCONAZOLE 200 MG: 100 TABLET ORAL at 20:14

## 2024-04-11 RX ADMIN — PROMETHAZINE HYDROCHLORIDE 12.5 MG: 25 INJECTION INTRAMUSCULAR; INTRAVENOUS at 14:58

## 2024-04-11 RX ADMIN — METOCLOPRAMIDE 10 MG: 5 INJECTION, SOLUTION INTRAMUSCULAR; INTRAVENOUS at 20:00

## 2024-04-11 RX ADMIN — INSULIN GLARGINE 40 UNITS: 100 INJECTION, SOLUTION SUBCUTANEOUS at 09:21

## 2024-04-11 RX ADMIN — CEFTRIAXONE SODIUM 1000 MG: 1 INJECTION, POWDER, FOR SOLUTION INTRAMUSCULAR; INTRAVENOUS at 17:41

## 2024-04-11 RX ADMIN — METOCLOPRAMIDE 10 MG: 5 INJECTION, SOLUTION INTRAMUSCULAR; INTRAVENOUS at 16:43

## 2024-04-11 RX ADMIN — LACTULOSE 20 G: 20 SOLUTION ORAL at 19:59

## 2024-04-11 RX ADMIN — RIFAXIMIN 550 MG: 550 TABLET ORAL at 13:35

## 2024-04-11 RX ADMIN — SODIUM CHLORIDE, PRESERVATIVE FREE 10 ML: 5 INJECTION INTRAVENOUS at 20:01

## 2024-04-11 RX ADMIN — DEXTROSE MONOHYDRATE 125 ML: 100 INJECTION, SOLUTION INTRAVENOUS at 11:58

## 2024-04-11 RX ADMIN — HYDRALAZINE HYDROCHLORIDE 10 MG: 20 INJECTION INTRAMUSCULAR; INTRAVENOUS at 06:08

## 2024-04-11 RX ADMIN — HYDROCHLOROTHIAZIDE 25 MG: 25 TABLET ORAL at 11:14

## 2024-04-11 RX ADMIN — LACTULOSE 20 G: 20 SOLUTION ORAL at 11:15

## 2024-04-11 RX ADMIN — OCTREOTIDE ACETATE 25 MCG/HR: 500 INJECTION, SOLUTION INTRAVENOUS; SUBCUTANEOUS at 01:41

## 2024-04-11 RX ADMIN — GABAPENTIN 100 MG: 100 CAPSULE ORAL at 13:34

## 2024-04-11 RX ADMIN — LACTULOSE 20 G: 20 SOLUTION ORAL at 13:34

## 2024-04-11 RX ADMIN — LISINOPRIL 10 MG: 10 TABLET ORAL at 11:16

## 2024-04-11 RX ADMIN — METOPROLOL TARTRATE 25 MG: 25 TABLET, FILM COATED ORAL at 19:59

## 2024-04-11 RX ADMIN — METOCLOPRAMIDE 10 MG: 5 INJECTION, SOLUTION INTRAMUSCULAR; INTRAVENOUS at 11:16

## 2024-04-11 RX ADMIN — SODIUM CHLORIDE, PRESERVATIVE FREE 40 MG: 5 INJECTION INTRAVENOUS at 09:24

## 2024-04-11 RX ADMIN — MICONAZOLE NITRATE: 2 CREAM TOPICAL at 20:00

## 2024-04-11 RX ADMIN — METOPROLOL TARTRATE 5 MG: 1 INJECTION, SOLUTION INTRAVENOUS at 03:16

## 2024-04-11 RX ADMIN — ANTI-FUNGAL POWDER MICONAZOLE NITRATE TALC FREE: 1.42 POWDER TOPICAL at 09:24

## 2024-04-11 RX ADMIN — RIFAXIMIN 550 MG: 550 TABLET ORAL at 20:15

## 2024-04-11 RX ADMIN — GABAPENTIN 100 MG: 100 CAPSULE ORAL at 09:22

## 2024-04-11 RX ADMIN — INSULIN GLARGINE 40 UNITS: 100 INJECTION, SOLUTION SUBCUTANEOUS at 20:29

## 2024-04-11 RX ADMIN — ONDANSETRON 4 MG: 2 INJECTION INTRAMUSCULAR; INTRAVENOUS at 11:16

## 2024-04-11 RX ADMIN — Medication 16 G: at 11:17

## 2024-04-11 RX ADMIN — ONDANSETRON 4 MG: 2 INJECTION INTRAMUSCULAR; INTRAVENOUS at 00:28

## 2024-04-11 RX ADMIN — GABAPENTIN 100 MG: 100 CAPSULE ORAL at 19:59

## 2024-04-11 RX ADMIN — SODIUM CHLORIDE, PRESERVATIVE FREE 10 ML: 5 INJECTION INTRAVENOUS at 09:25

## 2024-04-11 ASSESSMENT — PAIN DESCRIPTION - LOCATION: LOCATION: ABDOMEN

## 2024-04-11 ASSESSMENT — PAIN SCALES - GENERAL
PAINLEVEL_OUTOF10: 3
PAINLEVEL_OUTOF10: 6
PAINLEVEL_OUTOF10: 0
PAINLEVEL_OUTOF10: 5

## 2024-04-11 NOTE — CARE COORDINATION
Writer is following for potential discharge to [facility].  Facility has accepted this pt. Authorization can be started when pt is medically ready.  Writer placed message to Ana with [facility] for update.  Electronically signed by HUANG Ayers on 4/11/2024 at 11:46 AM

## 2024-04-11 NOTE — PROGRESS NOTES
Sylvia NP messaged regarding patient's blood sugar and scheduled Lantus. Okay to give Lantus and orders to follow hypoglycemia protocol as needed.

## 2024-04-11 NOTE — PROGRESS NOTES
[facility]   Progress Note                   Date:   4/11/2024  Patient name: Bobbi Phillips  Date of admission:  4/3/2024  1:22 PM  MRN:   [id number]  YOB: 1955  PCP: Stacie Doty MD    Reason for Admission:     Subjective:       Clinical Changes / Abnormalities:  Pt resting in bed. Pt sleeping     HR 80-90 bpm.      Medications:   Scheduled Meds:   pantoprazole (PROTONIX) 40 mg in sodium chloride (PF) 0.9 % 10 mL injection  40 mg IntraVENous Q12H    cefTRIAXone (ROCEPHIN) IV  1,000 mg IntraVENous Q24H    lisinopril  10 mg Oral Daily    metoprolol tartrate  25 mg Oral BID    rifAXIMin  550 mg Oral TID    hydroCHLOROthiazide  25 mg Oral Daily    lactulose  20 g Oral TID    gabapentin  100 mg Oral TID    miconazole   Topical BID    sodium chloride flush  5-40 mL IntraVENous 2 times per day    [Held by provider] atorvastatin  10 mg Oral Daily    insulin glargine  40 Units SubCUTAneous BID    insulin lispro  0-8 Units SubCUTAneous TID WC    insulin lispro  0-4 Units SubCUTAneous Nightly     Continuous Infusions:   octreotide (SANDOSTATIN) 500 mcg in sodium chloride 0.9 % 100 mL infusion 25 mcg/hr (04/11/24 0141)    sodium chloride 75 mL/hr at 04/10/24 2118    niCARdipine Stopped (04/05/24 1404)    sodium chloride      dextrose       CBC:   Recent Labs     04/09/24  0524 04/10/24  0553 04/10/24  1213 04/10/24  1820 04/11/24  0137 04/11/24  0700   WBC 14.5* 13.8*  --   --   --  12.5*   HGB 9.3* 9.1*   < > 9.3* 9.0* 9.3*   * 115*  --   --   --  178    < > = values in this interval not displayed.       BMP:    Recent Labs     04/09/24  0524 04/10/24  0553 04/11/24  0700    134* 136   K 3.7 3.4* 3.8    99 103   CO2 17* 21 22   BUN 52* 49* 47*   CREATININE 1.3* 1.0* 1.0*   GLUCOSE 125* 100* 89       Hepatic:   Recent Labs     04/09/24 0524 04/10/24  0553   AST 47* 87*   ALT 23 37*   BILITOT 1.4* 1.2   ALKPHOS 313* 350*       Troponin: No results for input(s):  \"TROPONINI\" in the last 72 hours.  BNP: No results for input(s): \"BNP\" in the last 72 hours.  Lipids: No results for input(s): \"CHOL\", \"HDL\" in the last 72 hours.    Invalid input(s): \"LDLCALCU\"  INR: No results for input(s): \"INR\" in the last 72 hours.    Objective:   Vitals: BP (!) 153/47   Pulse 86   Temp 98 °F (36.7 °C) (Oral)   Resp 14   Ht 1.499 m (4' 11\")   Wt 88.4 kg (194 lb 14.2 oz)   SpO2 94%   BMI 39.36 kg/m²   General appearance: alert and cooperative with exam  HEENT: Head: Normocephalic, no lesions, without obvious abnormality.  Neck: no adenopathy, no carotid bruit, no JVD, supple, symmetrical, trachea midline and thyroid not enlarged, symmetric, no tenderness/mass/nodules  Lungs: clear to auscultation bilaterally  Heart: regular rate and rhythm, S1, S2 normal, no murmur, click, rub or gallop  Abdomen: soft, non-tender; bowel sounds normal; no masses,  no organomegaly  Extremities: extremities normal, atraumatic, no cyanosis or edema  Neurologic: Mental status: Alert, oriented, thought content appropriate      EKG: Accerlerated Junctional rhythm Rate 123.      ECHO: Technically difficult study, tachycardia noted during study, all the structures were not well-visualized  The left ventricle appears normal in size, mildly increased LV wall thickness, hyperdynamic LV wall motion  Ejection fraction 75 to 80%  Intracavity gradient noted most likely secondary to hyperdynamic LV wall motions  The left and the right atrium appears normal in size  The right ventricle appears normal in size and function  The aortic valve appears to sclerotic, opens well, increased flow velocities peak gradient 18 mmHg and mean gradient 11 mmHg most likely secondary to hyperdynamic wall motion, no aortic regurgitation  Mitral valve leaflet appears thickened, mild regurgitation no stenosis  Tricuspid valve structure appears normal no stenosis mild regurgitation  Normal aortic root dimensions  The IVC not

## 2024-04-11 NOTE — PROGRESS NOTES
Spoke with Dr. Lambert via telephone. Orders to hand irrigate and remove as many clots as possible then begin continuous bladder irrigation. He states to monitor that saline is flowing out during CBI and to update him once CBI is started.

## 2024-04-11 NOTE — PROGRESS NOTES
Patient refusing all morning PO medications. RN educated on importance of medications and what they all do. Patient stated they make her throw up every time. RN offered Zofran before to help with any nausea and patient stated she tried that yesterday and it did not help. Patient continued to refuse morning medications.

## 2024-04-11 NOTE — PROGRESS NOTES
[facility] PULMONARY,CRITICAL CARE & SLEEP   Kalyan Collazo MD/John Silva MD/Al Rowan APRN AGACNP-BC, NP-C      Lori Rangel APRN NP-C     Lashae Burris APRN NP-C                                           Pulmonary Critical Care Progress Note    Patient - Bobbi Phillips   Age - 68 y.o.   - 1955  MRN - [id number]  [id number]  Date of Admission - 4/3/2024  1:22 PM    Consulting Service/Physician:       Primary Care Physician: Stacie Doty MD    SUBJECTIVE:     Chief Complaint:   Chief Complaint   Patient presents with    Back Pain   Metabolic encephalopathy  Subjective:    Patient seen in endoscopy yesterday.  Duodenal ulcers noted without active bleeding.  Patient denies any hematemesis or vomiting overnight.  She is currently resting comfortably on room air.  She has an octreotide drip running for reasons unclear to me.    She denies abdominal pain.  She feels like she is doing well.  She seems much more alert and interactive today compared to 2 days ago    VITALS  BP (!) 133/43   Pulse 89   Temp 98 °F (36.7 °C) (Oral)   Resp 14   Ht 1.499 m (4' 11\")   Wt 88.4 kg (194 lb 14.2 oz)   SpO2 94%   BMI 39.36 kg/m²   Wt Readings from Last 3 Encounters:   24 88.4 kg (194 lb 14.2 oz)   24 88 kg (194 lb)   24 88 kg (194 lb)     I/O (24 Hours)    Intake/Output Summary (Last 24 hours) at 2024 0945  Last data filed at 2024 0600  Gross per 24 hour   Intake 2381.42 ml   Output 950 ml   Net 1431.42 ml     Ventilator:      Exam:   Physical Exam   Constitutional: Alert, cooperative no distress  HENT: Currently on room air saturating 1  Neck: Neck supple.   Cardiovascular: Regular with soft murmur  Pulmonary/Chest: Diminished but  Abdominal: Distended with clinical ascites, nontender, fecal management system in place  Extremities: Very modest leg edema  Infusions:      octreotide (SANDOSTATIN) 500 mcg in sodium chloride 0.9 %  100 mL infusion 25 mcg/hr (04/11/24 0141)    sodium chloride 75 mL/hr at 04/10/24 2118    niCARdipine Stopped (04/05/24 1404)    sodium chloride      dextrose       Meds:     Current Facility-Administered Medications:     melatonin tablet 6 mg, 6 mg, Oral, Nightly PRN, Enedelia Crow MD, 6 mg at 04/10/24 0051    lidocaine (LMX) 4 % cream, , Topical, PRN, Enedelia Crow MD    octreotide (SANDOSTATIN) 500 mcg in sodium chloride 0.9 % 100 mL infusion, 25 mcg/hr, IntraVENous, Continuous, Enedelia Crow MD, Last Rate: 5 mL/hr at 04/11/24 0141, 25 mcg/hr at 04/11/24 0141    pantoprazole (PROTONIX) 40 mg in sodium chloride (PF) 0.9 % 10 mL injection, 40 mg, IntraVENous, Q12H, Enedelia Crow MD, 40 mg at 04/11/24 0924    cefTRIAXone (ROCEPHIN) 1,000 mg in sodium chloride 0.9 % 50 mL IVPB (mini-bag), 1,000 mg, IntraVENous, Q24H, Miguel Salazar MD, Stopped at 04/10/24 1807    0.9 % sodium chloride infusion, , IntraVENous, Continuous, Earl Cannon MD, Last Rate: 75 mL/hr at 04/10/24 2118, New Bag at 04/10/24 2118    promethazine (PHENERGAN) 12.5 mg in sodium chloride 0.9 % 50 mL IVPB, 12.5 mg, IntraVENous, Q6H PRN, Sylvia Mercer APRN - NP, Stopped at 04/10/24 2144    oxyCODONE (ROXICODONE) immediate release tablet 5 mg, 5 mg, Oral, Q4H PRN, Sylvia Mercer APRN - NP, 5 mg at 04/10/24 2058    lisinopril (PRINIVIL;ZESTRIL) tablet 10 mg, 10 mg, Oral, Daily, Enedelia rCow MD    metoprolol tartrate (LOPRESSOR) tablet 25 mg, 25 mg, Oral, BID, Enedelia Crow MD, 25 mg at 04/10/24 2058    rifAXIMin (XIFAXAN) tablet 550 mg, 550 mg, Oral, TID, Enedelia Crow MD, 550 mg at 04/10/24 2059    hydroCHLOROthiazide (HYDRODIURIL) tablet 25 mg, 25 mg, Oral, Daily, Enedelia Crow MD, 25 mg at 04/08/24 1030    metoprolol (LOPRESSOR) injection 5 mg, 5 mg, IntraVENous, Q6H PRN, Enedelia Crow MD, 5 mg at 04/11/24 0316    lactulose (CHRONULAC) 10 GM/15ML solution 20 g, 20 g, Oral, TID, Enedelia Crow MD, 20 g at 04/10/24 2059    [Held by

## 2024-04-11 NOTE — PROGRESS NOTES
Dr. Salazar notified patient has signs/ symptoms of a yeast infection to the vaginal area. Patient very red and excoriated with thick white discharge in which she states is very painful.

## 2024-04-11 NOTE — PROGRESS NOTES
radiation dose to as low as reasonably achievable.; CT of the head was performed without the administration of intravenous contrast. Automated exposure control, iterative reconstruction, and/or weight based adjustment of the mA/kV was utilized to reduce the radiation dose to as low as reasonably achievable. COMPARISON: C-spine radiograph series 03/11/2013 HISTORY: Fall from couch hitting head.  Unknown amount of time down.  Neck pain Decision Support Exception - unselect if not a suspected or confirmed emergency medical condition->Emergency Medical Condition (MA) CT HEAD FINDINGS: BRAIN/VENTRICLES: There is no acute intracranial hemorrhage, mass effect or midline shift.  No abnormal extra-axial fluid collection.  The gray-white differentiation is maintained without evidence of an acute infarct.  There is no evidence of hydrocephalus. ORBITS: The visualized portion of the orbits demonstrate no acute abnormality. SINUSES: The visualized paranasal sinuses and mastoid air cells demonstrate no acute abnormality. SOFT TISSUES/SKULL:  No acute abnormality of the visualized skull or soft tissues. CT CERVICAL SPINE FINDINGS: BONES/ALIGNMENT: There is no evidence of an acute cervical spine fracture. There is normal alignment of the cervical spine. DEGENERATIVE CHANGES: Mild mid and lower cervical degenerative changes.  No acquired cervical spinal canal stenosis or significant neural foraminal narrowing demonstrated. SOFT TISSUES: There is no prevertebral soft tissue swelling.     1. CT HEAD: No acute intracranial abnormality. 2. CT CERVICAL SPINE: No acute abnormality of the cervical spine. 3. Mild degenerative changes cervical spine.     XR PELVIS (1-2 VIEWS)    Result Date: 3/29/2024  EXAMINATION: ONE XRAY VIEW OF THE PELVIS 3/29/2024 8:31 pm COMPARISON: CT pelvis 03/04/2024 HISTORY: fell tonight and has pain in both of her hips. The pain worsens upon movement. FINDINGS: Unremarkable appearance of the right and left  normal saline 50 mL an hour to avoid a PICC line  Okay to progressive for after patient has excepted her oral hypertension medication      Brad Maciel MD  4/11/2024  10:56 AM

## 2024-04-11 NOTE — PLAN OF CARE
Problem: Discharge Planning  Goal: Discharge to home or other facility with appropriate resources  4/11/2024 1023 by Naz Diaz RN  Outcome: Progressing  4/11/2024 0327 by Shaylee Cota RN  Outcome: Progressing  Flowsheets (Taken 4/10/2024 2000)  Discharge to home or other facility with appropriate resources:   Identify barriers to discharge with patient and caregiver   Arrange for needed discharge resources and transportation as appropriate   Identify discharge learning needs (meds, wound care, etc)   Arrange for interpreters to assist at discharge as needed   Refer to discharge planning if patient needs post-hospital services based on physician order or complex needs related to functional status, cognitive ability or social support system     Problem: Pain  Goal: Verbalizes/displays adequate comfort level or baseline comfort level  4/11/2024 1023 by Naz Diaz RN  Outcome: Progressing  4/11/2024 0327 by Shaylee Cota RN  Outcome: Progressing  Flowsheets (Taken 4/10/2024 2000)  Verbalizes/displays adequate comfort level or baseline comfort level:   Encourage patient to monitor pain and request assistance   Assess pain using appropriate pain scale   Administer analgesics based on type and severity of pain and evaluate response   Implement non-pharmacological measures as appropriate and evaluate response   Consider cultural and social influences on pain and pain management   Notify Licensed Independent Practitioner if interventions unsuccessful or patient reports new pain     Problem: Skin/Tissue Integrity  Goal: Absence of new skin breakdown  Description: 1.  Monitor for areas of redness and/or skin breakdown  2.  Assess vascular access sites hourly  3.  Every 4-6 hours minimum:  Change oxygen saturation probe site  4.  Every 4-6 hours:  If on nasal continuous positive airway pressure, respiratory therapy assess nares and determine need for appliance change or resting period.  4/11/2024 1023

## 2024-04-11 NOTE — PROGRESS NOTES
Sylvia SAENZ notified patient is complaining of nausea and abdominal pain. See new orders.     2130 Sylvia SAENZ notified of patient's episode of vomiting following lactulose administration.

## 2024-04-11 NOTE — DISCHARGE INSTR - COC
Continuity of Care Form    Patient Name: Bobbi Phillips   :  1955  MRN:  [id number]    Admit date:  4/3/2024  Discharge date:  2024    Code Status Order: Full Code   Advance Directives:   Advance Care Flowsheet Documentation       Date/Time Healthcare Directive Type of Healthcare Directive Copy in Chart Healthcare Agent Appointed Healthcare Agent's Name Healthcare Agent's Phone Number    04/10/24 1111 No, patient does not have an advance directive for healthcare treatment -- -- -- -- --            Admitting Physician:  Courtney Carrillo MD  PCP: Stacie Doty MD    Discharging Nurse: EMILY John  Discharging Hospital Unit/Room#:   Discharging Unit Phone Number: [phone]    Emergency Contact:   Extended Emergency Contact Information  Primary Emergency Contact: Judit Gar *HIPAA*  Address:   Home Phone: [phone]  Work Phone: [phone]  Mobile Phone: [phone]  Relation: Brother/Sister  Secondary Emergency Contact: Phillip Lofton *HIPAA*  Home Phone: [phone]  Work Phone: [phone]  Mobile Phone: [phone]  Relation: Child    Past Surgical History:  Past Surgical History:   Procedure Laterality Date    ANKLE FRACTURE SURGERY Left 2006    no retained metal    CARDIAC CATHETERIZATION  2006    CATARACT REMOVAL WITH IMPLANT Bilateral 2018     SECTION      x2, 1988,  \"SADDLE BLOCK\"    CHOLECYSTECTOMY, LAPAROSCOPIC  2012    COLONOSCOPY N/A 2023    COLONOSCOPY WITH BIOPSY performed by Carlos Campos MD at [facility] OR    DILATION AND CURETTAGE OF UTERUS N/A 10/02/2023    ENDOMETRIAL BIOPSY AND EXAM UNDER ANESTHESIA  Failed D&C Hysteroscopy performed by Saud Hardy DO at [facility] OR    DILATION AND CURETTAGE OF UTERUS N/A 2024    EUA, DIAGNOSTIC HYSTEROSCOPY performed by Mena Horn MD at [facility] OR    HYSTEROSCOPY  2024    EUA, DIAGNOSTIC HYSTEROSCOPY    UPPER GASTROINTESTINAL ENDOSCOPY N/A 4/10/2024    ESOPHAGOGASTRODUODENOSCOPY  BIOPSY performed by Enedelia Crow MD at [facility] ENDO       Immunization History:   Immunization History   Administered Date(s) Administered    COVID-19, MODERNA BLUE border, Primary or Immunocompromised, (age 12y+), IM, 100 mcg/0.5mL 02/26/2021, 03/26/2021, 11/19/2021    Hep B, ENGERIX-B, RECOMBIVAX-HB, (age Birth - 19y), IM, 0.5mL 09/14/1995, 10/12/1995, 04/22/1996    Influenza Virus Vaccine 10/31/2013, 10/16/2014, 10/15/2015, 10/03/2020    Influenza Whole 10/16/2014, 10/15/2015    Influenza, FLUAD, (age 65 y+), Adjuvanted, 0.5mL 10/03/2020, 09/09/2021, 10/15/2023    Influenza, FLUARIX, FLULAVAL, FLUZONE (age 6 mo+) AND AFLURIA, (age 3 y+), PF, 0.5mL 10/06/2016, 10/18/2017, 10/05/2018    Influenza, FLUZONE (age 65 y+), High Dose, 0.7mL 11/15/2022    Influenza, Intradermal, Preservative free 10/01/2019    Influenza, Triv, inactivated, subunit, adjuvanted, IM (Fluad 65 yrs and older) 11/15/2019    Pneumococcal, PCV-13, PREVNAR 13, (age 6w+), IM, 0.5mL 03/16/2020    Pneumococcal, PPSV23, PNEUMOVAX 23, (age 2y+), SC/IM, 0.5mL 06/13/2012, 12/12/2012, 07/01/2014, 03/10/2022    Zoster Recombinant (Shingrix) 04/18/2022, 06/15/2022       Active Problems:  Patient Active Problem List   Diagnosis Code    Anxiety F41.9    Liver cirrhosis secondary to BONNER (HCC) K75.81, K74.60    Disc degeneration, lumbar M51.36    Poorly-controlled hypertension I10    Hyperlipidemia E78.5    Lumbar radiculopathy M54.16    Microalbuminuria R80.9    Numbness R20.0    Palpitations R00.2    Sciatica M54.30    Type 2 diabetes mellitus (HCC) E11.9    Rosacea L71.9    Obesity, Class III, BMI 40-49.9 (morbid obesity) (HCC) E66.01    Chronic right shoulder pain M25.511, G89.29    Seborrheic keratoses L82.1    Stucco keratoses L82.1    Cherry angioma D18.01    Actinic keratosis L57.0    Diabetic peripheral neuropathy associated with type 2 diabetes mellitus (HCC) E11.42    Diabetic renal disease (HCC) E11.21    Elevated LFTs R79.89    Moderate

## 2024-04-11 NOTE — PROGRESS NOTES
Secure message sent to Dr. Lambert with urology with an update regarding CBI, as it is currently clamped. 1000 ml instilled into the bladder with 1750 out, net total 750.

## 2024-04-11 NOTE — PROGRESS NOTES
NEPHROLOGY PROGRESS NOTE    Patient :  Bobbi Phillips; 68 y.o. MRN# [id number]  Location:  2008/2008-01  Attending:  Courtney Carrillo MD  Admit Date:  4/3/2024   Hospital Day: 8      Reason for Consult:  KIM      Chief Complaint:  Back pain, hematuria   History Obtained From:  patient     Interval history: Patient was seen and examined this morning and she was moaning and complaining of low back pain.  She is however awake and oriented x 3.  She is on IV fluids, kidney function stable and improved  Diarrhea improved  Blood pressure suboptimally controlled, systolic blood pressure averaging 130s to 180s mm hg  Urine output 1.1 L    History of Present Illness:    This is a 68 y.o. 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   Patient was seen in the ED approximately 1 week ago for fall. Patient was found to have UTI at that time and discharged on Keflex.   Patient denies N/V/diarrhea, fever chills.  C/o dysuria, flank pain and frequency.  Patient started on IV Abx.  Scr on admission 1.3 mg/dl, scr peaked to 1.9 mg/dl and therefor Nephrology consultation requested.  Bladder scan positive for urinary retention, nichols catheter was placed.  Scr improved to 1.3 mg/dl this morning.  Patient noted to be lethargic and decrease in responsiveness this morning and was transferred to ICU for AMS which was suspected to be due to use Narcotics.    Patient takes NSAIDs   There had exposure to IV contrast on 4/3/24. There is no history  of paraprotein disease. Pt denies any history of recurrent UTI or kidney stones.  Medication review shows use of ACE-inhibitor/diuretics.      Current Medications:    metoclopramide (REGLAN) injection 10 mg, 4x Daily AC & HS  melatonin tablet 6 mg, Nightly PRN  lidocaine (LMX) 4 % cream, PRN  pantoprazole (PROTONIX) 40 mg in sodium chloride (PF) 0.9 % 10 mL injection, Q12H  cefTRIAXone (ROCEPHIN) 1,000 mg in sodium chloride 0.9 % 50 mL  (36.6 °C), Max:98.8 °F (37.1 °C)    CURRENT RESPIRATORY RATE:  Respirations: 12  CURRENT PULSE:  Pulse: 85  CURRENT BLOOD PRESSURE:  BP: (!) 150/45  24HR BLOOD PRESSURE RANGE:  Systolic (24hrs), Av , Min:133 , Max:194   ; Diastolic (24hrs), Av, Min:39, Max:135    24HR INTAKE/OUTPUT:    Intake/Output Summary (Last 24 hours) at 2024 1440  Last data filed at 2024 0600  Gross per 24 hour   Intake 2381.42 ml   Output 600 ml   Net 1781.42 ml     Patient Vitals for the past 96 hrs (Last 3 readings):   Weight   24 0600 88.4 kg (194 lb 14.2 oz)       Physical Exam:  GENERAL APPEARANCE: Alert and cooperative, and appears to be in no acute distress.  HEAD: normocephalic  EYES:  EOMI. Not pale, anicteric   NOSE:  No nasal discharge.    CARDIAC: Normal S1 and S2. No S3, S4 or murmurs. Rhythm is regular.  LUNGS: Clear to auscultation and percussion without rales, rhonchi, wheezing or diminished breath sounds.  NECK: Neck supple, non-tender without lymphadenopathy, masses or thyromegaly. JVD-neg  GI soft non tender  MUSKULOSKELETAL: Adequately aligned spine. No joint erythema or tenderness.   EXTREMITIES:+ edema. Peripheral pulses intact.   NEURO: Non focal      Labs:   CBC:  Recent Labs     24  0524 04/10/24  0553 04/10/24  1213 04/10/24  1820 24  0137 24  0700   WBC 14.5* 13.8*  --   --   --  12.5*   RBC 3.40* 3.33*  --   --   --  3.33*   HGB 9.3* 9.1*   < > 9.3* 9.0* 9.3*   HCT 29.3* 28.0*   < > 28.6* 27.5* 28.6*   MCV 86.2 84.1  --   --   --  85.8   MCH 27.2 27.3  --   --   --  28.0   MCHC 31.6 32.5  --   --   --  32.6   RDW 17.4* 17.2*  --   --   --  17.5*   * 115*  --   --   --  178   MPV 9.3 9.2  --   --   --  8.2    < > = values in this interval not displayed.      BMP:   Recent Labs     24  0524 04/10/24  0553 24  0700    134* 136   K 3.7 3.4* 3.8    99 103   CO2 17* 21 22   BUN 52* 49* 47*   CREATININE 1.3* 1.0* 1.0*   GLUCOSE 125* 100* 89

## 2024-04-11 NOTE — CONSULTS
[facility]   Progress Note                   Date:   4/11/2024  Patient name: Bobbi Phillips  Date of admission:  4/3/2024  1:22 PM  MRN:   [id number]  YOB: 1955  PCP: Stacie Doty MD    Reason for Admission:     Subjective:       Clinical Changes / Abnormalities:  Pt resting in bed after endoscopy earlier today.  HR 80-90 bpm.      Medications:   Scheduled Meds:   pantoprazole (PROTONIX) 40 mg in sodium chloride (PF) 0.9 % 10 mL injection  40 mg IntraVENous Q12H    cefTRIAXone (ROCEPHIN) IV  1,000 mg IntraVENous Q24H    lisinopril  10 mg Oral Daily    metoprolol tartrate  25 mg Oral BID    rifAXIMin  550 mg Oral TID    hydroCHLOROthiazide  25 mg Oral Daily    lactulose  20 g Oral TID    gabapentin  100 mg Oral TID    miconazole   Topical BID    sodium chloride flush  5-40 mL IntraVENous 2 times per day    [Held by provider] atorvastatin  10 mg Oral Daily    insulin glargine  40 Units SubCUTAneous BID    insulin lispro  0-8 Units SubCUTAneous TID WC    insulin lispro  0-4 Units SubCUTAneous Nightly     Continuous Infusions:   octreotide (SANDOSTATIN) 500 mcg in sodium chloride 0.9 % 100 mL infusion 25 mcg/hr (04/11/24 0141)    sodium chloride 75 mL/hr at 04/10/24 2118    niCARdipine Stopped (04/05/24 1404)    sodium chloride      dextrose       CBC:   Recent Labs     04/08/24  0916 04/09/24  0524 04/10/24  0553 04/10/24  1213 04/10/24  1820 04/11/24  0137   WBC 16.8* 14.5* 13.8*  --   --   --    HGB 9.5* 9.3* 9.1* 8.2* 9.3* 9.0*   PLT 89* 101* 115*  --   --   --      BMP:    Recent Labs     04/09/24  0524 04/10/24  0553 04/11/24  0700    134* 136   K 3.7 3.4* 3.8    99 103   CO2 17* 21 22   BUN 52* 49* 47*   CREATININE 1.3* 1.0* 1.0*   GLUCOSE 125* 100* 89     Hepatic:   Recent Labs     04/09/24  0524 04/10/24  0553   AST 47* 87*   ALT 23 37*   BILITOT 1.4* 1.2   ALKPHOS 313* 350*     Troponin: No results for input(s): \"TROPONINI\" in the last 72 hours.  BNP: No

## 2024-04-11 NOTE — PROGRESS NOTES
[facility] -   Infectious diseases evaluation  admission date 4/3/2024    reason for consultation:   SBP    Impression :   Current:  Suspected SBP status post paracentesis 4/5/2024 fluid analysis suggestive of peritonitis, no growth on culture  Recent UTI on 3/29/2024, Klebsiella pneumoniae growth on cultures  Metabolic encephalopathy  Hematuria  Liver cirrhosis  Ascites  Recent influenza B tested positive on March 29, 2024  Diabetes mellitus  Uterine mass      HENCE:   Continue IV ceftriaxone   Follow peritoneal fluid culture from 4/10/2024, no growth to date  Follow CBC and renal function  Supportive care  Discussed with GI    Infection Control Recommendations   Petroleum Precautions      Antimicrobial Stewardship Recommendations   Simplification of therapy  Targeted therapy      History of Present Illness:   Initial history:  Bobbi Phillips is a 68 y.o.-year-old female presented to ER on 3/29/2024 after a fall with reported mental status changes, was positive for influenza B and suspected UTI.  She was discharged on Keflex .  She returned to the hospital on 4/3/2024 with back pain, altered mental status and hypoxia.  CT abdomen/pelvis, chest and lumbar spine suggestive of uterine mass, liver cirrhosis and portal hypertension, small amount of ascites.  No pulmonary embolism  4/ 3/2024 urinalysis showed moderate leukocyte esterase, too numerous to count WBC, no growth on cultures    Status post paracentesis 4/5/2024 peritoneal fluid analysis showed 1, 166 WBC with 60% neutrophils, no growth on fluid culture.  The patient has been on ceftriaxone since 4/3/2024.  He had leukocytosis, WBC was up to 20.5 on 4/4/2024, down to 14.5 on 4/9/2024.  Renal function got worse, creatinine was up to 1.9 on 4/8/2024 down to 1.3 on 4/9/2024.  On 3/29/2024 urine culture grew Klebsiella pneumoniae that was sensitive to cefazolin.  She was transferred to ICU for 924 for mental status  performed by Carlos Campos MD at [facility] OR    DILATION AND CURETTAGE OF UTERUS N/A 10/02/2023    ENDOMETRIAL BIOPSY AND EXAM UNDER ANESTHESIA  Failed D&C Hysteroscopy performed by Saud Hardy DO at [facility] OR    DILATION AND CURETTAGE OF UTERUS N/A 01/08/2024    EUA, DIAGNOSTIC HYSTEROSCOPY performed by Mena Horn MD at [facility] [place] OR    HYSTEROSCOPY  01/08/2024    EUA, DIAGNOSTIC HYSTEROSCOPY    UPPER GASTROINTESTINAL ENDOSCOPY N/A 4/10/2024    ESOPHAGOGASTRODUODENOSCOPY BIOPSY performed by Enedelia Crow MD at [facility] ENDO       Medications:      metoclopramide  10 mg IntraVENous 4x Daily AC & HS    pantoprazole (PROTONIX) 40 mg in sodium chloride (PF) 0.9 % 10 mL injection  40 mg IntraVENous Q12H    cefTRIAXone (ROCEPHIN) IV  1,000 mg IntraVENous Q24H    lisinopril  10 mg Oral Daily    metoprolol tartrate  25 mg Oral BID    rifAXIMin  550 mg Oral TID    hydroCHLOROthiazide  25 mg Oral Daily    lactulose  20 g Oral TID    gabapentin  100 mg Oral TID    miconazole   Topical BID    sodium chloride flush  5-40 mL IntraVENous 2 times per day    [Held by provider] atorvastatin  10 mg Oral Daily    insulin glargine  40 Units SubCUTAneous BID    insulin lispro  0-8 Units SubCUTAneous TID WC    insulin lispro  0-4 Units SubCUTAneous Nightly       Social History:     Social History     Socioeconomic History    Marital status:      Spouse name: Not on file    Number of children: Not on file    Years of education: Not on file    Highest education level: Not on file   Occupational History    Not on file   Tobacco Use    Smoking status: Never    Smokeless tobacco: Never   Vaping Use    Vaping Use: Never used   Substance and Sexual Activity    Alcohol use: Never     Alcohol/week: 0.0 standard drinks of alcohol    Drug use: Never    Sexual activity: Not Currently   Other Topics Concern    Not on file   Social History Narrative    Not on file     Social Determinants of Health     Financial Resource Strain: Low Risk

## 2024-04-11 NOTE — PROGRESS NOTES
[facility]    Gastroenterology Daily Progress Note      Patient:   Bobbi Phillips   :    1955   Facility:   [facility]  Date:     2024  Consultant:   JOE Toribio - CNP, CNP      SUBJECTIVE  68 y.o. female admitted 4/3/2024 with Acute pyelonephritis [N10]  Hyperglycemia [R73.9]  KIM (acute kidney injury) (HCC) [N17.9]  Intractable pain [R52]  Strain of lumbar region, sequela [S39.012S] and seen for cirrhosis with sbp. The pt was seen and examined. Went for egd yesterday that is discussed below. Pt has had gagging but no emesis, denies abdominal pain, she is tired appearing but oriented x3, denies abdominal pain. Repeat paracentesis yesterday still shows SBP, cytology negative and ascitic culture shows no growth so far.  .        OBJECTIVE  Scheduled Meds:   pantoprazole (PROTONIX) 40 mg in sodium chloride (PF) 0.9 % 10 mL injection  40 mg IntraVENous Q12H    cefTRIAXone (ROCEPHIN) IV  1,000 mg IntraVENous Q24H    lisinopril  10 mg Oral Daily    metoprolol tartrate  25 mg Oral BID    rifAXIMin  550 mg Oral TID    hydroCHLOROthiazide  25 mg Oral Daily    lactulose  20 g Oral TID    gabapentin  100 mg Oral TID    miconazole   Topical BID    sodium chloride flush  5-40 mL IntraVENous 2 times per day    [Held by provider] atorvastatin  10 mg Oral Daily    insulin glargine  40 Units SubCUTAneous BID    insulin lispro  0-8 Units SubCUTAneous TID WC    insulin lispro  0-4 Units SubCUTAneous Nightly       Vital Signs:  BP (!) 153/47   Pulse 86   Temp 98 °F (36.7 °C) (Oral)   Resp 14   Ht 1.499 m (4' 11\")   Wt 88.4 kg (194 lb 14.2 oz)   SpO2 94%   BMI 39.36 kg/m²    Review of Systems - History obtained from chart review and the patient  General ROS: positive for  - fatigue  Respiratory ROS: no cough, shortness of breath, or wheezing  Cardiovascular ROS: no chest pain or dyspnea on exertion  Gastrointestinal ROS: no abdominal pain, change in bowel habits, or black or

## 2024-04-11 NOTE — PROGRESS NOTES
Yani NP with urology called up to unit to check on patient, discussed current urine color. Expressed concern that patient has only had about 200 ml out in urine, a fruit punch color, that is slowly lightening to a more lisa from what can be assessed in the tubing.     Writer instructed to bladder scan the patient, if patient is not empty, replace nichols with a 3 way catheter.

## 2024-04-11 NOTE — CONSULTS
[facility] -   Infectious diseases evaluation  admission date 4/3/2024    reason for consultation:   SBP    Impression :   Current:  Suspected SBP status post paracentesis 4/5/2024 fluid analysis suggestive of peritonitis, no growth on culture  Recent UTI on 3/29/2024, Klebsiella pneumoniae growth on cultures  Metabolic encephalopathy  Hematuria  Liver cirrhosis  Ascites  Recent influenza B tested positive on March 29, 2024  Diabetes mellitus  Uterine mass      HENCE:   Continue IV ceftriaxone   Follow peritoneal fluid culture  Follow CBC and renal function  Supportive care    Infection Control Recommendations   [place]ard Precautions      Antimicrobial Stewardship Recommendations   Simplification of therapy  Targeted therapy      History of Present Illness:   Initial history:  Bobbi Phillips is a 68 y.o.-year-old female presented to ER on 3/29/2024 after a fall with reported mental status changes, was positive for influenza B and suspected UTI.  She was discharged on Keflex .  She returned to the hospital on 4/3/2024 with back pain, altered mental status and hypoxia.  CT abdomen/pelvis, chest and lumbar spine suggestive of uterine mass, liver cirrhosis and portal hypertension, small amount of ascites.  No pulmonary embolism  4/ 3/2024 urinalysis showed moderate leukocyte esterase, too numerous to count WBC, no growth on cultures    Status post paracentesis 4/5/2024 peritoneal fluid analysis showed 1, 166 WBC with 60% neutrophils, no growth on fluid culture.  The patient has been on ceftriaxone since 4/3/2024.  He had leukocytosis, WBC was up to 20.5 on 4/4/2024, down to 14.5 on 4/9/2024.  Renal function got worse, creatinine was up to 1.9 on 4/8/2024 down to 1.3 on 4/9/2024.  On 3/29/2024 urine culture grew Klebsiella pneumoniae that was sensitive to cefazolin.  Interval changes  4/10/2024   The patient was seen and examined earlier today, transferred to ICU  file   Tobacco Use    Smoking status: Never    Smokeless tobacco: Never   Vaping Use    Vaping Use: Never used   Substance and Sexual Activity    Alcohol use: Never     Alcohol/week: 0.0 standard drinks of alcohol    Drug use: Never    Sexual activity: Not Currently   Other Topics Concern    Not on file   Social History Narrative    Not on file     Social Determinants of Health     Financial Resource Strain: Low Risk  (6/26/2023)    Overall Financial Resource Strain (CARDIA)     Difficulty of Paying Living Expenses: Not hard at all   Food Insecurity: Not on file (6/26/2023)   Transportation Needs: Unknown (6/26/2023)    PRAPARE - Transportation     Lack of Transportation (Medical): Not on file     Lack of Transportation (Non-Medical): No   Physical Activity: Insufficiently Active (6/26/2023)    Exercise Vital Sign     Days of Exercise per Week: 2 days     Minutes of Exercise per Session: 30 min   Stress: Not on file   Social Connections: Not on file   Intimate Partner Violence: Not on file   Housing Stability: Unknown (6/26/2023)    Housing Stability Vital Sign     Unable to Pay for Housing in the Last Year: Not on file     Number of Places Lived in the Last Year: Not on file     Unstable Housing in the Last Year: No       Family History:     Family History   Problem Relation Age of Onset    Breast Cancer Mother 42    Diabetes Mother     Stroke Mother     Heart Disease Father     Cancer Father         prostate    Cancer Sister         cervical    Breast Cancer Sister 71    Diabetes Other         multiple relatives on Mom's side      Medical Decision Making:   I have independently reviewed/ordered the following labs:    CBC with Differential:   Recent Labs     04/09/24  0524 04/10/24  0553 04/10/24  1213 04/10/24  1820   WBC 14.5* 13.8*  --   --    HGB 9.3* 9.1* 8.2* 9.3*   HCT 29.3* 28.0* 25.4* 28.6*   * 115*  --   --    LYMPHOPCT 5* 3*  --   --    MONOPCT 2 2  --   --        BMP:  Recent Labs

## 2024-04-11 NOTE — ANESTHESIA POSTPROCEDURE EVALUATION
Department of Anesthesiology  Postprocedure Note    Patient: Bobbi Phillips  MRN: [id number]  YOB: 1955  Date of evaluation: 4/11/2024    Procedure Summary       Date: 04/10/24 Room / Location: [place] [address] / [facility]    Anesthesia Start: 1247 Anesthesia Stop: 1323    Procedure: ESOPHAGOGASTRODUODENOSCOPY BIOPSY (Esophagus) Diagnosis:       Hematemesis, unspecified whether nausea present      (Hematemesis, unspecified whether nausea present [K92.0])    Surgeons: Enedelia Crow MD Responsible Provider: Viji Garduno MD    Anesthesia Type: general ASA Status: 3 - Emergent            Anesthesia Type: No value filed.    Domenica Phase I: Domenica Score: 9    Domenica Phase II:      Anesthesia Post Evaluation    Comments: POD #1 Patient seen fast asleep. No anesthesia issues reported by Nursing staff or GI service    No notable events documented.

## 2024-04-11 NOTE — PROGRESS NOTES
Dr. Osborne at bedside to evaluate the patient's vaginal area. MD to consult wound care and place order for antifungal cream.

## 2024-04-11 NOTE — PROGRESS NOTES
Called nurse to get update on patient.  Urine in bag is fruit punch color, has only had 200mL out today.  Per nursing, urine is clearing and more of an lisa color this afternoon. Nursing to bladder scan patient, if her bladder is not empty then they will remove the nichols and replace it with a 3-way cath and continue hand irrigation PRN.  If hematuria worsens or clots develop, they have an order from yesterday to start CBI and notify urology.

## 2024-04-11 NOTE — PLAN OF CARE
Problem: Discharge Planning  Goal: Discharge to home or other facility with appropriate resources  4/11/2024 0327 by Shaylee Cota RN  Outcome: Progressing  Flowsheets (Taken 4/10/2024 2000)  Discharge to home or other facility with appropriate resources:   Identify barriers to discharge with patient and caregiver   Arrange for needed discharge resources and transportation as appropriate   Identify discharge learning needs (meds, wound care, etc)   Arrange for interpreters to assist at discharge as needed   Refer to discharge planning if patient needs post-hospital services based on physician order or complex needs related to functional status, cognitive ability or social support system     Problem: Pain  Goal: Verbalizes/displays adequate comfort level or baseline comfort level  4/11/2024 0327 by Shaylee Cota RN  Outcome: Progressing  Flowsheets (Taken 4/10/2024 2000)  Verbalizes/displays adequate comfort level or baseline comfort level:   Encourage patient to monitor pain and request assistance   Assess pain using appropriate pain scale   Administer analgesics based on type and severity of pain and evaluate response   Implement non-pharmacological measures as appropriate and evaluate response   Consider cultural and social influences on pain and pain management   Notify Licensed Independent Practitioner if interventions unsuccessful or patient reports new pain     Problem: Skin/Tissue Integrity  Goal: Absence of new skin breakdown  Description: 1.  Monitor for areas of redness and/or skin breakdown  4/11/2024 0327 by Shaylee Cota RN  Outcome: Progressing     Problem: Safety - Adult  Goal: Free from fall injury  4/11/2024 0327 by Shaylee Cota RN  Outcome: Progressing  Flowsheets (Taken 4/10/2024 2140)  Free From Fall Injury: Instruct family/caregiver on patient safety     Problem: Chronic Conditions and Co-morbidities  Goal: Patient's chronic conditions and co-morbidity symptoms are

## 2024-04-11 NOTE — PROGRESS NOTES
Dr. Maciel and Dr. Cordova aware that we have been having trouble maintaining IV access on patient. Order received for PICC placement.

## 2024-04-11 NOTE — PROGRESS NOTES
3 way nichols placed, did not originally get urine return. Patient's bladder irrigated with 50 cc of sterile saline x2, large red clots coming out. Page out to Dr. Lambert.

## 2024-04-12 LAB
GLUCOSE BLD-MCNC: 115 MG/DL (ref 65–105)
GLUCOSE BLD-MCNC: 124 MG/DL (ref 65–105)
GLUCOSE BLD-MCNC: 158 MG/DL (ref 65–105)
GLUCOSE BLD-MCNC: 168 MG/DL (ref 65–105)
HCT VFR BLD AUTO: 27.9 % (ref 36–46)
HCT VFR BLD AUTO: 31.3 % (ref 36–46)
HGB BLD-MCNC: 9.3 G/DL (ref 12–16)
HGB BLD-MCNC: 9.8 G/DL (ref 12–16)

## 2024-04-12 PROCEDURE — 6370000000 HC RX 637 (ALT 250 FOR IP): Performed by: INTERNAL MEDICINE

## 2024-04-12 PROCEDURE — 97110 THERAPEUTIC EXERCISES: CPT

## 2024-04-12 PROCEDURE — 97535 SELF CARE MNGMENT TRAINING: CPT

## 2024-04-12 PROCEDURE — 99232 SBSQ HOSP IP/OBS MODERATE 35: CPT | Performed by: INTERNAL MEDICINE

## 2024-04-12 PROCEDURE — 6360000002 HC RX W HCPCS: Performed by: INTERNAL MEDICINE

## 2024-04-12 PROCEDURE — 51700 IRRIGATION OF BLADDER: CPT

## 2024-04-12 PROCEDURE — 82947 ASSAY GLUCOSE BLOOD QUANT: CPT

## 2024-04-12 PROCEDURE — 2580000003 HC RX 258: Performed by: INTERNAL MEDICINE

## 2024-04-12 PROCEDURE — 99233 SBSQ HOSP IP/OBS HIGH 50: CPT | Performed by: INTERNAL MEDICINE

## 2024-04-12 PROCEDURE — 85018 HEMOGLOBIN: CPT

## 2024-04-12 PROCEDURE — A4216 STERILE WATER/SALINE, 10 ML: HCPCS | Performed by: INTERNAL MEDICINE

## 2024-04-12 PROCEDURE — 97530 THERAPEUTIC ACTIVITIES: CPT

## 2024-04-12 PROCEDURE — APPSS30 APP SPLIT SHARED TIME 16-30 MINUTES: Performed by: NURSE PRACTITIONER

## 2024-04-12 PROCEDURE — 36415 COLL VENOUS BLD VENIPUNCTURE: CPT

## 2024-04-12 PROCEDURE — 99231 SBSQ HOSP IP/OBS SF/LOW 25: CPT | Performed by: INTERNAL MEDICINE

## 2024-04-12 PROCEDURE — 85014 HEMATOCRIT: CPT

## 2024-04-12 PROCEDURE — C9113 INJ PANTOPRAZOLE SODIUM, VIA: HCPCS | Performed by: INTERNAL MEDICINE

## 2024-04-12 PROCEDURE — 2060000000 HC ICU INTERMEDIATE R&B

## 2024-04-12 RX ORDER — ZINC SULFATE 50(220)MG
50 CAPSULE ORAL DAILY
Status: DISCONTINUED | OUTPATIENT
Start: 2024-04-12 | End: 2024-04-23 | Stop reason: HOSPADM

## 2024-04-12 RX ADMIN — SODIUM CHLORIDE, PRESERVATIVE FREE 40 MG: 5 INJECTION INTRAVENOUS at 11:14

## 2024-04-12 RX ADMIN — METOCLOPRAMIDE 10 MG: 5 INJECTION, SOLUTION INTRAMUSCULAR; INTRAVENOUS at 06:31

## 2024-04-12 RX ADMIN — LACTULOSE 20 G: 20 SOLUTION ORAL at 16:01

## 2024-04-12 RX ADMIN — METOCLOPRAMIDE 10 MG: 5 INJECTION, SOLUTION INTRAMUSCULAR; INTRAVENOUS at 11:20

## 2024-04-12 RX ADMIN — Medication 50 MG: at 17:31

## 2024-04-12 RX ADMIN — GABAPENTIN 100 MG: 100 CAPSULE ORAL at 15:58

## 2024-04-12 RX ADMIN — METOPROLOL TARTRATE 25 MG: 25 TABLET, FILM COATED ORAL at 20:55

## 2024-04-12 RX ADMIN — RIFAXIMIN 550 MG: 550 TABLET ORAL at 20:55

## 2024-04-12 RX ADMIN — SODIUM CHLORIDE, PRESERVATIVE FREE 40 MG: 5 INJECTION INTRAVENOUS at 20:49

## 2024-04-12 RX ADMIN — METOCLOPRAMIDE 10 MG: 5 INJECTION, SOLUTION INTRAMUSCULAR; INTRAVENOUS at 16:08

## 2024-04-12 RX ADMIN — SODIUM CHLORIDE, PRESERVATIVE FREE 10 ML: 5 INJECTION INTRAVENOUS at 20:54

## 2024-04-12 RX ADMIN — MICONAZOLE NITRATE: 2 CREAM TOPICAL at 20:56

## 2024-04-12 RX ADMIN — FLUCONAZOLE 200 MG: 100 TABLET ORAL at 11:19

## 2024-04-12 RX ADMIN — RIFAXIMIN 550 MG: 550 TABLET ORAL at 11:24

## 2024-04-12 RX ADMIN — LISINOPRIL 20 MG: 20 TABLET ORAL at 11:19

## 2024-04-12 RX ADMIN — METOPROLOL TARTRATE 25 MG: 25 TABLET, FILM COATED ORAL at 11:20

## 2024-04-12 RX ADMIN — LIDOCAINE 4%: 4 CREAM TOPICAL at 15:58

## 2024-04-12 RX ADMIN — LACTULOSE 20 G: 20 SOLUTION ORAL at 20:57

## 2024-04-12 RX ADMIN — GABAPENTIN 100 MG: 100 CAPSULE ORAL at 11:19

## 2024-04-12 RX ADMIN — METOCLOPRAMIDE 10 MG: 5 INJECTION, SOLUTION INTRAMUSCULAR; INTRAVENOUS at 20:53

## 2024-04-12 RX ADMIN — GABAPENTIN 100 MG: 100 CAPSULE ORAL at 20:55

## 2024-04-12 RX ADMIN — CEFTRIAXONE SODIUM 1000 MG: 1 INJECTION, POWDER, FOR SOLUTION INTRAMUSCULAR; INTRAVENOUS at 17:49

## 2024-04-12 RX ADMIN — INSULIN GLARGINE 40 UNITS: 100 INJECTION, SOLUTION SUBCUTANEOUS at 20:55

## 2024-04-12 RX ADMIN — LACTULOSE 20 G: 20 SOLUTION ORAL at 11:20

## 2024-04-12 RX ADMIN — INSULIN GLARGINE 40 UNITS: 100 INJECTION, SOLUTION SUBCUTANEOUS at 11:14

## 2024-04-12 RX ADMIN — SODIUM CHLORIDE, PRESERVATIVE FREE 10 ML: 5 INJECTION INTRAVENOUS at 11:25

## 2024-04-12 RX ADMIN — RIFAXIMIN 550 MG: 550 TABLET ORAL at 15:50

## 2024-04-12 RX ADMIN — HYDROCHLOROTHIAZIDE 25 MG: 25 TABLET ORAL at 11:20

## 2024-04-12 ASSESSMENT — PAIN SCALES - GENERAL
PAINLEVEL_OUTOF10: 7
PAINLEVEL_OUTOF10: 0

## 2024-04-12 ASSESSMENT — PAIN DESCRIPTION - LOCATION: LOCATION: NECK

## 2024-04-12 NOTE — PROGRESS NOTES
[facility] Internal Medicine  Nikolas Tan MD; Ray Hwang MD; Brad Maciel MD; MD Courtney Lr MD; Germania Osborne MD; Marlin Gutierrez MD      Progress note            Date:   4/12/2024  Patient name:  Bobbi Phillips  MRN:   [id number]  Account:  [id number]  YOB: 1955  PCP:    Stacie Doty MD  Code Status:    Full Code    Chief Complaint:     Chief Complaint   Patient presents with    Back Pain         History Obtained From:     Patient, EMR, nursing staff    HPI     This patient is a 68 y.o. Non- / non  femalewho presents with back pain  History of type 2 diabetes, with diabetic retinopathy and neuropathy, hypertension,  Patient is poor historian she reports she has been given some pain medication and is unable to relate sequence of events accurately  Per EMR patient has chronic low back pain, she had fall 5 days ago and was seen in ER CT thoracic and lumbar spine nil acute but UA positive for UTI culture showed Klebsiella sensitive to Keflex which is what she was discharged on.  Patient reported subsequent improvement in back pain however reports pain became worse again today  Moderate intensity progressive persistent no aggravating leaving factors noted  Denies any associated fevers diarrhea or vomiting    ER evaluation showed UA suggestive of persistent UTI, severe hyperglycemia, KIM    4/12/2024  Reports abdominal pain is improving no nausea no vomiting.  Tolerating diet  Review of Systems:     Denies any shortness of breath or cough  Denies chest pain or palpitations  Positive for flank pain, lower back pain  Denies any new numbness tremors or weakness.    A 10 point review of systems was performed and and negative except as mentioned in HPI  Positive and Negative as described in HPI.  4/4  Patient, appear very tired, fatigue, sleepy,  Open eyes to command, and then dosing off  Although oriented time place person  Sister at bedside she  uncontrolled BS 3/20/17   Staice Doty MD   aspirin 81 MG EC tablet Take 1 tablet by mouth daily    ProviderJose MD   Lutein 20 MG TABS Take 1 tablet by mouth daily    Provider, MD Jose   Ascorbic Acid (VITAMIN C) 500 MG tablet Take by mouth daily Indications: as directed    Provider, MD Jose        Allergies:     Tylenol [acetaminophen]    Social History:     Tobacco:    reports that she has never smoked. She has never used smokeless tobacco.  Alcohol:      reports no history of alcohol use.  Drug Use:  reports no history of drug use.    Family History:     Family History   Problem Relation Age of Onset    Breast Cancer Mother 42    Diabetes Mother     Stroke Mother     Heart Disease Father     Cancer Father         prostate    Cancer Sister         cervical    Breast Cancer Sister 71    Diabetes Other         multiple relatives on Mom's side           Physical Exam:   BP (!) 143/80   Pulse 80   Temp 98.4 °F (36.9 °C) (Oral)   Resp 16   Ht 1.499 m (4' 11\")   Wt 88.4 kg (194 lb 14.2 oz)   SpO2 97%   BMI 39.36 kg/m²   Recent Labs     04/11/24  1956 04/12/24  0610 04/12/24  1116 04/12/24  1611   POCGLU 132* 115* 124* 168*         General Appearance: Awake alert, responds to commands.  Sitting in the recliner  Lungs: Bilateral equal air entry, clear to ausculation, no wheezing, rales or rhonchi, normal effort  Cardiovascular: normal rate, regular rhythm, no murmur, gallop, rub.  Abdomen: Soft, covered with abdominal binder.  Bowel sounds sluggish  Neurologic: There are no new focal motor or sensory deficits, normal muscle tone and bulk, no abnormal sensation, normal speech, cranial nerves II through XII grossly intact  Skin: No gross lesions, rashes, bruising or bleeding on exposed skin area  Extremities:  peripheral pulses palpable, no pedal edema or calf pain with palpation      Investigations:      Laboratory Testing:  Recent Results (from the past 24 hour(s))   POC Glucose

## 2024-04-12 NOTE — PROGRESS NOTES
[facility] -   Infectious diseases evaluation  admission date 4/3/2024    reason for consultation:   SBP    Impression :   Current:  Suspected SBP status post paracentesis 4/5/2024 fluid analysis suggestive of peritonitis, no growth on culture  Recent UTI on 3/29/2024, Klebsiella pneumoniae growth on cultures  Metabolic encephalopathy  Hematuria  Liver cirrhosis  Ascites  Recent influenza B tested positive on March 29, 2024  Diabetes mellitus  Uterine mass      HENCE:   Continue IV ceftriaxone through 4/16/2024, may change to oral Ceftin on discharge  Follow peritoneal fluid culture from 4/10/2024, no growth to date  Follow CBC and renal function  Supportive care      Infection Control Recommendations   Cameron Precautions      Antimicrobial Stewardship Recommendations   Simplification of therapy  Targeted therapy      History of Present Illness:   Initial history:  Bobbi Phillips is a 68 y.o.-year-old female presented to ER on 3/29/2024 after a fall with reported mental status changes, was positive for influenza B and suspected UTI.  She was discharged on Keflex .  She returned to the hospital on 4/3/2024 with back pain, altered mental status and hypoxia.  CT abdomen/pelvis, chest and lumbar spine suggestive of uterine mass, liver cirrhosis and portal hypertension, small amount of ascites.  No pulmonary embolism  4/ 3/2024 urinalysis showed moderate leukocyte esterase, too numerous to count WBC, no growth on cultures    Status post paracentesis 4/5/2024 peritoneal fluid analysis showed 1, 166 WBC with 60% neutrophils, no growth on fluid culture.  The patient has been on ceftriaxone since 4/3/2024.  He had leukocytosis, WBC was up to 20.5 on 4/4/2024, down to 14.5 on 4/9/2024.  Renal function got worse, creatinine was up to 1.9 on 4/8/2024 down to 1.3 on 4/9/2024.  On 3/29/2024 urine culture grew Klebsiella pneumoniae that was sensitive to cefazolin.  She was

## 2024-04-12 NOTE — PROGRESS NOTES
[facility] PULMONARY,CRITICAL CARE & SLEEP   Kalyan Collazo MD/John Silva MD/Al DIAZ AGAP-BC, NP-C      Lashae DIAZ NP-C    Mike DIAZ NP-C                                         Pulmonary Progress Note    Patient - Bobbi Phillips   Age - 68 y.o.   - 1955  MRN - [id number]  [id number]  Date of Admission - 4/3/2024  1:22 PM    Consulting Service/Physician:       Primary Care Physician: Stacie Doty MD    SUBJECTIVE:     Chief Complaint:   Chief Complaint   Patient presents with    Back Pain     Subjective:    She seems to be doing relatively well was transferred out of ICU this morning.  She has not required any oxygen.  She denies any abdominal pain.  She feels better since paracentesis.  Last paracentesis showed moderate/many neutrophils but no organisms on 4/10/2024, negative for malignancy.  She continues to be treated for SBP on IV ceftriaxone per ID    VITALS  /66   Pulse 79   Temp 98.4 °F (36.9 °C) (Oral)   Resp 18   Ht 1.499 m (4' 11\")   Wt 88.4 kg (194 lb 14.2 oz)   SpO2 94%   BMI 39.36 kg/m²   Wt Readings from Last 3 Encounters:   24 88.4 kg (194 lb 14.2 oz)   24 88 kg (194 lb)   24 88 kg (194 lb)     I/O (24 Hours)    Intake/Output Summary (Last 24 hours) at 2024 1213  Last data filed at 2024 0743  Gross per 24 hour   Intake 630.63 ml   Output 1150 ml   Net -519.37 ml     Ventilator:      Exam:   Physical Exam   Constitutional:  Oriented to person, place, and time.  Lying in bed, visiting family, eating lunch, not in any distress on room air  HENT: Unremarkable  Head: Normocephalic and atraumatic.   Eyes: EOM are normal. Pupils are equal, round, and reactive to light.   Neck: Neck supple.   Cardiovascular:  Regular rate and rhythm.  Normal heart tones.  No JVD.    Pulmonary/Chest: Lung sounds fairly clear, slightly diminished in bases, respirations easy nonlabored at

## 2024-04-12 NOTE — PROGRESS NOTES
Occupational Therapy  [facility]   INPATIENT OCCUPATIONAL THERAPY  PROGRESS NOTE  Date: 2024  Patient Name: Bobbi Phillips       Room:   MRN: [id number]    : 1955  (68 y.o.)  Gender: female   Referring Practitioner: Brad Maciel MD  Diagnosis: Acute kidney injury      Discharge Recommendations:  Further Occupational Therapy is recommended upon facility discharge.    OT Equipment Recommendations  Other: TBD    Restrictions/Precautions  Restrictions/Precautions  Restrictions/Precautions: General Precautions  Implants present? :  (Denied)    O2 Device: None (room air)    Subjective  Subjective  Subjective: pt states that it feels to sit up EOB. pt cooperative and motivated  Comments: Saima RN approved therapy; co-treat jerald Mejia PTA    Objective  Orientation  Overall Orientation Status: Within Functional Limits  Cognition  Overall Cognitive Status: Exceptions  Arousal/Alertness: Delayed responses to stimuli  Following Commands: Follows one step commands with repetition;Follows one step commands with increased time  Attention Span: Attends with cues to redirect  Safety Judgement: Decreased awareness of need for assistance;Decreased awareness of need for safety  Problem Solving: Assistance required to generate solutions;Assistance required to implement solutions;Assistance required to identify errors made;Assistance required to correct errors made;Decreased awareness of errors  Insights: Decreased awareness of deficits  Initiation: Requires cues for all  Sequencing: Requires cues for all    Activities of Daily Living  ADL  Grooming: Supervision  UE Bathing: Moderate assistance  LE Bathing: Maximum assistance  UE Dressing: Moderate assistance  LE Dressing: Dependent/Total  Toileting: Dependent/Total  Toileting Skilled Clinical Factors: Fecal management system/ nichols catheter  Additional Comments: ADL scores based on skilled  observation and  clinical reasoning, unless

## 2024-04-12 NOTE — PROGRESS NOTES
Physical Therapy  [facility]    Date: 24  Patient Name: Bobbi Phillips       Room: 6-  MRN: [id number]   Account: [id number]   : 1955  (68 y.o.) Gender: female     Referring Practitioner: Brad Maciel MD  Diagnosis: Acute kidney injury  Past Medical History:  has a past medical history of Abdominal swelling, Claustrophobia, COVID-19 vaccine administered, Diabetes (HCC), Diabetic retinopathy (HCC), Flatus, H/O acute sinusitis, H/O cholelithiasis, Hypertension, Liver cirrhosis (HCC), LLQ abdominal tenderness, Poor venous access, Positive colorectal cancer screening using Cologuard test, Post-menopausal bleeding, RUQ abdominal pain, Tendonitis of shoulder, right, Tingling, Under care of service provider, Under care of service provider, Vaginal bleeding, Vaginal candidiasis, and Wears glasses.   Past Surgical History:   has a past surgical history that includes Cholecystectomy, laparoscopic ();  section; Ankle fracture surgery (Left, ); Cataract removal with implant (Bilateral, ); Colonoscopy (N/A, 2023); Dilation and curettage of uterus (N/A, 10/02/2023); Cardiac catheterization (); hysteroscopy (2024); Dilation and curettage of uterus (N/A, 2024); and Upper gastrointestinal endoscopy (N/A, 4/10/2024).  Additional Pertinent Hx: Pt admitted to ICU due to worsening back pain on 24    Overall Orientation Status: Within Functional Limits  Restrictions/Precautions  Restrictions/Precautions: General Precautions  Implants present? :  (Denied)    Subjective: Pt lying in bed upon arrival. Agreeable to therapy  Comments: EMILY Landaverde approved therapy; co-treat with BALJIT Chong       Pain Assessment: None - Denies Pain     Oxygen Therapy  O2 Device: None (Room air)    Bed Mobility  Rolling: Maximal assistance;Rolling Left;Rolling Right  Supine to Sit: Maximal assistance (x2)  Sit to Supine: Maximal assistance (x2)  Scooting: Maximal

## 2024-04-12 NOTE — PROGRESS NOTES
[facility]    Gastroenterology Daily Progress Note      Patient:   Bobbi Phillips   :    1955   Facility:   [facility]  Date:     2024  Consultant:   JOE Toribio - CNP, CNP      SUBJECTIVE  68 y.o. female admitted 4/3/2024 with Acute pyelonephritis [N10]  Hyperglycemia [R73.9]  KIM (acute kidney injury) (HCC) [N17.9]  Intractable pain [R52]  Strain of lumbar region, sequela [S39.012S] and seen for cirrhosis with sbp and encephalopathy. The pt was seen and examined. .she is alert and oriented x3 able to take a shower today. No c/o abdominal pain tolerating a diet. Brown stool in fms.         OBJECTIVE  Scheduled Meds:   metoclopramide  10 mg IntraVENous 4x Daily AC & HS    lisinopril  20 mg Oral Daily    miconazole   Topical BID    fluconazole  200 mg Oral Daily    pantoprazole (PROTONIX) 40 mg in sodium chloride (PF) 0.9 % 10 mL injection  40 mg IntraVENous Q12H    cefTRIAXone (ROCEPHIN) IV  1,000 mg IntraVENous Q24H    metoprolol tartrate  25 mg Oral BID    rifAXIMin  550 mg Oral TID    hydroCHLOROthiazide  25 mg Oral Daily    lactulose  20 g Oral TID    gabapentin  100 mg Oral TID    sodium chloride flush  5-40 mL IntraVENous 2 times per day    [Held by provider] atorvastatin  10 mg Oral Daily    insulin glargine  40 Units SubCUTAneous BID    insulin lispro  0-8 Units SubCUTAneous TID WC    insulin lispro  0-4 Units SubCUTAneous Nightly       Vital Signs:  BP (!) 148/62   Pulse 72   Temp 98.1 °F (36.7 °C) (Oral)   Resp 16   Ht 1.499 m (4' 11\")   Wt 88.4 kg (194 lb 14.2 oz)   SpO2 95%   BMI 39.36 kg/m²    Review of Systems - History obtained from chart review and the patient  General ROS: positive for  - fatigue  Respiratory ROS: no cough, shortness of breath, or wheezing  Cardiovascular ROS: no chest pain or dyspnea on exertion  Gastrointestinal ROS: no abdominal pain, change in bowel habits, or black or bloody stools   Physical Exam:     General

## 2024-04-12 NOTE — PLAN OF CARE
Problem: Discharge Planning  Goal: Discharge to home or other facility with appropriate resources  Outcome: Progressing     Problem: Pain  Goal: Verbalizes/displays adequate comfort level or baseline comfort level  Outcome: Progressing  Flowsheets (Taken 4/12/2024 0400 by Anna Brady RN)  Verbalizes/displays adequate comfort level or baseline comfort level: Encourage patient to monitor pain and request assistance     Problem: Skin/Tissue Integrity  Goal: Absence of new skin breakdown  Description: 1.  Monitor for areas of redness and/or skin breakdown  2.  Assess vascular access sites hourly  3.  Every 4-6 hours minimum:  Change oxygen saturation probe site  4.  Every 4-6 hours:  If on nasal continuous positive airway pressure, respiratory therapy assess nares and determine need for appliance change or resting period.  Outcome: Progressing     Problem: Safety - Adult  Goal: Free from fall injury  Outcome: Progressing     Problem: Chronic Conditions and Co-morbidities  Goal: Patient's chronic conditions and co-morbidity symptoms are monitored and maintained or improved  Outcome: Progressing

## 2024-04-12 NOTE — PLAN OF CARE
Problem: Discharge Planning  Goal: Discharge to home or other facility with appropriate resources  Outcome: Progressing  Flowsheets (Taken 4/11/2024 2000)  Discharge to home or other facility with appropriate resources: Identify barriers to discharge with patient and caregiver     Problem: Pain  Goal: Verbalizes/displays adequate comfort level or baseline comfort level  Outcome: Progressing  Flowsheets  Taken 4/12/2024 0000  Verbalizes/displays adequate comfort level or baseline comfort level: Encourage patient to monitor pain and request assistance  Taken 4/11/2024 2000  Verbalizes/displays adequate comfort level or baseline comfort level:   Encourage patient to monitor pain and request assistance   Assess pain using appropriate pain scale     Problem: Skin/Tissue Integrity  Goal: Absence of new skin breakdown  Description: 1.  Monitor for areas of redness and/or skin breakdown  2.  Assess vascular access sites hourly  3.  Every 4-6 hours minimum:  Change oxygen saturation probe site  4.  Every 4-6 hours:  If on nasal continuous positive airway pressure, respiratory therapy assess nares and determine need for appliance change or resting period.  Outcome: Progressing     Problem: Safety - Adult  Goal: Free from fall injury  Outcome: Progressing  Flowsheets (Taken 4/11/2024 2040)  Free From Fall Injury: Instruct family/caregiver on patient safety     Problem: Chronic Conditions and Co-morbidities  Goal: Patient's chronic conditions and co-morbidity symptoms are monitored and maintained or improved  Outcome: Progressing  Flowsheets (Taken 4/11/2024 2000)  Care Plan - Patient's Chronic Conditions and Co-Morbidity Symptoms are Monitored and Maintained or Improved: Monitor and assess patient's chronic conditions and comorbid symptoms for stability, deterioration, or improvement     Problem: ABCDS Injury Assessment  Goal: Absence of physical injury  Outcome: Progressing  Flowsheets (Taken 4/11/2024 2040)  Absence of

## 2024-04-12 NOTE — PROGRESS NOTES
NEPHROLOGY PROGRESS NOTE    Patient :  Bobbi Phillips; 68 y.o. MRN# [id number]  Location:  2086/2086-01  Attending:  Courtney Carrillo MD  Admit Date:  4/3/2024   Hospital Day: 9      Reason for Consult:  KIM      Chief Complaint:  Back pain, hematuria   History Obtained From:  patient     Interval history:   Patient was seen and examined this morning, transferred out of ICU.  She is however awake and oriented x 3.  She is on IV fluids, kidney function stable and improved  Diarrhea improved  Blood pressure suboptimally controlled, systolic blood pressure improved.  Urine output 1.2 L    History of Present Illness:    This is a 68 y.o. 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   Patient was seen in the ED approximately 1 week ago for fall. Patient was found to have UTI at that time and discharged on Keflex.   Patient denies N/V/diarrhea, fever chills.  C/o dysuria, flank pain and frequency.  Patient started on IV Abx.  Scr on admission 1.3 mg/dl, scr peaked to 1.9 mg/dl and therefor Nephrology consultation requested.  Bladder scan positive for urinary retention, nichols catheter was placed.  Scr improved to 1.3 mg/dl this morning.  Patient noted to be lethargic and decrease in responsiveness this morning and was transferred to ICU for AMS which was suspected to be due to use Narcotics.    Patient takes NSAIDs   There had exposure to IV contrast on 4/3/24. There is no history  of paraprotein disease. Pt denies any history of recurrent UTI or kidney stones.  Medication review shows use of ACE-inhibitor/diuretics.      Current Medications:    zinc sulfate (ZINCATE) 220 mg capsule - elemental zinc 50 mg, Daily  metoclopramide (REGLAN) injection 10 mg, 4x Daily AC & HS  lisinopril (PRINIVIL;ZESTRIL) tablet 20 mg, Daily  miconazole (MICOTIN) 2 % cream, BID  fluconazole (DIFLUCAN) tablet 200 mg, Daily  melatonin tablet 6 mg, Nightly PRN  lidocaine (LMX) 4 %  cream, PRN  pantoprazole (PROTONIX) 40 mg in sodium chloride (PF) 0.9 % 10 mL injection, Q12H  cefTRIAXone (ROCEPHIN) 1,000 mg in sodium chloride 0.9 % 50 mL IVPB (mini-bag), Q24H  promethazine (PHENERGAN) 12.5 mg in sodium chloride 0.9 % 50 mL IVPB, Q6H PRN  oxyCODONE (ROXICODONE) immediate release tablet 5 mg, Q4H PRN  metoprolol tartrate (LOPRESSOR) tablet 25 mg, BID  rifAXIMin (XIFAXAN) tablet 550 mg, TID  hydroCHLOROthiazide (HYDRODIURIL) tablet 25 mg, Daily  metoprolol (LOPRESSOR) injection 5 mg, Q6H PRN  lactulose (CHRONULAC) 10 GM/15ML solution 20 g, TID  [Held by provider] HYDROcodone-acetaminophen (NORCO) 5-325 MG per tablet 1 tablet, Q6H PRN  gabapentin (NEURONTIN) capsule 100 mg, TID  perflutren lipid microspheres (DEFINITY) injection 1.5 mL, ONCE PRN  sodium chloride flush 0.9 % injection 10 mL, PRN  sodium chloride flush 0.9 % injection 5-40 mL, 2 times per day  sodium chloride flush 0.9 % injection 5-40 mL, PRN  0.9 % sodium chloride infusion, PRN  potassium chloride (KLOR-CON M) extended release tablet 40 mEq, PRN   Or  potassium bicarb-citric acid (EFFER-K) effervescent tablet 40 mEq, PRN   Or  potassium chloride 10 mEq/100 mL IVPB (Peripheral Line), PRN  magnesium sulfate 2000 mg in water 50 mL IVPB, PRN  ondansetron (ZOFRAN-ODT) disintegrating tablet 4 mg, Q8H PRN   Or  ondansetron (ZOFRAN) injection 4 mg, Q6H PRN  polyethylene glycol (GLYCOLAX) packet 17 g, Daily PRN  [Held by provider] atorvastatin (LIPITOR) tablet 10 mg, Daily  insulin glargine (LANTUS) injection vial 40 Units, BID  insulin lispro (HUMALOG) injection vial 0-8 Units, TID WC  insulin lispro (HUMALOG) injection vial 0-4 Units, Nightly  glucose chewable tablet 16 g, PRN  dextrose bolus 10% 125 mL, PRN   Or  dextrose bolus 10% 250 mL, PRN  glucagon injection 1 mg, PRN  dextrose 10 % infusion, Continuous PRN  hydrALAZINE (APRESOLINE) injection 10 mg, Q6H PRN    Objective:  CURRENT TEMPERATURE:  Temp: 98.4 °F (36.9 °C)  MAXIMUM

## 2024-04-12 NOTE — CARE COORDINATION
Writer is following for potential discharge to [facility].  Writer placed message to [facility] to verify facility can still accept. Facility accepts this pt. Writer requested facility to start authorization.  Electronically signed by HUANG Ayers on 4/12/2024 at 11:23 AM    Facility needs PT/OT notation before authorization can be submitted.  Electronically signed by HUANG Ayers on 4/12/2024 at 1:47 PM

## 2024-04-12 NOTE — PROGRESS NOTES
Urology Progress Note    Subjective: No new urologic issues overnight    Patient Vitals for the past 24 hrs:   BP Temp Temp src Pulse Resp SpO2   04/12/24 1530 (!) 143/80 98.4 °F (36.9 °C) Oral 80 16 97 %   04/12/24 1115 137/66 98.4 °F (36.9 °C) Oral 79 18 94 %   04/12/24 0645 (!) 148/62 98.1 °F (36.7 °C) Oral 72 16 95 %   04/12/24 0530 (!) 115/44 -- -- 70 11 93 %   04/12/24 0500 (!) 116/46 -- -- 69 10 94 %   04/12/24 0430 (!) 121/51 -- -- 71 10 94 %   04/12/24 0400 (!) 128/50 98.1 °F (36.7 °C) Axillary 74 17 94 %   04/12/24 0330 (!) 163/50 -- -- 75 14 94 %   04/12/24 0300 (!) 110/46 -- -- 72 12 92 %   04/12/24 0200 (!) 153/56 -- -- 76 13 94 %   04/12/24 0130 (!) 100/42 -- -- 70 11 94 %   04/12/24 0105 (!) 125/48 -- -- 71 13 94 %   04/12/24 0100 (!) 88/37 -- -- 71 11 93 %   04/12/24 0030 (!) 105/42 -- -- 73 12 93 %   04/12/24 0000 (!) 106/41 98.1 °F (36.7 °C) Axillary 73 11 94 %   04/11/24 2330 (!) 99/39 -- -- 73 11 94 %   04/11/24 2300 (!) 116/40 -- -- 73 12 94 %   04/11/24 2230 (!) 102/43 -- -- 73 11 95 %   04/11/24 2200 (!) 104/42 -- -- 74 11 94 %   04/11/24 2130 (!) 119/45 -- -- 74 12 94 %   04/11/24 2100 (!) 105/42 -- -- 74 11 95 %   04/11/24 2030 (!) 133/42 -- -- 74 11 93 %   04/11/24 2000 (!) 139/57 98 °F (36.7 °C) Oral 79 15 95 %   04/11/24 1734 -- -- -- 80 14 --   04/11/24 1733 -- -- -- 80 15 --   04/11/24 1732 -- -- -- 80 15 --   04/11/24 1731 -- -- -- 78 19 --   04/11/24 1730 (!) 148/65 -- -- 80 16 --   04/11/24 1729 -- -- -- 80 29 --   04/11/24 1728 -- -- -- 78 14 --   04/11/24 1727 -- -- -- 78 18 --   04/11/24 1726 -- -- -- 78 14 --   04/11/24 1725 -- -- -- 79 12 --   04/11/24 1724 -- -- -- 79 13 --   04/11/24 1723 -- -- -- 79 16 --   04/11/24 1722 -- -- -- 79 14 --   04/11/24 1721 -- -- -- 79 14 --   04/11/24 1720 -- -- -- 79 13 --   04/11/24 1719 -- -- -- 80 14 --   04/11/24 1718 -- -- -- 80 15 --   04/11/24 1717 -- -- -- 81 15 --   04/11/24 1716 -- -- -- 83 12 --   04/11/24 1715 -- -- -- 84 16

## 2024-04-12 NOTE — PROGRESS NOTES
Patient transferred to room 2086 from ICU in [facility]. Oriented to room and call light. Bed locked in lowest position with bed alarm engaged. Call light within reach.

## 2024-04-13 LAB
ANION GAP SERPL CALCULATED.3IONS-SCNC: 10 MMOL/L (ref 9–17)
BASOPHILS # BLD: 0 K/UL (ref 0–0.2)
BASOPHILS NFR BLD: 0 % (ref 0–2)
BUN SERPL-MCNC: 39 MG/DL (ref 8–23)
CALCIUM SERPL-MCNC: 8.4 MG/DL (ref 8.6–10.4)
CHLORIDE SERPL-SCNC: 98 MMOL/L (ref 98–107)
CO2 SERPL-SCNC: 21 MMOL/L (ref 20–31)
CREAT SERPL-MCNC: 0.8 MG/DL (ref 0.5–0.9)
EOSINOPHIL # BLD: 0.1 K/UL (ref 0–0.4)
EOSINOPHILS RELATIVE PERCENT: 1 % (ref 0–4)
ERYTHROCYTE [DISTWIDTH] IN BLOOD BY AUTOMATED COUNT: 17.5 % (ref 11.5–14.9)
GFR SERPL CREATININE-BSD FRML MDRD: 80 ML/MIN/1.73M2
GLUCOSE BLD-MCNC: 225 MG/DL (ref 65–105)
GLUCOSE BLD-MCNC: 240 MG/DL (ref 65–105)
GLUCOSE BLD-MCNC: 256 MG/DL (ref 65–105)
GLUCOSE BLD-MCNC: 78 MG/DL (ref 65–105)
GLUCOSE SERPL-MCNC: 274 MG/DL (ref 70–99)
HCT VFR BLD AUTO: 27.1 % (ref 36–46)
HCT VFR BLD AUTO: 28.3 % (ref 36–46)
HGB BLD-MCNC: 9 G/DL (ref 12–16)
HGB BLD-MCNC: 9.2 G/DL (ref 12–16)
LYMPHOCYTES NFR BLD: 0.9 K/UL (ref 1–4.8)
LYMPHOCYTES RELATIVE PERCENT: 8 % (ref 24–44)
MAGNESIUM SERPL-MCNC: 1.5 MG/DL (ref 1.6–2.6)
MCH RBC QN AUTO: 28.2 PG (ref 26–34)
MCHC RBC AUTO-ENTMCNC: 32.5 G/DL (ref 31–37)
MCV RBC AUTO: 86.9 FL (ref 80–100)
MONOCYTES NFR BLD: 0.7 K/UL (ref 0.1–1.3)
MONOCYTES NFR BLD: 6 % (ref 1–7)
NEUTROPHILS NFR BLD: 85 % (ref 36–66)
NEUTS SEG NFR BLD: 9.3 K/UL (ref 1.3–9.1)
PLATELET # BLD AUTO: 134 K/UL (ref 150–450)
PMV BLD AUTO: 8.2 FL (ref 6–12)
POTASSIUM SERPL-SCNC: 4.4 MMOL/L (ref 3.7–5.3)
RBC # BLD AUTO: 3.26 M/UL (ref 4–5.2)
SODIUM SERPL-SCNC: 129 MMOL/L (ref 135–144)
WBC OTHER # BLD: 10.9 K/UL (ref 3.5–11)

## 2024-04-13 PROCEDURE — APPSS30 APP SPLIT SHARED TIME 16-30 MINUTES: Performed by: NURSE PRACTITIONER

## 2024-04-13 PROCEDURE — 99232 SBSQ HOSP IP/OBS MODERATE 35: CPT | Performed by: INTERNAL MEDICINE

## 2024-04-13 PROCEDURE — 85014 HEMATOCRIT: CPT

## 2024-04-13 PROCEDURE — 6360000002 HC RX W HCPCS: Performed by: INTERNAL MEDICINE

## 2024-04-13 PROCEDURE — 2060000000 HC ICU INTERMEDIATE R&B

## 2024-04-13 PROCEDURE — 6370000000 HC RX 637 (ALT 250 FOR IP): Performed by: INTERNAL MEDICINE

## 2024-04-13 PROCEDURE — 2580000003 HC RX 258: Performed by: INTERNAL MEDICINE

## 2024-04-13 PROCEDURE — 6370000000 HC RX 637 (ALT 250 FOR IP)

## 2024-04-13 PROCEDURE — 82947 ASSAY GLUCOSE BLOOD QUANT: CPT

## 2024-04-13 PROCEDURE — 85025 COMPLETE CBC W/AUTO DIFF WBC: CPT

## 2024-04-13 PROCEDURE — 99233 SBSQ HOSP IP/OBS HIGH 50: CPT | Performed by: INTERNAL MEDICINE

## 2024-04-13 PROCEDURE — 36415 COLL VENOUS BLD VENIPUNCTURE: CPT

## 2024-04-13 PROCEDURE — 80048 BASIC METABOLIC PNL TOTAL CA: CPT

## 2024-04-13 PROCEDURE — 83735 ASSAY OF MAGNESIUM: CPT

## 2024-04-13 PROCEDURE — 85018 HEMOGLOBIN: CPT

## 2024-04-13 RX ORDER — INSULIN GLARGINE 100 [IU]/ML
36 INJECTION, SOLUTION SUBCUTANEOUS 2 TIMES DAILY
Status: DISCONTINUED | OUTPATIENT
Start: 2024-04-13 | End: 2024-04-17

## 2024-04-13 RX ORDER — PANTOPRAZOLE SODIUM 40 MG/1
40 TABLET, DELAYED RELEASE ORAL
Status: DISCONTINUED | OUTPATIENT
Start: 2024-04-13 | End: 2024-04-23 | Stop reason: HOSPADM

## 2024-04-13 RX ADMIN — SODIUM CHLORIDE, PRESERVATIVE FREE 10 ML: 5 INJECTION INTRAVENOUS at 21:31

## 2024-04-13 RX ADMIN — METOPROLOL TARTRATE 25 MG: 25 TABLET, FILM COATED ORAL at 21:27

## 2024-04-13 RX ADMIN — METOPROLOL TARTRATE 25 MG: 25 TABLET, FILM COATED ORAL at 09:09

## 2024-04-13 RX ADMIN — LACTULOSE 20 G: 20 SOLUTION ORAL at 09:09

## 2024-04-13 RX ADMIN — GABAPENTIN 100 MG: 100 CAPSULE ORAL at 21:27

## 2024-04-13 RX ADMIN — INSULIN LISPRO 2 UNITS: 100 INJECTION, SOLUTION INTRAVENOUS; SUBCUTANEOUS at 11:15

## 2024-04-13 RX ADMIN — INSULIN LISPRO 4 UNITS: 100 INJECTION, SOLUTION INTRAVENOUS; SUBCUTANEOUS at 16:04

## 2024-04-13 RX ADMIN — Medication 50 MG: at 09:09

## 2024-04-13 RX ADMIN — SODIUM CHLORIDE, PRESERVATIVE FREE 10 ML: 5 INJECTION INTRAVENOUS at 09:11

## 2024-04-13 RX ADMIN — RIFAXIMIN 550 MG: 550 TABLET ORAL at 21:28

## 2024-04-13 RX ADMIN — MICONAZOLE NITRATE: 2 CREAM TOPICAL at 21:30

## 2024-04-13 RX ADMIN — CEFTRIAXONE SODIUM 1000 MG: 1 INJECTION, POWDER, FOR SOLUTION INTRAMUSCULAR; INTRAVENOUS at 17:33

## 2024-04-13 RX ADMIN — RIFAXIMIN 550 MG: 550 TABLET ORAL at 09:28

## 2024-04-13 RX ADMIN — PANTOPRAZOLE SODIUM 40 MG: 40 TABLET, DELAYED RELEASE ORAL at 16:04

## 2024-04-13 RX ADMIN — INSULIN GLARGINE 36 UNITS: 100 INJECTION, SOLUTION SUBCUTANEOUS at 08:51

## 2024-04-13 RX ADMIN — LISINOPRIL 20 MG: 20 TABLET ORAL at 09:09

## 2024-04-13 RX ADMIN — FLUCONAZOLE 200 MG: 100 TABLET ORAL at 09:09

## 2024-04-13 RX ADMIN — MICONAZOLE NITRATE: 2 CREAM TOPICAL at 09:09

## 2024-04-13 RX ADMIN — RIFAXIMIN 550 MG: 550 TABLET ORAL at 14:48

## 2024-04-13 RX ADMIN — LACTULOSE 20 G: 20 SOLUTION ORAL at 21:27

## 2024-04-13 RX ADMIN — GABAPENTIN 100 MG: 100 CAPSULE ORAL at 09:09

## 2024-04-13 RX ADMIN — INSULIN GLARGINE 36 UNITS: 100 INJECTION, SOLUTION SUBCUTANEOUS at 21:26

## 2024-04-13 RX ADMIN — GABAPENTIN 100 MG: 100 CAPSULE ORAL at 14:47

## 2024-04-13 RX ADMIN — LACTULOSE 20 G: 20 SOLUTION ORAL at 14:47

## 2024-04-13 RX ADMIN — OXYCODONE HYDROCHLORIDE 5 MG: 5 TABLET ORAL at 10:48

## 2024-04-13 RX ADMIN — METOCLOPRAMIDE 10 MG: 5 INJECTION, SOLUTION INTRAMUSCULAR; INTRAVENOUS at 05:44

## 2024-04-13 ASSESSMENT — PAIN DESCRIPTION - ONSET: ONSET: ON-GOING

## 2024-04-13 ASSESSMENT — PAIN DESCRIPTION - LOCATION
LOCATION: NECK
LOCATION: BACK
LOCATION: BACK;NECK
LOCATION: BACK

## 2024-04-13 ASSESSMENT — PAIN SCALES - GENERAL
PAINLEVEL_OUTOF10: 5
PAINLEVEL_OUTOF10: 5
PAINLEVEL_OUTOF10: 4
PAINLEVEL_OUTOF10: 8
PAINLEVEL_OUTOF10: 3

## 2024-04-13 ASSESSMENT — PAIN DESCRIPTION - ORIENTATION
ORIENTATION: LOWER

## 2024-04-13 ASSESSMENT — PAIN DESCRIPTION - DESCRIPTORS
DESCRIPTORS: ACHING;DISCOMFORT
DESCRIPTORS: ACHING;DISCOMFORT

## 2024-04-13 ASSESSMENT — PAIN - FUNCTIONAL ASSESSMENT
PAIN_FUNCTIONAL_ASSESSMENT: ACTIVITIES ARE NOT PREVENTED
PAIN_FUNCTIONAL_ASSESSMENT: ACTIVITIES ARE NOT PREVENTED

## 2024-04-13 ASSESSMENT — PAIN SCALES - WONG BAKER: WONGBAKER_NUMERICALRESPONSE: HURTS A LITTLE BIT

## 2024-04-13 NOTE — PROGRESS NOTES
Post-menopausal bleeding     RUQ abdominal pain     Tendonitis of shoulder, right 12/09/2021    Tingling     Under care of service provider 12/28/2023    [facility] in [place]-last visit dec 2023    Under care of service provider 12/28/2023    [address] in [place]-last visit dec 2023    Vaginal bleeding 08/2023    \"SPOTTING, THICKENED UTERINE LINING\"DUE TO HAVE VAGINAL /UTERINE BIOPSY FOR\"    Vaginal candidiasis     Wears glasses     READING        Plan:        Decompensated cirrhosis 2/2 BONNER c/b ascites, diagnostic tap consistent with SBP, AMS  Currently alert, oriented  Continue 2 gm sodium diet  Avoid sedative narcotics  Diuretics per nephrology  SBP  Repeat paracentesis did not show WBC improvement  Continues on Rocephin  ID following  Will need long-term prophylactic antibiotics given SBP at discharge  UTI w/ suspected pyelonephritis  Influenza  KIM - per neprhology  Uterine mass - per primary, outpatient workup  Chronic anemia, thrombocytopenia, hematemesis s/p EGD showing esophagitis, large duodenal ulcer with black eschar and multiple small ulcerson the anterior surface.  No varices   Continue PPI  Trend HH  Transfuse for hgb <7    This plan was formulated in collaboration with Dr. Calzada    Explained to the patient and d/W Nursing Staff  Will F/U with you  Please call or Page for any issues or change in status  Thanks    This note is created with the assistance of the speech recognition program.  While intending to generate a document that actually reflects the content of the visit, document can still have some errors including those of syntax and sound like substitutions which may escape proof reading.  Actual meaning can be extrapolated by contextual diversion.    Electronically signed by JOE Verde NP on 4/13/2024 at 12:37 PM       Attending Physician Statement  I have discussed the care of Bobbi Phillips and I have examined the patient myselft and taken

## 2024-04-13 NOTE — PLAN OF CARE
Problem: Discharge Planning  Goal: Discharge to home or other facility with appropriate resources  4/13/2024 0329 by Margarita Aguirre RN  Outcome: Progressing  Flowsheets (Taken 4/13/2024 0329)  Discharge to home or other facility with appropriate resources:   Identify discharge learning needs (meds, wound care, etc)   Arrange for needed discharge resources and transportation as appropriate   Arrange for interpreters to assist at discharge as needed     Problem: Pain  Goal: Verbalizes/displays adequate comfort level or baseline comfort level  4/13/2024 0329 by Margarita Aguirre RN  Outcome: Progressing  Note: Pt medicated with pain medication prn.  Assessed all pain characteristics including level, type, location, frequency, and onset.  Non-pharmacologic interventions offered to pt as well.  Pt states pain is tolerable at this time.        Problem: Skin/Tissue Integrity  Goal: Absence of new skin breakdown  Description: 1.  Monitor for areas of redness and/or skin breakdown  2.  Assess vascular access sites hourly  3.  Every 4-6 hours minimum:  Change oxygen saturation probe site  4.  Every 4-6 hours:  If on nasal continuous positive airway pressure, respiratory therapy assess nares and determine need for appliance change or resting period.  4/13/2024 0329 by Margarita Agiurre RN  Outcome: Progressing  Note: Skin assessment complete. Waffle mattress in place. Coccyx reddened. Sensicare applied PRN. Turned and repositioned every two hours.  Area kept free from moisture. Proper nourishment and fluids encouraged, as appropriate. Will continue to monitor for additional needs and changes in skin breakdown.       Problem: Safety - Adult  Goal: Free from fall injury  4/13/2024 0329 by Margarita Aguirre, RN  Outcome: Progressing  Note: Pt assessed as a fall risk this shift. Remains free from falls and accidental injury at this time. Fall precautions in place, including falling star sign and fall risk band

## 2024-04-13 NOTE — CARE COORDINATION
IMM letter provided to patient.  Patient offered four hours to make informed decision regarding appeal process; patient agreeable to discharge.   Electronically signed by HUANG Ayers on 4/13/2024 at 12:55 PM

## 2024-04-13 NOTE — CARE COORDINATION
Writer is following for potential discharge to [facility].  Facility was waiting for PT/OT notation to start authorization. Writer placed call to Sanaz with [facility] that PT/OT notation is available.  Electronically signed by HUANG Ayers on 4/13/2024 at 10:31 AM

## 2024-04-13 NOTE — PROGRESS NOTES
[facility] -   Infectious diseases evaluation  admission date 4/3/2024    reason for consultation:   SBP    Impression :   Current:  Suspected SBP status post paracentesis 4/5/2024 fluid analysis suggestive of peritonitis, no growth on culture  Recent UTI on 3/29/2024, Klebsiella pneumoniae growth on cultures  Metabolic encephalopathy  Hematuria  Liver cirrhosis  Ascites  Recent influenza B tested positive on March 29, 2024  Diabetes mellitus  Uterine mass      HENCE:   Continue IV ceftriaxone through 4/16/2024, may change to oral Ceftin on discharge  Follow peritoneal fluid culture from 4/10/2024, no growth to date  Follow CBC and renal function  Supportive care      Infection Control Recommendations   [place] Precautions      Antimicrobial Stewardship Recommendations   Simplification of therapy  Targeted therapy      History of Present Illness:   Initial history:  Bobbi Phillips is a 68 y.o.-year-old female presented to ER on 3/29/2024 after a fall with reported mental status changes, was positive for influenza B and suspected UTI.  She was discharged on Keflex .  She returned to the hospital on 4/3/2024 with back pain, altered mental status and hypoxia.  CT abdomen/pelvis, chest and lumbar spine suggestive of uterine mass, liver cirrhosis and portal hypertension, small amount of ascites.  No pulmonary embolism  4/ 3/2024 urinalysis showed moderate leukocyte esterase, too numerous to count WBC, no growth on cultures    Status post paracentesis 4/5/2024 peritoneal fluid analysis showed 1, 166 WBC with 60% neutrophils, no growth on fluid culture.  The patient has been on ceftriaxone since 4/3/2024.  He had leukocytosis, WBC was up to 20.5 on 4/4/2024, down to 14.5 on 4/9/2024.  Renal function got worse, creatinine was up to 1.9 on 4/8/2024 down to 1.3 on 4/9/2024.  On 3/29/2024 urine culture grew Klebsiella pneumoniae that was sensitive to cefazolin.  She was

## 2024-04-13 NOTE — PROGRESS NOTES
[facility] Internal Medicine  Nikolas Tan MD; Ray Hwang MD; Brad Maciel MD; MD Courtney Lr MD; Germania Osborne MD; Marlin Gutierrez MD      Progress note            Date:   4/13/2024  Patient name:  Bobbi Phillips  MRN:   [id number]  Account:  [id number]  YOB: 1955  PCP:    Stacie Doty MD  Code Status:    Full Code    Chief Complaint:     Chief Complaint   Patient presents with    Back Pain         History Obtained From:     Patient, EMR, nursing staff    HPI     This patient is a 68 y.o. Non- / non  femalewho presents with back pain  History of type 2 diabetes, with diabetic retinopathy and neuropathy, hypertension,  Patient is poor historian she reports she has been given some pain medication and is unable to relate sequence of events accurately  Per EMR patient has chronic low back pain, she had fall 5 days ago and was seen in ER CT thoracic and lumbar spine nil acute but UA positive for UTI culture showed Klebsiella sensitive to Keflex which is what she was discharged on.  Patient reported subsequent improvement in back pain however reports pain became worse again today  Moderate intensity progressive persistent no aggravating leaving factors noted  Denies any associated fevers diarrhea or vomiting    ER evaluation showed UA suggestive of persistent UTI, severe hyperglycemia, KIM    4/13/2024  Doing well, tolerating current diet.  Denies abdominal discomfort.  No nausea no vomiting.  No blood in the catheter  Rectal tube resolved    Review of Systems:     A 10 point review of systems was performed and and negative except as mentioned in HPI  Positive and Negative as described in HPI.    Past Medical History:     Past Medical History:   Diagnosis Date    Abdominal swelling     Claustrophobia     COVID-19 vaccine administered     Diabetes (HCC)     DEXCOM LT ARM    Diabetic retinopathy (HCC) 11/28/2015    Flatus     H/O acute sinusitis   cultures yet..  ID on board, appreciate help.  Recommendations to continue antibiotics through 4/16/2024.  While in-house continue ceftriaxone, may change to oral Ceftin on discharge.  Diabetes mellitus type 2, blood sugars on the low normal.  Will decrease dosing of Lantus  Recent UTI, cultures grew Klebsiella pneumonia.  Liver cirrhosis, currently on rifaximin, lactulose.  Mentation intact  Hematuria, urology on board.  CBI is now off, no hematuria noted in bag today.  Will attempt voiding trial  Uterine mass, has been following up with outpatient OB/GYN.  Referred back to follow-up with them postdischarge  Hold off on initiating DVT prophylaxis, monitor 1 more day.  If no bleeding we will place on aspirin which the patient was receiving at home    Taye Rowe MD  4/13/2024  8:50 AM

## 2024-04-13 NOTE — PLAN OF CARE
Problem: Discharge Planning  Goal: Discharge to home or other facility with appropriate resources  Outcome: Progressing     Problem: Pain  Goal: Verbalizes/displays adequate comfort level or baseline comfort level  Outcome: Progressing     Problem: Skin/Tissue Integrity  Goal: Absence of new skin breakdown  Description: 1.  Monitor for areas of redness and/or skin breakdown  2.  Assess vascular access sites hourly  3.  Every 4-6 hours minimum:  Change oxygen saturation probe site  4.  Every 4-6 hours:  If on nasal continuous positive airway pressure, respiratory therapy assess nares and determine need for appliance change or resting period.  Outcome: Progressing     Problem: Safety - Adult  Goal: Free from fall injury  Outcome: Progressing     Problem: Chronic Conditions and Co-morbidities  Goal: Patient's chronic conditions and co-morbidity symptoms are monitored and maintained or improved  Outcome: Progressing     Problem: ABCDS Injury Assessment  Goal: Absence of physical injury  Outcome: Progressing     Problem: Neurosensory - Adult  Goal: Achieves stable or improved neurological status  Outcome: Progressing     Problem: Neurosensory - Adult  Goal: Achieves maximal functionality and self care  Outcome: Progressing     Problem: Nutrition Deficit:  Goal: Optimize nutritional status  Outcome: Progressing

## 2024-04-13 NOTE — PROGRESS NOTES
Urology Progress Note    Subjective: No new urologic issues overnight    Patient Vitals for the past 24 hrs:   BP Temp Temp src Pulse Resp SpO2 Weight   04/13/24 0751 (!) 143/65 98.8 °F (37.1 °C) Oral 74 18 96 % --   04/13/24 0550 -- -- -- -- -- -- 98.3 kg (216 lb 11.4 oz)   04/13/24 0300 (!) 118/54 98.6 °F (37 °C) Oral 76 16 93 % --   04/13/24 0020 -- -- -- 80 -- -- --   04/12/24 2308 (!) 115/53 98.3 °F (36.8 °C) Oral 82 16 93 % --   04/12/24 2000 -- -- -- 83 -- -- --   04/12/24 1940 (!) 135/58 98.5 °F (36.9 °C) Oral 86 16 95 % --   04/12/24 1530 (!) 143/80 98.4 °F (36.9 °C) Oral 80 16 97 % --   04/12/24 1115 137/66 98.4 °F (36.9 °C) Oral 79 18 94 % --       Intake/Output Summary (Last 24 hours) at 4/13/2024 1008  Last data filed at 4/13/2024 0550  Gross per 24 hour   Intake --   Output 1550 ml   Net -1550 ml       Recent Labs     04/11/24  0700 04/11/24  1641 04/12/24  0733 04/12/24  1537 04/13/24  0237   WBC 12.5*  --   --   --   --    HGB 9.3*   < > 9.3* 9.8* 9.0*   HCT 28.6*   < > 27.9* 31.3* 27.1*   MCV 85.8  --   --   --   --      --   --   --   --     < > = values in this interval not displayed.     Recent Labs     04/11/24  0700      K 3.8      CO2 22   BUN 47*   CREATININE 1.0*       No results for input(s): \"COLORU\", \"PHUR\", \"LABCAST\", \"WBCUA\", \"RBCUA\", \"MUCUS\", \"TRICHOMONAS\", \"YEAST\", \"BACTERIA\", \"CLARITYU\", \"SPECGRAV\", \"LEUKOCYTESUR\", \"UROBILINOGEN\", \"BILIRUBINUR\", \"BLOODU\" in the last 72 hours.    Invalid input(s): \"NITRATE\", \"GLUCOSEUKETONESUAMORPHOUS\"    Additional Lab/culture results:     Physical Exam: Urine is yellow and clear    Interval Imaging Findings: none    Impression:    Patient Active Problem List   Diagnosis    Anxiety    Liver cirrhosis secondary to BONNER (HCC)    Disc degeneration, lumbar    Poorly-controlled hypertension    Hyperlipidemia    Lumbar radiculopathy    Microalbuminuria    Numbness    Palpitations    Sciatica    Type 2 diabetes mellitus (HCC)    Rosacea

## 2024-04-14 LAB
ANION GAP SERPL CALCULATED.3IONS-SCNC: 10 MMOL/L (ref 9–17)
BASOPHILS # BLD: 0.11 K/UL (ref 0–0.2)
BASOPHILS NFR BLD: 1 % (ref 0–2)
BUN SERPL-MCNC: 43 MG/DL (ref 8–23)
CALCIUM SERPL-MCNC: 8.3 MG/DL (ref 8.6–10.4)
CHLORIDE SERPL-SCNC: 97 MMOL/L (ref 98–107)
CO2 SERPL-SCNC: 19 MMOL/L (ref 20–31)
CREAT SERPL-MCNC: 1.2 MG/DL (ref 0.5–0.9)
EOSINOPHIL # BLD: 0.11 K/UL (ref 0–0.4)
EOSINOPHILS RELATIVE PERCENT: 1 % (ref 0–4)
ERYTHROCYTE [DISTWIDTH] IN BLOOD BY AUTOMATED COUNT: 18.5 % (ref 11.5–14.9)
GFR SERPL CREATININE-BSD FRML MDRD: 49 ML/MIN/1.73M2
GLUCOSE BLD-MCNC: 135 MG/DL (ref 65–105)
GLUCOSE BLD-MCNC: 136 MG/DL (ref 65–105)
GLUCOSE BLD-MCNC: 224 MG/DL (ref 65–105)
GLUCOSE BLD-MCNC: 317 MG/DL (ref 65–105)
GLUCOSE SERPL-MCNC: 153 MG/DL (ref 70–99)
HCT VFR BLD AUTO: 32.3 % (ref 36–46)
HGB BLD-MCNC: 9.6 G/DL (ref 12–16)
LYMPHOCYTES NFR BLD: 1.42 K/UL (ref 1–4.8)
LYMPHOCYTES RELATIVE PERCENT: 13 % (ref 24–44)
MCH RBC QN AUTO: 27.9 PG (ref 26–34)
MCHC RBC AUTO-ENTMCNC: 29.7 G/DL (ref 31–37)
MCV RBC AUTO: 93.9 FL (ref 80–100)
MONOCYTES NFR BLD: 0.87 K/UL (ref 0.1–1.3)
MONOCYTES NFR BLD: 8 % (ref 1–7)
MORPHOLOGY: ABNORMAL
NEUTROPHILS NFR BLD: 77 % (ref 36–66)
NEUTS SEG NFR BLD: 8.39 K/UL (ref 1.3–9.1)
PLATELET # BLD AUTO: 124 K/UL (ref 150–450)
PMV BLD AUTO: 8.8 FL (ref 6–12)
POTASSIUM SERPL-SCNC: 4.8 MMOL/L (ref 3.7–5.3)
RBC # BLD AUTO: 3.44 M/UL (ref 4–5.2)
SODIUM SERPL-SCNC: 126 MMOL/L (ref 135–144)
WBC OTHER # BLD: 10.9 K/UL (ref 3.5–11)

## 2024-04-14 PROCEDURE — 99232 SBSQ HOSP IP/OBS MODERATE 35: CPT | Performed by: INTERNAL MEDICINE

## 2024-04-14 PROCEDURE — 6370000000 HC RX 637 (ALT 250 FOR IP): Performed by: INTERNAL MEDICINE

## 2024-04-14 PROCEDURE — 80048 BASIC METABOLIC PNL TOTAL CA: CPT

## 2024-04-14 PROCEDURE — 2580000003 HC RX 258: Performed by: INTERNAL MEDICINE

## 2024-04-14 PROCEDURE — 6360000002 HC RX W HCPCS: Performed by: INTERNAL MEDICINE

## 2024-04-14 PROCEDURE — 6370000000 HC RX 637 (ALT 250 FOR IP)

## 2024-04-14 PROCEDURE — 85025 COMPLETE CBC W/AUTO DIFF WBC: CPT

## 2024-04-14 PROCEDURE — 36415 COLL VENOUS BLD VENIPUNCTURE: CPT

## 2024-04-14 PROCEDURE — 2060000000 HC ICU INTERMEDIATE R&B

## 2024-04-14 PROCEDURE — 82947 ASSAY GLUCOSE BLOOD QUANT: CPT

## 2024-04-14 RX ORDER — HEPARIN SODIUM 5000 [USP'U]/ML
5000 INJECTION, SOLUTION INTRAVENOUS; SUBCUTANEOUS EVERY 8 HOURS SCHEDULED
Status: DISCONTINUED | OUTPATIENT
Start: 2024-04-14 | End: 2024-04-23 | Stop reason: HOSPADM

## 2024-04-14 RX ADMIN — PANTOPRAZOLE SODIUM 40 MG: 40 TABLET, DELAYED RELEASE ORAL at 05:52

## 2024-04-14 RX ADMIN — SODIUM CHLORIDE, PRESERVATIVE FREE 10 ML: 5 INJECTION INTRAVENOUS at 09:14

## 2024-04-14 RX ADMIN — RIFAXIMIN 550 MG: 550 TABLET ORAL at 22:12

## 2024-04-14 RX ADMIN — LACTULOSE 20 G: 20 SOLUTION ORAL at 14:01

## 2024-04-14 RX ADMIN — ONDANSETRON 4 MG: 2 INJECTION INTRAMUSCULAR; INTRAVENOUS at 18:32

## 2024-04-14 RX ADMIN — CEFTRIAXONE SODIUM 1000 MG: 1 INJECTION, POWDER, FOR SOLUTION INTRAMUSCULAR; INTRAVENOUS at 18:11

## 2024-04-14 RX ADMIN — Medication 50 MG: at 09:13

## 2024-04-14 RX ADMIN — METOPROLOL TARTRATE 25 MG: 25 TABLET, FILM COATED ORAL at 09:13

## 2024-04-14 RX ADMIN — RIFAXIMIN 550 MG: 550 TABLET ORAL at 14:01

## 2024-04-14 RX ADMIN — MICONAZOLE NITRATE: 2 CREAM TOPICAL at 22:09

## 2024-04-14 RX ADMIN — MICONAZOLE NITRATE: 2 CREAM TOPICAL at 09:13

## 2024-04-14 RX ADMIN — GABAPENTIN 100 MG: 100 CAPSULE ORAL at 14:01

## 2024-04-14 RX ADMIN — LACTULOSE 20 G: 20 SOLUTION ORAL at 22:11

## 2024-04-14 RX ADMIN — GABAPENTIN 100 MG: 100 CAPSULE ORAL at 22:08

## 2024-04-14 RX ADMIN — PANTOPRAZOLE SODIUM 40 MG: 40 TABLET, DELAYED RELEASE ORAL at 15:33

## 2024-04-14 RX ADMIN — HEPARIN SODIUM 5000 UNITS: 5000 INJECTION INTRAVENOUS; SUBCUTANEOUS at 22:05

## 2024-04-14 RX ADMIN — RIFAXIMIN 550 MG: 550 TABLET ORAL at 09:44

## 2024-04-14 RX ADMIN — OXYCODONE HYDROCHLORIDE 5 MG: 5 TABLET ORAL at 01:37

## 2024-04-14 RX ADMIN — INSULIN GLARGINE 36 UNITS: 100 INJECTION, SOLUTION SUBCUTANEOUS at 22:04

## 2024-04-14 RX ADMIN — INSULIN LISPRO 4 UNITS: 100 INJECTION, SOLUTION INTRAVENOUS; SUBCUTANEOUS at 22:04

## 2024-04-14 RX ADMIN — INSULIN LISPRO 2 UNITS: 100 INJECTION, SOLUTION INTRAVENOUS; SUBCUTANEOUS at 16:34

## 2024-04-14 RX ADMIN — HEPARIN SODIUM 5000 UNITS: 5000 INJECTION INTRAVENOUS; SUBCUTANEOUS at 14:01

## 2024-04-14 RX ADMIN — LACTULOSE 20 G: 20 SOLUTION ORAL at 09:13

## 2024-04-14 RX ADMIN — GABAPENTIN 100 MG: 100 CAPSULE ORAL at 09:13

## 2024-04-14 RX ADMIN — SODIUM CHLORIDE, PRESERVATIVE FREE 10 ML: 5 INJECTION INTRAVENOUS at 22:14

## 2024-04-14 RX ADMIN — METOPROLOL TARTRATE 25 MG: 25 TABLET, FILM COATED ORAL at 22:08

## 2024-04-14 RX ADMIN — LISINOPRIL 20 MG: 20 TABLET ORAL at 09:13

## 2024-04-14 RX ADMIN — Medication 6 MG: at 22:08

## 2024-04-14 RX ADMIN — INSULIN GLARGINE 36 UNITS: 100 INJECTION, SOLUTION SUBCUTANEOUS at 09:13

## 2024-04-14 ASSESSMENT — PAIN SCALES - GENERAL
PAINLEVEL_OUTOF10: 4
PAINLEVEL_OUTOF10: 5
PAINLEVEL_OUTOF10: 3

## 2024-04-14 ASSESSMENT — PAIN DESCRIPTION - DESCRIPTORS: DESCRIPTORS: ACHING;DISCOMFORT

## 2024-04-14 ASSESSMENT — PAIN DESCRIPTION - LOCATION
LOCATION: NECK
LOCATION: GENERALIZED;NECK

## 2024-04-14 ASSESSMENT — PAIN DESCRIPTION - ORIENTATION: ORIENTATION: MID

## 2024-04-14 NOTE — PROGRESS NOTES
50 mL IVPB  2,000 mg IntraVENous PRN Enedelia Crow MD        ondansetron (ZOFRAN-ODT) disintegrating tablet 4 mg  4 mg Oral Q8H PRN Enedelia Crow MD        Or    ondansetron (ZOFRAN) injection 4 mg  4 mg IntraVENous Q6H PRN Enedelia Crow MD   4 mg at 04/11/24 1116    polyethylene glycol (GLYCOLAX) packet 17 g  17 g Oral Daily PRN Enedelia Crow MD        [Held by provider] atorvastatin (LIPITOR) tablet 10 mg  10 mg Oral Daily LorenaCourtney MD   10 mg at 04/08/24 1030    insulin lispro (HUMALOG) injection vial 0-8 Units  0-8 Units SubCUTAneous TID WC Enedelia Crow MD   4 Units at 04/13/24 1604    insulin lispro (HUMALOG) injection vial 0-4 Units  0-4 Units SubCUTAneous Nightly Enedelia Crow MD   4 Units at 04/03/24 2316    glucose chewable tablet 16 g  4 tablet Oral PRN Enedelia Crow MD   16 g at 04/11/24 1117    dextrose bolus 10% 125 mL  125 mL IntraVENous PRN Enedelia Crow MD   Stopped at 04/11/24 1206    Or    dextrose bolus 10% 250 mL  250 mL IntraVENous PRN Enedelia Crow MD        glucagon injection 1 mg  1 mg SubCUTAneous PRN Enedelia Crow MD        dextrose 10 % infusion   IntraVENous Continuous PRN Enedelia Crow MD        hydrALAZINE (APRESOLINE) injection 10 mg  10 mg IntraVENous Q6H PRN Enedelia Crow MD   10 mg at 04/11/24 0608       Impressions :     1. Principal Problem:    KIM (acute kidney injury) (HCC)  Active Problems:    Liver cirrhosis secondary to BONNER (HCC)    Thrombocytopenia (HCC)    Acute pyelonephritis    SBP (spontaneous bacterial peritonitis) (HCC)    Anemia    Hepatic encephalopathy (HCC)  Resolved Problems:    * No resolved hospital problems. *        2.  has a past medical history of Abdominal swelling, Claustrophobia, COVID-19 vaccine administered, Diabetes (HCC), Diabetic retinopathy (HCC) (11/28/2015), Flatus, H/O acute sinusitis, H/O cholelithiasis, Hypertension, Liver cirrhosis (HCC), LLQ abdominal tenderness, Poor venous access, Positive colorectal cancer screening using  to follow-up with them postdischarge    -Begin on DVT with heparin  -Medically stable for discharge    Taye Rowe MD  4/14/2024  7:04 AM

## 2024-04-14 NOTE — CARE COORDINATION
ONGOING DISCHARGE PLANNING NOTE:    Writer reviewed LSW notes, and discharge plan is [facility]. Insurance auth to be started tomorrow.    Will continue to follow for additional discharge needs.    Electronically signed by Lisa Donato RN on 4/14/2024 at 10:02 AM

## 2024-04-14 NOTE — PROGRESS NOTES
GI Progress notes    4/14/2024   11:45 AM    Name:  Bobbi Phillips  MRN:    [id number]     Acct:     [id number]   Room:  [facility] Day: 11     Admit Date: 4/3/2024  1:22 PM  PCP: Stacie Doty MD    Subjective:     C/C:   Chief Complaint   Patient presents with    Back Pain       Interval History: Status: improved.     Patient seen and examined  No acute events overnight  Tolerating diet  No abdominal pain  Labs stable    ROS:  Constitutional: negative for chills, fevers and sweats  Gastrointestinal: negative for abdominal pain, constipation, diarrhea, nausea and vomiting  Neurological: negative for dizziness and headaches    Medications:     Allergies:   Allergies   Allergen Reactions    Tylenol [Acetaminophen] Other (See Comments)     intolerance due to cirrhosis       Current Meds: heparin (porcine) injection 5,000 Units, 3 times per day  insulin glargine (LANTUS) injection vial 36 Units, BID  pantoprazole (PROTONIX) tablet 40 mg, BID AC  zinc sulfate (ZINCATE) 220 mg capsule - elemental zinc 50 mg, Daily  lisinopril (PRINIVIL;ZESTRIL) tablet 20 mg, Daily  miconazole (MICOTIN) 2 % cream, BID  melatonin tablet 6 mg, Nightly PRN  lidocaine (LMX) 4 % cream, PRN  cefTRIAXone (ROCEPHIN) 1,000 mg in sodium chloride 0.9 % 50 mL IVPB (mini-bag), Q24H  promethazine (PHENERGAN) 12.5 mg in sodium chloride 0.9 % 50 mL IVPB, Q6H PRN  oxyCODONE (ROXICODONE) immediate release tablet 5 mg, Q4H PRN  metoprolol tartrate (LOPRESSOR) tablet 25 mg, BID  rifAXIMin (XIFAXAN) tablet 550 mg, TID  metoprolol (LOPRESSOR) injection 5 mg, Q6H PRN  lactulose (CHRONULAC) 10 GM/15ML solution 20 g, TID  [Held by provider] HYDROcodone-acetaminophen (NORCO) 5-325 MG per tablet 1 tablet, Q6H PRN  gabapentin (NEURONTIN) capsule 100 mg, TID  perflutren lipid microspheres (DEFINITY) injection 1.5 mL, ONCE PRN  sodium chloride flush 0.9 % injection 10 mL, PRN  sodium chloride flush 0.9 % injection 5-40 mL, 2 times per day  sodium  bleeding 08/2023    \"SPOTTING, THICKENED UTERINE LINING\"DUE TO HAVE VAGINAL /UTERINE BIOPSY FOR\"    Vaginal candidiasis     Wears glasses     READING        Plan:        Decompensated cirrhosis 2/2 BONNER c/b ascites, diagnostic tap consistent with SBP, AMS  Currently alert, oriented  Continue 2 gm sodium diet  Avoid sedative narcotics  Diuretics per nephrology  SBP  Repeat paracentesis did not show WBC improvement  Continues on Rocephin  ID following  Will need long-term prophylactic antibiotics given SBP at discharge (Bactrim DS once daily following completion of Rocephin)  UTI w/ suspected pyelonephritis  Influenza  KIM - per neprhology  Uterine mass - per primary, outpatient workup  Chronic anemia, thrombocytopenia, hematemesis s/p EGD showing esophagitis, large duodenal ulcer with black eschar and multiple small ulcerson the anterior surface.  No varices            Continue PPI  Trend HH  Transfuse for hgb <7     Plan to discharge to [facility]  GI signing off  This plan was formulated in collaboration with Dr. Calzada    Explained to the patient and d/W Nursing Staff  Will F/U with you  Please call or Page for any issues or change in status  Thanks    This note is created with the assistance of the speech recognition program.  While intending to generate a document that actually reflects the content of the visit, document can still have some errors including those of syntax and sound like substitutions which may escape proof reading.  Actual meaning can be extrapolated by contextual diversion.    Electronically signed by JOE Verde NP on 4/14/2024 at 11:46 AM       Attending Physician Statement  I have discussed the care of Bobbi Phillips and I have examined the patient myselft and taken ros and hpi , including pertinent history and exam findings,  with the author of this note . I have reviewed the key elements of all parts of the encounter with the nurse practitioner/resident.  I agree with the

## 2024-04-14 NOTE — PLAN OF CARE
Problem: Discharge Planning  Goal: Discharge to home or other facility with appropriate resources  4/14/2024 1526 by Judy Hoover RN  Outcome: Progressing     Problem: Pain  Goal: Verbalizes/displays adequate comfort level or baseline comfort level  4/14/2024 1526 by Judy Hoover RN  Outcome: Progressing     Problem: Skin/Tissue Integrity  Goal: Absence of new skin breakdown  Description: 1.  Monitor for areas of redness and/or skin breakdown  2.  Assess vascular access sites hourly  3.  Every 4-6 hours minimum:  Change oxygen saturation probe site  4.  Every 4-6 hours:  If on nasal continuous positive airway pressure, respiratory therapy assess nares and determine need for appliance change or resting period.  4/14/2024 1526 by Judy Hoover RN  Outcome: Progressing     Problem: Safety - Adult  Goal: Free from fall injury  4/14/2024 1526 by Judy Hoover RN  Outcome: Progressing     Problem: Chronic Conditions and Co-morbidities  Goal: Patient's chronic conditions and co-morbidity symptoms are monitored and maintained or improved  4/14/2024 1526 by Judy Hoover RN  Outcome: Progressing     Problem: ABCDS Injury Assessment  Goal: Absence of physical injury  4/14/2024 1526 by Judy Hoover RN  Outcome: Progressing     Problem: Neurosensory - Adult  Goal: Achieves stable or improved neurological status  4/14/2024 1526 by Judy Hoover RN  Outcome: Progressing     Problem: Neurosensory - Adult  Goal: Achieves maximal functionality and self care  4/14/2024 1526 by Judy Hoover RN  Outcome: Progressing     Problem: Nutrition Deficit:  Goal: Optimize nutritional status  4/14/2024 1526 by Judy Hoover RN  Outcome: Progressing

## 2024-04-14 NOTE — PROGRESS NOTES
[facility] -   Infectious diseases evaluation  admission date 4/3/2024    reason for consultation:   SBP    Impression :   Current:  Suspected SBP status post paracentesis 4/5/2024 fluid analysis suggestive of peritonitis, no growth on culture  Recent UTI on 3/29/2024, Klebsiella pneumoniae growth on cultures  Metabolic encephalopathy  Hematuria  Liver cirrhosis  Ascites  Recent influenza B tested positive on March 29, 2024  Diabetes mellitus  Uterine mass      HENCE:   Continue IV ceftriaxone through 4/16/2024, may change to oral Ceftin on discharge  Follow peritoneal fluid culture from 4/10/2024, no growth to date  Follow CBC and renal function  Supportive care      Infection Control Recommendations   [place] Precautions      Antimicrobial Stewardship Recommendations   Simplification of therapy  Targeted therapy      History of Present Illness:   Initial history:  Bobbi Phillips is a 68 y.o.-year-old female presented to ER on 3/29/2024 after a fall with reported mental status changes, was positive for influenza B and suspected UTI.  She was discharged on Keflex .  She returned to the hospital on 4/3/2024 with back pain, altered mental status and hypoxia.  CT abdomen/pelvis, chest and lumbar spine suggestive of uterine mass, liver cirrhosis and portal hypertension, small amount of ascites.  No pulmonary embolism  4/ 3/2024 urinalysis showed moderate leukocyte esterase, too numerous to count WBC, no growth on cultures    Status post paracentesis 4/5/2024 peritoneal fluid analysis showed 1, 166 WBC with 60% neutrophils, no growth on fluid culture.  The patient has been on ceftriaxone since 4/3/2024.  He had leukocytosis, WBC was up to 20.5 on 4/4/2024, down to 14.5 on 4/9/2024.  Renal function got worse, creatinine was up to 1.9 on 4/8/2024 down to 1.3 on 4/9/2024.  On 3/29/2024 urine culture grew Klebsiella pneumoniae that was sensitive to cefazolin.  She was

## 2024-04-14 NOTE — PLAN OF CARE
Problem: Discharge Planning  Goal: Discharge to home or other facility with appropriate resources  4/14/2024 0323 by Margarita Aguirre, RN  Outcome: Progressing  Flowsheets (Taken 4/14/2024 0323)  Discharge to home or other facility with appropriate resources:   Identify barriers to discharge with patient and caregiver   Refer to discharge planning if patient needs post-hospital services based on physician order or complex needs related to functional status, cognitive ability or social support system   Arrange for needed discharge resources and transportation as appropriate     Problem: Pain  Goal: Verbalizes/displays adequate comfort level or baseline comfort level  4/14/2024 0323 by Margarita Aguirre, RN  Outcome: Progressing  Note: Pt medicated with pain medication prn.  Assessed all pain characteristics including level, type, location, frequency, and onset.  Non-pharmacologic interventions offered to pt as well.  Pt states pain is tolerable at this time.        Problem: Skin/Tissue Integrity  Goal: Absence of new skin breakdown  Description: 1.  Monitor for areas of redness and/or skin breakdown  2.  Assess vascular access sites hourly  3.  Every 4-6 hours minimum:  Change oxygen saturation probe site  4.  Every 4-6 hours:  If on nasal continuous positive airway pressure, respiratory therapy assess nares and determine need for appliance change or resting period.  4/14/2024 0323 by Margarita Aguirre, RN  Outcome: Progressing  Note: Skin assessment complete. Waffle mattress in place. Coccyx reddened. Sensicare applied PRN. Turned and repositioned every two hours.  Area kept free from moisture. Proper nourishment and fluids encouraged, as appropriate. Will continue to monitor for additional needs and changes in skin breakdown.       Problem: Safety - Adult  Goal: Free from fall injury  4/14/2024 0323 by Margarita Aguirre, RN  Outcome: Progressing  Note: Pt assessed as a fall risk this shift. Remains

## 2024-04-15 LAB
ANION GAP SERPL CALCULATED.3IONS-SCNC: 9 MMOL/L (ref 9–17)
BACTERIA URNS QL MICRO: ABNORMAL
BASOPHILS # BLD: 0.1 K/UL (ref 0–0.2)
BASOPHILS NFR BLD: 1 % (ref 0–2)
BILIRUB UR QL STRIP: NEGATIVE
BUN SERPL-MCNC: 46 MG/DL (ref 8–23)
CALCIUM SERPL-MCNC: 8.5 MG/DL (ref 8.6–10.4)
CASTS #/AREA URNS LPF: ABNORMAL /LPF
CHLORIDE SERPL-SCNC: 99 MMOL/L (ref 98–107)
CLARITY UR: ABNORMAL
CO2 SERPL-SCNC: 22 MMOL/L (ref 20–31)
COLOR UR: YELLOW
CREAT SERPL-MCNC: 1.3 MG/DL (ref 0.5–0.9)
EOSINOPHIL # BLD: 0.2 K/UL (ref 0–0.4)
EOSINOPHILS RELATIVE PERCENT: 2 % (ref 0–4)
EPI CELLS #/AREA URNS HPF: ABNORMAL /HPF
ERYTHROCYTE [DISTWIDTH] IN BLOOD BY AUTOMATED COUNT: 17.4 % (ref 11.5–14.9)
GFR SERPL CREATININE-BSD FRML MDRD: 45 ML/MIN/1.73M2
GLUCOSE BLD-MCNC: 126 MG/DL (ref 65–105)
GLUCOSE BLD-MCNC: 162 MG/DL (ref 65–105)
GLUCOSE BLD-MCNC: 165 MG/DL (ref 65–105)
GLUCOSE BLD-MCNC: 176 MG/DL (ref 65–105)
GLUCOSE SERPL-MCNC: 170 MG/DL (ref 70–99)
GLUCOSE UR STRIP-MCNC: ABNORMAL MG/DL
HCT VFR BLD AUTO: 27.7 % (ref 36–46)
HGB BLD-MCNC: 8.9 G/DL (ref 12–16)
HGB UR QL STRIP.AUTO: ABNORMAL
KETONES UR STRIP-MCNC: NEGATIVE MG/DL
LEUKOCYTE ESTERASE UR QL STRIP: ABNORMAL
LYMPHOCYTES NFR BLD: 1.1 K/UL (ref 1–4.8)
LYMPHOCYTES RELATIVE PERCENT: 12 % (ref 24–44)
MAGNESIUM SERPL-MCNC: 1.6 MG/DL (ref 1.6–2.6)
MCH RBC QN AUTO: 28.3 PG (ref 26–34)
MCHC RBC AUTO-ENTMCNC: 32.1 G/DL (ref 31–37)
MCV RBC AUTO: 88 FL (ref 80–100)
MONOCYTES NFR BLD: 0.5 K/UL (ref 0.1–1.3)
MONOCYTES NFR BLD: 6 % (ref 1–7)
NEUTROPHILS NFR BLD: 79 % (ref 36–66)
NEUTS SEG NFR BLD: 7.4 K/UL (ref 1.3–9.1)
NITRITE UR QL STRIP: NEGATIVE
OSMOLALITY SERPL: 297 MOSM/KG (ref 275–295)
OSMOLALITY UR: 398 MOSM/KG (ref 80–1300)
PH UR STRIP: 7 [PH] (ref 5–8)
PLATELET # BLD AUTO: 120 K/UL (ref 150–450)
PMV BLD AUTO: 8.5 FL (ref 6–12)
POTASSIUM SERPL-SCNC: 4.3 MMOL/L (ref 3.7–5.3)
PROT UR STRIP-MCNC: ABNORMAL MG/DL
RBC # BLD AUTO: 3.15 M/UL (ref 4–5.2)
RBC #/AREA URNS HPF: ABNORMAL /HPF
SODIUM SERPL-SCNC: 130 MMOL/L (ref 135–144)
SP GR UR STRIP: 1.01 (ref 1–1.03)
UROBILINOGEN UR STRIP-ACNC: NORMAL EU/DL (ref 0–1)
WBC #/AREA URNS HPF: ABNORMAL /HPF
WBC OTHER # BLD: 9.3 K/UL (ref 3.5–11)

## 2024-04-15 PROCEDURE — 83930 ASSAY OF BLOOD OSMOLALITY: CPT

## 2024-04-15 PROCEDURE — 83935 ASSAY OF URINE OSMOLALITY: CPT

## 2024-04-15 PROCEDURE — 99232 SBSQ HOSP IP/OBS MODERATE 35: CPT | Performed by: INTERNAL MEDICINE

## 2024-04-15 PROCEDURE — 85025 COMPLETE CBC W/AUTO DIFF WBC: CPT

## 2024-04-15 PROCEDURE — 6360000002 HC RX W HCPCS: Performed by: INTERNAL MEDICINE

## 2024-04-15 PROCEDURE — 83735 ASSAY OF MAGNESIUM: CPT

## 2024-04-15 PROCEDURE — 6370000000 HC RX 637 (ALT 250 FOR IP): Performed by: INTERNAL MEDICINE

## 2024-04-15 PROCEDURE — 2060000000 HC ICU INTERMEDIATE R&B

## 2024-04-15 PROCEDURE — 2580000003 HC RX 258: Performed by: INTERNAL MEDICINE

## 2024-04-15 PROCEDURE — 99213 OFFICE O/P EST LOW 20 MIN: CPT

## 2024-04-15 PROCEDURE — 6370000000 HC RX 637 (ALT 250 FOR IP)

## 2024-04-15 PROCEDURE — 36415 COLL VENOUS BLD VENIPUNCTURE: CPT

## 2024-04-15 PROCEDURE — 97530 THERAPEUTIC ACTIVITIES: CPT

## 2024-04-15 PROCEDURE — 97535 SELF CARE MNGMENT TRAINING: CPT

## 2024-04-15 PROCEDURE — 51702 INSERT TEMP BLADDER CATH: CPT

## 2024-04-15 PROCEDURE — 82947 ASSAY GLUCOSE BLOOD QUANT: CPT

## 2024-04-15 PROCEDURE — 87086 URINE CULTURE/COLONY COUNT: CPT

## 2024-04-15 PROCEDURE — 80048 BASIC METABOLIC PNL TOTAL CA: CPT

## 2024-04-15 PROCEDURE — 97110 THERAPEUTIC EXERCISES: CPT

## 2024-04-15 PROCEDURE — 81001 URINALYSIS AUTO W/SCOPE: CPT

## 2024-04-15 RX ORDER — SODIUM CHLORIDE 9 MG/ML
INJECTION, SOLUTION INTRAVENOUS CONTINUOUS
Status: DISCONTINUED | OUTPATIENT
Start: 2024-04-15 | End: 2024-04-23 | Stop reason: HOSPADM

## 2024-04-15 RX ADMIN — GABAPENTIN 100 MG: 100 CAPSULE ORAL at 20:47

## 2024-04-15 RX ADMIN — METOPROLOL TARTRATE 25 MG: 25 TABLET, FILM COATED ORAL at 09:07

## 2024-04-15 RX ADMIN — CEFTRIAXONE SODIUM 1000 MG: 1 INJECTION, POWDER, FOR SOLUTION INTRAMUSCULAR; INTRAVENOUS at 19:31

## 2024-04-15 RX ADMIN — LACTULOSE 20 G: 20 SOLUTION ORAL at 20:47

## 2024-04-15 RX ADMIN — MICONAZOLE NITRATE: 2 CREAM TOPICAL at 09:08

## 2024-04-15 RX ADMIN — GABAPENTIN 100 MG: 100 CAPSULE ORAL at 09:07

## 2024-04-15 RX ADMIN — HEPARIN SODIUM 5000 UNITS: 5000 INJECTION INTRAVENOUS; SUBCUTANEOUS at 05:19

## 2024-04-15 RX ADMIN — SODIUM CHLORIDE: 9 INJECTION, SOLUTION INTRAVENOUS at 15:27

## 2024-04-15 RX ADMIN — RIFAXIMIN 550 MG: 550 TABLET ORAL at 15:18

## 2024-04-15 RX ADMIN — SODIUM CHLORIDE, PRESERVATIVE FREE 10 ML: 5 INJECTION INTRAVENOUS at 20:51

## 2024-04-15 RX ADMIN — PANTOPRAZOLE SODIUM 40 MG: 40 TABLET, DELAYED RELEASE ORAL at 05:19

## 2024-04-15 RX ADMIN — OXYCODONE HYDROCHLORIDE 5 MG: 5 TABLET ORAL at 02:08

## 2024-04-15 RX ADMIN — Medication 50 MG: at 09:08

## 2024-04-15 RX ADMIN — LISINOPRIL 20 MG: 20 TABLET ORAL at 09:07

## 2024-04-15 RX ADMIN — INSULIN GLARGINE 36 UNITS: 100 INJECTION, SOLUTION SUBCUTANEOUS at 20:47

## 2024-04-15 RX ADMIN — INSULIN GLARGINE 36 UNITS: 100 INJECTION, SOLUTION SUBCUTANEOUS at 09:07

## 2024-04-15 RX ADMIN — OXYCODONE HYDROCHLORIDE 5 MG: 5 TABLET ORAL at 20:47

## 2024-04-15 RX ADMIN — MICONAZOLE NITRATE: 2 CREAM TOPICAL at 20:50

## 2024-04-15 RX ADMIN — PANTOPRAZOLE SODIUM 40 MG: 40 TABLET, DELAYED RELEASE ORAL at 15:16

## 2024-04-15 RX ADMIN — RIFAXIMIN 550 MG: 550 TABLET ORAL at 09:08

## 2024-04-15 RX ADMIN — SODIUM CHLORIDE, PRESERVATIVE FREE 10 ML: 5 INJECTION INTRAVENOUS at 09:07

## 2024-04-15 RX ADMIN — Medication 6 MG: at 20:46

## 2024-04-15 RX ADMIN — RIFAXIMIN 550 MG: 550 TABLET ORAL at 20:49

## 2024-04-15 RX ADMIN — HEPARIN SODIUM 5000 UNITS: 5000 INJECTION INTRAVENOUS; SUBCUTANEOUS at 21:30

## 2024-04-15 RX ADMIN — METOPROLOL TARTRATE 25 MG: 25 TABLET, FILM COATED ORAL at 20:59

## 2024-04-15 RX ADMIN — GABAPENTIN 100 MG: 100 CAPSULE ORAL at 15:15

## 2024-04-15 RX ADMIN — LACTULOSE 20 G: 20 SOLUTION ORAL at 15:16

## 2024-04-15 RX ADMIN — HEPARIN SODIUM 5000 UNITS: 5000 INJECTION INTRAVENOUS; SUBCUTANEOUS at 15:15

## 2024-04-15 RX ADMIN — LACTULOSE 20 G: 20 SOLUTION ORAL at 09:07

## 2024-04-15 ASSESSMENT — PAIN DESCRIPTION - ONSET
ONSET: ON-GOING
ONSET: ON-GOING

## 2024-04-15 ASSESSMENT — PAIN DESCRIPTION - FREQUENCY
FREQUENCY: CONTINUOUS
FREQUENCY: CONTINUOUS

## 2024-04-15 ASSESSMENT — PAIN DESCRIPTION - LOCATION
LOCATION: BACK
LOCATION: NECK
LOCATION: BACK
LOCATION: NECK
LOCATION: BACK

## 2024-04-15 ASSESSMENT — PAIN DESCRIPTION - ORIENTATION
ORIENTATION: LOWER
ORIENTATION: LOWER
ORIENTATION: MID

## 2024-04-15 ASSESSMENT — PAIN DESCRIPTION - DESCRIPTORS
DESCRIPTORS: ACHING;DISCOMFORT
DESCRIPTORS: ACHING
DESCRIPTORS: ACHING;DISCOMFORT

## 2024-04-15 ASSESSMENT — PAIN DESCRIPTION - PAIN TYPE
TYPE: CHRONIC PAIN
TYPE: CHRONIC PAIN

## 2024-04-15 ASSESSMENT — PAIN SCALES - GENERAL
PAINLEVEL_OUTOF10: 3
PAINLEVEL_OUTOF10: 8
PAINLEVEL_OUTOF10: 8
PAINLEVEL_OUTOF10: 4
PAINLEVEL_OUTOF10: 3
PAINLEVEL_OUTOF10: 5

## 2024-04-15 NOTE — PROGRESS NOTES
Occupational Therapy  [facility]   INPATIENT OCCUPATIONAL THERAPY  PROGRESS NOTE  Date: 4/15/2024  Patient Name: Bobbi Phillips       Room:   MRN: [id number]    : 1955  (68 y.o.)  Gender: female   Referring Practitioner: Brad Maciel MD  Diagnosis: Acute kidney injury      Discharge Recommendations:  Further Occupational Therapy is recommended upon facility discharge.    OT Equipment Recommendations  Other: TBD    Restrictions/Precautions  Restrictions/Precautions  Restrictions/Precautions: General Precautions  Implants present? :  (Denied)    O2 Device: None (Room air)    Subjective  Subjective  Subjective: pt states that it feels to sit up in the chair. pt cooperative and motivated  Subjective  Pain: pt reports 8/10, lower back.  Comments: Meli RN approved therapy; co-treat jerald Mejia PTA    Objective  Orientation  Overall Orientation Status: Within Functional Limits  Cognition  Overall Cognitive Status: Exceptions  Arousal/Alertness: Appropriate responses to stimuli  Following Commands: Follows multistep commands consistently  Attention Span: Attends with cues to redirect  Safety Judgement: Good awareness of safety precautions  Problem Solving: Assistance required to generate solutions;Assistance required to implement solutions;Assistance required to correct errors made  Insights: Fully aware of deficits  Initiation: Requires cues for some  Sequencing: Requires cues for some    Activities of Daily Living  ADL  Feeding: Independent  Grooming: Supervision  Grooming Skilled Clinical Factors: pt sits EOB for washing face, hair care  UE Bathing: Moderate assistance  LE Bathing: Maximum assistance  UE Dressing: Moderate assistance  LE Dressing: Dependent/Total  LE Dressing Skilled Clinical Factors: TA to don gripper socks this date  Toileting: Dependent/Total  Toileting Skilled Clinical Factors: brief, external cath  Additional Comments: pt sits EOB for grooming, and LB  level, to slightly below knee level, and crossing midline outside ELIF c 1 UE support. no LOB noted    Patient Education  Patient Education  Education Given To: Patient  Education Provided: Role of Therapy, Plan of Care, Precautions, Transfer Training, Energy Conservation (pursed lip breathing)  Education Method: Verbal, Demonstration  Barriers to Learning: None  Education Outcome: Continued education needed, Demonstrated understanding, Verbalized understanding    Goals  Short Term Goals  Time Frame for Short Term Goals: By discharge, pt will  Short Term Goal 1: complete self care routine  for  5+ minutes  to improve active participation in  daily routine  Short Term Goal 2: perform UB ADLs with CGA and LB ADLs with  modA  Short Term Goal 3: V/D and explore use of adaptive techniques/equipment/DME to increase IND during self care tasks  Short Term Goal 4: perform bed mobility with Carmen to assist in hygiene and reduce risk of developing pressure ulcers  Short Term Goal 5: perform functional transfers/toilet transfers with maxA using  least restrictive device  Short Term Goal 6: V/D at least two nonpharmaceutical pain management techniques to improve  active participation in ADLs  Short Term Goal 7: actively  participate 15+ minutes of therapeutic exercise/functional activity to promote safety and independence with self care and mobility  Occupational Therapy Plan  Times Per Week: 5-7  Current Treatment Recommendations: Strengthening, Balance training, Functional mobility training, Endurance training, Safety education & training, Patient/Caregiver education & training, Equipment evaluation, education, & procurement, Pain management, Positioning, Self-Care / ADL    Assessment  Activity Tolerance  Activity Tolerance: Patient Tolerated treatment well  Assessment  Performance deficits / Impairments: Decreased ADL status, Decreased functional mobility , Decreased strength, Decreased safe awareness, Decreased endurance,

## 2024-04-15 NOTE — CONSULTS
[facility]  Consult Note       NAME:  Bobbi Phillips  MEDICAL RECORD NUMBER:  [id number]  AGE: 68 y.o.   GENDER: female  : 1955  TODAY'S DATE:  4/15/2024    Subjective:      Bobbi Phillips is a 68 y.o. female with inpatient referral to [facility] for:  Sacral wound      Wound Identification:  Wound Type: pressure  Contributing Factors: diabetes, poor glucose control, chronic pressure, decreased mobility, and shear force    Wound History: Patient unsure how long wound has been present or how it occurred  Current Wound Care Treatment: Zinc paste    Patient Goal of Care:  [x] Wound Healing  [] Odor Control  [] Palliative Care  [] Pain Control   [] Other:         PAST MEDICAL HISTORY        Diagnosis Date    Abdominal swelling     Claustrophobia     COVID-19 vaccine administered     Diabetes (HCC)     DEXCOM LT ARM    Diabetic retinopathy (HCC) 2015    Flatus     H/O acute sinusitis     H/O cholelithiasis     Hypertension     Liver cirrhosis (HCC)     LLQ abdominal tenderness     Poor venous access     \"THEY LIKE TO ROLL & RUN AWAY\"    Positive colorectal cancer screening using Cologuard test 2023    had colonoscopy after this and no cancer was found    Post-menopausal bleeding     RUQ abdominal pain     Tendonitis of shoulder, right 2021    Tingling     Under care of service provider 2023    [id number] in [place]-last visit dec 2023    Under care of service provider 2023    [facility] in [place]-last visit dec 2023    Vaginal bleeding 2023    \"SPOTTING, THICKENED UTERINE LINING\"DUE TO HAVE VAGINAL /UTERINE BIOPSY FOR\"    Vaginal candidiasis     Wears glasses     READING       PAST SURGICAL HISTORY    Past Surgical History:   Procedure Laterality Date    ANKLE FRACTURE SURGERY Left 2006    no retained metal    CARDIAC CATHETERIZATION  2006    CATARACT REMOVAL WITH IMPLANT Bilateral 2018      Lunch, Dinner; Low Calorie/High Protein Oral Supplement  ADULT DIET; Regular; Low Sodium (2 gm)  Dietician consult:  Yes    Discharge Plan:  Placement for patient upon discharge: skilled nursing   Patient appropriate for Outpatient Wound Care Center: Yes    Patient/Caregiver Teaching:  Level of patientunderstanding able to:     [x] Indicates understanding       [] Needs reinforcement  [] Unsuccessful      [x] Verbal Understanding  [] Demonstrated understanding       [] No evidence of learning  [] Refused teaching         [] N/A       Electronically signed by Sabine Caballero RN on  4/15/2024 at 3:06 PM

## 2024-04-15 NOTE — PROGRESS NOTES
SubCUTAneous 3 times per day    insulin glargine  36 Units SubCUTAneous BID    pantoprazole  40 mg Oral BID AC    zinc sulfate  50 mg Oral Daily    lisinopril  20 mg Oral Daily    miconazole   Topical BID    cefTRIAXone (ROCEPHIN) IV  1,000 mg IntraVENous Q24H    metoprolol tartrate  25 mg Oral BID    rifAXIMin  550 mg Oral TID    lactulose  20 g Oral TID    gabapentin  100 mg Oral TID    sodium chloride flush  5-40 mL IntraVENous 2 times per day    [Held by provider] atorvastatin  10 mg Oral Daily    insulin lispro  0-8 Units SubCUTAneous TID WC    insulin lispro  0-4 Units SubCUTAneous Nightly       Social History:     Social History     Socioeconomic History    Marital status:      Spouse name: Not on file    Number of children: Not on file    Years of education: Not on file    Highest education level: Not on file   Occupational History    Not on file   Tobacco Use    Smoking status: Never    Smokeless tobacco: Never   Vaping Use    Vaping Use: Never used   Substance and Sexual Activity    Alcohol use: Never     Alcohol/week: 0.0 standard drinks of alcohol    Drug use: Never    Sexual activity: Not Currently   Other Topics Concern    Not on file   Social History Narrative    Not on file     Social Determinants of Health     Financial Resource Strain: Low Risk  (6/26/2023)    Overall Financial Resource Strain (CARDIA)     Difficulty of Paying Living Expenses: Not hard at all   Food Insecurity: Not on file (6/26/2023)   Transportation Needs: Unknown (6/26/2023)    PRAPARE - Transportation     Lack of Transportation (Medical): Not on file     Lack of Transportation (Non-Medical): No   Physical Activity: Insufficiently Active (6/26/2023)    Exercise Vital Sign     Days of Exercise per Week: 2 days     Minutes of Exercise per Session: 30 min   Stress: Not on file   Social Connections: Not on file   Intimate Partner Violence: Not on file   Housing Stability: Unknown (6/26/2023)    Housing Stability Vital Sign      Unable to Pay for Housing in the Last Year: Not on file     Number of Places Lived in the Last Year: Not on file     Unstable Housing in the Last Year: No       Family History:     Family History   Problem Relation Age of Onset    Breast Cancer Mother 42    Diabetes Mother     Stroke Mother     Heart Disease Father     Cancer Father         prostate    Cancer Sister         cervical    Breast Cancer Sister 71    Diabetes Other         multiple relatives on Mom's side      Medical Decision Making:   I have independently reviewed/ordered the following labs:    CBC with Differential:   Recent Labs     04/14/24  0919 04/15/24  0537   WBC 10.9 9.3   HGB 9.6* 8.9*   HCT 32.3* 27.7*   * 120*   LYMPHOPCT 13* 12*   MONOPCT 8* 6       BMP:  Recent Labs     04/13/24  1028 04/14/24  0919 04/15/24  0537   * 126* 130*   K 4.4 4.8 4.3   CL 98 97* 99   CO2 21 19* 22   BUN 39* 43* 46*   CREATININE 0.8 1.2* 1.3*   MG 1.5*  --  1.6       Hepatic Function Panel:   No results for input(s): \"PROT\", \"LABALBU\", \"BILIDIR\", \"IBILI\", \"BILITOT\", \"ALKPHOS\", \"ALT\", \"AST\" in the last 72 hours.    No results for input(s): \"RPR\" in the last 72 hours.  No results for input(s): \"HIV\" in the last 72 hours.  No results for input(s): \"BC\" in the last 72 hours.  Lab Results   Component Value Date/Time    CREATININE 1.3 04/15/2024 05:37 AM    GLUCOSE 170 04/15/2024 05:37 AM    GLUCOSE 393 06/01/2012 10:18 AM       Detailed results:        Thank you for allowing us to participate in the care of this patient.Please call with questions.    This note is created with the assistance of a speech recognition program.  While intending to generate adocument that actually reflects the content of the visit, the document can still have some errors including those of syntax and sound a like substitutions which may escape proof reading.  It such instances, actual meaningcan be extrapolated by contextual diversion.    Miguel Salazar MD  Office: [phone]

## 2024-04-15 NOTE — CARE COORDINATION
Writer is following for potential discharge to [facility].  Writer spoke to [place] with [place], [facility] started authorization 4/12.    Authorization is pending at this time. Writer met with pt for update. No further questions at this time.  Electronically signed by HUANG Ayers on 4/15/2024 at 10:52 AM

## 2024-04-15 NOTE — PROGRESS NOTES
Physical Therapy  [facility]    Date: 4/15/24  Patient Name: Bobbi Phillips       Room:   MRN: [id number]   Account: [id number]   : 1955  (68 y.o.) Gender: female     Referring Practitioner: Brad Maciel MD  Diagnosis: Acute kidney injury  Past Medical History:  has a past medical history of Abdominal swelling, Claustrophobia, COVID-19 vaccine administered, Diabetes (HCC), Diabetic retinopathy (HCC), Flatus, H/O acute sinusitis, H/O cholelithiasis, Hypertension, Liver cirrhosis (HCC), LLQ abdominal tenderness, Poor venous access, Positive colorectal cancer screening using Cologuard test, Post-menopausal bleeding, RUQ abdominal pain, Tendonitis of shoulder, right, Tingling, Under care of service provider, Under care of service provider, Vaginal bleeding, Vaginal candidiasis, and Wears glasses.   Past Surgical History:   has a past surgical history that includes Cholecystectomy, laparoscopic ();  section; Ankle fracture surgery (Left, ); Cataract removal with implant (Bilateral, ); Colonoscopy (N/A, 2023); Dilation and curettage of uterus (N/A, 10/02/2023); Cardiac catheterization (); hysteroscopy (2024); Dilation and curettage of uterus (N/A, 2024); and Upper gastrointestinal endoscopy (N/A, 4/10/2024).  Additional Pertinent Hx: Pt admitted to ICU due to worsening back pain on 24    Overall Orientation Status: Within Functional Limits  Restrictions/Precautions  Restrictions/Precautions: General Precautions  Implants present? :  (Denied)    Subjective: Pt lying in bed upon arrival. Agreeable to therapy  Comments: EMILY Garrison approved therapy; co-treat with BALJIT Chong       Pain Assessment: 0-10  Pain Level: 8  Pain Location: Back     Oxygen Therapy  O2 Device: None (Room air)    Bed Mobility  Rolling: Maximal assistance;Rolling Right  Supine to Sit: Maximal assistance (x2)  Sit to Supine: Unable to assess  Scooting: Maximal

## 2024-04-15 NOTE — PROGRESS NOTES
Comprehensive Nutrition Assessment    Type and Reason for Visit:  Reassess    Nutrition Recommendations/Plan:   Will continue 2 g Na diet and change supplements to Magic Cup all trays     Malnutrition Assessment:  Malnutrition Status:  Insufficient data (04/10/24 1717)    Context:  Acute Illness     Findings of the 6 clinical characteristics of malnutrition:  Energy Intake:  Unable to assess  Weight Loss:  No significant weight loss     Body Fat Loss:  No significant body fat loss     Muscle Mass Loss:       Fluid Accumulation:  Mild Generalized   Strength:  Not Performed    Nutrition Assessment:    Pt consumed all of ice cream but only bites of other food provided on tray (no Ensure). RN documents intake greater than 75%.    Nutrition Related Findings:    mild edema to all extremities/generalized, Labs: Na 130, Glu 170, Meds: Lactulose, BM 4/14 Wound Type: Pressure Injury, Unstageable       Current Nutrition Intake & Therapies:    Average Meal Intake: %, 1-25%     ADULT ORAL NUTRITION SUPPLEMENT; Breakfast, Lunch, Dinner; Low Calorie/High Protein Oral Supplement  ADULT DIET; Regular; Low Sodium (2 gm)    Anthropometric Measures:  Height: 149.9 cm (4' 11\")  Ideal Body Weight (IBW): 95 lbs (43 kg)    Admission Body Weight: 88 kg (194 lb)  Current Body Weight: 99.8 kg (220 lb), 204.2 % IBW. Weight Source: Bed Scale  Current BMI (kg/m2): 44.4                          BMI Categories: Obese Class 3 (BMI 40.0 or greater)    Estimated Daily Nutrient Needs:  Energy Requirements Based On: Formula  Weight Used for Energy Requirements: Admission  Energy (kcal/day): 5717-2002 kcals based on 20-21 kcals/kg  Weight Used for Protein Requirements: Ideal  Protein (g/day): 86 gm protein based on 2 gm/kg  Method Used for Fluid Requirements: 1 ml/kcal      Nutrition Diagnosis:   Inadequate oral intake related to altered GI function as evidenced by vomiting    Nutrition Interventions:   Food and/or Nutrient Delivery: Continue

## 2024-04-15 NOTE — PROGRESS NOTES
Soft Tissues/Bones: Demineralization.  Ossification of the anterior longitudinal ligament. Abdomen/Pelvis: Organs: Nodular liver.  Small amount of ascites right upper quadrant. Spleen, pancreas and adrenals normal.  Gallbladder surgically absent. GI/Bowel: Air-fluid levels noted in the small bowel.  Increased gas in the colon.  Stomach normal.  Appendix normal. Pelvis: Fluid-filled uterus with a soft tissue density measuring 3.2 cm.  3 cm left adnexal cystic lesion. Peritoneum/Retroperitoneum: The aorta,ivc, and retroperitoneum appear unremarkable. Calcified plaque noted along the aorta and its branches. Bones/Soft Tissues: Spondylosis.     1. No acute intrathoracic or intra-abdominal/pelvic abnormality. 2. Fluid-filled uterus with a soft tissue density measuring 3.2 cm.  And 3 cm left adnexal cystic lesion. Recommend pelvic ultrasound for further evaluation. 3. Cirrhosis and ascites. 4.  ileus. 5. Calcific coronary artery disease. 6. Spondylosis.     CT THORACIC SPINE WO CONTRAST    Result Date: 3/29/2024  EXAMINATION: CT OF THE THORACIC SPINE WITHOUT CONTRAST  3/29/2024 6:21 pm: TECHNIQUE: CT of the thoracic spine was performed without the administration of intravenous contrast. Multiplanar reformatted images are provided for review. Automated exposure control, iterative reconstruction, and/or weight based adjustment of the mA/kV was utilized to reduce the radiation dose to as low as reasonably achievable. COMPARISON: None. HISTORY: ORDERING SYSTEM PROVIDED HISTORY: fall TECHNOLOGIST PROVIDED HISTORY: fall Reason for Exam: S/p fall off couch. C/o severe back pain FINDINGS: BONES/ALIGNMENT: Bones are osteopenic.  There is normal alignment of the spine. The vertebral body heights are maintained. No osseous destructive lesion is seen. DEGENERATIVE CHANGES: Multilevel loss of intervertebral disc space height. There are multilevel bridging anterior osteophytes suggesting dish. SOFT TISSUES: No paraspinal mass is seen.  weight based adjustment of the mA/kV was utilized to reduce the radiation dose to as low as reasonably achievable.; CT of the head was performed without the administration of intravenous contrast. Automated exposure control, iterative reconstruction, and/or weight based adjustment of the mA/kV was utilized to reduce the radiation dose to as low as reasonably achievable. COMPARISON: C-spine radiograph series 03/11/2013 HISTORY: Fall from couch hitting head.  Unknown amount of time down.  Neck pain Decision Support Exception - unselect if not a suspected or confirmed emergency medical condition->Emergency Medical Condition (MA) CT HEAD FINDINGS: BRAIN/VENTRICLES: There is no acute intracranial hemorrhage, mass effect or midline shift.  No abnormal extra-axial fluid collection.  The gray-white differentiation is maintained without evidence of an acute infarct.  There is no evidence of hydrocephalus. ORBITS: The visualized portion of the orbits demonstrate no acute abnormality. SINUSES: The visualized paranasal sinuses and mastoid air cells demonstrate no acute abnormality. SOFT TISSUES/SKULL:  No acute abnormality of the visualized skull or soft tissues. CT CERVICAL SPINE FINDINGS: BONES/ALIGNMENT: There is no evidence of an acute cervical spine fracture. There is normal alignment of the cervical spine. DEGENERATIVE CHANGES: Mild mid and lower cervical degenerative changes.  No acquired cervical spinal canal stenosis or significant neural foraminal narrowing demonstrated. SOFT TISSUES: There is no prevertebral soft tissue swelling.     1. CT HEAD: No acute intracranial abnormality. 2. CT CERVICAL SPINE: No acute abnormality of the cervical spine. 3. Mild degenerative changes cervical spine.     XR PELVIS (1-2 VIEWS)    Result Date: 3/29/2024  EXAMINATION: ONE XRAY VIEW OF THE PELVIS 3/29/2024 8:31 pm COMPARISON: CT pelvis 03/04/2024 HISTORY: fell tonight and has pain in both of her hips. The pain worsens upon movement.

## 2024-04-15 NOTE — PLAN OF CARE
Problem: Discharge Planning  Goal: Discharge to home or other facility with appropriate resources  4/15/2024 0310 by Margarita Aguirre RN  Outcome: Progressing  Flowsheets (Taken 4/15/2024 0310)  Discharge to home or other facility with appropriate resources:   Identify discharge learning needs (meds, wound care, etc)   Arrange for needed discharge resources and transportation as appropriate   Arrange for interpreters to assist at discharge as needed     Problem: Pain  Goal: Verbalizes/displays adequate comfort level or baseline comfort level  4/15/2024 0310 by Margarita Aguirre RN  Outcome: Progressing  Note: Pt medicated with pain medication prn.  Assessed all pain characteristics including level, type, location, frequency, and onset.  Non-pharmacologic interventions offered to pt as well.  Pt states pain is tolerable at this time.        Problem: Skin/Tissue Integrity  Goal: Absence of new skin breakdown  Description: 1.  Monitor for areas of redness and/or skin breakdown  2.  Assess vascular access sites hourly  3.  Every 4-6 hours minimum:  Change oxygen saturation probe site  4.  Every 4-6 hours:  If on nasal continuous positive airway pressure, respiratory therapy assess nares and determine need for appliance change or resting period.  4/15/2024 0310 by Margarita Aguirre RN  Outcome: Progressing  Note: Skin assessment complete. Waffle mattress in place. Coccyx reddened. Sensicare applied PRN. Turned and repositioned every two hours.  Area kept free from moisture. Proper nourishment and fluids encouraged, as appropriate. Will continue to monitor for additional needs and changes in skin breakdown.       Problem: Safety - Adult  Goal: Free from fall injury  4/15/2024 0310 by Margarita Aguirre, RN  Outcome: Progressing  Note: No falls noted this shift. Patient ambulates with x1 staff assistance without difficulty.  Bed kept in low position. Safe environment maintained. Bedside table & call light

## 2024-04-15 NOTE — PROGRESS NOTES
NEPHROLOGY PROGRESS NOTE    Patient :  Bobbi Phillips; 68 y.o. MRN# [id number]  Location:  2086/2086-01  Attending:  Courtney Carrillo MD  Admit Date:  4/3/2024   Hospital Day: 12    Reason for consultation: Management of acute kidney injury.    Consulting physician: Courtney Carrillo MD.    Chief Complaint:  Back pain, hematuria     Interval history: Patient was seen and examined today on the progressive care unit and she feels better.  Diarrhea is resolved and she is nonoliguric.    History of Present Illness:  This is a 68 y.o. 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   Patient was seen in the ED approximately 1 week ago for fall. Patient was found to have UTI at that time and discharged on Keflex.   Patient denies N/V/diarrhea, fever chills.  C/o dysuria, flank pain and frequency.  Patient started on IV Abx.  Scr on admission 1.3 mg/dl, scr peaked to 1.9 mg/dl and therefor Nephrology consultation requested.  Bladder scan positive for urinary retention, nichols catheter was placed.  Scr improved to 1.3 mg/dl this morning.  Patient noted to be lethargic and decrease in responsiveness this morning and was transferred to ICU for AMS which was suspected to be due to use Narcotics.  Patient takes NSAIDs   There had exposure to IV contrast on 4/3/24. There is no history  of paraprotein disease. Pt denies any history of recurrent UTI or kidney stones.  Medication review shows use of ACE-inhibitor/diuretics.    Current Medications:    heparin (porcine) injection 5,000 Units, 3 times per day  insulin glargine (LANTUS) injection vial 36 Units, BID  pantoprazole (PROTONIX) tablet 40 mg, BID AC  zinc sulfate (ZINCATE) 220 mg capsule - elemental zinc 50 mg, Daily  lisinopril (PRINIVIL;ZESTRIL) tablet 20 mg, Daily  miconazole (MICOTIN) 2 % cream, BID  melatonin tablet 6 mg, Nightly PRN  lidocaine (LMX) 4 % cream, PRN  cefTRIAXone (ROCEPHIN) 1,000 mg in sodium  Systolic (24hrs), Av , Min:116 , Max:154   ; Diastolic (24hrs), Av, Min:51, Max:97    24HR INTAKE/OUTPUT:    Intake/Output Summary (Last 24 hours) at 4/15/2024 0854  Last data filed at 4/15/2024 0632  Gross per 24 hour   Intake --   Output 450 ml   Net -450 ml       Patient Vitals for the past 96 hrs (Last 3 readings):   Weight   04/15/24 0217 102.7 kg (226 lb 6.6 oz)   24 0548 100.1 kg (220 lb 10.9 oz)   24 0550 98.3 kg (216 lb 11.4 oz)     Physical Exam:  GENERAL APPEARANCE: Alert and cooperative, and appears to be in no acute distress.  HEAD: normocephalic  EYES:  EOMI. Not pale, anicteric   NOSE:  No nasal discharge.    CARDIAC: Normal S1 and S2. No S3, S4 or murmurs. Rhythm is regular.  LUNGS: Clear to auscultation and percussion without rales, rhonchi, wheezing or diminished breath sounds.  NECK: Neck supple, non-tender without lymphadenopathy, masses or thyromegaly. JVD-neg  GI soft non tender  MUSKULOSKELETAL: Adequately aligned spine. No joint erythema or tenderness.   EXTREMITIES:+ edema. Peripheral pulses intact.   NEURO:   Awake and alert with no focal deficits.    Labs:   CBC:  Recent Labs     24  1028 24  0919 04/15/24  0537   WBC 10.9 10.9 9.3   RBC 3.26* 3.44* 3.15*   HGB 9.2* 9.6* 8.9*   HCT 28.3* 32.3* 27.7*   MCV 86.9 93.9 88.0   MCH 28.2 27.9 28.3   MCHC 32.5 29.7* 32.1   RDW 17.5* 18.5* 17.4*   * 124* 120*   MPV 8.2 8.8 8.5        BMP:   Recent Labs     24  1028 24  0919 04/15/24  0537   * 126* 130*   K 4.4 4.8 4.3   CL 98 97* 99   CO2 21 19* 22   BUN 39* 43* 46*   CREATININE 0.8 1.2* 1.3*   GLUCOSE 274* 153* 170*   CALCIUM 8.4* 8.3* 8.5*        Magnesium:   Recent Labs     24  1028 04/15/24  0537   MG 1.5* 1.6     Assessment/plan:    1.  Acute kidney injury - consistent with prerenal azotemia and obstructive uropathy secondary to urinary retention.  Peak serum creatinine 1.9 mg/dL; baseline serum creatinine 0.7 mg/dL.  Serum

## 2024-04-16 LAB
ALBUMIN SERPL-MCNC: 2.2 G/DL (ref 3.5–5.2)
ANION GAP SERPL CALCULATED.3IONS-SCNC: 8 MMOL/L (ref 9–17)
BUN SERPL-MCNC: 41 MG/DL (ref 8–23)
CALCIUM SERPL-MCNC: 8.5 MG/DL (ref 8.6–10.4)
CHLORIDE SERPL-SCNC: 101 MMOL/L (ref 98–107)
CO2 SERPL-SCNC: 23 MMOL/L (ref 20–31)
CREAT SERPL-MCNC: 1.1 MG/DL (ref 0.5–0.9)
GFR SERPL CREATININE-BSD FRML MDRD: 54 ML/MIN/1.73M2
GLUCOSE BLD-MCNC: 193 MG/DL (ref 65–105)
GLUCOSE BLD-MCNC: 206 MG/DL (ref 65–105)
GLUCOSE BLD-MCNC: 258 MG/DL (ref 65–105)
GLUCOSE BLD-MCNC: 85 MG/DL (ref 65–105)
GLUCOSE SERPL-MCNC: 80 MG/DL (ref 70–99)
MICROORGANISM SPEC CULT: NO GROWTH
MICROORGANISM SPEC CULT: NORMAL
MICROORGANISM/AGENT SPEC: NORMAL
PHOSPHATE SERPL-MCNC: 3.2 MG/DL (ref 2.6–4.5)
POTASSIUM SERPL-SCNC: 4.7 MMOL/L (ref 3.7–5.3)
SODIUM SERPL-SCNC: 132 MMOL/L (ref 135–144)
SPECIMEN DESCRIPTION: NORMAL
SPECIMEN DESCRIPTION: NORMAL

## 2024-04-16 PROCEDURE — 2060000000 HC ICU INTERMEDIATE R&B

## 2024-04-16 PROCEDURE — 80069 RENAL FUNCTION PANEL: CPT

## 2024-04-16 PROCEDURE — 97535 SELF CARE MNGMENT TRAINING: CPT

## 2024-04-16 PROCEDURE — 2580000003 HC RX 258: Performed by: INTERNAL MEDICINE

## 2024-04-16 PROCEDURE — 99232 SBSQ HOSP IP/OBS MODERATE 35: CPT | Performed by: INTERNAL MEDICINE

## 2024-04-16 PROCEDURE — 6370000000 HC RX 637 (ALT 250 FOR IP): Performed by: INTERNAL MEDICINE

## 2024-04-16 PROCEDURE — 36415 COLL VENOUS BLD VENIPUNCTURE: CPT

## 2024-04-16 PROCEDURE — 97530 THERAPEUTIC ACTIVITIES: CPT

## 2024-04-16 PROCEDURE — 97110 THERAPEUTIC EXERCISES: CPT

## 2024-04-16 PROCEDURE — 6360000002 HC RX W HCPCS: Performed by: INTERNAL MEDICINE

## 2024-04-16 PROCEDURE — 82947 ASSAY GLUCOSE BLOOD QUANT: CPT

## 2024-04-16 PROCEDURE — 6370000000 HC RX 637 (ALT 250 FOR IP)

## 2024-04-16 RX ORDER — TAMSULOSIN HYDROCHLORIDE 0.4 MG/1
0.4 CAPSULE ORAL DAILY
Status: DISCONTINUED | OUTPATIENT
Start: 2024-04-16 | End: 2024-04-23 | Stop reason: HOSPADM

## 2024-04-16 RX ADMIN — Medication 6 MG: at 21:21

## 2024-04-16 RX ADMIN — SODIUM CHLORIDE: 9 INJECTION, SOLUTION INTRAVENOUS at 13:51

## 2024-04-16 RX ADMIN — MICONAZOLE NITRATE: 2 CREAM TOPICAL at 21:20

## 2024-04-16 RX ADMIN — HEPARIN SODIUM 5000 UNITS: 5000 INJECTION INTRAVENOUS; SUBCUTANEOUS at 13:27

## 2024-04-16 RX ADMIN — PANTOPRAZOLE SODIUM 40 MG: 40 TABLET, DELAYED RELEASE ORAL at 17:45

## 2024-04-16 RX ADMIN — RIFAXIMIN 550 MG: 550 TABLET ORAL at 21:20

## 2024-04-16 RX ADMIN — GABAPENTIN 100 MG: 100 CAPSULE ORAL at 21:17

## 2024-04-16 RX ADMIN — RIFAXIMIN 550 MG: 550 TABLET ORAL at 08:46

## 2024-04-16 RX ADMIN — HEPARIN SODIUM 5000 UNITS: 5000 INJECTION INTRAVENOUS; SUBCUTANEOUS at 22:29

## 2024-04-16 RX ADMIN — TAMSULOSIN HYDROCHLORIDE 0.4 MG: 0.4 CAPSULE ORAL at 21:21

## 2024-04-16 RX ADMIN — OXYCODONE HYDROCHLORIDE 5 MG: 5 TABLET ORAL at 21:21

## 2024-04-16 RX ADMIN — INSULIN GLARGINE 36 UNITS: 100 INJECTION, SOLUTION SUBCUTANEOUS at 21:18

## 2024-04-16 RX ADMIN — CEFTRIAXONE SODIUM 1000 MG: 1 INJECTION, POWDER, FOR SOLUTION INTRAMUSCULAR; INTRAVENOUS at 17:44

## 2024-04-16 RX ADMIN — GABAPENTIN 100 MG: 100 CAPSULE ORAL at 08:43

## 2024-04-16 RX ADMIN — METOPROLOL TARTRATE 25 MG: 25 TABLET, FILM COATED ORAL at 08:43

## 2024-04-16 RX ADMIN — MICONAZOLE NITRATE: 2 CREAM TOPICAL at 08:46

## 2024-04-16 RX ADMIN — OXYCODONE HYDROCHLORIDE 5 MG: 5 TABLET ORAL at 05:38

## 2024-04-16 RX ADMIN — LISINOPRIL 20 MG: 20 TABLET ORAL at 08:43

## 2024-04-16 RX ADMIN — LACTULOSE 20 G: 20 SOLUTION ORAL at 21:17

## 2024-04-16 RX ADMIN — METOPROLOL TARTRATE 25 MG: 25 TABLET, FILM COATED ORAL at 21:17

## 2024-04-16 RX ADMIN — HEPARIN SODIUM 5000 UNITS: 5000 INJECTION INTRAVENOUS; SUBCUTANEOUS at 05:38

## 2024-04-16 RX ADMIN — INSULIN GLARGINE 36 UNITS: 100 INJECTION, SOLUTION SUBCUTANEOUS at 08:43

## 2024-04-16 RX ADMIN — Medication 50 MG: at 08:46

## 2024-04-16 RX ADMIN — RIFAXIMIN 550 MG: 550 TABLET ORAL at 13:37

## 2024-04-16 RX ADMIN — PANTOPRAZOLE SODIUM 40 MG: 40 TABLET, DELAYED RELEASE ORAL at 05:38

## 2024-04-16 RX ADMIN — INSULIN LISPRO 4 UNITS: 100 INJECTION, SOLUTION INTRAVENOUS; SUBCUTANEOUS at 17:45

## 2024-04-16 RX ADMIN — GABAPENTIN 100 MG: 100 CAPSULE ORAL at 13:27

## 2024-04-16 RX ADMIN — LACTULOSE 20 G: 20 SOLUTION ORAL at 13:28

## 2024-04-16 RX ADMIN — OXYCODONE HYDROCHLORIDE 5 MG: 5 TABLET ORAL at 13:46

## 2024-04-16 RX ADMIN — LACTULOSE 20 G: 20 SOLUTION ORAL at 08:43

## 2024-04-16 ASSESSMENT — PAIN DESCRIPTION - ORIENTATION
ORIENTATION: POSTERIOR
ORIENTATION: POSTERIOR
ORIENTATION: MID
ORIENTATION: POSTERIOR
ORIENTATION: POSTERIOR

## 2024-04-16 ASSESSMENT — PAIN DESCRIPTION - DESCRIPTORS
DESCRIPTORS: SHARP
DESCRIPTORS: ACHING;DISCOMFORT
DESCRIPTORS: ACHING;DISCOMFORT
DESCRIPTORS: ACHING
DESCRIPTORS: ACHING;DISCOMFORT
DESCRIPTORS: ACHING;DISCOMFORT

## 2024-04-16 ASSESSMENT — PAIN SCALES - GENERAL
PAINLEVEL_OUTOF10: 5
PAINLEVEL_OUTOF10: 9
PAINLEVEL_OUTOF10: 8
PAINLEVEL_OUTOF10: 7
PAINLEVEL_OUTOF10: 4
PAINLEVEL_OUTOF10: 8
PAINLEVEL_OUTOF10: 8

## 2024-04-16 ASSESSMENT — PAIN DESCRIPTION - LOCATION
LOCATION: NECK

## 2024-04-16 ASSESSMENT — PAIN DESCRIPTION - PAIN TYPE
TYPE: ACUTE PAIN

## 2024-04-16 ASSESSMENT — PAIN DESCRIPTION - ONSET
ONSET: ON-GOING

## 2024-04-16 ASSESSMENT — PAIN DESCRIPTION - FREQUENCY
FREQUENCY: INTERMITTENT

## 2024-04-16 ASSESSMENT — PAIN - FUNCTIONAL ASSESSMENT
PAIN_FUNCTIONAL_ASSESSMENT: PREVENTS OR INTERFERES SOME ACTIVE ACTIVITIES AND ADLS
PAIN_FUNCTIONAL_ASSESSMENT: PREVENTS OR INTERFERES SOME ACTIVE ACTIVITIES AND ADLS
PAIN_FUNCTIONAL_ASSESSMENT: ACTIVITIES ARE NOT PREVENTED
PAIN_FUNCTIONAL_ASSESSMENT: PREVENTS OR INTERFERES SOME ACTIVE ACTIVITIES AND ADLS
PAIN_FUNCTIONAL_ASSESSMENT: ACTIVITIES ARE NOT PREVENTED
PAIN_FUNCTIONAL_ASSESSMENT: PREVENTS OR INTERFERES WITH ALL ACTIVE AND SOME PASSIVE ACTIVITIES

## 2024-04-16 NOTE — PROGRESS NOTES
will  Short Term Goal 1: complete self care routine  for  5+ minutes  to improve active participation in  daily routine  Short Term Goal 2: perform UB ADLs with CGA and LB ADLs with  modA  Short Term Goal 3: V/D and explore use of adaptive techniques/equipment/DME to increase IND during self care tasks  Short Term Goal 4: perform bed mobility with Carmen to assist in hygiene and reduce risk of developing pressure ulcers  Short Term Goal 5: perform functional transfers/toilet transfers with maxA using  least restrictive device  Short Term Goal 6: V/D at least two nonpharmaceutical pain management techniques to improve  active participation in ADLs  Short Term Goal 7: actively  participate 15+ minutes of therapeutic exercise/functional activity to promote safety and independence with self care and mobility  Occupational Therapy Plan  Times Per Week: 5-7  Current Treatment Recommendations: Strengthening, Balance training, Functional mobility training, Endurance training, Safety education & training, Patient/Caregiver education & training, Equipment evaluation, education, & procurement, Pain management, Positioning, Self-Care / ADL    Assessment  Activity Tolerance  Activity Tolerance: Patient Tolerated treatment well  Assessment  Performance deficits / Impairments: Decreased ADL status, Decreased functional mobility , Decreased strength, Decreased safe awareness, Decreased endurance, Decreased balance, Decreased high-level IADLs, Decreased cognition  Treatment Diagnosis: Impaired self-care status  Prognosis: Good  Decision Making: Medium Complexity  Discharge Recommendations: Patient would benefit from continued therapy after discharge  OT Equipment Recommendations  Other: TBD  Safety Devices  Type of Devices: All fall risk precautions in place, Patient at risk for falls, Nurse notified, Call light within reach, Gait belt, Left in chair    AM-PAC Daily Activities Inpatient  AM-PAC Daily Activity - Inpatient   How much

## 2024-04-16 NOTE — PROGRESS NOTES
[facility] Internal Medicine  Nikolas Tan MD; Ray Hwang MD; Brad Maciel MD; MD Courtney Lr MD; Germania Osborne MD; Marlin Gutierrez MD      Progress note            Date:   4/16/2024  Patient name:  Bobbi Phillips  MRN:   [id number]  Account:  [id number]  YOB: 1955  PCP:    Stacie Doty MD  Code Status:    Full Code    Chief Complaint:     Chief Complaint   Patient presents with    Back Pain         History Obtained From:     Patient, EMR, nursing staff    HPI     This patient is a 68 y.o. Non- / non  femalewho presents with back pain  History of type 2 diabetes, with diabetic retinopathy and neuropathy, hypertension,  Patient is poor historian she reports she has been given some pain medication and is unable to relate sequence of events accurately  Per EMR patient has chronic low back pain, she had fall 5 days ago and was seen in ER CT thoracic and lumbar spine nil acute but UA positive for UTI culture showed Klebsiella sensitive to Keflex which is what she was discharged on.  Patient reported subsequent improvement in back pain however reports pain became worse again today  Moderate intensity progressive persistent no aggravating leaving factors noted  Denies any associated fevers diarrhea or vomiting    ER evaluation showed UA suggestive of persistent UTI, severe hyperglycemia, KIM  4/16/2024  Doing well, no concerns, sitting up in recliner.  Kerns had to be reinserted yesterday due to urinary retention.  Review of Systems:     A 10 point review of systems was performed and and negative except as mentioned in HPI  Positive and Negative as described in HPI.    Past Medical History:     Past Medical History:   Diagnosis Date    Abdominal swelling     Claustrophobia     COVID-19 vaccine administered     Diabetes (HCC)     DEXCOM LT ARM    Diabetic retinopathy (HCC) 11/28/2015    Flatus     H/O acute sinusitis     H/O cholelithiasis      Bones/Soft Tissues: Spondylosis.     1. No acute intrathoracic or intra-abdominal/pelvic abnormality. 2. Fluid-filled uterus with a soft tissue density measuring 3.2 cm.  And 3 cm left adnexal cystic lesion. Recommend pelvic ultrasound for further evaluation. 3. Cirrhosis and ascites. 4.  ileus. 5. Calcific coronary artery disease. 6. Spondylosis.     CT THORACIC SPINE WO CONTRAST    Result Date: 3/29/2024  EXAMINATION: CT OF THE THORACIC SPINE WITHOUT CONTRAST  3/29/2024 6:21 pm: TECHNIQUE: CT of the thoracic spine was performed without the administration of intravenous contrast. Multiplanar reformatted images are provided for review. Automated exposure control, iterative reconstruction, and/or weight based adjustment of the mA/kV was utilized to reduce the radiation dose to as low as reasonably achievable. COMPARISON: None. HISTORY: ORDERING SYSTEM PROVIDED HISTORY: fall TECHNOLOGIST PROVIDED HISTORY: fall Reason for Exam: S/p fall off couch. C/o severe back pain FINDINGS: BONES/ALIGNMENT: Bones are osteopenic.  There is normal alignment of the spine. The vertebral body heights are maintained. No osseous destructive lesion is seen. DEGENERATIVE CHANGES: Multilevel loss of intervertebral disc space height. There are multilevel bridging anterior osteophytes suggesting dish. SOFT TISSUES: No paraspinal mass is seen.     No evidence of acute thoracic spine fracture or traumatic malalignment. Multilevel thoracic spondylosis. Bridging anterior osteophytes suggesting dish. Osteopenia.     CT LUMBAR SPINE WO CONTRAST    Result Date: 3/29/2024  EXAMINATION: CT OF THE LUMBAR SPINE WITHOUT CONTRAST  3/29/2024 TECHNIQUE: CT of the lumbar spine was performed without the administration of intravenous contrast. Multiplanar reformatted images are provided for review. Adjustment of mA and/or kV according to patient size was utilized.  Automated exposure control, iterative reconstruction, and/or weight based adjustment of the mA/kV

## 2024-04-16 NOTE — FLOWSHEET NOTE
04/16/24 0111   Treatment Team Notification   Reason for Communication Review case   Name of Team Member Notified Ney Hart MD   Treatment Team Role Consulting Provider   Method of Communication Secure Message   Response No new orders   Notification Time 0157        Pt put out 1100 ml when nichols was placed around 1900 pm. Since then she has had 350 ml, reddish in color with some clots. Pt denies abd pain/cramping. Tanner BADILLO notified. No new orders.

## 2024-04-16 NOTE — PROGRESS NOTES
Temp (24hrs), Av.2 °F (36.8 °C), Min:97.4 °F (36.3 °C), Max:98.7 °F (37.1 °C)    CURRENT RESPIRATORY RATE:  Respirations: 18  CURRENT PULSE:  Pulse: 76  CURRENT BLOOD PRESSURE:  BP: (!) 124/50  24HR BLOOD PRESSURE RANGE:  Systolic (24hrs), Av , Min:107 , Max:138   ; Diastolic (24hrs), Av, Min:50, Max:85    24HR INTAKE/OUTPUT:    Intake/Output Summary (Last 24 hours) at 2024 0823  Last data filed at 2024 0050  Gross per 24 hour   Intake --   Output 1575 ml   Net -1575 ml       Patient Vitals for the past 96 hrs (Last 3 readings):   Weight   04/15/24 0217 102.7 kg (226 lb 6.6 oz)   24 0548 100.1 kg (220 lb 10.9 oz)   24 0550 98.3 kg (216 lb 11.4 oz)     Physical Exam:  GENERAL APPEARANCE: Alert and cooperative, and appears to be in no acute distress.  HEAD: normocephalic and atraumatic.  EYES:  EOMI. Not pale, anicteric   NOSE:  No nasal discharge.    NECK: Neck supple, non-tender without lymphadenopathy, masses or thyromegaly. JVD-neg  CARDIAC: Normal S1 and S2. No S3, S4 or murmurs. Rhythm is regular.  LUNGS: Clear to auscultation and percussion without rales, rhonchi, wheezing or diminished breath sounds.  ABDOMEN:   Generally soft with normal bowel sounds.  MUSKULOSKELETAL: Adequately aligned spine. No joint erythema or tenderness.   EXTREMITIES: + edema. Peripheral pulses intact.   NEURO: Awake and alert with no focal deficits.    Labs:   CBC:  Recent Labs     24  1028 24  0919 04/15/24  0537   WBC 10.9 10.9 9.3   RBC 3.26* 3.44* 3.15*   HGB 9.2* 9.6* 8.9*   HCT 28.3* 32.3* 27.7*   MCV 86.9 93.9 88.0   MCH 28.2 27.9 28.3   MCHC 32.5 29.7* 32.1   RDW 17.5* 18.5* 17.4*   * 124* 120*   MPV 8.2 8.8 8.5        BMP:   Recent Labs     24  0919 04/15/24  0537 24  0554   * 130* 132*   K 4.8 4.3 4.7   CL 97* 99 101   CO2 19* 22 23   BUN 43* 46* 41*   CREATININE 1.2* 1.3* 1.1*   GLUCOSE 153* 170* 80   CALCIUM 8.3* 8.5* 8.5*        Magnesium:   Recent  Labs     04/13/24  1028 04/15/24  0537   MG 1.5* 1.6     Assessment/plan:    1.  Acute kidney injury - consistent with prerenal azotemia and obstructive uropathy secondary to urinary retention.  Peak serum creatinine 1.9 mg/dL; baseline serum creatinine 0.7 mg/dL.  Serum creatinine improved to 0.8 mg/dL on 4/13/2024 but is 1.1 mg/dL today.  Plan:   Continue IVF 0.9 normal saline at 50 mill per hour.  Monitor urine output closely.  Basic metabolic profile daily.    2.  Hyponatremia -secondary to chronic liver disease. Iso-osmolar with serum osmolality 297 mOsm/kg on 4/15/2024.    3.  Systemic hypertension - blood pressure is adequately controlled at this time.    4.  Urinary retention - will defer Kerns catheter management to urology.    Prognosis is guarded.    Electronically signed by Earl Cannon MD on 4/16/2024 at 8:23 AM

## 2024-04-16 NOTE — PROGRESS NOTES
Physical Therapy  [facility]    Date: 24  Patient Name: Bobbi Phillips       Room: 6-  MRN: [id number]   Account: [id number]   : 1955  (68 y.o.) Gender: female     Referring Practitioner: Brad Maciel MD  Diagnosis: Acute kidney injury  Past Medical History:  has a past medical history of Abdominal swelling, Claustrophobia, COVID-19 vaccine administered, Diabetes (HCC), Diabetic retinopathy (HCC), Flatus, H/O acute sinusitis, H/O cholelithiasis, Hypertension, Liver cirrhosis (HCC), LLQ abdominal tenderness, Poor venous access, Positive colorectal cancer screening using Cologuard test, Post-menopausal bleeding, RUQ abdominal pain, Tendonitis of shoulder, right, Tingling, Under care of service provider, Under care of service provider, Vaginal bleeding, Vaginal candidiasis, and Wears glasses.   Past Surgical History:   has a past surgical history that includes Cholecystectomy, laparoscopic ();  section; Ankle fracture surgery (Left, ); Cataract removal with implant (Bilateral, ); Colonoscopy (N/A, 2023); Dilation and curettage of uterus (N/A, 10/02/2023); Cardiac catheterization (); hysteroscopy (2024); Dilation and curettage of uterus (N/A, 2024); and Upper gastrointestinal endoscopy (N/A, 4/10/2024).  Additional Pertinent Hx: Pt admitted to ICU due to worsening back pain on 24    Overall Orientation Status: Within Functional Limits  Restrictions/Precautions  Restrictions/Precautions: General Precautions  Implants present? :  (Denied)    Subjective: Pt lying in bed upon arrival. Agreeable to therapy  Comments: RN approved therapy; co-treat with Dallin Leyva       Pain Assessment: 0-10  Pain Level: 8  Pain Location: Neck     Oxygen Therapy  O2 Device: None (Room air)    Bed Mobility  Rolling: Maximal assistance;Rolling Right  Supine to Sit: Maximal assistance (x2)  Sit to Supine: Unable to assess  Scooting: Maximal  assistance (A x1 to bring hips to EOB.)  Comment: increased time to complete  Bed mobility  Scooting: Maximal assistance (A x1 to bring hips to EOB.)    Transfers:  Sit to Stand: Maximum Assistance;2 Person Assistance  Stand to Sit: 2 Person Assistance;Minimal Assistance    EXERCISES    Other exercises?: Yes  Other exercises 1: bed mobility x1  Other exercises 2: seated EOB ~ 10 minutes SBA  Other exercises 4: seated B LE ankle pumps, LAQs, and marches x10-15 reps  Other exercises 5: standing tolerance ~ 1 minute and 10 seconds  with RW Min A x2  Other exercises 7: stand pivot from bed to chair with RW and Mod Ax2           Activity Tolerance: Patient limited by pain, Patient limited by endurance    Current Treatment Recommendations: Strengthening, Balance training, Functional mobility training    Conditions Requiring Skilled Therapeutic Intervention  Treatment Diagnosis: impaired functional mobility  History: Pt admitted to ICU due to worsening back pain on 4.4.24  Discharge Recommendations: Patient would benefit from continued therapy after discharge    AM-PAC Basic Mobility - Inpatient   How much help is needed turning from your back to your side while in a flat bed without using bedrails?: A Lot  How much help is needed moving from lying on your back to sitting on the side of a flat bed without using bedrails?: Total  How much help is needed moving to and from a bed to a chair?: Total  How much help is needed standing up from a chair using your arms?: Total  How much help is needed walking in hospital room?: Total  How much help is needed climbing 3-5 steps with a railing?: Total  AM-PAC Inpatient Mobility Raw Score : 7  AM-PAC Inpatient T-Scale Score : 26.42  Mobility Inpatient CMS 0-100% Score: 92.36  Mobility Inpatient CMS G-Code Modifier : CM     Goals  Short Term Goals  Time Frame for Short Term Goals: 7 to 9 visits  Short Term Goal 1: Improve strength in both lower extremities to 4+/5  Short Term Goal 2:

## 2024-04-16 NOTE — FLOWSHEET NOTE
04/16/24 0126   Treatment Team Notification   Reason for Communication Review case   Name of Team Member Notified Hannah Garcia   Treatment Team Role Advanced Practice Nurse   Method of Communication Secure Message   Response No new orders   Notification Time 0129       RN reached out to Hannah Garcia NP that, Pt IV is leaking and we have had X2 unsuccessful IV US. Hannah asked David RN from ED to try and he wasn't successful.     0200:Anna RN placed one to left posterior Hand. Hannah SAENZ notified.

## 2024-04-16 NOTE — PLAN OF CARE
Problem: Pain  Goal: Verbalizes/displays adequate comfort level or baseline comfort level  Outcome: Progressing   Note: Pt medicated with pain medication prn.  Assessed all pain characteristics including level, type, location, frequency, and onset.  Non-pharmacologic interventions offered to pt as well.  Pt states pain is tolerable at this time.     Problem: Safety - Adult  Goal: Free from fall injury  Outcome: Progressing   Note: Pt assessed as a fall risk this shift. Remains free from falls and accidental injury at this time. Fall precautions in place, including falling star sign and fall risk band on pt. Floor free from obstacles, and bed is locked and in lowest position. Adequate lighting provided.  Pt encouraged to call before getting OOB for any need.  Bed alarm activated. Will continue to monitor needs during hourly rounding, and reinforce education on use of call light.     Problem: Chronic Conditions and Co-morbidities  Goal: Patient's chronic conditions and co-morbidity symptoms are monitored and maintained or improved  Outcome: Progressing     Problem: Skin/Tissue Integrity  Goal: Absence of new skin breakdown  Outcome: Progressing   Note:Skin assessment complete. Waffle mattress in place. Coccyx reddened. Sensicare applied PRN.  Area kept free from moisture. Proper nourishment and fluids encouraged, as appropriate. Will continue to monitor for additional needs and changes in skin breakdown.     Problem: Neurosensory - Adult  Goal: Achieves stable or improved neurological status  Outcome: Progressing

## 2024-04-16 NOTE — CARE COORDINATION
ONGOING DISCHARGE PLANNING NOTE:    Writer reviewed LSW notes, and discharge plan is still [facility].     JAMES ChenW, contacted Miguelangel with [employer] and she confirms authorization is still pending. Auth was submitted on 4/12 per Miguelangel.    This writer asked Merly Millan RN, clinical lead, case management to check with [facility] as we were previously made to believe that we had 24 hour authorization turnaround.     Will continue to follow for additional discharge needs.    Electronically signed by Taya Lopez RN on 4/16/2024 at 1:34 PM

## 2024-04-16 NOTE — PROGRESS NOTES
[facility] -   Infectious diseases evaluation  admission date 4/3/2024    reason for consultation:   SBP    Impression :   Current:  Suspected SBP status post paracentesis 4/5/2024 fluid analysis suggestive of peritonitis, no growth on culture  Recent UTI on 3/29/2024, Klebsiella pneumoniae growth on cultures  Metabolic encephalopathy  Hematuria  Liver cirrhosis  Ascites  Recent influenza B tested positive on March 29, 2024  Diabetes mellitus  Uterine mass      HENCE:   Continue IV ceftriaxone last dose today  Peritoneal fluid culture from 4/10/2024, no growth to date  Follow CBC and renal function  Supportive care      Infection Control Recommendations   [place] Precautions      Antimicrobial Stewardship Recommendations   Simplification of therapy  Targeted therapy      History of Present Illness:   Initial history:  Bobbi Phillips is a 68 y.o.-year-old female presented to ER on 3/29/2024 after a fall with reported mental status changes, was positive for influenza B and suspected UTI.  She was discharged on Keflex .  She returned to the hospital on 4/3/2024 with back pain, altered mental status and hypoxia.  CT abdomen/pelvis, chest and lumbar spine suggestive of uterine mass, liver cirrhosis and portal hypertension, small amount of ascites.  No pulmonary embolism  4/ 3/2024 urinalysis showed moderate leukocyte esterase, too numerous to count WBC, no growth on cultures    Status post paracentesis 4/5/2024 peritoneal fluid analysis showed 1, 166 WBC with 60% neutrophils, no growth on fluid culture.  The patient has been on ceftriaxone since 4/3/2024.  He had leukocytosis, WBC was up to 20.5 on 4/4/2024, down to 14.5 on 4/9/2024.  Renal function got worse, creatinine was up to 1.9 on 4/8/2024 down to 1.3 on 4/9/2024.  On 3/29/2024 urine culture grew Klebsiella pneumoniae that was sensitive to cefazolin.  She was transferred to ICU for 924 for mental status

## 2024-04-16 NOTE — PROGRESS NOTES
Pt refusing to be repositioned. Pt alert and oriented. Pt educated on benefits and risks, verbalized understanding but still refusing.

## 2024-04-17 LAB
ALBUMIN: 1.9 G/DL (ref 3.5–5.2)
ANION GAP SERPL CALCULATED.3IONS-SCNC: 8 MMOL/L (ref 9–17)
BUN SERPL-MCNC: 44 MG/DL (ref 8–23)
CALCIUM SERPL-MCNC: 8.4 MG/DL (ref 8.6–10.4)
CHLORIDE SERPL-SCNC: 102 MMOL/L (ref 98–107)
CO2 SERPL-SCNC: 23 MMOL/L (ref 20–31)
CREAT SERPL-MCNC: 1 MG/DL (ref 0.5–0.9)
GFR SERPL CREATININE-BSD FRML MDRD: 61 ML/MIN/1.73M2
GLUCOSE BLD-MCNC: 177 MG/DL (ref 65–105)
GLUCOSE BLD-MCNC: 295 MG/DL (ref 65–105)
GLUCOSE BLD-MCNC: 322 MG/DL (ref 65–105)
GLUCOSE SERPL-MCNC: 187 MG/DL (ref 70–99)
PHOSPHATE SERPL-MCNC: 3.5 MG/DL (ref 2.6–4.5)
POTASSIUM SERPL-SCNC: 5.2 MMOL/L (ref 3.7–5.3)
SODIUM SERPL-SCNC: 133 MMOL/L (ref 135–144)

## 2024-04-17 PROCEDURE — 6370000000 HC RX 637 (ALT 250 FOR IP): Performed by: INTERNAL MEDICINE

## 2024-04-17 PROCEDURE — 97530 THERAPEUTIC ACTIVITIES: CPT

## 2024-04-17 PROCEDURE — 6360000002 HC RX W HCPCS: Performed by: INTERNAL MEDICINE

## 2024-04-17 PROCEDURE — 6370000000 HC RX 637 (ALT 250 FOR IP)

## 2024-04-17 PROCEDURE — 2060000000 HC ICU INTERMEDIATE R&B

## 2024-04-17 PROCEDURE — 36415 COLL VENOUS BLD VENIPUNCTURE: CPT

## 2024-04-17 PROCEDURE — 2580000003 HC RX 258: Performed by: INTERNAL MEDICINE

## 2024-04-17 PROCEDURE — 80069 RENAL FUNCTION PANEL: CPT

## 2024-04-17 PROCEDURE — 82947 ASSAY GLUCOSE BLOOD QUANT: CPT

## 2024-04-17 PROCEDURE — 99232 SBSQ HOSP IP/OBS MODERATE 35: CPT | Performed by: INTERNAL MEDICINE

## 2024-04-17 RX ORDER — PROMETHAZINE HYDROCHLORIDE 12.5 MG/1
12.5 TABLET ORAL EVERY 6 HOURS PRN
Status: DISCONTINUED | OUTPATIENT
Start: 2024-04-17 | End: 2024-04-23 | Stop reason: HOSPADM

## 2024-04-17 RX ORDER — INSULIN GLARGINE 100 [IU]/ML
38 INJECTION, SOLUTION SUBCUTANEOUS 2 TIMES DAILY
Status: DISCONTINUED | OUTPATIENT
Start: 2024-04-17 | End: 2024-04-18

## 2024-04-17 RX ADMIN — LISINOPRIL 20 MG: 20 TABLET ORAL at 09:12

## 2024-04-17 RX ADMIN — PANTOPRAZOLE SODIUM 40 MG: 40 TABLET, DELAYED RELEASE ORAL at 06:22

## 2024-04-17 RX ADMIN — LACTULOSE 20 G: 20 SOLUTION ORAL at 09:12

## 2024-04-17 RX ADMIN — PANTOPRAZOLE SODIUM 40 MG: 40 TABLET, DELAYED RELEASE ORAL at 14:40

## 2024-04-17 RX ADMIN — METOPROLOL TARTRATE 25 MG: 25 TABLET, FILM COATED ORAL at 21:00

## 2024-04-17 RX ADMIN — HEPARIN SODIUM 5000 UNITS: 5000 INJECTION INTRAVENOUS; SUBCUTANEOUS at 22:02

## 2024-04-17 RX ADMIN — GABAPENTIN 100 MG: 100 CAPSULE ORAL at 21:00

## 2024-04-17 RX ADMIN — OXYCODONE HYDROCHLORIDE 5 MG: 5 TABLET ORAL at 09:56

## 2024-04-17 RX ADMIN — INSULIN GLARGINE 38 UNITS: 100 INJECTION, SOLUTION SUBCUTANEOUS at 21:00

## 2024-04-17 RX ADMIN — LACTULOSE 20 G: 20 SOLUTION ORAL at 14:40

## 2024-04-17 RX ADMIN — Medication 6 MG: at 21:00

## 2024-04-17 RX ADMIN — GABAPENTIN 100 MG: 100 CAPSULE ORAL at 09:12

## 2024-04-17 RX ADMIN — LACTULOSE 20 G: 20 SOLUTION ORAL at 21:02

## 2024-04-17 RX ADMIN — HEPARIN SODIUM 5000 UNITS: 5000 INJECTION INTRAVENOUS; SUBCUTANEOUS at 14:40

## 2024-04-17 RX ADMIN — GABAPENTIN 100 MG: 100 CAPSULE ORAL at 14:40

## 2024-04-17 RX ADMIN — RIFAXIMIN 550 MG: 550 TABLET ORAL at 21:00

## 2024-04-17 RX ADMIN — Medication 50 MG: at 09:11

## 2024-04-17 RX ADMIN — INSULIN GLARGINE 36 UNITS: 100 INJECTION, SOLUTION SUBCUTANEOUS at 09:12

## 2024-04-17 RX ADMIN — INSULIN LISPRO 6 UNITS: 100 INJECTION, SOLUTION INTRAVENOUS; SUBCUTANEOUS at 18:06

## 2024-04-17 RX ADMIN — MICONAZOLE NITRATE: 2 CREAM TOPICAL at 09:14

## 2024-04-17 RX ADMIN — SODIUM CHLORIDE: 9 INJECTION, SOLUTION INTRAVENOUS at 21:10

## 2024-04-17 RX ADMIN — MICONAZOLE NITRATE: 2 CREAM TOPICAL at 21:01

## 2024-04-17 RX ADMIN — RIFAXIMIN 550 MG: 550 TABLET ORAL at 09:11

## 2024-04-17 RX ADMIN — OXYCODONE HYDROCHLORIDE 5 MG: 5 TABLET ORAL at 21:01

## 2024-04-17 RX ADMIN — TAMSULOSIN HYDROCHLORIDE 0.4 MG: 0.4 CAPSULE ORAL at 09:11

## 2024-04-17 RX ADMIN — METOPROLOL TARTRATE 25 MG: 25 TABLET, FILM COATED ORAL at 09:11

## 2024-04-17 RX ADMIN — RIFAXIMIN 550 MG: 550 TABLET ORAL at 14:42

## 2024-04-17 RX ADMIN — HEPARIN SODIUM 5000 UNITS: 5000 INJECTION INTRAVENOUS; SUBCUTANEOUS at 06:22

## 2024-04-17 ASSESSMENT — PAIN DESCRIPTION - ORIENTATION
ORIENTATION: POSTERIOR
ORIENTATION: POSTERIOR
ORIENTATION: RIGHT;LEFT

## 2024-04-17 ASSESSMENT — PAIN DESCRIPTION - FREQUENCY
FREQUENCY: INTERMITTENT
FREQUENCY: INTERMITTENT

## 2024-04-17 ASSESSMENT — PAIN DESCRIPTION - LOCATION
LOCATION: NECK
LOCATION: BACK;NECK;SHOULDER

## 2024-04-17 ASSESSMENT — PAIN DESCRIPTION - DESCRIPTORS
DESCRIPTORS: ACHING;DISCOMFORT
DESCRIPTORS: SHARP
DESCRIPTORS: ACHING;DISCOMFORT

## 2024-04-17 ASSESSMENT — PAIN SCALES - GENERAL
PAINLEVEL_OUTOF10: 5
PAINLEVEL_OUTOF10: 9
PAINLEVEL_OUTOF10: 9

## 2024-04-17 ASSESSMENT — PAIN DESCRIPTION - PAIN TYPE
TYPE: ACUTE PAIN
TYPE: ACUTE PAIN

## 2024-04-17 ASSESSMENT — PAIN DESCRIPTION - ONSET
ONSET: ON-GOING
ONSET: ON-GOING

## 2024-04-17 ASSESSMENT — PAIN - FUNCTIONAL ASSESSMENT
PAIN_FUNCTIONAL_ASSESSMENT: PREVENTS OR INTERFERES SOME ACTIVE ACTIVITIES AND ADLS
PAIN_FUNCTIONAL_ASSESSMENT: PREVENTS OR INTERFERES SOME ACTIVE ACTIVITIES AND ADLS

## 2024-04-17 NOTE — PLAN OF CARE
Problem: Pain  Goal: Verbalizes/displays adequate comfort level or baseline comfort level  Outcome: Progressing   Note: Pt medicated with pain medication prn.  Assessed all pain characteristics including level, type, location, frequency, and onset.  Non-pharmacologic interventions offered to pt as well.       Problem: Skin/Tissue Integrity  Goal: Absence of new skin breakdown  Outcome: Progressing     Problem: Safety - Adult  Goal: Free from fall injury  Outcome: Progressing     Problem: Chronic Conditions and Co-morbidities  Goal: Patient's chronic conditions and co-morbidity symptoms are monitored and maintained or improved  Outcome: Progressing     Problem: Neurosensory - Adult  Goal: Achieves stable or improved neurological status  Outcome: Progressing

## 2024-04-17 NOTE — PROGRESS NOTES
Demonstration  Barriers to Learning: None  Education Outcome: Continued education needed, Demonstrated understanding, Verbalized understanding    Goals  Short Term Goals  Time Frame for Short Term Goals: By discharge, pt will  Short Term Goal 1: complete self care routine  for  5+ minutes  to improve active participation in  daily routine  Short Term Goal 2: perform UB ADLs with CGA and LB ADLs with  modA  Short Term Goal 3: V/D and explore use of adaptive techniques/equipment/DME to increase IND during self care tasks  Short Term Goal 4: perform bed mobility with Carmen to assist in hygiene and reduce risk of developing pressure ulcers  Short Term Goal 5: perform functional transfers/toilet transfers with maxA using  least restrictive device  Short Term Goal 6: V/D at least two nonpharmaceutical pain management techniques to improve  active participation in ADLs  Short Term Goal 7: actively  participate 15+ minutes of therapeutic exercise/functional activity to promote safety and independence with self care and mobility  Occupational Therapy Plan  Times Per Week: 5-7  Current Treatment Recommendations: Strengthening, Balance training, Functional mobility training, Endurance training, Safety education & training, Patient/Caregiver education & training, Equipment evaluation, education, & procurement, Pain management, Positioning, Self-Care / ADL    Assessment  Activity Tolerance  Activity Tolerance: Patient Tolerated treatment well, Patient limited by fatigue  Assessment  Performance deficits / Impairments: Decreased ADL status, Decreased functional mobility , Decreased strength, Decreased safe awareness, Decreased endurance, Decreased balance, Decreased high-level IADLs, Decreased cognition  Treatment Diagnosis: Impaired self-care status  Prognosis: Good  Decision Making: Medium Complexity  Discharge Recommendations: Patient would benefit from continued therapy after discharge  OT Equipment Recommendations  Other:  TBD  Safety Devices  Type of Devices: All fall risk precautions in place, Patient at risk for falls, Call light within reach, Gait belt, Left in chair    AM-PAC Daily Activities Inpatient  AM-PAC Daily Activity - Inpatient   How much help is needed for putting on and taking off regular lower body clothing?: A Lot  How much help is needed for bathing (which includes washing, rinsing, drying)?: A Lot  How much help is needed for toileting (which includes using toilet, bedpan, or urinal)?: Total  How much help is needed for putting on and taking off regular upper body clothing?: A Lot  How much help is needed for taking care of personal grooming?: A Little  How much help for eating meals?: None  AM-PAC Inpatient Daily Activity Raw Score: 14  AM-PAC Inpatient ADL T-Scale Score : 33.39  ADL Inpatient CMS 0-100% Score: 59.67  ADL Inpatient CMS G-Code Modifier : CK    Electronically signed by ALEIDA FARMER on 4/17/24 at 1:38 PM EDT     This patient  Bobbi Phillips  was seen by the Occupational Therapy Student identified above, including any treatment and documentation activity.  The above documentation completed by LYNNE Student  was reviewed and accepted by the Supervising Clinical Instructor   Electronically signed by SERA Martinez on 4/17/24 at 1:43 PM EDT

## 2024-04-17 NOTE — PROGRESS NOTES
NEPHROLOGY PROGRESS NOTE    Patient :  Bobbi Phillips; 68 y.o. MRN# [id number]  Location:  2086/2086-01  Attending:  Courtney Carrillo MD  Admit Date:  4/3/2024   Hospital Day: 14    Reason for consultation: Management of acute kidney injury.    Consulting physician: Courtney Carrillo MD.    Chief Complaint: Back pain and gross hematuria     Interval history: Patient feels well today and back pain is adequately controlled.  She is hemodynamically stable and nonoliguric.  Laboratory study results were reviewed by me.    History of Present Illness:  This is a 68 y.o. 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   Patient was seen in the ED approximately 1 week ago for fall. Patient was found to have UTI at that time and discharged on Keflex.   Patient denies N/V/diarrhea, fever chills.  C/o dysuria, flank pain and frequency.  Patient started on IV Abx.  Scr on admission 1.3 mg/dl, scr peaked to 1.9 mg/dl and therefor Nephrology consultation requested.  Bladder scan positive for urinary retention, nichols catheter was placed.  Scr improved to 1.3 mg/dl this morning.  Patient noted to be lethargic and decrease in responsiveness this morning and was transferred to ICU for AMS which was suspected to be due to use Narcotics.  Patient takes NSAIDs   There had exposure to IV contrast on 4/3/24. There is no history  of paraprotein disease. Pt denies any history of recurrent UTI or kidney stones.  Medication review shows use of ACE-inhibitor/diuretics.    Current Medications:    tamsulosin (FLOMAX) capsule 0.4 mg, Daily  0.9 % sodium chloride infusion, Continuous  heparin (porcine) injection 5,000 Units, 3 times per day  insulin glargine (LANTUS) injection vial 36 Units, BID  pantoprazole (PROTONIX) tablet 40 mg, BID AC  zinc sulfate (ZINCATE) 220 mg capsule - elemental zinc 50 mg, Daily  lisinopril (PRINIVIL;ZESTRIL) tablet 20 mg, Daily  miconazole (MICOTIN) 2 % cream,  113/57  24HR BLOOD PRESSURE RANGE:  Systolic (24hrs), Av , Min:105 , Max:133   ; Diastolic (24hrs), Av, Min:44, Max:68    24HR INTAKE/OUTPUT:    Intake/Output Summary (Last 24 hours) at 2024 1046  Last data filed at 2024 0624  Gross per 24 hour   Intake 949.7 ml   Output 625 ml   Net 324.7 ml       Patient Vitals for the past 96 hrs (Last 3 readings):   Weight   04/15/24 0217 102.7 kg (226 lb 6.6 oz)   24 0548 100.1 kg (220 lb 10.9 oz)     Physical Exam:  GENERAL APPEARANCE: Alert and cooperative, and appears to be in no acute distress.  HEAD: normocephalic and atraumatic.  EYES:  EOMI. Not pale, anicteric   NOSE:  No nasal discharge.    NECK: Neck supple, non-tender without lymphadenopathy, masses or thyromegaly. JVD-neg  CARDIAC: Normal S1 and S2. No S3, S4 or murmurs. Rhythm is regular.  LUNGS: Clear to auscultation and percussion without rales, rhonchi, wheezing or diminished breath sounds.  ABDOMEN:   Generally soft with normal bowel sounds.  MUSKULOSKELETAL: Adequately aligned spine. No joint erythema or tenderness.   EXTREMITIES: + edema. Peripheral pulses intact.   NEURO: Awake and alert with no focal deficits.    Labs:   CBC:  Recent Labs     04/15/24  0537   WBC 9.3   RBC 3.15*   HGB 8.9*   HCT 27.7*   MCV 88.0   MCH 28.3   MCHC 32.1   RDW 17.4*   *   MPV 8.5      BMP:   Recent Labs     04/15/24  0537 24  0554 24  0608   * 132* 133*   K 4.3 4.7 5.2   CL 99 101 102   CO2 22 23 23   BUN 46* 41* 44*   CREATININE 1.3* 1.1* 1.0*   GLUCOSE 170* 80 187*   CALCIUM 8.5* 8.5* 8.4*        Magnesium:   Recent Labs     04/15/24  0537   MG 1.6     Assessment/plan:    1.  Acute kidney injury - consistent with prerenal azotemia and obstructive uropathy secondary to urinary retention.  Peak serum creatinine 1.9 mg/dL; baseline serum creatinine 0.7 mg/dL.    Serum creatinine today is 1.0 mg/dL.  Plan: Continue IVF 0.9 normal saline at 50 mill per hour.  Monitor urine output

## 2024-04-17 NOTE — PLAN OF CARE
Problem: Discharge Planning  Goal: Discharge to home or other facility with appropriate resources  Outcome: Progressing     Problem: Pain  Goal: Verbalizes/displays adequate comfort level or baseline comfort level  4/17/2024 1653 by Tasha Rice, RN  Outcome: Progressing     Problem: Skin/Tissue Integrity  Goal: Absence of new skin breakdown  Description: 1.  Monitor for areas of redness and/or skin breakdown  2.  Assess vascular access sites hourly  3.  Every 4-6 hours minimum:  Change oxygen saturation probe site  4.  Every 4-6 hours:  If on nasal continuous positive airway pressure, respiratory therapy assess nares and determine need for appliance change or resting period.  4/17/2024 1653 by Tasha Rice, RN  Outcome: Progressing     Problem: Safety - Adult  Goal: Free from fall injury  4/17/2024 1653 by Tasha Rice, RN  Outcome: Progressing     Problem: Chronic Conditions and Co-morbidities  Goal: Patient's chronic conditions and co-morbidity symptoms are monitored and maintained or improved  4/17/2024 1653 by Tasha Rice, RN  Outcome: Progressing     Problem: ABCDS Injury Assessment  Goal: Absence of physical injury  Outcome: Progressing     Problem: Neurosensory - Adult  Goal: Achieves stable or improved neurological status  4/17/2024 1653 by Tasha Rice, RN  Outcome: Progressing     Problem: Nutrition Deficit:  Goal: Optimize nutritional status  Outcome: Progressing

## 2024-04-17 NOTE — PROGRESS NOTES
[facility] Internal Medicine  Nikolas Tan MD; Ray Hwang MD; Brad Maciel MD; MD Courtney Lr MD; Germania Osborne MD; Marlin Gutierrez MD      Progress note            Date:   4/17/2024  Patient name:  Bobbi Phillips  MRN:   [id number]  Account:  [id number]  YOB: 1955  PCP:    Stacie Doty MD  Code Status:    Full Code    Chief Complaint:     Chief Complaint   Patient presents with    Back Pain         History Obtained From:     Patient, EMR, nursing staff    HPI     This patient is a 68 y.o. Non- / non  femalewho presents with back pain  History of type 2 diabetes, with diabetic retinopathy and neuropathy, hypertension,  Patient is poor historian she reports she has been given some pain medication and is unable to relate sequence of events accurately  Per EMR patient has chronic low back pain, she had fall 5 days ago and was seen in ER CT thoracic and lumbar spine nil acute but UA positive for UTI culture showed Klebsiella sensitive to Keflex which is what she was discharged on.  Patient reported subsequent improvement in back pain however reports pain became worse again today  Moderate intensity progressive persistent no aggravating leaving factors noted  Denies any associated fevers diarrhea or vomiting    ER evaluation showed UA suggestive of persistent UTI, severe hyperglycemia, KIM  4/16/2024  Doing well, no concerns, sitting up in recliner.  Kerns had to be reinserted yesterday due to urinary retention.    4/17/2024  Doing well, denies concerns.  In bed.  Review of Systems:     A 10 point review of systems was performed and and negative except as mentioned in HPI  Positive and Negative as described in HPI.    Past Medical History:     Past Medical History:   Diagnosis Date    Abdominal swelling     Claustrophobia     COVID-19 vaccine administered     Diabetes (HCC)     DEXCOM LT ARM    Diabetic retinopathy (HCC) 11/28/2015    Flatus

## 2024-04-17 NOTE — CARE COORDINATION
Writer is following for potential discharge to [facility].  Authorization was started for this pt on 4/12.  Writer placed message to Perez with Mobile to check status.  Authorization is pending at this time.   Electronically signed by HUANG Ayers on 4/17/2024 at 11:55 AM    Writer met with pt and family in room for update. No further questions at this time.  Electronically signed by HUANG Ayers on 4/17/2024 at 4:43 PM

## 2024-04-17 NOTE — PROGRESS NOTES
Physical Therapy  [facility]    Date: 24  Patient Name: Bobbi Phillips       Room: 6  MRN: [id number]   Account: [id number]   : 1955  (68 y.o.) Gender: female     Referring Practitioner: Brad Maciel MD  Diagnosis: Acute kidney injury  Past Medical History:  has a past medical history of Abdominal swelling, Claustrophobia, COVID-19 vaccine administered, Diabetes (HCC), Diabetic retinopathy (HCC), Flatus, H/O acute sinusitis, H/O cholelithiasis, Hypertension, Liver cirrhosis (HCC), LLQ abdominal tenderness, Poor venous access, Positive colorectal cancer screening using Cologuard test, Post-menopausal bleeding, RUQ abdominal pain, Tendonitis of shoulder, right, Tingling, Under care of service provider, Under care of service provider, Vaginal bleeding, Vaginal candidiasis, and Wears glasses.   Past Surgical History:   has a past surgical history that includes Cholecystectomy, laparoscopic ();  section; Ankle fracture surgery (Left, ); Cataract removal with implant (Bilateral, ); Colonoscopy (N/A, 2023); Dilation and curettage of uterus (N/A, 10/02/2023); Cardiac catheterization (); hysteroscopy (2024); Dilation and curettage of uterus (N/A, 2024); and Upper gastrointestinal endoscopy (N/A, 4/10/2024).  Additional Pertinent Hx: Pt admitted to ICU due to worsening back pain on 24    Overall Orientation Status: Within Functional Limits  Restrictions/Precautions  Restrictions/Precautions: General Precautions  Implants present? :  (Denied)    Subjective: Pt lying in bed upon arrival. Agreeable to therapy  Comments: RN approved therapy; co-treat with Dallin Leyva       Pain Assessment: 0-10 (did not state number)  Pain Location: Neck     Oxygen Therapy  O2 Device: None (Room air)    Bed Mobility  Rolling: Maximal assistance;Rolling Right  Supine to Sit: Maximal assistance (x2)  Sit to Supine: Unable to assess  Scooting:  7 to 9 visits  Short Term Goal 1: Improve strength in both lower extremities to 4+/5  Short Term Goal 2: Pt will be able to perform bed mobility with minimal assistance  Short Term Goal 3: Pt able to perform sit<.stand and pivot ransfers consitently with min A  Short Term Goal 4: Progress pt to ambulation with RW distance fo 10 ft x2, min A x 1 to 2 person for ssafety  Short Term Goal 5: Pt to tolerate activity for 30 minutes to improve fucntion  Long Term Goals  Time Frame for Long Term Goals : --  Long Term Goal 1: --       04/17/24 1031   PT Individual Minutes   Time In 0818   Time Out 0848   Minutes 30         Electronically signed by Jackie Fung PTA on 4/17/24 at 10:33 AM EDT

## 2024-04-18 LAB
ALBUMIN: 2.1 G/DL (ref 3.5–5.2)
ANION GAP SERPL CALCULATED.3IONS-SCNC: 6 MMOL/L (ref 9–17)
ANION GAP SERPL CALCULATED.3IONS-SCNC: 7 MMOL/L (ref 9–17)
BUN SERPL-MCNC: 40 MG/DL (ref 8–23)
BUN SERPL-MCNC: 43 MG/DL (ref 8–23)
CALCIUM SERPL-MCNC: 8.4 MG/DL (ref 8.6–10.4)
CALCIUM SERPL-MCNC: 8.6 MG/DL (ref 8.6–10.4)
CHLORIDE SERPL-SCNC: 100 MMOL/L (ref 98–107)
CHLORIDE SERPL-SCNC: 102 MMOL/L (ref 98–107)
CO2 SERPL-SCNC: 21 MMOL/L (ref 20–31)
CO2 SERPL-SCNC: 22 MMOL/L (ref 20–31)
CREAT SERPL-MCNC: 0.9 MG/DL (ref 0.5–0.9)
CREAT SERPL-MCNC: 0.9 MG/DL (ref 0.5–0.9)
GFR SERPL CREATININE-BSD FRML MDRD: 69 ML/MIN/1.73M2
GFR SERPL CREATININE-BSD FRML MDRD: 69 ML/MIN/1.73M2
GLUCOSE BLD-MCNC: 191 MG/DL (ref 65–105)
GLUCOSE BLD-MCNC: 244 MG/DL (ref 65–105)
GLUCOSE SERPL-MCNC: 222 MG/DL (ref 70–99)
GLUCOSE SERPL-MCNC: 258 MG/DL (ref 70–99)
PHOSPHATE SERPL-MCNC: 3.5 MG/DL (ref 2.6–4.5)
POTASSIUM SERPL-SCNC: 5.1 MMOL/L (ref 3.7–5.3)
POTASSIUM SERPL-SCNC: 5.5 MMOL/L (ref 3.7–5.3)
POTASSIUM SERPL-SCNC: 5.5 MMOL/L (ref 3.7–5.3)
SODIUM SERPL-SCNC: 128 MMOL/L (ref 135–144)
SODIUM SERPL-SCNC: 130 MMOL/L (ref 135–144)

## 2024-04-18 PROCEDURE — 6370000000 HC RX 637 (ALT 250 FOR IP)

## 2024-04-18 PROCEDURE — 6360000002 HC RX W HCPCS: Performed by: INTERNAL MEDICINE

## 2024-04-18 PROCEDURE — 6370000000 HC RX 637 (ALT 250 FOR IP): Performed by: INTERNAL MEDICINE

## 2024-04-18 PROCEDURE — 36415 COLL VENOUS BLD VENIPUNCTURE: CPT

## 2024-04-18 PROCEDURE — 84132 ASSAY OF SERUM POTASSIUM: CPT

## 2024-04-18 PROCEDURE — 6370000000 HC RX 637 (ALT 250 FOR IP): Performed by: NURSE PRACTITIONER

## 2024-04-18 PROCEDURE — 99232 SBSQ HOSP IP/OBS MODERATE 35: CPT | Performed by: INTERNAL MEDICINE

## 2024-04-18 PROCEDURE — 80069 RENAL FUNCTION PANEL: CPT

## 2024-04-18 PROCEDURE — 1200000000 HC SEMI PRIVATE

## 2024-04-18 PROCEDURE — 82947 ASSAY GLUCOSE BLOOD QUANT: CPT

## 2024-04-18 PROCEDURE — 80048 BASIC METABOLIC PNL TOTAL CA: CPT

## 2024-04-18 PROCEDURE — 99231 SBSQ HOSP IP/OBS SF/LOW 25: CPT | Performed by: INTERNAL MEDICINE

## 2024-04-18 RX ORDER — INSULIN GLARGINE 100 [IU]/ML
40 INJECTION, SOLUTION SUBCUTANEOUS 2 TIMES DAILY
Status: DISCONTINUED | OUTPATIENT
Start: 2024-04-18 | End: 2024-04-19

## 2024-04-18 RX ORDER — SODIUM POLYSTYRENE SULFONATE 15 G/60ML
15 SUSPENSION ORAL; RECTAL ONCE
Status: COMPLETED | OUTPATIENT
Start: 2024-04-18 | End: 2024-04-18

## 2024-04-18 RX ADMIN — OXYCODONE HYDROCHLORIDE 5 MG: 5 TABLET ORAL at 02:28

## 2024-04-18 RX ADMIN — HEPARIN SODIUM 5000 UNITS: 5000 INJECTION INTRAVENOUS; SUBCUTANEOUS at 16:11

## 2024-04-18 RX ADMIN — LISINOPRIL 20 MG: 20 TABLET ORAL at 11:35

## 2024-04-18 RX ADMIN — OXYCODONE HYDROCHLORIDE 5 MG: 5 TABLET ORAL at 11:36

## 2024-04-18 RX ADMIN — SODIUM POLYSTYRENE SULFONATE 15 G: 15 SUSPENSION ORAL; RECTAL at 16:11

## 2024-04-18 RX ADMIN — METOPROLOL TARTRATE 25 MG: 25 TABLET, FILM COATED ORAL at 22:42

## 2024-04-18 RX ADMIN — RIFAXIMIN 550 MG: 550 TABLET ORAL at 11:37

## 2024-04-18 RX ADMIN — LACTULOSE 20 G: 20 SOLUTION ORAL at 16:11

## 2024-04-18 RX ADMIN — PANTOPRAZOLE SODIUM 40 MG: 40 TABLET, DELAYED RELEASE ORAL at 17:38

## 2024-04-18 RX ADMIN — PANTOPRAZOLE SODIUM 40 MG: 40 TABLET, DELAYED RELEASE ORAL at 06:06

## 2024-04-18 RX ADMIN — SODIUM ZIRCONIUM CYCLOSILICATE 10 G: 5 POWDER, FOR SUSPENSION ORAL at 11:47

## 2024-04-18 RX ADMIN — GABAPENTIN 100 MG: 100 CAPSULE ORAL at 22:42

## 2024-04-18 RX ADMIN — Medication 50 MG: at 11:35

## 2024-04-18 RX ADMIN — HEPARIN SODIUM 5000 UNITS: 5000 INJECTION INTRAVENOUS; SUBCUTANEOUS at 22:42

## 2024-04-18 RX ADMIN — LACTULOSE 20 G: 20 SOLUTION ORAL at 22:43

## 2024-04-18 RX ADMIN — OXYCODONE HYDROCHLORIDE 5 MG: 5 TABLET ORAL at 17:38

## 2024-04-18 RX ADMIN — INSULIN LISPRO 2 UNITS: 100 INJECTION, SOLUTION INTRAVENOUS; SUBCUTANEOUS at 17:38

## 2024-04-18 RX ADMIN — GABAPENTIN 100 MG: 100 CAPSULE ORAL at 11:35

## 2024-04-18 RX ADMIN — LACTULOSE 20 G: 20 SOLUTION ORAL at 11:35

## 2024-04-18 RX ADMIN — MICONAZOLE NITRATE: 2 CREAM TOPICAL at 11:33

## 2024-04-18 RX ADMIN — RIFAXIMIN 550 MG: 550 TABLET ORAL at 22:42

## 2024-04-18 RX ADMIN — INSULIN GLARGINE 40 UNITS: 100 INJECTION, SOLUTION SUBCUTANEOUS at 11:34

## 2024-04-18 RX ADMIN — TAMSULOSIN HYDROCHLORIDE 0.4 MG: 0.4 CAPSULE ORAL at 11:36

## 2024-04-18 RX ADMIN — INSULIN LISPRO 2 UNITS: 100 INJECTION, SOLUTION INTRAVENOUS; SUBCUTANEOUS at 11:34

## 2024-04-18 RX ADMIN — HEPARIN SODIUM 5000 UNITS: 5000 INJECTION INTRAVENOUS; SUBCUTANEOUS at 06:06

## 2024-04-18 RX ADMIN — INSULIN GLARGINE 40 UNITS: 100 INJECTION, SOLUTION SUBCUTANEOUS at 22:42

## 2024-04-18 RX ADMIN — METOPROLOL TARTRATE 25 MG: 25 TABLET, FILM COATED ORAL at 11:36

## 2024-04-18 RX ADMIN — MICONAZOLE NITRATE: 2 CREAM TOPICAL at 22:43

## 2024-04-18 RX ADMIN — GABAPENTIN 100 MG: 100 CAPSULE ORAL at 16:12

## 2024-04-18 ASSESSMENT — PAIN DESCRIPTION - FREQUENCY
FREQUENCY: INTERMITTENT
FREQUENCY: INTERMITTENT

## 2024-04-18 ASSESSMENT — PAIN SCALES - GENERAL
PAINLEVEL_OUTOF10: 8
PAINLEVEL_OUTOF10: 5
PAINLEVEL_OUTOF10: 9
PAINLEVEL_OUTOF10: 9
PAINLEVEL_OUTOF10: 7

## 2024-04-18 ASSESSMENT — PAIN DESCRIPTION - DIRECTION
RADIATING_TOWARDS: RUQ
RADIATING_TOWARDS: RUQ

## 2024-04-18 ASSESSMENT — PAIN DESCRIPTION - ORIENTATION
ORIENTATION: MID
ORIENTATION: LOWER
ORIENTATION: LOWER

## 2024-04-18 ASSESSMENT — PAIN DESCRIPTION - ONSET: ONSET: SUDDEN

## 2024-04-18 ASSESSMENT — PAIN DESCRIPTION - PAIN TYPE
TYPE: ACUTE PAIN
TYPE: ACUTE PAIN

## 2024-04-18 ASSESSMENT — PAIN DESCRIPTION - DESCRIPTORS
DESCRIPTORS: ACHING
DESCRIPTORS: SHARP;ACHING

## 2024-04-18 ASSESSMENT — PAIN DESCRIPTION - LOCATION
LOCATION: BACK
LOCATION: BACK
LOCATION: NECK
LOCATION: NECK

## 2024-04-18 NOTE — CARE COORDINATION
Writer called [facility]  [phone] and spoke with Cecelia ,  Was Advised that the pre cert that was submitted to OP is Ref # [id number] and was advised that the pre cert was denied yesterday 4/17/24.  No one called us.  [employer] advised he has no information for peer to peer options and no reason for the denial.  [employer], advised he was placing this as top priority to have someone from the pre auth team call.  He believes they are out of the office for the day.     Electronically signed by Bertha Millan RN on 4/18/2024 at 3:51 PM     Writer called Ana at [facility] [phone], and asked her if she heard anything from [facility].  Ana says she has not.  Ana was going to call to see what information she can find.  The patient is a max assist times 2.    Electronically signed by Bertha Millan RN on 4/18/2024 at 3:59 PM

## 2024-04-18 NOTE — PROGRESS NOTES
NEPHROLOGY PROGRESS NOTE    Patient :  Bobbi Phillips; 68 y.o. MRN# [id number]  Location:  2086/2086-01  Attending:  Courtney Carrillo MD  Admit Date:  4/3/2024   Hospital Day: 15    Reason for consultation: Management of acute kidney injury.    Consulting physician: Courtney Carrillo MD.    Chief Complaint: Back pain and gross hematuria     Interval history: Patient was seen and examined today and she still has indwelling Nichols but does not have gross hematuria.  She is currently afebrile.  She is nonoliguric.    History of Present Illness: This is a 68 y.o. 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   Patient was seen in the ED approximately 1 week ago for fall. Patient was found to have UTI at that time and discharged on Keflex.   Patient denies N/V/diarrhea, fever chills.  C/o dysuria, flank pain and frequency.  Patient started on IV Abx.  Scr on admission 1.3 mg/dl, scr peaked to 1.9 mg/dl and therefor Nephrology consultation requested.  Bladder scan positive for urinary retention, nichols catheter was placed.  Scr improved to 1.3 mg/dl this morning.  Patient noted to be lethargic and decrease in responsiveness this morning and was transferred to ICU for AMS which was suspected to be due to use Narcotics.  Patient takes NSAIDs   There had exposure to IV contrast on 4/3/24. There is no history  of paraprotein disease. Pt denies any history of recurrent UTI or kidney stones.  Medication review shows use of ACE-inhibitor/diuretics.    Current Medications:    insulin glargine (LANTUS) injection vial 40 Units, BID  rifAXIMin (XIFAXAN) tablet 550 mg, BID  promethazine (PHENERGAN) tablet 12.5 mg, Q6H PRN  tamsulosin (FLOMAX) capsule 0.4 mg, Daily  0.9 % sodium chloride infusion, Continuous  heparin (porcine) injection 5,000 Units, 3 times per day  pantoprazole (PROTONIX) tablet 40 mg, BID AC  zinc sulfate (ZINCATE) 220 mg capsule - elemental zinc 50 mg,  disease. Iso-osmolar with serum osmolality 297 mOsm/kg on 4/15/2024.    3.  Systemic hypertension - blood pressure is adequately controlled at this time.    4.  Urinary retention - will defer Kerns catheter management to urology.    5.  Hyperkalemia [serum potassium 5.5 mmol/L]. No improvement with Lokelma.  Will give Kayexalate.    Prognosis is guarded.    Electronically signed by Earl Cannon MD on 4/18/2024 at 2:53 PM

## 2024-04-18 NOTE — PROGRESS NOTES
[facility] -   Infectious diseases evaluation  admission date 4/3/2024    reason for consultation:   SBP    Impression :   Current:  Suspected SBP status post paracentesis 4/5/2024 fluid analysis suggestive of peritonitis, no growth on culture  Recent UTI on 3/29/2024, Klebsiella pneumoniae growth on cultures  Metabolic encephalopathy  Hematuria  Liver cirrhosis  Ascites  Recent influenza B tested positive on March 29, 2024  Diabetes mellitus  Uterine mass      HENCE:   Continue IV ceftriaxone course completed 4/16/2024  Peritoneal fluid culture from 4/10/2024, no growth to date        Infection Control Recommendations   [place] Precautions      Antimicrobial Stewardship Recommendations   Simplification of therapy  Targeted therapy      History of Present Illness:   Initial history:  Bobbi Phillips is a 68 y.o.-year-old female presented to ER on 3/29/2024 after a fall with reported mental status changes, was positive for influenza B and suspected UTI.  She was discharged on Keflex .  She returned to the hospital on 4/3/2024 with back pain, altered mental status and hypoxia.  CT abdomen/pelvis, chest and lumbar spine suggestive of uterine mass, liver cirrhosis and portal hypertension, small amount of ascites.  No pulmonary embolism  4/ 3/2024 urinalysis showed moderate leukocyte esterase, too numerous to count WBC, no growth on cultures    Status post paracentesis 4/5/2024 peritoneal fluid analysis showed 1, 166 WBC with 60% neutrophils, no growth on fluid culture.  The patient has been on ceftriaxone since 4/3/2024.  He had leukocytosis, WBC was up to 20.5 on 4/4/2024, down to 14.5 on 4/9/2024.  Renal function got worse, creatinine was up to 1.9 on 4/8/2024 down to 1.3 on 4/9/2024.  On 3/29/2024 urine culture grew Klebsiella pneumoniae that was sensitive to cefazolin.  Urinalysis 4/9/24 showed too numerous to count WBC, too numerous to count RBCs, moderate

## 2024-04-18 NOTE — PROGRESS NOTES
[facility]   OCCUPATIONAL THERAPY MISSED TREATMENT NOTE   INPATIENT   Date: 24  Patient Name: Bobbi Phillips       Room:   MRN: [id number]   Account #: [id number]    : 1955  (68 y.o.)  Gender: female   Referring Practitioner: Brad Maciel MD  Diagnosis: Acute kidney injury             REASON FOR MISSED TREATMENT:  Patient declined   -   Pt laying in bed upon writer arrival. Politely declines OT/PT co-tx reporting fatigue and pain. Pt provided with encouragement and educated on benefit of participating in therapy, continues to decline. Will continue to follow up with POC. 0950    Electronically signed by ALEIDA NELSON/LYNNE on 24 at 10:22 AM EDT     This patient  Bobbi Phillips  was seen by the Occupational Therapy Student identified above, including any treatment and documentation activity.  The above documentation completed by LYNNE Student  was reviewed and accepted by the Supervising Clinical Instructor   Electronically signed by SERA Martinez on 24 at 10:38 AM EDT

## 2024-04-18 NOTE — PROGRESS NOTES
Physical Therapy  DATE: 2024    NAME: Bobbi Phillips  MRN: [id number]   : 1955    Patient not seen this date for Physical Therapy due to:      [] Cancel by RN or physician due to:    [] Hemodialysis    [] Critical Lab Value Level     [] Blood transfusion in progress    [] Acute or unstable cardiovascular status   _MAP < 55 or more than >115  _HR < 40 or > 130    [] Acute or unstable pulmonary status   -FiO2 > 60%   _RR < 5 or >40    _O2 sats < 85%    [] Strict Bedrest    [] Off Unit for surgery or procedure    [] Off Unit for testing       [] Pending imaging to R/O fracture    [x] Refusal by Patient; checked on pt from [id number]. Pt lying in bed stating she had a horrible night last night and just wants to try and get some rest. Writer educated pt in therapy POC and provided encouragement and pt politely declined. Will continue to follow.     [] Other      [] PT being discontinued at this time. Patient independent. No further needs.     [] PT being discontinued at this time as the patient has been transferred to hospice care. No further needs.      Electronically signed by Jackie Fung PTA on 24 at 10:30 AM EDT

## 2024-04-18 NOTE — ADT AUTH CERT
Patient Demographics  Name Patient ID SSN Gender Identity Birth Date  Bobbi Phillips [id number]  Female [birth year] (68 yrs)  Address Phone Email Employer   [address] [address] [phone] (M)  [phone] (H) -- OTHER-[address] Race Occupation Emp Status   -- White (non-) -- Retired   Reg Status PCP Date Last Verified Next Review Date   Verified Stacie Doty MD  [phone] 04/03/24 05/03/24   Admission Date Discharge Date Admitting Provider     04/03/24 -- Courtney Carrillo MD    Marital Status Yazdanism        Mosque     Emergency Contact 1 Emergency Contact 2 Emergency Contact 3  Judit *HIPAA* Bitter (6)  NA  [phone] (H)  [phone] (W)  [phone] (M) Phillip *HIPAA* Lofton (C)  [phone] (H)  [phone] (W)  [phone] (M) Andrea *Hipaa* [place]fton (C)  [phone] (H)  [phone] (W)  [phone] (M)  Physician Progress NotesNotes from 04/15/24 through 04/18/24  Progress Notes by Taye Rowe MD at 4/18/2024  9:27 AM  Author: Taye Rowe MD Service: Internal Medicine Author Type: Physician  Filed: 4/18/2024  9:29 AM Date of Service: 4/18/2024  9:27 AM Status: Signed  : Taye Rowe MD (Physician)  Expand All Collapse All  untitled image     [facility] Internal Medicine  Nikolas Tan MD; Ray Hwang MD; Brad Maciel MD; MD Courtney Lr MD; Germania Osborne MD; Marlin Gutierrez MD        Progress note            Date:                            4/18/2024  Patient name:              Bobbi Phillips  MRN:                           [id number]  Account:                      [id number]  YOB: 1955  PCP:                            Stacie Doty MD  Code Status:               Full Code     Chief Complaint:     Chief Complaint  Patient presents with    Back Pain           History Obtained From:     Patient, EMR, nursing staff     HPI     This patient is a  1700(s)  04/12/24 1647(a)  04/12/24 1647(r) 0 Units   SubCUTAneous       Reason: Order parameters not met MartaSaima    Due 04/12/24 1700(s)  04/10/24 1348(r)     SubCUTAneous          Automatically Held 04/12/24 1700(a)  04/10/24 1104(r)     SubCUTAneous      Autohold, Mar  65 Not Given 04/13/24 0800(s)  04/13/24 0853(a)  04/13/24 0853(r)     SubCUTAneous     Reason: Order parameters not met Judy Hoover    Due 04/13/24 0800(s)  04/10/24 1348(r)     SubCUTAneous          Automatically Held 04/13/24 0800(a)  04/10/24 1104(r)     SubCUTAneous      Autohold, Mar  67 Given 04/13/24 1200(s)  04/13/24 1115(a)  04/13/24 1115(r) 2 Units   SubCUTAneous Abdomen LLQ (Left Lower Quadrant)    Kiko Judy    Due 04/13/24 1200(s)  04/10/24 1348(r)     SubCUTAneous          Automatically Held 04/13/24 1200(a)  04/10/24 1104(r)     SubCUTAneous      Autohold, Mar  68 Given 04/13/24 1700(s)  04/13/24 1604(a)  04/13/24 1604(r) 4 Units   SubCUTAneous Abdomen LLQ (Left Lower Quadrant)    Kiko Judy    Due 04/13/24 1700(s)  04/10/24 1348(r)     SubCUTAneous          Automatically Held 04/13/24 1700(a)  04/10/24 1104(r)     SubCUTAneous      Autohold, Mar  69 Not Given 04/10/24 0900(s)  04/10/24 1015(a)  04/10/24 1015(r) 20 g   Oral     Reason: Pt NPO Naz Diaz    Due 04/10/24 0900(s)  04/09/24 1535(r)     Oral          Automatically Held 04/10/24 0900(a)  04/09/24 0934(r)     Oral      Monae Beasley    Due 04/10/24 0900(s)  04/07/24 0914(r)     Oral          Automatically Held 04/10/24 0900(a)  04/07/24 0813(r)     Oral      Monae Beasley  69 MAR Hold 04/10/24 1104(s)  04/10/24 1104(a)  04/10/24 1104(r)     Oral     Reason: Unreviewed Transfer Orders Autohold, Mar  71 MAR Unhold 04/10/24 1348(s)  04/10/24 1348(a)  04/10/24 1348(r)     Oral      Saima Cristina S  71 Given 04/10/24 1400(s)  04/10/24 1458(a)  04/10/24 1458(r) 20 g   Oral      Joe, Naz    Due 04/10/24 1400(s)  04/10/24 1348(r)     Oral

## 2024-04-18 NOTE — FLOWSHEET NOTE
04/18/24 0233   Treatment Team Notification   Reason for Communication Review case   Name of Team Member Notified Cande Hollis   Treatment Team Role Advanced Practice Nurse   Method of Communication Secure Message   Response No new orders   Notification Time 0240       Writer notified DANY Castellon that, pt mourning and complaining of pain in the epigastric area that radiates to RUQ. Pt given prn Roxicodone 5 mg PO.     02:53: Cande NP at bedside to check on the pt. Per NP, pt likely cramping secondary to lactulose and from pressure of ascites. Pt has had similar pain previously.

## 2024-04-18 NOTE — CARE COORDINATION
Writer left a message with Edda from [employer] to see what the pre cert status is.      Electronically signed by Bertha Millan RN on 4/18/2024 at 8:28 AM

## 2024-04-18 NOTE — PLAN OF CARE
Problem: Pain  Goal: Verbalizes/displays adequate comfort level or baseline comfort level  4/18/2024 0446 by Catalina Reyes RN  Outcome: Progressing     Problem: Skin/Tissue Integrity  Goal: Absence of new skin breakdown  4/18/2024 0446 by Catalina Reyes RN  Outcome: Progressing     Problem: Safety - Adult  Goal: Free from fall injury  4/18/2024 0446 by Catalina Reyes RN  Outcome: Progressing     Problem: Chronic Conditions and Co-morbidities  Goal: Patient's chronic conditions and co-morbidity symptoms are monitored and maintained or improved  4/18/2024 0446 by Catalina Reyes RN  Outcome: Progressing     Problem: Neurosensory - Adult  Goal: Achieves stable or improved neurological status  4/18/2024 0446 by Catalina Reyes RN  Outcome: Progressing

## 2024-04-18 NOTE — DISCHARGE INSTRUCTIONS
Your  has arranged your appointments for follow up based on your preference of where you would like to continue your care.     If you are unable to attend appointments that have been arranged for you, it is your responsibility to call to reschedule at least 48 hours prior to the appointment date.     Your  has arranged additional care needs such as home health, infusion services, or durable medical equipment based on your preference and options available by your insurance and local agencies.    Your After Visit Summary (AVS) has provided you with instructions for your discharge. It is your responsibility to ask questions if you have any. Please contact your medical care team at the numbers listed if you should have any additional questions.

## 2024-04-18 NOTE — CARE COORDINATION
Writer is following for potential discharge to [facility].  Writer placed call to Ana with [employer] regarding authorization status. Authorization is pending at this time.    Writer met with pt for update. No further questions at this time.  Electronically signed by HUANG Ayers on 4/18/2024 at 12:24 PM    Writer spoke to Perez regarding submitting new authorization and peer to peer option. Ana spoke to [facility] and they confirmed this case has not been updated by  yet and that's why denial and peer to peer information was not received yet.     Perez will get this information to writer when it is received from [facility].  Electronically signed by HUANG Ayers on 4/18/2024 at 4:41 PM

## 2024-04-18 NOTE — PROGRESS NOTES
[facility] Internal Medicine  Nikolas Tan MD; Ray Hwang MD; Brad Maciel MD; MD Courtney Lr MD; Germania Osborne MD; Marlin Gutierrez MD      Progress note            Date:   4/18/2024  Patient name:  Bobbi Phillips  MRN:   [id number]  Account:  [id number]  YOB: 1955  PCP:    Stacie Doty MD  Code Status:    Full Code    Chief Complaint:     Chief Complaint   Patient presents with    Back Pain         History Obtained From:     Patient, EMR, nursing staff    HPI     This patient is a 68 y.o. Non- / non  femalewho presents with back pain  History of type 2 diabetes, with diabetic retinopathy and neuropathy, hypertension,  Patient is poor historian she reports she has been given some pain medication and is unable to relate sequence of events accurately  Per EMR patient has chronic low back pain, she had fall 5 days ago and was seen in ER CT thoracic and lumbar spine nil acute but UA positive for UTI culture showed Klebsiella sensitive to Keflex which is what she was discharged on.  Patient reported subsequent improvement in back pain however reports pain became worse again today  Moderate intensity progressive persistent no aggravating leaving factors noted  Denies any associated fevers diarrhea or vomiting    ER evaluation showed UA suggestive of persistent UTI, severe hyperglycemia, KIM  4/16/2024  Doing well, no concerns, sitting up in recliner.  Kerns had to be reinserted yesterday due to urinary retention.    4/18/2024  Doing well  Urine output adequate  Passing gas, did not have a bowel movement yesterday  No nausea no vomiting  Review of Systems:     A 10 point review of systems was performed and and negative except as mentioned in HPI  Positive and Negative as described in HPI.    Past Medical History:     Past Medical History:   Diagnosis Date    Abdominal swelling     Claustrophobia     COVID-19 vaccine administered     Diabetes (HCC)  jejunum proximally which could be related to portal hypertension and ascites, but small-bowel enteritis is a possibility. 3. There is again fluid in the endometrial cavity with the 3 cm soft tissue attenuation structure projecting over the endometrium suggesting a mass in this area which could represent a fibroid or malignancy.  Pelvic ultrasound is recommended for further evaluation.     CT LUMBAR SPINE WO CONTRAST    Result Date: 4/3/2024  EXAMINATION: CT OF THE LUMBAR SPINE WITHOUT CONTRAST  4/3/2024 TECHNIQUE: CT of the lumbar spine was performed without the administration of intravenous contrast. Multiplanar reformatted images are provided for review. Adjustment of mA and/or kV according to patient size was utilized.  Automated exposure control, iterative reconstruction, and/or weight based adjustment of the mA/kV was utilized to reduce the radiation dose to as low as reasonably achievable. COMPARISON: 03/29/2024 HISTORY: ORDERING SYSTEM PROVIDED HISTORY: low back pain TECHNOLOGIST PROVIDED HISTORY: low back pain Decision Support Exception - unselect if not a suspected or confirmed emergency medical condition->Emergency Medical Condition (MA) Reason for Exam: low back pain, Additional signs and symptoms: pt c/o loq back pain, fell 5 days ago pain has increases, recent uti. screaming during test, not following commands. Relevant Medical/Surgical History: cholecystectomy, cardiac cath FINDINGS: BONES/ALIGNMENT: Mild grade 1 anterolisthesis L3 on L4, not changed.  The vertebral body heights are maintained. No osseous destructive lesion is seen. DEGENERATIVE CHANGES: Multilevel degenerative disc disease and facet joint arthropathy again noted.  Disc bulging at L3-4 combined with the anterolisthesis and facet joint arthropathy causes canal stenosis. SOFT TISSUES/RETROPERITONEUM: No paraspinal mass is seen.     No acute fracture or subluxation Multilevel degenerative changes with canal stenosis at L3-4     CT CHEST

## 2024-04-19 ENCOUNTER — APPOINTMENT (OUTPATIENT)
Dept: GENERAL RADIOLOGY | Age: 69
DRG: 871 | End: 2024-04-19
Payer: MEDICARE

## 2024-04-19 LAB
ANION GAP SERPL CALCULATED.3IONS-SCNC: 7 MMOL/L (ref 9–17)
BASOPHILS # BLD: 0.1 K/UL (ref 0–0.2)
BASOPHILS NFR BLD: 2 % (ref 0–2)
BUN SERPL-MCNC: 40 MG/DL (ref 8–23)
CALCIUM SERPL-MCNC: 8.4 MG/DL (ref 8.6–10.4)
CHLORIDE SERPL-SCNC: 105 MMOL/L (ref 98–107)
CO2 SERPL-SCNC: 22 MMOL/L (ref 20–31)
CREAT SERPL-MCNC: 0.9 MG/DL (ref 0.5–0.9)
EOSINOPHIL # BLD: 0.2 K/UL (ref 0–0.4)
EOSINOPHILS RELATIVE PERCENT: 4 % (ref 0–4)
ERYTHROCYTE [DISTWIDTH] IN BLOOD BY AUTOMATED COUNT: 18.9 % (ref 11.5–14.9)
GFR SERPL CREATININE-BSD FRML MDRD: 69 ML/MIN/1.73M2
GLUCOSE BLD-MCNC: 122 MG/DL (ref 65–105)
GLUCOSE BLD-MCNC: 134 MG/DL (ref 65–105)
GLUCOSE BLD-MCNC: 153 MG/DL (ref 65–105)
GLUCOSE BLD-MCNC: 247 MG/DL (ref 65–105)
GLUCOSE BLD-MCNC: 54 MG/DL (ref 65–105)
GLUCOSE BLD-MCNC: 62 MG/DL (ref 65–105)
GLUCOSE BLD-MCNC: 89 MG/DL (ref 65–105)
GLUCOSE SERPL-MCNC: 121 MG/DL (ref 70–99)
HCT VFR BLD AUTO: 25.9 % (ref 36–46)
HGB BLD-MCNC: 8.2 G/DL (ref 12–16)
LYMPHOCYTES NFR BLD: 0.9 K/UL (ref 1–4.8)
LYMPHOCYTES RELATIVE PERCENT: 15 % (ref 24–44)
MCH RBC QN AUTO: 28 PG (ref 26–34)
MCHC RBC AUTO-ENTMCNC: 31.7 G/DL (ref 31–37)
MCV RBC AUTO: 88.3 FL (ref 80–100)
MONOCYTES NFR BLD: 0.6 K/UL (ref 0.1–1.3)
MONOCYTES NFR BLD: 10 % (ref 1–7)
NEUTROPHILS NFR BLD: 69 % (ref 36–66)
NEUTS SEG NFR BLD: 4.1 K/UL (ref 1.3–9.1)
PLATELET # BLD AUTO: 136 K/UL (ref 150–450)
PMV BLD AUTO: 10.1 FL (ref 6–12)
POTASSIUM SERPL-SCNC: 5.3 MMOL/L (ref 3.7–5.3)
RBC # BLD AUTO: 2.93 M/UL (ref 4–5.2)
SODIUM SERPL-SCNC: 134 MMOL/L (ref 135–144)
WBC OTHER # BLD: 5.8 K/UL (ref 3.5–11)

## 2024-04-19 PROCEDURE — 80048 BASIC METABOLIC PNL TOTAL CA: CPT

## 2024-04-19 PROCEDURE — 6370000000 HC RX 637 (ALT 250 FOR IP): Performed by: INTERNAL MEDICINE

## 2024-04-19 PROCEDURE — 97110 THERAPEUTIC EXERCISES: CPT

## 2024-04-19 PROCEDURE — 2580000003 HC RX 258: Performed by: INTERNAL MEDICINE

## 2024-04-19 PROCEDURE — 74018 RADEX ABDOMEN 1 VIEW: CPT

## 2024-04-19 PROCEDURE — 99233 SBSQ HOSP IP/OBS HIGH 50: CPT | Performed by: INTERNAL MEDICINE

## 2024-04-19 PROCEDURE — 6370000000 HC RX 637 (ALT 250 FOR IP)

## 2024-04-19 PROCEDURE — 6360000002 HC RX W HCPCS: Performed by: INTERNAL MEDICINE

## 2024-04-19 PROCEDURE — 97530 THERAPEUTIC ACTIVITIES: CPT

## 2024-04-19 PROCEDURE — 6370000000 HC RX 637 (ALT 250 FOR IP): Performed by: NURSE PRACTITIONER

## 2024-04-19 PROCEDURE — 1200000000 HC SEMI PRIVATE

## 2024-04-19 PROCEDURE — 99232 SBSQ HOSP IP/OBS MODERATE 35: CPT | Performed by: INTERNAL MEDICINE

## 2024-04-19 PROCEDURE — 36415 COLL VENOUS BLD VENIPUNCTURE: CPT

## 2024-04-19 PROCEDURE — 85025 COMPLETE CBC W/AUTO DIFF WBC: CPT

## 2024-04-19 PROCEDURE — 82947 ASSAY GLUCOSE BLOOD QUANT: CPT

## 2024-04-19 RX ORDER — LACTULOSE 10 G/15ML
20 SOLUTION ORAL EVERY 6 HOURS SCHEDULED
Status: DISCONTINUED | OUTPATIENT
Start: 2024-04-19 | End: 2024-04-20

## 2024-04-19 RX ORDER — INSULIN GLARGINE 100 [IU]/ML
30 INJECTION, SOLUTION SUBCUTANEOUS 2 TIMES DAILY
Status: DISCONTINUED | OUTPATIENT
Start: 2024-04-19 | End: 2024-04-19

## 2024-04-19 RX ORDER — INSULIN GLARGINE 100 [IU]/ML
35 INJECTION, SOLUTION SUBCUTANEOUS 2 TIMES DAILY
Status: DISCONTINUED | OUTPATIENT
Start: 2024-04-19 | End: 2024-04-23 | Stop reason: HOSPADM

## 2024-04-19 RX ADMIN — MICONAZOLE NITRATE: 2 CREAM TOPICAL at 08:43

## 2024-04-19 RX ADMIN — RIFAXIMIN 550 MG: 550 TABLET ORAL at 22:37

## 2024-04-19 RX ADMIN — LACTULOSE 20 G: 20 SOLUTION ORAL at 08:43

## 2024-04-19 RX ADMIN — METOPROLOL TARTRATE 25 MG: 25 TABLET, FILM COATED ORAL at 08:43

## 2024-04-19 RX ADMIN — Medication 16 G: at 12:19

## 2024-04-19 RX ADMIN — HEPARIN SODIUM 5000 UNITS: 5000 INJECTION INTRAVENOUS; SUBCUTANEOUS at 06:32

## 2024-04-19 RX ADMIN — PANTOPRAZOLE SODIUM 40 MG: 40 TABLET, DELAYED RELEASE ORAL at 06:32

## 2024-04-19 RX ADMIN — MICONAZOLE NITRATE: 2 CREAM TOPICAL at 21:53

## 2024-04-19 RX ADMIN — POLYETHYLENE GLYCOL 3350 17 G: 17 POWDER, FOR SOLUTION ORAL at 18:32

## 2024-04-19 RX ADMIN — OXYCODONE HYDROCHLORIDE 5 MG: 5 TABLET ORAL at 14:22

## 2024-04-19 RX ADMIN — TAMSULOSIN HYDROCHLORIDE 0.4 MG: 0.4 CAPSULE ORAL at 08:43

## 2024-04-19 RX ADMIN — HEPARIN SODIUM 5000 UNITS: 5000 INJECTION INTRAVENOUS; SUBCUTANEOUS at 14:22

## 2024-04-19 RX ADMIN — LACTULOSE 20 G: 20 SOLUTION ORAL at 21:52

## 2024-04-19 RX ADMIN — DEXTROSE MONOHYDRATE 125 ML: 100 INJECTION, SOLUTION INTRAVENOUS at 12:43

## 2024-04-19 RX ADMIN — Medication 50 MG: at 08:43

## 2024-04-19 RX ADMIN — SODIUM CHLORIDE, PRESERVATIVE FREE 10 ML: 5 INJECTION INTRAVENOUS at 21:56

## 2024-04-19 RX ADMIN — GABAPENTIN 100 MG: 100 CAPSULE ORAL at 21:52

## 2024-04-19 RX ADMIN — HEPARIN SODIUM 5000 UNITS: 5000 INJECTION INTRAVENOUS; SUBCUTANEOUS at 21:53

## 2024-04-19 RX ADMIN — GABAPENTIN 100 MG: 100 CAPSULE ORAL at 08:43

## 2024-04-19 RX ADMIN — METOPROLOL TARTRATE 25 MG: 25 TABLET, FILM COATED ORAL at 21:52

## 2024-04-19 RX ADMIN — INSULIN GLARGINE 35 UNITS: 100 INJECTION, SOLUTION SUBCUTANEOUS at 21:52

## 2024-04-19 RX ADMIN — INSULIN GLARGINE 40 UNITS: 100 INJECTION, SOLUTION SUBCUTANEOUS at 08:43

## 2024-04-19 RX ADMIN — LISINOPRIL 20 MG: 20 TABLET ORAL at 08:43

## 2024-04-19 RX ADMIN — LACTULOSE 20 G: 20 SOLUTION ORAL at 14:22

## 2024-04-19 RX ADMIN — OXYCODONE HYDROCHLORIDE 5 MG: 5 TABLET ORAL at 18:28

## 2024-04-19 RX ADMIN — RIFAXIMIN 550 MG: 550 TABLET ORAL at 08:43

## 2024-04-19 RX ADMIN — PANTOPRAZOLE SODIUM 40 MG: 40 TABLET, DELAYED RELEASE ORAL at 14:22

## 2024-04-19 RX ADMIN — GABAPENTIN 100 MG: 100 CAPSULE ORAL at 14:22

## 2024-04-19 ASSESSMENT — PAIN DESCRIPTION - DESCRIPTORS
DESCRIPTORS: ACHING;DISCOMFORT
DESCRIPTORS: ACHING

## 2024-04-19 ASSESSMENT — PAIN SCALES - GENERAL
PAINLEVEL_OUTOF10: 4
PAINLEVEL_OUTOF10: 5
PAINLEVEL_OUTOF10: 9
PAINLEVEL_OUTOF10: 8

## 2024-04-19 ASSESSMENT — PAIN DESCRIPTION - LOCATION
LOCATION: BACK;NECK
LOCATION: BACK

## 2024-04-19 NOTE — PROGRESS NOTES
[facility] -   Infectious diseases evaluation  admission date 4/3/2024    reason for consultation:   SBP    Impression :   Current:  Suspected SBP status post paracentesis 4/5/2024 fluid analysis suggestive of peritonitis, no growth on culture  Recent UTI on 3/29/2024, Klebsiella pneumoniae growth on cultures  Metabolic encephalopathy  Hematuria  Liver cirrhosis  Ascites  Recent influenza B tested positive on March 29, 2024  Diabetes mellitus  Uterine mass      HENCE:   Continue IV ceftriaxone course completed 4/16/2024  Peritoneal fluid culture from 4/10/2024, no growth to date        Infection Control Recommendations   [place] Precautions      Antimicrobial Stewardship Recommendations   Simplification of therapy  Targeted therapy      History of Present Illness:   Initial history:  Bobbi Phillips is a 68 y.o.-year-old female presented to ER on 3/29/2024 after a fall with reported mental status changes, was positive for influenza B and suspected UTI.  She was discharged on Keflex .  She returned to the hospital on 4/3/2024 with back pain, altered mental status and hypoxia.  CT abdomen/pelvis, chest and lumbar spine suggestive of uterine mass, liver cirrhosis and portal hypertension, small amount of ascites.  No pulmonary embolism  4/ 3/2024 urinalysis showed moderate leukocyte esterase, too numerous to count WBC, no growth on cultures    Status post paracentesis 4/5/2024 peritoneal fluid analysis showed 1, 166 WBC with 60% neutrophils, no growth on fluid culture.  The patient has been on ceftriaxone since 4/3/2024.  He had leukocytosis, WBC was up to 20.5 on 4/4/2024, down to 14.5 on 4/9/2024.  Renal function got worse, creatinine was up to 1.9 on 4/8/2024 down to 1.3 on 4/9/2024.  On 3/29/2024 urine culture grew Klebsiella pneumoniae that was sensitive to cefazolin.  Urinalysis 4/9/24 showed too numerous to count WBC, too numerous to count RBCs, moderate

## 2024-04-19 NOTE — CARE COORDINATION
Writer is following for potential discharge to [facility].  Writer placed message to Ana regarding peer to peer information for this pt authorization denial.    Writer met with pt for update, no further questions at this time.  Electronically signed by HUANG Ayers on 4/19/2024 at 11:50 AM

## 2024-04-19 NOTE — PROGRESS NOTES
NEPHROLOGY PROGRESS NOTE    Patient :  Bobbi Phillips; 68 y.o. MRN# [id number]  Location:  2040/2040-01  Attending:  Courtney Carrillo MD  Admit Date:  4/3/2024   Hospital Day: 16    Reason for consultation: Management of acute kidney injury.    Consulting physician: Courtney Carrillo MD.    Chief Complaint: Back pain and gross hematuria     Interval history: Patient was seen and examined today and she still has indwelling Nichols but does not have gross hematuria.  She is currently afebrile.  She is nonoliguric.  K 5.3    History of Present Illness: This is a 68 y.o. 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   Patient was seen in the ED approximately 1 week ago for fall. Patient was found to have UTI at that time and discharged on Keflex.   Patient denies N/V/diarrhea, fever chills.  C/o dysuria, flank pain and frequency.  Patient started on IV Abx.  Scr on admission 1.3 mg/dl, scr peaked to 1.9 mg/dl and therefor Nephrology consultation requested.  Bladder scan positive for urinary retention, nichols catheter was placed.  Scr improved to 1.3 mg/dl this morning.  Patient noted to be lethargic and decrease in responsiveness this morning and was transferred to ICU for AMS which was suspected to be due to use Narcotics.  Patient takes NSAIDs   There had exposure to IV contrast on 4/3/24. There is no history  of paraprotein disease. Pt denies any history of recurrent UTI or kidney stones.  Medication review shows use of ACE-inhibitor/diuretics.    Current Medications:    lactulose (CHRONULAC) 10 GM/15ML solution 20 g, 4 times per day  insulin glargine (LANTUS) injection vial 35 Units, BID  rifAXIMin (XIFAXAN) tablet 550 mg, BID  promethazine (PHENERGAN) tablet 12.5 mg, Q6H PRN  tamsulosin (FLOMAX) capsule 0.4 mg, Daily  0.9 % sodium chloride infusion, Continuous  heparin (porcine) injection 5,000 Units, 3 times per day  pantoprazole (PROTONIX) tablet 40 mg, BID

## 2024-04-19 NOTE — PLAN OF CARE
Problem: Discharge Planning  Goal: Discharge to home or other facility with appropriate resources  Outcome: Progressing     Problem: Pain  Goal: Verbalizes/displays adequate comfort level or baseline comfort level  Outcome: Progressing     Problem: Skin/Tissue Integrity  Goal: Absence of new skin breakdown  Description: 1.  Monitor for areas of redness and/or skin breakdown  2.  Assess vascular access sites hourly  3.  Every 4-6 hours minimum:  Change oxygen saturation probe site  4.  Every 4-6 hours:  If on nasal continuous positive airway pressure, respiratory therapy assess nares and determine need for appliance change or resting period.  Outcome: Progressing     Problem: Safety - Adult  Goal: Free from fall injury  Outcome: Progressing     Problem: Chronic Conditions and Co-morbidities  Goal: Patient's chronic conditions and co-morbidity symptoms are monitored and maintained or improved  Outcome: Progressing     Problem: ABCDS Injury Assessment  Goal: Absence of physical injury  Outcome: Progressing     Problem: Neurosensory - Adult  Goal: Achieves stable or improved neurological status  Outcome: Progressing  Goal: Achieves maximal functionality and self care  Outcome: Progressing     Problem: Nutrition Deficit:  Goal: Optimize nutritional status  Outcome: Progressing

## 2024-04-19 NOTE — PROGRESS NOTES
Physical Therapy  [facility]    Date: 24  Patient Name: Bobbi Phillips       Room:   MRN: [id number]   Account: [id number]   : 1955  (68 y.o.) Gender: female     Referring Practitioner: Brad Maciel MD  Diagnosis: Acute kidney injury  Past Medical History:  has a past medical history of Abdominal swelling, Claustrophobia, COVID-19 vaccine administered, Diabetes (HCC), Diabetic retinopathy (HCC), Flatus, H/O acute sinusitis, H/O cholelithiasis, Hypertension, Liver cirrhosis (HCC), LLQ abdominal tenderness, Poor venous access, Positive colorectal cancer screening using Cologuard test, Post-menopausal bleeding, RUQ abdominal pain, Tendonitis of shoulder, right, Tingling, Under care of service provider, Under care of service provider, Vaginal bleeding, Vaginal candidiasis, and Wears glasses.   Past Surgical History:   has a past surgical history that includes Cholecystectomy, laparoscopic ();  section; Ankle fracture surgery (Left, ); Cataract removal with implant (Bilateral, ); Colonoscopy (N/A, 2023); Dilation and curettage of uterus (N/A, 10/02/2023); Cardiac catheterization (); hysteroscopy (2024); Dilation and curettage of uterus (N/A, 2024); and Upper gastrointestinal endoscopy (N/A, 4/10/2024).  Additional Pertinent Hx: Pt admitted to ICU due to worsening back pain on 24    Overall Orientation Status: Within Functional Limits  Restrictions/Precautions  Restrictions/Precautions: General Precautions  Implants present? :  (Denied)    Subjective: Pt lying in bed upon arrival. Agreeable to therapy  Comments: RN approved therapy; co-treat with BALJIT Olivo       Pain Assessment: 0-10  Pain Level: 8  Pain Location:  (devin-area d/t catheter)     Oxygen Therapy  O2 Device: None (Room air)    Bed Mobility  Rolling: Maximal assistance;Rolling Right  Supine to Sit: Maximal assistance (x2)  Sit to Supine: Unable to

## 2024-04-19 NOTE — PROGRESS NOTES
20 mg Oral Daily Bryson Cordova MD   20 mg at 04/19/24 0843    miconazole (MICOTIN) 2 % cream   Topical BID Germania Osborne MD   Given at 04/19/24 0843    melatonin tablet 6 mg  6 mg Oral Nightly PRN Enedelia Crow MD   6 mg at 04/17/24 2100    lidocaine (LMX) 4 % cream   Topical PRN Enedelia Crow MD   Given at 04/12/24 1558    oxyCODONE (ROXICODONE) immediate release tablet 5 mg  5 mg Oral Q4H PRN Sylvia Mercer APRAJGDEEP - NP   5 mg at 04/18/24 1738    metoprolol tartrate (LOPRESSOR) tablet 25 mg  25 mg Oral BID Enedelia Crow MD   25 mg at 04/19/24 0843    metoprolol (LOPRESSOR) injection 5 mg  5 mg IntraVENous Q6H PRN Enedelia Crow MD   5 mg at 04/11/24 0316    lactulose (CHRONULAC) 10 GM/15ML solution 20 g  20 g Oral TID Enedelia Crow MD   20 g at 04/19/24 0843    [Held by provider] HYDROcodone-acetaminophen (NORCO) 5-325 MG per tablet 1 tablet  1 tablet Oral Q6H PRN Enedelia Crow MD   1 tablet at 04/08/24 1837    gabapentin (NEURONTIN) capsule 100 mg  100 mg Oral TID Enedelia Crow MD   100 mg at 04/19/24 0843    perflutren lipid microspheres (DEFINITY) injection 1.5 mL  1.5 mL IntraVENous ONCE PRN Enedelia Crow MD        sodium chloride flush 0.9 % injection 10 mL  10 mL IntraVENous PRN Enedelia Crow MD   10 mL at 04/03/24 1703    sodium chloride flush 0.9 % injection 5-40 mL  5-40 mL IntraVENous 2 times per day Enedelia Crow MD   10 mL at 04/15/24 2051    sodium chloride flush 0.9 % injection 5-40 mL  5-40 mL IntraVENous PRN Enedelia Crow MD        0.9 % sodium chloride infusion   IntraVENous PRN Enedelia Crow MD        potassium chloride (KLOR-CON M) extended release tablet 40 mEq  40 mEq Oral PRN Enedelia Crow MD   40 mEq at 04/07/24 0633    Or    potassium bicarb-citric acid (EFFER-K) effervescent tablet 40 mEq  40 mEq Oral PRN Enedelia Crow MD   40 mEq at 04/06/24 1029    Or    potassium chloride 10 mEq/100 mL IVPB (Peripheral Line)  10 mEq IntraVENous PRN Enedelia Crow MD        magnesium  screening using Cologuard test (07/06/2023), Post-menopausal bleeding, RUQ abdominal pain, Tendonitis of shoulder, right (12/09/2021), Tingling, Under care of service provider (12/28/2023), Under care of service provider (12/28/2023), Vaginal bleeding (08/2023), Vaginal candidiasis, and Wears glasses.     Plans:   This patient is a 68 y.o. Non- / non  femalewho presents with back pain  History of type 2 diabetes, with diabetic retinopathy and neuropathy, hypertension,  Patient is poor historian she reports she has been given some pain medication and is unable to relate sequence of events accurately  Per EMR patient has chronic low back pain, she had fall 5 days ago and was seen in ER CT thoracic and lumbar spine nil acute but UA positive for UTI culture showed Klebsiella sensitive to Keflex which is what she was discharged on.  Patient reported subsequent improvement in back pain however reports pain became worse again today    Decompensated Bonner decompensated cirrhosis secondary to Bonner, complicated by ascites.  Diagnostic tap concerning for SBP   Receiving ceftriaxone for SBP-suspected.  Fluid analysis is suggestive of peritonitis, no growth on cultures yet..  ID on board, appreciate help.  Past dose of ceftriaxone will be today   Urinary retention, Kerns replaced 4/16/2024.  Follow-up on UA  s/p EGD, black eschar noted.  Esophageal rings however no obvious masses..  Continue pantoprazole 40 mg p.o. twice daily  Hyponatremia, improving.  Appreciate help by nephrology, currently receiving normal saline  Diabetes mellitus type 2, blood sugars better controlled  Recent UTI, cultures grew Klebsiella pneumonia.  BONNER Liver cirrhosis, currently on rifaximin, lactulose.  Mentation intact  Hematuria, now resolved.  Attempted Kerns removal unsuccessful, replaced 4/16/2024.  Will place on Flomax  Uterine mass, has been following up with outpatient OB/GYN.  Referred back to follow-up with them

## 2024-04-19 NOTE — PLAN OF CARE
Problem: Discharge Planning  Goal: Discharge to home or other facility with appropriate resources  4/19/2024 1727 by Manda Terrazas RN  Outcome: Progressing  Flowsheets  Taken 4/19/2024 1727 by Manda Terrazas RN  Discharge to home or other facility with appropriate resources: Identify barriers to discharge with patient and caregiver  Taken 4/19/2024 0847 by Shaylee Jaffe RN  Discharge to home or other facility with appropriate resources:   Identify barriers to discharge with patient and caregiver   Arrange for needed discharge resources and transportation as appropriate   Identify discharge learning needs (meds, wound care, etc)   Refer to discharge planning if patient needs post-hospital services based on physician order or complex needs related to functional status, cognitive ability or social support system     Problem: Pain  Goal: Verbalizes/displays adequate comfort level or baseline comfort level  4/19/2024 1727 by Manda Terrazas RN  Outcome: Progressing  Flowsheets (Taken 4/19/2024 1727)  Verbalizes/displays adequate comfort level or baseline comfort level: Encourage patient to monitor pain and request assistance     Problem: Skin/Tissue Integrity  Goal: Absence of new skin breakdown  Description: 1.  Monitor for areas of redness and/or skin breakdown  2.  Assess vascular access sites hourly  3.  Every 4-6 hours minimum:  Change oxygen saturation probe site  4.  Every 4-6 hours:  If on nasal continuous positive airway pressure, respiratory therapy assess nares and determine need for appliance change or resting period.  4/19/2024 1727 by Manda Terrazas RN  Outcome: Progressing     Problem: Safety - Adult  Goal: Free from fall injury  4/19/2024 1727 by Manda Terrazas RN  Flowsheets (Taken 4/19/2024 1727)  Free From Fall Injury: Instruct family/caregiver on patient safety     Problem: Chronic Conditions and Co-morbidities  Goal: Patient's chronic conditions and co-morbidity symptoms are monitored and

## 2024-04-19 NOTE — PROGRESS NOTES
[facility]   INPATIENT OCCUPATIONAL THERAPY  PROGRESS NOTE  Date: 2024  Patient Name: Bobbi Phillips       Room:   MRN: [id number]    : 1955  (68 y.o.)  Gender: female   Referring Practitioner: Brad Maciel MD  Diagnosis: Acute kidney injury      Discharge Recommendations:  Further Occupational Therapy is recommended upon facility discharge.    OT Equipment Recommendations  Other: TBD    Restrictions/Precautions  Restrictions/Precautions  Restrictions/Precautions: General Precautions  Implants present? :  (Denied)    O2 Device: None (Room air)    Subjective  Subjective  Subjective: Pt agreeable to OT/PT co-tx with PTA Jackie.  Subjective  Pain: Pt reports 7-8 pain coming from cath site  Comments: Green OT/PT sign present.    Objective  Orientation  Overall Orientation Status: Within Functional Limits  Orientation Level: Oriented X4  Cognition  Overall Cognitive Status: Exceptions  Arousal/Alertness: Appropriate responses to stimuli  Following Commands: Follows multistep commands consistently  Attention Span: Attends with cues to redirect  Memory: Appears intact  Safety Judgement: Good awareness of safety precautions  Problem Solving: Assistance required to generate solutions;Assistance required to implement solutions;Assistance required to correct errors made  Insights: Fully aware of deficits  Initiation: Requires cues for some  Sequencing: Requires cues for some    Activities of Daily Living  ADL  Feeding: Independent, Based on clinical judgement  Grooming: Stand by assistance, Based on clinical judgement  Grooming Skilled Clinical Factors: While seated at EOB, pt completes brushing of hair.  UE Bathing: Moderate assistance, Based on clinical judgement  LE Bathing: Maximum assistance, Based on clinical judgement  UE Dressing: Moderate assistance, Based on clinical judgement  LE Dressing: Maximum assistance, Based on clinical judgement  LE Dressing Skilled  Decreased high-level IADLs, Decreased cognition  Treatment Diagnosis: Impaired self-care status  Prognosis: Good  Decision Making: Medium Complexity  Discharge Recommendations: Patient would benefit from continued therapy after discharge  OT Equipment Recommendations  Other: TBD  Safety Devices  Type of Devices: All fall risk precautions in place, Patient at risk for falls, Call light within reach, Gait belt, Left in chair, Nurse notified    AM-PAC Daily Activities Inpatient  AM-PAC Daily Activity - Inpatient   How much help is needed for putting on and taking off regular lower body clothing?: A Lot  How much help is needed for bathing (which includes washing, rinsing, drying)?: A Lot  How much help is needed for toileting (which includes using toilet, bedpan, or urinal)?: Total  How much help is needed for putting on and taking off regular upper body clothing?: A Lot  How much help is needed for taking care of personal grooming?: A Little  How much help for eating meals?: None  AM-PAC Inpatient Daily Activity Raw Score: 14  AM-PAC Inpatient ADL T-Scale Score : 33.39  ADL Inpatient CMS 0-100% Score: 59.67  ADL Inpatient CMS G-Code Modifier : CK    OT Minutes  OT Individual Minutes  Time In: 0812  Time Out: 0842  Minutes: 30      Electronically signed by LYNNE Jiménez on 4/19/24 at 10:39 AM EDT

## 2024-04-19 NOTE — PROGRESS NOTES
Comprehensive Nutrition Assessment    Type and Reason for Visit:  Reassess    Nutrition Recommendations/Plan:   Will continue 2g Na diet and Magic Cup all trays     Malnutrition Assessment:  Malnutrition Status:  Insufficient data (04/10/24 0609)    Context:  Acute Illness     Findings of the 6 clinical characteristics of malnutrition:  Energy Intake:  Unable to assess  Weight Loss:  No significant weight loss     Body Fat Loss:  No significant body fat loss     Muscle Mass Loss:       Fluid Accumulation:  Mild Generalized   Strength:  Not Performed    Nutrition Assessment:    Pt states all food provided has been tasty and she is consuming her normal amounts.    Nutrition Related Findings:    mild edema to all extremities/generalized, Labs: Na 130, Glu 170, Meds: Lactulose, BM 4/14 Wound Type: Pressure Injury, Unstageable       Current Nutrition Intake & Therapies:    Average Meal Intake: 51-75%     ADULT DIET; Regular; Low Sodium (2 gm)  ADULT ORAL NUTRITION SUPPLEMENT; Breakfast, Lunch, Dinner; Frozen Oral Supplement    Anthropometric Measures:  Height: 149.9 cm (4' 11\")  Ideal Body Weight (IBW): 95 lbs (43 kg)    Admission Body Weight: 88 kg (194 lb)  Current Body Weight: 102.5 kg (226 lb), 204.2 % IBW. Weight Source: Bed Scale  Current BMI (kg/m2): 45.6                          BMI Categories: Obese Class 3 (BMI 40.0 or greater)    Estimated Daily Nutrient Needs:  Energy Requirements Based On: Formula  Weight Used for Energy Requirements: Admission  Energy (kcal/day): 5702-0318 kcals based on 20-21 kcals/kg  Weight Used for Protein Requirements: Ideal  Protein (g/day): 86 gm protein based on 2 gm/kg  Method Used for Fluid Requirements: 1 ml/kcal    Nutrition Diagnosis:   Inadequate oral intake related to altered GI function as evidenced by vomiting    Nutrition Interventions:   Food and/or Nutrient Delivery: Continue Current Diet, Continue Oral Nutrition Supplement  Nutrition Education/Counseling: No  History Of Present Illness  Mine Durham is a 66 y.o. female presenting with colon cancer screening.     Past Medical History  Past Medical History:   Diagnosis Date    Stroke (CMS/HCC)        Surgical History  Past Surgical History:   Procedure Laterality Date    CARPAL TUNNEL RELEASE Bilateral     CHOLECYSTECTOMY      CYST REMOVAL      tumors removed on arm    HYSTERECTOMY          Social History  She reports that she has been smoking cigarettes. She does not have any smokeless tobacco history on file. She reports current alcohol use. No history on file for drug use.    Family History  No family history on file.     Allergies  Tetracycline    Review of Systems   Constitutional: Negative.    HENT: Negative.     Respiratory: Negative.     Cardiovascular: Negative.    Gastrointestinal: Negative.    Neurological: Negative.    Hematological: Negative.         Physical Exam     Last Recorded Vitals  There were no vitals taken for this visit.    Relevant Results             Assessment/Plan   Active Problems:  There are no active Hospital Problems.      Problem List Items Addressed This Visit       Encounter for screening for malignant neoplasm of colon - Primary    Relevant Orders    Colonoscopy Screening    Colonoscopy Screening           I spent  minutes in the professional and overall care of this patient.      Evangelista Ortiz, DO     Angioedema

## 2024-04-20 LAB
ANION GAP SERPL CALCULATED.3IONS-SCNC: 9 MMOL/L (ref 9–17)
ANION GAP SERPL CALCULATED.3IONS-SCNC: 9 MMOL/L (ref 9–17)
BUN SERPL-MCNC: 41 MG/DL (ref 8–23)
BUN SERPL-MCNC: 42 MG/DL (ref 8–23)
CALCIUM SERPL-MCNC: 8.2 MG/DL (ref 8.6–10.4)
CALCIUM SERPL-MCNC: 8.4 MG/DL (ref 8.6–10.4)
CHLORIDE SERPL-SCNC: 102 MMOL/L (ref 98–107)
CHLORIDE SERPL-SCNC: 103 MMOL/L (ref 98–107)
CO2 SERPL-SCNC: 20 MMOL/L (ref 20–31)
CO2 SERPL-SCNC: 21 MMOL/L (ref 20–31)
CREAT SERPL-MCNC: 1.1 MG/DL (ref 0.5–0.9)
CREAT SERPL-MCNC: 1.1 MG/DL (ref 0.5–0.9)
GFR SERPL CREATININE-BSD FRML MDRD: 54 ML/MIN/1.73M2
GFR SERPL CREATININE-BSD FRML MDRD: 54 ML/MIN/1.73M2
GLUCOSE BLD-MCNC: 112 MG/DL (ref 65–105)
GLUCOSE BLD-MCNC: 162 MG/DL (ref 65–105)
GLUCOSE BLD-MCNC: 201 MG/DL (ref 65–105)
GLUCOSE BLD-MCNC: 223 MG/DL (ref 65–105)
GLUCOSE SERPL-MCNC: 147 MG/DL (ref 70–99)
GLUCOSE SERPL-MCNC: 212 MG/DL (ref 70–99)
POTASSIUM SERPL-SCNC: 5.4 MMOL/L (ref 3.7–5.3)
POTASSIUM SERPL-SCNC: 5.5 MMOL/L (ref 3.7–5.3)
POTASSIUM SERPL-SCNC: 5.6 MMOL/L (ref 3.7–5.3)
SODIUM SERPL-SCNC: 131 MMOL/L (ref 135–144)
SODIUM SERPL-SCNC: 133 MMOL/L (ref 135–144)

## 2024-04-20 PROCEDURE — 6370000000 HC RX 637 (ALT 250 FOR IP): Performed by: INTERNAL MEDICINE

## 2024-04-20 PROCEDURE — 6370000000 HC RX 637 (ALT 250 FOR IP): Performed by: NURSE PRACTITIONER

## 2024-04-20 PROCEDURE — 82088 ASSAY OF ALDOSTERONE: CPT

## 2024-04-20 PROCEDURE — 2580000003 HC RX 258: Performed by: INTERNAL MEDICINE

## 2024-04-20 PROCEDURE — 6360000002 HC RX W HCPCS: Performed by: INTERNAL MEDICINE

## 2024-04-20 PROCEDURE — 80048 BASIC METABOLIC PNL TOTAL CA: CPT

## 2024-04-20 PROCEDURE — 99232 SBSQ HOSP IP/OBS MODERATE 35: CPT | Performed by: INTERNAL MEDICINE

## 2024-04-20 PROCEDURE — 1200000000 HC SEMI PRIVATE

## 2024-04-20 PROCEDURE — 84132 ASSAY OF SERUM POTASSIUM: CPT

## 2024-04-20 PROCEDURE — 99232 SBSQ HOSP IP/OBS MODERATE 35: CPT | Performed by: NURSE PRACTITIONER

## 2024-04-20 PROCEDURE — 6370000000 HC RX 637 (ALT 250 FOR IP)

## 2024-04-20 PROCEDURE — 82947 ASSAY GLUCOSE BLOOD QUANT: CPT

## 2024-04-20 PROCEDURE — 84244 ASSAY OF RENIN: CPT

## 2024-04-20 PROCEDURE — 36415 COLL VENOUS BLD VENIPUNCTURE: CPT

## 2024-04-20 RX ORDER — SODIUM POLYSTYRENE SULFONATE 15 G/60ML
15 SUSPENSION ORAL; RECTAL 2 TIMES DAILY
Status: DISCONTINUED | OUTPATIENT
Start: 2024-04-20 | End: 2024-04-23 | Stop reason: HOSPADM

## 2024-04-20 RX ORDER — FUROSEMIDE 10 MG/ML
20 INJECTION INTRAMUSCULAR; INTRAVENOUS ONCE
Status: COMPLETED | OUTPATIENT
Start: 2024-04-20 | End: 2024-04-20

## 2024-04-20 RX ORDER — SODIUM POLYSTYRENE SULFONATE 15 G/60ML
15 SUSPENSION ORAL; RECTAL DAILY
Status: DISCONTINUED | OUTPATIENT
Start: 2024-04-20 | End: 2024-04-20

## 2024-04-20 RX ORDER — LACTULOSE 10 G/15ML
30 SOLUTION ORAL EVERY 6 HOURS SCHEDULED
Status: DISCONTINUED | OUTPATIENT
Start: 2024-04-20 | End: 2024-04-23 | Stop reason: HOSPADM

## 2024-04-20 RX ADMIN — OXYCODONE HYDROCHLORIDE 5 MG: 5 TABLET ORAL at 22:05

## 2024-04-20 RX ADMIN — METOPROLOL TARTRATE 25 MG: 25 TABLET, FILM COATED ORAL at 08:38

## 2024-04-20 RX ADMIN — GABAPENTIN 100 MG: 100 CAPSULE ORAL at 21:41

## 2024-04-20 RX ADMIN — PANTOPRAZOLE SODIUM 40 MG: 40 TABLET, DELAYED RELEASE ORAL at 17:42

## 2024-04-20 RX ADMIN — HEPARIN SODIUM 5000 UNITS: 5000 INJECTION INTRAVENOUS; SUBCUTANEOUS at 21:41

## 2024-04-20 RX ADMIN — SODIUM CHLORIDE, PRESERVATIVE FREE 10 ML: 5 INJECTION INTRAVENOUS at 08:42

## 2024-04-20 RX ADMIN — LACTULOSE 30 G: 20 SOLUTION ORAL at 21:40

## 2024-04-20 RX ADMIN — INSULIN GLARGINE 35 UNITS: 100 INJECTION, SOLUTION SUBCUTANEOUS at 21:41

## 2024-04-20 RX ADMIN — MICONAZOLE NITRATE: 2 CREAM TOPICAL at 21:46

## 2024-04-20 RX ADMIN — INSULIN LISPRO 2 UNITS: 100 INJECTION, SOLUTION INTRAVENOUS; SUBCUTANEOUS at 12:43

## 2024-04-20 RX ADMIN — GABAPENTIN 100 MG: 100 CAPSULE ORAL at 14:48

## 2024-04-20 RX ADMIN — INSULIN GLARGINE 35 UNITS: 100 INJECTION, SOLUTION SUBCUTANEOUS at 08:38

## 2024-04-20 RX ADMIN — LACTULOSE 20 G: 20 SOLUTION ORAL at 08:38

## 2024-04-20 RX ADMIN — GABAPENTIN 100 MG: 100 CAPSULE ORAL at 08:38

## 2024-04-20 RX ADMIN — LACTULOSE 20 G: 20 SOLUTION ORAL at 02:59

## 2024-04-20 RX ADMIN — FUROSEMIDE 20 MG: 10 INJECTION, SOLUTION INTRAMUSCULAR; INTRAVENOUS at 13:07

## 2024-04-20 RX ADMIN — HEPARIN SODIUM 5000 UNITS: 5000 INJECTION INTRAVENOUS; SUBCUTANEOUS at 14:48

## 2024-04-20 RX ADMIN — LACTULOSE 30 G: 20 SOLUTION ORAL at 14:47

## 2024-04-20 RX ADMIN — RIFAXIMIN 550 MG: 550 TABLET ORAL at 08:38

## 2024-04-20 RX ADMIN — SODIUM POLYSTYRENE SULFONATE 15 G: 15 SUSPENSION ORAL; RECTAL at 21:40

## 2024-04-20 RX ADMIN — PANTOPRAZOLE SODIUM 40 MG: 40 TABLET, DELAYED RELEASE ORAL at 05:38

## 2024-04-20 RX ADMIN — INSULIN LISPRO 2 UNITS: 100 INJECTION, SOLUTION INTRAVENOUS; SUBCUTANEOUS at 17:42

## 2024-04-20 RX ADMIN — OXYCODONE HYDROCHLORIDE 5 MG: 5 TABLET ORAL at 14:48

## 2024-04-20 RX ADMIN — OXYCODONE HYDROCHLORIDE 5 MG: 5 TABLET ORAL at 08:34

## 2024-04-20 RX ADMIN — ONDANSETRON 4 MG: 2 INJECTION INTRAMUSCULAR; INTRAVENOUS at 08:34

## 2024-04-20 RX ADMIN — SODIUM CHLORIDE, PRESERVATIVE FREE 10 ML: 5 INJECTION INTRAVENOUS at 21:41

## 2024-04-20 RX ADMIN — HEPARIN SODIUM 5000 UNITS: 5000 INJECTION INTRAVENOUS; SUBCUTANEOUS at 05:38

## 2024-04-20 RX ADMIN — Medication 50 MG: at 08:43

## 2024-04-20 RX ADMIN — TAMSULOSIN HYDROCHLORIDE 0.4 MG: 0.4 CAPSULE ORAL at 08:38

## 2024-04-20 RX ADMIN — SODIUM POLYSTYRENE SULFONATE 15 G: 15 SUSPENSION ORAL; RECTAL at 12:43

## 2024-04-20 RX ADMIN — METOPROLOL TARTRATE 25 MG: 25 TABLET, FILM COATED ORAL at 21:41

## 2024-04-20 RX ADMIN — RIFAXIMIN 550 MG: 550 TABLET ORAL at 21:41

## 2024-04-20 ASSESSMENT — PAIN SCALES - GENERAL
PAINLEVEL_OUTOF10: 4
PAINLEVEL_OUTOF10: 7
PAINLEVEL_OUTOF10: 8
PAINLEVEL_OUTOF10: 4
PAINLEVEL_OUTOF10: 7
PAINLEVEL_OUTOF10: 4

## 2024-04-20 ASSESSMENT — PAIN DESCRIPTION - PAIN TYPE: TYPE: ACUTE PAIN

## 2024-04-20 ASSESSMENT — PAIN DESCRIPTION - DESCRIPTORS
DESCRIPTORS: ACHING;DISCOMFORT
DESCRIPTORS: DISCOMFORT;SHARP
DESCRIPTORS: ACHING
DESCRIPTORS: DISCOMFORT;ACHING
DESCRIPTORS: DISCOMFORT;SHARP

## 2024-04-20 ASSESSMENT — PAIN - FUNCTIONAL ASSESSMENT
PAIN_FUNCTIONAL_ASSESSMENT: ACTIVITIES ARE NOT PREVENTED
PAIN_FUNCTIONAL_ASSESSMENT: PREVENTS OR INTERFERES WITH ALL ACTIVE AND SOME PASSIVE ACTIVITIES
PAIN_FUNCTIONAL_ASSESSMENT: ACTIVITIES ARE NOT PREVENTED

## 2024-04-20 ASSESSMENT — PAIN DESCRIPTION - ORIENTATION: ORIENTATION: UPPER

## 2024-04-20 ASSESSMENT — PAIN DESCRIPTION - LOCATION
LOCATION: ABDOMEN
LOCATION: NECK
LOCATION: GENERALIZED
LOCATION: GENERALIZED
LOCATION: ABDOMEN

## 2024-04-20 ASSESSMENT — PAIN DESCRIPTION - FREQUENCY: FREQUENCY: INTERMITTENT

## 2024-04-20 NOTE — PROGRESS NOTES
(NORCO) 5-325 MG per tablet 1 tablet  1 tablet Oral Q6H PRN Enedelia Crow MD   1 tablet at 04/08/24 1837    gabapentin (NEURONTIN) capsule 100 mg  100 mg Oral TID Enedelia Crow MD   100 mg at 04/20/24 0838    perflutren lipid microspheres (DEFINITY) injection 1.5 mL  1.5 mL IntraVENous ONCE PRN Enedelia Crow MD        sodium chloride flush 0.9 % injection 10 mL  10 mL IntraVENous PRN Enedelia Crow MD   10 mL at 04/03/24 1703    sodium chloride flush 0.9 % injection 5-40 mL  5-40 mL IntraVENous 2 times per day Enedelia Crow MD   10 mL at 04/20/24 0842    sodium chloride flush 0.9 % injection 5-40 mL  5-40 mL IntraVENous PRN Enedelia Crow MD        0.9 % sodium chloride infusion   IntraVENous PRN Enedelia Crow MD        potassium chloride (KLOR-CON M) extended release tablet 40 mEq  40 mEq Oral PRN Enedelia Crow MD   40 mEq at 04/07/24 0633    Or    potassium bicarb-citric acid (EFFER-K) effervescent tablet 40 mEq  40 mEq Oral PRN Enedelia Crow MD   40 mEq at 04/06/24 1029    Or    potassium chloride 10 mEq/100 mL IVPB (Peripheral Line)  10 mEq IntraVENous PRN Enedelia Crow MD        magnesium sulfate 2000 mg in water 50 mL IVPB  2,000 mg IntraVENous PRN Enedelia Crow MD        ondansetron (ZOFRAN-ODT) disintegrating tablet 4 mg  4 mg Oral Q8H PRN Enedelia Crow MD        Or    ondansetron (ZOFRAN) injection 4 mg  4 mg IntraVENous Q6H PRN Enedelia Crow MD   4 mg at 04/20/24 0834    polyethylene glycol (GLYCOLAX) packet 17 g  17 g Oral Daily PRN Enedelia Crow MD   17 g at 04/19/24 1832    [Held by provider] atorvastatin (LIPITOR) tablet 10 mg  10 mg Oral Daily Courtney Carrillo MD   10 mg at 04/08/24 1030    insulin lispro (HUMALOG) injection vial 0-8 Units  0-8 Units SubCUTAneous TID WC Enedelia Crow MD   2 Units at 04/18/24 1738    insulin lispro (HUMALOG) injection vial 0-4 Units  0-4 Units SubCUTAneous Nightly Enedelia Crow MD   4 Units at 04/14/24 2204    glucose chewable tablet 16 g  4 tablet Oral PRN Enedelia Crow,  which is what she was discharged on.  Patient reported subsequent improvement in back pain however reports pain became worse again today    Decompensated Bonner decompensated cirrhosis secondary to Bonner, complicated by ascites.  Diagnostic tap concerning for SBP   Receiving ceftriaxone for SBP-suspected.  Fluid analysis is suggestive of peritonitis, no growth on cultures yet..  ID on board, appreciate help.  Past dose of ceftriaxone will be today   Urinary retention, Kerns replaced 4/16/2024.  Follow-up on UA  s/p EGD, black eschar noted.  Esophageal rings however no obvious masses..  Continue pantoprazole 40 mg p.o. twice daily  Hyponatremia, improving.  Appreciate help by nephrology, currently receiving normal saline  Diabetes mellitus type 2, blood sugars better controlled  Recent UTI, cultures grew Klebsiella pneumonia.  BONNER Liver cirrhosis, currently on rifaximin, lactulose.  Mentation intact  Hematuria, now resolved.  Attempted Kerns removal unsuccessful, replaced 4/16/2024.  Will place on Flomax  Uterine mass, has been following up with outpatient OB/GYN.  Referred back to follow-up with them postdischarge    April 20    68-year-old lady with a history of nonalcoholic steatohepatitis leading to cirrhosis recent CT scan confirms cirrhosis but her bilirubin is 1.2 INR is 1.2 chronic thrombocytopenia she is admitted for feeling unwell suspected SBP cultures were negative UTI noted Klebsiella she had treated antibiotic antibiotic course completed on April 16 she has been on lactulose last 24 to 48 hours she has no bowel movement KUB was done yesterday did not show any obstruction lactulose dose was increased yesterday will further increase today abdomen examination is benign  Kerns in place failed trial without a catheter given underlying uterine mass for outpatient OB/GYN follow-up  Will attempt soapsuds enema rule out stool impaction and causing obstruction  Will also increase the lactulose to 30 g  Trial

## 2024-04-20 NOTE — PLAN OF CARE
Problem: Discharge Planning  Goal: Discharge to home or other facility with appropriate resources  4/20/2024 0322 by Danisha Jauregui RN  Discharge to home or other facility with appropriate resources:   Identify barriers to discharge with patient and caregiver   Arrange for needed discharge resources and transportation as appropriate   Identify discharge learning needs (meds, wound care, etc)   Refer to discharge planning if patient needs post-hospital services based on physician order or complex needs related to functional status, cognitive ability or social support system     Problem: Pain  Goal: Verbalizes/displays adequate comfort level or baseline comfort level  4/20/2024 0322 by Danisha Jauregui RN  Outcome: Progressing  Verbalizes/displays adequate comfort level or baseline comfort level: Encourage patient to monitor pain and request assistance     Problem: Safety - Adult  Goal: Free from fall injury  4/20/2024 0322 by Danisha Jauregui RN  Outcome: Progressing  Free From Fall Injury: Instruct family/caregiver on patient safety    Problem: Chronic Conditions and Co-morbidities  Goal: Patient's chronic conditions and co-morbidity symptoms are monitored and maintained or improved  4/20/2024 0322 by Danisha Jauregui RN  Outcome: Progressing   Monitor and assess patient's chronic conditions and comorbid symptoms for stability, deterioration, or improvement   Collaborate with multidisciplinary team to address chronic and comorbid conditions and prevent exacerbation or deterioration   Update acute care plan with appropriate goals if chronic or comorbid symptoms are exacerbated and prevent overall improvement and discharge    Problem: ABCDS Injury Assessment  Goal: Absence of physical injury  4/20/2024 0322 by Danisha Jauregui RN  Outcome: Progressing  Absence of Physical Injury: Implement safety measures based on patient assessment    Problem: Neurosensory - Adult  Goal: Achieves stable or improved neurological  status  4/20/2024 0322 by Danisha Jauregui RN  Outcome: Progressing  4/19/2024 1727 by Manda Terrazas RN  Outcome: Progressing  Flowsheets  Taken 4/19/2024 1727 by Manda Terrazas RN  Achieves stable or improved neurological status: Assess for and report changes in neurological status  Taken 4/19/2024 0847 by Shaylee Jaffe RN  Achieves stable or improved neurological status: Assess for and report changes in neurological status  Goal: Achieves maximal functionality and self care  4/19/2024 1727 by Manda Terrazas RN  Outcome: Progressing  Achieves maximal functionality and self care: Monitor swallowing and airway patency with patient fatigue and changes in neurological status    Problem: Neurosensory - Adult  Goal: Achieves stable or improved neurological status  4/20/2024 0322 by Danisha Jauregui RN  Outcome: Progressing  Assess for and report changes in neurological status  Goal: Achieves maximal functionality and self care  4/19/2024 1727 by Manda Terrazas RN  Outcome: Progressing   Monitor swallowing and airway patency with patient fatigue and changes in neurological status     Problem: Nutrition Deficit:  Goal: Optimize nutritional status  4/20/2024 0322 by Danisha Jauregui RN  Outcome: Progressing  Maintaining nutritional needs: Monitor oral intake, labs, and treatment plans

## 2024-04-20 NOTE — PROGRESS NOTES
Patient had a very large formed soft bowel movement. Patient was agreeable to Kaexylate and lactulose, but is refusing enema at this time.

## 2024-04-20 NOTE — PROGRESS NOTES
NEPHROLOGY PROGRESS NOTE    Patient :  Bobbi Phillips; 68 y.o. MRN# [id number]  Location:  2040/2040-01  Attending:  Courtney Carrillo MD  Admit Date:  4/3/2024   Hospital Day: 17    Reason for consultation: Management of acute kidney injury.    Consulting physician: Courtney Carrillo MD.    Chief Complaint: Back pain and gross hematuria     Interval history: Patient was seen and examined today and she still has indwelling Nichols but does not have gross hematuria.  She is currently afebrile.  She is nonoliguric.  K 5.6  Scr 1.1 mg/dl    History of Present Illness: This is a 68 y.o. 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   Patient was seen in the ED approximately 1 week ago for fall. Patient was found to have UTI at that time and discharged on Keflex.   Patient denies N/V/diarrhea, fever chills.  C/o dysuria, flank pain and frequency.  Patient started on IV Abx.  Scr on admission 1.3 mg/dl, scr peaked to 1.9 mg/dl and therefor Nephrology consultation requested.  Bladder scan positive for urinary retention, nichols catheter was placed.  Scr improved to 1.3 mg/dl this morning.  Patient noted to be lethargic and decrease in responsiveness this morning and was transferred to ICU for AMS which was suspected to be due to use Narcotics.  Patient takes NSAIDs   There had exposure to IV contrast on 4/3/24. There is no history  of paraprotein disease. Pt denies any history of recurrent UTI or kidney stones.  Medication review shows use of ACE-inhibitor/diuretics.    Current Medications:    lactulose (CHRONULAC) 10 GM/15ML solution 30 g, 4 times per day  sodium polystyrene (KAYEXALATE) 15 GM/60ML suspension 15 g, Daily  insulin glargine (LANTUS) injection vial 35 Units, BID  rifAXIMin (XIFAXAN) tablet 550 mg, BID  promethazine (PHENERGAN) tablet 12.5 mg, Q6H PRN  tamsulosin (FLOMAX) capsule 0.4 mg, Daily  0.9 % sodium chloride infusion, Continuous  heparin (porcine)  adequately controlled at this time.    4. DM type 2 iddm    4.  Urinary retention - will defer Kerns catheter management to urology.    5.  Hyperkalemia [serum potassium 5.6 mmol/L]. 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  Hold lisinopril.  Check renin Hardeep, cortisol  Will start FLorinef once above test is ordered.    Plan  Kayexalate 15 grams bid  Hold Lisinopril  BMP 6 pm  Lasix 20 mg x 1 dose to increase K renal excretion      Prognosis is guarded.    Electronically signed by Bryson Cordova MD on 4/20/2024 at 12:33 PM

## 2024-04-20 NOTE — PLAN OF CARE
Problem: Pain  Goal: Verbalizes/displays adequate comfort level or baseline comfort level  4/20/2024 1036 by Eloina Lucero, RN  Outcome: Progressing  Patient medicated for pain as ordered. Repositioned for comfort. Education on non pharmacological comfort measures as well.  Problem: Safety - Adult  Goal: Free from fall injury  4/20/2024 1036 by Eloina Lucero, RN  Outcome: Progressing  Pt fall risk, fall band present, falling star, safety alarm activated and in use as needed. Hourly rounding performed. Pt encouraged to use call light. See Zohaib fall risk assessment.   Problem: Chronic Conditions and Co-morbidities  Goal: Patient's chronic conditions and co-morbidity symptoms are monitored and maintained or improved  4/20/2024 1036 by Eloina Lucero, RN  Outcome: Progressing    Problem: Skin/Tissue Integrity  Goal: Absence of new skin breakdown  Description: 1.  Monitor for areas of redness and/or skin breakdown  2.  Assess vascular access sites hourly  3.  Every 4-6 hours minimum:  Change oxygen saturation probe site  4.  Every 4-6 hours:  If on nasal continuous positive airway pressure, respiratory therapy assess nares and determine need for appliance change or resting period.  4/20/2024 1036 by Eloina Lucero, RN  Outcome: Progressing

## 2024-04-20 NOTE — PROGRESS NOTES
takes less than 2 seconds.      Findings: No bruising or erythema.   Neurological:      Mental Status: She is alert and oriented to person, place, and time.   Psychiatric:         Mood and Affect: Mood normal.         Behavior: Behavior normal.         Past Medical History:     Past Medical History:   Diagnosis Date    Abdominal swelling     Claustrophobia     COVID-19 vaccine administered     Diabetes (HCC)     DEXCOM LT ARM    Diabetic retinopathy (HCC) 2015    Flatus     H/O acute sinusitis     H/O cholelithiasis     Hypertension     Liver cirrhosis (HCC)     LLQ abdominal tenderness     Poor venous access     \"THEY LIKE TO ROLL & RUN AWAY\"    Positive colorectal cancer screening using Cologuard test 2023    had colonoscopy after this and no cancer was found    Post-menopausal bleeding     RUQ abdominal pain     Tendonitis of shoulder, right 2021    Tingling     Under care of service provider 2023    [place]ly[place]zx[place] in [place]-last visit dec 2023    Under care of service provider 2023    lgnklb-dwdxjyzu-vgtpes [facility] in [place]-last visit dec 2023    Vaginal bleeding 2023    \"SPOTTING, THICKENED UTERINE LINING\"DUE TO HAVE VAGINAL /UTERINE BIOPSY FOR\"    Vaginal candidiasis     Wears glasses     READING       Past Surgical  History:     Past Surgical History:   Procedure Laterality Date    ANKLE FRACTURE SURGERY Left 2006    no retained metal    CARDIAC CATHETERIZATION  2006    CATARACT REMOVAL WITH IMPLANT Bilateral 2018     SECTION      x2, 1988,  \"SADDLE BLOCK\"    CHOLECYSTECTOMY, LAPAROSCOPIC      COLONOSCOPY N/A 2023    COLONOSCOPY WITH BIOPSY performed by Carlos Campos MD at [facility] OR    DILATION AND CURETTAGE OF UTERUS N/A 10/02/2023    ENDOMETRIAL BIOPSY AND EXAM UNDER ANESTHESIA  Failed D&C Hysteroscopy performed by Saud Hardy DO at [facility] OR    DILATION AND CURETTAGE OF UTERUS N/A 2024    EUA, DIAGNOSTIC HYSTEROSCOPY performed by  including those of syntax and sound a like substitutions which may escape proof reading.  It such instances, actual meaningcan be extrapolated by contextual diversion.    Romy Escobedo, JOE - CNP  Office: [phone]  Perfect serve / office [phone]

## 2024-04-21 LAB
ANION GAP SERPL CALCULATED.3IONS-SCNC: 8 MMOL/L (ref 9–17)
BUN SERPL-MCNC: 41 MG/DL (ref 8–23)
CALCIUM SERPL-MCNC: 8.6 MG/DL (ref 8.6–10.4)
CHLORIDE SERPL-SCNC: 102 MMOL/L (ref 98–107)
CO2 SERPL-SCNC: 23 MMOL/L (ref 20–31)
CREAT SERPL-MCNC: 1.2 MG/DL (ref 0.5–0.9)
GFR SERPL CREATININE-BSD FRML MDRD: 49 ML/MIN/1.73M2
GLUCOSE BLD-MCNC: 155 MG/DL (ref 65–105)
GLUCOSE BLD-MCNC: 172 MG/DL (ref 65–105)
GLUCOSE BLD-MCNC: 179 MG/DL (ref 65–105)
GLUCOSE BLD-MCNC: 190 MG/DL (ref 65–105)
GLUCOSE SERPL-MCNC: 166 MG/DL (ref 70–99)
POTASSIUM SERPL-SCNC: 5.3 MMOL/L (ref 3.7–5.3)
SODIUM SERPL-SCNC: 133 MMOL/L (ref 135–144)

## 2024-04-21 PROCEDURE — 99232 SBSQ HOSP IP/OBS MODERATE 35: CPT | Performed by: NURSE PRACTITIONER

## 2024-04-21 PROCEDURE — 36415 COLL VENOUS BLD VENIPUNCTURE: CPT

## 2024-04-21 PROCEDURE — 6360000002 HC RX W HCPCS: Performed by: INTERNAL MEDICINE

## 2024-04-21 PROCEDURE — 82947 ASSAY GLUCOSE BLOOD QUANT: CPT

## 2024-04-21 PROCEDURE — 6370000000 HC RX 637 (ALT 250 FOR IP): Performed by: INTERNAL MEDICINE

## 2024-04-21 PROCEDURE — 6370000000 HC RX 637 (ALT 250 FOR IP): Performed by: NURSE PRACTITIONER

## 2024-04-21 PROCEDURE — 80048 BASIC METABOLIC PNL TOTAL CA: CPT

## 2024-04-21 PROCEDURE — 2580000003 HC RX 258: Performed by: INTERNAL MEDICINE

## 2024-04-21 PROCEDURE — 1200000000 HC SEMI PRIVATE

## 2024-04-21 PROCEDURE — 6370000000 HC RX 637 (ALT 250 FOR IP)

## 2024-04-21 PROCEDURE — 51798 US URINE CAPACITY MEASURE: CPT

## 2024-04-21 PROCEDURE — 99233 SBSQ HOSP IP/OBS HIGH 50: CPT | Performed by: INTERNAL MEDICINE

## 2024-04-21 RX ADMIN — SODIUM POLYSTYRENE SULFONATE 15 G: 15 SUSPENSION ORAL; RECTAL at 08:12

## 2024-04-21 RX ADMIN — INSULIN GLARGINE 35 UNITS: 100 INJECTION, SOLUTION SUBCUTANEOUS at 20:05

## 2024-04-21 RX ADMIN — MICONAZOLE NITRATE: 2 CREAM TOPICAL at 08:12

## 2024-04-21 RX ADMIN — METOPROLOL TARTRATE 25 MG: 25 TABLET, FILM COATED ORAL at 20:02

## 2024-04-21 RX ADMIN — Medication 50 MG: at 08:12

## 2024-04-21 RX ADMIN — RIFAXIMIN 550 MG: 550 TABLET ORAL at 08:12

## 2024-04-21 RX ADMIN — PANTOPRAZOLE SODIUM 40 MG: 40 TABLET, DELAYED RELEASE ORAL at 06:49

## 2024-04-21 RX ADMIN — LACTULOSE 30 G: 20 SOLUTION ORAL at 02:56

## 2024-04-21 RX ADMIN — LACTULOSE 30 G: 20 SOLUTION ORAL at 16:10

## 2024-04-21 RX ADMIN — OXYCODONE HYDROCHLORIDE 5 MG: 5 TABLET ORAL at 04:01

## 2024-04-21 RX ADMIN — LACTULOSE 30 G: 20 SOLUTION ORAL at 08:12

## 2024-04-21 RX ADMIN — OXYCODONE HYDROCHLORIDE 5 MG: 5 TABLET ORAL at 08:18

## 2024-04-21 RX ADMIN — OXYCODONE HYDROCHLORIDE 5 MG: 5 TABLET ORAL at 16:18

## 2024-04-21 RX ADMIN — PANTOPRAZOLE SODIUM 40 MG: 40 TABLET, DELAYED RELEASE ORAL at 16:18

## 2024-04-21 RX ADMIN — HEPARIN SODIUM 5000 UNITS: 5000 INJECTION INTRAVENOUS; SUBCUTANEOUS at 21:51

## 2024-04-21 RX ADMIN — GABAPENTIN 100 MG: 100 CAPSULE ORAL at 08:12

## 2024-04-21 RX ADMIN — HEPARIN SODIUM 5000 UNITS: 5000 INJECTION INTRAVENOUS; SUBCUTANEOUS at 06:49

## 2024-04-21 RX ADMIN — GABAPENTIN 100 MG: 100 CAPSULE ORAL at 20:02

## 2024-04-21 RX ADMIN — MICONAZOLE NITRATE: 2 CREAM TOPICAL at 20:04

## 2024-04-21 RX ADMIN — GABAPENTIN 100 MG: 100 CAPSULE ORAL at 16:10

## 2024-04-21 RX ADMIN — HEPARIN SODIUM 5000 UNITS: 5000 INJECTION INTRAVENOUS; SUBCUTANEOUS at 16:10

## 2024-04-21 RX ADMIN — SODIUM CHLORIDE, PRESERVATIVE FREE 10 ML: 5 INJECTION INTRAVENOUS at 20:04

## 2024-04-21 RX ADMIN — INSULIN GLARGINE 35 UNITS: 100 INJECTION, SOLUTION SUBCUTANEOUS at 08:13

## 2024-04-21 RX ADMIN — METOPROLOL TARTRATE 25 MG: 25 TABLET, FILM COATED ORAL at 08:12

## 2024-04-21 RX ADMIN — RIFAXIMIN 550 MG: 550 TABLET ORAL at 20:02

## 2024-04-21 RX ADMIN — SODIUM CHLORIDE, PRESERVATIVE FREE 10 ML: 5 INJECTION INTRAVENOUS at 08:15

## 2024-04-21 RX ADMIN — TAMSULOSIN HYDROCHLORIDE 0.4 MG: 0.4 CAPSULE ORAL at 08:12

## 2024-04-21 ASSESSMENT — PAIN DESCRIPTION - DESCRIPTORS
DESCRIPTORS: DISCOMFORT;ACHING
DESCRIPTORS: ACHING;DISCOMFORT
DESCRIPTORS: ACHING
DESCRIPTORS: ACHING;DISCOMFORT
DESCRIPTORS: ACHING;DISCOMFORT

## 2024-04-21 ASSESSMENT — PAIN SCALES - GENERAL
PAINLEVEL_OUTOF10: 8
PAINLEVEL_OUTOF10: 0
PAINLEVEL_OUTOF10: 0
PAINLEVEL_OUTOF10: 6
PAINLEVEL_OUTOF10: 4
PAINLEVEL_OUTOF10: 7
PAINLEVEL_OUTOF10: 5
PAINLEVEL_OUTOF10: 0
PAINLEVEL_OUTOF10: 0
PAINLEVEL_OUTOF10: 8

## 2024-04-21 ASSESSMENT — PAIN - FUNCTIONAL ASSESSMENT

## 2024-04-21 ASSESSMENT — PAIN DESCRIPTION - PAIN TYPE: TYPE: ACUTE PAIN

## 2024-04-21 ASSESSMENT — PAIN DESCRIPTION - LOCATION
LOCATION: NECK;HIP
LOCATION: SHOULDER;NECK;GENERALIZED
LOCATION: ABDOMEN;BACK
LOCATION: SHOULDER;NECK;GENERALIZED
LOCATION: ABDOMEN;BACK

## 2024-04-21 ASSESSMENT — PAIN DESCRIPTION - ORIENTATION
ORIENTATION: RIGHT
ORIENTATION: RIGHT;LEFT
ORIENTATION: RIGHT;LEFT
ORIENTATION: LEFT;RIGHT

## 2024-04-21 NOTE — CARE COORDINATION
ONGOING DISCHARGE  NOTE:      Writer reviewed notes, Patient is agreeable to discharge to [facility]  [address] [place] [address]  P: [phone]  F: [phone]      Spoke to Janie from [facility]     Instead of a P2P they will submit for a whole new auth when pt is medically ready    I asked for them to start the pre cert in the am asap     Will continue to follow for additional discharge needs.     If patient is discharged prior to next notation, then this note serves as note for discharge by case management.    Electronically signed by Saloni Arciniega RN on 4/21/2024 at 1:12 PM

## 2024-04-21 NOTE — PLAN OF CARE
Problem: Safety - Adult  Goal: Free from fall injury  Outcome: Progressing   Fall risk assessment completed. Patient instructed to use call light. Bed locked and in lowest position, side rails up 2/4, call light and bedside table within reach, clutter removed, and non-skid footwear on when pt out of bed. Hourly rounds will continue.       Problem: Pain  Goal: Verbalizes/displays adequate comfort level or baseline comfort level  Problem: Chronic Conditions and Co-morbidities  Goal: Patient's chronic conditions and co-morbidity symptoms are monitored and maintained or improved  Outcome: Progressing

## 2024-04-21 NOTE — PROGRESS NOTES
[facility] Internal Medicine  Nikolas Tan MD; Ray Hwang MD; Brad Maciel MD; MD Courtney Lr MD; Germania Osborne MD; Marlin Gutierrez MD      Progress note            Date:   4/21/2024  Patient name:  Bobbi Phillips  MRN:   [id number]  Account:  [id number]  YOB: 1955  PCP:    Stacie Doty MD  Code Status:    Full Code    Chief Complaint:     Chief Complaint   Patient presents with    Back Pain         History Obtained From:     Patient, EMR, nursing staff    HPI     This patient is a 68 y.o. Non- / non  femalewho presents with back pain  History of type 2 diabetes, with diabetic retinopathy and neuropathy, hypertension,  Patient is poor historian she reports she has been given some pain medication and is unable to relate sequence of events accurately  Per EMR patient has chronic low back pain, she had fall 5 days ago and was seen in ER CT thoracic and lumbar spine nil acute but UA positive for UTI culture showed Klebsiella sensitive to Keflex which is what she was discharged on.  Patient reported subsequent improvement in back pain however reports pain became worse again today  Moderate intensity progressive persistent no aggravating leaving factors noted  Denies any associated fevers diarrhea or vomiting    ER evaluation showed UA suggestive of persistent UTI, severe hyperglycemia, KIM  4/16/2024  Doing well, no concerns, sitting up in recliner.  Kerns had to be reinserted yesterday due to urinary retention.  Review of Systems:     A 10 point review of systems was performed and and negative except as mentioned in HPI  Positive and Negative as described in HPI.    Past Medical History:     Past Medical History:   Diagnosis Date    Abdominal swelling     Claustrophobia     COVID-19 vaccine administered     Diabetes (HCC)     DEXCOM LT ARM    Diabetic retinopathy (HCC) 11/28/2015    Flatus     H/O acute sinusitis     H/O cholelithiasis

## 2024-04-21 NOTE — PROGRESS NOTES
NEPHROLOGY PROGRESS NOTE    Patient :  Bobbi Phillips; 68 y.o. MRN# [id number]  Location:  2040/2040-01  Attending:  Courtney Carrillo MD  Admit Date:  4/3/2024   Hospital Day: 18    Reason for consultation: Management of acute kidney injury.    Consulting physician: Courtney Carrillo MD.    Chief Complaint: Back pain and gross hematuria     Interval history:   Patient was seen and examined today   Patient failed void trial ,Nichols catheter placed again    She is currently afebrile.  She is nonoliguric.  K 5.5-->5.3  Scr 1.2 mg/dl  Urine output 500 mL yesterday and 24-hour  Patient received Kayexalate    History of Present Illness: This is a 68 y.o. 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   Patient was seen in the ED approximately 1 week ago for fall. Patient was found to have UTI at that time and discharged on Keflex.   Patient denies N/V/diarrhea, fever chills.  C/o dysuria, flank pain and frequency.  Patient started on IV Abx.  Scr on admission 1.3 mg/dl, scr peaked to 1.9 mg/dl and therefor Nephrology consultation requested.  Bladder scan positive for urinary retention, nichols catheter was placed.  Scr improved to 1.3 mg/dl this morning.  Patient noted to be lethargic and decrease in responsiveness this morning and was transferred to ICU for AMS which was suspected to be due to use Narcotics.  Patient takes NSAIDs   There had exposure to IV contrast on 4/3/24. There is no history  of paraprotein disease. Pt denies any history of recurrent UTI or kidney stones.  Medication review shows use of ACE-inhibitor/diuretics.    Current Medications:    lactulose (CHRONULAC) 10 GM/15ML solution 30 g, 4 times per day  sodium polystyrene (KAYEXALATE) 15 GM/60ML suspension 15 g, BID  insulin glargine (LANTUS) injection vial 35 Units, BID  rifAXIMin (XIFAXAN) tablet 550 mg, BID  promethazine (PHENERGAN) tablet 12.5 mg, Q6H PRN  tamsulosin (FLOMAX) capsule 0.4 mg,  Daily  0.9 % sodium chloride infusion, Continuous  heparin (porcine) injection 5,000 Units, 3 times per day  pantoprazole (PROTONIX) tablet 40 mg, BID AC  zinc sulfate (ZINCATE) 220 mg capsule - elemental zinc 50 mg, Daily  miconazole (MICOTIN) 2 % cream, BID  melatonin tablet 6 mg, Nightly PRN  lidocaine (LMX) 4 % cream, PRN  oxyCODONE (ROXICODONE) immediate release tablet 5 mg, Q4H PRN  metoprolol tartrate (LOPRESSOR) tablet 25 mg, BID  metoprolol (LOPRESSOR) injection 5 mg, Q6H PRN  [Held by provider] HYDROcodone-acetaminophen (NORCO) 5-325 MG per tablet 1 tablet, Q6H PRN  gabapentin (NEURONTIN) capsule 100 mg, TID  perflutren lipid microspheres (DEFINITY) injection 1.5 mL, ONCE PRN  sodium chloride flush 0.9 % injection 10 mL, PRN  sodium chloride flush 0.9 % injection 5-40 mL, 2 times per day  sodium chloride flush 0.9 % injection 5-40 mL, PRN  0.9 % sodium chloride infusion, PRN  potassium chloride (KLOR-CON M) extended release tablet 40 mEq, PRN   Or  potassium bicarb-citric acid (EFFER-K) effervescent tablet 40 mEq, PRN   Or  potassium chloride 10 mEq/100 mL IVPB (Peripheral Line), PRN  magnesium sulfate 2000 mg in water 50 mL IVPB, PRN  ondansetron (ZOFRAN-ODT) disintegrating tablet 4 mg, Q8H PRN   Or  ondansetron (ZOFRAN) injection 4 mg, Q6H PRN  polyethylene glycol (GLYCOLAX) packet 17 g, Daily PRN  [Held by provider] atorvastatin (LIPITOR) tablet 10 mg, Daily  insulin lispro (HUMALOG) injection vial 0-8 Units, TID WC  insulin lispro (HUMALOG) injection vial 0-4 Units, Nightly  glucose chewable tablet 16 g, PRN  dextrose bolus 10% 125 mL, PRN   Or  dextrose bolus 10% 250 mL, PRN  glucagon injection 1 mg, PRN  dextrose 10 % infusion, Continuous PRN  hydrALAZINE (APRESOLINE) injection 10 mg, Q6H PRN      Objective:  CURRENT TEMPERATURE:  Temp: 97.9 °F (36.6 °C)  MAXIMUM TEMPERATURE OVER 24HRS:  Temp (24hrs), Av.6 °F (37 °C), Min:97.9 °F (36.6 °C), Max:99.3 °F (37.4 °C)    CURRENT RESPIRATORY RATE:

## 2024-04-21 NOTE — PROGRESS NOTES
Physical Activity: Insufficiently Active (6/26/2023)    Exercise Vital Sign     Days of Exercise per Week: 2 days     Minutes of Exercise per Session: 30 min   Stress: Not on file   Social Connections: Not on file   Intimate Partner Violence: Not on file   Housing Stability: Unknown (6/26/2023)    Housing Stability Vital Sign     Unable to Pay for Housing in the Last Year: Not on file     Number of Places Lived in the Last Year: Not on file     Unstable Housing in the Last Year: No       Family History:     Family History   Problem Relation Age of Onset    Breast Cancer Mother 42    Diabetes Mother     Stroke Mother     Heart Disease Father     Cancer Father         prostate    Cancer Sister         cervical    Breast Cancer Sister 71    Diabetes Other         multiple relatives on Mom's side      Medical Decision Making:   I have independently reviewed/ordered the following labs:    CBC with Differential:   Recent Labs     04/19/24  0532   WBC 5.8   HGB 8.2*   HCT 25.9*   *   LYMPHOPCT 15*   MONOPCT 10*       BMP:  Recent Labs     04/20/24  1828 04/20/24  2153 04/21/24  0617   *  --  133*   K 5.4* 5.5* 5.3     --  102   CO2 20  --  23   BUN 42*  --  41*   CREATININE 1.1*  --  1.2*       Hepatic Function Panel:   No results for input(s): \"PROT\", \"LABALBU\", \"BILIDIR\", \"IBILI\", \"BILITOT\", \"ALKPHOS\", \"ALT\", \"AST\" in the last 72 hours.    No results for input(s): \"RPR\" in the last 72 hours.  No results for input(s): \"HIV\" in the last 72 hours.  No results for input(s): \"BC\" in the last 72 hours.  Lab Results   Component Value Date/Time    CREATININE 1.2 04/21/2024 06:17 AM    GLUCOSE 166 04/21/2024 06:17 AM    GLUCOSE 393 06/01/2012 10:18 AM       Detailed results:        Thank you for allowing us to participate in the care of this patient.Please call with questions.    This note is created with the assistance of a speech recognition program.  While intending to generate adocument that actually

## 2024-04-22 LAB
ANION GAP SERPL CALCULATED.3IONS-SCNC: 9 MMOL/L (ref 9–17)
BASOPHILS # BLD: 0 K/UL (ref 0–0.2)
BASOPHILS NFR BLD: 1 % (ref 0–2)
BUN SERPL-MCNC: 36 MG/DL (ref 8–23)
CALCIUM SERPL-MCNC: 8.1 MG/DL (ref 8.6–10.4)
CHLORIDE SERPL-SCNC: 100 MMOL/L (ref 98–107)
CO2 SERPL-SCNC: 23 MMOL/L (ref 20–31)
CREAT SERPL-MCNC: 0.8 MG/DL (ref 0.5–0.9)
EOSINOPHIL # BLD: 0.2 K/UL (ref 0–0.4)
EOSINOPHILS RELATIVE PERCENT: 4 % (ref 0–4)
ERYTHROCYTE [DISTWIDTH] IN BLOOD BY AUTOMATED COUNT: 19 % (ref 11.5–14.9)
GFR SERPL CREATININE-BSD FRML MDRD: 80 ML/MIN/1.73M2
GLUCOSE BLD-MCNC: 105 MG/DL (ref 65–105)
GLUCOSE BLD-MCNC: 218 MG/DL (ref 65–105)
GLUCOSE BLD-MCNC: 232 MG/DL (ref 65–105)
GLUCOSE BLD-MCNC: 241 MG/DL (ref 65–105)
GLUCOSE BLD-MCNC: 70 MG/DL (ref 65–105)
GLUCOSE SERPL-MCNC: 170 MG/DL (ref 70–99)
HCT VFR BLD AUTO: 28.4 % (ref 36–46)
HGB BLD-MCNC: 9.2 G/DL (ref 12–16)
LYMPHOCYTES NFR BLD: 0.7 K/UL (ref 1–4.8)
LYMPHOCYTES RELATIVE PERCENT: 10 % (ref 24–44)
MCH RBC QN AUTO: 28.2 PG (ref 26–34)
MCHC RBC AUTO-ENTMCNC: 32.3 G/DL (ref 31–37)
MCV RBC AUTO: 87.3 FL (ref 80–100)
MONOCYTES NFR BLD: 0.5 K/UL (ref 0.1–1.3)
MONOCYTES NFR BLD: 7 % (ref 1–7)
NEUTROPHILS NFR BLD: 78 % (ref 36–66)
NEUTS SEG NFR BLD: 4.8 K/UL (ref 1.3–9.1)
PLATELET # BLD AUTO: 133 K/UL (ref 150–450)
PMV BLD AUTO: 8.2 FL (ref 6–12)
POTASSIUM SERPL-SCNC: 5 MMOL/L (ref 3.7–5.3)
RBC # BLD AUTO: 3.25 M/UL (ref 4–5.2)
SODIUM SERPL-SCNC: 132 MMOL/L (ref 135–144)
WBC OTHER # BLD: 6.3 K/UL (ref 3.5–11)

## 2024-04-22 PROCEDURE — 6370000000 HC RX 637 (ALT 250 FOR IP): Performed by: INTERNAL MEDICINE

## 2024-04-22 PROCEDURE — 6370000000 HC RX 637 (ALT 250 FOR IP): Performed by: NURSE PRACTITIONER

## 2024-04-22 PROCEDURE — 99213 OFFICE O/P EST LOW 20 MIN: CPT

## 2024-04-22 PROCEDURE — 36415 COLL VENOUS BLD VENIPUNCTURE: CPT

## 2024-04-22 PROCEDURE — 97110 THERAPEUTIC EXERCISES: CPT

## 2024-04-22 PROCEDURE — 80048 BASIC METABOLIC PNL TOTAL CA: CPT

## 2024-04-22 PROCEDURE — 6360000002 HC RX W HCPCS: Performed by: INTERNAL MEDICINE

## 2024-04-22 PROCEDURE — 97530 THERAPEUTIC ACTIVITIES: CPT

## 2024-04-22 PROCEDURE — 85025 COMPLETE CBC W/AUTO DIFF WBC: CPT

## 2024-04-22 PROCEDURE — 1200000000 HC SEMI PRIVATE

## 2024-04-22 PROCEDURE — 51798 US URINE CAPACITY MEASURE: CPT

## 2024-04-22 PROCEDURE — 99232 SBSQ HOSP IP/OBS MODERATE 35: CPT | Performed by: INTERNAL MEDICINE

## 2024-04-22 PROCEDURE — 2580000003 HC RX 258: Performed by: INTERNAL MEDICINE

## 2024-04-22 PROCEDURE — 82947 ASSAY GLUCOSE BLOOD QUANT: CPT

## 2024-04-22 PROCEDURE — 6370000000 HC RX 637 (ALT 250 FOR IP)

## 2024-04-22 PROCEDURE — 2500000003 HC RX 250 WO HCPCS: Performed by: INTERNAL MEDICINE

## 2024-04-22 RX ORDER — LIDOCAINE 4 G/G
1 PATCH TOPICAL DAILY
Status: DISCONTINUED | OUTPATIENT
Start: 2024-04-22 | End: 2024-04-23 | Stop reason: HOSPADM

## 2024-04-22 RX ORDER — CALCIUM CARBONATE 500 MG/1
500 TABLET, CHEWABLE ORAL 3 TIMES DAILY PRN
Status: DISCONTINUED | OUTPATIENT
Start: 2024-04-22 | End: 2024-04-23 | Stop reason: HOSPADM

## 2024-04-22 RX ADMIN — RIFAXIMIN 550 MG: 550 TABLET ORAL at 20:59

## 2024-04-22 RX ADMIN — OXYCODONE HYDROCHLORIDE 5 MG: 5 TABLET ORAL at 08:17

## 2024-04-22 RX ADMIN — ANTACID TABLETS 500 MG: 500 TABLET, CHEWABLE ORAL at 21:04

## 2024-04-22 RX ADMIN — HEPARIN SODIUM 5000 UNITS: 5000 INJECTION INTRAVENOUS; SUBCUTANEOUS at 06:11

## 2024-04-22 RX ADMIN — GABAPENTIN 100 MG: 100 CAPSULE ORAL at 08:12

## 2024-04-22 RX ADMIN — INSULIN GLARGINE 35 UNITS: 100 INJECTION, SOLUTION SUBCUTANEOUS at 21:00

## 2024-04-22 RX ADMIN — SODIUM CHLORIDE, PRESERVATIVE FREE 10 ML: 5 INJECTION INTRAVENOUS at 08:12

## 2024-04-22 RX ADMIN — SODIUM POLYSTYRENE SULFONATE 15 G: 15 SUSPENSION ORAL; RECTAL at 08:14

## 2024-04-22 RX ADMIN — HEPARIN SODIUM 5000 UNITS: 5000 INJECTION INTRAVENOUS; SUBCUTANEOUS at 13:06

## 2024-04-22 RX ADMIN — SODIUM CHLORIDE, PRESERVATIVE FREE 10 ML: 5 INJECTION INTRAVENOUS at 21:01

## 2024-04-22 RX ADMIN — OXYCODONE HYDROCHLORIDE 5 MG: 5 TABLET ORAL at 13:07

## 2024-04-22 RX ADMIN — SODIUM POLYSTYRENE SULFONATE 15 G: 15 SUSPENSION ORAL; RECTAL at 21:00

## 2024-04-22 RX ADMIN — GABAPENTIN 100 MG: 100 CAPSULE ORAL at 13:07

## 2024-04-22 RX ADMIN — MICONAZOLE NITRATE: 2 CREAM TOPICAL at 08:17

## 2024-04-22 RX ADMIN — METOPROLOL TARTRATE 25 MG: 25 TABLET, FILM COATED ORAL at 20:59

## 2024-04-22 RX ADMIN — PANTOPRAZOLE SODIUM 40 MG: 40 TABLET, DELAYED RELEASE ORAL at 16:39

## 2024-04-22 RX ADMIN — Medication 50 MG: at 08:17

## 2024-04-22 RX ADMIN — HEPARIN SODIUM 5000 UNITS: 5000 INJECTION INTRAVENOUS; SUBCUTANEOUS at 20:59

## 2024-04-22 RX ADMIN — RIFAXIMIN 550 MG: 550 TABLET ORAL at 08:14

## 2024-04-22 RX ADMIN — PANTOPRAZOLE SODIUM 40 MG: 40 TABLET, DELAYED RELEASE ORAL at 06:11

## 2024-04-22 RX ADMIN — GABAPENTIN 100 MG: 100 CAPSULE ORAL at 20:59

## 2024-04-22 RX ADMIN — ANTI-FUNGAL POWDER MICONAZOLE NITRATE TALC FREE: 1.42 POWDER TOPICAL at 13:29

## 2024-04-22 RX ADMIN — INSULIN LISPRO 2 UNITS: 100 INJECTION, SOLUTION INTRAVENOUS; SUBCUTANEOUS at 13:07

## 2024-04-22 RX ADMIN — ANTI-FUNGAL POWDER MICONAZOLE NITRATE TALC FREE: 1.42 POWDER TOPICAL at 21:00

## 2024-04-22 RX ADMIN — METOPROLOL TARTRATE 25 MG: 25 TABLET, FILM COATED ORAL at 08:12

## 2024-04-22 RX ADMIN — INSULIN LISPRO 2 UNITS: 100 INJECTION, SOLUTION INTRAVENOUS; SUBCUTANEOUS at 16:39

## 2024-04-22 RX ADMIN — TAMSULOSIN HYDROCHLORIDE 0.4 MG: 0.4 CAPSULE ORAL at 08:12

## 2024-04-22 ASSESSMENT — PAIN SCALES - GENERAL
PAINLEVEL_OUTOF10: 0
PAINLEVEL_OUTOF10: 7
PAINLEVEL_OUTOF10: 0
PAINLEVEL_OUTOF10: 0
PAINLEVEL_OUTOF10: 7
PAINLEVEL_OUTOF10: 0
PAINLEVEL_OUTOF10: 0
PAINLEVEL_OUTOF10: 7

## 2024-04-22 ASSESSMENT — PAIN DESCRIPTION - LOCATION
LOCATION: NECK

## 2024-04-22 ASSESSMENT — PAIN SCALES - WONG BAKER: WONGBAKER_NUMERICALRESPONSE: HURTS WHOLE LOT

## 2024-04-22 NOTE — PROGRESS NOTES
NEPHROLOGY PROGRESS NOTE    Patient :  Bobbi Phillips; 68 y.o. MRN# [id number]  Location:  2040/2040-01  Attending:  Courtney Carrillo MD  Admit Date:  4/3/2024   Hospital Day: 19    Reason for consultation: Management of acute kidney injury.    Consulting physician: Courtney Carrillo MD.    Chief Complaint: Back pain and gross hematuria     Interval history:   Patient was seen and examined today   Patient failed void trial ,Nichols catheter placed again    She is currently afebrile.  She is nonoliguric.  K 5.5-->5.3-->5.0  Scr 0.8 mg/dl  Urine output 1100 mL yesterday and 24-hour  Patient received Kayexalate    History of Present Illness: This is a 68 y.o. 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   Patient was seen in the ED approximately 1 week ago for fall. Patient was found to have UTI at that time and discharged on Keflex.   Patient denies N/V/diarrhea, fever chills.  C/o dysuria, flank pain and frequency.  Patient started on IV Abx.  Scr on admission 1.3 mg/dl, scr peaked to 1.9 mg/dl and therefor Nephrology consultation requested.  Bladder scan positive for urinary retention, nichols catheter was placed.  Scr improved to 1.3 mg/dl this morning.  Patient noted to be lethargic and decrease in responsiveness this morning and was transferred to ICU for AMS which was suspected to be due to use Narcotics.  Patient takes NSAIDs   There had exposure to IV contrast on 4/3/24. There is no history  of paraprotein disease. Pt denies any history of recurrent UTI or kidney stones.  Medication review shows use of ACE-inhibitor/diuretics.    Current Medications:    miconazole (MICOTIN) 2 % powder, BID  lidocaine 4 % external patch 1 patch, Daily  lactulose (CHRONULAC) 10 GM/15ML solution 30 g, 4 times per day  sodium polystyrene (KAYEXALATE) 15 GM/60ML suspension 15 g, BID  insulin glargine (LANTUS) injection vial 35 Units, BID  rifAXIMin (XIFAXAN) tablet 550 mg,  BID  promethazine (PHENERGAN) tablet 12.5 mg, Q6H PRN  tamsulosin (FLOMAX) capsule 0.4 mg, Daily  0.9 % sodium chloride infusion, Continuous  heparin (porcine) injection 5,000 Units, 3 times per day  pantoprazole (PROTONIX) tablet 40 mg, BID AC  zinc sulfate (ZINCATE) 220 mg capsule - elemental zinc 50 mg, Daily  melatonin tablet 6 mg, Nightly PRN  lidocaine (LMX) 4 % cream, PRN  oxyCODONE (ROXICODONE) immediate release tablet 5 mg, Q4H PRN  metoprolol tartrate (LOPRESSOR) tablet 25 mg, BID  metoprolol (LOPRESSOR) injection 5 mg, Q6H PRN  [Held by provider] HYDROcodone-acetaminophen (NORCO) 5-325 MG per tablet 1 tablet, Q6H PRN  gabapentin (NEURONTIN) capsule 100 mg, TID  sodium chloride flush 0.9 % injection 10 mL, PRN  sodium chloride flush 0.9 % injection 5-40 mL, 2 times per day  sodium chloride flush 0.9 % injection 5-40 mL, PRN  0.9 % sodium chloride infusion, PRN  potassium chloride (KLOR-CON M) extended release tablet 40 mEq, PRN   Or  potassium bicarb-citric acid (EFFER-K) effervescent tablet 40 mEq, PRN   Or  potassium chloride 10 mEq/100 mL IVPB (Peripheral Line), PRN  magnesium sulfate 2000 mg in water 50 mL IVPB, PRN  ondansetron (ZOFRAN-ODT) disintegrating tablet 4 mg, Q8H PRN   Or  ondansetron (ZOFRAN) injection 4 mg, Q6H PRN  polyethylene glycol (GLYCOLAX) packet 17 g, Daily PRN  [Held by provider] atorvastatin (LIPITOR) tablet 10 mg, Daily  insulin lispro (HUMALOG) injection vial 0-8 Units, TID WC  insulin lispro (HUMALOG) injection vial 0-4 Units, Nightly  glucose chewable tablet 16 g, PRN  dextrose bolus 10% 125 mL, PRN   Or  dextrose bolus 10% 250 mL, PRN  glucagon injection 1 mg, PRN  dextrose 10 % infusion, Continuous PRN  hydrALAZINE (APRESOLINE) injection 10 mg, Q6H PRN      Objective:  CURRENT TEMPERATURE:  Temp: 97.5 °F (36.4 °C)  MAXIMUM TEMPERATURE OVER 24HRS:  Temp (24hrs), Av °F (36.7 °C), Min:97.5 °F (36.4 °C), Max:98.4 °F (36.9 °C)    CURRENT RESPIRATORY RATE:  Respirations:

## 2024-04-22 NOTE — PROGRESS NOTES
solutions;Assistance required to correct errors made  Insights: Fully aware of deficits  Initiation: Requires cues for some  Sequencing: Requires cues for some     Objective   Vitals     Bed Mobility Training  Bed Mobility Training: Yes  Overall Level of Assistance: Maximum assistance;Assist X2  Interventions: Verbal cues;Safety awareness training;Manual cues  Rolling: Maximum assistance;Assist X1 (to both L and right)  Supine to Sit: Maximum assistance;Assist X2;Additional time (Reports Pain through BLE while completing.)  Sit to Supine: Maximum assistance;Additional time;Assist X2  Scooting: Maximum assistance;Assist X2;Additional time  Balance  Sitting: Intact (pt sat EOB unsupported x 20 minutes, SBA)  Transfer Training  Transfer Training: No  Gait Training  Gait Training: No  Gait  Gait Training: No     PT Exercises  A/AROM Exercises: Seated Marching and LAQs x 12. Some posterior sway while completing but no LOB  Static Sitting Balance Exercises: Dangling EOB x 20 minutes, SBA. (Completed BUE and BLE exercises while sitting up EOB)     Safety Devices  Type of Devices: Nurse notified;Call light within reach;Left in bed       Goals  Short Term Goals  Time Frame for Short Term Goals: 7 to 9 visits  Short Term Goal 1: Improve strength in both lower extremities to 4+/5  Short Term Goal 2: Pt will be able to perform bed mobility with minimal assistance  Short Term Goal 3: Pt able to perform sit<.stand and pivot ransfers consitently with min A  Short Term Goal 4: Progress pt to ambulation with RW distance fo 10 ft x2, min A x 1 to 2 person for ssafety  Short Term Goal 5: Pt to tolerate activity for 30 minutes to improve fucntion  Patient Goals   Patient Goals : return home    Education  Patient Education  Education Given To: Patient  Education Provided: Role of Therapy;Plan of Care;Transfer Training;Fall Prevention Strategies  Education Provided Comments: Educated pt on home safety and fall prevention strategies. Bed  mobility and benefit in completing therapeutic activity  Education Method: Verbal;Demonstration  Barriers to Learning: Cognition  Education Outcome: Continued education needed    AM-PAC - Mobility    AM-PAC Basic Mobility - Inpatient   How much help is needed turning from your back to your side while in a flat bed without using bedrails?: A Lot  How much help is needed moving from lying on your back to sitting on the side of a flat bed without using bedrails?: Total  How much help is needed moving to and from a bed to a chair?: Total  How much help is needed standing up from a chair using your arms?: Total  How much help is needed walking in hospital room?: Total  How much help is needed climbing 3-5 steps with a railing?: Total  AM-PAC Inpatient Mobility Raw Score : 7  AM-PAC Inpatient T-Scale Score : 26.42  Mobility Inpatient CMS 0-100% Score: 92.36  Mobility Inpatient CMS G-Code Modifier : CM         Therapy Time   Individual Concurrent Group Co-treatment   Time In 1455         Time Out 1534         Minutes 39                 Miky Childs, PTA

## 2024-04-22 NOTE — PROGRESS NOTES
Visualized portions of the proximal right and left femurs appear intact and articulate normally at the right and left acetabulum.     No acute osseous abnormality on AP pelvis radiograph. RECOMMENDATION: If pain persists or worsens, then additional evaluation with MRI is indicated to ensure no underlying radiographically occult process such as fracture, AVN or transient osteoporosis.     XR CHEST PORTABLE    Result Date: 3/29/2024  EXAMINATION: ONE XRAY VIEW OF THE CHEST 3/29/2024 8:31 pm COMPARISON: Chest x-ray dated 06/01/2012. HISTORY: ORDERING SYSTEM PROVIDED HISTORY: fall TECHNOLOGIST PROVIDED HISTORY: fall Reason for Exam: Pt. fell tonight. FINDINGS: HEART/MEDIASTINUM: The cardiomediastinal silhouette is within normal limits. PLEURA/LUNGS: There are no focal consolidations or pleural effusions. There is no appreciable pneumothorax. BONES/SOFT TISSUE: No acute abnormality.     No radiographic evidence of acute pulmonary disease.        Current Facility-Administered Medications   Medication Dose Route Frequency Provider Last Rate Last Admin    lactulose (CHRONULAC) 10 GM/15ML solution 30 g  30 g Oral 4 times per day Brad Maciel MD   30 g at 04/21/24 1610    sodium polystyrene (KAYEXALATE) 15 GM/60ML suspension 15 g  15 g Oral BID Bryson Cordova MD   15 g at 04/22/24 0814    insulin glargine (LANTUS) injection vial 35 Units  35 Units SubCUTAneous BID Marlin Gutierrez MD   35 Units at 04/21/24 2005    rifAXIMin (XIFAXAN) tablet 550 mg  550 mg Oral BID Chapito Guerrero APRN - NP   550 mg at 04/22/24 0814    promethazine (PHENERGAN) tablet 12.5 mg  12.5 mg Oral Q6H PRN Sylvia Mercer APRN - NP        tamsulosin (FLOMAX) capsule 0.4 mg  0.4 mg Oral Daily Taye Rowe MD   0.4 mg at 04/22/24 0812    0.9 % sodium chloride infusion   IntraVENous Continuous Earl Cannon MD 30 mL/hr at 04/17/24 2110 New Bag at 04/17/24 2110    heparin (porcine) injection 5,000 Units  5,000 Units  replaced 4/16/2024.  Will place on Flomax  Uterine mass, has been following up with outpatient OB/GYN.  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 recent CT scan confirms cirrhosis but her bilirubin is 1.2 INR is 1.2 chronic thrombocytopenia she is admitted for feeling unwell suspected SBP cultures were negative UTI noted Klebsiella she had treated antibiotic antibiotic course completed on April 16 she has been on lactulose last 24 to 48 hours she has no bowel movement KUB was done did not show any obstruction lactulose dose was increased  will further increase today abdomen examination is benign  Kerns in place failed trial without a catheter given underlying uterine mass for outpatient OB/GYN follow-up   lactulose to 30 g  Trial without a catheter bladder scan postvoid  Hyperkalemia Kayexalate given repeat potassium improved  Failed trial without a catheter  Patient had a large bowel movement  Off antibiotics  Low potassium diet  Will be working with physical therapy,  Prolonged hospitalization very weak,        Germania Osborne MD  4/22/2024  10:55 AM

## 2024-04-22 NOTE — CARE COORDINATION
Writer is following for potential discharge to [facility].  Facility accepts this pt and pt authorization was denied 4/17.    Ana with [facility] resubmitted authorization for this pt 4/19. Ana is calling for update.    Writer met with pt kashif Fisher and Andrea for update on insurance authorization. Concerned why [facility] denied this pt, writer informed that [facility] did not give facility a reason for denial and the process will be restarted.  Electronically signed by HUANG Ayers on 4/22/2024 at 12:10 PM    Writer received message from Ana with [facility] that [facility] is working on case, hopeful for answer today.  Electronically signed by HUANG Ayers on 4/22/2024 at 1:15 PM    Writer received message from Ana that pt was denied for peer to peer.  [phone] option #4. Writer placed call to Dr. Osborne regarding this, Dr. Osborne agreeable.    Writer met with pt for update regarding peer to peer option. No further questions at this time.    Writer placed call to [facility] to schedule peer to peer. Writer left all necessary information on scheduling line voicemail.    Writer placed call to pt vel Mendez for update regarding peer to peer. Andrea will update Phillip as well. No further questions at this time.  Electronically signed by HUANG Ayers on 4/22/2024 at 4:12 PM

## 2024-04-22 NOTE — PROGRESS NOTES
[facility] -   Infectious diseases evaluation  admission date 4/3/2024    reason for consultation:   SBP    Impression :   Current:  Suspected SBP status post paracentesis 4/5/2024 fluid analysis suggestive of peritonitis, no growth on culture  Recent UTI on 3/29/2024, Klebsiella pneumoniae growth on cultures  Metabolic encephalopathy  Hematuria  Liver cirrhosis  Ascites  Recent influenza B tested positive on March 29, 2024  Diabetes mellitus  Uterine mass      HENCE:   Continue IV ceftriaxone course completed 4/16/2024  Peritoneal fluid culture from 4/10/2024, no growth         Infection Control Recommendations   [place] Precautions      Antimicrobial Stewardship Recommendations   Simplification of therapy  Targeted therapy      History of Present Illness:   Initial history:  Bobbi Phillips is a 68 y.o.-year-old female presented to ER on 3/29/2024 after a fall with reported mental status changes, was positive for influenza B and suspected UTI.  She was discharged on Keflex .  She returned to the hospital on 4/3/2024 with back pain, altered mental status and hypoxia.  CT abdomen/pelvis, chest and lumbar spine suggestive of uterine mass, liver cirrhosis and portal hypertension, small amount of ascites.  No pulmonary embolism  4/ 3/2024 urinalysis showed moderate leukocyte esterase, too numerous to count WBC, no growth on cultures    Status post paracentesis 4/5/2024 peritoneal fluid analysis showed 1, 166 WBC with 60% neutrophils, no growth on fluid culture.  The patient has been on ceftriaxone since 4/3/2024.  He had leukocytosis, WBC was up to 20.5 on 4/4/2024, down to 14.5 on 4/9/2024.  Renal function got worse, creatinine was up to 1.9 on 4/8/2024 down to 1.3 on 4/9/2024.  On 3/29/2024 urine culture grew Klebsiella pneumoniae that was sensitive to cefazolin.  Urinalysis 4/9/24 showed too numerous to count WBC, too numerous to count RBCs, moderate leukocyte

## 2024-04-22 NOTE — PLAN OF CARE
Problem: Discharge Planning  Goal: Discharge to home or other facility with appropriate resources  Outcome: Progressing     Problem: Pain  Goal: Verbalizes/displays adequate comfort level or baseline comfort level  Outcome: Progressing     Problem: Skin/Tissue Integrity  Goal: Absence of new skin breakdown  Description: 1.  Monitor for areas of redness and/or skin breakdown  2.  Assess vascular access sites hourly  3.  Every 4-6 hours minimum:  Change oxygen saturation probe site  4.  Every 4-6 hours:  If on nasal continuous positive airway pressure, respiratory therapy assess nares and determine need for appliance change or resting period.  Outcome: Progressing     Problem: Safety - Adult  Goal: Free from fall injury  Outcome: Progressing     Problem: Chronic Conditions and Co-morbidities  Goal: Patient's chronic conditions and co-morbidity symptoms are monitored and maintained or improved  Outcome: Progressing     Problem: ABCDS Injury Assessment  Goal: Absence of physical injury  Outcome: Progressing     Problem: Neurosensory - Adult  Goal: Achieves stable or improved neurological status  Outcome: Progressing     Problem: Nutrition Deficit:  Goal: Optimize nutritional status  Outcome: Progressing

## 2024-04-22 NOTE — PROGRESS NOTES
[facility]  Progress Note      NAME:  Bobbi Phillips  MEDICAL RECORD NUMBER:  [id number]  AGE: 68 y.o.   GENDER: female  : 1955  TODAY'S DATE:  2024    [facility] nurse follow-up for sacral wound.  The wound continues to evolve with some improvement noted since last assessment.  Wound is clean and red to the buttocks, small amount of slough noted on right buttock, area of moist eschar in the coccygeal area.  Will continue current treatment with Triad and foam dressing.       Measurements:  Wound 24 Sacrum (Active)   Wound Image   24 1123   Wound Etiology Pressure Unstageable 24 1123   Dressing Status Old drainage noted;New dressing applied 24 1123   Wound Cleansed Soap and water 24   Dressing/Treatment Triad hydro/zinc oxide-based hydrophilic paste;Foam 24 1123   Wound Length (cm) 7 cm 24 1123   Wound Width (cm) 11 cm 24 1123   Wound Depth (cm) 0.1 cm 243   Wound Surface Area (cm^2) 77 cm^2 24 1123   Change in Wound Size % (l*w) 0 24 1123   Wound Volume (cm^3) 7.7 cm^3 24 1123   Wound Healing % 0 243   Wound Assessment Pink/red;Eschar moist 24   Drainage Amount Small (< 25%) 24   Drainage Description Serosanguinous 243   Odor None 24   Brooke-wound Assessment Denuded;Erosion;Fragile 24   Margins Defined edges 243   Number of days: 10     Sabine Caballero, KANUN, RN, CWOCN, [facility]  Wound, Ostomy, and Continence Nursing  [phone]

## 2024-04-22 NOTE — PLAN OF CARE
Problem: Discharge Planning  Goal: Discharge to home or other facility with appropriate resources  Outcome: Progressing  Note: Discharge planning will begin when the patient meets discharge criteria to go home or to a facility     Problem: Pain  Goal: Verbalizes/displays adequate comfort level or baseline comfort level  Outcome: Progressing  Note: Pain is being managed with rest, repositioning and medication      Problem: Skin/Tissue Integrity  Goal: Absence of new skin breakdown  Description: 1.  Monitor for areas of redness and/or skin breakdown  2.  Assess vascular access sites hourly  3.  Every 4-6 hours minimum:  Change oxygen saturation probe site  4.  Every 4-6 hours:  If on nasal continuous positive airway pressure, respiratory therapy assess nares and determine need for appliance change or resting period.  Outcome: Progressing  Note: Patient is being turned as needed to prevent skin breakdown     Problem: Safety - Adult  Goal: Free from fall injury  Outcome: Progressing  Note: Call light within reach, bed alarm on, socks on and bed in lowest position

## 2024-04-22 NOTE — PROGRESS NOTES
Physician Progress Note      PATIENT:               CESAR MENJIVAR  CSN #:                  [id number]  :                       1955  ADMIT DATE:       4/3/2024 1:22 PM  DISCH DATE:  RESPONDING  PROVIDER #:        Germania Osborne MD          QUERY TEXT:    Pt admitted with UTI, Metabolic Encephalopathy . Pt noted to have WBC 9.4,   13.6,  20.5, 21.4, tachycardia   With Sepsis POA per Query on  per Dr Osborne without recent follow through. If possible, please make selection below   , document in the progress notes and discharge summary if you are evaluating   and /or treating any of the following:    The medical record reflects the following:  Risk Factors: UTI, Cirrhosis with ascites and hyperammonemia, poorly   controlled type 2 diabetes  Clinical Indicators: WBC 9.4, 13.6,  20.5, 21.4, tachycardia  ,   Metabolic Encephalopathy ,  UA - cloudy trace glucose , wbc- too many , rbc   too many , few bacteria , moderate le.  UA positive for UTI Klebsiella   sensitive to Keflex, on Rocephin IV, possible pyelonephritis, Paracentesis was   done and found to have, Spontaneous bacterial peritonitis, on Rocephin  Treatment: admission , imaging labs, IV fluids, IV antibiotics - Rocephin    Thank you,  Becky BOBBYN, RN, CRCR  Clinical   P: [phone]  F: [phone]  Options provided:  -- Sepsis, present on admission  -- Sepsis, present on admission now treated and resolved  -- Sepsis, developed following admission  -- Sepsis was ruled out  -- Other - I will add my own diagnosis  -- Disagree - Not applicable / Not valid  -- Disagree - Clinically unable to determine / Unknown  -- Refer to Clinical Documentation Reviewer    PROVIDER RESPONSE TEXT:    This patient has sepsis which was present on admission.    Query created by: Becky Magallon on 2024 1:17 PM      Electronically signed by:  Germania Osborne MD 2024 2:16 PM

## 2024-04-22 NOTE — PROGRESS NOTES
Devices  Type of Devices: Nurse notified, Call light within reach, Left in bed    AM-PAC Daily Activities Inpatient  AM-PAC Daily Activity - Inpatient   How much help is needed for putting on and taking off regular lower body clothing?: A Lot  How much help is needed for bathing (which includes washing, rinsing, drying)?: A Lot  How much help is needed for toileting (which includes using toilet, bedpan, or urinal)?: Total  How much help is needed for putting on and taking off regular upper body clothing?: A Lot  How much help is needed for taking care of personal grooming?: A Little  How much help for eating meals?: None  AM-PAC Inpatient Daily Activity Raw Score: 14  AM-PAC Inpatient ADL T-Scale Score : 33.39  ADL Inpatient CMS 0-100% Score: 59.67  ADL Inpatient CMS G-Code Modifier : CK    OT Minutes  OT Individual Minutes  Time In: 1455  Time Out: 1534  Minutes: 39      Electronically signed by SERA Gr on 4/22/24 at 3:57 PM EDT

## 2024-04-22 NOTE — CARE COORDINATION
Patient has not urinated since nichols cath removed this afternoon. Bladder scan read 766ml. Dr. Osborne states to straight cath.

## 2024-04-22 NOTE — PROGRESS NOTES
Patients blood sugar was 70 this morning. Patient was given juice, peanut butter and crackers. Patient is currently eating and drinking it.

## 2024-04-22 NOTE — CARE COORDINATION
Writer called [facility] and Spoke with Miguelangel she advised she started the pre cert Friday, however Aetjoanne called her back today and gave her the [id number] information on the original pre cert therefore pre cert that was started on Friday was cancelled.  Miguelangel advised the Peer 2 Peer must be completed by noon tomorrow.  The telephone number is [phone] option 4  Reference number [id number].     Writer sent a message to Dr jordan to see if he will do the Peer to Peer and waiting on call back.    Electronically signed by Bertha Millan RN on 4/22/2024 at 3:57 PM     Writer spoke with Dayana ENCINAS about this.  Dr Jordan advised he already spoke with Dayana about this.  Dayana ENCINAS Advised she is working on scheduling this for tomorrow.    Electronically signed by Bertha Millan RN on 4/22/2024 at 4:01 PM

## 2024-04-23 VITALS
OXYGEN SATURATION: 97 % | WEIGHT: 226.41 LBS | HEIGHT: 59 IN | HEART RATE: 70 BPM | TEMPERATURE: 98.2 F | DIASTOLIC BLOOD PRESSURE: 56 MMHG | SYSTOLIC BLOOD PRESSURE: 106 MMHG | BODY MASS INDEX: 45.64 KG/M2 | RESPIRATION RATE: 18 BRPM

## 2024-04-23 LAB
ANION GAP SERPL CALCULATED.3IONS-SCNC: 9 MMOL/L (ref 9–17)
BASOPHILS # BLD: 0 K/UL (ref 0–0.2)
BASOPHILS NFR BLD: 1 % (ref 0–2)
BUN SERPL-MCNC: 38 MG/DL (ref 8–23)
CALCIUM SERPL-MCNC: 8 MG/DL (ref 8.6–10.4)
CHLORIDE SERPL-SCNC: 101 MMOL/L (ref 98–107)
CO2 SERPL-SCNC: 23 MMOL/L (ref 20–31)
CREAT SERPL-MCNC: 1.1 MG/DL (ref 0.5–0.9)
EOSINOPHIL # BLD: 0.3 K/UL (ref 0–0.4)
EOSINOPHILS RELATIVE PERCENT: 5 % (ref 0–4)
ERYTHROCYTE [DISTWIDTH] IN BLOOD BY AUTOMATED COUNT: 19.2 % (ref 11.5–14.9)
GFR SERPL CREATININE-BSD FRML MDRD: 54 ML/MIN/1.73M2
GLUCOSE BLD-MCNC: 151 MG/DL (ref 65–105)
GLUCOSE BLD-MCNC: 87 MG/DL (ref 65–105)
GLUCOSE SERPL-MCNC: 81 MG/DL (ref 70–99)
HCT VFR BLD AUTO: 24.4 % (ref 36–46)
HGB BLD-MCNC: 7.8 G/DL (ref 12–16)
LYMPHOCYTES NFR BLD: 0.8 K/UL (ref 1–4.8)
LYMPHOCYTES RELATIVE PERCENT: 15 % (ref 24–44)
MCH RBC QN AUTO: 28.8 PG (ref 26–34)
MCHC RBC AUTO-ENTMCNC: 31.9 G/DL (ref 31–37)
MCV RBC AUTO: 90.4 FL (ref 80–100)
MONOCYTES NFR BLD: 0.5 K/UL (ref 0.1–1.3)
MONOCYTES NFR BLD: 9 % (ref 1–7)
NEUTROPHILS NFR BLD: 70 % (ref 36–66)
NEUTS SEG NFR BLD: 3.9 K/UL (ref 1.3–9.1)
PLATELET # BLD AUTO: 156 K/UL (ref 150–450)
PMV BLD AUTO: 9.2 FL (ref 6–12)
POTASSIUM SERPL-SCNC: 3.8 MMOL/L (ref 3.7–5.3)
RBC # BLD AUTO: 2.7 M/UL (ref 4–5.2)
SODIUM SERPL-SCNC: 133 MMOL/L (ref 135–144)
WBC OTHER # BLD: 5.5 K/UL (ref 3.5–11)

## 2024-04-23 PROCEDURE — 97110 THERAPEUTIC EXERCISES: CPT

## 2024-04-23 PROCEDURE — 97530 THERAPEUTIC ACTIVITIES: CPT

## 2024-04-23 PROCEDURE — 6360000002 HC RX W HCPCS: Performed by: INTERNAL MEDICINE

## 2024-04-23 PROCEDURE — 36415 COLL VENOUS BLD VENIPUNCTURE: CPT

## 2024-04-23 PROCEDURE — 80048 BASIC METABOLIC PNL TOTAL CA: CPT

## 2024-04-23 PROCEDURE — 51798 US URINE CAPACITY MEASURE: CPT

## 2024-04-23 PROCEDURE — 6370000000 HC RX 637 (ALT 250 FOR IP): Performed by: INTERNAL MEDICINE

## 2024-04-23 PROCEDURE — 6370000000 HC RX 637 (ALT 250 FOR IP): Performed by: NURSE PRACTITIONER

## 2024-04-23 PROCEDURE — 99239 HOSP IP/OBS DSCHRG MGMT >30: CPT | Performed by: INTERNAL MEDICINE

## 2024-04-23 PROCEDURE — 82947 ASSAY GLUCOSE BLOOD QUANT: CPT

## 2024-04-23 PROCEDURE — 2580000003 HC RX 258: Performed by: INTERNAL MEDICINE

## 2024-04-23 PROCEDURE — 97535 SELF CARE MNGMENT TRAINING: CPT

## 2024-04-23 PROCEDURE — 85025 COMPLETE CBC W/AUTO DIFF WBC: CPT

## 2024-04-23 PROCEDURE — 6370000000 HC RX 637 (ALT 250 FOR IP)

## 2024-04-23 RX ORDER — LACTULOSE 10 G/15ML
30 SOLUTION ORAL EVERY 8 HOURS PRN
Qty: 237 ML | Refills: 0 | Status: ON HOLD | OUTPATIENT
Start: 2024-04-23 | End: 2024-05-23

## 2024-04-23 RX ORDER — OXYCODONE HYDROCHLORIDE 5 MG/1
5 TABLET ORAL EVERY 6 HOURS PRN
Qty: 12 TABLET | Refills: 0 | Status: SHIPPED | OUTPATIENT
Start: 2024-04-23 | End: 2024-04-26

## 2024-04-23 RX ORDER — ZINC SULFATE 50(220)MG
50 CAPSULE ORAL DAILY
Qty: 30 CAPSULE | Refills: 3 | Status: ON HOLD | COMMUNITY
Start: 2024-04-24

## 2024-04-23 RX ORDER — GABAPENTIN 100 MG/1
100 CAPSULE ORAL 3 TIMES DAILY
Qty: 30 CAPSULE | Refills: 0 | Status: ON HOLD | OUTPATIENT
Start: 2024-04-23 | End: 2024-05-03

## 2024-04-23 RX ORDER — TAMSULOSIN HYDROCHLORIDE 0.4 MG/1
0.4 CAPSULE ORAL DAILY
Qty: 30 CAPSULE | Refills: 3 | Status: ON HOLD | OUTPATIENT
Start: 2024-04-24

## 2024-04-23 RX ORDER — POLYETHYLENE GLYCOL 3350 17 G/17G
17 POWDER, FOR SOLUTION ORAL DAILY PRN
Qty: 527 G | Refills: 0 | Status: ON HOLD | OUTPATIENT
Start: 2024-04-23 | End: 2024-05-23

## 2024-04-23 RX ORDER — LIDOCAINE 4 G/G
1 PATCH TOPICAL DAILY
Qty: 5 EACH | Refills: 0 | Status: ON HOLD | OUTPATIENT
Start: 2024-04-24 | End: 2024-04-29

## 2024-04-23 RX ORDER — LANOLIN ALCOHOL/MO/W.PET/CERES
6 CREAM (GRAM) TOPICAL NIGHTLY PRN
Qty: 30 TABLET | Refills: 0 | Status: ON HOLD | OUTPATIENT
Start: 2024-04-23 | End: 2024-05-23

## 2024-04-23 RX ORDER — GABAPENTIN 100 MG/1
100 CAPSULE ORAL 3 TIMES DAILY
Qty: 30 CAPSULE | Refills: 0 | Status: SHIPPED | OUTPATIENT
Start: 2024-04-23 | End: 2024-04-23

## 2024-04-23 RX ORDER — OXYCODONE HYDROCHLORIDE 5 MG/1
5 TABLET ORAL EVERY 6 HOURS PRN
Qty: 12 TABLET | Refills: 0 | Status: SHIPPED | OUTPATIENT
Start: 2024-04-23 | End: 2024-04-23

## 2024-04-23 RX ORDER — CYCLOBENZAPRINE HCL 10 MG
10 TABLET ORAL 3 TIMES DAILY PRN
Qty: 21 TABLET | Refills: 0 | Status: ON HOLD | OUTPATIENT
Start: 2024-04-23 | End: 2024-05-03

## 2024-04-23 RX ORDER — PANTOPRAZOLE SODIUM 40 MG/1
40 TABLET, DELAYED RELEASE ORAL
Qty: 30 TABLET | Refills: 3 | Status: ON HOLD | OUTPATIENT
Start: 2024-04-23

## 2024-04-23 RX ORDER — SULFAMETHOXAZOLE AND TRIMETHOPRIM 800; 160 MG/1; MG/1
1 TABLET ORAL DAILY
Qty: 60 TABLET | Refills: 3 | Status: ON HOLD | OUTPATIENT
Start: 2024-04-23 | End: 2024-12-19

## 2024-04-23 RX ADMIN — HEPARIN SODIUM 5000 UNITS: 5000 INJECTION INTRAVENOUS; SUBCUTANEOUS at 05:50

## 2024-04-23 RX ADMIN — Medication 50 MG: at 08:16

## 2024-04-23 RX ADMIN — RIFAXIMIN 550 MG: 550 TABLET ORAL at 08:16

## 2024-04-23 RX ADMIN — TAMSULOSIN HYDROCHLORIDE 0.4 MG: 0.4 CAPSULE ORAL at 08:15

## 2024-04-23 RX ADMIN — SODIUM CHLORIDE, PRESERVATIVE FREE 10 ML: 5 INJECTION INTRAVENOUS at 08:16

## 2024-04-23 RX ADMIN — ANTI-FUNGAL POWDER MICONAZOLE NITRATE TALC FREE: 1.42 POWDER TOPICAL at 08:15

## 2024-04-23 RX ADMIN — SODIUM POLYSTYRENE SULFONATE 15 G: 15 SUSPENSION ORAL; RECTAL at 08:16

## 2024-04-23 RX ADMIN — GABAPENTIN 100 MG: 100 CAPSULE ORAL at 08:15

## 2024-04-23 RX ADMIN — OXYCODONE HYDROCHLORIDE 5 MG: 5 TABLET ORAL at 03:45

## 2024-04-23 RX ADMIN — PANTOPRAZOLE SODIUM 40 MG: 40 TABLET, DELAYED RELEASE ORAL at 05:50

## 2024-04-23 RX ADMIN — METOPROLOL TARTRATE 25 MG: 25 TABLET, FILM COATED ORAL at 08:15

## 2024-04-23 ASSESSMENT — PAIN - FUNCTIONAL ASSESSMENT: PAIN_FUNCTIONAL_ASSESSMENT: ACTIVITIES ARE NOT PREVENTED

## 2024-04-23 ASSESSMENT — PAIN SCALES - GENERAL
PAINLEVEL_OUTOF10: 3
PAINLEVEL_OUTOF10: 9

## 2024-04-23 ASSESSMENT — PAIN DESCRIPTION - ORIENTATION: ORIENTATION: RIGHT

## 2024-04-23 ASSESSMENT — PAIN DESCRIPTION - LOCATION: LOCATION: HIP

## 2024-04-23 ASSESSMENT — PAIN DESCRIPTION - DESCRIPTORS: DESCRIPTORS: SHARP

## 2024-04-23 NOTE — PLAN OF CARE
Problem: Skin/Tissue Integrity  Goal: Absence of new skin breakdown  Description: 1.  Monitor for areas of redness and/or skin breakdown  2.  Assess vascular access sites hourly  3.  Every 4-6 hours minimum:  Change oxygen saturation probe site  4.  Every 4-6 hours:  If on nasal continuous positive airway pressure, respiratory therapy assess nares and determine need for appliance change or resting period.  4/22/2024 2231 by Catherine Grace, RN  Outcome: Progressing     Problem: Safety - Adult  Goal: Free from fall injury  4/22/2024 2231 by Catherine Grace, RN  Outcome: Progressing     Problem: Neurosensory - Adult  Goal: Achieves stable or improved neurological status  4/22/2024 2231 by Catherine Grace, RN  Outcome: Progressing     Problem: Nutrition Deficit:  Goal: Optimize nutritional status  4/22/2024 2231 by Catherine Grace, RN  Outcome: Progressing  Flowsheets (Taken 4/19/2024 1727 by Manda Terrazas, RN)  Nutrient intake appropriate for improving, restoring, or maintaining nutritional needs: Assess nutritional status and recommend course of action

## 2024-04-23 NOTE — CARE COORDINATION
Peer to peer approved. Patient accepted to facility. Ana Laura in admissions notified. Patient is able to discharge to facility today once updated approval is received by facility.     Updated auth received by facility. Patient is ok to admit to facility when medically ready for discharge. Patient and Son Phillip updated

## 2024-04-23 NOTE — CARE COORDINATION
Transportation arranged for patient to go to [facility] at 1400 via [employer]. Facility informed. Bedside nurse informed.     Number for Report:  [phone]

## 2024-04-23 NOTE — PROGRESS NOTES
Physical Therapy    Date: 24  Patient Name: Bobbi Phillips       Room:   MRN: [id number]   Account: [id number]   : 1955  (68 y.o.) Gender: female     Referring Practitioner: Brad Maciel MD  Diagnosis: Acute kidney injury  Past Medical History:  has a past medical history of Abdominal swelling, Claustrophobia, COVID-19 vaccine administered, Diabetes (HCC), Diabetic retinopathy (HCC), Flatus, H/O acute sinusitis, H/O cholelithiasis, Hypertension, Liver cirrhosis (HCC), LLQ abdominal tenderness, Poor venous access, Positive colorectal cancer screening using Cologuard test, Post-menopausal bleeding, RUQ abdominal pain, Tendonitis of shoulder, right, Tingling, Under care of service provider, Under care of service provider, Vaginal bleeding, Vaginal candidiasis, and Wears glasses.   Past Surgical History:   has a past surgical history that includes Cholecystectomy, laparoscopic ();  section; Ankle fracture surgery (Left, ); Cataract removal with implant (Bilateral, ); Colonoscopy (N/A, 2023); Dilation and curettage of uterus (N/A, 10/02/2023); Cardiac catheterization (); hysteroscopy (2024); Dilation and curettage of uterus (N/A, 2024); and Upper gastrointestinal endoscopy (N/A, 4/10/2024).  Additional Pertinent Hx: Pt admitted to ICU due to worsening back pain on 24    Overall Orientation Status: Within Functional Limits  Restrictions/Precautions  Restrictions/Precautions: General Precautions  Implants present? :  (Denied)    Subjective: Pt lying in bed upon arrival. Agreeable to therapy  Comments: RN approved therapy; co-treat with MARLENY Leyva       Pain Assessment: None - Denies Pain     Oxygen Therapy  O2 Device: None (Room air)          Bed Mobility:   Bed Mobility  Rolling: Maximal assistance;Rolling Right  Supine to Sit: Maximal assistance (x2)  Sit to Supine: Unable to assess  Scooting: Maximal assistance;2 Person assistance  Comment:

## 2024-04-23 NOTE — PROGRESS NOTES
[facility]   INPATIENT OCCUPATIONAL THERAPY  PROGRESS NOTE  Date: 2024  Patient Name: Bobbi Phillips       Room:   MRN: [id number]    : 1955  (68 y.o.)  Gender: female   Referring Practitioner: Brad Maciel MD  Diagnosis: Acute kidney injury      Discharge Recommendations:  Further Occupational Therapy is recommended upon facility discharge.    OT Equipment Recommendations  Other: TBD    Restrictions/Precautions  Restrictions/Precautions  Restrictions/Precautions: General Precautions  Implants present? :  (Denied)    O2 Device: None (Room air)    Subjective  Subjective  Subjective: Pt agreeable to OT/PT co-tx with PTA Jackie.  Subjective  Pain: Denies pain rating at this time.  Comments: Okay for OT/PT co-tx at this time per RN Dora.    Objective  Orientation  Overall Orientation Status: Within Functional Limits  Cognition  Overall Cognitive Status: Exceptions  Arousal/Alertness: Appropriate responses to stimuli  Following Commands: Follows multistep commands consistently  Attention Span: Attends with cues to redirect  Memory: Appears intact  Safety Judgement: Good awareness of safety precautions  Problem Solving: Assistance required to generate solutions;Assistance required to implement solutions;Assistance required to correct errors made  Insights: Fully aware of deficits  Initiation: Requires cues for some  Sequencing: Requires cues for some    Activities of Daily Living  ADL  Feeding: Independent, Based on clinical judgement  Grooming: Setup  Grooming Skilled Clinical Factors: Completes oral care, washing of face, and brushing of hair while seated in bedside recliner. Setup for items within reach.  UE Bathing: Moderate assistance, Based on clinical judgement  LE Bathing: Maximum assistance, Based on clinical judgement  UE Dressing: Moderate assistance, Based on clinical judgement  LE Dressing: Maximum assistance, Based on clinical judgement  LE Dressing  tolerance for improved functional performance during ADLs and transfers. Completes x15 reps x1 set in all available planes. Visual demonstration provided for initiation of all various exercises. Pt demonstrates good technique/pacing throughout.    Patient Education  Patient Education  Education Given To: Patient  Education Provided: Role of Therapy, Plan of Care, Transfer Training, Home Exercise Program  Education Method: Verbal, Demonstration  Barriers to Learning: None  Education Outcome: Continued education needed, Demonstrated understanding, Verbalized understanding    Goals  Short Term Goals  Time Frame for Short Term Goals: By discharge, pt will  Short Term Goal 1: complete self care routine  for  5+ minutes  to improve active participation in  daily routine  Short Term Goal 2: perform UB ADLs with CGA and LB ADLs with  modA  Short Term Goal 3: V/D and explore use of adaptive techniques/equipment/DME to increase IND during self care tasks  Short Term Goal 4: perform bed mobility with Carmen to assist in hygiene and reduce risk of developing pressure ulcers  Short Term Goal 5: perform functional transfers/toilet transfers with maxA using  least restrictive device  Additional Goals?: Yes  Short Term Goal 6: V/D at least two nonpharmaceutical pain management techniques to improve  active participation in ADLs  Short Term Goal 7: actively  participate 15+ minutes of therapeutic exercise/functional activity to promote safety and independence with self care and mobility  Occupational Therapy Plan  Times Per Week: 5-7  Current Treatment Recommendations: Strengthening, Balance training, Functional mobility training, Endurance training, Safety education & training, Patient/Caregiver education & training, Equipment evaluation, education, & procurement, Pain management, Positioning, Self-Care / ADL    Assessment  Activity Tolerance  Activity Tolerance: Patient Tolerated treatment well, Patient limited by

## 2024-04-24 LAB
ALDOST SERPL-MCNC: 5.1 NG/DL
ALDOSTERONE COMMENT: NORMAL

## 2024-04-25 LAB
RENIN COMMENT: NORMAL
RENIN PLAS-CCNC: 24 NG/ML/HR

## 2024-04-27 ENCOUNTER — HOSPITAL ENCOUNTER (INPATIENT)
Age: 69
LOS: 6 days | Discharge: HOME | DRG: 669 | End: 2024-05-03
Attending: EMERGENCY MEDICINE | Admitting: INTERNAL MEDICINE
Payer: MEDICARE

## 2024-04-27 ENCOUNTER — APPOINTMENT (OUTPATIENT)
Dept: CT IMAGING | Age: 69
DRG: 669 | End: 2024-04-27
Payer: MEDICARE

## 2024-04-27 DIAGNOSIS — N32.89 MASS OF BLADDER: ICD-10-CM

## 2024-04-27 DIAGNOSIS — N17.9 AKI (ACUTE KIDNEY INJURY) (HCC): Primary | ICD-10-CM

## 2024-04-27 DIAGNOSIS — R31.9 HEMATURIA, UNSPECIFIED TYPE: ICD-10-CM

## 2024-04-27 DIAGNOSIS — R31.0 GROSS HEMATURIA: ICD-10-CM

## 2024-04-27 DIAGNOSIS — D64.9 ANEMIA, UNSPECIFIED TYPE: ICD-10-CM

## 2024-04-27 LAB
ALBUMIN SERPL-MCNC: 2 G/DL (ref 3.5–5.2)
ALP SERPL-CCNC: 372 U/L (ref 35–104)
ALT SERPL-CCNC: 15 U/L (ref 5–33)
ANION GAP SERPL CALCULATED.3IONS-SCNC: 11 MMOL/L (ref 9–17)
AST SERPL-CCNC: 29 U/L
BACTERIA URNS QL MICRO: ABNORMAL
BASOPHILS # BLD: 0 K/UL (ref 0–0.2)
BASOPHILS NFR BLD: 1 % (ref 0–2)
BILIRUB DIRECT SERPL-MCNC: 0.2 MG/DL
BILIRUB INDIRECT SERPL-MCNC: 0.1 MG/DL (ref 0–1)
BILIRUB SERPL-MCNC: 0.3 MG/DL (ref 0.3–1.2)
BILIRUB UR QL STRIP: NEGATIVE
BUN SERPL-MCNC: 46 MG/DL (ref 8–23)
CALCIUM SERPL-MCNC: 7.6 MG/DL (ref 8.6–10.4)
CASTS #/AREA URNS LPF: ABNORMAL /LPF
CHLORIDE SERPL-SCNC: 94 MMOL/L (ref 98–107)
CLARITY UR: ABNORMAL
CO2 SERPL-SCNC: 23 MMOL/L (ref 20–31)
COLOR UR: ABNORMAL
CREAT SERPL-MCNC: 1.7 MG/DL (ref 0.5–0.9)
EOSINOPHIL # BLD: 0.1 K/UL (ref 0–0.4)
EOSINOPHILS RELATIVE PERCENT: 2 % (ref 0–4)
EPI CELLS #/AREA URNS HPF: ABNORMAL /HPF
ERYTHROCYTE [DISTWIDTH] IN BLOOD BY AUTOMATED COUNT: 19.3 % (ref 11.5–14.9)
GFR SERPL CREATININE-BSD FRML MDRD: 32 ML/MIN/1.73M2
GLUCOSE BLD-MCNC: 121 MG/DL (ref 65–105)
GLUCOSE BLD-MCNC: 209 MG/DL (ref 65–105)
GLUCOSE SERPL-MCNC: 155 MG/DL (ref 70–99)
GLUCOSE UR STRIP-MCNC: ABNORMAL MG/DL
HCT VFR BLD AUTO: 22 % (ref 36–46)
HCT VFR BLD AUTO: 24.8 % (ref 36–46)
HGB BLD-MCNC: 7.2 G/DL (ref 12–16)
HGB BLD-MCNC: 8.1 G/DL (ref 12–16)
HGB UR QL STRIP.AUTO: ABNORMAL
INR PPP: 1.3
KETONES UR STRIP-MCNC: NEGATIVE MG/DL
LEUKOCYTE ESTERASE UR QL STRIP: NEGATIVE
LIPASE SERPL-CCNC: 49 U/L (ref 13–60)
LYMPHOCYTES NFR BLD: 0.4 K/UL (ref 1–4.8)
LYMPHOCYTES RELATIVE PERCENT: 11 % (ref 24–44)
MCH RBC QN AUTO: 28.6 PG (ref 26–34)
MCHC RBC AUTO-ENTMCNC: 32.5 G/DL (ref 31–37)
MCV RBC AUTO: 87.9 FL (ref 80–100)
MONOCYTES NFR BLD: 0.4 K/UL (ref 0.1–1.3)
MONOCYTES NFR BLD: 10 % (ref 1–7)
NEUTROPHILS NFR BLD: 76 % (ref 36–66)
NEUTS SEG NFR BLD: 3.1 K/UL (ref 1.3–9.1)
NITRITE UR QL STRIP: NEGATIVE
PH UR STRIP: 7 [PH] (ref 5–8)
PLATELET # BLD AUTO: 188 K/UL (ref 150–450)
PMV BLD AUTO: 8.3 FL (ref 6–12)
POTASSIUM SERPL-SCNC: 4.1 MMOL/L (ref 3.7–5.3)
PROT SERPL-MCNC: 6.1 G/DL (ref 6.4–8.3)
PROT UR STRIP-MCNC: ABNORMAL MG/DL
PROTHROMBIN TIME: 16.4 SEC (ref 11.8–14.6)
RBC # BLD AUTO: 2.51 M/UL (ref 4–5.2)
RBC #/AREA URNS HPF: ABNORMAL /HPF
SODIUM SERPL-SCNC: 128 MMOL/L (ref 135–144)
SP GR UR STRIP: 1.02 (ref 1–1.03)
UROBILINOGEN UR STRIP-ACNC: NORMAL EU/DL (ref 0–1)
WBC #/AREA URNS HPF: ABNORMAL /HPF
WBC OTHER # BLD: 4.1 K/UL (ref 3.5–11)

## 2024-04-27 PROCEDURE — 51700 IRRIGATION OF BLADDER: CPT

## 2024-04-27 PROCEDURE — 82947 ASSAY GLUCOSE BLOOD QUANT: CPT

## 2024-04-27 PROCEDURE — 80076 HEPATIC FUNCTION PANEL: CPT

## 2024-04-27 PROCEDURE — 2580000003 HC RX 258: Performed by: EMERGENCY MEDICINE

## 2024-04-27 PROCEDURE — 80048 BASIC METABOLIC PNL TOTAL CA: CPT

## 2024-04-27 PROCEDURE — 85014 HEMATOCRIT: CPT

## 2024-04-27 PROCEDURE — 81001 URINALYSIS AUTO W/SCOPE: CPT

## 2024-04-27 PROCEDURE — 74177 CT ABD & PELVIS W/CONTRAST: CPT

## 2024-04-27 PROCEDURE — P9016 RBC LEUKOCYTES REDUCED: HCPCS

## 2024-04-27 PROCEDURE — 2580000003 HC RX 258

## 2024-04-27 PROCEDURE — 85018 HEMOGLOBIN: CPT

## 2024-04-27 PROCEDURE — 86850 RBC ANTIBODY SCREEN: CPT

## 2024-04-27 PROCEDURE — 36415 COLL VENOUS BLD VENIPUNCTURE: CPT

## 2024-04-27 PROCEDURE — 1200000000 HC SEMI PRIVATE

## 2024-04-27 PROCEDURE — 51702 INSERT TEMP BLADDER CATH: CPT

## 2024-04-27 PROCEDURE — 6360000004 HC RX CONTRAST MEDICATION: Performed by: EMERGENCY MEDICINE

## 2024-04-27 PROCEDURE — 2500000003 HC RX 250 WO HCPCS

## 2024-04-27 PROCEDURE — 6370000000 HC RX 637 (ALT 250 FOR IP)

## 2024-04-27 PROCEDURE — 86920 COMPATIBILITY TEST SPIN: CPT

## 2024-04-27 PROCEDURE — 85610 PROTHROMBIN TIME: CPT

## 2024-04-27 PROCEDURE — 30233N1 TRANSFUSION OF NONAUTOLOGOUS RED BLOOD CELLS INTO PERIPHERAL VEIN, PERCUTANEOUS APPROACH: ICD-10-PCS | Performed by: INTERNAL MEDICINE

## 2024-04-27 PROCEDURE — 85025 COMPLETE CBC W/AUTO DIFF WBC: CPT

## 2024-04-27 PROCEDURE — 86900 BLOOD TYPING SEROLOGIC ABO: CPT

## 2024-04-27 PROCEDURE — 86901 BLOOD TYPING SEROLOGIC RH(D): CPT

## 2024-04-27 PROCEDURE — 83690 ASSAY OF LIPASE: CPT

## 2024-04-27 PROCEDURE — 99285 EMERGENCY DEPT VISIT HI MDM: CPT

## 2024-04-27 RX ORDER — 0.9 % SODIUM CHLORIDE 0.9 %
1000 INTRAVENOUS SOLUTION INTRAVENOUS ONCE
Status: COMPLETED | OUTPATIENT
Start: 2024-04-27 | End: 2024-04-27

## 2024-04-27 RX ORDER — SODIUM CHLORIDE 9 MG/ML
INJECTION, SOLUTION INTRAVENOUS CONTINUOUS
Status: ACTIVE | OUTPATIENT
Start: 2024-04-27 | End: 2024-04-29

## 2024-04-27 RX ORDER — PANTOPRAZOLE SODIUM 40 MG/1
40 TABLET, DELAYED RELEASE ORAL
Status: DISCONTINUED | OUTPATIENT
Start: 2024-04-27 | End: 2024-05-03 | Stop reason: HOSPADM

## 2024-04-27 RX ORDER — INSULIN LISPRO 100 [IU]/ML
0-4 INJECTION, SOLUTION INTRAVENOUS; SUBCUTANEOUS NIGHTLY
Status: DISCONTINUED | OUTPATIENT
Start: 2024-04-27 | End: 2024-05-03 | Stop reason: HOSPADM

## 2024-04-27 RX ORDER — ATORVASTATIN CALCIUM 10 MG/1
10 TABLET, FILM COATED ORAL NIGHTLY
Status: DISCONTINUED | OUTPATIENT
Start: 2024-04-27 | End: 2024-05-03 | Stop reason: HOSPADM

## 2024-04-27 RX ORDER — 0.9 % SODIUM CHLORIDE 0.9 %
100 INTRAVENOUS SOLUTION INTRAVENOUS ONCE
Status: COMPLETED | OUTPATIENT
Start: 2024-04-27 | End: 2024-04-27

## 2024-04-27 RX ORDER — POLYETHYLENE GLYCOL 3350 17 G/17G
17 POWDER, FOR SOLUTION ORAL DAILY PRN
Status: DISCONTINUED | OUTPATIENT
Start: 2024-04-27 | End: 2024-05-03 | Stop reason: HOSPADM

## 2024-04-27 RX ORDER — VITAMIN B COMPLEX
4000 TABLET ORAL DAILY
Status: DISCONTINUED | OUTPATIENT
Start: 2024-04-28 | End: 2024-05-03 | Stop reason: HOSPADM

## 2024-04-27 RX ORDER — SODIUM CHLORIDE 0.9 % (FLUSH) 0.9 %
5-40 SYRINGE (ML) INJECTION PRN
Status: DISCONTINUED | OUTPATIENT
Start: 2024-04-27 | End: 2024-05-03 | Stop reason: HOSPADM

## 2024-04-27 RX ORDER — POLYETHYLENE GLYCOL 3350 17 G/17G
17 POWDER, FOR SOLUTION ORAL DAILY PRN
Status: DISCONTINUED | OUTPATIENT
Start: 2024-04-27 | End: 2024-04-27 | Stop reason: SDUPTHER

## 2024-04-27 RX ORDER — SODIUM CHLORIDE 0.9 % (FLUSH) 0.9 %
5-40 SYRINGE (ML) INJECTION EVERY 12 HOURS SCHEDULED
Status: DISCONTINUED | OUTPATIENT
Start: 2024-04-27 | End: 2024-05-03 | Stop reason: HOSPADM

## 2024-04-27 RX ORDER — POTASSIUM CHLORIDE 7.45 MG/ML
10 INJECTION INTRAVENOUS PRN
Status: DISCONTINUED | OUTPATIENT
Start: 2024-04-27 | End: 2024-05-03 | Stop reason: HOSPADM

## 2024-04-27 RX ORDER — SODIUM CHLORIDE 0.9 % (FLUSH) 0.9 %
10 SYRINGE (ML) INJECTION PRN
Status: DISCONTINUED | OUTPATIENT
Start: 2024-04-27 | End: 2024-05-03 | Stop reason: HOSPADM

## 2024-04-27 RX ORDER — INSULIN LISPRO 100 [IU]/ML
0-8 INJECTION, SOLUTION INTRAVENOUS; SUBCUTANEOUS
Status: DISCONTINUED | OUTPATIENT
Start: 2024-04-27 | End: 2024-05-03 | Stop reason: HOSPADM

## 2024-04-27 RX ORDER — SODIUM CHLORIDE 9 MG/ML
INJECTION, SOLUTION INTRAVENOUS PRN
Status: DISCONTINUED | OUTPATIENT
Start: 2024-04-27 | End: 2024-05-03 | Stop reason: HOSPADM

## 2024-04-27 RX ORDER — DEXTROSE MONOHYDRATE 100 MG/ML
INJECTION, SOLUTION INTRAVENOUS CONTINUOUS PRN
Status: DISCONTINUED | OUTPATIENT
Start: 2024-04-27 | End: 2024-05-03 | Stop reason: HOSPADM

## 2024-04-27 RX ORDER — LACTULOSE 10 G/15ML
30 SOLUTION ORAL EVERY 8 HOURS PRN
Status: DISCONTINUED | OUTPATIENT
Start: 2024-04-27 | End: 2024-05-03 | Stop reason: HOSPADM

## 2024-04-27 RX ORDER — MAGNESIUM SULFATE HEPTAHYDRATE 40 MG/ML
2000 INJECTION, SOLUTION INTRAVENOUS PRN
Status: DISCONTINUED | OUTPATIENT
Start: 2024-04-27 | End: 2024-05-03 | Stop reason: HOSPADM

## 2024-04-27 RX ORDER — LANOLIN ALCOHOL/MO/W.PET/CERES
3 CREAM (GRAM) TOPICAL NIGHTLY PRN
Status: DISCONTINUED | OUTPATIENT
Start: 2024-04-27 | End: 2024-05-03 | Stop reason: HOSPADM

## 2024-04-27 RX ORDER — CYCLOBENZAPRINE HCL 10 MG
10 TABLET ORAL 3 TIMES DAILY PRN
Status: DISCONTINUED | OUTPATIENT
Start: 2024-04-27 | End: 2024-05-03 | Stop reason: HOSPADM

## 2024-04-27 RX ORDER — ZINC SULFATE 50(220)MG
50 CAPSULE ORAL DAILY
Status: DISCONTINUED | OUTPATIENT
Start: 2024-04-27 | End: 2024-05-03 | Stop reason: HOSPADM

## 2024-04-27 RX ORDER — VITAMIN E 268 MG
400 CAPSULE ORAL DAILY
Status: DISCONTINUED | OUTPATIENT
Start: 2024-04-27 | End: 2024-05-03 | Stop reason: HOSPADM

## 2024-04-27 RX ORDER — GABAPENTIN 100 MG/1
100 CAPSULE ORAL 3 TIMES DAILY PRN
Status: DISCONTINUED | OUTPATIENT
Start: 2024-04-27 | End: 2024-04-28

## 2024-04-27 RX ORDER — LIDOCAINE 4 G/G
1 PATCH TOPICAL DAILY
Status: DISCONTINUED | OUTPATIENT
Start: 2024-04-27 | End: 2024-04-28

## 2024-04-27 RX ORDER — ONDANSETRON 2 MG/ML
4 INJECTION INTRAMUSCULAR; INTRAVENOUS EVERY 6 HOURS PRN
Status: DISCONTINUED | OUTPATIENT
Start: 2024-04-27 | End: 2024-05-03 | Stop reason: HOSPADM

## 2024-04-27 RX ORDER — ONDANSETRON 4 MG/1
4 TABLET, ORALLY DISINTEGRATING ORAL EVERY 8 HOURS PRN
Status: DISCONTINUED | OUTPATIENT
Start: 2024-04-27 | End: 2024-05-03 | Stop reason: HOSPADM

## 2024-04-27 RX ORDER — POTASSIUM CHLORIDE 20 MEQ/1
40 TABLET, EXTENDED RELEASE ORAL PRN
Status: DISCONTINUED | OUTPATIENT
Start: 2024-04-27 | End: 2024-05-03 | Stop reason: HOSPADM

## 2024-04-27 RX ORDER — TAMSULOSIN HYDROCHLORIDE 0.4 MG/1
0.4 CAPSULE ORAL DAILY
Status: DISCONTINUED | OUTPATIENT
Start: 2024-04-27 | End: 2024-05-03 | Stop reason: HOSPADM

## 2024-04-27 RX ADMIN — SODIUM CHLORIDE 100 ML: 9 INJECTION, SOLUTION INTRAVENOUS at 13:36

## 2024-04-27 RX ADMIN — Medication 3 MG: at 21:15

## 2024-04-27 RX ADMIN — SODIUM CHLORIDE 1000 ML: 9 INJECTION, SOLUTION INTRAVENOUS at 12:27

## 2024-04-27 RX ADMIN — ATORVASTATIN CALCIUM 10 MG: 10 TABLET, FILM COATED ORAL at 21:15

## 2024-04-27 RX ADMIN — METOPROLOL TARTRATE 25 MG: 25 TABLET, FILM COATED ORAL at 21:15

## 2024-04-27 RX ADMIN — PANTOPRAZOLE SODIUM 40 MG: 40 TABLET, DELAYED RELEASE ORAL at 17:55

## 2024-04-27 RX ADMIN — ANTI-FUNGAL POWDER MICONAZOLE NITRATE TALC FREE: 1.42 POWDER TOPICAL at 21:14

## 2024-04-27 RX ADMIN — SODIUM CHLORIDE: 9 INJECTION, SOLUTION INTRAVENOUS at 17:38

## 2024-04-27 RX ADMIN — IOPAMIDOL 75 ML: 755 INJECTION, SOLUTION INTRAVENOUS at 13:35

## 2024-04-27 RX ADMIN — TAMSULOSIN HYDROCHLORIDE 0.4 MG: 0.4 CAPSULE ORAL at 17:55

## 2024-04-27 RX ADMIN — SODIUM CHLORIDE, PRESERVATIVE FREE 10 ML: 5 INJECTION INTRAVENOUS at 13:35

## 2024-04-27 RX ADMIN — RIFAXIMIN 550 MG: 550 TABLET ORAL at 21:15

## 2024-04-27 RX ADMIN — GABAPENTIN 100 MG: 100 CAPSULE ORAL at 21:15

## 2024-04-27 ASSESSMENT — PAIN - FUNCTIONAL ASSESSMENT
PAIN_FUNCTIONAL_ASSESSMENT: NONE - DENIES PAIN
PAIN_FUNCTIONAL_ASSESSMENT: PREVENTS OR INTERFERES SOME ACTIVE ACTIVITIES AND ADLS

## 2024-04-27 ASSESSMENT — PAIN DESCRIPTION - PAIN TYPE: TYPE: ACUTE PAIN

## 2024-04-27 ASSESSMENT — PAIN DESCRIPTION - LOCATION: LOCATION: HIP

## 2024-04-27 ASSESSMENT — PAIN SCALES - GENERAL: PAINLEVEL_OUTOF10: 3

## 2024-04-27 ASSESSMENT — PAIN DESCRIPTION - ORIENTATION: ORIENTATION: RIGHT

## 2024-04-27 ASSESSMENT — PAIN DESCRIPTION - DESCRIPTORS: DESCRIPTORS: SPASM;SHARP

## 2024-04-27 NOTE — H&P
[facility]  IN-PATIENT SERVICE  [facility]  IN-PATIENT SERVICE   [facility]     HISTORY AND PHYSICAL EXAMINATION            Date:   4/27/2024  Patient name:  Bobbi Phillips  Date of admission:  4/27/2024 12:05 PM  MRN:   [id number]  Account:  [id number]  YOB: 1955  PCP:    Stacie Doty MD  Room:   06/06  Code Status:    Prior    Chief Complaint:     Chief Complaint   Patient presents with    Hematuria       History Obtained From:     patient, electronic medical record    History of Present Illness:     Bobbi Phillips is a 68 y.o. 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  Patient was admitted to this facility for approximately 20 days for a complicated UTI.  She was discharged to our provide staff.  Patient states that starting last night she noticed some bleeding in her briefs.  During her previous admission she did have some pink-tinged urine in her Kerns which was attributed to her UTI.  Patient did also endorse associated chills for the last couple and fatigue of nights.  Patient denied any headache, lightheadedness, dizziness, shortness of breath, chest pain, abdominal pain, or difficulty urinating.  In the ED, Kerns catheter was inserted with return of gross hematuria which is filling the Kerns bag.  Hemoglobin was 7.2 on presentation, and patient was transfused 1 unit RBCs.  Urology was consulted in the ED who made the patient n.p.o. at midnight for cystoscopy tomorrow.  Patient was also noted to have mild KIM with Cr 1.7, up from 1.1 previously, 5 days ago.  Patient's exam is otherwise benign, no abdominal tenderness, CVA tenderness.  There is mild 1+ lower extremity pitting edema.  CT abdomen/pelvis was significant for large amount  [E78.5] 11/28/2015    Type 2 diabetes mellitus (HCC) [E11.9] 11/28/2015       Plan:     Patient status Admit as inpatient in the  Med/Surge    Gross Hematuria 2/2 likely Urogenital Mass  -Kerns catheter placed in ED, draining sara red blood  -CT abdomen/pelvis showed CT abdomen/pelvis was significant for large amount of inflammatory debris/hemorrhage within the urinary bladder, dilated endometrial canal likely related to cervical stenosis  -Urology consulted, plans for cystoscopy    Acute Blood Loss Anemia  -Hgb 7.2 on presentation  -1 u RBCs transfusion ordered in ED    KIM on CKD  -Cr 1.7 on presentation  -baseline ~1.1  -start gentle hydration after transfusion complete    Type 2 Diabetes Mellitus  -POCT glucose 4 times daily before meals and at bedtime  -Hypoglycemia protocol  -Medium-dose sliding scale    HTN  -Continue home Lopressor 25 mg p.o. twice daily    HLD  -Continue home atorvastatin 10 mg p.o. nightly    BONNER Cirrhosis   -Rifaximin 600 mg p.o. twice daily  -Lactulose 45 mL by mouth every 8 hours as needed for constipation      DVT prophylaxis: SCDs, active bleeding  GI prophylaxis: Protonix 40 mg p.o. twice daily  Diet: Regular diabetic diet, n.p.o. at midnight  Disposition: Likely SNF    OT/PT/SW consults in place    Code Status: Full code    Consultations:   IP CONSULT TO INTERNAL MEDICINE  IP CONSULT TO UROLOGY  IP CONSULT TO SOCIAL WORK     Patient is admitted as inpatient status because of co-morbiditieslisted above, severity of signs and symptoms as outlined, requirement for current medical therapies and most importantly because of direct risk to patient if care not provided in a hospital setting.    Willie Wilson MD  PGY1- Transitional Year Resident  4/27/2024  3:31 PM    This note is created with the assistance of the speech recognition program. While intending to generate a document that actually reflects the content of the visit, it can still have some errors including those of syntax and

## 2024-04-27 NOTE — PLAN OF CARE
Problem: Discharge Planning  Goal: Discharge to home or other facility with appropriate resources  Recent Flowsheet Documentation  Taken 4/27/2024 1702 by Basia Villeda RN  Discharge to home or other facility with appropriate resources:   Identify barriers to discharge with patient and caregiver   Identify discharge learning needs (meds, wound care, etc)   Refer to discharge planning if patient needs post-hospital services based on physician order or complex needs related to functional status, cognitive ability or social support system

## 2024-04-27 NOTE — PROGRESS NOTES
This Rn oriented pt to unit and completed as much of admit as pt could answer. Pt's son's at bedside and stated that she is scheduled for a cysto tomorrow per ER doc. This RN notified Dr. Carrillo of admit to unit and cherry red blood from [place], this rn waiting for response.

## 2024-04-27 NOTE — ED PROVIDER NOTES
EMERGENCY DEPARTMENT ENCOUNTER    Pt Name: Bobbi Phillips  MRN: [id number]  Birthdate 1955  Date of evaluation: 4/27/24  CHIEF COMPLAINT       Chief Complaint   Patient presents with    Hematuria     HISTORY OF PRESENT ILLNESS   68-year-old female presenting to the ER with rectal bleeding that started earlier today.  Patient does have an underlying history of liver cirrhosis.  Patient does not have a history of blood in the stool in the past.  The patient was recently discharged from the hospital after being treated for urinary tract infection.    The history is provided by the patient and the EMS personnel.   Rectal Bleeding  Quality:  Bright red  Amount:  Moderate  Duration:  4 hours  Chronicity:  New  Associated symptoms: no abdominal pain, no dizziness and no vomiting            REVIEW OF SYSTEMS     Review of Systems   Constitutional:  Negative for activity change, appetite change and fatigue.   HENT:  Negative for facial swelling, trouble swallowing and voice change.    Eyes:  Negative for photophobia and pain.   Respiratory:  Negative for chest tightness and shortness of breath.    Cardiovascular:  Negative for chest pain and palpitations.   Gastrointestinal:  Positive for anal bleeding, blood in stool and hematochezia. Negative for abdominal pain, nausea and vomiting.   Genitourinary:  Negative for dysuria and urgency.   Musculoskeletal:  Negative for arthralgias and back pain.   Skin:  Negative for color change and rash.   Neurological:  Negative for dizziness, syncope and headaches.   Psychiatric/Behavioral:  Negative for behavioral problems and hallucinations.      PASTMEDICAL HISTORY     Past Medical History:   Diagnosis Date    Abdominal swelling     Claustrophobia     COVID-19 vaccine administered     Diabetes (HCC)     DEXCOM LT ARM    Diabetic retinopathy (HCC) 11/28/2015    Flatus     H/O acute sinusitis     H/O cholelithiasis     Hypertension     Liver cirrhosis (HCC)     LLQ abdominal tenderness

## 2024-04-27 NOTE — ED TRIAGE NOTES
Mode of arrival (squad #, walk in, police, etc) : carlitos        Chief complaint(s): rectal bleeding        Arrival Note (brief scenario, treatment PTA, etc).: pt sent in for rectal bleeding after staff at [facility] noted 3 bloody briefs today. Pt has hx liver cirrhosis. Pt denies complaints at this time        C= \"Have you ever felt that you should Cut down on your drinking?\"  No  A= \"Have people Annoyed you by criticizing your drinking?\"  No  G= \"Have you ever felt bad or Guilty about your drinking?\"  No  E= \"Have you ever had a drink as an Eye-opener first thing in the morning to steady your nerves or to help a hangover?\"  No      Deferred []      Reason for deferring: N/A    *If yes to two or more: probable alcohol abuse.*

## 2024-04-27 NOTE — ED NOTES
Report given to EMILY Mendoza from [facility].   Report method by phone   The following was reviewed with receiving RN:   Current vital signs:  BP (!) 129/54   Pulse 83   Temp 98.5 °F (36.9 °C) (Oral)   Resp 15   Wt 102.1 kg (225 lb)   SpO2 97%   BMI 45.44 kg/m²                MEWS Score: 1     Any medication or safety alerts were reviewed. Any pending diagnostics and notifications were also reviewed, as well as any safety concerns or issues, abnormal labs, abnormal imaging, and abnormal assessment findings. Questions were answered.

## 2024-04-28 LAB
ANION GAP SERPL CALCULATED.3IONS-SCNC: 7 MMOL/L (ref 9–17)
BASOPHILS # BLD: 0 K/UL (ref 0–0.2)
BASOPHILS NFR BLD: 0 % (ref 0–2)
BUN SERPL-MCNC: 44 MG/DL (ref 8–23)
CALCIUM SERPL-MCNC: 7.3 MG/DL (ref 8.6–10.4)
CHLORIDE SERPL-SCNC: 98 MMOL/L (ref 98–107)
CO2 SERPL-SCNC: 23 MMOL/L (ref 20–31)
CREAT SERPL-MCNC: 1.4 MG/DL (ref 0.5–0.9)
EOSINOPHIL # BLD: 0.09 K/UL (ref 0–0.4)
EOSINOPHILS RELATIVE PERCENT: 2 % (ref 0–4)
ERYTHROCYTE [DISTWIDTH] IN BLOOD BY AUTOMATED COUNT: 18.8 % (ref 11.5–14.9)
GFR SERPL CREATININE-BSD FRML MDRD: 41 ML/MIN/1.73M2
GLUCOSE BLD-MCNC: 106 MG/DL (ref 65–105)
GLUCOSE BLD-MCNC: 183 MG/DL (ref 65–105)
GLUCOSE BLD-MCNC: 208 MG/DL (ref 65–105)
GLUCOSE BLD-MCNC: 58 MG/DL (ref 65–105)
GLUCOSE BLD-MCNC: 90 MG/DL (ref 65–105)
GLUCOSE SERPL-MCNC: 106 MG/DL (ref 70–99)
HCT VFR BLD AUTO: 21 % (ref 36–46)
HCT VFR BLD AUTO: 25.7 % (ref 36–46)
HCT VFR BLD AUTO: 28.1 % (ref 36–46)
HGB BLD-MCNC: 6.8 G/DL (ref 12–16)
HGB BLD-MCNC: 8.4 G/DL (ref 12–16)
HGB BLD-MCNC: 9 G/DL (ref 12–16)
LYMPHOCYTES NFR BLD: 0.42 K/UL (ref 1–4.8)
LYMPHOCYTES RELATIVE PERCENT: 9 % (ref 24–44)
MCH RBC QN AUTO: 29.4 PG (ref 26–34)
MCHC RBC AUTO-ENTMCNC: 32.5 G/DL (ref 31–37)
MCV RBC AUTO: 90.6 FL (ref 80–100)
MONOCYTES NFR BLD: 0.52 K/UL (ref 0.1–1.3)
MONOCYTES NFR BLD: 11 % (ref 1–7)
MORPHOLOGY: ABNORMAL
MORPHOLOGY: ABNORMAL
NEUTROPHILS NFR BLD: 78 % (ref 36–66)
NEUTS SEG NFR BLD: 3.67 K/UL (ref 1.3–9.1)
PLATELET # BLD AUTO: 198 K/UL (ref 150–450)
PMV BLD AUTO: 8.8 FL (ref 6–12)
POTASSIUM SERPL-SCNC: 4 MMOL/L (ref 3.7–5.3)
RBC # BLD AUTO: 2.32 M/UL (ref 4–5.2)
SODIUM SERPL-SCNC: 128 MMOL/L (ref 135–144)
WBC OTHER # BLD: 4.7 K/UL (ref 3.5–11)

## 2024-04-28 PROCEDURE — 2580000003 HC RX 258

## 2024-04-28 PROCEDURE — 6360000002 HC RX W HCPCS

## 2024-04-28 PROCEDURE — 36430 TRANSFUSION BLD/BLD COMPNT: CPT

## 2024-04-28 PROCEDURE — 6370000000 HC RX 637 (ALT 250 FOR IP)

## 2024-04-28 PROCEDURE — 85014 HEMATOCRIT: CPT

## 2024-04-28 PROCEDURE — 36415 COLL VENOUS BLD VENIPUNCTURE: CPT

## 2024-04-28 PROCEDURE — 1200000000 HC SEMI PRIVATE

## 2024-04-28 PROCEDURE — 51702 INSERT TEMP BLADDER CATH: CPT

## 2024-04-28 PROCEDURE — 82947 ASSAY GLUCOSE BLOOD QUANT: CPT

## 2024-04-28 PROCEDURE — 85018 HEMOGLOBIN: CPT

## 2024-04-28 PROCEDURE — 51700 IRRIGATION OF BLADDER: CPT

## 2024-04-28 PROCEDURE — 80048 BASIC METABOLIC PNL TOTAL CA: CPT

## 2024-04-28 PROCEDURE — 85025 COMPLETE CBC W/AUTO DIFF WBC: CPT

## 2024-04-28 PROCEDURE — P9016 RBC LEUKOCYTES REDUCED: HCPCS

## 2024-04-28 PROCEDURE — 99223 1ST HOSP IP/OBS HIGH 75: CPT | Performed by: INTERNAL MEDICINE

## 2024-04-28 PROCEDURE — 6370000000 HC RX 637 (ALT 250 FOR IP): Performed by: NURSE PRACTITIONER

## 2024-04-28 RX ORDER — 0.9 % SODIUM CHLORIDE 0.9 %
500 INTRAVENOUS SOLUTION INTRAVENOUS ONCE
Status: DISCONTINUED | OUTPATIENT
Start: 2024-04-28 | End: 2024-05-03 | Stop reason: HOSPADM

## 2024-04-28 RX ORDER — GABAPENTIN 100 MG/1
100 CAPSULE ORAL 3 TIMES DAILY
Status: DISCONTINUED | OUTPATIENT
Start: 2024-04-28 | End: 2024-04-28

## 2024-04-28 RX ORDER — LIDOCAINE 4 G/G
1 PATCH TOPICAL DAILY
Status: DISCONTINUED | OUTPATIENT
Start: 2024-04-28 | End: 2024-05-03 | Stop reason: HOSPADM

## 2024-04-28 RX ORDER — GABAPENTIN 100 MG/1
100 CAPSULE ORAL 3 TIMES DAILY
Status: DISCONTINUED | OUTPATIENT
Start: 2024-04-28 | End: 2024-05-03 | Stop reason: HOSPADM

## 2024-04-28 RX ORDER — SODIUM CHLORIDE 9 MG/ML
INJECTION, SOLUTION INTRAVENOUS PRN
Status: DISCONTINUED | OUTPATIENT
Start: 2024-04-28 | End: 2024-05-03 | Stop reason: HOSPADM

## 2024-04-28 RX ORDER — OXYCODONE HYDROCHLORIDE 5 MG/1
5 TABLET ORAL EVERY 6 HOURS PRN
Status: DISCONTINUED | OUTPATIENT
Start: 2024-04-28 | End: 2024-05-03 | Stop reason: HOSPADM

## 2024-04-28 RX ORDER — SULFAMETHOXAZOLE AND TRIMETHOPRIM 800; 160 MG/1; MG/1
1 TABLET ORAL DAILY
Status: DISCONTINUED | OUTPATIENT
Start: 2024-04-28 | End: 2024-05-03 | Stop reason: HOSPADM

## 2024-04-28 RX ADMIN — DEXTROSE MONOHYDRATE 250 ML: 100 INJECTION, SOLUTION INTRAVENOUS at 07:11

## 2024-04-28 RX ADMIN — ANTI-FUNGAL POWDER MICONAZOLE NITRATE TALC FREE: 1.42 POWDER TOPICAL at 09:05

## 2024-04-28 RX ADMIN — Medication 50 MG: at 10:16

## 2024-04-28 RX ADMIN — RIFAXIMIN 550 MG: 550 TABLET ORAL at 10:16

## 2024-04-28 RX ADMIN — POLYETHYLENE GLYCOL 3350 17 G: 17 POWDER, FOR SOLUTION ORAL at 15:03

## 2024-04-28 RX ADMIN — METOPROLOL TARTRATE 25 MG: 25 TABLET, FILM COATED ORAL at 21:03

## 2024-04-28 RX ADMIN — OXYCODONE HYDROCHLORIDE 5 MG: 5 TABLET ORAL at 22:11

## 2024-04-28 RX ADMIN — INSULIN LISPRO 2 UNITS: 100 INJECTION, SOLUTION INTRAVENOUS; SUBCUTANEOUS at 17:15

## 2024-04-28 RX ADMIN — RIFAXIMIN 550 MG: 550 TABLET ORAL at 20:58

## 2024-04-28 RX ADMIN — ATORVASTATIN CALCIUM 10 MG: 10 TABLET, FILM COATED ORAL at 20:57

## 2024-04-28 RX ADMIN — Medication 4000 UNITS: at 09:03

## 2024-04-28 RX ADMIN — LACTULOSE 30 G: 20 SOLUTION ORAL at 21:03

## 2024-04-28 RX ADMIN — SODIUM CHLORIDE, PRESERVATIVE FREE 10 ML: 5 INJECTION INTRAVENOUS at 10:16

## 2024-04-28 RX ADMIN — GABAPENTIN 100 MG: 100 CAPSULE ORAL at 18:06

## 2024-04-28 RX ADMIN — METOPROLOL TARTRATE 25 MG: 25 TABLET, FILM COATED ORAL at 09:04

## 2024-04-28 RX ADMIN — Medication 3 MG: at 20:57

## 2024-04-28 RX ADMIN — PANTOPRAZOLE SODIUM 40 MG: 40 TABLET, DELAYED RELEASE ORAL at 15:03

## 2024-04-28 RX ADMIN — Medication 400 UNITS: at 10:16

## 2024-04-28 RX ADMIN — SULFAMETHOXAZOLE AND TRIMETHOPRIM 1 TABLET: 800; 160 TABLET ORAL at 15:03

## 2024-04-28 RX ADMIN — TAMSULOSIN HYDROCHLORIDE 0.4 MG: 0.4 CAPSULE ORAL at 09:03

## 2024-04-28 RX ADMIN — PANTOPRAZOLE SODIUM 40 MG: 40 TABLET, DELAYED RELEASE ORAL at 09:04

## 2024-04-28 RX ADMIN — ONDANSETRON 4 MG: 2 INJECTION INTRAMUSCULAR; INTRAVENOUS at 09:04

## 2024-04-28 RX ADMIN — CYCLOBENZAPRINE 10 MG: 10 TABLET, FILM COATED ORAL at 18:06

## 2024-04-28 RX ADMIN — ANTI-FUNGAL POWDER MICONAZOLE NITRATE TALC FREE: 1.42 POWDER TOPICAL at 20:58

## 2024-04-28 ASSESSMENT — PAIN SCALES - WONG BAKER: WONGBAKER_NUMERICALRESPONSE: NO HURT

## 2024-04-28 ASSESSMENT — PAIN DESCRIPTION - LOCATION: LOCATION: ABDOMEN

## 2024-04-28 ASSESSMENT — PAIN SCALES - GENERAL: PAINLEVEL_OUTOF10: 9

## 2024-04-28 ASSESSMENT — PAIN DESCRIPTION - DESCRIPTORS: DESCRIPTORS: DISCOMFORT;TENDER;TIGHTNESS

## 2024-04-28 NOTE — PROGRESS NOTES
[facility]   OCCUPATIONAL THERAPY MISSED TREATMENT NOTE   INPATIENT   Date: 24  Patient Name: Bobbi Phillips       Room:   MRN: [id number]   Account #: [id number]    : 1955  (68 y.o.)  Gender: female                 REASON FOR MISSED TREATMENT:  Hold treatment per nursing request   -   RN deferred OT evaluation due to Hgb 6.8 and pt being on continuous bladder irrigation.  Will follow and attempt 24 for OT evaluation.    Electronically signed by Sylvia Vo OT on 24 at 11:37 AM EDT

## 2024-04-28 NOTE — CONSENT
Informed Consent for Blood Component Transfusion Note    I have discussed with the patient the rationale for blood component transfusion; its benefits in treating or preventing fatigue, organ damage, or death; and its risk which includes mild transfusion reactions, rare risk of blood borne infection, or more serious but rare reactions. I have discussed the alternatives to transfusion, including the risk and consequences of not receiving transfusion. The patient had an opportunity to ask questions and had agreed to proceed with transfusion of blood components.    Electronically signed by Willie Wilson MD on 4/28/24 at 9:28 AM EDT

## 2024-04-28 NOTE — PLAN OF CARE
Problem: Discharge Planning  Goal: Discharge to home or other facility with appropriate resources  Outcome: Progressing  Flowsheets  Taken 4/27/2024 2118 by Andrew Borjas RN  Discharge to home or other facility with appropriate resources: Identify barriers to discharge with patient and caregiver  Taken 4/27/2024 1702 by Basia Villeda RN  Discharge to home or other facility with appropriate resources:   Identify barriers to discharge with patient and caregiver   Identify discharge learning needs (meds, wound care, etc)   Refer to discharge planning if patient needs post-hospital services based on physician order or complex needs related to functional status, cognitive ability or social support system     Problem: Safety - Adult  Goal: Free from fall injury  Outcome: Progressing  Flowsheets (Taken 4/27/2024 2006)  Free From Fall Injury: Instruct family/caregiver on patient safety     Problem: Skin/Tissue Integrity  Goal: Absence of new skin breakdown  Description: 1.  Monitor for areas of redness and/or skin breakdown  2.  Assess vascular access sites hourly  3.  Every 4-6 hours minimum:  Change oxygen saturation probe site  4.  Every 4-6 hours:  If on nasal continuous positive airway pressure, respiratory therapy assess nares and determine need for appliance change or resting period.  Outcome: Progressing     Problem: Pain  Goal: Verbalizes/displays adequate comfort level or baseline comfort level  Outcome: Progressing     Problem: ABCDS Injury Assessment  Goal: Absence of physical injury  Outcome: Progressing  Flowsheets  Taken 4/27/2024 2006 by Andrew Borjas RN  Absence of Physical Injury: Implement safety measures based on patient assessment  Taken 4/27/2024 1656 by Basia Villeda RN  Absence of Physical Injury: Implement safety measures based on patient assessment     Problem: Skin/Tissue Integrity - Adult  Goal: Skin integrity remains intact  Outcome: Progressing  Flowsheets  Taken 4/27/2024

## 2024-04-28 NOTE — PROGRESS NOTES
Writer notified Dr. Hammer that pt stated blood clots were found in her brief at facility today and upon physical assessment writer found clots around nichols catheter leaking into brief. There is currently 100 mL in catheter bag but no clots are present in bag. Per Dr. Hammer place a 22 [place] three way nichols, hand irrigate, and start CBI. Instructed to keep light pink.

## 2024-04-28 NOTE — CARE COORDINATION
Case Management Assessment  Initial Evaluation    Date/Time of Evaluation: 4/28/2024 12:45 PM  Assessment Completed by: Nataliya Duvall RN    If patient is discharged prior to next notation, then this note serves as note for discharge by case management.    Patient Name: Bobbi Phillips                   YOB: 1955  Diagnosis: Hematuria [R31.9]  Anemia, unspecified type [D64.9]  Hematuria, unspecified type [R31.9]                   Date / Time: 4/27/2024 12:05 PM    Patient Admission Status: Inpatient   Readmission Risk (Low < 19, Mod (19-27), High > 27): Readmission Risk Score: 25.2    Current PCP: Stacie Doty MD  PCP verified by CM? Yes    Chart Reviewed: Yes      History Provided by: Patient  Patient Orientation: Alert and Oriented    Patient Cognition: Alert    Hospitalization in the last 30 days (Readmission):  Yes    If yes, Readmission Assessment in CM Navigator will be completed.    Advance Directives:      Code Status: Full Code   Patient's Primary Decision Maker is:      Primary Decision Maker: Phillip Peters *HIPAA* - Child - [phone]    Primary Decision Maker: TYLER PETERS *HIPAA* - Child - [phone]    Discharge Planning:    Patient lives with: Other (Comment) (SNF) Type of Home: Skilled Nursing Facility  Primary Care Giver: Other (Comment) (From SNF- [facility])  Patient Support Systems include: Children, Family Members   Current Financial resources: Medicare  Current community resources: None  Current services prior to admission: Skilled Nursing Facility            Current DME: Walker, Glucometer            Type of Home Care services:  PT, OT    ADLS  Prior functional level: Independent in ADLs/IADLs  Current functional level: Independent in ADLs/IADLs    PT AM-PAC:   /24  OT AM-PAC:   /24    Family can provide assistance at DC:    Would you like Case Management to discuss the discharge plan with any other family members/significant others, and if so, who?

## 2024-04-28 NOTE — PLAN OF CARE
Problem: Discharge Planning  Goal: Discharge to home or other facility with appropriate resources  Outcome: Progressing     Problem: Safety - Adult  Goal: Free from fall injury  Outcome: Progressing     Problem: Skin/Tissue Integrity  Goal: Absence of new skin breakdown  Description: 1.  Monitor for areas of redness and/or skin breakdown  2.  Assess vascular access sites hourly  3.  Every 4-6 hours minimum:  Change oxygen saturation probe site  4.  Every 4-6 hours:  If on nasal continuous positive airway pressure, respiratory therapy assess nares and determine need for appliance change or resting period.  Outcome: Progressing     Problem: Pain  Goal: Verbalizes/displays adequate comfort level or baseline comfort level  Outcome: Progressing     Problem: ABCDS Injury Assessment  Goal: Absence of physical injury  Outcome: Progressing     Problem: Skin/Tissue Integrity - Adult  Goal: Skin integrity remains intact  Outcome: Progressing     Problem: Musculoskeletal - Adult  Goal: Return mobility to safest level of function  Outcome: Progressing     Problem: Genitourinary - Adult  Goal: Urinary catheter remains patent  Outcome: Progressing

## 2024-04-28 NOTE — PROGRESS NOTES
Writer spoke with Dr. Hamemr to clarify if pt is going to surgery tomorrow or not. Per Dr. Hammer they will see pt tomorrow and evaluate but to have pt NPO at midnight incase.

## 2024-04-28 NOTE — PROGRESS NOTES
[facility]  IN-PATIENT SERVICE  [facility]    PROGRESS NOTE             4/28/2024    7:37 AM    Name:   Bobbi Phillips  MRN:     [id number]     Acct:      [id number]   Room:   2073/2073-01   Day:  1  Admit Date:  4/27/2024 12:05 PM    PCP:  Stacie Doty MD  Code Status:  Full Code    Subjective:     C/C:   Chief Complaint   Patient presents with    Hematuria     Interval History Status: not changed.    Patient seen and examined at bedside this morning.  No acute events reported overnight.  Patient is resting comfortably in bed.  Awaiting Urology recommendations.    Brief History:     Bobbi Phillips is a 68 y.o. 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  Patient was admitted to this facility for approximately 20 days for a complicated UTI.  She was discharged to our provide staff.  Patient states that starting last night she noticed some bleeding in her briefs.  During her previous admission she did have some pink-tinged urine in her Kerns which was attributed to her UTI.  Patient did also endorse associated chills for the last couple and fatigue of nights.  Patient denied any headache, lightheadedness, dizziness, shortness of breath, chest pain, abdominal pain, or difficulty urinating.  In the ED, Kerns catheter was inserted with return of gross hematuria which is filling the Kerns bag.  Hemoglobin was 7.2 on presentation, and patient was transfused 1 unit RBCs.  Urology was consulted in the ED who made the patient n.p.o. at midnight for cystoscopy tomorrow.  Patient was also noted to have mild KIM with Cr 1.7, up from 1.1 previously, 5 days ago.  Patient's exam is otherwise benign, no abdominal tenderness, CVA tenderness.  There is mild 1+ lower extremity pitting edema.  CT abdomen/pelvis was significant  for large amount of inflammatory debris/hemorrhage within the urinary bladder, dilated endometrial canal likely related to cervical stenosis.  Underlying malignancy not excluded.  Anasarca and trace bilateral pleural effusions and moderate to large amount of ascites.     Patient was admitted to the MedSur unit for further management of gross hematuria.    Review of Systems:     Review of Systems   Constitutional:  Positive for chills and fatigue. Negative for fever.   Respiratory:  Negative for cough and shortness of breath.    Cardiovascular:  Positive for leg swelling. Negative for chest pain and palpitations.   Gastrointestinal:  Negative for abdominal distention, abdominal pain, blood in stool, diarrhea, nausea and vomiting.   Genitourinary:  Positive for hematuria. Negative for difficulty urinating.   Musculoskeletal:  Negative for myalgias.   Skin:  Negative for rash.   Neurological:  Negative for dizziness, light-headedness and headaches.   Psychiatric/Behavioral:  The patient is nervous/anxious.        Medications:     Allergies:    Allergies   Allergen Reactions    Tylenol [Acetaminophen] Other (See Comments)     intolerance due to cirrhosis       Current Meds:   Scheduled Meds:    sodium chloride flush  5-40 mL IntraVENous 2 times per day    atorvastatin  10 mg Oral Nightly    Vitamin D  4,000 Units Oral Daily    lidocaine  1 patch TransDERmal Daily    metoprolol tartrate  25 mg Oral BID    miconazole   Topical BID    pantoprazole  40 mg Oral BID AC    rifAXIMin  550 mg Oral BID    tamsulosin  0.4 mg Oral Daily    zinc sulfate  50 mg Oral Daily    vitamin E  400 Units Oral Daily    insulin lispro  0-8 Units SubCUTAneous TID     insulin lispro  0-4 Units SubCUTAneous Nightly     Continuous Infusions:    sodium chloride      sodium chloride      sodium chloride 75 mL/hr at 04/27/24 1738    dextrose       PRN Meds: sodium chloride flush, sodium chloride, sodium chloride flush, sodium chloride, potassium

## 2024-04-28 NOTE — ACP (ADVANCE CARE PLANNING)
Advance Care Planning     Advance Care Planning Activator (Inpatient)  Conversation Note      Date of ACP Conversation: 4/28/2024     Conversation Conducted with: Patient with Decision Making Capacity    ACP Activator: Nataliya Duvall RN        Health Care Decision Maker:     Current Designated Health Care Decision Maker:     Primary Decision Maker: Phillip Lofton *HIPAA* - Child - [phone]    Primary Decision Maker: FRANTYLER *[facility]* - Child - [phone]        Care Preferences    Ventilation:  \"If you were in your present state of health and suddenly became very ill and were unable to breathe on your own, what would your preference be about the use of a ventilator (breathing machine) if it were available to you?\"      Would the patient desire the use of ventilator (breathing machine)?: yes    \"If your health worsens and it becomes clear that your chance of recovery is unlikely, what would your preference be about the use of a ventilator (breathing machine) if it were available to you?\"     Would the patient desire the use of ventilator (breathing machine)?: \"I don't Know\"      Resuscitation  \"CPR works best to restart the heart when there is a sudden event, like a heart attack, in someone who is otherwise healthy. Unfortunately, CPR does not typically restart the heart for people who have serious health conditions or who are very sick.\"    \"In the event your heart stopped as a result of an underlying serious health condition, would you want attempts to be made to restart your heart (answer \"yes\" for attempt to resuscitate) or would you prefer a natural death (answer \"no\" for do not attempt to resuscitate)?\" yes       [] Yes   [] No   Educated Patient / Decision Maker regarding differences between Advance Directives and portable DNR orders.    Length of ACP Conversation in minutes:      Conversation Outcomes:  ACP discussion completed    Follow-up plan:    [] Schedule follow-up conversation to continue

## 2024-04-28 NOTE — PROGRESS NOTES
Physical Therapy          Physical Therapy Cancel Note      DATE: 2024    NAME: Bobbi Phillips  MRN: [id number]   : 1955      Patient not seen this date for Physical Therapy due to:    Other: RN deferred PT evaluation due to Hgb 6.8/pending transfusion, and pt being on continuous bladder irrigation.  Will follow 24 for initial evaluation.      Electronically signed by Adali Mcgee PT on 2024 at 9:22 AM

## 2024-04-29 ENCOUNTER — ANESTHESIA (OUTPATIENT)
Dept: OPERATING ROOM | Age: 69
End: 2024-04-29
Payer: MEDICARE

## 2024-04-29 ENCOUNTER — ANESTHESIA EVENT (OUTPATIENT)
Dept: OPERATING ROOM | Age: 69
End: 2024-04-29
Payer: MEDICARE

## 2024-04-29 LAB
ABO/RH: NORMAL
ANION GAP SERPL CALCULATED.3IONS-SCNC: 9 MMOL/L (ref 9–17)
ANTIBODY SCREEN: NEGATIVE
ARM BAND NUMBER: NORMAL
BASOPHILS # BLD: 0 K/UL (ref 0–0.2)
BASOPHILS NFR BLD: 0 % (ref 0–2)
BLOOD BANK BLOOD PRODUCT EXPIRATION DATE: NORMAL
BLOOD BANK BLOOD PRODUCT EXPIRATION DATE: NORMAL
BLOOD BANK DISPENSE STATUS: NORMAL
BLOOD BANK DISPENSE STATUS: NORMAL
BLOOD BANK ISBT PRODUCT BLOOD TYPE: 6200
BLOOD BANK ISBT PRODUCT BLOOD TYPE: 6200
BLOOD BANK PRODUCT CODE: NORMAL
BLOOD BANK PRODUCT CODE: NORMAL
BLOOD BANK SAMPLE EXPIRATION: NORMAL
BLOOD BANK UNIT TYPE AND RH: NORMAL
BLOOD BANK UNIT TYPE AND RH: NORMAL
BPU ID: NORMAL
BPU ID: NORMAL
BUN SERPL-MCNC: 44 MG/DL (ref 8–23)
CALCIUM SERPL-MCNC: 7.4 MG/DL (ref 8.6–10.4)
CHLORIDE SERPL-SCNC: 96 MMOL/L (ref 98–107)
CO2 SERPL-SCNC: 21 MMOL/L (ref 20–31)
COMPONENT: NORMAL
COMPONENT: NORMAL
CREAT SERPL-MCNC: 1.4 MG/DL (ref 0.5–0.9)
CROSSMATCH RESULT: NORMAL
CROSSMATCH RESULT: NORMAL
EOSINOPHIL # BLD: 0.1 K/UL (ref 0–0.4)
EOSINOPHILS RELATIVE PERCENT: 2 % (ref 0–4)
ERYTHROCYTE [DISTWIDTH] IN BLOOD BY AUTOMATED COUNT: 17.2 % (ref 11.5–14.9)
GFR SERPL CREATININE-BSD FRML MDRD: 41 ML/MIN/1.73M2
GLUCOSE BLD-MCNC: 101 MG/DL (ref 65–105)
GLUCOSE BLD-MCNC: 128 MG/DL (ref 65–105)
GLUCOSE BLD-MCNC: 246 MG/DL (ref 65–105)
GLUCOSE BLD-MCNC: 78 MG/DL (ref 65–105)
GLUCOSE BLD-MCNC: 96 MG/DL (ref 65–105)
GLUCOSE SERPL-MCNC: 133 MG/DL (ref 70–99)
HCT VFR BLD AUTO: 25.3 % (ref 36–46)
HCT VFR BLD AUTO: 25.3 % (ref 36–46)
HCT VFR BLD AUTO: 25.6 % (ref 36–46)
HCT VFR BLD AUTO: 25.9 % (ref 36–46)
HGB BLD-MCNC: 8.2 G/DL (ref 12–16)
HGB BLD-MCNC: 8.3 G/DL (ref 12–16)
HGB BLD-MCNC: 8.4 G/DL (ref 12–16)
HGB BLD-MCNC: 8.4 G/DL (ref 12–16)
LYMPHOCYTES NFR BLD: 0.5 K/UL (ref 1–4.8)
LYMPHOCYTES RELATIVE PERCENT: 7 % (ref 24–44)
MCH RBC QN AUTO: 28.6 PG (ref 26–34)
MCHC RBC AUTO-ENTMCNC: 32.7 G/DL (ref 31–37)
MCV RBC AUTO: 87.3 FL (ref 80–100)
MONOCYTES NFR BLD: 0.6 K/UL (ref 0.1–1.3)
MONOCYTES NFR BLD: 8 % (ref 1–7)
NEUTROPHILS NFR BLD: 83 % (ref 36–66)
NEUTS SEG NFR BLD: 6.1 K/UL (ref 1.3–9.1)
OSMOLALITY SERPL: 285 MOSM/KG (ref 275–295)
PLATELET # BLD AUTO: 204 K/UL (ref 150–450)
PMV BLD AUTO: 8.3 FL (ref 6–12)
POTASSIUM SERPL-SCNC: 4.7 MMOL/L (ref 3.7–5.3)
RBC # BLD AUTO: 2.93 M/UL (ref 4–5.2)
SODIUM SERPL-SCNC: 126 MMOL/L (ref 135–144)
SODIUM SERPL-SCNC: 129 MMOL/L (ref 135–144)
TRANSFUSION STATUS: NORMAL
TRANSFUSION STATUS: NORMAL
UNIT DIVISION: 0
UNIT DIVISION: 0
UNIT ISSUE DATE/TIME: NORMAL
UNIT ISSUE DATE/TIME: NORMAL
WBC OTHER # BLD: 7.3 K/UL (ref 3.5–11)

## 2024-04-29 PROCEDURE — 2720000010 HC SURG SUPPLY STERILE: Performed by: UROLOGY

## 2024-04-29 PROCEDURE — 99232 SBSQ HOSP IP/OBS MODERATE 35: CPT | Performed by: INTERNAL MEDICINE

## 2024-04-29 PROCEDURE — 85025 COMPLETE CBC W/AUTO DIFF WBC: CPT

## 2024-04-29 PROCEDURE — 6360000002 HC RX W HCPCS: Performed by: NURSE PRACTITIONER

## 2024-04-29 PROCEDURE — 3700000001 HC ADD 15 MINUTES (ANESTHESIA): Performed by: UROLOGY

## 2024-04-29 PROCEDURE — 82947 ASSAY GLUCOSE BLOOD QUANT: CPT

## 2024-04-29 PROCEDURE — 3600000002 HC SURGERY LEVEL 2 BASE: Performed by: UROLOGY

## 2024-04-29 PROCEDURE — 1200000000 HC SEMI PRIVATE

## 2024-04-29 PROCEDURE — 3700000000 HC ANESTHESIA ATTENDED CARE: Performed by: UROLOGY

## 2024-04-29 PROCEDURE — 2500000003 HC RX 250 WO HCPCS: Performed by: NURSE ANESTHETIST, CERTIFIED REGISTERED

## 2024-04-29 PROCEDURE — 99213 OFFICE O/P EST LOW 20 MIN: CPT

## 2024-04-29 PROCEDURE — 6370000000 HC RX 637 (ALT 250 FOR IP)

## 2024-04-29 PROCEDURE — 80048 BASIC METABOLIC PNL TOTAL CA: CPT

## 2024-04-29 PROCEDURE — 2580000003 HC RX 258: Performed by: ANESTHESIOLOGY

## 2024-04-29 PROCEDURE — 6360000002 HC RX W HCPCS: Performed by: NURSE ANESTHETIST, CERTIFIED REGISTERED

## 2024-04-29 PROCEDURE — 2580000003 HC RX 258: Performed by: UROLOGY

## 2024-04-29 PROCEDURE — 3600000012 HC SURGERY LEVEL 2 ADDTL 15MIN: Performed by: UROLOGY

## 2024-04-29 PROCEDURE — 7100000001 HC PACU RECOVERY - ADDTL 15 MIN: Performed by: UROLOGY

## 2024-04-29 PROCEDURE — 84295 ASSAY OF SERUM SODIUM: CPT

## 2024-04-29 PROCEDURE — 51702 INSERT TEMP BLADDER CATH: CPT

## 2024-04-29 PROCEDURE — 36415 COLL VENOUS BLD VENIPUNCTURE: CPT

## 2024-04-29 PROCEDURE — 6370000000 HC RX 637 (ALT 250 FOR IP): Performed by: UROLOGY

## 2024-04-29 PROCEDURE — 0TBB8ZZ EXCISION OF BLADDER, VIA NATURAL OR ARTIFICIAL OPENING ENDOSCOPIC: ICD-10-PCS | Performed by: UROLOGY

## 2024-04-29 PROCEDURE — 0TCB8ZZ EXTIRPATION OF MATTER FROM BLADDER, VIA NATURAL OR ARTIFICIAL OPENING ENDOSCOPIC: ICD-10-PCS | Performed by: UROLOGY

## 2024-04-29 PROCEDURE — 83930 ASSAY OF BLOOD OSMOLALITY: CPT

## 2024-04-29 PROCEDURE — 88307 TISSUE EXAM BY PATHOLOGIST: CPT

## 2024-04-29 PROCEDURE — 85018 HEMOGLOBIN: CPT

## 2024-04-29 PROCEDURE — 85014 HEMATOCRIT: CPT

## 2024-04-29 PROCEDURE — 2709999900 HC NON-CHARGEABLE SUPPLY: Performed by: UROLOGY

## 2024-04-29 PROCEDURE — 7100000000 HC PACU RECOVERY - FIRST 15 MIN: Performed by: UROLOGY

## 2024-04-29 RX ORDER — EPHEDRINE SULFATE/0.9% NACL/PF 25 MG/5 ML
SYRINGE (ML) INTRAVENOUS PRN
Status: DISCONTINUED | OUTPATIENT
Start: 2024-04-29 | End: 2024-04-29 | Stop reason: SDUPTHER

## 2024-04-29 RX ORDER — SODIUM CHLORIDE 0.9 % (FLUSH) 0.9 %
5-40 SYRINGE (ML) INJECTION PRN
Status: DISCONTINUED | OUTPATIENT
Start: 2024-04-29 | End: 2024-04-29 | Stop reason: HOSPADM

## 2024-04-29 RX ORDER — SODIUM CHLORIDE 9 MG/ML
INJECTION, SOLUTION INTRAVENOUS PRN
Status: DISCONTINUED | OUTPATIENT
Start: 2024-04-29 | End: 2024-04-29 | Stop reason: HOSPADM

## 2024-04-29 RX ORDER — SUCCINYLCHOLINE/SOD CL,ISO/PF 200MG/10ML
SYRINGE (ML) INTRAVENOUS PRN
Status: DISCONTINUED | OUTPATIENT
Start: 2024-04-29 | End: 2024-04-29 | Stop reason: SDUPTHER

## 2024-04-29 RX ORDER — SODIUM CHLORIDE 0.9 % (FLUSH) 0.9 %
5-40 SYRINGE (ML) INJECTION EVERY 12 HOURS SCHEDULED
Status: DISCONTINUED | OUTPATIENT
Start: 2024-04-29 | End: 2024-04-29 | Stop reason: HOSPADM

## 2024-04-29 RX ORDER — PHENYLEPHRINE HYDROCHLORIDE 10 MG/ML
INJECTION INTRAVENOUS PRN
Status: DISCONTINUED | OUTPATIENT
Start: 2024-04-29 | End: 2024-04-29 | Stop reason: SDUPTHER

## 2024-04-29 RX ORDER — MORPHINE SULFATE 2 MG/ML
2 INJECTION, SOLUTION INTRAMUSCULAR; INTRAVENOUS ONCE
Status: COMPLETED | OUTPATIENT
Start: 2024-04-29 | End: 2024-04-29

## 2024-04-29 RX ORDER — FENTANYL CITRATE 0.05 MG/ML
50 INJECTION, SOLUTION INTRAMUSCULAR; INTRAVENOUS EVERY 5 MIN PRN
Status: DISCONTINUED | OUTPATIENT
Start: 2024-04-29 | End: 2024-04-29 | Stop reason: HOSPADM

## 2024-04-29 RX ORDER — DEXAMETHASONE SODIUM PHOSPHATE 4 MG/ML
INJECTION, SOLUTION INTRA-ARTICULAR; INTRALESIONAL; INTRAMUSCULAR; INTRAVENOUS; SOFT TISSUE PRN
Status: DISCONTINUED | OUTPATIENT
Start: 2024-04-29 | End: 2024-04-29 | Stop reason: SDUPTHER

## 2024-04-29 RX ORDER — FENTANYL CITRATE 50 UG/ML
INJECTION, SOLUTION INTRAMUSCULAR; INTRAVENOUS PRN
Status: DISCONTINUED | OUTPATIENT
Start: 2024-04-29 | End: 2024-04-29 | Stop reason: SDUPTHER

## 2024-04-29 RX ORDER — SODIUM CHLORIDE 9 MG/ML
INJECTION, SOLUTION INTRAVENOUS CONTINUOUS
Status: DISCONTINUED | OUTPATIENT
Start: 2024-04-29 | End: 2024-04-29 | Stop reason: HOSPADM

## 2024-04-29 RX ORDER — FENTANYL CITRATE 0.05 MG/ML
25 INJECTION, SOLUTION INTRAMUSCULAR; INTRAVENOUS EVERY 5 MIN PRN
Status: DISCONTINUED | OUTPATIENT
Start: 2024-04-29 | End: 2024-04-29 | Stop reason: HOSPADM

## 2024-04-29 RX ORDER — PROPOFOL 10 MG/ML
INJECTION, EMULSION INTRAVENOUS PRN
Status: DISCONTINUED | OUTPATIENT
Start: 2024-04-29 | End: 2024-04-29 | Stop reason: SDUPTHER

## 2024-04-29 RX ORDER — ONDANSETRON 2 MG/ML
INJECTION INTRAMUSCULAR; INTRAVENOUS PRN
Status: DISCONTINUED | OUTPATIENT
Start: 2024-04-29 | End: 2024-04-29 | Stop reason: SDUPTHER

## 2024-04-29 RX ORDER — DIPHENHYDRAMINE HYDROCHLORIDE 50 MG/ML
12.5 INJECTION INTRAMUSCULAR; INTRAVENOUS
Status: DISCONTINUED | OUTPATIENT
Start: 2024-04-29 | End: 2024-04-29 | Stop reason: HOSPADM

## 2024-04-29 RX ORDER — CEFAZOLIN SODIUM 1 G/3ML
INJECTION, POWDER, FOR SOLUTION INTRAMUSCULAR; INTRAVENOUS PRN
Status: DISCONTINUED | OUTPATIENT
Start: 2024-04-29 | End: 2024-04-29 | Stop reason: SDUPTHER

## 2024-04-29 RX ORDER — EPHEDRINE SULFATE 50 MG/ML
10 INJECTION INTRAVENOUS EVERY 10 MIN PRN
Status: DISCONTINUED | OUTPATIENT
Start: 2024-04-29 | End: 2024-05-03 | Stop reason: HOSPADM

## 2024-04-29 RX ORDER — ONDANSETRON 2 MG/ML
4 INJECTION INTRAMUSCULAR; INTRAVENOUS
Status: DISCONTINUED | OUTPATIENT
Start: 2024-04-29 | End: 2024-04-29 | Stop reason: HOSPADM

## 2024-04-29 RX ADMIN — ONDANSETRON 4 MG: 2 INJECTION INTRAMUSCULAR; INTRAVENOUS at 13:05

## 2024-04-29 RX ADMIN — METOPROLOL TARTRATE 25 MG: 25 TABLET, FILM COATED ORAL at 21:37

## 2024-04-29 RX ADMIN — Medication 160 MG: at 12:56

## 2024-04-29 RX ADMIN — EPHEDRINE SULFATE 10 MG: 5 INJECTION INTRAVENOUS at 13:04

## 2024-04-29 RX ADMIN — MORPHINE SULFATE 2 MG: 2 INJECTION, SOLUTION INTRAMUSCULAR; INTRAVENOUS at 05:33

## 2024-04-29 RX ADMIN — SODIUM CHLORIDE: 9 INJECTION, SOLUTION INTRAVENOUS at 11:37

## 2024-04-29 RX ADMIN — CEFAZOLIN 2 G: 1 INJECTION, POWDER, FOR SOLUTION INTRAMUSCULAR; INTRAVENOUS at 13:05

## 2024-04-29 RX ADMIN — RIFAXIMIN 550 MG: 550 TABLET ORAL at 21:37

## 2024-04-29 RX ADMIN — DEXAMETHASONE SODIUM PHOSPHATE 4 MG: 4 INJECTION INTRA-ARTICULAR; INTRALESIONAL; INTRAMUSCULAR; INTRAVENOUS; SOFT TISSUE at 13:05

## 2024-04-29 RX ADMIN — EPHEDRINE SULFATE 10 MG: 5 INJECTION INTRAVENOUS at 13:00

## 2024-04-29 RX ADMIN — PROPOFOL 180 MG: 10 INJECTION, EMULSION INTRAVENOUS at 12:56

## 2024-04-29 RX ADMIN — PHENYLEPHRINE HYDROCHLORIDE 100 MCG: 10 INJECTION INTRAVENOUS at 13:01

## 2024-04-29 RX ADMIN — FENTANYL CITRATE 25 MCG: 50 INJECTION, SOLUTION INTRAMUSCULAR; INTRAVENOUS at 12:56

## 2024-04-29 RX ADMIN — EPHEDRINE SULFATE 10 MG: 5 INJECTION INTRAVENOUS at 13:02

## 2024-04-29 RX ADMIN — EPHEDRINE SULFATE 5 MG: 5 INJECTION INTRAVENOUS at 13:03

## 2024-04-29 RX ADMIN — EPHEDRINE SULFATE 5 MG: 5 INJECTION INTRAVENOUS at 13:20

## 2024-04-29 RX ADMIN — PHENYLEPHRINE HYDROCHLORIDE 100 MCG: 10 INJECTION INTRAVENOUS at 13:07

## 2024-04-29 RX ADMIN — METOPROLOL TARTRATE 25 MG: 25 TABLET, FILM COATED ORAL at 08:40

## 2024-04-29 RX ADMIN — SODIUM CHLORIDE, PRESERVATIVE FREE 10 ML: 5 INJECTION INTRAVENOUS at 21:37

## 2024-04-29 RX ADMIN — EPHEDRINE SULFATE 10 MG: 5 INJECTION INTRAVENOUS at 13:05

## 2024-04-29 RX ADMIN — ANTI-FUNGAL POWDER MICONAZOLE NITRATE TALC FREE: 1.42 POWDER TOPICAL at 09:10

## 2024-04-29 RX ADMIN — ANTI-FUNGAL POWDER MICONAZOLE NITRATE TALC FREE: 1.42 POWDER TOPICAL at 21:37

## 2024-04-29 RX ADMIN — FENTANYL CITRATE 50 MCG: 50 INJECTION, SOLUTION INTRAMUSCULAR; INTRAVENOUS at 13:46

## 2024-04-29 RX ADMIN — FENTANYL CITRATE 25 MCG: 50 INJECTION, SOLUTION INTRAMUSCULAR; INTRAVENOUS at 13:49

## 2024-04-29 RX ADMIN — TAMSULOSIN HYDROCHLORIDE 0.4 MG: 0.4 CAPSULE ORAL at 08:41

## 2024-04-29 RX ADMIN — GABAPENTIN 100 MG: 100 CAPSULE ORAL at 21:37

## 2024-04-29 RX ADMIN — ATORVASTATIN CALCIUM 10 MG: 10 TABLET, FILM COATED ORAL at 21:37

## 2024-04-29 RX ADMIN — Medication 3 MG: at 21:53

## 2024-04-29 RX ADMIN — PHENYLEPHRINE HYDROCHLORIDE 100 MCG: 10 INJECTION INTRAVENOUS at 13:05

## 2024-04-29 RX ADMIN — PHENYLEPHRINE HYDROCHLORIDE 100 MCG: 10 INJECTION INTRAVENOUS at 13:03

## 2024-04-29 ASSESSMENT — ENCOUNTER SYMPTOMS
VOMITING: 0
BLOOD IN STOOL: 0
NAUSEA: 0
SHORTNESS OF BREATH: 0
ABDOMINAL PAIN: 0
COUGH: 0
DIARRHEA: 0
ABDOMINAL DISTENTION: 0

## 2024-04-29 ASSESSMENT — PAIN - FUNCTIONAL ASSESSMENT
PAIN_FUNCTIONAL_ASSESSMENT: NONE - DENIES PAIN
PAIN_FUNCTIONAL_ASSESSMENT: CRITICAL CARE PAIN OBSERVATION TOOL (CPOT)

## 2024-04-29 ASSESSMENT — PAIN DESCRIPTION - LOCATION: LOCATION: ABDOMEN

## 2024-04-29 ASSESSMENT — PAIN SCALES - GENERAL
PAINLEVEL_OUTOF10: 8
PAINLEVEL_OUTOF10: 6

## 2024-04-29 ASSESSMENT — PAIN DESCRIPTION - DESCRIPTORS: DESCRIPTORS: CRAMPING;DISCOMFORT

## 2024-04-29 NOTE — PLAN OF CARE
Problem: Discharge Planning  Goal: Discharge to home or other facility with appropriate resources  4/29/2024 0358 by Andrew Borjas RN  Outcome: Progressing  Flowsheets (Taken 4/28/2024 2109)  Discharge to home or other facility with appropriate resources: Identify barriers to discharge with patient and caregiver     Problem: Safety - Adult  Goal: Free from fall injury  4/29/2024 0358 by Andrew Borjas RN  Outcome: Progressing  Flowsheets (Taken 4/28/2024 2359)  Free From Fall Injury: Instruct family/caregiver on patient safety     Problem: Skin/Tissue Integrity  Goal: Absence of new skin breakdown  Description: 1.  Monitor for areas of redness and/or skin breakdown  2.  Assess vascular access sites hourly  3.  Every 4-6 hours minimum:  Change oxygen saturation probe site  4.  Every 4-6 hours:  If on nasal continuous positive airway pressure, respiratory therapy assess nares and determine need for appliance change or resting period.  4/29/2024 0358 by Andrew Borjas RN  Outcome: Progressing     Problem: Pain  Goal: Verbalizes/displays adequate comfort level or baseline comfort level  4/29/2024 0358 by Andrew Borjas RN  Outcome: Progressing     Problem: ABCDS Injury Assessment  Goal: Absence of physical injury  4/29/2024 0358 by Andrew Borjas RN  Outcome: Progressing  Flowsheets (Taken 4/28/2024 2359)  Absence of Physical Injury: Implement safety measures based on patient assessment     Problem: Skin/Tissue Integrity - Adult  Goal: Skin integrity remains intact  4/29/2024 0358 by Andrew Borjas RN  Outcome: Progressing  Flowsheets  Taken 4/28/2024 2359  Skin Integrity Remains Intact: Monitor for areas of redness and/or skin breakdown  Taken 4/28/2024 2109  Skin Integrity Remains Intact: Monitor for areas of redness and/or skin breakdown     Problem: Musculoskeletal - Adult  Goal: Return mobility to safest level of function  4/29/2024 0358 by Andrew Borjas RN  Outcome: Progressing  Flowsheets (Taken  4/28/2024 2109)  Return Mobility to Safest Level of Function: Assess patient stability and activity tolerance for standing, transferring and ambulating with or without assistive devices     Problem: Genitourinary - Adult  Goal: Urinary catheter remains patent  4/29/2024 0358 by Andrew Borjas, RN  Outcome: Progressing  Flowsheets (Taken 4/28/2024 2109)  Urinary catheter remains patent: Assess patency of urinary catheter     Problem: Infection - Adult  Goal: Absence of infection at discharge  Outcome: Progressing  Flowsheets (Taken 4/28/2024 2109)  Absence of infection at discharge: Assess and monitor for signs and symptoms of infection     Problem: Metabolic/Fluid and Electrolytes - Adult  Goal: Electrolytes maintained within normal limits  Outcome: Progressing  Flowsheets (Taken 4/28/2024 2109)  Electrolytes maintained within normal limits: Monitor labs and assess patient for signs and symptoms of electrolyte imbalances     Problem: Metabolic/Fluid and Electrolytes - Adult  Goal: Glucose maintained within prescribed range  Outcome: Progressing  Flowsheets (Taken 4/28/2024 2109)  Glucose maintained within prescribed range: Monitor blood glucose as ordered     Problem: Hematologic - Adult  Goal: Maintains hematologic stability  Outcome: Progressing  Flowsheets (Taken 4/28/2024 2109)  Maintains hematologic stability: Assess for signs and symptoms of bleeding or hemorrhage     Problem: Chronic Conditions and Co-morbidities  Goal: Patient's chronic conditions and co-morbidity symptoms are monitored and maintained or improved  Outcome: Progressing  Flowsheets (Taken 4/28/2024 2109)  Care Plan - Patient's Chronic Conditions and Co-Morbidity Symptoms are Monitored and Maintained or Improved: Monitor and assess patient's chronic conditions and comorbid symptoms for stability, deterioration, or improvement

## 2024-04-29 NOTE — DISCHARGE SUMMARY
[facility]    Brad Maciel MD; Maximiliano Haywood MD, Ray Hwang MD, Marlin Gutierrez MD, Taye Carrillo MD; Germania Osborne MD      [facility]  IN-PATIENT SERVICE   [facility]    Discharge Summary     Patient ID: Bobbi Phillips  :  1955   MRN: [id number]     ACCOUNT:  [id number]   Patient's PCP: Stacie Doty MD  Admit Date: 4/3/2024   Discharge Date: 24    Length of Stay: 20  Code Status:  Full Code  Admitting Physician: Courtney Carrillo MD  Discharge Physician: Germania Osborne MD     Active Discharge Diagnoses:     Hospital Problem Lists:  Principal Problem:    KIM (acute kidney injury) (HCC)  Active Problems:    Liver cirrhosis secondary to BONNER (HCC)    Thrombocytopenia (HCC)    Acute pyelonephritis    SBP (spontaneous bacterial peritonitis) (HCC)    Anemia    Hepatic encephalopathy (HCC)  Resolved Problems:    * No resolved hospital problems. *      Admission Condition:  Serious      Discharged Condition: Stable     Hospital Stay:     HPI    This patient is a 68 y.o. Non- / non  femalewho presents with back pain  History of type 2 diabetes, with diabetic retinopathy and neuropathy, hypertension,  Patient is poor historian she reports she has been given some pain medication and is unable to relate sequence of events accurately  Per EMR patient has chronic low back pain, she had fall 5 days ago and was seen in ER CT thoracic and lumbar spine nil acute but UA positive for UTI culture showed Klebsiella sensitive to Keflex which is what she was discharged on.  Patient reported subsequent improvement in back pain however reports pain became worse again today  Moderate intensity progressive persistent no aggravating leaving factors noted  Denies any associated fevers diarrhea or vomiting     ER evaluation showed UA suggestive of persistent UTI, severe hyperglycemia, KIM  2024  Doing

## 2024-04-29 NOTE — PROGRESS NOTES
[facility]   OCCUPATIONAL THERAPY MISSED TREATMENT NOTE   INPATIENT   Date: 24  Patient Name: Bobbi Phillips       Room: [facility] OR Pool/NONE  MRN: [id number]   Account #: [id number]    : 1955  (68 y.o.)  Gender: female         REASON FOR MISSED TREATMENT:  Patient at testing and/or off the floor   -    Pt out of room for cystoscopy. Will attempt to complete OT evaluation as able.      Electronically signed by Sylvia Vo OT on 24 at 12:30 PM EDT

## 2024-04-29 NOTE — PROGRESS NOTES
Physical Therapy        Physical Therapy Cancel Note      DATE: 2024    NAME: Bobbi Phillips  MRN: [id number]   : 1955      Patient not seen this date for Physical Therapy due to:      Patient at testing and/or off the floor   -    Pt out of room for cystoscopy. Will attempt PT evaluation as able.      Electronically signed by Dmitri Maxwell PT on 2024 at 12:31 PM

## 2024-04-29 NOTE — PROGRESS NOTES
Writer contacted Dr. Harman carr pt has had minimal urine output since discontinuing CBI and bladder scan showed 700 mL. Order received to remove nichols and place another. Continue holding CBI.

## 2024-04-29 NOTE — PROGRESS NOTES
Writer removed CBI catheter per order and large clots found obstructing the tip of the catheter. New nichols placed with immediate urine, blood, and small clots returned.

## 2024-04-29 NOTE — PROGRESS NOTES
BONNER (HCC)    Disc degeneration, lumbar    Poorly-controlled hypertension    Hyperlipidemia    Lumbar radiculopathy    Microalbuminuria    Numbness    Palpitations    Sciatica    Type 2 diabetes mellitus (HCC)    Rosacea    Obesity, Class III, BMI 40-49.9 (morbid obesity) (HCC)    Chronic right shoulder pain    Seborrheic keratoses    Stucco keratoses    Cherry angioma    Actinic keratosis    Diabetic peripheral neuropathy associated with type 2 diabetes mellitus (HCC)    Diabetic renal disease (HCC)    Elevated LFTs    Moderate nonproliferative diabetic retinopathy associated with type 2 diabetes mellitus (HCC)    NAFLD (nonalcoholic fatty liver disease)    Hypoglycemia    Type 2 diabetes mellitus with hyperglycemia    Type 2 diabetes mellitus with chronic kidney disease    Thickened endometrium    Abnormal uterine and vaginal bleeding, unspecified    Postmenopausal bleeding    Status post EMBX and EUA FAILED D&C HSCOPE 10/2/2023    Thrombocytopenia (HCC)    S/p Dilatation and curettage, Aveta hysteroscopy 1/8/24**    Diverticular disease of colon    Obesity, Class II, BMI 35-39.9    Insulin-requiring or dependent type II diabetes mellitus (HCC)    Post-menopausal bleeding    KIM (acute kidney injury) (HCC)    Acute pyelonephritis    SBP (spontaneous bacterial peritonitis) (HCC)    Anemia    Hepatic encephalopathy (HCC)    Hematuria       Plan: Will plan for cystoscopy with clot evacuation and possible TURBT today.    Jason Lambert MD  7:53 AM 4/29/2024

## 2024-04-29 NOTE — PROGRESS NOTES
ileus. 5. Calcific coronary artery disease. 6. Spondylosis.     CT THORACIC SPINE WO CONTRAST    Result Date: 3/29/2024  EXAMINATION: CT OF THE THORACIC SPINE WITHOUT CONTRAST  3/29/2024 6:21 pm: TECHNIQUE: CT of the thoracic spine was performed without the administration of intravenous contrast. Multiplanar reformatted images are provided for review. Automated exposure control, iterative reconstruction, and/or weight based adjustment of the mA/kV was utilized to reduce the radiation dose to as low as reasonably achievable. COMPARISON: None. HISTORY: ORDERING SYSTEM PROVIDED HISTORY: fall TECHNOLOGIST PROVIDED HISTORY: fall Reason for Exam: S/p fall off couch. C/o severe back pain FINDINGS: BONES/ALIGNMENT: Bones are osteopenic.  There is normal alignment of the spine. The vertebral body heights are maintained. No osseous destructive lesion is seen. DEGENERATIVE CHANGES: Multilevel loss of intervertebral disc space height. There are multilevel bridging anterior osteophytes suggesting dish. SOFT TISSUES: No paraspinal mass is seen.     No evidence of acute thoracic spine fracture or traumatic malalignment. Multilevel thoracic spondylosis. Bridging anterior osteophytes suggesting dish. Osteopenia.     CT LUMBAR SPINE WO CONTRAST    Result Date: 3/29/2024  EXAMINATION: CT OF THE LUMBAR SPINE WITHOUT CONTRAST  3/29/2024 TECHNIQUE: CT of the lumbar spine was performed without the administration of intravenous contrast. Multiplanar reformatted images are provided for review. Adjustment of mA and/or kV according to patient size was utilized.  Automated exposure control, iterative reconstruction, and/or weight based adjustment of the mA/kV was utilized to reduce the radiation dose to as low as reasonably achievable. COMPARISON: None HISTORY: ORDERING SYSTEM PROVIDED HISTORY: fall TECHNOLOGIST PROVIDED HISTORY: fall Decision Support Exception - unselect if not a suspected or confirmed emergency medical condition->Emergency  Medical Condition (MA) Reason for Exam: S/p fall off couch. C/o severe back pain FINDINGS: BONES/ALIGNMENT: Grade 1 anterolisthesis L3 on L4.  The vertebral body heights are maintained. No osseous destructive lesion is seen. DEGENERATIVE CHANGES: Multilevel loss of intervertebral disc space height. Multilevel facet arthrosis of the lower lumbar levels. SOFT TISSUES/RETROPERITONEUM: No paraspinal mass is seen.     No evidence of acute lumbar spine fracture or traumatic malalignment. Multilevel lumbar spondylosis.     CT CERVICAL SPINE WO CONTRAST    Result Date: 3/29/2024  EXAMINATION: CT OF THE CERVICAL SPINE WITHOUT CONTRAST; CT OF THE HEAD WITHOUT CONTRAST 3/29/2024 9:15 pm TECHNIQUE: CT of the cervical spine was performed without the administration of intravenous contrast. Multiplanar reformatted images are provided for review. Automated exposure control, iterative reconstruction, and/or weight based adjustment of the mA/kV was utilized to reduce the radiation dose to as low as reasonably achievable.; CT of the head was performed without the administration of intravenous contrast. Automated exposure control, iterative reconstruction, and/or weight based adjustment of the mA/kV was utilized to reduce the radiation dose to as low as reasonably achievable. COMPARISON: C-spine radiograph series 03/11/2013 HISTORY: Fall from couch hitting head.  Unknown amount of time down.  Neck pain Decision Support Exception - unselect if not a suspected or confirmed emergency medical condition->Emergency Medical Condition (MA) CT HEAD FINDINGS: BRAIN/VENTRICLES: There is no acute intracranial hemorrhage, mass effect or midline shift.  No abnormal extra-axial fluid collection.  The gray-white differentiation is maintained without evidence of an acute infarct.  There is no evidence of hydrocephalus. ORBITS: The visualized portion of the orbits demonstrate no acute abnormality. SINUSES: The visualized paranasal sinuses and mastoid

## 2024-04-29 NOTE — PLAN OF CARE
Problem: Discharge Planning  Goal: Discharge to home or other facility with appropriate resources  4/29/2024 1632 by Nicole Morel RN  Outcome: Progressing     Problem: Safety - Adult  Goal: Free from fall injury  4/29/2024 1632 by Nicole Morel RN  Outcome: Progressing     Problem: Skin/Tissue Integrity  Goal: Absence of new skin breakdown  Description: 1.  Monitor for areas of redness and/or skin breakdown  2.  Assess vascular access sites hourly  3.  Every 4-6 hours minimum:  Change oxygen saturation probe site  4.  Every 4-6 hours:  If on nasal continuous positive airway pressure, respiratory therapy assess nares and determine need for appliance change or resting period.  4/29/2024 1632 by Nicole Morel RN  Outcome: Progressing     Problem: Pain  Goal: Verbalizes/displays adequate comfort level or baseline comfort level  4/29/2024 1632 by Nicole Morel RN  Outcome: Progressing     Problem: ABCDS Injury Assessment  Goal: Absence of physical injury    Problem: ABCDS Injury Assessment  Goal: Absence of physical injury  4/29/2024 1632 by Nicole Morel RN  Outcome: Progressing     Problem: Skin/Tissue Integrity - Adult  Goal: Skin integrity remains intact  4/29/2024 1632 by Nicole Morel RN  Outcome: Progressing     Problem: Musculoskeletal - Adult  Goal: Return mobility to safest level of function  4/29/2024 1632 by Nicole Morel RN  Outcome: Progressing     Problem: Genitourinary - Adult  Goal: Urinary catheter remains patent  4/29/2024 1632 by Nicole Morel RN  Outcome: Progressing     Problem: Infection - Adult  Goal: Absence of infection at discharge  4/29/2024 1632 by Nicole Morel RN  Outcome: Progressing     Problem: Metabolic/Fluid and Electrolytes - Adult  Goal: Electrolytes maintained within normal limits  4/29/2024 1632 by Nicole Morel RN  Outcome: Progressing     Problem: Metabolic/Fluid and Electrolytes - Adult  Goal: Glucose maintained within prescribed range  4/29/2024 1632 by Nicole Morel

## 2024-04-29 NOTE — CONSULTS
[facility]  Consult Note       NAME:  Bobbi Phillips  MEDICAL RECORD NUMBER:  [id number]  AGE: 68 y.o.   GENDER: female  : 1955  TODAY'S DATE:  2024    Subjective:      Bobbi Phillips is a 68 y.o. female with inpatient referral to [facility] for:  Sacral wound      Wound Identification:  Wound Type: pressure  Contributing Factors: diabetes, poor glucose control, chronic pressure, decreased mobility, shear force, and incontinence of stool    Wound History: Wound present for a few weeks, no history available from patient  Current Wound Care Treatment: Foam    Patient Goal of Care:  [x] Wound Healing  [] Odor Control  [] Palliative Care  [] Pain Control   [] Other:         PAST MEDICAL HISTORY        Diagnosis Date    Abdominal swelling     Claustrophobia     COVID-19 vaccine administered     Diabetes (HCC)     DEXCOM LT ARM    Diabetic retinopathy (HCC) 2015    Flatus     H/O acute sinusitis     H/O cholelithiasis     Hypertension     Liver cirrhosis (HCC)     LLQ abdominal tenderness     Poor venous access     \"THEY LIKE TO ROLL & RUN AWAY\"    Positive colorectal cancer screening using Cologuard test 2023    had colonoscopy after this and no cancer was found    Post-menopausal bleeding     RUQ abdominal pain     Tendonitis of shoulder, right 2021    Tingling     Under care of service provider 2023    yfc-yfssifznb-kmvymbayyn in [place]-last visit dec 2023    Under care of service provider 2023    bggiky-baghcgmz-xsjthh [address] in [place]-last visit dec 2023    Vaginal bleeding 2023    \"SPOTTING, THICKENED UTERINE LINING\"DUE TO HAVE VAGINAL /UTERINE BIOPSY FOR\"    Vaginal candidiasis     Wears glasses     READING       PAST SURGICAL HISTORY    Past Surgical History:   Procedure Laterality Date    ANKLE FRACTURE SURGERY Left 2006    no retained metal    CARDIAC CATHETERIZATION  2006    CATARACT REMOVAL WITH IMPLANT Bilateral 2018     PROTIME 16.4 04/27/2024 12:25 PM    INR 1.3 04/27/2024 12:25 PM     HgBA1c:    Lab Results   Component Value Date/Time    LABA1C 8.6 12/28/2023 09:13 AM     PTT: No components found for: \"LABPTT\"      Assessment:       Jeremias Risk Score: Jeremias Scale Score: 17    Patient Active Problem List   Diagnosis Code    Anxiety F41.9    Liver cirrhosis secondary to BONNER ([facility]) K75.81, K74.60    Disc degeneration, lumbar M51.36    Poorly-controlled hypertension I10    Hyperlipidemia E78.5    Lumbar radiculopathy M54.16    Microalbuminuria R80.9    Numbness R20.0    Palpitations R00.2    Sciatica M54.30    Type 2 diabetes mellitus ([facility]) E11.9    Rosacea L71.9    Obesity, Class III, BMI 40-49.9 (morbid obesity) ([facility]) E66.01    Chronic right shoulder pain M25.511, G89.29    Seborrheic keratoses L82.1    Stucco keratoses L82.1    Cherry angioma D18.01    Actinic keratosis L57.0    Diabetic peripheral neuropathy associated with type 2 diabetes mellitus ([facility]) E11.42    Diabetic renal disease ([facility]) E11.21    Elevated LFTs R79.89    Moderate nonproliferative diabetic retinopathy associated with type 2 diabetes mellitus ([facility]) E11.3399    NAFLD (nonalcoholic fatty liver disease) K76.0    Hypoglycemia E16.2    Type 2 diabetes mellitus with hyperglycemia E11.65    Type 2 diabetes mellitus with chronic kidney disease E11.22    Thickened endometrium R93.89    Abnormal uterine and vaginal bleeding, unspecified N93.9    Postmenopausal bleeding N95.0    Status post EMBX and EUA FAILED D&C HSCOPE 10/2/2023 Z98.890    Thrombocytopenia ([facility]) D69.6    S/p Dilatation and curettage, Aveta hysteroscopy 1/8/24** Z98.890    Diverticular disease of colon K57.30    Obesity, Class II, BMI 35-39.9 E66.9    Insulin-requiring or dependent type II diabetes mellitus ([facility]) E11.9, Z79.4    Post-menopausal bleeding N95.0    KIM (acute kidney injury) ([facility]) N17.9    Acute pyelonephritis N10    SBP (spontaneous bacterial peritonitis) ([facility]) K65.2    Anemia D64.9

## 2024-04-29 NOTE — PROGRESS NOTES
Writer contacted Cande Hollis NP as pt is NPO and unable to receive oral pain medication. Order placed by NP for 1x dose of IV morphine.

## 2024-04-29 NOTE — OP NOTE
to bed;Catheter secured to thigh;Bag below bladder;Bag not on floor;Lack of dependent loop in tubing;Drainage bag less than half full 04/29/24 1458   Status Continuous bladder irrigation;Draining 04/29/24 1458   Rate Moderate 04/29/24 1458   Irrigant Normal saline 04/29/24 1458   CBI Irrigation Intake (mL) 3000 mL 04/29/24 1458   CBI Kerns Output (mL) 3300 mL 04/29/24 1458   CBI Net Output (mL) 300 mL 04/29/24 1458       [REMOVED] Rectal Tube (Removed)   Site Assessment Clean, dry & intact 04/12/24 0400   Stool Appearance Watery 04/12/24 0400   Stool Color Brown 04/12/24 0400   Stool Amount Small 04/12/24 0400   Rectal Tube Output (mL) 200 ml 04/12/24 1548       [REMOVED] Urinary Catheter 04/09/24 (Removed)   $ Urethral catheter insertion $ Not inserted for procedure 04/09/24 0008   Catheter Indications Urinary retention (acute or chronic), continuous bladder irrigation or bladder outlet obstruction 04/11/24 1600   Site Assessment [place] 04/11/24 1600   Urine Color Bloody;Other (Comment) 04/11/24 1740   Urine Appearance Clots 04/11/24 1740   Urine Odor Malodorous 04/11/24 1600   Collection Container Standard 04/11/24 1600   Securement Method Securing device (Describe) 04/11/24 1600   Catheter Care  Soap and water 04/11/24 1200   Catheter Best Practices  Drainage tube clipped to bed;Catheter secured to thigh;Tamper seal intact;Bag below bladder;Bag not on floor;Lack of dependent loop in tubing;Drainage bag less than half full 04/11/24 1600   Status Draining;Patent 04/11/24 1600   $ Bladder irrigation simple- 1X PER DAY $ Yes 04/11/24 1740   Rate [place] 04/11/24 1740   Irrigant Normal saline 04/11/24 1740   Manual Irrigation Volume Input (mL) 200 mL 04/11/24 1740   Output (mL) 250 mL 04/11/24 1740       [REMOVED] Urinary Catheter 04/11/24 3 Way;Kerns (Removed)   Catheter Indications Urinary retention (acute or chronic), continuous bladder irrigation or bladder outlet obstruction 04/13/24 1932   Site Assessment Swelling  position.  She was prepped and draped in sterile fashion.  A rigid cystoscope was introduced into the bladder.  The bladder was surveyed in its entirety.  She had a very large clot in her bladder.  We did evacuate the clot with an Ellik evacuator and then reinserted the scope.  There was an approximately 4 cm patch of erythema that was raised anteriorly towards the right side and a similar appearing lesion on the left side.  We did use a resectoscope and resected sections of both of these lesions and sent them to pathology.  We then used the resectoscope to cauterize both areas to try to control hemostasis.  There was continued slow ooze but the hemostasis certainly appeared better after we were done resecting.  All of the specimen was sent to pathology for analysis.  A 22 Tamazight three-way Kerns catheter was then inserted, continuous bladder irrigation was initiated, and the patient was taken the PACU in stable condition.    Electronically signed by Jason Lambert MD on 4/29/2024 at 4:16 PM

## 2024-04-29 NOTE — PROGRESS NOTES
Writer contacted Cande Hollis NP as pts pain is uncontrolled. Order placed by NP for roxicodone as pt has received this during previous admissions.

## 2024-04-29 NOTE — ANESTHESIA POSTPROCEDURE EVALUATION
Department of Anesthesiology  Postprocedure Note    Patient: Bobbi Phillips  MRN: [id number]  YOB: 1955  Date of evaluation: 4/29/2024    Procedure Summary       Date: 04/29/24 Room / Location: [place] [address] / [facility]    Anesthesia Start: 1249 Anesthesia Stop: 1358    Procedure: CYSTOSCOPY,POSSIBLE TRANSURETHRAL RESECTION BLADDER,  EVACUATION OF CLOTS with Catheter Insertion for Continuous Irrigation (Bladder) Diagnosis:       Gross hematuria      Mass of bladder      (Gross hematuria [R31.0])      (Mass of bladder [N32.89])    Surgeons: Jason Lambert MD Responsible Provider: Shayy Vu MD    Anesthesia Type: general ASA Status: 3 - Emergent            Anesthesia Type: No value filed.    Domenica Phase I: Domenica Score: 8    Domenica Phase II:      Anesthesia Post Evaluation    Comments: POST- ANESTHESIA EVALUATION       Pt Name: Bobbi Phillips  MRN: [id number]  YOB: 1955  Date of evaluation: 4/29/2024  Time:  2:34 PM      BP (!) 105/52   Pulse 71   Temp 97.7 °F (36.5 °C) (Infrared)   Resp 14   Ht 1.499 m (4' 11\")   Wt 102.1 kg (225 lb)   SpO2 100%   BMI 45.44 kg/m²      Consciousness Level  Awake  Cardiopulmonary Status  Stable  Pain Adequately Treated YES  Nausea / Vomiting  NO  Adequate Hydration  YES  Anesthesia Related Complications NONE      Electronically signed by Shayy Vu MD on 4/29/2024 at 2:34 PM           No notable events documented.

## 2024-04-29 NOTE — PLAN OF CARE
Problem: Discharge Planning  Goal: Discharge to home or other facility with appropriate resources  4/29/2024 1636 by Nicole Morel RN  Outcome: Progressing     Problem: Safety - Adult  Goal: Free from fall injury  4/29/2024 1636 by Nicole Morel RN  Outcome: Progressing     Problem: Skin/Tissue Integrity  Goal: Absence of new skin breakdown  Description: 1.  Monitor for areas of redness and/or skin breakdown  2.  Assess vascular access sites hourly  3.  Every 4-6 hours minimum:  Change oxygen saturation probe site  4.  Every 4-6 hours:  If on nasal continuous positive airway pressure, respiratory therapy assess nares and determine need for appliance change or resting period.  4/29/2024 1636 by Nicole Morel RN  Outcome: Progressing     Problem: Pain  Goal: Verbalizes/displays adequate comfort level or baseline comfort level  4/29/2024 1636 by Nicole Morel RN  Outcome: Progressing     Problem: ABCDS Injury Assessment  Goal: Absence of physical injury  4/29/2024 1636 by Nicole Morel RN  Outcome: Progressing     Problem: Skin/Tissue Integrity - Adult  Goal: Skin integrity remains intact  4/29/2024 1636 by Nicole Morel RN  Outcome: Progressing     Problem: Musculoskeletal - Adult  Goal: Return mobility to safest level of function  4/29/2024 1636 by Nicole Morel RN  Outcome: Progressing     Problem: Genitourinary - Adult  Goal: Urinary catheter remains patent  4/29/2024 1636 by Nicole Morel RN  Outcome: Progressing     Problem: Infection - Adult  Goal: Absence of infection at discharge  4/29/2024 1636 by Nicole Morel RN  Outcome: Progressing     Problem: Metabolic/Fluid and Electrolytes - Adult  Goal: Electrolytes maintained within normal limits  4/29/2024 1636 by Nicole Morel RN  Outcome: Progressing     Problem: Metabolic/Fluid and Electrolytes - Adult  Goal: Glucose maintained within prescribed range  4/29/2024 1636 by Nicole Morel RN  Outcome: Progressing     Problem: Hematologic - Adult  Goal: Maintains

## 2024-04-29 NOTE — ANESTHESIA PRE PROCEDURE
Department of Anesthesiology  Preprocedure Note       Name:  Bobbi Phillips   Age:  68 y.o.  :  1955                                          MRN:  [id number]         Date:  2024      Surgeon: Surgeon(s):  Jason Lambert MD    Procedure: Procedure(s):  CYSTOSCOPY,POSSIBLE TRANSURETHRAL RESECTION BLADDER, POSSIBLE EVACUATION OF CLOTS    Medications prior to admission:   Prior to Admission medications    Medication Sig Start Date End Date Taking? Authorizing Provider   sulfamethoxazole-trimethoprim (BACTRIM DS;SEPTRA DS) 800-160 MG per tablet Take 1 tablet by mouth daily 24  Germania Osborne MD   metoprolol tartrate (LOPRESSOR) 25 MG tablet Take 1 tablet by mouth 2 times daily 24   Germania Osborne MD   miconazole (MICOTIN) 2 % powder Apply topically 2 times daily. 24   Germania Osborne MD   lidocaine 4 % external patch Place 1 patch onto the skin daily for 5 days  Patient not taking: Reported on 2024  Germania Osborne MD   lactulose (CHRONULAC) 10 GM/15ML solution Take 45 mLs by mouth every 8 hours as needed (constpation/ needs to have 2-3 BMs daily)  Patient not taking: Reported on 2024  Germania Osborne MD   polyethylene glycol (GLYCOLAX) 17 g packet Take 1 packet by mouth daily as needed for Constipation  Patient not taking: Reported on 2024  Germania Osborne MD   melatonin 3 MG TABS tablet Take 2 tablets by mouth nightly as needed (insomnia) 24  Germania Osborne MD   zinc sulfate (ZINCATE) 220 (50 Zn)  mg capsule - elemental zinc Take 1 capsule by mouth daily 24   Germania Osborne MD   rifAXIMin (XIFAXAN) 550 MG tablet Take 1 tablet by mouth 2 times daily 24   Germania Osborne MD   tamsulosin (FLOMAX) 0.4 MG capsule Take 1 capsule by mouth daily 24   Germania Osborne MD   cyclobenzaprine (FLEXERIL) 10 MG tablet Take 1 tablet by mouth 3 times daily as needed for Muscle spasms 24

## 2024-04-29 NOTE — PROGRESS NOTES
OLIVIA BARRAGAN MD  Urology Progress Note    Subjective: Bobbi Phillips is a 68 y.o. female.    s/p * Surgery not found * on     His/Her current Diet is: ADULT DIET; Regular; 5 carb choices (75 gm/meal)  Diet NPO.    Since the previous note, the patient reports the following:  No acute issues overnight.  No fevers or chills.  No nausea or vomiting.  No chest pain or shortness of breath.  No calf pain.  Pain not Controlled.  Ambulating.  Tolerating PO Diet.      Vitals and Labs:  Patient Vitals for the past 24 hrs:   BP Temp Temp src Pulse Resp SpO2   04/28/24 2103 135/65 -- -- 77 -- --   04/28/24 1827 (!) 109/47 97.9 °F (36.6 °C) -- 69 18 95 %   04/28/24 1112 103/60 98 °F (36.7 °C) Oral 58 18 100 %   04/28/24 1051 (!) 103/54 98 °F (36.7 °C) Oral 59 18 100 %   04/28/24 1046 94/61 97.5 °F (36.4 °C) Axillary 58 22 100 %   04/28/24 1034 (!) 88/54 97.5 °F (36.4 °C) Axillary 58 22 100 %   04/28/24 0944 (!) 109/56 97.5 °F (36.4 °C) Axillary 63 20 98 %   04/28/24 0624 (!) 102/41 97.3 °F (36.3 °C) -- 71 18 100 %   04/28/24 0015 (!) 110/47 98.1 °F (36.7 °C) -- 76 18 96 %     I/O last 3 completed shifts:  In: 908.8 [P.O.:240; Blood:668.8]  Out: 950 [Urine:950]    Recent Labs     04/27/24  1225 04/27/24  1834 04/28/24  0634 04/28/24  1727   WBC 4.1  --  4.7  --    HGB 7.2* 8.1* 6.8* 8.4*   HCT 22.0* 24.8* 21.0* 25.7*   MCV 87.9  --  90.6  --      --  198  --      Recent Labs     04/27/24  1225 04/28/24  0902   * 128*   K 4.1 4.0   CL 94* 98   CO2 23 23   BUN 46* 44*   CREATININE 1.7* 1.4*       Recent Labs     04/27/24  1215   COLORU Red*   PHUR 7.0   WBCUA 0 TO 2*   RBCUA TOO NUMEROUS TO COUNT*   BACTERIA None   SPECGRAV 1.025   LEUKOCYTESUR NEGATIVE   UROBILINOGEN Normal   BILIRUBINUR NEGATIVE       Physical Exam:  No acute distress. Awake, alert and oriented.  Neck is supple  Regular rate and rhythm. Normal peripheral pulses  No accessory muscles of inspiration. Symmetric chest rise  Abdomen soft, non-tender,

## 2024-04-29 NOTE — DISCHARGE INSTR - COC
Continuity of Care Form    Patient Name: Bobbi Phillips   :  1955  MRN:  [id number]    Admit date:  2024  Discharge date:  ***    Code Status Order: Full Code   Advance Directives:   Advance Care Flowsheet Documentation       Date/Time Healthcare Directive Type of Healthcare Directive Copy in Chart Healthcare Agent Appointed Healthcare Agent's Name Healthcare Agent's Phone Number    24 1125 No, patient does not have an advance directive for healthcare treatment -- -- -- -- --            Admitting Physician:  Courtney Carrillo MD  PCP: Stacie Doty MD    Discharging Nurse: anthony bull   Discharging Hospital Unit/Room#:   Discharging Unit Phone Number: [phone]    Emergency Contact:   Extended Emergency Contact Information  Primary Emergency Contact: Judit Gar *HIPAA*  Address:   Home Phone: [phone]  Work Phone: [phone]  Mobile Phone: [phone]  Relation: Brother/Sister  Secondary Emergency Contact: Phillip Lofton *HIPAA*  Home Phone: [phone]  Work Phone: [phone]  Mobile Phone: [phone]  Relation: Child    Past Surgical History:  Past Surgical History:   Procedure Laterality Date    ANKLE FRACTURE SURGERY Left 2006    no retained metal    ANOMALOUS VENOUS RETURN REPAIR N/A 2024    CYSTOSCOPY, TRANSURETHRAL RESECTION BLADDER,  EVACUATION OF CLOTS with Catheter Insertion for Continuous Irrigation performed by Jason Lambert MD at [facility] OR    CARDIAC CATHETERIZATION  2006    CATARACT REMOVAL WITH IMPLANT Bilateral 2018     SECTION      x2, ,  \"SADDLE BLOCK\"    CHOLECYSTECTOMY, LAPAROSCOPIC  2012    COLONOSCOPY N/A 2023    COLONOSCOPY WITH BIOPSY performed by Carlos Campos MD at [facility] OR    DILATION AND CURETTAGE OF UTERUS N/A 10/02/2023    ENDOMETRIAL BIOPSY AND EXAM UNDER ANESTHESIA  Failed D&C Hysteroscopy performed by Saud Hardy DO at [facility] OR    DILATION AND CURETTAGE OF UTERUS N/A 2024    EUA,

## 2024-04-29 NOTE — CARE COORDINATION
ONGOING DISCHARGE PLAN:    Patient is alert and oriented x4.    Spoke with patient regarding discharge plan and patient confirms that plan is still to return back to [facility].     Per Myriam, Pt. Will need Pre-Cert. Did leave , for her to start today.     Plan is for Cysto today w/ Urology.     PT/OT on board.     HGB today 8.4, Na 126.     Will continue to follow for additional discharge needs.    If patient is discharged prior to next notation, then this note serves as note for discharge by case management.    Electronically signed by Nataliya Duvall RN on 4/29/2024 at 10:02 AM

## 2024-04-30 LAB
ANION GAP SERPL CALCULATED.3IONS-SCNC: 8 MMOL/L (ref 9–17)
BASOPHILS # BLD: 0 K/UL (ref 0–0.2)
BASOPHILS NFR BLD: 0 % (ref 0–2)
BUN SERPL-MCNC: 44 MG/DL (ref 8–23)
CA-I BLD-SCNC: 1.15 MMOL/L (ref 1.1–1.33)
CALCIUM SERPL-MCNC: 7.7 MG/DL (ref 8.6–10.4)
CHLORIDE SERPL-SCNC: 99 MMOL/L (ref 98–107)
CO2 SERPL-SCNC: 21 MMOL/L (ref 20–31)
CREAT SERPL-MCNC: 1.4 MG/DL (ref 0.5–0.9)
EOSINOPHIL # BLD: 0 K/UL (ref 0–0.4)
EOSINOPHILS RELATIVE PERCENT: 0 % (ref 0–4)
ERYTHROCYTE [DISTWIDTH] IN BLOOD BY AUTOMATED COUNT: 18 % (ref 11.5–14.9)
GFR SERPL CREATININE-BSD FRML MDRD: 41 ML/MIN/1.73M2
GLUCOSE BLD-MCNC: 192 MG/DL (ref 65–105)
GLUCOSE BLD-MCNC: 203 MG/DL (ref 65–105)
GLUCOSE BLD-MCNC: 271 MG/DL (ref 65–105)
GLUCOSE BLD-MCNC: 287 MG/DL (ref 65–105)
GLUCOSE SERPL-MCNC: 212 MG/DL (ref 70–99)
HCT VFR BLD AUTO: 25.4 % (ref 36–46)
HCT VFR BLD AUTO: 25.4 % (ref 36–46)
HCT VFR BLD AUTO: 25.6 % (ref 36–46)
HGB BLD-MCNC: 8.1 G/DL (ref 12–16)
HGB BLD-MCNC: 8.2 G/DL (ref 12–16)
HGB BLD-MCNC: 8.2 G/DL (ref 12–16)
LYMPHOCYTES NFR BLD: 0.4 K/UL (ref 1–4.8)
LYMPHOCYTES RELATIVE PERCENT: 11 % (ref 24–44)
MCH RBC QN AUTO: 28.7 PG (ref 26–34)
MCHC RBC AUTO-ENTMCNC: 31.9 G/DL (ref 31–37)
MCV RBC AUTO: 89.7 FL (ref 80–100)
MONOCYTES NFR BLD: 0.2 K/UL (ref 0.1–1.3)
MONOCYTES NFR BLD: 5 % (ref 1–7)
NEUTROPHILS NFR BLD: 84 % (ref 36–66)
NEUTS SEG NFR BLD: 3.1 K/UL (ref 1.3–9.1)
PLATELET # BLD AUTO: 201 K/UL (ref 150–450)
PMV BLD AUTO: 8.4 FL (ref 6–12)
POTASSIUM SERPL-SCNC: 5.3 MMOL/L (ref 3.7–5.3)
RBC # BLD AUTO: 2.83 M/UL (ref 4–5.2)
SODIUM SERPL-SCNC: 123 MMOL/L (ref 135–144)
SODIUM SERPL-SCNC: 128 MMOL/L (ref 135–144)
WBC OTHER # BLD: 3.7 K/UL (ref 3.5–11)

## 2024-04-30 PROCEDURE — 80048 BASIC METABOLIC PNL TOTAL CA: CPT

## 2024-04-30 PROCEDURE — 87075 CULTR BACTERIA EXCEPT BLOOD: CPT

## 2024-04-30 PROCEDURE — 6360000002 HC RX W HCPCS

## 2024-04-30 PROCEDURE — 82947 ASSAY GLUCOSE BLOOD QUANT: CPT

## 2024-04-30 PROCEDURE — 85018 HEMOGLOBIN: CPT

## 2024-04-30 PROCEDURE — 99232 SBSQ HOSP IP/OBS MODERATE 35: CPT | Performed by: INTERNAL MEDICINE

## 2024-04-30 PROCEDURE — 97530 THERAPEUTIC ACTIVITIES: CPT

## 2024-04-30 PROCEDURE — 97166 OT EVAL MOD COMPLEX 45 MIN: CPT

## 2024-04-30 PROCEDURE — 2580000003 HC RX 258: Performed by: UROLOGY

## 2024-04-30 PROCEDURE — 84295 ASSAY OF SERUM SODIUM: CPT

## 2024-04-30 PROCEDURE — 85014 HEMATOCRIT: CPT

## 2024-04-30 PROCEDURE — 6370000000 HC RX 637 (ALT 250 FOR IP)

## 2024-04-30 PROCEDURE — 82330 ASSAY OF CALCIUM: CPT

## 2024-04-30 PROCEDURE — 87205 SMEAR GRAM STAIN: CPT

## 2024-04-30 PROCEDURE — 85025 COMPLETE CBC W/AUTO DIFF WBC: CPT

## 2024-04-30 PROCEDURE — 1200000000 HC SEMI PRIVATE

## 2024-04-30 PROCEDURE — 84157 ASSAY OF PROTEIN OTHER: CPT

## 2024-04-30 PROCEDURE — 6370000000 HC RX 637 (ALT 250 FOR IP): Performed by: UROLOGY

## 2024-04-30 PROCEDURE — 97162 PT EVAL MOD COMPLEX 30 MIN: CPT

## 2024-04-30 PROCEDURE — 87070 CULTURE OTHR SPECIMN AEROBIC: CPT

## 2024-04-30 PROCEDURE — 36415 COLL VENOUS BLD VENIPUNCTURE: CPT

## 2024-04-30 RX ORDER — CALCIUM CARBONATE 500 MG/1
500 TABLET, CHEWABLE ORAL 3 TIMES DAILY PRN
Status: DISCONTINUED | OUTPATIENT
Start: 2024-04-30 | End: 2024-05-03 | Stop reason: HOSPADM

## 2024-04-30 RX ORDER — FUROSEMIDE 10 MG/ML
20 INJECTION INTRAMUSCULAR; INTRAVENOUS ONCE
Status: COMPLETED | OUTPATIENT
Start: 2024-04-30 | End: 2024-04-30

## 2024-04-30 RX ADMIN — RIFAXIMIN 550 MG: 550 TABLET ORAL at 21:31

## 2024-04-30 RX ADMIN — ANTACID TABLETS 500 MG: 500 TABLET, CHEWABLE ORAL at 14:37

## 2024-04-30 RX ADMIN — ANTI-FUNGAL POWDER MICONAZOLE NITRATE TALC FREE: 1.42 POWDER TOPICAL at 09:58

## 2024-04-30 RX ADMIN — PANTOPRAZOLE SODIUM 40 MG: 40 TABLET, DELAYED RELEASE ORAL at 14:37

## 2024-04-30 RX ADMIN — INSULIN LISPRO 2 UNITS: 100 INJECTION, SOLUTION INTRAVENOUS; SUBCUTANEOUS at 09:48

## 2024-04-30 RX ADMIN — SULFAMETHOXAZOLE AND TRIMETHOPRIM 1 TABLET: 800; 160 TABLET ORAL at 09:50

## 2024-04-30 RX ADMIN — INSULIN LISPRO 4 UNITS: 100 INJECTION, SOLUTION INTRAVENOUS; SUBCUTANEOUS at 13:21

## 2024-04-30 RX ADMIN — GABAPENTIN 100 MG: 100 CAPSULE ORAL at 13:21

## 2024-04-30 RX ADMIN — FUROSEMIDE 20 MG: 10 INJECTION, SOLUTION INTRAMUSCULAR; INTRAVENOUS at 09:47

## 2024-04-30 RX ADMIN — ANTI-FUNGAL POWDER MICONAZOLE NITRATE TALC FREE: 1.42 POWDER TOPICAL at 21:29

## 2024-04-30 RX ADMIN — TAMSULOSIN HYDROCHLORIDE 0.4 MG: 0.4 CAPSULE ORAL at 09:53

## 2024-04-30 RX ADMIN — ATORVASTATIN CALCIUM 10 MG: 10 TABLET, FILM COATED ORAL at 21:28

## 2024-04-30 RX ADMIN — Medication 400 UNITS: at 09:54

## 2024-04-30 RX ADMIN — METOPROLOL TARTRATE 25 MG: 25 TABLET, FILM COATED ORAL at 09:52

## 2024-04-30 RX ADMIN — RIFAXIMIN 550 MG: 550 TABLET ORAL at 09:51

## 2024-04-30 RX ADMIN — SODIUM CHLORIDE, PRESERVATIVE FREE 10 ML: 5 INJECTION INTRAVENOUS at 09:58

## 2024-04-30 RX ADMIN — PANTOPRAZOLE SODIUM 40 MG: 40 TABLET, DELAYED RELEASE ORAL at 06:30

## 2024-04-30 RX ADMIN — Medication 50 MG: at 09:52

## 2024-04-30 RX ADMIN — ANTACID TABLETS 500 MG: 500 TABLET, CHEWABLE ORAL at 21:35

## 2024-04-30 RX ADMIN — GABAPENTIN 100 MG: 100 CAPSULE ORAL at 21:28

## 2024-04-30 RX ADMIN — Medication 3 MG: at 21:28

## 2024-04-30 RX ADMIN — Medication 4000 UNITS: at 09:56

## 2024-04-30 RX ADMIN — GABAPENTIN 100 MG: 100 CAPSULE ORAL at 09:51

## 2024-04-30 RX ADMIN — SODIUM CHLORIDE, PRESERVATIVE FREE 10 ML: 5 INJECTION INTRAVENOUS at 21:29

## 2024-04-30 ASSESSMENT — ENCOUNTER SYMPTOMS
BLOOD IN STOOL: 0
ABDOMINAL PAIN: 0
VOMITING: 0
SHORTNESS OF BREATH: 0
NAUSEA: 0
ABDOMINAL DISTENTION: 1
DIARRHEA: 0
COUGH: 0

## 2024-04-30 ASSESSMENT — PAIN SCALES - GENERAL: PAINLEVEL_OUTOF10: 2

## 2024-04-30 ASSESSMENT — PAIN DESCRIPTION - LOCATION: LOCATION: ABDOMEN

## 2024-04-30 NOTE — CARE COORDINATION
Call placed to Ana Laura at [facility]. Pre cert was submitted and is still pending for return to facility

## 2024-04-30 NOTE — CONSULTS
Department of Internal Medicine  Nephrology Earl Cannon MD   Consult Note    Reason for consultation: Management of acute kidney injury and hyponatremia.    Consulting physician: Courtney Carrillo MD.    History of present illness: This is a 68 y.o. female with a significant past medical history of type 2 diabetes mellitus, liver cirrhosis secondary to nonalcoholic steatohepatitis [BONNER], systemic hypertension and cholelithiasis, presented to the hospital with complaints of gross hematuria.  Vital signs were stable at presentation.    CT scan of the abdomen and pelvis performed with IV contrast on 4/27/2024 showed large amount of inflammatory debris in the dependent urinary bladder as well as nonspecific distal gastritis and duodenitis as well as anasarca.  BUNs/creatinine on same day was 46/1.7 mg/dL with sodium 128 mmol/L.  Urinalysis showed numerous WBCs.    He underwent cystoscopy 4/29/2024 with clot evacuation and transurethral resection of bladder tumor.    Tylenol [acetaminophen]    Past Medical History:   Diagnosis Date    Abdominal swelling     Claustrophobia     COVID-19 vaccine administered     Diabetes (HCC)     DEXCOM LT ARM    Diabetic retinopathy (HCC) 11/28/2015    Flatus     H/O acute sinusitis     H/O cholelithiasis     Hypertension     Liver cirrhosis (HCC)     LLQ abdominal tenderness     Poor venous access     \"THEY LIKE TO ROLL & RUN AWAY\"    Positive colorectal cancer screening using Cologuard test 07/06/2023    had colonoscopy after this and no cancer was found    Post-menopausal bleeding     RUQ abdominal pain     Tendonitis of shoulder, right 12/09/2021    Tingling     Under care of service provider 12/28/2023    [facility]q-[facility]rlq[facility]-[id number] in [place]-last visit dec 2023    Under care of service provider 12/28/2023    bdmwdp-[id number]-zkegyd st in [place]-last visit dec 2023    Vaginal bleeding 08/2023    \"SPOTTING, THICKENED UTERINE LINING\"DUE TO HAVE VAGINAL /UTERINE BIOPSY FOR\"

## 2024-04-30 NOTE — PLAN OF CARE
Problem: Safety - Adult  Goal: Free from fall injury  Outcome: Progressing  Note: Patient remains free of incidence/ injury. Bed remains in low position. Call light within reach. Side rails up x2.      Problem: Skin/Tissue Integrity  Goal: Absence of new skin breakdown  Description: 1.  Monitor for areas of redness and/or skin breakdown  2.  Assess vascular access sites hourly  3.  Every 4-6 hours minimum:  Change oxygen saturation probe site  4.  Every 4-6 hours:  If on nasal continuous positive airway pressure, respiratory therapy assess nares and determine need for appliance change or resting period.  Outcome: Progressing  Note: No new occurrence of skin breakdown noted during this shift.     Problem: Pain  Goal: Verbalizes/displays adequate comfort level or baseline comfort level  Outcome: Progressing  Note: Patient expresses relief following administration of prn pain medication.

## 2024-04-30 NOTE — ANESTHESIA POSTPROCEDURE EVALUATION
Department of Anesthesiology  Postprocedure Note    Patient: Bobbi Phillips  MRN: [id number]  YOB: 1955  Date of evaluation: 4/30/2024    Procedure Summary       Date: 04/29/24 Room / Location: [place] [address] / [facility]    Anesthesia Start: 1249 Anesthesia Stop: 1358    Procedure: CYSTOSCOPY, TRANSURETHRAL RESECTION BLADDER,  EVACUATION OF CLOTS with Catheter Insertion for Continuous Irrigation (Bladder) Diagnosis:       Gross hematuria      Mass of bladder      (Gross hematuria [R31.0])      (Mass of bladder [N32.89])    Surgeons: Jason Lambert MD Responsible Provider: Shayy Vu MD    Anesthesia Type: general ASA Status: 3 - Emergent            Anesthesia Type: No value filed.    Domenica Phase I: Domenica Score: 10    Domenica Phase II:      Anesthesia Post Evaluation    Comments: POD #1.  Patient seen at bedside.  No anesthesia complications reported.        No notable events documented.

## 2024-04-30 NOTE — PROGRESS NOTES
Urology Progress Note    Subjective: No urologic issues overnight.    Patient Vitals for the past 24 hrs:   BP Temp Pulse Resp SpO2   04/30/24 0612 (!) 125/53 98.4 °F (36.9 °C) 69 18 94 %   04/29/24 2331 (!) 99/48 98.1 °F (36.7 °C) 66 16 94 %   04/29/24 1817 (!) 121/45 97.8 °F (36.6 °C) 73 16 97 %       Intake/Output Summary (Last 24 hours) at 4/30/2024 1646  Last data filed at 4/30/2024 1058  Gross per 24 hour   Intake --   Output 2750 ml   Net -2750 ml       Recent Labs     04/28/24  0634 04/28/24  1727 04/29/24  0425 04/29/24  1035 04/29/24  2200 04/30/24  0508 04/30/24  1231   WBC 4.7  --  7.3  --   --  3.7  --    HGB 6.8*   < > 8.4*   < > 8.4* 8.1* 8.2*   HCT 21.0*   < > 25.6*   < > 25.3* 25.4* 25.6*   MCV 90.6  --  87.3  --   --  89.7  --      --  204  --   --  201  --     < > = values in this interval not displayed.     Recent Labs     04/28/24  0902 04/29/24  0425 04/29/24  1747 04/30/24  0508   * 126* 129* 128*   K 4.0 4.7  --  5.3   CL 98 96*  --  99   CO2 23 21  --  21   BUN 44* 44*  --  44*   CREATININE 1.4* 1.4*  --  1.4*       No results for input(s): \"COLORU\", \"PHUR\", \"LABCAST\", \"WBCUA\", \"RBCUA\", \"MUCUS\", \"TRICHOMONAS\", \"YEAST\", \"BACTERIA\", \"CLARITYU\", \"SPECGRAV\", \"LEUKOCYTESUR\", \"UROBILINOGEN\", \"BILIRUBINUR\", \"BLOODU\" in the last 72 hours.    Invalid input(s): \"NITRATE\", \"GLUCOSEUKETONESUAMORPHOUS\"    Additional Lab/culture results:     Physical Exam: Kerns in place.  Urine is yellow and completely clear.  Continuous bladder irrigation is off.    Interval Imaging Findings: None    Impression:    Patient Active Problem List   Diagnosis    Anxiety    Liver cirrhosis secondary to BONNER (HCC)    Disc degeneration, lumbar    Poorly-controlled hypertension    Hyperlipidemia    Lumbar radiculopathy    Microalbuminuria    Numbness    Palpitations    Sciatica    Type 2 diabetes mellitus (HCC)    Rosacea    Obesity, Class III, BMI 40-49.9 (morbid obesity) (HCC)    Chronic right shoulder pain

## 2024-04-30 NOTE — PROGRESS NOTES
Physical Therapy  Facility/Department: [facility] MED SURG  Physical Therapy Initial Assessment    Name: Bobbi hPillips  : 1955  MRN: [id number]  Date of Service: 2024    Discharge Recommendations:  Patient would benefit from continued therapy after discharge, Therapy recommended at discharge   PT Equipment Recommendations  Equipment Needed: No      Patient Diagnosis(es): The primary encounter diagnosis was Hematuria, unspecified type. Diagnoses of Anemia, unspecified type, Gross hematuria, and Mass of bladder were also pertinent to this visit.  Past Medical History:  has a past medical history of Abdominal swelling, Claustrophobia, COVID-19 vaccine administered, Diabetes (HCC), Diabetic retinopathy (HCC), Flatus, H/O acute sinusitis, H/O cholelithiasis, Hypertension, Liver cirrhosis (HCC), LLQ abdominal tenderness, Poor venous access, Positive colorectal cancer screening using Cologuard test, Post-menopausal bleeding, RUQ abdominal pain, Tendonitis of shoulder, right, Tingling, Under care of service provider, Under care of service provider, Vaginal bleeding, Vaginal candidiasis, and Wears glasses.  Past Surgical History:  has a past surgical history that includes Cholecystectomy, laparoscopic ();  section; Ankle fracture surgery (Left, ); Cataract removal with implant (Bilateral, ); Colonoscopy (N/A, 2023); Dilation and curettage of uterus (N/A, 10/02/2023); Cardiac catheterization (); hysteroscopy (2024); Dilation and curettage of uterus (N/A, 2024); Upper gastrointestinal endoscopy (N/A, 4/10/2024); and Anomalous venous return repair (N/A, 2024).    Assessment   Body Structures, Functions, Activity Limitations Requiring Skilled Therapeutic Intervention: Decreased functional mobility ;Decreased endurance;Decreased strength;Decreased ROM;Increased pain;Decreased balance  Assessment: Impaired mobility due to decreased tolerance to activity and pain with safety

## 2024-04-30 NOTE — PROGRESS NOTES
Regarding abdomen ultrasound for ascites - pt had CT 4/27 and noted to have moderate to large amount of ascites. Please see report. Any questions please contact us at [phone]

## 2024-04-30 NOTE — PROGRESS NOTES
lesion. Recommend pelvic ultrasound for further evaluation. 3. Cirrhosis and ascites. 4.  ileus. 5. Calcific coronary artery disease. 6. Spondylosis.     CT THORACIC SPINE WO CONTRAST    Result Date: 3/29/2024  EXAMINATION: CT OF THE THORACIC SPINE WITHOUT CONTRAST  3/29/2024 6:21 pm: TECHNIQUE: CT of the thoracic spine was performed without the administration of intravenous contrast. Multiplanar reformatted images are provided for review. Automated exposure control, iterative reconstruction, and/or weight based adjustment of the mA/kV was utilized to reduce the radiation dose to as low as reasonably achievable. COMPARISON: None. HISTORY: ORDERING SYSTEM PROVIDED HISTORY: fall TECHNOLOGIST PROVIDED HISTORY: fall Reason for Exam: S/p fall off couch. C/o severe back pain FINDINGS: BONES/ALIGNMENT: Bones are osteopenic.  There is normal alignment of the spine. The vertebral body heights are maintained. No osseous destructive lesion is seen. DEGENERATIVE CHANGES: Multilevel loss of intervertebral disc space height. There are multilevel bridging anterior osteophytes suggesting dish. SOFT TISSUES: No paraspinal mass is seen.     No evidence of acute thoracic spine fracture or traumatic malalignment. Multilevel thoracic spondylosis. Bridging anterior osteophytes suggesting dish. Osteopenia.     CT LUMBAR SPINE WO CONTRAST    Result Date: 3/29/2024  EXAMINATION: CT OF THE LUMBAR SPINE WITHOUT CONTRAST  3/29/2024 TECHNIQUE: CT of the lumbar spine was performed without the administration of intravenous contrast. Multiplanar reformatted images are provided for review. Adjustment of mA and/or kV according to patient size was utilized.  Automated exposure control, iterative reconstruction, and/or weight based adjustment of the mA/kV was utilized to reduce the radiation dose to as low as reasonably achievable. COMPARISON: None HISTORY: ORDERING SYSTEM PROVIDED HISTORY: fall TECHNOLOGIST PROVIDED HISTORY: fall Decision Support  HEART/MEDIASTINUM: The cardiomediastinal silhouette is within normal limits. PLEURA/LUNGS: There are no focal consolidations or pleural effusions. There is no appreciable pneumothorax. BONES/SOFT TISSUE: No acute abnormality.     No radiographic evidence of acute pulmonary disease.         Physical Examination:      BP (!) 125/53   Pulse 69   Temp 98.4 °F (36.9 °C)   Resp 18   Ht 1.499 m (4' 11\")   Wt 102.1 kg (225 lb)   SpO2 94%   BMI 45.44 kg/m²     Physical Exam  Constitutional:       Appearance: She is obese.   HENT:      Head: Normocephalic and atraumatic.   Eyes:      Pupils: Pupils are equal, round, and reactive to light.   Cardiovascular:      Rate and Rhythm: Normal rate and regular rhythm.      Heart sounds: Normal heart sounds.   Pulmonary:      Effort: Pulmonary effort is normal. No respiratory distress.      Breath sounds: Normal breath sounds. No stridor. No wheezing, rhonchi or rales.   Abdominal:      General: There is distension (ascites).      Palpations: Abdomen is soft.      Tenderness: There is no abdominal tenderness. There is no right CVA tenderness, left CVA tenderness or guarding.   Genitourinary:     Comments: Kerns bag draining yellow-colored urine  Musculoskeletal:      Right lower leg: Edema (1+ pitting) present.      Left lower leg: Edema (1+ pitting) present.   Neurological:      General: No focal deficit present.      Mental Status: She is alert and oriented to person, place, and time.         Assessment:        Primary Problem  Hematuria    Active Hospital Problems    Diagnosis Date Noted    Hematuria [R31.9] 04/27/2024    KIM (acute kidney injury) (HCC) [N17.9] 04/03/2024    Obesity, Class II, BMI 35-39.9 [E66.9] 03/05/2024    Diabetic renal disease (HCC) [E11.21] 04/18/2022    Liver cirrhosis secondary to BONNER (HCC) [K75.81, K74.60] 11/28/2015    Hyperlipidemia [E78.5] 11/28/2015    Type 2 diabetes mellitus (HCC) [E11.9] 11/28/2015       Plan:        Patient status Admit as

## 2024-04-30 NOTE — ANESTHESIA POSTPROCEDURE EVALUATION
Department of Anesthesiology  Postprocedure Note    Patient: Bobbi Phillips  MRN: [id number]  YOB: 1955  Date of evaluation: 4/30/2024    Procedure Summary       Date: 04/29/24 Room / Location: [place] [address] / [facility]    Anesthesia Start: 1249 Anesthesia Stop: 1358    Procedure: CYSTOSCOPY, TRANSURETHRAL RESECTION BLADDER,  EVACUATION OF CLOTS with Catheter Insertion for Continuous Irrigation (Bladder) Diagnosis:       Gross hematuria      Mass of bladder      (Gross hematuria [R31.0])      (Mass of bladder [N32.89])    Surgeons: Jason Lambert MD Responsible Provider: Shayy Vu MD    Anesthesia Type: general ASA Status: 3 - Emergent            Anesthesia Type: No value filed.    Domenica Phase I: Domenica Score: 10    Domenica Phase II:      Anesthesia Post Evaluation    Comments: POD #1.  Patient seen at bedside.  No anesthesia complications reported.        No notable events documented.

## 2024-04-30 NOTE — PROGRESS NOTES
[facility]   Occupational Therapy Evaluation  Date: 24  Patient Name: Bobbi Phillips       Room: -  MRN: [id number]  Account: [id number]   : 1955  (68 y.o.) Gender: female     Discharge Recommendations:  Further Occupational Therapy is recommended upon facility discharge.    OT Equipment Recommendations  Other: TBD    Referring Practitioner: Jason Lambert MD  Diagnosis: Hematuria   Additional Pertinent Hx: 68 y.o. 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  Patient was admitted to this facility for approximately 20 days for a complicated UTI.  She was discharged to our provide staff.  Patient states that starting last night she noticed some bleeding in her briefs.  During her previous admission she did have some pink-tinged urine in her Kerns which was attributed to her UTI.  Patient did also endorse associated chills for the last couple and fatigue of nights. In the ED, Kerns catheter was inserted with return of gross hematuria which is filling the Kerns bag.  Hemoglobin was 7.2 on presentation, and patient was transfused 1 unit RBCs.  Urology was consulted in the ED who made the patient n.p.o. at midnight for cystoscopy tomorrow.  Patient was also noted to have mild KIM with Cr 1.7, up from 1.1 previously, 5 days ago.  Patient's exam is otherwise benign, no abdominal tenderness, CVA tenderness.  There is mild 1+ lower extremity pitting edema.  CT abdomen/pelvis was significant for large amount of inflammatory debris/hemorrhage within the urinary bladder, dilated endometrial canal likely related to cervical stenosis.    Treatment Diagnosis: Impaired self-care status    Past Medical History:  has a past medical history of Abdominal swelling, Claustrophobia, COVID-19 vaccine administered, Diabetes (HCC), Diabetic  appropriate and notify OTR to update POC  Short Term Goal 6: actively participate in 15+ minutes of therapeutic exercise/functional activity to promote safety and independence with self care and mobility  Short Term Goal 7: -    Plan  Occupational Therapy Plan  Times Per Week: 5-7  Current Treatment Recommendations: Strengthening, Balance training, Functional mobility training, Endurance training, Safety education & training, Patient/Caregiver education & training, Equipment evaluation, education, & procurement, Positioning, Self-Care / ADL      OT Individual Minutes  OT Individual Minutes  Time In: 0834  Time Out: 0909  Minutes: 35  Time Code Minutes   Timed Code Treatment Minutes: 20 Minutes        Electronically signed by PANCHITO Mcneil on 4/30/24 at 10:20 AM EDT

## 2024-04-30 NOTE — PLAN OF CARE
Problem: Discharge Planning  Goal: Discharge to home or other facility with appropriate resources  4/30/2024 0432 by Tasha Oreilly RN  Outcome: Progressing     Problem: Safety - Adult  Goal: Free from fall injury  4/30/2024 0432 by Tasha Oreilly RN  Outcome: Progressing     Problem: Skin/Tissue Integrity  Goal: Absence of new skin breakdown  Description: 1.  Monitor for areas of redness and/or skin breakdown  2.  Assess vascular access sites hourly  3.  Every 4-6 hours minimum:  Change oxygen saturation probe site  4.  Every 4-6 hours:  If on nasal continuous positive airway pressure, respiratory therapy assess nares and determine need for appliance change or resting period.  4/30/2024 0432 by Tasha Oreilly RN  Outcome: Progressing     Problem: Pain  Goal: Verbalizes/displays adequate comfort level or baseline comfort level  4/30/2024 0432 by Tasha Oreilly RN  Outcome: Progressing     Problem: ABCDS Injury Assessment  Goal: Absence of physical injury  4/30/2024 0432 by Tasha Oreilly RN  Outcome: Progressing     Problem: Skin/Tissue Integrity - Adult  Goal: Skin integrity remains intact  4/30/2024 0432 by Tasha Oreilly RN  Outcome: Progressing     Problem: Genitourinary - Adult  Goal: Urinary catheter remains patent  4/30/2024 0432 by Tasha Oreilly RN  Outcome: Progressing     Problem: Metabolic/Fluid and Electrolytes - Adult  Goal: Electrolytes maintained within normal limits  4/30/2024 0432 by Tasha Oreilly RN  Outcome: Progressing     Problem: Chronic Conditions and Co-morbidities  Goal: Patient's chronic conditions and co-morbidity symptoms are monitored and maintained or improved  4/30/2024 0432 by Tasha Oreilly RN  Outcome: Progressing     Problem: Nutrition Deficit:  Goal: Optimize nutritional status  4/30/2024 0432 by Tasha Oreilly RN  Outcome: Progressing

## 2024-05-01 ENCOUNTER — APPOINTMENT (OUTPATIENT)
Dept: INTERVENTIONAL RADIOLOGY/VASCULAR | Age: 69
DRG: 669 | End: 2024-05-01
Payer: MEDICARE

## 2024-05-01 LAB
ANION GAP SERPL CALCULATED.3IONS-SCNC: 10 MMOL/L (ref 9–17)
BASOPHILS # BLD: 0 K/UL (ref 0–0.2)
BASOPHILS NFR BLD: 1 % (ref 0–2)
BUN SERPL-MCNC: 47 MG/DL (ref 8–23)
CALCIUM SERPL-MCNC: 8 MG/DL (ref 8.6–10.4)
CASE NUMBER:: NORMAL
CHLORIDE SERPL-SCNC: 98 MMOL/L (ref 98–107)
CO2 SERPL-SCNC: 20 MMOL/L (ref 20–31)
CREAT SERPL-MCNC: 1.4 MG/DL (ref 0.5–0.9)
EOSINOPHIL # BLD: 0.2 K/UL (ref 0–0.4)
EOSINOPHILS RELATIVE PERCENT: 2 % (ref 0–4)
ERYTHROCYTE [DISTWIDTH] IN BLOOD BY AUTOMATED COUNT: 17.9 % (ref 11.5–14.9)
GFR, ESTIMATED: 41 ML/MIN/1.73M2
GLUCOSE BLD-MCNC: 194 MG/DL (ref 65–105)
GLUCOSE BLD-MCNC: 200 MG/DL (ref 65–105)
GLUCOSE BLD-MCNC: 200 MG/DL (ref 65–105)
GLUCOSE BLD-MCNC: 224 MG/DL (ref 65–105)
GLUCOSE SERPL-MCNC: 217 MG/DL (ref 70–99)
HCT VFR BLD AUTO: 25 % (ref 36–46)
HCT VFR BLD AUTO: 25.2 % (ref 36–46)
HCT VFR BLD AUTO: 25.4 % (ref 36–46)
HGB BLD-MCNC: 8.1 G/DL (ref 12–16)
LYMPHOCYTES NFR BLD: 0.9 K/UL (ref 1–4.8)
LYMPHOCYTES RELATIVE PERCENT: 13 % (ref 24–44)
MCH RBC QN AUTO: 29 PG (ref 26–34)
MCHC RBC AUTO-ENTMCNC: 32.3 G/DL (ref 31–37)
MCV RBC AUTO: 89.7 FL (ref 80–100)
MONOCYTES NFR BLD: 0.5 K/UL (ref 0.1–1.3)
MONOCYTES NFR BLD: 8 % (ref 1–7)
NEUTROPHILS NFR BLD: 76 % (ref 36–66)
NEUTS SEG NFR BLD: 5 K/UL (ref 1.3–9.1)
PLATELET # BLD AUTO: 196 K/UL (ref 150–450)
PMV BLD AUTO: 8.5 FL (ref 6–12)
POTASSIUM SERPL-SCNC: 4.7 MMOL/L (ref 3.7–5.3)
PROT FLD-MCNC: 1.1 G/DL
RBC # BLD AUTO: 2.78 M/UL (ref 4–5.2)
SODIUM SERPL-SCNC: 126 MMOL/L (ref 135–144)
SODIUM SERPL-SCNC: 128 MMOL/L (ref 135–144)
SPECIMEN DESCRIPTION: NORMAL
SPECIMEN TYPE: NORMAL
WBC OTHER # BLD: 6.6 K/UL (ref 3.5–11)

## 2024-05-01 PROCEDURE — 51798 US URINE CAPACITY MEASURE: CPT

## 2024-05-01 PROCEDURE — 49083 ABD PARACENTESIS W/IMAGING: CPT

## 2024-05-01 PROCEDURE — 6370000000 HC RX 637 (ALT 250 FOR IP): Performed by: UROLOGY

## 2024-05-01 PROCEDURE — 88112 CYTOPATH CELL ENHANCE TECH: CPT

## 2024-05-01 PROCEDURE — 85014 HEMATOCRIT: CPT

## 2024-05-01 PROCEDURE — 36415 COLL VENOUS BLD VENIPUNCTURE: CPT

## 2024-05-01 PROCEDURE — 82947 ASSAY GLUCOSE BLOOD QUANT: CPT

## 2024-05-01 PROCEDURE — 80048 BASIC METABOLIC PNL TOTAL CA: CPT

## 2024-05-01 PROCEDURE — 99233 SBSQ HOSP IP/OBS HIGH 50: CPT | Performed by: INTERNAL MEDICINE

## 2024-05-01 PROCEDURE — 85025 COMPLETE CBC W/AUTO DIFF WBC: CPT

## 2024-05-01 PROCEDURE — 0W9G3ZZ DRAINAGE OF PERITONEAL CAVITY, PERCUTANEOUS APPROACH: ICD-10-PCS | Performed by: RADIOLOGY

## 2024-05-01 PROCEDURE — 84295 ASSAY OF SERUM SODIUM: CPT

## 2024-05-01 PROCEDURE — 1200000000 HC SEMI PRIVATE

## 2024-05-01 PROCEDURE — 2709999900 IR US GUIDED PARACENTESIS

## 2024-05-01 PROCEDURE — 85018 HEMOGLOBIN: CPT

## 2024-05-01 PROCEDURE — 2580000003 HC RX 258: Performed by: UROLOGY

## 2024-05-01 PROCEDURE — 88305 TISSUE EXAM BY PATHOLOGIST: CPT

## 2024-05-01 RX ADMIN — METOPROLOL TARTRATE 25 MG: 25 TABLET, FILM COATED ORAL at 10:04

## 2024-05-01 RX ADMIN — Medication 50 MG: at 10:04

## 2024-05-01 RX ADMIN — RIFAXIMIN 550 MG: 550 TABLET ORAL at 10:05

## 2024-05-01 RX ADMIN — RIFAXIMIN 550 MG: 550 TABLET ORAL at 21:41

## 2024-05-01 RX ADMIN — GABAPENTIN 100 MG: 100 CAPSULE ORAL at 12:57

## 2024-05-01 RX ADMIN — INSULIN LISPRO 2 UNITS: 100 INJECTION, SOLUTION INTRAVENOUS; SUBCUTANEOUS at 17:38

## 2024-05-01 RX ADMIN — GABAPENTIN 100 MG: 100 CAPSULE ORAL at 10:04

## 2024-05-01 RX ADMIN — PANTOPRAZOLE SODIUM 40 MG: 40 TABLET, DELAYED RELEASE ORAL at 10:03

## 2024-05-01 RX ADMIN — ANTI-FUNGAL POWDER MICONAZOLE NITRATE TALC FREE: 1.42 POWDER TOPICAL at 21:43

## 2024-05-01 RX ADMIN — SODIUM CHLORIDE, PRESERVATIVE FREE 10 ML: 5 INJECTION INTRAVENOUS at 21:42

## 2024-05-01 RX ADMIN — TAMSULOSIN HYDROCHLORIDE 0.4 MG: 0.4 CAPSULE ORAL at 10:04

## 2024-05-01 RX ADMIN — SULFAMETHOXAZOLE AND TRIMETHOPRIM 1 TABLET: 800; 160 TABLET ORAL at 10:04

## 2024-05-01 RX ADMIN — PANTOPRAZOLE SODIUM 40 MG: 40 TABLET, DELAYED RELEASE ORAL at 17:38

## 2024-05-01 RX ADMIN — ATORVASTATIN CALCIUM 10 MG: 10 TABLET, FILM COATED ORAL at 21:42

## 2024-05-01 RX ADMIN — Medication 400 UNITS: at 10:05

## 2024-05-01 RX ADMIN — ANTI-FUNGAL POWDER MICONAZOLE NITRATE TALC FREE: 1.42 POWDER TOPICAL at 10:04

## 2024-05-01 RX ADMIN — SODIUM CHLORIDE, PRESERVATIVE FREE 10 ML: 5 INJECTION INTRAVENOUS at 10:04

## 2024-05-01 RX ADMIN — INSULIN LISPRO 2 UNITS: 100 INJECTION, SOLUTION INTRAVENOUS; SUBCUTANEOUS at 12:57

## 2024-05-01 RX ADMIN — METOPROLOL TARTRATE 25 MG: 25 TABLET, FILM COATED ORAL at 21:42

## 2024-05-01 RX ADMIN — GABAPENTIN 100 MG: 100 CAPSULE ORAL at 21:42

## 2024-05-01 RX ADMIN — Medication 4000 UNITS: at 10:04

## 2024-05-01 ASSESSMENT — ENCOUNTER SYMPTOMS
ABDOMINAL DISTENTION: 1
VOMITING: 0
BLOOD IN STOOL: 0
DIARRHEA: 0
NAUSEA: 0
ABDOMINAL PAIN: 0
SHORTNESS OF BREATH: 0
COUGH: 0

## 2024-05-01 NOTE — PLAN OF CARE
Problem: Discharge Planning  Goal: Discharge to home or other facility with appropriate resources  Outcome: Progressing  Flowsheets (Taken 4/30/2024 2015)  Discharge to home or other facility with appropriate resources: Identify barriers to discharge with patient and caregiver     Problem: Safety - Adult  Goal: Free from fall injury  5/1/2024 0124 by Christo Canales RN  Outcome: Progressing  Flowsheets (Taken 5/1/2024 0121)  Free From Fall Injury: Instruct family/caregiver on patient safety  4/30/2024 1938 by Gabi Cody RN  Outcome: Progressing  Note: Patient remains free of incidence/ injury. Bed remains in low position. Call light within reach. Side rails up x2.      Problem: Skin/Tissue Integrity  Goal: Absence of new skin breakdown  Description: 1.  Monitor for areas of redness and/or skin breakdown  2.  Assess vascular access sites hourly  3.  Every 4-6 hours minimum:  Change oxygen saturation probe site  4.  Every 4-6 hours:  If on nasal continuous positive airway pressure, respiratory therapy assess nares and determine need for appliance change or resting period.  5/1/2024 0124 by Christo Canales RN  Outcome: Progressing  4/30/2024 1938 by Gabi Cody RN  Outcome: Progressing  Note: No new occurrence of skin breakdown noted during this shift.     Problem: Pain  Goal: Verbalizes/displays adequate comfort level or baseline comfort level  5/1/2024 0124 by Christo Canales RN  Outcome: Progressing  Flowsheets (Taken 4/30/2024 2015)  Verbalizes/displays adequate comfort level or baseline comfort level:   Encourage patient to monitor pain and request assistance   Administer analgesics based on type and severity of pain and evaluate response  4/30/2024 1938 by Gabi Cody RN  Outcome: Progressing  Note: Patient expresses relief following administration of prn pain medication.     Problem: ABCDS Injury Assessment  Goal: Absence of physical injury  Outcome: Progressing  Flowsheets (Taken  5/1/2024 0121)  Absence of Physical Injury: Implement safety measures based on patient assessment     Problem: Skin/Tissue Integrity - Adult  Goal: Skin integrity remains intact  Outcome: Progressing  Flowsheets  Taken 5/1/2024 0121  Skin Integrity Remains Intact: Monitor for areas of redness and/or skin breakdown  Taken 4/30/2024 2015  Skin Integrity Remains Intact: Monitor for areas of redness and/or skin breakdown     Problem: Musculoskeletal - Adult  Goal: Return mobility to safest level of function  Outcome: Progressing  Flowsheets (Taken 4/30/2024 2015)  Return Mobility to Safest Level of Function: Assess patient stability and activity tolerance for standing, transferring and ambulating with or without assistive devices     Problem: Genitourinary - Adult  Goal: Urinary catheter remains patent  Outcome: Progressing  Flowsheets (Taken 4/30/2024 2015)  Urinary catheter remains patent: Assess patency of urinary catheter     Problem: Infection - Adult  Goal: Absence of infection at discharge  Outcome: Progressing  Flowsheets (Taken 4/30/2024 2015)  Absence of infection at discharge: Assess and monitor for signs and symptoms of infection     Problem: Metabolic/Fluid and Electrolytes - Adult  Goal: Electrolytes maintained within normal limits  Outcome: Progressing  Flowsheets (Taken 4/30/2024 2015)  Electrolytes maintained within normal limits: Monitor labs and assess patient for signs and symptoms of electrolyte imbalances  Goal: Glucose maintained within prescribed range  Outcome: Progressing  Flowsheets (Taken 4/30/2024 2015)  Glucose maintained within prescribed range: Monitor blood glucose as ordered     Problem: Hematologic - Adult  Goal: Maintains hematologic stability  Outcome: Progressing  Flowsheets (Taken 4/30/2024 2015)  Maintains hematologic stability: Assess for signs and symptoms of bleeding or hemorrhage     Problem: Chronic Conditions and Co-morbidities  Goal: Patient's chronic conditions and

## 2024-05-01 NOTE — PROGRESS NOTES
Writer attempts again to have patient attempt to get up to urinate or allow RN to place nichols per order. Patient does not want to at this time as she has visitors. Patient states she will let writer know when she is ready.

## 2024-05-01 NOTE — CARE COORDINATION
Writer is following for potential discharge to [facility].  Authorization was started 4/29.  Writer placed call to [facility] to check authorization status. No answer, voicemail left for return call.  Electronically signed by HUANG Ayers on 5/1/2024 at 10:59 AM    Writer received call back from [employer]. Authorization is still pending at this time.  Electronically signed by HUANG Ayers on 5/1/2024 at 3:07 PM

## 2024-05-01 NOTE — PROGRESS NOTES
Occupational Therapy  [facility]  Occupational Therapy Not Seen    DATE: 2024    NAME: Bobbi Phillips  MRN: [id number]   : 1955    Patient not seen this date for Occupational Therapy due to:      [] Cancel by RN or physician due to:    [] Hemodialysis    [] Critical Lab Value Level     [] Blood transfusion in progress    [] Acute or unstable cardiovascular status   _MAP < 55 or more than >115  _HR < 40 or > 130    [] Acute or unstable pulmonary status   -FiO2 > 60%   _RR < 5 or >40    _O2 sats < 85%    [] Strict Bedrest    [x] Off Unit for surgery or procedure (paracentesis)    [] Off Unit for testing       [] Pending imaging to R/O fracture    [] Refusal by Patient      [] Other      [] OT being discontinued at this time. Patient independent. No further needs.     [] OT being discontinued at this time as the patient has been transferred to hospice care. No further needs.      LYNNE GARCIA

## 2024-05-01 NOTE — PROGRESS NOTES
Department of Internal Medicine  Nephrology Earl Cannon MD  Progress Note    Reason for consultation: Management of acute kidney injury and hyponatremia.    Consulting physician: Courtney Carrillo MD.    Interval history: Patient was seen and examined today and Kerns catheter has been removed.  She feels better and it appears gross hematuria has resolved.  She is nonoliguric.  She underwent ultrasound-guided paracentesis today with removal of 6.2 L of ascitic fluid.    History of present illness: This is a 68 y.o. female with a significant past medical history of type 2 diabetes mellitus, liver cirrhosis secondary to nonalcoholic steatohepatitis [BONNER], systemic hypertension and cholelithiasis, presented to the hospital with complaints of gross hematuria.  Vital signs were stable at presentation.    CT scan of the abdomen and pelvis performed with IV contrast on 4/27/2024 showed large amount of inflammatory debris in the dependent urinary bladder as well as nonspecific distal gastritis and duodenitis as well as anasarca.  BUNs/creatinine on same day was 46/1.7 mg/dL with sodium 128 mmol/L.  Urinalysis showed numerous WBCs.  He underwent cystoscopy 4/29/2024 with clot evacuation and transurethral resection of bladder tumor.    Scheduled Meds:   sodium chloride  500 mL IntraVENous Once    sulfamethoxazole-trimethoprim  1 tablet Oral Daily    lidocaine  1 patch TransDERmal Daily    gabapentin  100 mg Oral TID    sodium chloride flush  5-40 mL IntraVENous 2 times per day    atorvastatin  10 mg Oral Nightly    Vitamin D  4,000 Units Oral Daily    metoprolol tartrate  25 mg Oral BID    miconazole   Topical BID    pantoprazole  40 mg Oral BID AC    rifAXIMin  550 mg Oral BID    tamsulosin  0.4 mg Oral Daily    zinc sulfate  50 mg Oral Daily    vitamin E  400 Units Oral Daily    insulin lispro  0-8 Units SubCUTAneous TID WC    insulin lispro  0-4 Units SubCUTAneous Nightly     Continuous Infusions:   sodium

## 2024-05-01 NOTE — PROGRESS NOTES
[facility]  IN-PATIENT SERVICE  [facility]    PROGRESS NOTE             5/1/2024    11:40 AM    Name:   Bobbi Phillips  MRN:     [id number]     Acct:      [id number]   Room:   2073/2073-01  IP Day:  4  Admit Date:  4/27/2024 12:05 PM    PCP:  Stacie Doty MD  Code Status:  Full Code    Subjective:     C/C:   Chief Complaint   Patient presents with    Hematuria     Interval History Status: not changed.    Patient, clinically doing much better today  No complaints today  Kerns's catheter removed, on no hematuria  S/p large-volume paracentesis  Brief History:     Bobbi Phillips is a 68 y.o. 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  Patient was admitted to this facility for approximately 20 days for a complicated UTI.  She was discharged to our provide staff.  Patient states that starting last night she noticed some bleeding in her briefs.  During her previous admission she did have some pink-tinged urine in her Kerns which was attributed to her UTI.  Patient did also endorse associated chills for the last couple and fatigue of nights.  Patient denied any headache, lightheadedness, dizziness, shortness of breath, chest pain, abdominal pain, or difficulty urinating.  In the ED, Kerns catheter was inserted with return of gross hematuria which is filling the Kerns bag.  Hemoglobin was 7.2 on presentation, and patient was transfused 1 unit RBCs.  Urology was consulted in the ED who made the patient n.p.o. at midnight for cystoscopy tomorrow.  Patient was also noted to have mild KIM with Cr 1.7, up from 1.1 previously, 5 days ago.  Patient's exam is otherwise benign, no abdominal tenderness, CVA tenderness.  There is mild 1+ lower extremity pitting edema.  CT abdomen/pelvis was significant for large amount of  okay  Platelets are okay, if no bleeding by tomorrow will start patient on DVT prophylaxis once okay with urologist  Awaiting placement  \    DVT prophylaxis: SCDs, active bleeding  GI prophylaxis: Protonix 40 mg p.o. twice daily  Diet: Regular diabetic diet  Disposition: Likely SNF     OT/PT/SW consults in place     Code Status: Full code    Maximiliano Haywood MD  PGY1-Transitional Year Resident  5/1/2024  11:40 AM     This note is created with the assistance of the speech recognition program. While intending to generate a document that actually reflects the content of the visit, it can still have some errors including those of syntax and sound-a-like substitutions which may escape proof reading. Actual meaning can be extrapolated by contextual inference.    Attending Physician Statement  I have discussed the care of Bobbi Phillips and I have examined the patient myself and taken ROS and HPI, including pertinent history and exam findings, with the resident. I have reviewed the key elements of all parts of the encounter with the resident.  I agree with the assessment, plan and orders as documented by the resident.      Electronically signed by Maximiliano Haywood MD

## 2024-05-01 NOTE — PROGRESS NOTES
Physical Therapy        Physical Therapy Cancel Note      DATE: 2024    NAME: Bobbi Phillips  MRN: [id number]   : 1955      Patient not seen this date for Physical Therapy due to:    Surgery/Procedure: Cx; patient out of room for procedure of paracentesis. Writer plans to repraoch if time permitting. Will continue to follow for therapy updates.      Electronically signed by Shy Pickett PTA on 2024 at 10:22 AM

## 2024-05-01 NOTE — BRIEF OP NOTE
Brief Postoperative Note    Bobbi Phillips  YOB: 1955  [id number]    Pre-operative Diagnosis: Recurrent ascites; abdominal discomfort    Post-operative Diagnosis: Same    Procedure: US guided paracentesis     Anesthesia: Local    Surgeons/Assistants: Sadiq    Estimated Blood Loss: less than 50     Complications: None    Specimens: Was Obtained: non turbid straw colored ascites    Electronically signed by Luis Babcock MD on 5/1/2024 at 10:12 AM

## 2024-05-01 NOTE — PROGRESS NOTES
Writer notifies Dr Lambert of patient's post nichols removal bladder scan of 776mLs. Awaiting response.

## 2024-05-01 NOTE — PROGRESS NOTES
Patient calls writer into room and states she peed. Patient's brief is slightly wet but patient agreeable to get up with mary palencia to toilet. At this time patient urinates 100 mLs in collection container, and misses container for some amount. Urine is pink tinged with some sediment. Writer bladder scans patient again and still has 479 mLs in bladder. Writer notifies Dr Lambert of this and he states \"If she wants to leave it out, that's her choice, but please let her know that its dangerous to have such a large PVR\" Writer relays this to patient again and she says she understands, but she wants to wait.

## 2024-05-01 NOTE — PROGRESS NOTES
Patient tolerated paracentesis without distress. 6200 ml of clear, yellow fluid removed. Dressing to site. Patient returned to room. Nurse updated.

## 2024-05-01 NOTE — PROGRESS NOTES
Dr Lambert states to place 22F 3 way nichols catheter. Writer notifies patient and patient is insistent on attempting to urinate on her own.

## 2024-05-01 NOTE — PLAN OF CARE
Transfer of Care    Pt with PMH of DM2, CKD, BONNER cirrhosis, HTN, HLD who presented with hematuria. Kerns placed in ED with return of gross hematuria, large volume. Recent admission here for complicated UTI with DC to [facility]. Hgb 7.2 on presentation, 1 uRBC transfusion ordered in ED. Had 1 additional unit given while on the floor. Urology consulted. Had cystoscopy on 4/29 with 2 erythematous lesions removed and sent for pathology. No hematuria since procedure.   Plan for paracentesis on 5/1.  No concerns of SBP, Bonner cirrhosis, stable but could not give much Lasix because of her blood pressure has been on the softer side most of the admission  Pre-cert for return to [facility] started 4/30.

## 2024-05-01 NOTE — PLAN OF CARE
Problem: Safety - Adult  Goal: Free from fall injury  Outcome: Progressing  Note: Patient remains free of incidence/ injury. Bed remains in low position. Call light within reach. Side rails up x2.      Problem: Skin/Tissue Integrity  Goal: Absence of new skin breakdown  Description: 1.  Monitor for areas of redness and/or skin breakdown  2.  Assess vascular access sites hourly  3.  Every 4-6 hours minimum:  Change oxygen saturation probe site  4.  Every 4-6 hours:  If on nasal continuous positive airway pressure, respiratory therapy assess nares and determine need for appliance change or resting period.  Outcome: Progressing  Note: No new occurrence of skin breakdown noted during this shift.     Problem: Pain  Goal: Verbalizes/displays adequate comfort level or baseline comfort level  Outcome: Progressing  Note: Patient denies need for prn pain medication this shift.      Problem: Infection - Adult  Goal: Absence of infection at discharge  Outcome: Progressing  Note: Patient displays no new signs/ symptoms of infection during this shift.

## 2024-05-02 LAB
ANION GAP SERPL CALCULATED.3IONS-SCNC: 8 MMOL/L (ref 9–17)
BASOPHILS # BLD: 0 K/UL (ref 0–0.2)
BASOPHILS NFR BLD: 1 % (ref 0–2)
BUN SERPL-MCNC: 45 MG/DL (ref 8–23)
CALCIUM SERPL-MCNC: 7.8 MG/DL (ref 8.6–10.4)
CHLORIDE SERPL-SCNC: 100 MMOL/L (ref 98–107)
CO2 SERPL-SCNC: 21 MMOL/L (ref 20–31)
CREAT SERPL-MCNC: 1.3 MG/DL (ref 0.5–0.9)
EOSINOPHIL # BLD: 0.1 K/UL (ref 0–0.4)
EOSINOPHILS RELATIVE PERCENT: 2 % (ref 0–4)
ERYTHROCYTE [DISTWIDTH] IN BLOOD BY AUTOMATED COUNT: 18.4 % (ref 11.5–14.9)
GFR, ESTIMATED: 45 ML/MIN/1.73M2
GLUCOSE BLD-MCNC: 200 MG/DL (ref 65–105)
GLUCOSE BLD-MCNC: 214 MG/DL (ref 65–105)
GLUCOSE BLD-MCNC: 219 MG/DL (ref 65–105)
GLUCOSE BLD-MCNC: 258 MG/DL (ref 65–105)
GLUCOSE SERPL-MCNC: 243 MG/DL (ref 70–99)
HCT VFR BLD AUTO: 24.8 % (ref 36–46)
HCT VFR BLD AUTO: 25.9 % (ref 36–46)
HCT VFR BLD AUTO: 26.4 % (ref 36–46)
HGB BLD-MCNC: 7.9 G/DL (ref 12–16)
HGB BLD-MCNC: 8.2 G/DL (ref 12–16)
HGB BLD-MCNC: 8.4 G/DL (ref 12–16)
LYMPHOCYTES NFR BLD: 0.6 K/UL (ref 1–4.8)
LYMPHOCYTES RELATIVE PERCENT: 11 % (ref 24–44)
MCH RBC QN AUTO: 28.4 PG (ref 26–34)
MCHC RBC AUTO-ENTMCNC: 31.6 G/DL (ref 31–37)
MCV RBC AUTO: 90.1 FL (ref 80–100)
MONOCYTES NFR BLD: 0.3 K/UL (ref 0.1–1.3)
MONOCYTES NFR BLD: 6 % (ref 1–7)
NEUTROPHILS NFR BLD: 80 % (ref 36–66)
NEUTS SEG NFR BLD: 4.5 K/UL (ref 1.3–9.1)
PLATELET # BLD AUTO: 213 K/UL (ref 150–450)
PMV BLD AUTO: 7.7 FL (ref 6–12)
POTASSIUM SERPL-SCNC: 5.1 MMOL/L (ref 3.7–5.3)
RBC # BLD AUTO: 2.88 M/UL (ref 4–5.2)
SODIUM SERPL-SCNC: 129 MMOL/L (ref 135–144)
SURGICAL PATHOLOGY REPORT: NORMAL
WBC OTHER # BLD: 5.6 K/UL (ref 3.5–11)

## 2024-05-02 PROCEDURE — 99232 SBSQ HOSP IP/OBS MODERATE 35: CPT | Performed by: INTERNAL MEDICINE

## 2024-05-02 PROCEDURE — 97535 SELF CARE MNGMENT TRAINING: CPT

## 2024-05-02 PROCEDURE — 6370000000 HC RX 637 (ALT 250 FOR IP): Performed by: INTERNAL MEDICINE

## 2024-05-02 PROCEDURE — 85025 COMPLETE CBC W/AUTO DIFF WBC: CPT

## 2024-05-02 PROCEDURE — 85018 HEMOGLOBIN: CPT

## 2024-05-02 PROCEDURE — 97110 THERAPEUTIC EXERCISES: CPT

## 2024-05-02 PROCEDURE — 6370000000 HC RX 637 (ALT 250 FOR IP): Performed by: UROLOGY

## 2024-05-02 PROCEDURE — 82947 ASSAY GLUCOSE BLOOD QUANT: CPT

## 2024-05-02 PROCEDURE — 80048 BASIC METABOLIC PNL TOTAL CA: CPT

## 2024-05-02 PROCEDURE — 97116 GAIT TRAINING THERAPY: CPT

## 2024-05-02 PROCEDURE — 2580000003 HC RX 258: Performed by: UROLOGY

## 2024-05-02 PROCEDURE — 36415 COLL VENOUS BLD VENIPUNCTURE: CPT

## 2024-05-02 PROCEDURE — 97530 THERAPEUTIC ACTIVITIES: CPT

## 2024-05-02 PROCEDURE — 51798 US URINE CAPACITY MEASURE: CPT

## 2024-05-02 PROCEDURE — 1200000000 HC SEMI PRIVATE

## 2024-05-02 PROCEDURE — 85014 HEMATOCRIT: CPT

## 2024-05-02 RX ORDER — INSULIN GLARGINE 100 [IU]/ML
10 INJECTION, SOLUTION SUBCUTANEOUS NIGHTLY
Status: DISCONTINUED | OUTPATIENT
Start: 2024-05-02 | End: 2024-05-03 | Stop reason: HOSPADM

## 2024-05-02 RX ORDER — SODIUM CHLORIDE 1 G/1
1 TABLET ORAL
Status: DISCONTINUED | OUTPATIENT
Start: 2024-05-02 | End: 2024-05-03 | Stop reason: HOSPADM

## 2024-05-02 RX ADMIN — SODIUM ZIRCONIUM CYCLOSILICATE 10 G: 5 POWDER, FOR SUSPENSION ORAL at 08:37

## 2024-05-02 RX ADMIN — ANTI-FUNGAL POWDER MICONAZOLE NITRATE TALC FREE: 1.42 POWDER TOPICAL at 08:28

## 2024-05-02 RX ADMIN — TAMSULOSIN HYDROCHLORIDE 0.4 MG: 0.4 CAPSULE ORAL at 08:26

## 2024-05-02 RX ADMIN — METOPROLOL TARTRATE 25 MG: 25 TABLET, FILM COATED ORAL at 20:55

## 2024-05-02 RX ADMIN — ATORVASTATIN CALCIUM 10 MG: 10 TABLET, FILM COATED ORAL at 20:55

## 2024-05-02 RX ADMIN — ANTI-FUNGAL POWDER MICONAZOLE NITRATE TALC FREE: 1.42 POWDER TOPICAL at 21:00

## 2024-05-02 RX ADMIN — SODIUM CHLORIDE, PRESERVATIVE FREE 10 ML: 5 INJECTION INTRAVENOUS at 08:28

## 2024-05-02 RX ADMIN — SODIUM CHLORIDE, PRESERVATIVE FREE 10 ML: 5 INJECTION INTRAVENOUS at 20:56

## 2024-05-02 RX ADMIN — GABAPENTIN 100 MG: 100 CAPSULE ORAL at 15:10

## 2024-05-02 RX ADMIN — SODIUM CHLORIDE 1 G: 1 TABLET ORAL at 17:07

## 2024-05-02 RX ADMIN — PANTOPRAZOLE SODIUM 40 MG: 40 TABLET, DELAYED RELEASE ORAL at 07:04

## 2024-05-02 RX ADMIN — INSULIN LISPRO 2 UNITS: 100 INJECTION, SOLUTION INTRAVENOUS; SUBCUTANEOUS at 08:25

## 2024-05-02 RX ADMIN — GABAPENTIN 100 MG: 100 CAPSULE ORAL at 20:55

## 2024-05-02 RX ADMIN — METOPROLOL TARTRATE 25 MG: 25 TABLET, FILM COATED ORAL at 08:26

## 2024-05-02 RX ADMIN — OXYCODONE HYDROCHLORIDE 5 MG: 5 TABLET ORAL at 08:26

## 2024-05-02 RX ADMIN — SODIUM CHLORIDE 1 G: 1 TABLET ORAL at 15:09

## 2024-05-02 RX ADMIN — Medication 400 UNITS: at 12:51

## 2024-05-02 RX ADMIN — PANTOPRAZOLE SODIUM 40 MG: 40 TABLET, DELAYED RELEASE ORAL at 17:07

## 2024-05-02 RX ADMIN — CYCLOBENZAPRINE 10 MG: 10 TABLET, FILM COATED ORAL at 08:27

## 2024-05-02 RX ADMIN — Medication 4000 UNITS: at 08:26

## 2024-05-02 RX ADMIN — RIFAXIMIN 550 MG: 550 TABLET ORAL at 20:55

## 2024-05-02 RX ADMIN — GABAPENTIN 100 MG: 100 CAPSULE ORAL at 08:26

## 2024-05-02 RX ADMIN — Medication 50 MG: at 12:51

## 2024-05-02 RX ADMIN — INSULIN LISPRO 2 UNITS: 100 INJECTION, SOLUTION INTRAVENOUS; SUBCUTANEOUS at 12:48

## 2024-05-02 RX ADMIN — SULFAMETHOXAZOLE AND TRIMETHOPRIM 1 TABLET: 800; 160 TABLET ORAL at 08:27

## 2024-05-02 RX ADMIN — OXYCODONE HYDROCHLORIDE 5 MG: 5 TABLET ORAL at 19:47

## 2024-05-02 RX ADMIN — SODIUM CHLORIDE 1 G: 1 TABLET ORAL at 08:26

## 2024-05-02 RX ADMIN — RIFAXIMIN 550 MG: 550 TABLET ORAL at 12:51

## 2024-05-02 RX ADMIN — INSULIN GLARGINE 10 UNITS: 100 INJECTION, SOLUTION SUBCUTANEOUS at 20:56

## 2024-05-02 RX ADMIN — INSULIN LISPRO 2 UNITS: 100 INJECTION, SOLUTION INTRAVENOUS; SUBCUTANEOUS at 17:07

## 2024-05-02 ASSESSMENT — PAIN DESCRIPTION - ORIENTATION
ORIENTATION: RIGHT
ORIENTATION: LOWER

## 2024-05-02 ASSESSMENT — PAIN SCALES - GENERAL
PAINLEVEL_OUTOF10: 8
PAINLEVEL_OUTOF10: 6

## 2024-05-02 ASSESSMENT — PAIN DESCRIPTION - DESCRIPTORS: DESCRIPTORS: ACHING

## 2024-05-02 ASSESSMENT — ENCOUNTER SYMPTOMS
DIARRHEA: 0
SHORTNESS OF BREATH: 0
NAUSEA: 0
COUGH: 0
VOMITING: 0
ABDOMINAL DISTENTION: 1
ABDOMINAL PAIN: 0
BLOOD IN STOOL: 0

## 2024-05-02 ASSESSMENT — PAIN DESCRIPTION - LOCATION
LOCATION: HIP
LOCATION: BACK

## 2024-05-02 NOTE — CARE COORDINATION
Writer is following for potential discharge to [facility].  Authorization was started 4/29.  Writer placed message to [facility] with [employer] to check authorization status. Authorization is still pending at this time.  Electronically signed by HUANG Ayers on 5/2/2024 at 12:06 PM    Writer met with pt for update. Pt is very tearful and upset regarding authorization pending. Writer placed call back to [facility] with [employer] and authorization is still currently pending. [employer] is reportedly \"temporarily closed\" when facility called regarding this authorization.  Electronically signed by HUANG Ayers on 5/2/2024 at 3:44 PM

## 2024-05-02 NOTE — PROGRESS NOTES
Occupational Therapy  [facility]   INPATIENT OCCUPATIONAL THERAPY  PROGRESS NOTE  Date: 2024  Patient Name: Bobbi Phillips       Room:   MRN: [id number]    : 1955  (68 y.o.)  Gender: female   Referring Practitioner: Jason Lambert MD  Diagnosis: Hematuria      Discharge Recommendations:  Further Occupational Therapy is recommended upon facility discharge.    OT Equipment Recommendations  Other: TBD    Restrictions/Precautions  Restrictions/Precautions  Restrictions/Precautions: Fall Risk;General Precautions  Required Braces or Orthoses?: No  Position Activity Restriction  Other position/activity restrictions: bladder Irrigation    O2 Device: None (Room air)    Subjective  Subjective  Subjective: pt states that she has been getting stronger since she has been getting therapy at the SNF.  Comments: co-tx jerald NELSON PTA    Objective  Orientation  Overall Orientation Status: Within Functional Limits    Activities of Daily Living  ADL  Feeding: Setup, Based on clinical judgement  Grooming: Setup  Grooming Skilled Clinical Factors: Completes washing of face and brushing of hair while seated EOB.  UE Bathing: Minimal assistance, Based on clinical judgement  LE Bathing: Maximum assistance, Based on clinical judgement  UE Dressing: Moderate assistance, Based on clinical judgement  LE Dressing: Dependent/Total, Based on clinical judgement  Toileting: Maximum assistance, Based on clinical judgement  Additional Comments: pt completes grooming tasks sitting EOB, other ADL scores based on clinical judgement unless otherwise noted. Pt is limited due to significant pain (abdomen, BLEs, groin), impaired balance, and SOB with minimal activity affecting her ability to complete self care tasks  Skin Care: Soap and water        Balance  Balance  Sitting Balance: Stand by assistance  Standing Balance: Dependent/Total (min A x 2 c RW)  Standing Balance  Time: ~2 minutes x 3  Activity: 8

## 2024-05-02 NOTE — PROGRESS NOTES
[facility]  IN-PATIENT SERVICE  [facility]    PROGRESS NOTE             5/2/2024    12:01 PM    Name:   Bobbi Phillips  MRN:     [id number]     Acct:      [id number]   Room:   2073/2073-01   Day:  5  Admit Date:  4/27/2024 12:05 PM    PCP:  Stacie Doty MD  Code Status:  Full Code    Subjective:     C/C:   Chief Complaint   Patient presents with    Hematuria     Interval History Status: not changed.  5/2    Patient, clinically doing much better today  No complaints today  Kerns's catheter removed, on no hematuria  S/p large-volume paracentesis  Readings of high blood sugar, patient at home was taking Ozempic and 40 unit of insulin twice a day with Ozempic  Brief History:     Bobbi Phillips is a 68 y.o. 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  Patient was admitted to this facility for approximately 20 days for a complicated UTI.  She was discharged to our provide staff.  Patient states that starting last night she noticed some bleeding in her briefs.  During her previous admission she did have some pink-tinged urine in her Kerns which was attributed to her UTI.  Patient did also endorse associated chills for the last couple and fatigue of nights.  Patient denied any headache, lightheadedness, dizziness, shortness of breath, chest pain, abdominal pain, or difficulty urinating.  In the ED, Kerns catheter was inserted with return of gross hematuria which is filling the Kerns bag.  Hemoglobin was 7.2 on presentation, and patient was transfused 1 unit RBCs.  Urology was consulted in the ED who made the patient n.p.o. at midnight for cystoscopy tomorrow.  Patient was also noted to have mild KIM with Cr 1.7, up from 1.1 previously, 5 days ago.  Patient's exam is otherwise benign, no abdominal tenderness, CVA  ileus. 5. Calcific coronary artery disease. 6. Spondylosis.     CT THORACIC SPINE WO CONTRAST    Result Date: 3/29/2024  EXAMINATION: CT OF THE THORACIC SPINE WITHOUT CONTRAST  3/29/2024 6:21 pm: TECHNIQUE: CT of the thoracic spine was performed without the administration of intravenous contrast. Multiplanar reformatted images are provided for review. Automated exposure control, iterative reconstruction, and/or weight based adjustment of the mA/kV was utilized to reduce the radiation dose to as low as reasonably achievable. COMPARISON: None. HISTORY: ORDERING SYSTEM PROVIDED HISTORY: fall TECHNOLOGIST PROVIDED HISTORY: fall Reason for Exam: S/p fall off couch. C/o severe back pain FINDINGS: BONES/ALIGNMENT: Bones are osteopenic.  There is normal alignment of the spine. The vertebral body heights are maintained. No osseous destructive lesion is seen. DEGENERATIVE CHANGES: Multilevel loss of intervertebral disc space height. There are multilevel bridging anterior osteophytes suggesting dish. SOFT TISSUES: No paraspinal mass is seen.     No evidence of acute thoracic spine fracture or traumatic malalignment. Multilevel thoracic spondylosis. Bridging anterior osteophytes suggesting dish. Osteopenia.     CT LUMBAR SPINE WO CONTRAST    Result Date: 3/29/2024  EXAMINATION: CT OF THE LUMBAR SPINE WITHOUT CONTRAST  3/29/2024 TECHNIQUE: CT of the lumbar spine was performed without the administration of intravenous contrast. Multiplanar reformatted images are provided for review. Adjustment of mA and/or kV according to patient size was utilized.  Automated exposure control, iterative reconstruction, and/or weight based adjustment of the mA/kV was utilized to reduce the radiation dose to as low as reasonably achievable. COMPARISON: None HISTORY: ORDERING SYSTEM PROVIDED HISTORY: fall TECHNOLOGIST PROVIDED HISTORY: fall Decision Support Exception - unselect if not a suspected or confirmed emergency medical condition->Emergency

## 2024-05-02 NOTE — PLAN OF CARE
Problem: Discharge Planning  Goal: Discharge to home or other facility with appropriate resources  5/2/2024 1236 by Kim Hurley RN  Outcome: Progressing  5/2/2024 0435 by Tasha Oreilly RN  Outcome: Progressing     Problem: Safety - Adult  Goal: Free from fall injury  5/2/2024 1236 by Kim Hurley RN  Outcome: Progressing  5/2/2024 0435 by Tasha Oreilly RN  Outcome: Progressing     Problem: Skin/Tissue Integrity  Goal: Absence of new skin breakdown  Description: 1.  Monitor for areas of redness and/or skin breakdown  2.  Assess vascular access sites hourly  3.  Every 4-6 hours minimum:  Change oxygen saturation probe site  4.  Every 4-6 hours:  If on nasal continuous positive airway pressure, respiratory therapy assess nares and determine need for appliance change or resting period.  5/2/2024 1236 by Kim Hurley RN  Outcome: Progressing  5/2/2024 0435 by Tasha Oreilly RN  Outcome: Progressing     Problem: Pain  Goal: Verbalizes/displays adequate comfort level or baseline comfort level  5/2/2024 1236 by Kim Hurley RN  Outcome: Progressing  5/2/2024 0435 by Tasha Oreilly RN  Outcome: Progressing     Problem: ABCDS Injury Assessment  Goal: Absence of physical injury  5/2/2024 1236 by Kim Hurley RN  Outcome: Progressing  5/2/2024 0435 by Tasha Oreilly RN  Outcome: Progressing     Problem: Skin/Tissue Integrity - Adult  Goal: Skin integrity remains intact  5/2/2024 1236 by Kim Hurley RN  Outcome: Progressing  5/2/2024 0435 by Tasha Oreilly RN  Outcome: Progressing     Problem: Musculoskeletal - Adult  Goal: Return mobility to safest level of function  5/2/2024 1236 by Kim Hurley RN  Outcome: Progressing  5/2/2024 0435 by Tasha Oreilly RN  Outcome: Progressing     Problem: Genitourinary - Adult  Goal: Urinary catheter remains patent  5/2/2024 1236 by Kim Hurley RN  Outcome: Progressing  5/2/2024 0435 by Tasha Oreilly RN  Outcome: Progressing     Problem:

## 2024-05-02 NOTE — PROGRESS NOTES
Pt to the restroom; urinated 325 with some in her brief also. Bladder scan afterward showed 546. Pt informed that she may need a nichols placed again and pt became tearful. Dr. Lambert notified and states that he had a conversation with the dayshirohan RN and informed her that it is the pt's right to refuse a nichols even if she should probably have one.

## 2024-05-02 NOTE — PROGRESS NOTES
Physical Therapy  [facility]    Date: 24  Patient Name: Bbobi Phillips       Room:   MRN: [id number]   Account: [id number]   : 1955  (68 y.o.) Gender: female     Referring Practitioner: Jason Lambert MD  Diagnosis: Hematuria  Past Medical History:  has a past medical history of Abdominal swelling, Claustrophobia, COVID-19 vaccine administered, Diabetes (HCC), Diabetic retinopathy (HCC), Flatus, H/O acute sinusitis, H/O cholelithiasis, Hypertension, Liver cirrhosis (HCC), LLQ abdominal tenderness, Poor venous access, Positive colorectal cancer screening using Cologuard test, Post-menopausal bleeding, RUQ abdominal pain, Tendonitis of shoulder, right, Tingling, Under care of service provider, Under care of service provider, Vaginal bleeding, Vaginal candidiasis, and Wears glasses.   Past Surgical History:   has a past surgical history that includes Cholecystectomy, laparoscopic ();  section; Ankle fracture surgery (Left, ); Cataract removal with implant (Bilateral, ); Colonoscopy (N/A, 2023); Dilation and curettage of uterus (N/A, 10/02/2023); Cardiac catheterization (); hysteroscopy (2024); Dilation and curettage of uterus (N/A, 2024); Upper gastrointestinal endoscopy (N/A, 4/10/2024); and Anomalous venous return repair (N/A, 2024).       Overall Orientation Status: Within Functional Limits  Restrictions/Precautions  Restrictions/Precautions: Fall Risk;General Precautions  Required Braces or Orthoses?: No  Position Activity Restriction  Other position/activity restrictions: bladder Irrigation    Subjective: Pt lying in bed upon arrival, agreeable to therpy  Comments: EMILY Silva approved therapy; co-treat with BALJIT Chong       Pain Assessment: None - Denies Pain     Oxygen Therapy  O2 Device: None (Room air)    Bed Mobility  Supine to Sit: Minimal assistance  Sit to Supine: Maximal assistance (x2)  Scooting: Contact guard

## 2024-05-02 NOTE — PROGRESS NOTES
Department of Internal Medicine  Nephrology Earl Cannon MD  Progress Note    Reason for consultation: Management of acute kidney injury and hyponatremia.    Consulting physician: Courtney Carrillo MD.    Interval history:  Patient was seen and examined today.  She is nonoliguric.  Urine output slowed down yesterday and attempt at reinsertion of Kerns catheter was refused by the patient subsequently voided 600 mL of urine with post void bladder residual at 161 mL.  Gross hematuria has resolved.    She underwent ultrasound-guided paracentesis on 5/1/2024 with removal of 6.2 L of ascitic fluid.    History of present illness: This is a 68 y.o. female with a significant past medical history of type 2 diabetes mellitus, liver cirrhosis secondary to nonalcoholic steatohepatitis [BONNER], systemic hypertension and cholelithiasis, presented to the hospital with complaints of gross hematuria.  Vital signs were stable at presentation.    CT scan of the abdomen and pelvis performed with IV contrast on 4/27/2024 showed large amount of inflammatory debris in the dependent urinary bladder as well as nonspecific distal gastritis and duodenitis as well as anasarca.  BUNs/creatinine on same day was 46/1.7 mg/dL with sodium 128 mmol/L.  Urinalysis showed numerous WBCs.  He underwent cystoscopy 4/29/2024 with clot evacuation and transurethral resection of bladder tumor.    Scheduled Meds:   sodium chloride  500 mL IntraVENous Once    sulfamethoxazole-trimethoprim  1 tablet Oral Daily    lidocaine  1 patch TransDERmal Daily    gabapentin  100 mg Oral TID    sodium chloride flush  5-40 mL IntraVENous 2 times per day    atorvastatin  10 mg Oral Nightly    Vitamin D  4,000 Units Oral Daily    metoprolol tartrate  25 mg Oral BID    miconazole   Topical BID    pantoprazole  40 mg Oral BID AC    rifAXIMin  550 mg Oral BID    tamsulosin  0.4 mg Oral Daily    zinc sulfate  50 mg Oral Daily    vitamin E  400 Units Oral Daily    insulin

## 2024-05-02 NOTE — PROGRESS NOTES
Comprehensive Nutrition Assessment    Type and Reason for Visit:  Reassess    Nutrition Recommendations/Plan:   Continue current diet.   Provide 1 Glucerna per day.  Monitor labs.     Malnutrition Assessment:  Malnutrition Status:  At risk for malnutrition (Comment) (04/29/24 160)    Context:  Acute Illness     Findings of the 6 clinical characteristics of malnutrition:  Energy Intake:  Unable to assess  Weight Loss:  No significant weight loss     Body Fat Loss:  Unable to assess     Muscle Mass Loss:  Unable to assess    Fluid Accumulation:    Generalized, Extremities   Strength:  Not Performed    Nutrition Assessment:    Pt had paracentesis on 5/1/2024 with removal of 6.2 L of ascitic fluid. Pt states she had some intentional wt loss within the past year prior to April hospital admission. PO intake has varied from 0 to approximately 75% of meals over the past month (pt had been NPO or on liquid diet at times). Pt states she experiences early satiety and it is hard for her to get comfortable in bed to eat meals. When pt is constipated, this also contributes to decreased intake. Pt willing to receive one Glucerna per day.    Nutrition Related Findings:    Edema: +1 generalized, RUE, LUE; +2 pitting RLE, LLE. Ascites. Lantus, Salt Tabs. Na: 129, K: 5.1, Glucose: 243. Other meds and labs reviewed. BM: 4/29 smear. Wound Type: Pressure Injury, Unstageable       Current Nutrition Intake & Therapies:    Average Meal Intake: 26-50%     ADULT DIET; Regular; 4 carb choices (60 gm/meal); 1500 ml    Anthropometric Measures:  Height: 149.9 cm (4' 11\")  Ideal Body Weight (IBW): 95 lbs (43 kg)    Admission Body Weight: 102.1 kg (225 lb)  Current Body Weight: 102.1 kg (225 lb 1.4 oz) (dry wt expected to be much less), 236.8 % IBW.    Current BMI (kg/m2): 45.4  Usual Body Weight: 87.5 kg (193 lb) (6/23)  % Weight Change (Calculated): 16.6                    BMI Categories: Obese Class 3 (BMI 40.0 or greater)    Estimated

## 2024-05-02 NOTE — PLAN OF CARE
Problem: Discharge Planning  Goal: Discharge to home or other facility with appropriate resources  Outcome: Progressing     Problem: Safety - Adult  Goal: Free from fall injury  5/2/2024 0435 by Tasha Oreilly RN  Outcome: Progressing     Problem: Skin/Tissue Integrity  Goal: Absence of new skin breakdown  Description: 1.  Monitor for areas of redness and/or skin breakdown  2.  Assess vascular access sites hourly  3.  Every 4-6 hours minimum:  Change oxygen saturation probe site  4.  Every 4-6 hours:  If on nasal continuous positive airway pressure, respiratory therapy assess nares and determine need for appliance change or resting period.  5/2/2024 0435 by Tasha Oreilly RN  Outcome: Progressing     Problem: Pain  Goal: Verbalizes/displays adequate comfort level or baseline comfort level  5/2/2024 0435 by Tasha Oreilly RN  Outcome: Progressing     Problem: ABCDS Injury Assessment  Goal: Absence of physical injury  Outcome: Progressing     Problem: Skin/Tissue Integrity - Adult  Goal: Skin integrity remains intact  Outcome: Progressing     Problem: Musculoskeletal - Adult  Goal: Return mobility to safest level of function  Outcome: Progressing     Problem: Genitourinary - Adult  Goal: Urinary catheter remains patent  Outcome: Progressing     Problem: Infection - Adult  Goal: Absence of infection at discharge  5/2/2024 0435 by Tasha Oreilly RN  Outcome: Progressing     Problem: Metabolic/Fluid and Electrolytes - Adult  Goal: Electrolytes maintained within normal limits  Outcome: Progressing     Problem: Metabolic/Fluid and Electrolytes - Adult  Goal: Glucose maintained within prescribed range  Outcome: Progressing     Problem: Hematologic - Adult  Goal: Maintains hematologic stability  Outcome: Progressing     Problem: Chronic Conditions and Co-morbidities  Goal: Patient's chronic conditions and co-morbidity symptoms are monitored and maintained or improved  Outcome: Progressing     Problem: Nutrition

## 2024-05-03 VITALS
SYSTOLIC BLOOD PRESSURE: 125 MMHG | RESPIRATION RATE: 18 BRPM | WEIGHT: 225 LBS | HEART RATE: 78 BPM | HEIGHT: 59 IN | DIASTOLIC BLOOD PRESSURE: 80 MMHG | OXYGEN SATURATION: 97 % | TEMPERATURE: 97.5 F | BODY MASS INDEX: 45.36 KG/M2

## 2024-05-03 LAB
ANION GAP SERPL CALCULATED.3IONS-SCNC: 9 MMOL/L (ref 9–17)
BASOPHILS # BLD: 0 K/UL (ref 0–0.2)
BASOPHILS NFR BLD: 1 % (ref 0–2)
BUN SERPL-MCNC: 38 MG/DL (ref 8–23)
CALCIUM SERPL-MCNC: 7.8 MG/DL (ref 8.6–10.4)
CHLORIDE SERPL-SCNC: 101 MMOL/L (ref 98–107)
CO2 SERPL-SCNC: 21 MMOL/L (ref 20–31)
CREAT SERPL-MCNC: 1.1 MG/DL (ref 0.5–0.9)
EOSINOPHIL # BLD: 0.2 K/UL (ref 0–0.4)
EOSINOPHILS RELATIVE PERCENT: 3 % (ref 0–4)
ERYTHROCYTE [DISTWIDTH] IN BLOOD BY AUTOMATED COUNT: 18.1 % (ref 11.5–14.9)
GFR, ESTIMATED: 54 ML/MIN/1.73M2
GLUCOSE BLD-MCNC: 160 MG/DL (ref 65–105)
GLUCOSE BLD-MCNC: 208 MG/DL (ref 65–105)
GLUCOSE BLD-MCNC: 222 MG/DL (ref 65–105)
GLUCOSE SERPL-MCNC: 172 MG/DL (ref 70–99)
HCT VFR BLD AUTO: 24.2 % (ref 36–46)
HGB BLD-MCNC: 7.9 G/DL (ref 12–16)
LYMPHOCYTES NFR BLD: 0.7 K/UL (ref 1–4.8)
LYMPHOCYTES RELATIVE PERCENT: 12 % (ref 24–44)
MCH RBC QN AUTO: 29.1 PG (ref 26–34)
MCHC RBC AUTO-ENTMCNC: 32.7 G/DL (ref 31–37)
MCV RBC AUTO: 88.9 FL (ref 80–100)
MONOCYTES NFR BLD: 0.4 K/UL (ref 0.1–1.3)
MONOCYTES NFR BLD: 6 % (ref 1–7)
NEUTROPHILS NFR BLD: 78 % (ref 36–66)
NEUTS SEG NFR BLD: 4.9 K/UL (ref 1.3–9.1)
PLATELET # BLD AUTO: 210 K/UL (ref 150–450)
PMV BLD AUTO: 7.3 FL (ref 6–12)
POTASSIUM SERPL-SCNC: 4.8 MMOL/L (ref 3.7–5.3)
RBC # BLD AUTO: 2.72 M/UL (ref 4–5.2)
SODIUM SERPL-SCNC: 131 MMOL/L (ref 135–144)
WBC OTHER # BLD: 6.3 K/UL (ref 3.5–11)

## 2024-05-03 PROCEDURE — 80048 BASIC METABOLIC PNL TOTAL CA: CPT

## 2024-05-03 PROCEDURE — 6370000000 HC RX 637 (ALT 250 FOR IP): Performed by: UROLOGY

## 2024-05-03 PROCEDURE — 2580000003 HC RX 258: Performed by: UROLOGY

## 2024-05-03 PROCEDURE — 36415 COLL VENOUS BLD VENIPUNCTURE: CPT

## 2024-05-03 PROCEDURE — 85025 COMPLETE CBC W/AUTO DIFF WBC: CPT

## 2024-05-03 PROCEDURE — 82947 ASSAY GLUCOSE BLOOD QUANT: CPT

## 2024-05-03 PROCEDURE — 99232 SBSQ HOSP IP/OBS MODERATE 35: CPT | Performed by: INTERNAL MEDICINE

## 2024-05-03 PROCEDURE — 6370000000 HC RX 637 (ALT 250 FOR IP): Performed by: INTERNAL MEDICINE

## 2024-05-03 PROCEDURE — 6360000002 HC RX W HCPCS: Performed by: INTERNAL MEDICINE

## 2024-05-03 RX ORDER — BUMETANIDE 1 MG/1
1 TABLET ORAL DAILY
Qty: 30 TABLET | Refills: 0 | OUTPATIENT
Start: 2024-05-03 | End: 2024-05-10

## 2024-05-03 RX ORDER — BUMETANIDE 1 MG/1
1 TABLET ORAL DAILY
Qty: 30 TABLET | Refills: 0 | Status: SHIPPED | OUTPATIENT
Start: 2024-05-03 | End: 2024-06-02

## 2024-05-03 RX ORDER — GABAPENTIN 100 MG/1
100 CAPSULE ORAL 3 TIMES DAILY
Qty: 30 CAPSULE | Refills: 0 | Status: SHIPPED | OUTPATIENT
Start: 2024-05-03 | End: 2024-05-13

## 2024-05-03 RX ORDER — SODIUM CHLORIDE 1 G/1
1 TABLET ORAL
Qty: 90 TABLET | Refills: 3 | Status: SHIPPED | OUTPATIENT
Start: 2024-05-03

## 2024-05-03 RX ORDER — BUMETANIDE 0.25 MG/ML
1 INJECTION INTRAMUSCULAR; INTRAVENOUS 2 TIMES DAILY
Status: DISCONTINUED | OUTPATIENT
Start: 2024-05-03 | End: 2024-05-03 | Stop reason: HOSPADM

## 2024-05-03 RX ORDER — BUMETANIDE 1 MG/1
1 TABLET ORAL DAILY
Qty: 7 TABLET | Refills: 0 | Status: SHIPPED | OUTPATIENT
Start: 2024-05-03 | End: 2024-05-03

## 2024-05-03 RX ADMIN — SODIUM CHLORIDE 1 G: 1 TABLET ORAL at 11:40

## 2024-05-03 RX ADMIN — ANTI-FUNGAL POWDER MICONAZOLE NITRATE TALC FREE: 1.42 POWDER TOPICAL at 08:25

## 2024-05-03 RX ADMIN — GABAPENTIN 100 MG: 100 CAPSULE ORAL at 08:24

## 2024-05-03 RX ADMIN — Medication 50 MG: at 08:25

## 2024-05-03 RX ADMIN — INSULIN LISPRO 2 UNITS: 100 INJECTION, SOLUTION INTRAVENOUS; SUBCUTANEOUS at 16:52

## 2024-05-03 RX ADMIN — BUMETANIDE 1 MG: 0.25 INJECTION INTRAMUSCULAR; INTRAVENOUS at 16:52

## 2024-05-03 RX ADMIN — SODIUM CHLORIDE 1 G: 1 TABLET ORAL at 16:52

## 2024-05-03 RX ADMIN — RIFAXIMIN 550 MG: 550 TABLET ORAL at 09:28

## 2024-05-03 RX ADMIN — OXYCODONE HYDROCHLORIDE 5 MG: 5 TABLET ORAL at 01:40

## 2024-05-03 RX ADMIN — METOPROLOL TARTRATE 25 MG: 25 TABLET, FILM COATED ORAL at 08:24

## 2024-05-03 RX ADMIN — PANTOPRAZOLE SODIUM 40 MG: 40 TABLET, DELAYED RELEASE ORAL at 08:24

## 2024-05-03 RX ADMIN — TAMSULOSIN HYDROCHLORIDE 0.4 MG: 0.4 CAPSULE ORAL at 08:24

## 2024-05-03 RX ADMIN — OXYCODONE HYDROCHLORIDE 5 MG: 5 TABLET ORAL at 17:56

## 2024-05-03 RX ADMIN — GABAPENTIN 100 MG: 100 CAPSULE ORAL at 14:18

## 2024-05-03 RX ADMIN — PANTOPRAZOLE SODIUM 40 MG: 40 TABLET, DELAYED RELEASE ORAL at 14:18

## 2024-05-03 RX ADMIN — SODIUM CHLORIDE, PRESERVATIVE FREE 10 ML: 5 INJECTION INTRAVENOUS at 08:27

## 2024-05-03 RX ADMIN — SODIUM CHLORIDE 1 G: 1 TABLET ORAL at 08:24

## 2024-05-03 RX ADMIN — BUMETANIDE 1 MG: 0.25 INJECTION INTRAMUSCULAR; INTRAVENOUS at 09:28

## 2024-05-03 RX ADMIN — Medication 4000 UNITS: at 08:24

## 2024-05-03 RX ADMIN — INSULIN LISPRO 2 UNITS: 100 INJECTION, SOLUTION INTRAVENOUS; SUBCUTANEOUS at 11:40

## 2024-05-03 RX ADMIN — SULFAMETHOXAZOLE AND TRIMETHOPRIM 1 TABLET: 800; 160 TABLET ORAL at 08:24

## 2024-05-03 RX ADMIN — Medication 400 UNITS: at 08:25

## 2024-05-03 ASSESSMENT — PAIN DESCRIPTION - LOCATION
LOCATION: HIP
LOCATION: HIP

## 2024-05-03 ASSESSMENT — ENCOUNTER SYMPTOMS
VOMITING: 0
BLOOD IN STOOL: 0
DIARRHEA: 0
COUGH: 0
SHORTNESS OF BREATH: 0
ABDOMINAL DISTENTION: 1
NAUSEA: 0
ABDOMINAL PAIN: 0

## 2024-05-03 ASSESSMENT — PAIN SCALES - GENERAL
PAINLEVEL_OUTOF10: 8
PAINLEVEL_OUTOF10: 8

## 2024-05-03 ASSESSMENT — PAIN DESCRIPTION - ORIENTATION
ORIENTATION: RIGHT
ORIENTATION: RIGHT

## 2024-05-03 NOTE — PROGRESS NOTES
[facility]  IN-PATIENT SERVICE  [facility]    PROGRESS NOTE             5/3/2024    12:31 PM    Name:   Bobbi Phillips  MRN:     [id number]     Acct:      [id number]   Room:   2073/2073-01   Day:  6  Admit Date:  4/27/2024 12:05 PM    PCP:  Stacie Doty MD  Code Status:  Full Code    Subjective:     C/C:   Chief Complaint   Patient presents with    Hematuria     Interval History Status: not changed.  5/3  Seen and examined face-to-face,  No active complaints,  Does not want Kerns catheter,  Retaining off-and-on,  Continue bladder scanning and keep off the Kerns catheter at this time urology on board  Brief History:     Bobbi Phillips is a 68 y.o. 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  Patient was admitted to this facility for approximately 20 days for a complicated UTI.  She was discharged to our provide staff.  Patient states that starting last night she noticed some bleeding in her briefs.  During her previous admission she did have some pink-tinged urine in her Kerns which was attributed to her UTI.  Patient did also endorse associated chills for the last couple and fatigue of nights.  Patient denied any headache, lightheadedness, dizziness, shortness of breath, chest pain, abdominal pain, or difficulty urinating.  In the ED, Kerns catheter was inserted with return of gross hematuria which is filling the Kerns bag.  Hemoglobin was 7.2 on presentation, and patient was transfused 1 unit RBCs.  Urology was consulted in the ED who made the patient n.p.o. at midnight for cystoscopy tomorrow.  Patient was also noted to have mild KIM with Cr 1.7, up from 1.1 previously, 5 days ago.  Patient's exam is otherwise benign, no abdominal tenderness, CVA tenderness.  There is mild 1+ lower extremity pitting  will start on tentative Lantus tonight  [place] related cirrhosis of liver, s/p large-volume paracentesis  Acute kidney injury, improving  Anemia, required 1 unit of packed cell transfusion, hemoglobin staying okay  Platelets are okay, if no bleeding by tomorrow will start patient on DVT prophylaxis once okay with urologist  Awaiting placement  PT OT on board,  Patient does not want Kerns catheter, understand risk      DVT prophylaxis: SCDs    Germania Osborne MD      Electronically signed by Germania Osborne MD

## 2024-05-03 NOTE — CARE COORDINATION
Writer is following for potential discharge to [facility].   Authorization was started 4/29.  Writer placed message to San[place] with [employer] to check authorization status. Authorization has been approved.    Writer spoke to charge EMILY Ybarra regarding discharge. Wound care to see this pt, agreeable to 1600 discharge.    Writer met with pt for update .Writer placed call to [facility] to schedule transportation. Forms faxed for scheduling.  Electronically signed by HUANG Ayers on 5/3/2024 at 12:43 PM

## 2024-05-03 NOTE — CARE COORDINATION
Transportation arranged for patient to go to [facility] at 1800 via [employer]. Facility informed. Bedside nurse informed.     Number for Report: [phone]  Electronically signed by HUANG Ayers on 5/3/2024 at 2:06 PM

## 2024-05-03 NOTE — PLAN OF CARE
Problem: Discharge Planning  Goal: Discharge to home or other facility with appropriate resources  5/3/2024 1605 by Manda Terrazas RN  Outcome: Progressing  Flowsheets (Taken 4/30/2024 2015 by Christo Canales, RN)  Discharge to home or other facility with appropriate resources: Identify barriers to discharge with patient and caregiver     Problem: Safety - Adult  Goal: Free from fall injury  5/3/2024 1605 by Manda Terrazas RN  Outcome: Progressing  Flowsheets (Taken 5/1/2024 0121 by Christo Canales, RN)  Free From Fall Injury: Instruct family/caregiver on patient safety     Problem: Skin/Tissue Integrity  Goal: Absence of new skin breakdown  Description: 1.  Monitor for areas of redness and/or skin breakdown  2.  Assess vascular access sites hourly  3.  Every 4-6 hours minimum:  Change oxygen saturation probe site  4.  Every 4-6 hours:  If on nasal continuous positive airway pressure, respiratory therapy assess nares and determine need for appliance change or resting period.  5/3/2024 1605 by Manda Terrazas RN  Outcome: Progressing     Problem: Pain  Goal: Verbalizes/displays adequate comfort level or baseline comfort level  5/3/2024 1605 by Manda Terrazas RN  Outcome: Progressing  Flowsheets (Taken 5/3/2024 1605)  Verbalizes/displays adequate comfort level or baseline comfort level: Encourage patient to monitor pain and request assistance     Problem: ABCDS Injury Assessment  Goal: Absence of physical injury  Outcome: Progressing  Flowsheets (Taken 5/1/2024 0121 by Christo Canales, RN)  Absence of Physical Injury: Implement safety measures based on patient assessment     Problem: Skin/Tissue Integrity - Adult  Goal: Skin integrity remains intact  Outcome: Progressing  Flowsheets (Taken 5/3/2024 1605)  Skin Integrity Remains Intact: Monitor for areas of redness and/or skin breakdown     Problem: Musculoskeletal - Adult  Goal: Return mobility to safest level of function  Outcome: Progressing  Flowsheets (Taken  1600)  Nutrient intake appropriate for improving, restoring, or maintaining nutritional needs: Recommend appropriate diets, oral nutritional supplements, and vitamin/mineral supplements

## 2024-05-03 NOTE — PROGRESS NOTES
Writer spoke with [facility] nurse.  Recommends placing oil emulsion and mepilex on open blister.

## 2024-05-03 NOTE — CARE COORDINATION
IMM letter provided to patient.  Patient offered four hours to make informed decision regarding appeal process; patient agreeable to discharge.   Electronically signed by HUANG Ayers on 5/3/2024 at 12:45 PM

## 2024-05-03 NOTE — DISCHARGE INSTR - DIET

## 2024-05-03 NOTE — PROGRESS NOTES
Department of Internal Medicine  Nephrology Earl Cannon MD  Progress Note    Reason for consultation: Management of acute kidney injury and hyponatremia.    Consulting physician: Courtney Carrillo MD.    Interval history: Patient feels well today and has an external urinary catheter.  She is nonoliguric.  Laboratory study results were reviewed. She underwent ultrasound-guided paracentesis on 5/1/2024 with removal of 6.2 L of ascitic fluid.    History of present illness: This is a 68 y.o. female with a significant past medical history of type 2 diabetes mellitus, liver cirrhosis secondary to nonalcoholic steatohepatitis [BONNER], systemic hypertension and cholelithiasis, presented to the hospital with complaints of gross hematuria.  Vital signs were stable at presentation.    CT scan of the abdomen and pelvis performed with IV contrast on 4/27/2024 showed large amount of inflammatory debris in the dependent urinary bladder as well as nonspecific distal gastritis and duodenitis as well as anasarca.  BUN/creatinine on same day was 46/1.7 mg/dL with sodium 128 mmol/L.  Urinalysis showed numerous WBCs.  He underwent cystoscopy 4/29/2024 with clot evacuation and transurethral resection of bladder tumor.    Scheduled Meds:   bumetanide  1 mg IntraVENous BID    sodium chloride  1 g Oral TID WC    insulin glargine  10 Units SubCUTAneous Nightly    sodium chloride  500 mL IntraVENous Once    sulfamethoxazole-trimethoprim  1 tablet Oral Daily    lidocaine  1 patch TransDERmal Daily    gabapentin  100 mg Oral TID    sodium chloride flush  5-40 mL IntraVENous 2 times per day    atorvastatin  10 mg Oral Nightly    Vitamin D  4,000 Units Oral Daily    metoprolol tartrate  25 mg Oral BID    miconazole   Topical BID    pantoprazole  40 mg Oral BID AC    rifAXIMin  550 mg Oral BID    tamsulosin  0.4 mg Oral Daily    zinc sulfate  50 mg Oral Daily    vitamin E  400 Units Oral Daily    insulin lispro  0-8 Units SubCUTAneous TID

## 2024-05-03 NOTE — CARE COORDINATION
Continuity of Care Form    Patient Name: Bobbi Phillips   :  1955  MRN:  [id number]    Admit date:  2024  Discharge date:  5/3/24    Code Status Order: Full Code   Advance Directives:   Advance Care Flowsheet Documentation       Date/Time Healthcare Directive Type of Healthcare Directive Copy in Chart Healthcare Agent Appointed Healthcare Agent's Name Healthcare Agent's Phone Number    24 1125 No, patient does not have an advance directive for healthcare treatment -- -- -- -- --            Admitting Physician:  Courtney Carrillo MD  PCP: Stacie Doty MD    Discharging Nurse: anthony bull   Discharging Hospital Unit/Room#:   Discharging Unit Phone Number: [phone]    Emergency Contact:   Extended Emergency Contact Information  Primary Emergency Contact: Judit Gar *HIPAA*  Address:   Home Phone: [phone]  Work Phone: [phone]  Mobile Phone: [phone]  Relation: Brother/Sister  Secondary Emergency Contact: Phillip Lofton *HIPAA*  Home Phone: [phone]  Work Phone: [phone]  Mobile Phone: [phone]  Relation: Child    Past Surgical History:  Past Surgical History:   Procedure Laterality Date    ANKLE FRACTURE SURGERY Left 2006    no retained metal    ANOMALOUS VENOUS RETURN REPAIR N/A 2024    CYSTOSCOPY, TRANSURETHRAL RESECTION BLADDER,  EVACUATION OF CLOTS with Catheter Insertion for Continuous Irrigation performed by Jason Lambert MD at [facility]    CARDIAC CATHETERIZATION  2006    CATARACT REMOVAL WITH IMPLANT Bilateral 2018     SECTION      x2, ,  \"SADDLE BLOCK\"    CHOLECYSTECTOMY, LAPAROSCOPIC  2012    COLONOSCOPY N/A 2023    COLONOSCOPY WITH BIOPSY performed by Carlos Campos MD at [facility] OR    DILATION AND CURETTAGE OF UTERUS N/A 10/02/2023    ENDOMETRIAL BIOPSY AND EXAM UNDER ANESTHESIA  Failed D&C Hysteroscopy performed by Saud Hardy DO at [facility] OR    DILATION AND CURETTAGE OF UTERUS N/A 2024    EUA,

## 2024-05-05 LAB
MICROORGANISM SPEC CULT: NORMAL
MICROORGANISM/AGENT SPEC: NORMAL
SPECIMEN DESCRIPTION: NORMAL

## 2024-05-06 LAB — SURGICAL PATHOLOGY REPORT: NORMAL

## 2024-05-07 LAB
MICROORGANISM SPEC CULT: NORMAL
MICROORGANISM/AGENT SPEC: NORMAL
SPECIMEN DESCRIPTION: NORMAL

## 2024-05-13 ENCOUNTER — APPOINTMENT (OUTPATIENT)
Dept: CT IMAGING | Age: 69
DRG: 690 | End: 2024-05-13
Payer: MEDICARE

## 2024-05-13 ENCOUNTER — APPOINTMENT (OUTPATIENT)
Dept: GENERAL RADIOLOGY | Age: 69
DRG: 690 | End: 2024-05-13
Payer: MEDICARE

## 2024-05-13 ENCOUNTER — HOSPITAL ENCOUNTER (INPATIENT)
Age: 69
LOS: 4 days | Discharge: NURSING, REHAB OR LONG-TERM CARE | DRG: 690 | End: 2024-05-17
Attending: EMERGENCY MEDICINE | Admitting: INTERNAL MEDICINE
Payer: MEDICARE

## 2024-05-13 DIAGNOSIS — M79.89 LEG SWELLING: ICD-10-CM

## 2024-05-13 DIAGNOSIS — D64.9 ANEMIA, UNSPECIFIED TYPE: Primary | ICD-10-CM

## 2024-05-13 LAB
ALBUMIN SERPL-MCNC: 2.2 G/DL (ref 3.5–5.2)
ALP SERPL-CCNC: 403 U/L (ref 35–104)
ALT SERPL-CCNC: 14 U/L (ref 5–33)
ANION GAP SERPL CALCULATED.3IONS-SCNC: 9 MMOL/L (ref 9–17)
AST SERPL-CCNC: 29 U/L
BASOPHILS # BLD: 0.1 K/UL (ref 0–0.2)
BASOPHILS NFR BLD: 1 % (ref 0–2)
BILIRUB SERPL-MCNC: 0.3 MG/DL (ref 0.3–1.2)
BNP SERPL-MCNC: 385 PG/ML
BUN SERPL-MCNC: 33 MG/DL (ref 8–23)
CALCIUM SERPL-MCNC: 8.6 MG/DL (ref 8.6–10.4)
CHLORIDE SERPL-SCNC: 101 MMOL/L (ref 98–107)
CO2 SERPL-SCNC: 24 MMOL/L (ref 20–31)
CREAT SERPL-MCNC: 1 MG/DL (ref 0.5–0.9)
DATE, STOOL #1: NORMAL
EOSINOPHIL # BLD: 0.2 K/UL (ref 0–0.4)
EOSINOPHILS RELATIVE PERCENT: 4 % (ref 0–4)
ERYTHROCYTE [DISTWIDTH] IN BLOOD BY AUTOMATED COUNT: 17.5 % (ref 11.5–14.9)
GFR, ESTIMATED: 61 ML/MIN/1.73M2
GLUCOSE SERPL-MCNC: 219 MG/DL (ref 70–99)
HCT VFR BLD AUTO: 10.9 % (ref 36–46)
HEMOCCULT SP1 STL QL: NEGATIVE
HGB BLD-MCNC: 3.7 G/DL (ref 12–16)
INR PPP: 1.2
LIPASE SERPL-CCNC: 25 U/L (ref 13–60)
LYMPHOCYTES NFR BLD: 1 K/UL (ref 1–4.8)
LYMPHOCYTES RELATIVE PERCENT: 16 % (ref 24–44)
MCH RBC QN AUTO: 30.1 PG (ref 26–34)
MCHC RBC AUTO-ENTMCNC: 33.8 G/DL (ref 31–37)
MCV RBC AUTO: 89.1 FL (ref 80–100)
MONOCYTES NFR BLD: 0.5 K/UL (ref 0.1–1.3)
MONOCYTES NFR BLD: 8 % (ref 1–7)
NEUTROPHILS NFR BLD: 71 % (ref 36–66)
NEUTS SEG NFR BLD: 4.3 K/UL (ref 1.3–9.1)
PLATELET # BLD AUTO: 321 K/UL (ref 150–450)
PMV BLD AUTO: 7.6 FL (ref 6–12)
POTASSIUM SERPL-SCNC: 4.4 MMOL/L (ref 3.7–5.3)
PROT SERPL-MCNC: 6.6 G/DL (ref 6.4–8.3)
PROTHROMBIN TIME: 15 SEC (ref 11.8–14.6)
RBC # BLD AUTO: 1.23 M/UL (ref 4–5.2)
RETICS # AUTO: 0.02 M/UL (ref 0.02–0.1)
RETICS/RBC NFR AUTO: 1.4 % (ref 0.5–2)
SODIUM SERPL-SCNC: 134 MMOL/L (ref 135–144)
TIME, STOOL #1: 1830
TROPONIN I SERPL HS-MCNC: 24 NG/L (ref 0–14)
WBC OTHER # BLD: 6 K/UL (ref 3.5–11)

## 2024-05-13 PROCEDURE — 86850 RBC ANTIBODY SCREEN: CPT

## 2024-05-13 PROCEDURE — 82270 OCCULT BLOOD FECES: CPT

## 2024-05-13 PROCEDURE — 36415 COLL VENOUS BLD VENIPUNCTURE: CPT

## 2024-05-13 PROCEDURE — P9016 RBC LEUKOCYTES REDUCED: HCPCS

## 2024-05-13 PROCEDURE — 30233N1 TRANSFUSION OF NONAUTOLOGOUS RED BLOOD CELLS INTO PERIPHERAL VEIN, PERCUTANEOUS APPROACH: ICD-10-PCS | Performed by: INTERNAL MEDICINE

## 2024-05-13 PROCEDURE — 93005 ELECTROCARDIOGRAM TRACING: CPT

## 2024-05-13 PROCEDURE — 85025 COMPLETE CBC W/AUTO DIFF WBC: CPT

## 2024-05-13 PROCEDURE — 86901 BLOOD TYPING SEROLOGIC RH(D): CPT

## 2024-05-13 PROCEDURE — 82947 ASSAY GLUCOSE BLOOD QUANT: CPT

## 2024-05-13 PROCEDURE — 85045 AUTOMATED RETICULOCYTE COUNT: CPT

## 2024-05-13 PROCEDURE — 2060000000 HC ICU INTERMEDIATE R&B

## 2024-05-13 PROCEDURE — 2580000003 HC RX 258

## 2024-05-13 PROCEDURE — 71045 X-RAY EXAM CHEST 1 VIEW: CPT

## 2024-05-13 PROCEDURE — 6360000004 HC RX CONTRAST MEDICATION

## 2024-05-13 PROCEDURE — 99285 EMERGENCY DEPT VISIT HI MDM: CPT

## 2024-05-13 PROCEDURE — 80053 COMPREHEN METABOLIC PANEL: CPT

## 2024-05-13 PROCEDURE — 83880 ASSAY OF NATRIURETIC PEPTIDE: CPT

## 2024-05-13 PROCEDURE — 84484 ASSAY OF TROPONIN QUANT: CPT

## 2024-05-13 PROCEDURE — 86900 BLOOD TYPING SEROLOGIC ABO: CPT

## 2024-05-13 PROCEDURE — 86920 COMPATIBILITY TEST SPIN: CPT

## 2024-05-13 PROCEDURE — 85610 PROTHROMBIN TIME: CPT

## 2024-05-13 PROCEDURE — 83690 ASSAY OF LIPASE: CPT

## 2024-05-13 PROCEDURE — 74177 CT ABD & PELVIS W/CONTRAST: CPT

## 2024-05-13 RX ORDER — INSULIN LISPRO 100 [IU]/ML
0-8 INJECTION, SOLUTION INTRAVENOUS; SUBCUTANEOUS
Status: DISCONTINUED | OUTPATIENT
Start: 2024-05-14 | End: 2024-05-17 | Stop reason: HOSPADM

## 2024-05-13 RX ORDER — INSULIN LISPRO 100 [IU]/ML
0-4 INJECTION, SOLUTION INTRAVENOUS; SUBCUTANEOUS NIGHTLY
Status: DISCONTINUED | OUTPATIENT
Start: 2024-05-14 | End: 2024-05-17 | Stop reason: HOSPADM

## 2024-05-13 RX ORDER — VITAMIN E 268 MG
400 CAPSULE ORAL DAILY
Status: DISCONTINUED | OUTPATIENT
Start: 2024-05-14 | End: 2024-05-17 | Stop reason: HOSPADM

## 2024-05-13 RX ORDER — LACTULOSE 10 G/15ML
30 SOLUTION ORAL EVERY 8 HOURS PRN
Status: DISCONTINUED | OUTPATIENT
Start: 2024-05-13 | End: 2024-05-17 | Stop reason: HOSPADM

## 2024-05-13 RX ORDER — SODIUM CHLORIDE 9 MG/ML
INJECTION, SOLUTION INTRAVENOUS PRN
Status: DISCONTINUED | OUTPATIENT
Start: 2024-05-13 | End: 2024-05-17 | Stop reason: HOSPADM

## 2024-05-13 RX ORDER — GABAPENTIN 100 MG/1
100 CAPSULE ORAL 3 TIMES DAILY
Status: DISCONTINUED | OUTPATIENT
Start: 2024-05-14 | End: 2024-05-17 | Stop reason: HOSPADM

## 2024-05-13 RX ORDER — SODIUM CHLORIDE 0.9 % (FLUSH) 0.9 %
5-40 SYRINGE (ML) INJECTION EVERY 12 HOURS SCHEDULED
Status: DISCONTINUED | OUTPATIENT
Start: 2024-05-14 | End: 2024-05-16 | Stop reason: SDUPTHER

## 2024-05-13 RX ORDER — TAMSULOSIN HYDROCHLORIDE 0.4 MG/1
0.4 CAPSULE ORAL DAILY
Status: DISCONTINUED | OUTPATIENT
Start: 2024-05-14 | End: 2024-05-17 | Stop reason: HOSPADM

## 2024-05-13 RX ORDER — LANOLIN ALCOHOL/MO/W.PET/CERES
6 CREAM (GRAM) TOPICAL NIGHTLY PRN
Status: DISCONTINUED | OUTPATIENT
Start: 2024-05-13 | End: 2024-05-17 | Stop reason: HOSPADM

## 2024-05-13 RX ORDER — DEXTROSE MONOHYDRATE 100 MG/ML
INJECTION, SOLUTION INTRAVENOUS CONTINUOUS PRN
Status: DISCONTINUED | OUTPATIENT
Start: 2024-05-13 | End: 2024-05-17 | Stop reason: HOSPADM

## 2024-05-13 RX ORDER — PANTOPRAZOLE SODIUM 40 MG/1
40 TABLET, DELAYED RELEASE ORAL
Status: DISCONTINUED | OUTPATIENT
Start: 2024-05-14 | End: 2024-05-14

## 2024-05-13 RX ORDER — BUMETANIDE 1 MG/1
1 TABLET ORAL DAILY
Status: DISCONTINUED | OUTPATIENT
Start: 2024-05-14 | End: 2024-05-17 | Stop reason: HOSPADM

## 2024-05-13 RX ORDER — SODIUM CHLORIDE 0.9 % (FLUSH) 0.9 %
5-40 SYRINGE (ML) INJECTION PRN
Status: DISCONTINUED | OUTPATIENT
Start: 2024-05-13 | End: 2024-05-16 | Stop reason: SDUPTHER

## 2024-05-13 RX ORDER — 0.9 % SODIUM CHLORIDE 0.9 %
100 INTRAVENOUS SOLUTION INTRAVENOUS ONCE
Status: COMPLETED | OUTPATIENT
Start: 2024-05-13 | End: 2024-05-13

## 2024-05-13 RX ORDER — SODIUM CHLORIDE 1 G/1
1 TABLET ORAL
Status: DISCONTINUED | OUTPATIENT
Start: 2024-05-14 | End: 2024-05-16

## 2024-05-13 RX ORDER — CYCLOBENZAPRINE HCL 10 MG
5 TABLET ORAL 3 TIMES DAILY PRN
Status: DISCONTINUED | OUTPATIENT
Start: 2024-05-13 | End: 2024-05-17 | Stop reason: HOSPADM

## 2024-05-13 RX ORDER — ATORVASTATIN CALCIUM 10 MG/1
10 TABLET, FILM COATED ORAL DAILY
Status: DISCONTINUED | OUTPATIENT
Start: 2024-05-14 | End: 2024-05-17 | Stop reason: HOSPADM

## 2024-05-13 RX ORDER — ZINC SULFATE 50(220)MG
50 CAPSULE ORAL DAILY
Status: DISCONTINUED | OUTPATIENT
Start: 2024-05-14 | End: 2024-05-17 | Stop reason: HOSPADM

## 2024-05-13 RX ORDER — TRAMADOL HYDROCHLORIDE 50 MG/1
50 TABLET ORAL EVERY 8 HOURS PRN
COMMUNITY

## 2024-05-13 RX ORDER — TRAMADOL HYDROCHLORIDE 50 MG/1
50 TABLET ORAL EVERY 8 HOURS PRN
Status: DISCONTINUED | OUTPATIENT
Start: 2024-05-13 | End: 2024-05-17 | Stop reason: HOSPADM

## 2024-05-13 RX ORDER — SODIUM CHLORIDE 9 MG/ML
INJECTION, SOLUTION INTRAVENOUS PRN
Status: DISCONTINUED | OUTPATIENT
Start: 2024-05-13 | End: 2024-05-16 | Stop reason: SDUPTHER

## 2024-05-13 RX ORDER — ASPIRIN 81 MG/1
81 TABLET ORAL DAILY
Status: DISCONTINUED | OUTPATIENT
Start: 2024-05-14 | End: 2024-05-17 | Stop reason: HOSPADM

## 2024-05-13 RX ORDER — VITAMIN B COMPLEX
4000 TABLET ORAL DAILY
Status: DISCONTINUED | OUTPATIENT
Start: 2024-05-14 | End: 2024-05-17 | Stop reason: HOSPADM

## 2024-05-13 RX ORDER — SODIUM CHLORIDE 0.9 % (FLUSH) 0.9 %
10 SYRINGE (ML) INJECTION ONCE
Status: COMPLETED | OUTPATIENT
Start: 2024-05-13 | End: 2024-05-13

## 2024-05-13 RX ORDER — INSULIN GLARGINE 100 [IU]/ML
40 INJECTION, SOLUTION SUBCUTANEOUS 2 TIMES DAILY
Status: DISCONTINUED | OUTPATIENT
Start: 2024-05-14 | End: 2024-05-14

## 2024-05-13 RX ORDER — CYCLOBENZAPRINE HCL 5 MG
5 TABLET ORAL 3 TIMES DAILY PRN
COMMUNITY

## 2024-05-13 RX ADMIN — IOPAMIDOL 75 ML: 755 INJECTION, SOLUTION INTRAVENOUS at 20:46

## 2024-05-13 RX ADMIN — SODIUM CHLORIDE, PRESERVATIVE FREE 10 ML: 5 INJECTION INTRAVENOUS at 20:46

## 2024-05-13 RX ADMIN — SODIUM CHLORIDE 100 ML: 9 INJECTION, SOLUTION INTRAVENOUS at 20:46

## 2024-05-13 ASSESSMENT — PAIN - FUNCTIONAL ASSESSMENT
PAIN_FUNCTIONAL_ASSESSMENT: NONE - DENIES PAIN
PAIN_FUNCTIONAL_ASSESSMENT: NONE - DENIES PAIN

## 2024-05-13 NOTE — ED PROVIDER NOTES
EMERGENCY DEPARTMENT ENCOUNTER   ATTENDING ATTESTATION     Pt Name: Bobbi Phillips  MRN: [id number]  Birthdate 1955  Date of evaluation: 5/13/24       Bobbi Phillips is a 68 y.o. female who presents with Shortness of Breath and Leg Swelling      MDM: 68-year-old female presents for complaints of shortness of breath and fatigue    Patient has history of cirrhosis worsening shortness of breath recently    On exam patient in no acute distress, vital stable abdomen is soft, fluid wave noted    Will check labs    EKG showing normal sinus rhythm rate of 80, normal axis, normal intervals, no ST segment elevation or depression no T wave changes    Labs are reviewed, hemoglobin noted at 3.7, remaining labs were unremarkable, Hemoccult negative, CT obtained showing ascites, patient was started on blood transfusion, decision to admit for further hemoglobin monitoring and replacement    Spoke with Pepper SAENZ for Dr. Carrillo who accepts admission. Patient demonstrates understanding and agreement with the plan, was given the opportunity to ask questions, and these questions were answered to the best of the provided information at this time. VS stable for transfer to floor.       This dictation was prepared using Dragon Medical voice recognition software.           Vitals:   Vitals:    05/13/24 2015 05/13/24 2030 05/13/24 2158 05/13/24 2233   BP: (!) 126/54 (!) 112/53 (!) 115/40 (!) 126/58   Pulse: 79 80 83 82   Resp: 15 15 15 13   Temp: 98.1 °F (36.7 °C) 98.5 °F (36.9 °C)  98.4 °F (36.9 °C)   TempSrc: Oral Oral  Oral   SpO2: 97% 97% 96% 97%   Weight:       Height:             I personally saw and examined the patient. I have reviewed and agree with the resident's findings, including all diagnostic interpretations and treatment plan as written. I was present for the key portions of any procedures performed and the inclusive time noted for any critical care statement.    Mendoza Lopez DO  Attending Emergency Physician      
tenderness. Edema present.      Left lower leg: No tenderness. Edema present.   Skin:     General: Skin is warm and dry.      Capillary Refill: Capillary refill takes less than 2 seconds.   Neurological:      General: No focal deficit present.      Mental Status: She is alert.           DDX/DIAGNOSTIC RESULTS / EMERGENCY DEPARTMENT COURSE / MDM     Medical Decision Making  Bobbi Phillips is a 68 y.o. female who presents with abdominal distention, peripheral edema.  Patient is GCS 15, nontoxic appearing, not in acute distress, speaking full sentences, able to ambulate under their own power.  Patient is afebrile, normotensive, nontachycardic, satting well on room air.  Examination reveals lungs are clear to auscultation bilaterally.  Abdomen soft nontender.  Peripheral pulses are 2+ throughout.  Patient appears to be grossly fluid overloaded.  Differential diagnosis includes CHF exacerbation, liver disease, renal injury.  Will obtain cardiac workup, liver function tests EKG, chest x-ray.  Plan for admission.  Patient does not have any chest pain or shortness of breath, very low suspicion for PE considering not hypoxic, not tachycardic, does not have symptoms.      Amount and/or Complexity of Data Reviewed  Labs: ordered.  Radiology: ordered.  ECG/medicine tests: ordered.    Risk  Prescription drug management.  Decision regarding hospitalization.        EKG      All EKG's are interpreted by the Emergency Department Physician who either signs or Co-signs this chart in the absence of a cardiologist.    EMERGENCY DEPARTMENT COURSE:    ED Course as of 05/13/24 1934   Mon May 13, 2024   1825 Rectal exam performed with nurse Marcell as chaperone.  Patient has some chronic wounds on her sacrum, noninfected looking.  Otherwise unremarkable rectal exam.  Occult sent. [AK]   1934 Spoke to hospitalist JAVIER, admit, transfuse [AK]      ED Course User Index  [AK] Liban Bonds MD       PROCEDURES:      CONSULTS:  IP CONSULT TO

## 2024-05-13 NOTE — CONSENT
Informed Consent for Blood Component Transfusion Note    I have discussed with the patient the rationale for blood component transfusion; its benefits in treating or preventing fatigue, organ damage, or death; and its risk which includes mild transfusion reactions, rare risk of blood borne infection, or more serious but rare reactions. I have discussed the alternatives to transfusion, including the risk and consequences of not receiving transfusion. The patient had an opportunity to ask questions and had agreed to proceed with transfusion of blood components.    Electronically signed by Liban Bonds MD on 5/13/24 at 6:56 PM EDT

## 2024-05-14 PROBLEM — K74.60 DECOMPENSATION OF CIRRHOSIS OF LIVER (HCC): Status: ACTIVE | Noted: 2024-05-14

## 2024-05-14 PROBLEM — Z86.19 HISTORY OF SPONTANEOUS BACTERIAL PERITONITIS: Status: ACTIVE | Noted: 2024-05-14

## 2024-05-14 PROBLEM — K72.90 DECOMPENSATION OF CIRRHOSIS OF LIVER (HCC): Status: ACTIVE | Noted: 2024-05-14

## 2024-05-14 LAB
ALBUMIN SERPL-MCNC: 2 G/DL (ref 3.5–5.2)
ALP SERPL-CCNC: 355 U/L (ref 35–104)
ALT SERPL-CCNC: 13 U/L (ref 5–33)
AMMONIA PLAS-SCNC: 60 UMOL/L (ref 11–51)
ANION GAP SERPL CALCULATED.3IONS-SCNC: 8 MMOL/L (ref 9–17)
AST SERPL-CCNC: 27 U/L
BACTERIA URNS QL MICRO: ABNORMAL
BILIRUB SERPL-MCNC: 0.9 MG/DL (ref 0.3–1.2)
BILIRUB UR QL STRIP: NEGATIVE
BUN SERPL-MCNC: 30 MG/DL (ref 8–23)
CALCIUM SERPL-MCNC: 8.4 MG/DL (ref 8.6–10.4)
CASTS #/AREA URNS LPF: ABNORMAL /LPF
CHLORIDE SERPL-SCNC: 104 MMOL/L (ref 98–107)
CLARITY UR: ABNORMAL
CO2 SERPL-SCNC: 23 MMOL/L (ref 20–31)
COLOR UR: YELLOW
CREAT SERPL-MCNC: 0.7 MG/DL (ref 0.5–0.9)
EKG ATRIAL RATE: 80 BPM
EKG P AXIS: 71 DEGREES
EKG P-R INTERVAL: 176 MS
EKG Q-T INTERVAL: 350 MS
EKG QRS DURATION: 80 MS
EKG QTC CALCULATION (BAZETT): 403 MS
EKG R AXIS: -13 DEGREES
EKG T AXIS: 28 DEGREES
EKG VENTRICULAR RATE: 80 BPM
EPI CELLS #/AREA URNS HPF: ABNORMAL /HPF
FERRITIN SERPL-MCNC: 123 NG/ML (ref 13–150)
FOLATE SERPL-MCNC: 5.8 NG/ML (ref 4.8–24.2)
GFR, ESTIMATED: >90 ML/MIN/1.73M2
GLUCOSE BLD-MCNC: 101 MG/DL (ref 65–105)
GLUCOSE BLD-MCNC: 107 MG/DL (ref 65–105)
GLUCOSE BLD-MCNC: 108 MG/DL (ref 65–105)
GLUCOSE BLD-MCNC: 120 MG/DL (ref 65–105)
GLUCOSE BLD-MCNC: 177 MG/DL (ref 65–105)
GLUCOSE BLD-MCNC: 48 MG/DL (ref 65–105)
GLUCOSE SERPL-MCNC: 51 MG/DL (ref 70–99)
GLUCOSE UR STRIP-MCNC: NEGATIVE MG/DL
HAPTOGLOB SERPL-MCNC: 208 MG/DL (ref 30–200)
HCT VFR BLD AUTO: 29.9 % (ref 36–46)
HCT VFR BLD AUTO: 31.2 % (ref 36–46)
HCT VFR BLD AUTO: 32.8 % (ref 36–46)
HCT VFR BLD AUTO: 33.8 % (ref 36–46)
HGB BLD-MCNC: 10.1 G/DL (ref 12–16)
HGB BLD-MCNC: 10.7 G/DL (ref 12–16)
HGB BLD-MCNC: 11.2 G/DL (ref 12–16)
HGB BLD-MCNC: 9.8 G/DL (ref 12–16)
HGB UR QL STRIP.AUTO: ABNORMAL
IRON SATN MFR SERPL: ABNORMAL % (ref 20–55)
IRON SERPL-MCNC: 131 UG/DL (ref 37–145)
KETONES UR STRIP-MCNC: NEGATIVE MG/DL
LDH SERPL-CCNC: 194 U/L (ref 135–214)
LEUKOCYTE ESTERASE UR QL STRIP: ABNORMAL
NITRITE UR QL STRIP: NEGATIVE
PARTIAL THROMBOPLASTIN TIME: 36.3 SEC (ref 24–36)
PATH REV BLD -IMP: NORMAL
PH UR STRIP: 5.5 [PH] (ref 5–8)
POTASSIUM SERPL-SCNC: 4.3 MMOL/L (ref 3.7–5.3)
PROT SERPL-MCNC: 6 G/DL (ref 6.4–8.3)
PROT UR STRIP-MCNC: ABNORMAL MG/DL
RBC #/AREA URNS HPF: ABNORMAL /HPF
SODIUM SERPL-SCNC: 135 MMOL/L (ref 135–144)
SP GR UR STRIP: 1.03 (ref 1–1.03)
SURGICAL PATHOLOGY REPORT: NORMAL
TIBC SERPL-MCNC: ABNORMAL UG/DL (ref 250–450)
TROPONIN I SERPL HS-MCNC: 26 NG/L (ref 0–14)
TROPONIN I SERPL HS-MCNC: 26 NG/L (ref 0–14)
UNSATURATED IRON BINDING CAPACITY: <17 UG/DL (ref 112–347)
UROBILINOGEN UR STRIP-ACNC: NORMAL EU/DL (ref 0–1)
VIT B12 SERPL-MCNC: 473 PG/ML (ref 232–1245)
WBC #/AREA URNS HPF: ABNORMAL /HPF

## 2024-05-14 PROCEDURE — C9113 INJ PANTOPRAZOLE SODIUM, VIA: HCPCS | Performed by: INTERNAL MEDICINE

## 2024-05-14 PROCEDURE — 83615 LACTATE (LD) (LDH) ENZYME: CPT

## 2024-05-14 PROCEDURE — 83550 IRON BINDING TEST: CPT

## 2024-05-14 PROCEDURE — 93010 ELECTROCARDIOGRAM REPORT: CPT | Performed by: INTERNAL MEDICINE

## 2024-05-14 PROCEDURE — 80053 COMPREHEN METABOLIC PANEL: CPT

## 2024-05-14 PROCEDURE — 82140 ASSAY OF AMMONIA: CPT

## 2024-05-14 PROCEDURE — 36430 TRANSFUSION BLD/BLD COMPNT: CPT

## 2024-05-14 PROCEDURE — 36415 COLL VENOUS BLD VENIPUNCTURE: CPT

## 2024-05-14 PROCEDURE — 97530 THERAPEUTIC ACTIVITIES: CPT

## 2024-05-14 PROCEDURE — P9016 RBC LEUKOCYTES REDUCED: HCPCS

## 2024-05-14 PROCEDURE — 6360000002 HC RX W HCPCS: Performed by: INTERNAL MEDICINE

## 2024-05-14 PROCEDURE — 82728 ASSAY OF FERRITIN: CPT

## 2024-05-14 PROCEDURE — 99223 1ST HOSP IP/OBS HIGH 75: CPT | Performed by: INTERNAL MEDICINE

## 2024-05-14 PROCEDURE — 6360000002 HC RX W HCPCS: Performed by: NURSE PRACTITIONER

## 2024-05-14 PROCEDURE — 2500000003 HC RX 250 WO HCPCS: Performed by: NURSE PRACTITIONER

## 2024-05-14 PROCEDURE — 82607 VITAMIN B-12: CPT

## 2024-05-14 PROCEDURE — 97162 PT EVAL MOD COMPLEX 30 MIN: CPT

## 2024-05-14 PROCEDURE — 82746 ASSAY OF FOLIC ACID SERUM: CPT

## 2024-05-14 PROCEDURE — 85014 HEMATOCRIT: CPT

## 2024-05-14 PROCEDURE — 85018 HEMOGLOBIN: CPT

## 2024-05-14 PROCEDURE — 2060000000 HC ICU INTERMEDIATE R&B

## 2024-05-14 PROCEDURE — APPNB60 APP NON BILLABLE TIME 46-60 MINS: Performed by: NURSE PRACTITIONER

## 2024-05-14 PROCEDURE — 87077 CULTURE AEROBIC IDENTIFY: CPT

## 2024-05-14 PROCEDURE — 99213 OFFICE O/P EST LOW 20 MIN: CPT

## 2024-05-14 PROCEDURE — 87086 URINE CULTURE/COLONY COUNT: CPT

## 2024-05-14 PROCEDURE — 6370000000 HC RX 637 (ALT 250 FOR IP): Performed by: NURSE PRACTITIONER

## 2024-05-14 PROCEDURE — 83010 ASSAY OF HAPTOGLOBIN QUANT: CPT

## 2024-05-14 PROCEDURE — 84484 ASSAY OF TROPONIN QUANT: CPT

## 2024-05-14 PROCEDURE — 85730 THROMBOPLASTIN TIME PARTIAL: CPT

## 2024-05-14 PROCEDURE — 83540 ASSAY OF IRON: CPT

## 2024-05-14 PROCEDURE — 81001 URINALYSIS AUTO W/SCOPE: CPT

## 2024-05-14 PROCEDURE — 2580000003 HC RX 258: Performed by: INTERNAL MEDICINE

## 2024-05-14 PROCEDURE — A4216 STERILE WATER/SALINE, 10 ML: HCPCS | Performed by: NURSE PRACTITIONER

## 2024-05-14 PROCEDURE — A4216 STERILE WATER/SALINE, 10 ML: HCPCS | Performed by: INTERNAL MEDICINE

## 2024-05-14 PROCEDURE — 97166 OT EVAL MOD COMPLEX 45 MIN: CPT

## 2024-05-14 PROCEDURE — 2580000003 HC RX 258: Performed by: NURSE PRACTITIONER

## 2024-05-14 PROCEDURE — 87186 SC STD MICRODIL/AGAR DIL: CPT

## 2024-05-14 RX ORDER — INSULIN GLARGINE 100 [IU]/ML
30 INJECTION, SOLUTION SUBCUTANEOUS 2 TIMES DAILY
Status: DISCONTINUED | OUTPATIENT
Start: 2024-05-14 | End: 2024-05-16

## 2024-05-14 RX ORDER — SODIUM CHLORIDE 0.9 % (FLUSH) 0.9 %
5-40 SYRINGE (ML) INJECTION PRN
Status: DISCONTINUED | OUTPATIENT
Start: 2024-05-14 | End: 2024-05-17 | Stop reason: HOSPADM

## 2024-05-14 RX ORDER — SODIUM CHLORIDE 9 MG/ML
INJECTION, SOLUTION INTRAVENOUS PRN
Status: DISCONTINUED | OUTPATIENT
Start: 2024-05-14 | End: 2024-05-17 | Stop reason: HOSPADM

## 2024-05-14 RX ORDER — SODIUM CHLORIDE 0.9 % (FLUSH) 0.9 %
5-40 SYRINGE (ML) INJECTION EVERY 12 HOURS SCHEDULED
Status: DISCONTINUED | OUTPATIENT
Start: 2024-05-14 | End: 2024-05-17 | Stop reason: HOSPADM

## 2024-05-14 RX ADMIN — CEFTRIAXONE SODIUM 1000 MG: 1 INJECTION, POWDER, FOR SOLUTION INTRAMUSCULAR; INTRAVENOUS at 04:44

## 2024-05-14 RX ADMIN — GABAPENTIN 100 MG: 100 CAPSULE ORAL at 14:47

## 2024-05-14 RX ADMIN — ANTI-FUNGAL POWDER MICONAZOLE NITRATE TALC FREE: 1.42 POWDER TOPICAL at 10:40

## 2024-05-14 RX ADMIN — RIFAXIMIN 550 MG: 550 TABLET ORAL at 00:38

## 2024-05-14 RX ADMIN — ATORVASTATIN CALCIUM 10 MG: 10 TABLET, FILM COATED ORAL at 10:37

## 2024-05-14 RX ADMIN — ANTI-FUNGAL POWDER MICONAZOLE NITRATE TALC FREE: 1.42 POWDER TOPICAL at 00:38

## 2024-05-14 RX ADMIN — Medication 4000 UNITS: at 10:36

## 2024-05-14 RX ADMIN — PANTOPRAZOLE SODIUM 40 MG: 40 INJECTION, POWDER, FOR SOLUTION INTRAVENOUS at 20:42

## 2024-05-14 RX ADMIN — DEXTROSE MONOHYDRATE 125 ML: 100 INJECTION, SOLUTION INTRAVENOUS at 06:37

## 2024-05-14 RX ADMIN — TRAMADOL HYDROCHLORIDE 50 MG: 50 TABLET, COATED ORAL at 11:00

## 2024-05-14 RX ADMIN — SODIUM CHLORIDE 1 G: 1 TABLET ORAL at 11:59

## 2024-05-14 RX ADMIN — TAMSULOSIN HYDROCHLORIDE 0.4 MG: 0.4 CAPSULE ORAL at 10:37

## 2024-05-14 RX ADMIN — METOPROLOL TARTRATE 25 MG: 25 TABLET, FILM COATED ORAL at 00:37

## 2024-05-14 RX ADMIN — GABAPENTIN 100 MG: 100 CAPSULE ORAL at 10:36

## 2024-05-14 RX ADMIN — METOPROLOL TARTRATE 25 MG: 25 TABLET, FILM COATED ORAL at 10:37

## 2024-05-14 RX ADMIN — TRAMADOL HYDROCHLORIDE 50 MG: 50 TABLET, COATED ORAL at 00:42

## 2024-05-14 RX ADMIN — GABAPENTIN 100 MG: 100 CAPSULE ORAL at 00:37

## 2024-05-14 RX ADMIN — RIFAXIMIN 550 MG: 550 TABLET ORAL at 10:39

## 2024-05-14 RX ADMIN — PANTOPRAZOLE SODIUM 40 MG: 40 TABLET, DELAYED RELEASE ORAL at 05:45

## 2024-05-14 RX ADMIN — GABAPENTIN 100 MG: 100 CAPSULE ORAL at 20:41

## 2024-05-14 RX ADMIN — SODIUM CHLORIDE 1 G: 1 TABLET ORAL at 17:01

## 2024-05-14 RX ADMIN — SODIUM CHLORIDE 10 ML: 9 INJECTION INTRAMUSCULAR; INTRAVENOUS; SUBCUTANEOUS at 10:43

## 2024-05-14 RX ADMIN — METOPROLOL TARTRATE 25 MG: 25 TABLET, FILM COATED ORAL at 20:41

## 2024-05-14 RX ADMIN — BUMETANIDE 1 MG: 1 TABLET ORAL at 10:37

## 2024-05-14 RX ADMIN — PANTOPRAZOLE SODIUM 40 MG: 40 INJECTION, POWDER, FOR SOLUTION INTRAVENOUS at 10:37

## 2024-05-14 RX ADMIN — TRAMADOL HYDROCHLORIDE 50 MG: 50 TABLET, COATED ORAL at 20:41

## 2024-05-14 RX ADMIN — SODIUM CHLORIDE 1 G: 1 TABLET ORAL at 10:37

## 2024-05-14 RX ADMIN — RIFAXIMIN 550 MG: 550 TABLET ORAL at 20:43

## 2024-05-14 RX ADMIN — Medication 400 UNITS: at 10:38

## 2024-05-14 RX ADMIN — Medication 50 MG: at 10:40

## 2024-05-14 RX ADMIN — SODIUM CHLORIDE, PRESERVATIVE FREE 10 ML: 5 INJECTION INTRAVENOUS at 20:42

## 2024-05-14 ASSESSMENT — PAIN DESCRIPTION - ORIENTATION
ORIENTATION: RIGHT

## 2024-05-14 ASSESSMENT — PAIN SCALES - GENERAL
PAINLEVEL_OUTOF10: 8

## 2024-05-14 ASSESSMENT — PAIN DESCRIPTION - LOCATION
LOCATION: HIP
LOCATION: HIP;LEG
LOCATION: HIP

## 2024-05-14 NOTE — DISCHARGE INSTR - COC
Continuity of Care Form    Patient Name: Bobbi Phillips   :  1955  MRN:  [id number]    Admit date:  2024  Discharge date:  2024    Code Status Order: Full Code   Advance Directives:     Admitting Physician:  Maximiliano Haywood MD  PCP: Stacie Doty MD    Discharging Nurse: Mary RN  Discharging Hospital Unit/Room#: /-01  Discharging Unit Phone Number: [phone]    Emergency Contact:   Extended Emergency Contact Information  Primary Emergency Contact: Judit Gar *HIPAA*  Address: NA  Home Phone: [phone]  Work Phone: [phone]  Mobile Phone: [phone]  Relation: Brother/Sister  Secondary Emergency Contact: RollyPhillip *HIPAA*  Home Phone: [phone]  Work Phone: [phone]  Mobile Phone: [phone]  Relation: Child    Past Surgical History:  Past Surgical History:   Procedure Laterality Date    ANKLE FRACTURE SURGERY Left 2006    no retained metal    ANOMALOUS VENOUS RETURN REPAIR N/A 2024    CYSTOSCOPY, TRANSURETHRAL RESECTION BLADDER,  EVACUATION OF CLOTS with Catheter Insertion for Continuous Irrigation performed by Jason Lambert MD at [facility] OR    CARDIAC CATHETERIZATION  2006    CATARACT REMOVAL WITH IMPLANT Bilateral 2018     SECTION      x2, ,  \"SADDLE BLOCK\"    CHOLECYSTECTOMY, LAPAROSCOPIC  2012    COLONOSCOPY N/A 2023    COLONOSCOPY WITH BIOPSY performed by Carlos Campos MD at [facility] OR    DILATION AND CURETTAGE OF UTERUS N/A 10/02/2023    ENDOMETRIAL BIOPSY AND EXAM UNDER ANESTHESIA  Failed D&C Hysteroscopy performed by Saud Hardy DO at [facility] OR    DILATION AND CURETTAGE OF UTERUS N/A 2024    EUA, DIAGNOSTIC HYSTEROSCOPY performed by Mena Horn MD at [facility] OR    HYSTEROSCOPY  2024    EUA, DIAGNOSTIC HYSTEROSCOPY    UPPER GASTROINTESTINAL ENDOSCOPY N/A 4/10/2024    ESOPHAGOGASTRODUODENOSCOPY BIOPSY performed by Enedelia Crow MD at [facility] ENDO       Immunization History:   Immunization History  Unstageable 05/14/24 1246   Dressing Status Old drainage noted;New dressing applied 05/14/24 1246   Wound Cleansed Cleansed with saline 05/14/24 1246   Dressing/Treatment Triad hydro/zinc oxide-based hydrophilic paste;Foam 05/14/24 1246   Dressing Change Due 05/02/24 05/01/24 2030   Wound Length (cm) 2.8 cm 05/14/24 1246   Wound Width (cm) 5.6 cm 05/14/24 1246   Wound Depth (cm) 0.2 cm 05/14/24 1246   Wound Surface Area (cm^2) 15.68 cm^2 05/14/24 1246   Change in Wound Size % (l*w) 79.64 05/14/24 1246   Wound Volume (cm^3) 3.136 cm^3 05/14/24 1246   Wound Healing % 59 05/14/24 1246   Wound Assessment Pink/red;Slough 05/14/24 1246   Drainage Amount Moderate (25-50%) 05/14/24 1246   Drainage Description Serosanguinous 05/14/24 1246   Odor None 05/14/24 1246   Brooke-wound Assessment Blanchable erythema 05/14/24 1246   Margins Defined edges 05/14/24 1246   Number of days: 32       Wound 05/03/24 Hip Right;Distal (Active)   Wound Image   05/14/24 1246   Wound Etiology Other 05/14/24 1246   Dressing Status Old drainage noted;New dressing applied 05/14/24 1246   Wound Cleansed Cleansed with saline 05/14/24 1246   Dressing/Treatment Triad hydro/zinc oxide-based hydrophilic paste;Foam 05/14/24 1246   Wound Length (cm) 1.5 cm 05/14/24 1246   Wound Width (cm) 2.5 cm 05/14/24 1246   Wound Depth (cm) 0.1 cm 05/14/24 1246   Wound Surface Area (cm^2) 3.75 cm^2 05/14/24 1246   Wound Volume (cm^3) 0.375 cm^3 05/14/24 1246   Wound Assessment Pink/red;Slough 05/14/24 1246   Drainage Amount Small (< 25%) 05/14/24 1246   Drainage Description Serosanguinous 05/14/24 1246   Odor None 05/14/24 1246   Brooke-wound Assessment Blanchable erythema 05/14/24 1246   Margins Defined edges 05/14/24 1246   Number of days: 11     Sacrum: Cleanse with soap and water, pat dry.  Apply Triad cream to wound, cover with sacral foam dressing.  Change daily and as needed if loose or soiled.     Right distal hip: Cleanse with soap and water, pat dry.  Apply

## 2024-05-14 NOTE — PROGRESS NOTES
IR order received for paracentesis. No IR physician today. Will plan for procedure tomorrow. Patient does not need to be NPO for paracentesis.

## 2024-05-14 NOTE — PROGRESS NOTES
Pt arrived to floor via stretcher from ED and was transfered to bed.  Vitals taken, telemetry applied. Assessment complete. Call light within reach, and pt educated on its use. Bed in lowest position, and locked. Side rails up x 2. Denied further questions or needs at this time. Will continue to monitor.

## 2024-05-14 NOTE — CARE COORDINATION
Case Management Assessment  Initial Evaluation    Date/Time of Evaluation: 5/14/2024 3:42 PM  Assessment Completed by: Saloni Arciniega RN    If patient is discharged prior to next notation, then this note serves as note for discharge by case management.    Patient Name: Bobbi Phillips                   YOB: 1955  Diagnosis: Leg swelling [M79.89]  Symptomatic anemia [D64.9]  Anemia, unspecified type [D64.9]                   Date / Time: 5/13/2024  4:46 PM    Patient Admission Status: Inpatient   Readmission Risk (Low < 19, Mod (19-27), High > 27): Readmission Risk Score: 26.8    Current PCP: Stacie Doty MD  PCP verified by CM? Yes    Chart Reviewed: Yes      History Provided by: Patient  Patient Orientation: Alert and Oriented    Patient Cognition: Alert    Hospitalization in the last 30 days (Readmission):  Yes    If yes, Readmission Assessment in  Navigator will be completed.    Advance Directives:      Code Status: Full Code   Patient's Primary Decision Maker is: Legal Next of Kin    Primary Decision Maker: Phillip Peters *HIPAA* - Child - [phone]    Primary Decision Maker: TYLER PETERS *HIPAA* - Child - [phone]    Discharge Planning:    Patient lives with: Other (Comment) (SNF) Type of Home: Skilled Nursing Facility  Primary Care Giver: Other (Comment) (snf)  Patient Support Systems include: Family Members   Current Financial resources: Medicare  Current community resources: None  Current services prior to admission: Skilled Nursing Facility            Current DME: [facility]            Type of Home Care services:  PT, OT    ADLS  Prior functional level: Independent in ADLs/IADLs  Current functional level: Independent in ADLs/IADLs    PT AM-PAC: 6 /24  OT AM-PAC: 11 /24    Family can provide assistance at DC: Yes  Would you like Case Management to discuss the discharge plan with any other family members/significant others, and if so, who?    Plans to Return to Present Housing:

## 2024-05-14 NOTE — PROGRESS NOTES
[facility] Internal Medicine  Nikolas Tan MD; Ray Hwang MD; Brad Maciel MD; MD Courtney Lr MD; Germania Osborne MD  [facility] Internal Medicine   IN-PATIENT SERVICE  [facility]                 Date:   5/14/2024  Patientname:  Bobbi Phillips  Date of admission:  5/13/2024  4:46 PM  MRN:   [id number]  Account:  [id number]  YOB: 1955  PCP:    Stacie Doty MD  Room:   2091/2091-01  Code Status:    Full Code      Chief Complaint:     Chief Complaint   Patient presents with    Shortness of Breath    Leg Swelling       History of Present Illness:     Bobbi Phillips is a 68 y.o. Non- / non  female who presents with Shortness of Breath and Leg Swelling and is admitted to the hospital for the management of Symptomatic anemia. Medical history significant for BONNER with liver cirrhosis, HTN, HLD, CKD 3, DM type 2. Recent admission 4/3-4/23 KIM and UTI. Readmitted 4/27 with gross hematuria. Paracentesis 5/3 with 6.2L fluid removed. Presented today from nursing facility with complaints of shortness of breath, fatigue, leg swelling and abdominal distention.  Found to have severe anemia with Hgb 3.7. Hemoccult negative, Denies any current hematuria. On 5/3 hgb was 7.9. Platelets 321. CT abdomen reveals hepatic cirrhosis with large ascites. Mild right-sided pleural effusion with right basilar atelectasis.   significantly less than prior level 4/9 of 2100. EKG NSR no ST or T-wave changes. Cr 1.0, baseline with history of CKD3. Denies fever, chills, chest pain, cough, vomiting, diarrhea, and urinary symptoms.  There are no aggravating or alleviating factors. Symptoms are reported as acute, constant and severe.     Past Medical History:     Past Medical History:   Diagnosis Date    Abdominal swelling     Claustrophobia     COVID-19 vaccine administered     Diabetes (HCC)     DEXCOM LT ARM    Diabetic retinopathy (HCC) 11/28/2015  Rosa, MD   TOUJEO MAX SOLOSTAR 300 UNIT/ML SOPN inject 40 units subcutaneously twice a day 10/17/23  Yes Stacie Doty MD   vitamin E 180 MG (400 UNIT) CAPS capsule Take 1 capsule by mouth daily   Yes ProviderJose MD   Cholecalciferol (VITAMIN D3) 50 MCG (2000 UT) CAPS Take 2 capsules by mouth daily 11/15/19  Yes Stacie Doty MD   aspirin 81 MG EC tablet Take 1 tablet by mouth daily   Yes ProviderJose MD   sulfamethoxazole-trimethoprim (BACTRIM DS;SEPTRA DS) 800-160 MG per tablet Take 1 tablet by mouth daily 4/23/24 12/19/24  Germania Osborne MD   polyethylene glycol (GLYCOLAX) 17 g packet Take 1 packet by mouth daily as needed for Constipation 4/23/24 5/23/24  Germania Osborne MD   HUMALOG KWIKPEN 100 UNIT/ML SOPN inject PER SLIDING SCALE BEFORE MEALS AND AT BEDTIME 4 TIMES DAILY, MAX 25 UNITS DAILY 12/8/23   Stacie Doty MD   FREESTYLE TEST STRIPS strip use 1 TEST STRIP to TEST BLOOD SUGAR three times a day 10/17/23   Stacie Doty MD   Continuous Blood Gluc Sensor (DEXCOM G7 SENSOR) MISC Use to monitor sugars 6-8 times a day and monitor for high and low sugars. 6/26/23   Stacie Doty MD   B-D UF III MINI PEN NEEDLES 31G X 5 MM MISC use 1 PEN NEEDLE to inject MEDICATION subcutaneously four times a day with HUMALOG 4/3/23   Stacie Doty MD   Lancets MISC Test 4 times daily for uncontrolled BS 3/20/17   Stacie Doty MD        Allergies:     Tylenol [acetaminophen]    Social History:     Tobacco:    reports that she has never smoked. She has never used smokeless tobacco.  Alcohol:      reports no history of alcohol use.  Drug Use:  reports no history of drug use.    Family History:     Family History   Problem Relation Age of Onset    Breast Cancer Mother 42    Diabetes Mother     Stroke Mother     Heart Disease Father     Cancer Father         prostate    Cancer Sister         cervical    Breast Cancer Sister 71    Diabetes

## 2024-05-14 NOTE — PLAN OF CARE
Problem: Discharge Planning  Goal: Discharge to home or other facility with appropriate resources  Outcome: Progressing     Problem: Skin/Tissue Integrity  Goal: Absence of new skin breakdown  Description: 1.  Monitor for areas of redness and/or skin breakdown  2.  Assess vascular access sites hourly  3.  Every 4-6 hours minimum:  Change oxygen saturation probe site  4.  Every 4-6 hours:  If on nasal continuous positive airway pressure, respiratory therapy assess nares and determine need for appliance change or resting period.  Outcome: Progressing     Problem: Safety - Adult  Goal: Free from fall injury  Outcome: Progressing     Problem: ABCDS Injury Assessment  Goal: Absence of physical injury  Outcome: Progressing     Problem: Risk for Elopement  Goal: Patient will not exit the unit/facility without proper excort  Outcome: Progressing

## 2024-05-14 NOTE — PLAN OF CARE
Problem: Discharge Planning  Goal: Discharge to home or other facility with appropriate resources  5/14/2024 1721 by Eloina Morales RN  Outcome: Progressing  Flowsheets (Taken 5/14/2024 1721)  Discharge to home or other facility with appropriate resources:   Identify barriers to discharge with patient and caregiver   Arrange for needed discharge resources and transportation as appropriate     Problem: Skin/Tissue Integrity  Goal: Absence of new skin breakdown  Description: 1.  Monitor for areas of redness and/or skin breakdown  2.  Assess vascular access sites hourly  3.  Every 4-6 hours minimum:  Change oxygen saturation probe site  4.  Every 4-6 hours:  If on nasal continuous positive airway pressure, respiratory therapy assess nares and determine need for appliance change or resting period.  5/14/2024 1721 by Eloina Morales RN  Outcome: Progressing     Problem: Safety - Adult  Goal: Free from fall injury  5/14/2024 1721 by Eloina Morales RN  Outcome: Progressing  Flowsheets (Taken 5/14/2024 1721)  Free From Fall Injury: Instruct family/caregiver on patient safety     Problem: ABCDS Injury Assessment  Goal: Absence of physical injury  5/14/2024 1721 by Eloina Morales RN  Outcome: Progressing  Flowsheets (Taken 5/14/2024 1721)  Absence of Physical Injury: Implement safety measures based on patient assessment     Problem: Risk for Elopement  Goal: Patient will not exit the unit/facility without proper excort  5/14/2024 1721 by Eloina Morales, RN  Outcome: Progressing  Flowsheets (Taken 5/14/2024 1721)  Nursing Interventions for Elopement Risk:   Assist with personal care needs such as toileting, eating, dressing, as needed to reduce the risk of wandering   Collaborate with family members/caregivers to mitigate the elopement risk     Problem: Nutrition Deficit:  Goal: Optimize nutritional status  Outcome: Progressing

## 2024-05-14 NOTE — PROGRESS NOTES
[facility]   OCCUPATIONAL THERAPY MISSED TREATMENT NOTE   INPATIENT   Date: 24  Patient Name: Bobbi Phillips       Room:   MRN: [id number]   Account #: [id number]    : 1955  (68 y.o.)  Gender: female   Referring Practitioner: Cande Hollis APRN - NP  Diagnosis: Symptomatic anemia             REASON FOR MISSED TREATMENT:  Patient declined   -    Patient initially agreeable to participate in OT PT evaluation and treatment. Pt then declined attempting to sit EOB due to significant R hip pain. RN notified. Educated on AROM of BLEs to complete while supine in bed, patient demonstrated Good understanding.  [id number]        Electronically signed by PANCHITO Mcneil on 24 at 10:04 AM EDT

## 2024-05-14 NOTE — CONSULTS
addition to the nurse practitioner time.  That also included history taking follow-up physical examination and review of system.    Electronically signed by Andrés Calzada MD

## 2024-05-14 NOTE — PROGRESS NOTES
Physical Therapy  Facility/Department: [facility] PROGRESSIVE CARE  Physical Therapy Initial Assessment    Name: Bobbi Phillips  : 1955  MRN: [id number]  Date of Service: 2024    Discharge Recommendations:  Patient would benefit from continued therapy after discharge, Therapy recommended at discharge   PT Equipment Recommendations  Equipment Needed: No      Patient Diagnosis(es): The primary encounter diagnosis was Anemia, unspecified type. A diagnosis of Leg swelling was also pertinent to this visit.  Past Medical History:  has a past medical history of Abdominal swelling, Claustrophobia, COVID-19 vaccine administered, Diabetes (HCC), Diabetic retinopathy (HCC), Flatus, H/O acute sinusitis, H/O cholelithiasis, Hypertension, Liver cirrhosis (HCC), LLQ abdominal tenderness, Poor venous access, Positive colorectal cancer screening using Cologuard test, Post-menopausal bleeding, RUQ abdominal pain, Tendonitis of shoulder, right, Tingling, Under care of service provider, Under care of service provider, Vaginal bleeding, Vaginal candidiasis, and Wears glasses.  Past Surgical History:  has a past surgical history that includes Cholecystectomy, laparoscopic ();  section; Ankle fracture surgery (Left, ); Cataract removal with implant (Bilateral, ); Colonoscopy (N/A, 2023); Dilation and curettage of uterus (N/A, 10/02/2023); Cardiac catheterization (); hysteroscopy (2024); Dilation and curettage of uterus (N/A, 2024); Upper gastrointestinal endoscopy (N/A, 4/10/2024); and Anomalous venous return repair (N/A, 2024).    Assessment   Body Structures, Functions, Activity Limitations Requiring Skilled Therapeutic Intervention: Decreased functional mobility ;Decreased endurance;Decreased ROM;Decreased strength;Increased pain  Assessment: Impaired mobility due to decreased tolerance to activity and pain with ROM and strength limitations due to edema  Decision Making: Medium

## 2024-05-14 NOTE — CONSULTS
[facility]  Consult Note       NAME:  Bobbi Phillips  MEDICAL RECORD NUMBER:  [id number]  AGE: 68 y.o.   GENDER: female  : 1955  TODAY'S DATE:  2024    Subjective:      Bobbi Phillips is a 68 y.o. female with inpatient referral to [facility] for:  Wounds to sacrum and right hip      Wound Identification:  Wound Type: pressure and undetermined  Contributing Factors: edema, diabetes, poor glucose control, chronic pressure, decreased mobility, shear force, obesity, decreased tissue oxygenation, incontinence of stool, and incontinence of urine    Wound History: Sacral wound present for about a month  Current Wound Care Treatment: Foam    Patient Goal of Care:  [x] Wound Healing  [] Odor Control  [] Palliative Care  [] Pain Control   [] Other:         PAST MEDICAL HISTORY        Diagnosis Date    Abdominal swelling     Claustrophobia     COVID-19 vaccine administered     Diabetes (HCC)     DEXCOM LT ARM    Diabetic retinopathy (HCC) 2015    Flatus     H/O acute sinusitis     H/O cholelithiasis     Hypertension     Liver cirrhosis (HCC)     LLQ abdominal tenderness     Poor venous access     \"THEY LIKE TO ROLL & RUN AWAY\"    Positive colorectal cancer screening using Cologuard test 2023    had colonoscopy after this and no cancer was found    Post-menopausal bleeding     RUQ abdominal pain     Tendonitis of shoulder, right 2021    Tingling     Under care of service provider 2023    zyv-bdmjadbpp-hhyfxuqftx in [place]-last visit dec 2023    Under care of service provider 2023    zwdaam-mplakhtv-ukfj[address] st in [place]-last visit dec 2023    Vaginal bleeding 2023    \"SPOTTING, THICKENED UTERINE LINING\"DUE TO HAVE VAGINAL /UTERINE BIOPSY FOR\"    Vaginal candidiasis     Wears glasses     READING       PAST SURGICAL HISTORY    Past Surgical History:   Procedure Laterality Date    ANKLE FRACTURE SURGERY Left     no retained metal     05/01/24 2030   Wound Length (cm) 2.8 cm 05/14/24 1246   Wound Width (cm) 5.6 cm 05/14/24 1246   Wound Depth (cm) 0.2 cm 05/14/24 1246   Wound Surface Area (cm^2) 15.68 cm^2 05/14/24 1246   Change in Wound Size % (l*w) 79.64 05/14/24 1246   Wound Volume (cm^3) 3.136 cm^3 05/14/24 1246   Wound Healing % 59 05/14/24 1246   Wound Assessment Pink/red;Slough 05/14/24 1246   Drainage Amount Moderate (25-50%) 05/14/24 1246   Drainage Description Serosanguinous 05/14/24 1246   Odor None 05/14/24 1246   Brooke-wound Assessment Blanchable erythema 05/14/24 1246   Margins Defined edges 05/14/24 1246   Number of days: 32       Wound 05/03/24 Hip Right;Distal (Active)   Wound Image   05/14/24 1246   Wound Etiology Other 05/14/24 1246   Dressing Status Old drainage noted;New dressing applied 05/14/24 1246   Wound Cleansed Cleansed with saline 05/14/24 1246   Dressing/Treatment Triad hydro/zinc oxide-based hydrophilic paste;Foam 05/14/24 1246   Wound Length (cm) 1.5 cm 05/14/24 1246   Wound Width (cm) 2.5 cm 05/14/24 1246   Wound Depth (cm) 0.1 cm 05/14/24 1246   Wound Surface Area (cm^2) 3.75 cm^2 05/14/24 1246   Wound Volume (cm^3) 0.375 cm^3 05/14/24 1246   Wound Assessment Pink/red;Slough 05/14/24 1246   Drainage Amount Small (< 25%) 05/14/24 1246   Drainage Description Serosanguinous 05/14/24 1246   Odor None 05/14/24 1246   Brooke-wound Assessment Blanchable erythema 05/14/24 1246   Margins Defined edges 05/14/24 1246   Number of days: 11         WOUND ASSESSMENT:   WOC nurse consult for wounds to sacrum and right hip.  Patient well-known to writer from previous admissions.  She has had a wound to her sacrum for about a month.  She was admitted on 5/13 with shortness of breath, fatigue, and abdominal distention.  She had a hemoglobin of 3.7 in ER.  Upon assessment, the wound is looking better when compared to last assessment.  Most of the wound is clean and red.  There is still some slough obscuring the base of the wound in the  Repeat

## 2024-05-14 NOTE — PROGRESS NOTES
[facility]   Occupational Therapy Evaluation  Date: 24  Patient Name: Bobbi Phillips       Room:   MRN: [id number]  Account: [id number]   : 1955  (68 y.o.) Gender: female     Discharge Recommendations:  Further Occupational Therapy is recommended upon facility discharge.    OT Equipment Recommendations  Other: TBD    Referring Practitioner: Cande Hollis APRN - NP  Diagnosis: Symptomatic anemia   Additional Pertinent Hx: Per H&P Note: 68 y.o. female who presents with Shortness of Breath and Leg Swelling and is admitted to the hospital for the management of Symptomatic anemia. Medical history significant for BONNER with liver cirrhosis, HTN, HLD, CKD 3, DM type 2. Recent admission 4/3- KIM and UTI. Readmitted  with gross hematuria. Paracentesis 5/3 with 6.2L fluid removed. Presented today from nursing facility with complaints of shortness of breath, fatigue, leg swelling and abdominal distention.  Found to have severe anemia with Hgb 3.7. Hemoccult negative, Denies any current hematuria. On 5/3 hgb was 7.9. Platelets 321. CT abdomen reveals hepatic cirrhosis with large ascites. Mild right-sided pleural effusion with right basilar atelectasis.    Treatment Diagnosis: Impaired self-care status    Past Medical History:  has a past medical history of Abdominal swelling, Claustrophobia, COVID-19 vaccine administered, Diabetes (HCC), Diabetic retinopathy (HCC), Flatus, H/O acute sinusitis, H/O cholelithiasis, Hypertension, Liver cirrhosis (HCC), LLQ abdominal tenderness, Poor venous access, Positive colorectal cancer screening using Cologuard test, Post-menopausal bleeding, RUQ abdominal pain, Tendonitis of shoulder, right, Tingling, Under care of service provider, Under care of service provider, Vaginal bleeding, Vaginal candidiasis, and Wears glasses.    Past Surgical History:   has a past surgical history that includes Cholecystectomy, laparoscopic ();  11  AM-PAC Inpatient ADL T-Scale Score : 29.04  ADL Inpatient CMS 0-100% Score: 70.42  ADL Inpatient CMS G-Code Modifier : CL       Goals     Short Term Goals  Time Frame for Short Term Goals: By discharge, pt will  Short Term Goal 1: perform bed mobility with mod A to aid in hygiene and faciltiated seated ADLs  Short Term Goal 2: tolerate sitting EOB for 10+ minutes with supervision during functional activity of choice  Short Term Goal 3: perform functional transfers/toilet transfers with Carmen x2 using least restrictive device  Short Term Goal 4: tolerate standing for 5+ minutes with min A during functional activity of choice  Short Term Goal 5: actively participate in 15+ minutes of therapeutic exercise/functional activity to promote safety and independence with self care and mobility  Short Term Goal 6: OT to further assess functional mobility as appropriate and notify OTR to update POC    Plan  Occupational Therapy Plan  Times Per Week: 5-7  Current Treatment Recommendations: Strengthening, Balance training, Functional mobility training, Endurance training, Equipment evaluation, education, & procurement, Patient/Caregiver education & training, Positioning, Self-Care / ADL, Safety education & training, Pain management     05/14/24 0826 05/14/24 1102   Time Code Minutes    Timed Code Treatment Minutes  --  34 Minutes   OT Individual Minutes   Time In 0826 1102   Time Out 0842 1136   Minutes 16 34           Electronically signed by PANCHITO Mcneil on 5/14/24 at 12:57 PM EDT

## 2024-05-15 ENCOUNTER — APPOINTMENT (OUTPATIENT)
Dept: INTERVENTIONAL RADIOLOGY/VASCULAR | Age: 69
DRG: 690 | End: 2024-05-15
Payer: MEDICARE

## 2024-05-15 LAB
ALBUMIN FLD-MCNC: 0.8 G/DL
ALBUMIN SERPL-MCNC: 2.1 G/DL (ref 3.5–5.2)
ALP SERPL-CCNC: 415 U/L (ref 35–104)
ALT SERPL-CCNC: 13 U/L (ref 5–33)
ANION GAP SERPL CALCULATED.3IONS-SCNC: 7 MMOL/L (ref 9–17)
APPEARANCE FLD: ABNORMAL
AST SERPL-CCNC: 26 U/L
BASOPHILS # BLD: 0.1 K/UL (ref 0–0.2)
BASOPHILS NFR BLD: 3 % (ref 0–2)
BILIRUB SERPL-MCNC: 0.4 MG/DL (ref 0.3–1.2)
BLASTS NFR FLD: 0 %
BODY FLD TYPE: ABNORMAL
BUN SERPL-MCNC: 30 MG/DL (ref 8–23)
CALCIUM SERPL-MCNC: 8.6 MG/DL (ref 8.6–10.4)
CASE NUMBER:: NORMAL
CHLORIDE SERPL-SCNC: 103 MMOL/L (ref 98–107)
CO2 SERPL-SCNC: 26 MMOL/L (ref 20–31)
COLOR FLD: YELLOW
CREAT SERPL-MCNC: 0.8 MG/DL (ref 0.5–0.9)
EOSINOPHIL # BLD: 0.3 K/UL (ref 0–0.4)
EOSINOPHIL NFR FLD: 0 %
EOSINOPHILS RELATIVE PERCENT: 6 % (ref 0–4)
ERYTHROCYTE [DISTWIDTH] IN BLOOD BY AUTOMATED COUNT: 17.1 % (ref 11.5–14.9)
GFR, ESTIMATED: 80 ML/MIN/1.73M2
GLUCOSE BLD-MCNC: 183 MG/DL (ref 65–105)
GLUCOSE BLD-MCNC: 222 MG/DL (ref 65–105)
GLUCOSE BLD-MCNC: 280 MG/DL (ref 65–105)
GLUCOSE BLD-MCNC: 73 MG/DL (ref 65–105)
GLUCOSE SERPL-MCNC: 78 MG/DL (ref 70–99)
HCT VFR BLD AUTO: 29.7 % (ref 36–46)
HCT VFR BLD AUTO: 31.3 % (ref 36–46)
HGB BLD-MCNC: 10.3 G/DL (ref 12–16)
HGB BLD-MCNC: 9.9 G/DL (ref 12–16)
LDH FLD L TO P-CCNC: 50 U/L
LYMPHOCYTES NFR BLD: 0.8 K/UL (ref 1–4.8)
LYMPHOCYTES NFR FLD: 22 %
LYMPHOCYTES RELATIVE PERCENT: 19 % (ref 24–44)
MCH RBC QN AUTO: 29.6 PG (ref 26–34)
MCHC RBC AUTO-ENTMCNC: 33 G/DL (ref 31–37)
MCV RBC AUTO: 89.7 FL (ref 80–100)
MICROORGANISM SPEC CULT: ABNORMAL
MONOCYTES NFR BLD: 0.4 K/UL (ref 0.1–1.3)
MONOCYTES NFR BLD: 10 % (ref 1–7)
MONOCYTES NFR FLD: 58 %
NEUTROPHILS NFR BLD: 62 % (ref 36–66)
NEUTROPHILS NFR FLD: 20 %
NEUTS SEG NFR BLD: 2.7 K/UL (ref 1.3–9.1)
PLATELET # BLD AUTO: 221 K/UL (ref 150–450)
PMV BLD AUTO: 7.6 FL (ref 6–12)
POTASSIUM SERPL-SCNC: 4.7 MMOL/L (ref 3.7–5.3)
PROT FLD-MCNC: 1.3 G/DL
PROT SERPL-MCNC: 6.1 G/DL (ref 6.4–8.3)
RBC # BLD AUTO: 3.49 M/UL (ref 4–5.2)
RBC # FLD: 320 CELLS/UL
SODIUM SERPL-SCNC: 136 MMOL/L (ref 135–144)
SPECIMEN DESCRIPTION: ABNORMAL
SPECIMEN DESCRIPTION: NORMAL
SPECIMEN TYPE: NORMAL
WBC # FLD: 101 CELLS/UL
WBC OTHER # BLD: 4.3 K/UL (ref 3.5–11)

## 2024-05-15 PROCEDURE — 88305 TISSUE EXAM BY PATHOLOGIST: CPT

## 2024-05-15 PROCEDURE — 85025 COMPLETE CBC W/AUTO DIFF WBC: CPT

## 2024-05-15 PROCEDURE — C9113 INJ PANTOPRAZOLE SODIUM, VIA: HCPCS | Performed by: INTERNAL MEDICINE

## 2024-05-15 PROCEDURE — 82947 ASSAY GLUCOSE BLOOD QUANT: CPT

## 2024-05-15 PROCEDURE — 85018 HEMOGLOBIN: CPT

## 2024-05-15 PROCEDURE — 0W9G3ZZ DRAINAGE OF PERITONEAL CAVITY, PERCUTANEOUS APPROACH: ICD-10-PCS | Performed by: RADIOLOGY

## 2024-05-15 PROCEDURE — 87205 SMEAR GRAM STAIN: CPT

## 2024-05-15 PROCEDURE — 6360000002 HC RX W HCPCS: Performed by: NURSE PRACTITIONER

## 2024-05-15 PROCEDURE — 99232 SBSQ HOSP IP/OBS MODERATE 35: CPT | Performed by: INTERNAL MEDICINE

## 2024-05-15 PROCEDURE — 87070 CULTURE OTHR SPECIMN AEROBIC: CPT

## 2024-05-15 PROCEDURE — A4216 STERILE WATER/SALINE, 10 ML: HCPCS | Performed by: INTERNAL MEDICINE

## 2024-05-15 PROCEDURE — 87075 CULTR BACTERIA EXCEPT BLOOD: CPT

## 2024-05-15 PROCEDURE — 6370000000 HC RX 637 (ALT 250 FOR IP): Performed by: NURSE PRACTITIONER

## 2024-05-15 PROCEDURE — 2580000003 HC RX 258: Performed by: NURSE PRACTITIONER

## 2024-05-15 PROCEDURE — 88112 CYTOPATH CELL ENHANCE TECH: CPT

## 2024-05-15 PROCEDURE — 2709999900 IR US GUIDED PARACENTESIS

## 2024-05-15 PROCEDURE — 49083 ABD PARACENTESIS W/IMAGING: CPT

## 2024-05-15 PROCEDURE — 80053 COMPREHEN METABOLIC PANEL: CPT

## 2024-05-15 PROCEDURE — 83615 LACTATE (LD) (LDH) ENZYME: CPT

## 2024-05-15 PROCEDURE — 84157 ASSAY OF PROTEIN OTHER: CPT

## 2024-05-15 PROCEDURE — P9047 ALBUMIN (HUMAN), 25%, 50ML: HCPCS | Performed by: RADIOLOGY

## 2024-05-15 PROCEDURE — 36415 COLL VENOUS BLD VENIPUNCTURE: CPT

## 2024-05-15 PROCEDURE — 6360000002 HC RX W HCPCS: Performed by: RADIOLOGY

## 2024-05-15 PROCEDURE — 2580000003 HC RX 258: Performed by: INTERNAL MEDICINE

## 2024-05-15 PROCEDURE — APPSS30 APP SPLIT SHARED TIME 16-30 MINUTES: Performed by: NURSE PRACTITIONER

## 2024-05-15 PROCEDURE — 89051 BODY FLUID CELL COUNT: CPT

## 2024-05-15 PROCEDURE — 82042 OTHER SOURCE ALBUMIN QUAN EA: CPT

## 2024-05-15 PROCEDURE — 85014 HEMATOCRIT: CPT

## 2024-05-15 PROCEDURE — 2060000000 HC ICU INTERMEDIATE R&B

## 2024-05-15 PROCEDURE — 6360000002 HC RX W HCPCS: Performed by: INTERNAL MEDICINE

## 2024-05-15 RX ORDER — ALBUMIN (HUMAN) 12.5 G/50ML
SOLUTION INTRAVENOUS CONTINUOUS PRN
Status: COMPLETED | OUTPATIENT
Start: 2024-05-15 | End: 2024-05-15

## 2024-05-15 RX ADMIN — ALBUMIN (HUMAN) 25 G: 0.25 INJECTION, SOLUTION INTRAVENOUS at 10:02

## 2024-05-15 RX ADMIN — INSULIN LISPRO 4 UNITS: 100 INJECTION, SOLUTION INTRAVENOUS; SUBCUTANEOUS at 21:44

## 2024-05-15 RX ADMIN — CEFTRIAXONE SODIUM 1000 MG: 1 INJECTION, POWDER, FOR SOLUTION INTRAMUSCULAR; INTRAVENOUS at 04:20

## 2024-05-15 RX ADMIN — TAMSULOSIN HYDROCHLORIDE 0.4 MG: 0.4 CAPSULE ORAL at 10:33

## 2024-05-15 RX ADMIN — Medication 50 MG: at 10:34

## 2024-05-15 RX ADMIN — GABAPENTIN 100 MG: 100 CAPSULE ORAL at 20:48

## 2024-05-15 RX ADMIN — SODIUM CHLORIDE, PRESERVATIVE FREE 10 ML: 5 INJECTION INTRAVENOUS at 10:35

## 2024-05-15 RX ADMIN — GABAPENTIN 100 MG: 100 CAPSULE ORAL at 13:36

## 2024-05-15 RX ADMIN — AMPICILLIN 1000 MG: 1 INJECTION, POWDER, FOR SOLUTION INTRAMUSCULAR; INTRAVENOUS at 17:48

## 2024-05-15 RX ADMIN — SODIUM CHLORIDE 1 G: 1 TABLET ORAL at 17:48

## 2024-05-15 RX ADMIN — BUMETANIDE 1 MG: 1 TABLET ORAL at 10:33

## 2024-05-15 RX ADMIN — ALBUMIN (HUMAN) 25 G: 0.25 INJECTION, SOLUTION INTRAVENOUS at 09:45

## 2024-05-15 RX ADMIN — PANTOPRAZOLE SODIUM 40 MG: 40 INJECTION, POWDER, FOR SOLUTION INTRAVENOUS at 10:33

## 2024-05-15 RX ADMIN — METOPROLOL TARTRATE 25 MG: 25 TABLET, FILM COATED ORAL at 10:33

## 2024-05-15 RX ADMIN — AMPICILLIN 1000 MG: 1 INJECTION, POWDER, FOR SOLUTION INTRAMUSCULAR; INTRAVENOUS at 12:20

## 2024-05-15 RX ADMIN — RIFAXIMIN 550 MG: 550 TABLET ORAL at 20:51

## 2024-05-15 RX ADMIN — INSULIN LISPRO 2 UNITS: 100 INJECTION, SOLUTION INTRAVENOUS; SUBCUTANEOUS at 13:36

## 2024-05-15 RX ADMIN — SODIUM CHLORIDE 1 G: 1 TABLET ORAL at 10:33

## 2024-05-15 RX ADMIN — Medication 400 UNITS: at 10:34

## 2024-05-15 RX ADMIN — METOPROLOL TARTRATE 25 MG: 25 TABLET, FILM COATED ORAL at 20:48

## 2024-05-15 RX ADMIN — ANTI-FUNGAL POWDER MICONAZOLE NITRATE TALC FREE: 1.42 POWDER TOPICAL at 10:34

## 2024-05-15 RX ADMIN — PANTOPRAZOLE SODIUM 40 MG: 40 INJECTION, POWDER, FOR SOLUTION INTRAVENOUS at 20:49

## 2024-05-15 RX ADMIN — SODIUM CHLORIDE 1 G: 1 TABLET ORAL at 13:36

## 2024-05-15 RX ADMIN — ANTI-FUNGAL POWDER MICONAZOLE NITRATE TALC FREE: 1.42 POWDER TOPICAL at 20:49

## 2024-05-15 RX ADMIN — ATORVASTATIN CALCIUM 10 MG: 10 TABLET, FILM COATED ORAL at 10:33

## 2024-05-15 RX ADMIN — TRAMADOL HYDROCHLORIDE 50 MG: 50 TABLET, COATED ORAL at 21:48

## 2024-05-15 RX ADMIN — RIFAXIMIN 550 MG: 550 TABLET ORAL at 10:34

## 2024-05-15 RX ADMIN — GABAPENTIN 100 MG: 100 CAPSULE ORAL at 10:33

## 2024-05-15 RX ADMIN — Medication 4000 UNITS: at 10:32

## 2024-05-15 ASSESSMENT — PAIN DESCRIPTION - LOCATION: LOCATION: HIP

## 2024-05-15 ASSESSMENT — PAIN DESCRIPTION - DESCRIPTORS: DESCRIPTORS: ACHING

## 2024-05-15 ASSESSMENT — PAIN SCALES - GENERAL
PAINLEVEL_OUTOF10: 6
PAINLEVEL_OUTOF10: 0

## 2024-05-15 ASSESSMENT — PAIN DESCRIPTION - ORIENTATION: ORIENTATION: RIGHT

## 2024-05-15 ASSESSMENT — PAIN DESCRIPTION - ONSET: ONSET: ON-GOING

## 2024-05-15 ASSESSMENT — PAIN - FUNCTIONAL ASSESSMENT: PAIN_FUNCTIONAL_ASSESSMENT: PREVENTS OR INTERFERES SOME ACTIVE ACTIVITIES AND ADLS

## 2024-05-15 ASSESSMENT — PAIN DESCRIPTION - FREQUENCY: FREQUENCY: INTERMITTENT

## 2024-05-15 ASSESSMENT — PAIN DESCRIPTION - PAIN TYPE: TYPE: CHRONIC PAIN

## 2024-05-15 NOTE — PROGRESS NOTES
Physical Therapy Cancel Note      DATE: 5/15/2024    NAME: Bobbi Phillips  MRN: [id number]   : 1955      Patient not seen this date for Physical Therapy due to:    Surgery/Procedure: Attempt Co-tx with OT  @ 0924 , Pt OOR for IR.       Electronically signed by Jackie Zhao PTA on 5/15/2024 at 11:17 AM

## 2024-05-15 NOTE — OR NURSING
Patient brought to the IR suite via cart, after consent obtained, patient placed on the procedure table, monitoring equipment hooked up.  Patient then positioned/prepped/draped.

## 2024-05-15 NOTE — OR NURSING
Dear Gladis Arzola,     It was our pleasure to care for you here at [facility]  It is our hope that we were always able to exceed the expected standards for your care during your stay  You were hospitalized due to falls  You were cared for on the 4th floor by Dara Quinones MD under the service of Nubia Blancas MD with the [facility] who covers for your primary care physician (PCP), Candice Aragon MD, while you were hospitalized  If you have any questions or concerns related to this hospitalization, you may contact us at [phone]  For follow up as well as any medication refills, we recommend that you follow up with your primary care physician  A registered nurse will reach out to you by phone within a few days after your discharge to answer any additional questions that you may have after going home  However, at this time we provide for you here, the most important instructions / recommendations at discharge:       Notable Medication Adjustments -   Stop taking Apixaban (Eliquis) 5mg  Stop taking Amlodipine (Norvasc) 5mg     Start taking Acetaminophen(Tylenol) 975mg by mouth every 8 (eight) hours    Testing Required after Discharge -     Lab: PT/INR level in 3 days    Important follow up information -   Follow up with your primary care physician Candice Aragon MD in a week    Other Instructions -   See the attached document    Please review this entire after visit summary as additional general instructions including medication list, appointments, activity, diet, any pertinent wound care, and other additional recommendations from your care team that may be provided for you        Sincerely,     Dara Quinones MD Patient tolerated procedure well, no distress and VSS.

## 2024-05-15 NOTE — PROGRESS NOTES
GI Progress notes    5/15/2024   11:55 AM    Name:  Bobbi Phillips  MRN:    [id number]     Acct:     [id number]   Room:  2091/2091-01   Day: 2     Admit Date: 5/13/2024  4:46 PM  PCP: Stacie Doty MD    Subjective:     C/C:   Chief Complaint   Patient presents with    Shortness of Breath    Leg Swelling       Interval History: Status: improved.     Patient seen and examined  No acute events overnight  Patient had paracentesis this am w/ 4.7 L removed.  Hgb has remained stable  No signs of bleeding.    ROS:  Constitutional: negative for chills, fevers and sweats  Gastrointestinal: negative for abdominal pain, constipation, diarrhea, nausea and vomiting  Neurological: negative for dizziness and headaches    Medications:     Allergies:   Allergies   Allergen Reactions    Tylenol [Acetaminophen] Other (See Comments)     intolerance due to cirrhosis       Current Meds: ampicillin (OMNIPEN) 1,000 mg in sodium chloride 0.9 % 50 mL IVPB (mini-bag), 4 times per day  [Held by provider] insulin glargine (LANTUS) injection vial 30 Units, BID  pantoprazole (PROTONIX) 40 mg in sodium chloride (PF) 0.9 % 10 mL injection, Q12H  sodium chloride flush 0.9 % injection 5-40 mL, 2 times per day  sodium chloride flush 0.9 % injection 5-40 mL, PRN  0.9 % sodium chloride infusion, PRN  0.9 % sodium chloride infusion, PRN  glucose chewable tablet 16 g, PRN  dextrose bolus 10% 125 mL, PRN   Or  dextrose bolus 10% 250 mL, PRN  glucagon injection 1 mg, PRN  dextrose 10 % infusion, Continuous PRN  insulin lispro (HUMALOG,ADMELOG) injection vial 0-8 Units, TID WC  insulin lispro (HUMALOG,ADMELOG) injection vial 0-4 Units, Nightly  [Held by provider] aspirin EC tablet 81 mg, Daily  atorvastatin (LIPITOR) tablet 10 mg, Daily  bumetanide (BUMEX) tablet 1 mg, Daily  Vitamin D (CHOLECALCIFEROL) tablet 4,000 Units, Daily  cyclobenzaprine (FLEXERIL) tablet 5 mg, TID PRN  gabapentin (NEURONTIN) capsule 100 mg, TID  lactulose (CHRONULAC)  cooperative and no distress  Mental Status: oriented to person, place and time and normal affect  Lungs: clear to auscultation bilaterally, normal effort  Heart: regular rate and rhythm, no murmur,  Abdomen: soft, nontender, nondistended, bowel sounds present all four quadrants, no masses, hepatomegaly or splenomegaly  Extremities: no edema, redness or tenderness in the calves  Skin: no gross lesions, rashes, or induration    Assessment:        Primary Problem  Symptomatic anemia     Active Hospital Problems    Diagnosis Date Noted    Decompensation of cirrhosis of liver (HCC) [K72.90, K74.60] 05/14/2024    History of spontaneous bacterial peritonitis [Z86.19] 05/14/2024    Symptomatic anemia [D64.9] 05/13/2024     Past Medical History:   Diagnosis Date    Abdominal swelling     Claustrophobia     COVID-19 vaccine administered     Diabetes (HCC)     DEXCOM LT ARM    Diabetic retinopathy (HCC) 11/28/2015    Flatus     H/O acute sinusitis     H/O cholelithiasis     Hypertension     Liver cirrhosis (HCC)     LLQ abdominal tenderness     Poor venous access     \"THEY LIKE TO ROLL & RUN AWAY\"    Positive colorectal cancer screening using Cologuard test 07/06/2023    had colonoscopy after this and no cancer was found    Post-menopausal bleeding     RUQ abdominal pain     Tendonitis of shoulder, right 12/09/2021    Tingling     Under care of service provider 12/28/2023    [id number]-[id number]-xevgapnxxu in [place]-last visit dec 2023    Under care of service provider 12/28/2023    [id number]-fpmpxzyq-pgpxqe  in [place]-last visit dec 2023    Vaginal bleeding 08/2023    \"SPOTTING, THICKENED UTERINE LINING\"DUE TO HAVE VAGINAL /UTERINE BIOPSY FOR\"    Vaginal candidiasis     Wears glasses     READING        Plan:        Decompensated cirrhosis 2/2 BONNER c/b ascites, hx of HE  S/p paracentesis 5/15/24 w/ 4.7 L removed  Fluid analysis pending; based on cell count there is no SBP  Continue Rocephin  Will need outpatient prophylaxis with

## 2024-05-15 NOTE — PROGRESS NOTES
Patient tolerated procedure well without distress.  4.7 L of clear, yellow fluid removed. Area cleansed & dry dressing applied. Transport notified to take patient back to [facility].  EMILY Robertson updated.

## 2024-05-15 NOTE — PLAN OF CARE
Problem: Discharge Planning  Goal: Discharge to home or other facility with appropriate resources  5/15/2024 0447 by Renita Pires RN  Outcome: Progressing       Problem: Skin/Tissue Integrity  Goal: Absence of new skin breakdown  Description: 1.  Monitor for areas of redness and/or skin breakdown  2.  Assess vascular access sites hourly  3.  Every 4-6 hours minimum:  Change oxygen saturation probe site  4.  Every 4-6 hours:  If on nasal continuous positive airway pressure, respiratory therapy assess nares and determine need for appliance change or resting period.  5/15/2024 0447 by Renita Pires RN  Outcome: Progressing       Problem: Safety - Adult  Goal: Free from fall injury  5/15/2024 0447 by Renita Pires RN  Outcome: Progressing       Problem: ABCDS Injury Assessment  Goal: Absence of physical injury  5/15/2024 0447 by Renita Pires RN  Outcome: Progressing       Problem: Risk for Elopement  Goal: Patient will not exit the unit/facility without proper excort  5/15/2024 0447 by Renita Pires RN  Outcome: Progressing       Problem: Chronic Conditions and Co-morbidities  Goal: Patient's chronic conditions and co-morbidity symptoms are monitored and maintained or improved  5/15/2024 0447 by Renita Pires RN  Outcome: Progressing       Problem: Nutrition Deficit:  Goal: Optimize nutritional status  5/15/2024 0447 by Renita Pires RN  Outcome: Progressing       Problem: Pain  Goal: Verbalizes/displays adequate comfort level or baseline comfort level  Outcome: Progressing

## 2024-05-15 NOTE — PROGRESS NOTES
Occupational Therapy  [facility]  Occupational Therapy Not Seen    DATE: 5/15/2024    NAME: Bobbi Phillips  MRN: [id number]   : 1955    Patient not seen this date for Occupational Therapy due to:      [] Cancel by RN or physician due to:    [] Hemodialysis    [] Critical Lab Value Level     [] Blood transfusion in progress    [] Acute or unstable cardiovascular status   _MAP < 55 or more than >115  _HR < 40 or > 130    [] Acute or unstable pulmonary status   -FiO2 > 60%   _RR < 5 or >40    _O2 sats < 85%    [] Strict Bedrest    [x] Off Unit for surgery or procedure (In IR)    [] Off Unit for testing       [] Pending imaging to R/O fracture    [] Refusal by Patient      [] Other      [] OT being discontinued at this time. Patient independent. No further needs.     [] OT being discontinued at this time as the patient has been transferred to hospice care. No further needs.      MARCO ANTONIO TYLER LYNNE

## 2024-05-16 LAB
ALBUMIN SERPL-MCNC: 2.4 G/DL (ref 3.5–5.2)
ALP SERPL-CCNC: 375 U/L (ref 35–104)
ALT SERPL-CCNC: 11 U/L (ref 5–33)
ANION GAP SERPL CALCULATED.3IONS-SCNC: 8 MMOL/L (ref 9–17)
AST SERPL-CCNC: 22 U/L
BASOPHILS # BLD: 0 K/UL (ref 0–0.2)
BASOPHILS NFR BLD: 0 % (ref 0–2)
BILIRUB SERPL-MCNC: 0.6 MG/DL (ref 0.3–1.2)
BUN SERPL-MCNC: 32 MG/DL (ref 8–23)
CALCIUM SERPL-MCNC: 8.7 MG/DL (ref 8.6–10.4)
CHLORIDE SERPL-SCNC: 102 MMOL/L (ref 98–107)
CO2 SERPL-SCNC: 27 MMOL/L (ref 20–31)
CREAT SERPL-MCNC: 0.7 MG/DL (ref 0.5–0.9)
EOSINOPHIL # BLD: 0.2 K/UL (ref 0–0.4)
EOSINOPHILS RELATIVE PERCENT: 5 % (ref 0–4)
ERYTHROCYTE [DISTWIDTH] IN BLOOD BY AUTOMATED COUNT: 17.2 % (ref 11.5–14.9)
GFR, ESTIMATED: >90 ML/MIN/1.73M2
GLUCOSE BLD-MCNC: 147 MG/DL (ref 65–105)
GLUCOSE BLD-MCNC: 219 MG/DL (ref 65–105)
GLUCOSE BLD-MCNC: 243 MG/DL (ref 65–105)
GLUCOSE BLD-MCNC: 249 MG/DL (ref 65–105)
GLUCOSE SERPL-MCNC: 154 MG/DL (ref 70–99)
HCT VFR BLD AUTO: 32.4 % (ref 36–46)
HGB BLD-MCNC: 10.6 G/DL (ref 12–16)
LYMPHOCYTES NFR BLD: 0.7 K/UL (ref 1–4.8)
LYMPHOCYTES RELATIVE PERCENT: 18 % (ref 24–44)
MCH RBC QN AUTO: 29.4 PG (ref 26–34)
MCHC RBC AUTO-ENTMCNC: 32.7 G/DL (ref 31–37)
MCV RBC AUTO: 89.8 FL (ref 80–100)
MONOCYTES NFR BLD: 0.4 K/UL (ref 0.1–1.3)
MONOCYTES NFR BLD: 11 % (ref 1–7)
NEUTROPHILS NFR BLD: 66 % (ref 36–66)
NEUTS SEG NFR BLD: 2.6 K/UL (ref 1.3–9.1)
PLATELET # BLD AUTO: 220 K/UL (ref 150–450)
PMV BLD AUTO: 7.8 FL (ref 6–12)
POTASSIUM SERPL-SCNC: 4.4 MMOL/L (ref 3.7–5.3)
PROT SERPL-MCNC: 6.2 G/DL (ref 6.4–8.3)
RBC # BLD AUTO: 3.6 M/UL (ref 4–5.2)
SODIUM SERPL-SCNC: 137 MMOL/L (ref 135–144)
SURGICAL PATHOLOGY REPORT: NORMAL
WBC OTHER # BLD: 3.8 K/UL (ref 3.5–11)

## 2024-05-16 PROCEDURE — 97530 THERAPEUTIC ACTIVITIES: CPT

## 2024-05-16 PROCEDURE — 6370000000 HC RX 637 (ALT 250 FOR IP): Performed by: INTERNAL MEDICINE

## 2024-05-16 PROCEDURE — 99232 SBSQ HOSP IP/OBS MODERATE 35: CPT | Performed by: INTERNAL MEDICINE

## 2024-05-16 PROCEDURE — APPSS30 APP SPLIT SHARED TIME 16-30 MINUTES: Performed by: NURSE PRACTITIONER

## 2024-05-16 PROCEDURE — A4216 STERILE WATER/SALINE, 10 ML: HCPCS | Performed by: INTERNAL MEDICINE

## 2024-05-16 PROCEDURE — 2060000000 HC ICU INTERMEDIATE R&B

## 2024-05-16 PROCEDURE — C9113 INJ PANTOPRAZOLE SODIUM, VIA: HCPCS | Performed by: INTERNAL MEDICINE

## 2024-05-16 PROCEDURE — 80053 COMPREHEN METABOLIC PANEL: CPT

## 2024-05-16 PROCEDURE — 97535 SELF CARE MNGMENT TRAINING: CPT

## 2024-05-16 PROCEDURE — 6370000000 HC RX 637 (ALT 250 FOR IP): Performed by: NURSE PRACTITIONER

## 2024-05-16 PROCEDURE — 36415 COLL VENOUS BLD VENIPUNCTURE: CPT

## 2024-05-16 PROCEDURE — 6360000002 HC RX W HCPCS: Performed by: INTERNAL MEDICINE

## 2024-05-16 PROCEDURE — 82947 ASSAY GLUCOSE BLOOD QUANT: CPT

## 2024-05-16 PROCEDURE — 97110 THERAPEUTIC EXERCISES: CPT

## 2024-05-16 PROCEDURE — 85025 COMPLETE CBC W/AUTO DIFF WBC: CPT

## 2024-05-16 PROCEDURE — 2580000003 HC RX 258: Performed by: INTERNAL MEDICINE

## 2024-05-16 RX ORDER — INSULIN GLARGINE 100 [IU]/ML
5 INJECTION, SOLUTION SUBCUTANEOUS 2 TIMES DAILY
Status: DISCONTINUED | OUTPATIENT
Start: 2024-05-16 | End: 2024-05-17 | Stop reason: HOSPADM

## 2024-05-16 RX ORDER — SULFAMETHOXAZOLE AND TRIMETHOPRIM 800; 160 MG/1; MG/1
1 TABLET ORAL DAILY
Status: DISCONTINUED | OUTPATIENT
Start: 2024-05-16 | End: 2024-05-17 | Stop reason: HOSPADM

## 2024-05-16 RX ORDER — LINEZOLID 600 MG/1
600 TABLET, FILM COATED ORAL 2 TIMES DAILY
Qty: 10 TABLET | Refills: 0 | Status: SHIPPED | OUTPATIENT
Start: 2024-05-17 | End: 2024-05-22

## 2024-05-16 RX ADMIN — SODIUM CHLORIDE 1 G: 1 TABLET ORAL at 18:20

## 2024-05-16 RX ADMIN — BUMETANIDE 1 MG: 1 TABLET ORAL at 10:07

## 2024-05-16 RX ADMIN — SODIUM CHLORIDE 1 G: 1 TABLET ORAL at 12:17

## 2024-05-16 RX ADMIN — INSULIN LISPRO 2 UNITS: 100 INJECTION, SOLUTION INTRAVENOUS; SUBCUTANEOUS at 12:18

## 2024-05-16 RX ADMIN — PANTOPRAZOLE SODIUM 40 MG: 40 INJECTION, POWDER, FOR SOLUTION INTRAVENOUS at 18:23

## 2024-05-16 RX ADMIN — GABAPENTIN 100 MG: 100 CAPSULE ORAL at 20:49

## 2024-05-16 RX ADMIN — Medication 6 MG: at 20:57

## 2024-05-16 RX ADMIN — METOPROLOL TARTRATE 25 MG: 25 TABLET, FILM COATED ORAL at 20:49

## 2024-05-16 RX ADMIN — AMPICILLIN 1000 MG: 1 INJECTION, POWDER, FOR SOLUTION INTRAMUSCULAR; INTRAVENOUS at 05:22

## 2024-05-16 RX ADMIN — GABAPENTIN 100 MG: 100 CAPSULE ORAL at 10:06

## 2024-05-16 RX ADMIN — SODIUM CHLORIDE, PRESERVATIVE FREE 10 ML: 5 INJECTION INTRAVENOUS at 20:49

## 2024-05-16 RX ADMIN — SODIUM CHLORIDE 1 G: 1 TABLET ORAL at 10:07

## 2024-05-16 RX ADMIN — Medication 50 MG: at 10:09

## 2024-05-16 RX ADMIN — INSULIN GLARGINE 5 UNITS: 100 INJECTION, SOLUTION SUBCUTANEOUS at 20:57

## 2024-05-16 RX ADMIN — AMPICILLIN 1000 MG: 1 INJECTION, POWDER, FOR SOLUTION INTRAMUSCULAR; INTRAVENOUS at 00:32

## 2024-05-16 RX ADMIN — RIFAXIMIN 550 MG: 550 TABLET ORAL at 20:49

## 2024-05-16 RX ADMIN — TAMSULOSIN HYDROCHLORIDE 0.4 MG: 0.4 CAPSULE ORAL at 10:07

## 2024-05-16 RX ADMIN — PANTOPRAZOLE SODIUM 40 MG: 40 INJECTION, POWDER, FOR SOLUTION INTRAVENOUS at 10:08

## 2024-05-16 RX ADMIN — ATORVASTATIN CALCIUM 10 MG: 10 TABLET, FILM COATED ORAL at 10:07

## 2024-05-16 RX ADMIN — GABAPENTIN 100 MG: 100 CAPSULE ORAL at 15:41

## 2024-05-16 RX ADMIN — METOPROLOL TARTRATE 25 MG: 25 TABLET, FILM COATED ORAL at 10:07

## 2024-05-16 RX ADMIN — SULFAMETHOXAZOLE AND TRIMETHOPRIM 1 TABLET: 800; 160 TABLET ORAL at 21:26

## 2024-05-16 RX ADMIN — VANCOMYCIN HYDROCHLORIDE 1500 MG: 1.25 INJECTION, POWDER, LYOPHILIZED, FOR SOLUTION INTRAVENOUS at 12:26

## 2024-05-16 RX ADMIN — SODIUM CHLORIDE, PRESERVATIVE FREE 10 ML: 5 INJECTION INTRAVENOUS at 10:11

## 2024-05-16 RX ADMIN — ANTI-FUNGAL POWDER MICONAZOLE NITRATE TALC FREE: 1.42 POWDER TOPICAL at 10:11

## 2024-05-16 RX ADMIN — ANTI-FUNGAL POWDER MICONAZOLE NITRATE TALC FREE: 1.42 POWDER TOPICAL at 20:49

## 2024-05-16 RX ADMIN — INSULIN LISPRO 2 UNITS: 100 INJECTION, SOLUTION INTRAVENOUS; SUBCUTANEOUS at 18:21

## 2024-05-16 RX ADMIN — Medication 400 UNITS: at 10:08

## 2024-05-16 RX ADMIN — Medication 4000 UNITS: at 10:07

## 2024-05-16 RX ADMIN — RIFAXIMIN 550 MG: 550 TABLET ORAL at 10:13

## 2024-05-16 ASSESSMENT — PAIN DESCRIPTION - ORIENTATION: ORIENTATION: RIGHT

## 2024-05-16 ASSESSMENT — PAIN SCALES - GENERAL: PAINLEVEL_OUTOF10: 6

## 2024-05-16 ASSESSMENT — PAIN DESCRIPTION - LOCATION: LOCATION: HIP

## 2024-05-16 NOTE — CARE COORDINATION
Writer is following for potential discharge to [facility]. Writer placed message to [employer] to check authorization status.    Authorization is pending at this time.    Writer met with pt for update. No further questions at this time.  Electronically signed by HUANG Ayers on 5/16/2024 at 1:49 PM

## 2024-05-16 NOTE — H&P
[facility]   IN-PATIENT SERVICE   [facility]    HISTORY AND PHYSICAL EXAMINATION            Date:   5/14/2024  Patient name:  Bobbi Phillips  Date of admission:  5/13/2024  4:46 PM  MRN:   [id number]  Account:  [id number]  YOB: 1955  PCP:    Stacie Doty MD  Room:   2091/2091-01  Code Status:    Full Code    Chief Complaint:     Chief Complaint   Patient presents with    Shortness of Breath    Leg Swelling       History Obtained From:     patient    History of Present Illness:     Bobbi Phillips is a 68 y.o. Non- / non  female who presents with Shortness of Breath and Leg Swelling and is admitted to the hospital for the management of Symptomatic anemia. Medical history significant for BONNER with liver cirrhosis, HTN, HLD, CKD 3, DM type 2. Recent admission 4/3-4/23 KIM and UTI. Readmitted 4/27 with gross hematuria. Paracentesis 5/3 with 6.2L fluid removed. Presented today from nursing facility with complaints of shortness of breath, fatigue, leg swelling and abdominal distention.  Found to have severe anemia with Hgb 3.7. Hemoccult negative, Denies any current hematuria. On 5/3 hgb was 7.9. Platelets 321. CT abdomen reveals hepatic cirrhosis with large ascites. Mild right-sided pleural effusion with right basilar atelectasis.   significantly less than prior level 4/9 of 2100. EKG NSR no ST or T-wave changes. Cr 1.0, baseline with history of CKD3. Denies fever, chills, chest pain, cough, vomiting, diarrhea, and urinary symptoms.  There are no aggravating or alleviating factors. Symptoms are reported as acute, constant and severe.     5/30/2024  When seen the patient reports feeling much better after receiving blood yesterday.  She denies any bleeding since presentation to the hospital.  Reports shortness of breath with laying flat, reports significant distention of the abdomen.  Also reports welling of bilateral lower extremities    Past Medical 
drainage noted  Mouth: mucous membranes moist  Neck: supple, no carotid bruits, thyroid not palpable  Lungs: Bilateral equal air entry, clear to ausculation, no wheezing, rales or rhonchi, normal effort  Cardiovascular: normal rate, regular rhythm, no murmur, gallop, rub.  Abdomen: Distended, nontender, fluid thrill  Neurologic: There are no new focal motor or sensory deficits, normal muscle tone and bulk, no abnormal sensation, normal speech, cranial nerves II through XII grossly intact.  No asterixis,  Skin: No gross lesions, rashes, bruising or bleeding on exposed skin area  Extremities: 1+ edema bilateral lower extremities  Psych: normal affect     Investigations:      Laboratory Testing:  Recent Results (from the past 24 hour(s))   POC Glucose Fingerstick    Collection Time: 05/14/24  8:27 PM   Result Value Ref Range    POC Glucose 177 (H) 65 - 105 mg/dL   Hemoglobin and Hematocrit    Collection Time: 05/14/24 11:06 PM   Result Value Ref Range    Hemoglobin 9.8 (L) 12.0 - 16.0 g/dL    Hematocrit 29.9 (L) 36 - 46 %   Comprehensive Metabolic Panel w/ Reflex to MG    Collection Time: 05/15/24  5:37 AM   Result Value Ref Range    Sodium 136 135 - 144 mmol/L    Potassium 4.7 3.7 - 5.3 mmol/L    Chloride 103 98 - 107 mmol/L    CO2 26 20 - 31 mmol/L    Anion Gap 7 (L) 9 - 17 mmol/L    Glucose 78 70 - 99 mg/dL    BUN 30 (H) 8 - 23 mg/dL    Creatinine 0.8 0.5 - 0.9 mg/dL    Est, Glom Filt Rate 80 >60 mL/min/1.73m2    Calcium 8.6 8.6 - 10.4 mg/dL    Total Protein 6.1 (L) 6.4 - 8.3 g/dL    Albumin 2.1 (L) 3.5 - 5.2 g/dL    Total Bilirubin 0.4 0.3 - 1.2 mg/dL    Alkaline Phosphatase 415 (H) 35 - 104 U/L    ALT 13 5 - 33 U/L    AST 26 <32 U/L   CBC with Auto Differential    Collection Time: 05/15/24  5:37 AM   Result Value Ref Range    WBC 4.3 3.5 - 11.0 k/uL    RBC 3.49 (L) 4.0 - 5.2 m/uL    Hemoglobin 10.3 (L) 12.0 - 16.0 g/dL    Hematocrit 31.3 (L) 36 - 46 %    MCV 89.7 80 - 100 fL    MCH 29.6 26 - 34 pg    MCHC 33.0 31

## 2024-05-16 NOTE — PROGRESS NOTES
Physical Therapy  [facility]    Date: 24  Patient Name: Bobbi Phillips       Room:   MRN: [id number]   Account: [id number]   : 1955  (68 y.o.) Gender: female     Referring Practitioner: Cande Hollis APRN - NP  Diagnosis: Symptomatic anemia  Past Medical History:  has a past medical history of Abdominal swelling, Claustrophobia, COVID-19 vaccine administered, Diabetes (HCC), Diabetic retinopathy (HCC), Flatus, H/O acute sinusitis, H/O cholelithiasis, Hypertension, Liver cirrhosis (HCC), LLQ abdominal tenderness, Poor venous access, Positive colorectal cancer screening using Cologuard test, Post-menopausal bleeding, RUQ abdominal pain, Tendonitis of shoulder, right, Tingling, Under care of service provider, Under care of service provider, Vaginal bleeding, Vaginal candidiasis, and Wears glasses.   Past Surgical History:   has a past surgical history that includes Cholecystectomy, laparoscopic ();  section; Ankle fracture surgery (Left, ); Cataract removal with implant (Bilateral, ); Colonoscopy (N/A, 2023); Dilation and curettage of uterus (N/A, 10/02/2023); Cardiac catheterization (); hysteroscopy (2024); Dilation and curettage of uterus (N/A, 2024); Upper gastrointestinal endoscopy (N/A, 4/10/2024); and Anomalous venous return repair (N/A, 2024).       Overall Orientation Status: Within Functional Limits  Restrictions/Precautions  Restrictions/Precautions: Fall Risk;General Precautions  Required Braces or Orthoses?: No    Subjective: Pt lying in bed upon arrival, agreeable to therpy  Comments: RN approved therapy; co-treat with BALJIT Daniels       Pain Assessment: 0-10  Pain Level: 6  Pain Location: Hip  Pain Orientation: Right     Oxygen Therapy  O2 Device: None (Room air)    Bed Mobility  Supine to Sit: Maximal assistance  Sit to Supine: Maximal assistance;Moderate assistance (x2)  Scooting: Contact guard

## 2024-05-16 NOTE — PROGRESS NOTES
Comprehensive Nutrition Assessment    Type and Reason for Visit:  Reassess    Nutrition Recommendations/Plan:   Will continue to provide 2 g Na diet with Naveed twice daily  Will add Ensure High Protein once daily     Malnutrition Assessment:  Malnutrition Status:  At risk for malnutrition (Comment) (05/16/24 7579)    Context:  Acute Illness     Findings of the 6 clinical characteristics of malnutrition:  Energy Intake:  50% or less of estimated energy requirements for 5 or more days  Weight Loss:  No significant weight loss     Body Fat Loss:  No significant body fat loss     Muscle Mass Loss:  No significant muscle mass loss    Fluid Accumulation:  Moderate to Severe     Strength:  Not Performed    Nutrition Assessment:    Pt observed with lunch tray consuming all of soup provided. Nursing documents 5075 % intake. She states she is drinking the Naveed supplements. She is s/p (5/15) paracentesis with 4.7 L fluid removed.    Nutrition Related Findings:    Edema: Mild generalized/BUE, Severe BLE, Labs: Med Reviewed,  5/16 Wound Type: Multiple, Pressure Injury, Unstageable       Current Nutrition Intake & Therapies:    Average Meal Intake: %     ADULT DIET; Regular; Low Sodium (2 gm)  ADULT ORAL NUTRITION SUPPLEMENT; Breakfast, Dinner; Wound Healing Oral Supplement    Anthropometric Measures:  Height: 149.9 cm (4' 11\")  Ideal Body Weight (IBW): 95 lbs (43 kg)    Admission Body Weight: 104.3 kg (230 lb)  Current Body Weight: 113.9 kg (251 lb), 242.1 % IBW. Weight Source: Bed Scale  Current BMI (kg/m2): 50.7  Usual Body Weight: 89.8 kg (198 lb)  % Weight Change (Calculated): 16.2                    BMI Categories: Obese Class 3 (BMI 40.0 or greater)    Estimated Daily Nutrient Needs:  Energy Requirements Based On: Formula  Weight Used for Energy Requirements: Ideal  Energy (kcal/day): [place] x 1.2-1.3g/kg= 8954-6967 kcal  Weight Used for Protein Requirements: Ideal  Protein (g/day): 1.5g/kg= 65 g     Fluid

## 2024-05-16 NOTE — PROGRESS NOTES
aid in hygiene and faciltiated seated ADLs  Short Term Goal 2: tolerate sitting EOB for 10+ minutes with supervision during functional activity of choice  Short Term Goal 3: perform functional transfers/toilet transfers with Carmen x2 using least restrictive device  Short Term Goal 4: tolerate standing for 5+ minutes with min A during functional activity of choice  Short Term Goal 5: actively participate in 15+ minutes of therapeutic exercise/functional activity to promote safety and independence with self care and mobility  Short Term Goal 6: OT to further assess functional mobility as appropriate and notify OTR to update POC  Occupational Therapy Plan  Times Per Week: 5-7  Current Treatment Recommendations: Strengthening, Balance training, Functional mobility training, Endurance training, Equipment evaluation, education, & procurement, Patient/Caregiver education & training, Positioning, Self-Care / ADL, Safety education & training, Pain management    Assessment  Activity Tolerance  Activity Tolerance: Patient limited by fatigue  Assessment  Performance deficits / Impairments: Decreased ADL status, Decreased functional mobility , Decreased strength, Decreased safe awareness, Decreased endurance, Decreased balance, Decreased high-level IADLs  Treatment Diagnosis: Impaired self-care status  Prognosis: Good  Decision Making: Medium Complexity  Discharge Recommendations: Patient would benefit from continued therapy after discharge  OT Equipment Recommendations  Other: TBD  Safety Devices  Type of Devices: Left in bed, Patient at risk for falls, Call light within reach, Bed alarm in place, Nurse notified, Gait belt    AM-PAC Daily Activities Inpatient  AM-PAC Daily Activity - Inpatient   How much help is needed for putting on and taking off regular lower body clothing?: Total  How much help is needed for bathing (which includes washing, rinsing, drying)?: Total  How much help is needed for toileting (which includes using

## 2024-05-16 NOTE — PROGRESS NOTES
Vancomycin Dosing by Pharmacy - Initial Note   TODAY'S DATE:  5/16/2024  Patient name, age:  Bobbi Phillips, 68 y.o.    Indication:  UTI  Additional antimicrobials:  no    Allergies:  Tylenol [acetaminophen]   Actual Weight:    Wt Readings from Last 1 Encounters:   05/15/24 113.9 kg (251 lb 1.7 oz)     Labs/Ancillary Data  Estimated Creatinine Clearance: 90 mL/min (based on SCr of 0.7 mg/dL).  Recent Labs     05/13/24  1727 05/14/24  0538 05/15/24  0537 05/16/24  0625   CREATININE 1.0* 0.7 0.8 0.7   BUN 33* 30* 30* 32*   WBC 6.0  --  4.3 3.8     No results found for: \"PROCAL\"    Intake/Output Summary (Last 24 hours) at 5/16/2024 1042  Last data filed at 5/16/2024 0614  Gross per 24 hour   Intake 1470 ml   Output 1100 ml   Net 370 ml     Temp: 98.4    Recent vancomycin administrations        No vancomycin IV orders with administrations found.            Orders not given:            vancomycin (VANCOCIN) 1,500 mg in sodium chloride 0.9 % 250 mL IVPB ([id number])    vancomycin (VANCOCIN) intermittent dosing (placeholder)                  Culture Date / Source  /  Results  5/14              Urine            Greater 100,000 Ent Faecium  5/15              Ascitic Fluid          Pending  MRSA Nasal Swab: N/A. Non-respiratory infection..    PLAN     vancomycin 1500 mg IV every 24 hours.    Ensured BUN/sCr ordered at baseline and every 48 hours x at least 3 levels, then at least weekly.  Vancomycin level ordered for 5/17 @ 1200. Will use bayesian method for dosing.  This level will not be a trough.  Target AUC/LIAM: 400-600.      Vancomycin Target Concentration Parameters  Treatment  Population Target AUC/LIAM Target Trough   Invasive MRSA Infection (bacteremia, pneumonia, meningitis, endocarditis, osteomyelitis)  Sepsis (undifferentiated) 400-600 N/A   Infection due to non-MRSA pathogen  Empiric treatment of non-invasive MRSA infection  (SSTI, UTI) <500 10-15 mg/L   CrCl < 29 mL/min  Rapidly fluctuating serum creatinine   KIM  N/A < 15 mg/L     Renal replacement therapy is dosed by levels, per hospital protocol.  Abbreviations  * Pauc: probability that AUC is >400 (efficacy); Pconc: probability that Ctrough is above 20 ?g/mL (toxicity); Tox: Probability of nephrotoxicity, based on Shad et al. Clin Infect Dis 2009.    Regimen: 1500 mg IV every 24 hours.  Start time: 10:37 on 05/16/2024  Exposure target: AUC24 (range)400-600 mg/L.hr   UEV15-35: 415 mg/L.hr  AUC24,ss: 459 mg/L.hr  Probability of AUC24 > 400: 61 %  Ctrough,ss: 10.6 mg/L  Probability of Ctrough,ss > 20: 21 %  propriate part of Bayesian software results and enter here, if applicable.    Thank you for the consult.  Pharmacy will continue to follow.   Lonnie Allison RP,RP, MS   5/16/2024  10:44 AM

## 2024-05-16 NOTE — PLAN OF CARE
Problem: Discharge Planning  Goal: Discharge to home or other facility with appropriate resources  5/16/2024 1541 by Mary Goff, RN  Outcome: Progressing     Problem: Skin/Tissue Integrity  Goal: Absence of new skin breakdown  Description: 1.  Monitor for areas of redness and/or skin breakdown  2.  Assess vascular access sites hourly  3.  Every 4-6 hours minimum:  Change oxygen saturation probe site  4.  Every 4-6 hours:  If on nasal continuous positive airway pressure, respiratory therapy assess nares and determine need for appliance change or resting period.  5/16/2024 1541 by Mary Goff, RN  Outcome: Progressing     Problem: Safety - Adult  Goal: Free from fall injury  Outcome: Progressing     Problem: ABCDS Injury Assessment  Goal: Absence of physical injury  Outcome: Progressing     Problem: Risk for Elopement  Goal: Patient will not exit the unit/facility without proper excort  Outcome: Progressing     Problem: Chronic Conditions and Co-morbidities  Goal: Patient's chronic conditions and co-morbidity symptoms are monitored and maintained or improved  Outcome: Progressing     Problem: Nutrition Deficit:  Goal: Optimize nutritional status  Outcome: Progressing  Flowsheets (Taken 5/16/2024 1527 by Anju Alexis, RD, LD)  Nutrient intake appropriate for improving, restoring, or maintaining nutritional needs: Monitor oral intake, labs, and treatment plans     Problem: Pain  Goal: Verbalizes/displays adequate comfort level or baseline comfort level  Outcome: Progressing

## 2024-05-16 NOTE — PROGRESS NOTES
GI Progress notes    5/16/2024   10:53 AM    Name:  Bobbi Phillips  MRN:    [id number]     Acct:     [id number]   Room:  2091/2091-01   Day: 3     Admit Date: 5/13/2024  4:46 PM  PCP: Stacie Doty MD    Subjective:     C/C:   Chief Complaint   Patient presents with    Shortness of Breath    Leg Swelling       Interval History: Status: improved.     Patient seen and examined  No acute events overnight  Tolerating diet  Labs reviewed.    ROS:  Constitutional: negative for chills, fevers and sweats  Respiratory: negative for cough, dyspnea on exertion, hemoptysis, shortness of breath and wheezing  Cardiovascular: negative for chest pain, chest pressure/discomfort, dyspnea, lower extremity edema and palpitations  Gastrointestinal: negative for abdominal pain, constipation, diarrhea, nausea and vomiting  Neurological: negative for dizziness and headaches    Medications:     Allergies:   Allergies   Allergen Reactions    Tylenol [Acetaminophen] Other (See Comments)     intolerance due to cirrhosis       Current Meds: vancomycin (VANCOCIN) 1,500 mg in sodium chloride 0.9 % 250 mL IVPB ([id number]), Q24H  vancomycin (VANCOCIN) intermittent dosing (placeholder), RX Placeholder  [Held by provider] insulin glargine (LANTUS) injection vial 30 Units, BID  pantoprazole (PROTONIX) 40 mg in sodium chloride (PF) 0.9 % 10 mL injection, Q12H  sodium chloride flush 0.9 % injection 5-40 mL, 2 times per day  sodium chloride flush 0.9 % injection 5-40 mL, PRN  0.9 % sodium chloride infusion, PRN  0.9 % sodium chloride infusion, PRN  glucose chewable tablet 16 g, PRN  dextrose bolus 10% 125 mL, PRN   Or  dextrose bolus 10% 250 mL, PRN  glucagon injection 1 mg, PRN  dextrose 10 % infusion, Continuous PRN  insulin lispro (HUMALOG,ADMELOG) injection vial 0-8 Units, TID WC  insulin lispro (HUMALOG,ADMELOG) injection vial 0-4 Units, Nightly  [Held by provider] aspirin EC tablet 81 mg, Daily  atorvastatin (LIPITOR) tablet 10 mg,

## 2024-05-17 VITALS
WEIGHT: 237.88 LBS | SYSTOLIC BLOOD PRESSURE: 135 MMHG | BODY MASS INDEX: 47.96 KG/M2 | RESPIRATION RATE: 20 BRPM | HEIGHT: 59 IN | TEMPERATURE: 97.5 F | OXYGEN SATURATION: 98 % | HEART RATE: 79 BPM | DIASTOLIC BLOOD PRESSURE: 59 MMHG

## 2024-05-17 LAB
ABO/RH: NORMAL
ABSOLUTE BANDS: 0.13 K/UL (ref 0–1)
ANION GAP SERPL CALCULATED.3IONS-SCNC: 7 MMOL/L (ref 9–17)
ANTIBODY SCREEN: NEGATIVE
ARM BAND NUMBER: NORMAL
BANDS: 5 % (ref 0–10)
BASOPHILS # BLD: 0 K/UL (ref 0–0.2)
BASOPHILS NFR BLD: 0 % (ref 0–2)
BLOOD BANK BLOOD PRODUCT EXPIRATION DATE: NORMAL
BLOOD BANK BLOOD PRODUCT EXPIRATION DATE: NORMAL
BLOOD BANK DISPENSE STATUS: NORMAL
BLOOD BANK DISPENSE STATUS: NORMAL
BLOOD BANK ISBT PRODUCT BLOOD TYPE: 6200
BLOOD BANK ISBT PRODUCT BLOOD TYPE: 6200
BLOOD BANK PRODUCT CODE: NORMAL
BLOOD BANK PRODUCT CODE: NORMAL
BLOOD BANK SAMPLE EXPIRATION: NORMAL
BLOOD BANK UNIT TYPE AND RH: NORMAL
BLOOD BANK UNIT TYPE AND RH: NORMAL
BPU ID: NORMAL
BPU ID: NORMAL
BUN SERPL-MCNC: 34 MG/DL (ref 8–23)
CALCIUM SERPL-MCNC: 8.4 MG/DL (ref 8.6–10.4)
CHLORIDE SERPL-SCNC: 104 MMOL/L (ref 98–107)
CO2 SERPL-SCNC: 25 MMOL/L (ref 20–31)
COMPONENT: NORMAL
COMPONENT: NORMAL
CREAT SERPL-MCNC: 0.7 MG/DL (ref 0.5–0.9)
CROSSMATCH RESULT: NORMAL
CROSSMATCH RESULT: NORMAL
EOSINOPHIL # BLD: 0.13 K/UL (ref 0–0.4)
EOSINOPHILS RELATIVE PERCENT: 5 % (ref 0–4)
ERYTHROCYTE [DISTWIDTH] IN BLOOD BY AUTOMATED COUNT: 17.5 % (ref 11.5–14.9)
GFR, ESTIMATED: >90 ML/MIN/1.73M2
GLUCOSE BLD-MCNC: 252 MG/DL (ref 65–105)
GLUCOSE BLD-MCNC: 289 MG/DL (ref 65–105)
GLUCOSE SERPL-MCNC: 262 MG/DL (ref 70–99)
HCT VFR BLD AUTO: 32.6 % (ref 36–46)
HGB BLD-MCNC: 10.4 G/DL (ref 12–16)
LYMPHOCYTES NFR BLD: 0.52 K/UL (ref 1–4.8)
LYMPHOCYTES RELATIVE PERCENT: 20 % (ref 24–44)
MAGNESIUM SERPL-MCNC: 1.4 MG/DL (ref 1.6–2.6)
MCH RBC QN AUTO: 29.3 PG (ref 26–34)
MCHC RBC AUTO-ENTMCNC: 32 G/DL (ref 31–37)
MCV RBC AUTO: 91.5 FL (ref 80–100)
MONOCYTES NFR BLD: 0.13 K/UL (ref 0.1–1.3)
MONOCYTES NFR BLD: 5 % (ref 1–7)
MORPHOLOGY: ABNORMAL
NEUTROPHILS NFR BLD: 65 % (ref 36–66)
NEUTS SEG NFR BLD: 1.69 K/UL (ref 1.3–9.1)
PLATELET # BLD AUTO: 217 K/UL (ref 150–450)
PMV BLD AUTO: 7.5 FL (ref 6–12)
POTASSIUM SERPL-SCNC: 4.7 MMOL/L (ref 3.7–5.3)
RBC # BLD AUTO: 3.56 M/UL (ref 4–5.2)
SODIUM SERPL-SCNC: 136 MMOL/L (ref 135–144)
TRANSFUSION STATUS: NORMAL
TRANSFUSION STATUS: NORMAL
UNIT DIVISION: 0
UNIT DIVISION: 0
UNIT ISSUE DATE/TIME: NORMAL
UNIT ISSUE DATE/TIME: NORMAL
WBC OTHER # BLD: 2.6 K/UL (ref 3.5–11)

## 2024-05-17 PROCEDURE — 80048 BASIC METABOLIC PNL TOTAL CA: CPT

## 2024-05-17 PROCEDURE — 6370000000 HC RX 637 (ALT 250 FOR IP): Performed by: NURSE PRACTITIONER

## 2024-05-17 PROCEDURE — C9113 INJ PANTOPRAZOLE SODIUM, VIA: HCPCS | Performed by: INTERNAL MEDICINE

## 2024-05-17 PROCEDURE — 82947 ASSAY GLUCOSE BLOOD QUANT: CPT

## 2024-05-17 PROCEDURE — 6370000000 HC RX 637 (ALT 250 FOR IP): Performed by: INTERNAL MEDICINE

## 2024-05-17 PROCEDURE — 97110 THERAPEUTIC EXERCISES: CPT

## 2024-05-17 PROCEDURE — 36415 COLL VENOUS BLD VENIPUNCTURE: CPT

## 2024-05-17 PROCEDURE — 2580000003 HC RX 258: Performed by: INTERNAL MEDICINE

## 2024-05-17 PROCEDURE — 99239 HOSP IP/OBS DSCHRG MGMT >30: CPT | Performed by: INTERNAL MEDICINE

## 2024-05-17 PROCEDURE — 83735 ASSAY OF MAGNESIUM: CPT

## 2024-05-17 PROCEDURE — 97530 THERAPEUTIC ACTIVITIES: CPT

## 2024-05-17 PROCEDURE — A4216 STERILE WATER/SALINE, 10 ML: HCPCS | Performed by: INTERNAL MEDICINE

## 2024-05-17 PROCEDURE — 85025 COMPLETE CBC W/AUTO DIFF WBC: CPT

## 2024-05-17 PROCEDURE — 97535 SELF CARE MNGMENT TRAINING: CPT

## 2024-05-17 PROCEDURE — 6360000002 HC RX W HCPCS: Performed by: INTERNAL MEDICINE

## 2024-05-17 RX ORDER — LANOLIN ALCOHOL/MO/W.PET/CERES
400 CREAM (GRAM) TOPICAL ONCE
Status: COMPLETED | OUTPATIENT
Start: 2024-05-17 | End: 2024-05-17

## 2024-05-17 RX ORDER — MAGNESIUM SULFATE HEPTAHYDRATE 40 MG/ML
2000 INJECTION, SOLUTION INTRAVENOUS ONCE
Status: DISCONTINUED | OUTPATIENT
Start: 2024-05-17 | End: 2024-05-17 | Stop reason: HOSPADM

## 2024-05-17 RX ORDER — FOLIC ACID 0.8 MG
1 TABLET ORAL 2 TIMES DAILY
Qty: 60 CAPSULE | Refills: 0 | Status: SHIPPED | OUTPATIENT
Start: 2024-05-17

## 2024-05-17 RX ADMIN — Medication 400 MG: at 14:25

## 2024-05-17 RX ADMIN — SULFAMETHOXAZOLE AND TRIMETHOPRIM 1 TABLET: 800; 160 TABLET ORAL at 11:29

## 2024-05-17 RX ADMIN — GABAPENTIN 100 MG: 100 CAPSULE ORAL at 15:45

## 2024-05-17 RX ADMIN — TAMSULOSIN HYDROCHLORIDE 0.4 MG: 0.4 CAPSULE ORAL at 11:29

## 2024-05-17 RX ADMIN — ATORVASTATIN CALCIUM 10 MG: 10 TABLET, FILM COATED ORAL at 11:29

## 2024-05-17 RX ADMIN — GABAPENTIN 100 MG: 100 CAPSULE ORAL at 11:29

## 2024-05-17 RX ADMIN — METOPROLOL TARTRATE 25 MG: 25 TABLET, FILM COATED ORAL at 11:29

## 2024-05-17 RX ADMIN — PANTOPRAZOLE SODIUM 40 MG: 40 INJECTION, POWDER, FOR SOLUTION INTRAVENOUS at 11:29

## 2024-05-17 RX ADMIN — INSULIN GLARGINE 5 UNITS: 100 INJECTION, SOLUTION SUBCUTANEOUS at 11:27

## 2024-05-17 RX ADMIN — ANTI-FUNGAL POWDER MICONAZOLE NITRATE TALC FREE: 1.42 POWDER TOPICAL at 15:42

## 2024-05-17 RX ADMIN — BUMETANIDE 1 MG: 1 TABLET ORAL at 11:29

## 2024-05-17 RX ADMIN — Medication 50 MG: at 11:42

## 2024-05-17 RX ADMIN — Medication 4000 UNITS: at 11:29

## 2024-05-17 RX ADMIN — INSULIN LISPRO 4 UNITS: 100 INJECTION, SOLUTION INTRAVENOUS; SUBCUTANEOUS at 11:46

## 2024-05-17 RX ADMIN — RIFAXIMIN 550 MG: 550 TABLET ORAL at 11:42

## 2024-05-17 RX ADMIN — Medication 400 UNITS: at 11:42

## 2024-05-17 NOTE — CARE COORDINATION
Worker signature: Electronically signed by Taya Lopez RN on 5/16/24 at 8:34 AM EDT    PHYSICIAN SECTION    Prognosis: Good    Condition at Discharge: Stable    Rehab Potential (if transferring to Rehab): Good    Recommended Labs or Other Treatments After Discharge:     Physician Certification: I certify the above information and transfer of Bobbi Phillips  is necessary for the continuing treatment of the diagnosis listed and that she requires Skilled Nursing Facility for less 30 days.     Update Admission H&P: No change in H&P    PHYSICIAN SIGNATURE:  Electronically signed by Taye Rowe MD on 5/16/24 at 10:23 AM EDT    Medication List    START taking these medications    START taking these medications  linezolid 600 MG tablet  Commonly known as: Zyvox  Take 1 tablet by mouth 2 times daily for 5 days   Magnesium 500 MG Caps  Take 1 capsule by mouth in the morning and at bedtime     CONTINUE taking these medications    CONTINUE taking these medications  aspirin 81 MG EC tablet  Take 1 tablet by mouth daily   atorvastatin 10 MG tablet  Commonly known as: LIPITOR  take 1 tablet by mouth once daily   B-D UF III MINI PEN NEEDLES 31G X 5 MM Misc  Generic drug: Insulin Pen Needle  use 1 PEN NEEDLE to inject MEDICATION subcutaneously four times a day with HUMALOG   bumetanide 1 MG tablet  Commonly known as: BUMEX  Take 1 tablet by mouth daily   cyclobenzaprine 5 MG tablet  Commonly known as: FLEXERIL  Take 1 tablet by mouth 3 times daily as needed for Muscle spasms   Dexcom G7 Sensor Misc  Use to monitor sugars 6-8 times a day and monitor for high and low sugars.   FREESTYLE TEST STRIPS strip  Generic drug: blood glucose test strips  use 1 TEST STRIP to TEST BLOOD SUGAR three times a day   gabapentin 100 MG capsule  Commonly known as: NEURONTIN  Take 1 capsule by mouth 3 times daily for 10 days.   HumaLOG KwikPen 100 UNIT/ML Sopn  Generic drug: insulin lispro (1 Unit Dial)  inject PER SLIDING SCALE BEFORE MEALS  AND AT BEDTIME 4 TIMES DAILY, MAX 25 UNITS DAILY   lactulose 10 GM/15ML solution  Commonly known as: CHRONULAC  Take 45 mLs by mouth every 8 hours as needed (constpation/ needs to have 2-3 BMs daily)   Lancets Misc  Test 4 times daily for uncontrolled BS   melatonin 3 MG Tabs tablet  Take 2 tablets by mouth nightly as needed (insomnia)   metoprolol tartrate 25 MG tablet  Commonly known as: LOPRESSOR  Take 1 tablet by mouth 2 times daily   miconazole 2 % powder  Commonly known as: MICOTIN  Apply topically 2 times daily.   Ozempic (0.25 or 0.5 MG/DOSE) 2 MG/3ML Sopn  Generic drug: Semaglutide(0.25 or 0.5MG/DOS)  INJECT 0.5 MG SUBCUTANEOUSLY ONCE A WEEK   pantoprazole 40 MG tablet  Commonly known as: PROTONIX  Take 1 tablet by mouth 2 times daily (before meals)   polyethylene glycol 17 g packet  Commonly known as: GLYCOLAX  Take 1 packet by mouth daily as needed for Constipation   rifAXIMin 550 MG tablet  Commonly known as: XIFAXAN  Take 1 tablet by mouth 2 times daily   tamsulosin 0.4 MG capsule  Commonly known as: FLOMAX  Take 1 capsule by mouth daily   Toujeo Max SoloStar 300 UNIT/ML Sopn  Generic drug: Insulin Glargine (2 Unit Dial)  inject 40 units subcutaneously twice a day   traMADol 50 MG tablet  Commonly known as: ULTRAM  Take 1 tablet by mouth every 8 hours as needed for Pain. Max Daily Amount: 150 mg   Vitamin D3 50 MCG (2000 UT) Caps  Take 2 capsules by mouth daily   vitamin E 180 MG (400 UNIT) Caps capsule  Take 1 capsule by mouth daily   zinc sulfate 220 (50 Zn)  mg capsule - elemental zinc  Commonly known as: ZINCATE  Take 1 capsule by mouth daily     STOP taking these medications    STOP taking these medications  sodium chloride 1 g tablet   sulfamethoxazole-trimethoprim 800-160 MG per tablet  Commonly known as: BACTRIM DS;SEPTRA DS   Where to Get Your Medications      These medications were sent to [id number] - [address] - P [phone] -  [phone]  [address] [place] [address]

## 2024-05-17 NOTE — CARE COORDINATION
Transportation arranged for patient to go to [facility] at 1600 via [employer]. Facility informed. Bedside nurse informed.     Number for Report: [phone]  Electronically signed by HUANG Ayers on 5/17/2024 at 12:07 PM

## 2024-05-17 NOTE — PROGRESS NOTES
[facility]   INPATIENT OCCUPATIONAL THERAPY  PROGRESS NOTE  Date: 2024  Patient Name: Bobbi Phillips       Room:   MRN: [id number]    : 1955  (68 y.o.)  Gender: female   Referring Practitioner: Cande Hollis APRN - NP  Diagnosis: Symptomatic anemia      Discharge Recommendations:  Further Occupational Therapy is recommended upon facility discharge.    OT Equipment Recommendations  Other: TBD    Restrictions/Precautions  Restrictions/Precautions  Restrictions/Precautions: Fall Risk;General Precautions  Required Braces or Orthoses?: No    O2 Device: None (Room air)    Subjective  Subjective  Subjective: pt agreeable to therapy  Comments: co-tx with Jackie MCNAIR    Objective  Orientation  Overall Orientation Status: Within Functional Limits  Orientation Level: Oriented X4  Cognition  Overall Cognitive Status: WFL  Patient affect:: Normal    Activities of Daily Living  ADL  Feeding: Setup, Based on clinical judgement  Grooming: Setup  Grooming Skilled Clinical Factors: pt washed face and combed hair with setup  UE Bathing: Stand by assistance, Based on clinical judgement  UE Bathing Skilled Clinical Factors: seated EOB- CHG used for IV line in LUE  LE Bathing: Dependent/Total, Based on clinical judgement  LE Bathing Skilled Clinical Factors: seated EOB to cleanse BLE to ankles; Ax2 in stnace for devin/buttock hygiene  UE Dressing: Minimal assistance, Based on clinical judgement  LE Dressing: Dependent/Total  LE Dressing Skilled Clinical Factors: TA to don footies, pt reports she is aware of AE, pt used reacher to thread brief req max/mod A over feet with TA over hips  Toileting: Dependent/Total  Toileting Skilled Clinical Factors: TA for hygiene and brief mgmt post void  Additional Comments: ADL scores based on clinical reasoning unless otherwise noted. Pt is limited due to generalized weakness, significant edema/abdominal distention, pain, and low activity tolerance  affecting her ability to complete self care tasks  Skin Care: Soap and water        Balance  Balance  Sitting Balance: Stand by assistance  Standing Balance: Contact guard assistance (x1)  Standing Balance  Time: 1 min, 3-4 min  Activity: functional standing/transfers and self care tasks  Comment: BUE support on RW    Transfers/Mobility  Bed mobility  Rolling to Left: Contact guard assistance  Supine to Sit: Contact guard assistance  Scooting: Stand by assistance (hips to EOB)  Bed Mobility Comments: increased time req, using bed rail, cuing req  Transfers  Stand Step Transfers: Dependent/Total (CGA x1 with RW)  Sit to stand: Dependent/Total (mod A x2)  Stand to sit: Dependent/Total (mod A x2)  Transfer Comments: VCs req for hand placements           OT Exercises  A/AROM Exercises: while seated EOB pt participated in AROM ex's to increase strength and overall endurance, pt tolerated well with RB's, 15 reps per ex in all available planes    Patient Education  Patient Education  Education Given To: Patient  Education Provided: Plan of Care, Precautions, ADL Adaptive Strategies, Transfer Training, Equipment  Education Provided Comments: review of AE  Education Method: Verbal, Demonstration  Barriers to Learning: Other (Comment) (Pain; self-limiting behaviors)  Education Outcome: Continued education needed, Demonstrated understanding, Verbalized understanding    Goals  Short Term Goals  Time Frame for Short Term Goals: By discharge, pt will  Short Term Goal 1: perform bed mobility with mod A to aid in hygiene and faciltiated seated ADLs  Short Term Goal 2: tolerate sitting EOB for 10+ minutes with supervision during functional activity of choice  Short Term Goal 3: perform functional transfers/toilet transfers with Carmen x2 using least restrictive device  Short Term Goal 4: tolerate standing for 5+ minutes with min A during functional activity of choice  Short Term Goal 5: actively participate in 15+ minutes of

## 2024-05-17 NOTE — PROGRESS NOTES
Physical Therapy  [facility]    Date: 24  Patient Name: Bobbi Phillips       Room:   MRN: [id number]   Account: [id number]   : 1955  (68 y.o.) Gender: female     Referring Practitioner: Cande Hollis APRN - NP  Diagnosis: Symptomatic anemia  Past Medical History:  has a past medical history of Abdominal swelling, Claustrophobia, COVID-19 vaccine administered, Diabetes (HCC), Diabetic retinopathy (HCC), Flatus, H/O acute sinusitis, H/O cholelithiasis, Hypertension, Liver cirrhosis (HCC), LLQ abdominal tenderness, Poor venous access, Positive colorectal cancer screening using Cologuard test, Post-menopausal bleeding, RUQ abdominal pain, Tendonitis of shoulder, right, Tingling, Under care of service provider, Under care of service provider, Vaginal bleeding, Vaginal candidiasis, and Wears glasses.   Past Surgical History:   has a past surgical history that includes Cholecystectomy, laparoscopic ();  section; Ankle fracture surgery (Left, ); Cataract removal with implant (Bilateral, ); Colonoscopy (N/A, 2023); Dilation and curettage of uterus (N/A, 10/02/2023); Cardiac catheterization (); hysteroscopy (2024); Dilation and curettage of uterus (N/A, 2024); Upper gastrointestinal endoscopy (N/A, 4/10/2024); and Anomalous venous return repair (N/A, 2024).       Overall Orientation Status: Within Functional Limits  Restrictions/Precautions  Restrictions/Precautions: Fall Risk;General Precautions  Required Braces or Orthoses?: No    Subjective: Pt lying in bed upon arrival, agreeable to therpy  Comments: RN approved therapy; co-treat with BALJIT Thornton       Pain Assessment: None - Denies Pain     Oxygen Therapy  O2 Device: None (Room air)    Bed Mobility  Supine to Sit: Contact guard assistance  Sit to Supine: Unable to assess  Scooting: Contact guard assistance  Bed mobility  Scooting: Contact guard assistance    Transfers:  Sit

## 2024-05-17 NOTE — DISCHARGE SUMMARY
[facility] Internal Medicine    Brad Maciel MD; Maximiliano Haywood MD, Ray Hwang MD, Marlin Gutierrez MD, Taye Carrillo MD; Germania Osborne MD      [facility] Internal Medicine  IN-PATIENT SERVICE   [facility]    Discharge Summary     Patient ID: Bobbi Phillips  :  1955   MRN: [id number]     ACCOUNT:  [id number]   Patient's PCP: Stacie Doty MD  Admit Date: 2024   Discharge Date: 2024     Length of Stay: 4  Code Status:  Full Code  Admitting Physician: Maximiliano Haywood MD  Discharge Physician: Germania Osborne MD     Active Discharge Diagnoses:     Hospital Problem Lists:  Principal Problem:    Symptomatic anemia  Active Problems:    Decompensation of cirrhosis of liver (HCC)    History of spontaneous bacterial peritonitis  Resolved Problems:    * No resolved hospital problems. *      Admission Condition:  Serious      Discharged Condition: Stable     Hospital Stay:     Hospital Course: Bobbi Phillips is a 68 y.o. Non- / non  female who presents with Shortness of Breath and Leg Swelling and is admitted to the hospital for the management of Symptomatic anemia. Medical history significant for BONNER with liver cirrhosis, HTN, HLD, CKD 3, DM type 2. Recent admission 4/3- [facility] and UTI. Readmitted  with gross hematuria. Paracentesis 5/3 with 6.2L fluid removed. Presented today from nursing facility with complaints of shortness of breath, fatigue, leg swelling and abdominal distention.  Found to have severe anemia with Hgb 3.7. Hemoccult negative, Denies any current hematuria. On 5/3 hgb was 7.9. Platelets 321. CT abdomen reveals hepatic cirrhosis with large ascites. Mild right-sided pleural effusion with right basilar atelectasis.   significantly less than prior level  of 2100. EKG NSR no ST or T-wave changes. Cr 1.0, baseline with history of CKD3. Denies fever, chills, chest pain, cough, vomiting,  50 MCG (2000 UT) Caps  Take 2 capsules by mouth daily     vitamin E 180 MG (400 UNIT) Caps capsule     zinc sulfate 220 (50 Zn)  mg capsule - elemental zinc  Commonly known as: ZINCATE  Take 1 capsule by mouth daily            STOP taking these medications      sodium chloride 1 g tablet     sulfamethoxazole-trimethoprim 800-160 MG per tablet  Commonly known as: BACTRIM DS;SEPTRA DS               Where to Get Your Medications        These medications were sent to [id number] - [place], [place] - [address] [place] [address] -  [phone] - F [phone]  [address] [place] [id number]      Phone: [phone]   linezolid 600 MG tablet  Magnesium 500 MG Caps         No discharge procedures on file.    Time Spent on discharge is  34 mins in patient examination, evaluation, counseling as well as medication reconciliation, prescriptions for required medications, discharge plan and follow up.    Electronically signed by   Germania Osborne MD  5/17/2024  1:31 PM      Thank you Stacie Fraga MD for the opportunity to be involved in this patient's care.    Please note that this chart was generated using voice recognition Dragon dictation software.  Although every effort was made to ensure the accuracy of this automated transcription, some errors in transcription may have occurred.

## 2024-05-17 NOTE — CARE COORDINATION
IMM letter provided to patient.  Patient offered four hours to make informed decision regarding appeal process; patient agreeable to discharge.     Electronically signed by HUANG Ayers on 5/17/2024 at 2:22 PM

## 2024-05-17 NOTE — CARE COORDINATION
Writer is following for potential discharge to [facility].   Writer placed message to San[facility] with [facility]. Family is agreeable to privately pay for this pt until authorization is approved.    Sanaz is agreeable to pt admission after 1400. Writer will schedule transportation.  Electronically signed by HUANG Ayers on 5/17/2024 at 10:14 AM

## 2024-05-17 NOTE — PLAN OF CARE
Problem: Discharge Planning  Goal: Discharge to home or other facility with appropriate resources  5/17/2024 1517 by Mary Goff, RN  Outcome: Adequate for Discharge     Problem: Skin/Tissue Integrity  Goal: Absence of new skin breakdown  Description: 1.  Monitor for areas of redness and/or skin breakdown  2.  Assess vascular access sites hourly  3.  Every 4-6 hours minimum:  Change oxygen saturation probe site  4.  Every 4-6 hours:  If on nasal continuous positive airway pressure, respiratory therapy assess nares and determine need for appliance change or resting period.  5/17/2024 1517 by Mary Goff, RN  Outcome: Adequate for Discharge     Problem: Safety - Adult  Goal: Free from fall injury  Outcome: Adequate for Discharge     Problem: ABCDS Injury Assessment  Goal: Absence of physical injury  Outcome: Adequate for Discharge     Problem: Risk for Elopement  Goal: Patient will not exit the unit/facility without proper excort  Outcome: Adequate for Discharge     Problem: Chronic Conditions and Co-morbidities  Goal: Patient's chronic conditions and co-morbidity symptoms are monitored and maintained or improved  Outcome: Adequate for Discharge     Problem: Nutrition Deficit:  Goal: Optimize nutritional status  Outcome: Adequate for Discharge     Problem: Pain  Goal: Verbalizes/displays adequate comfort level or baseline comfort level  Outcome: Adequate for Discharge

## 2024-05-17 NOTE — PROGRESS NOTES
[facility]   IN-PATIENT SERVICE   [facility]    HISTORY AND PHYSICAL EXAMINATION            Date:   5/16/2024  Patient name:  Bobbi Phillips  Date of admission:  5/13/2024  4:46 PM  MRN:   [id number]  Account:  [id number]  YOB: 1955  PCP:    Stacie Doty MD  Room:   2091/2091-01  Code Status:    Full Code    Chief Complaint:     Chief Complaint   Patient presents with    Shortness of Breath    Leg Swelling       History Obtained From:     patient    History of Present Illness:     Bobbi Phillips is a 68 y.o. Non- / non  female who presents with Shortness of Breath and Leg Swelling and is admitted to the hospital for the management of Symptomatic anemia. Medical history significant for BONNER with liver cirrhosis, HTN, HLD, CKD 3, DM type 2. Recent admission 4/3-4/23 [facility] and UTI. Readmitted 4/27 with gross hematuria. Paracentesis 5/3 with 6.2L fluid removed. Presented today from nursing facility with complaints of shortness of breath, fatigue, leg swelling and abdominal distention.  Found to have severe anemia with Hgb 3.7. Hemoccult negative, Denies any current hematuria. On 5/3 hgb was 7.9. Platelets 321. CT abdomen reveals hepatic cirrhosis with large ascites. Mild right-sided pleural effusion with right basilar atelectasis.   significantly less than prior level 4/9 of 2100. EKG NSR no ST or T-wave changes. Cr 1.0, baseline with history of CKD3. Denies fever, chills, chest pain, cough, vomiting, diarrhea, and urinary symptoms.  There are no aggravating or alleviating factors. Symptoms are reported as acute, constant and severe.     5/15/2024  S/p removal of 4.7 L fluid today.  Not consistent with infection.  Reports that she feels much better after having fluid drained    5/16/2024  When seen today the patient is doing much better, reports breathing is much improved    Past Medical History:     Past Medical History:   Diagnosis Date     Bumex, discontinue salt tablets that the patient is on.  Consider discontinuation on discharge  Diabetes mellitus type 2, blood sugars now rising.  Will initiate Lantus at a lower dose    Discharge orders were placed, however patient probably will be transferred back to facility tomorrow.  Repeat labs in a.m.    Consultations:   IP CONSULT TO FAMILY MEDICINE  IP CONSULT TO GI  PHARMACY TO DOSE VANCOMYCIN     Patient is admitted as inpatient status because of co-morbidities listed above, severity of signs and symptoms as outlined, requirement for current medical therapies and most importantly because of direct risk to patient if care not provided in a hospital setting.    Taye Rowe MD  5/16/2024  8:38 PM    Copy sent to Dr. Doty, Stacie Lee MD    Please note that this chart was generated using voice recognition Dragon dictation software.  Although every effort was made to ensure the accuracy of this automated transcription, some errors in transcription may have occurred.

## 2024-05-19 LAB
MICROORGANISM SPEC CULT: NORMAL
MICROORGANISM/AGENT SPEC: NORMAL
SPECIMEN DESCRIPTION: NORMAL

## 2024-05-21 LAB
MICROORGANISM SPEC CULT: NORMAL
MICROORGANISM/AGENT SPEC: NORMAL
SPECIMEN DESCRIPTION: NORMAL

## 2024-06-07 ENCOUNTER — TELEPHONE (OUTPATIENT)
Dept: PRIMARY CARE CLINIC | Age: 69
End: 2024-06-07

## 2024-06-07 NOTE — TELEPHONE ENCOUNTER
Lara from [facility] want to know if you will follow up with patients home care.  Call back number is [phone] option 1

## 2024-06-10 ENCOUNTER — APPOINTMENT (OUTPATIENT)
Dept: CT IMAGING | Age: 69
DRG: 683 | End: 2024-06-10
Payer: MEDICARE

## 2024-06-10 ENCOUNTER — APPOINTMENT (OUTPATIENT)
Dept: GENERAL RADIOLOGY | Age: 69
DRG: 683 | End: 2024-06-10
Payer: MEDICARE

## 2024-06-10 ENCOUNTER — HOSPITAL ENCOUNTER (INPATIENT)
Age: 69
LOS: 8 days | Discharge: NURSING, REHAB OR LONG-TERM CARE | DRG: 683 | End: 2024-06-19
Attending: EMERGENCY MEDICINE | Admitting: INTERNAL MEDICINE
Payer: MEDICARE

## 2024-06-10 ENCOUNTER — CARE COORDINATION (OUTPATIENT)
Dept: CASE MANAGEMENT | Age: 69
End: 2024-06-10

## 2024-06-10 DIAGNOSIS — R11.2 NAUSEA AND VOMITING, UNSPECIFIED VOMITING TYPE: ICD-10-CM

## 2024-06-10 DIAGNOSIS — D64.9 ANEMIA, UNSPECIFIED TYPE: Primary | ICD-10-CM

## 2024-06-10 DIAGNOSIS — R53.83 OTHER FATIGUE: ICD-10-CM

## 2024-06-10 DIAGNOSIS — M54.31 SCIATICA OF RIGHT SIDE: Primary | ICD-10-CM

## 2024-06-10 DIAGNOSIS — E03.9 HYPOTHYROIDISM, UNSPECIFIED TYPE: ICD-10-CM

## 2024-06-10 LAB
ALBUMIN SERPL-MCNC: 2.7 G/DL (ref 3.5–5.2)
ALBUMIN/GLOB SERPL: 1 {RATIO} (ref 1–2.5)
ALP SERPL-CCNC: 700 U/L (ref 35–104)
ALT SERPL-CCNC: 17 U/L (ref 10–35)
AMMONIA PLAS-SCNC: 42 UMOL/L (ref 11–51)
ANION GAP SERPL CALCULATED.3IONS-SCNC: 11 MMOL/L (ref 9–16)
AST SERPL-CCNC: 63 U/L (ref 10–35)
BILIRUB SERPL-MCNC: 0.8 MG/DL (ref 0–1.2)
BUN SERPL-MCNC: 27 MG/DL (ref 8–23)
CALCIUM SERPL-MCNC: 8.6 MG/DL (ref 8.6–10.4)
CHLORIDE SERPL-SCNC: 98 MMOL/L (ref 98–107)
CO2 SERPL-SCNC: 23 MMOL/L (ref 20–31)
CREAT SERPL-MCNC: 1.1 MG/DL (ref 0.5–0.9)
ERYTHROCYTE [DISTWIDTH] IN BLOOD BY AUTOMATED COUNT: 16.7 % (ref 11.8–14.4)
GFR, ESTIMATED: 56 ML/MIN/1.73M2
GLUCOSE BLD-MCNC: 184 MG/DL (ref 65–105)
GLUCOSE SERPL-MCNC: 203 MG/DL (ref 74–99)
HCT VFR BLD AUTO: 28.6 % (ref 36.3–47.1)
HGB BLD-MCNC: 8.9 G/DL (ref 11.9–15.1)
LACTIC ACID, WHOLE BLOOD: 2 MMOL/L (ref 0.7–2.1)
LIPASE SERPL-CCNC: 21 U/L (ref 13–60)
MCH RBC QN AUTO: 28.3 PG (ref 25.2–33.5)
MCHC RBC AUTO-ENTMCNC: 31.1 G/DL (ref 28.4–34.8)
MCV RBC AUTO: 90.8 FL (ref 82.6–102.9)
NRBC BLD-RTO: 0 PER 100 WBC
PLATELET # BLD AUTO: 328 K/UL (ref 138–453)
PMV BLD AUTO: 10.8 FL (ref 8.1–13.5)
POTASSIUM SERPL-SCNC: 4.2 MMOL/L (ref 3.7–5.3)
PROT SERPL-MCNC: 7.4 G/DL (ref 6.6–8.7)
RBC # BLD AUTO: 3.15 M/UL (ref 3.95–5.11)
SODIUM SERPL-SCNC: 132 MMOL/L (ref 136–145)
TROPONIN I SERPL HS-MCNC: 27 NG/L (ref 0–14)
WBC OTHER # BLD: 8.8 K/UL (ref 3.5–11.3)

## 2024-06-10 PROCEDURE — 96372 THER/PROPH/DIAG INJ SC/IM: CPT

## 2024-06-10 PROCEDURE — 96375 TX/PRO/DX INJ NEW DRUG ADDON: CPT

## 2024-06-10 PROCEDURE — 83605 ASSAY OF LACTIC ACID: CPT

## 2024-06-10 PROCEDURE — 83880 ASSAY OF NATRIURETIC PEPTIDE: CPT

## 2024-06-10 PROCEDURE — 6360000002 HC RX W HCPCS: Performed by: STUDENT IN AN ORGANIZED HEALTH CARE EDUCATION/TRAINING PROGRAM

## 2024-06-10 PROCEDURE — 6370000000 HC RX 637 (ALT 250 FOR IP): Performed by: STUDENT IN AN ORGANIZED HEALTH CARE EDUCATION/TRAINING PROGRAM

## 2024-06-10 PROCEDURE — A4216 STERILE WATER/SALINE, 10 ML: HCPCS | Performed by: STUDENT IN AN ORGANIZED HEALTH CARE EDUCATION/TRAINING PROGRAM

## 2024-06-10 PROCEDURE — 83690 ASSAY OF LIPASE: CPT

## 2024-06-10 PROCEDURE — G0378 HOSPITAL OBSERVATION PER HR: HCPCS

## 2024-06-10 PROCEDURE — C9113 INJ PANTOPRAZOLE SODIUM, VIA: HCPCS | Performed by: STUDENT IN AN ORGANIZED HEALTH CARE EDUCATION/TRAINING PROGRAM

## 2024-06-10 PROCEDURE — 96376 TX/PRO/DX INJ SAME DRUG ADON: CPT

## 2024-06-10 PROCEDURE — 84484 ASSAY OF TROPONIN QUANT: CPT

## 2024-06-10 PROCEDURE — 87040 BLOOD CULTURE FOR BACTERIA: CPT

## 2024-06-10 PROCEDURE — 71045 X-RAY EXAM CHEST 1 VIEW: CPT

## 2024-06-10 PROCEDURE — 96374 THER/PROPH/DIAG INJ IV PUSH: CPT

## 2024-06-10 PROCEDURE — 85027 COMPLETE CBC AUTOMATED: CPT

## 2024-06-10 PROCEDURE — 2580000003 HC RX 258: Performed by: STUDENT IN AN ORGANIZED HEALTH CARE EDUCATION/TRAINING PROGRAM

## 2024-06-10 PROCEDURE — 99285 EMERGENCY DEPT VISIT HI MDM: CPT

## 2024-06-10 PROCEDURE — 82140 ASSAY OF AMMONIA: CPT

## 2024-06-10 PROCEDURE — 96361 HYDRATE IV INFUSION ADD-ON: CPT

## 2024-06-10 PROCEDURE — 82947 ASSAY GLUCOSE BLOOD QUANT: CPT

## 2024-06-10 PROCEDURE — 93005 ELECTROCARDIOGRAM TRACING: CPT | Performed by: STUDENT IN AN ORGANIZED HEALTH CARE EDUCATION/TRAINING PROGRAM

## 2024-06-10 PROCEDURE — 80053 COMPREHEN METABOLIC PANEL: CPT

## 2024-06-10 PROCEDURE — 1111F DSCHRG MED/CURRENT MED MERGE: CPT | Performed by: FAMILY MEDICINE

## 2024-06-10 RX ORDER — SODIUM CHLORIDE 0.9 % (FLUSH) 0.9 %
5-40 SYRINGE (ML) INJECTION EVERY 12 HOURS SCHEDULED
Status: DISCONTINUED | OUTPATIENT
Start: 2024-06-10 | End: 2024-06-19 | Stop reason: HOSPADM

## 2024-06-10 RX ORDER — SODIUM CHLORIDE 9 MG/ML
INJECTION, SOLUTION INTRAVENOUS PRN
Status: DISCONTINUED | OUTPATIENT
Start: 2024-06-10 | End: 2024-06-19 | Stop reason: HOSPADM

## 2024-06-10 RX ORDER — GLUCAGON 1 MG/ML
1 KIT INJECTION PRN
Status: DISCONTINUED | OUTPATIENT
Start: 2024-06-10 | End: 2024-06-19 | Stop reason: HOSPADM

## 2024-06-10 RX ORDER — ACETAMINOPHEN 325 MG/1
650 TABLET ORAL EVERY 4 HOURS PRN
Status: DISCONTINUED | OUTPATIENT
Start: 2024-06-10 | End: 2024-06-19 | Stop reason: HOSPADM

## 2024-06-10 RX ORDER — ONDANSETRON 2 MG/ML
4 INJECTION INTRAMUSCULAR; INTRAVENOUS ONCE
Status: COMPLETED | OUTPATIENT
Start: 2024-06-10 | End: 2024-06-10

## 2024-06-10 RX ORDER — DEXTROSE MONOHYDRATE 100 MG/ML
INJECTION, SOLUTION INTRAVENOUS CONTINUOUS PRN
Status: DISCONTINUED | OUTPATIENT
Start: 2024-06-10 | End: 2024-06-19 | Stop reason: HOSPADM

## 2024-06-10 RX ORDER — INSULIN LISPRO 100 [IU]/ML
0-4 INJECTION, SOLUTION INTRAVENOUS; SUBCUTANEOUS NIGHTLY
Status: DISCONTINUED | OUTPATIENT
Start: 2024-06-10 | End: 2024-06-19 | Stop reason: HOSPADM

## 2024-06-10 RX ORDER — SODIUM CHLORIDE, SODIUM LACTATE, POTASSIUM CHLORIDE, AND CALCIUM CHLORIDE .6; .31; .03; .02 G/100ML; G/100ML; G/100ML; G/100ML
1000 INJECTION, SOLUTION INTRAVENOUS ONCE
Status: COMPLETED | OUTPATIENT
Start: 2024-06-10 | End: 2024-06-10

## 2024-06-10 RX ORDER — ONDANSETRON 4 MG/1
4 TABLET, ORALLY DISINTEGRATING ORAL EVERY 8 HOURS PRN
Status: DISCONTINUED | OUTPATIENT
Start: 2024-06-10 | End: 2024-06-19 | Stop reason: HOSPADM

## 2024-06-10 RX ORDER — SODIUM CHLORIDE 0.9 % (FLUSH) 0.9 %
5-40 SYRINGE (ML) INJECTION PRN
Status: DISCONTINUED | OUTPATIENT
Start: 2024-06-10 | End: 2024-06-19 | Stop reason: HOSPADM

## 2024-06-10 RX ORDER — ONDANSETRON 4 MG/1
4 TABLET, FILM COATED ORAL EVERY 8 HOURS PRN
Qty: 20 TABLET | Refills: 0 | Status: SHIPPED | OUTPATIENT
Start: 2024-06-10

## 2024-06-10 RX ORDER — ONDANSETRON 2 MG/ML
4 INJECTION INTRAMUSCULAR; INTRAVENOUS EVERY 6 HOURS PRN
Status: DISCONTINUED | OUTPATIENT
Start: 2024-06-10 | End: 2024-06-19 | Stop reason: HOSPADM

## 2024-06-10 RX ORDER — INSULIN LISPRO 100 [IU]/ML
0-16 INJECTION, SOLUTION INTRAVENOUS; SUBCUTANEOUS
Status: DISCONTINUED | OUTPATIENT
Start: 2024-06-10 | End: 2024-06-19 | Stop reason: HOSPADM

## 2024-06-10 RX ORDER — PANTOPRAZOLE SODIUM 40 MG/1
40 TABLET, DELAYED RELEASE ORAL
Status: DISCONTINUED | OUTPATIENT
Start: 2024-06-11 | End: 2024-06-19 | Stop reason: HOSPADM

## 2024-06-10 RX ORDER — ENOXAPARIN SODIUM 100 MG/ML
40 INJECTION SUBCUTANEOUS DAILY
Status: DISCONTINUED | OUTPATIENT
Start: 2024-06-10 | End: 2024-06-13

## 2024-06-10 RX ADMIN — ONDANSETRON 4 MG: 2 INJECTION INTRAMUSCULAR; INTRAVENOUS at 16:44

## 2024-06-10 RX ADMIN — SODIUM CHLORIDE, POTASSIUM CHLORIDE, SODIUM LACTATE AND CALCIUM CHLORIDE 1000 ML: 600; 310; 30; 20 INJECTION, SOLUTION INTRAVENOUS at 16:43

## 2024-06-10 RX ADMIN — SODIUM CHLORIDE, PRESERVATIVE FREE 10 ML: 5 INJECTION INTRAVENOUS at 22:13

## 2024-06-10 RX ADMIN — ONDANSETRON 4 MG: 2 INJECTION INTRAMUSCULAR; INTRAVENOUS at 14:49

## 2024-06-10 RX ADMIN — ENOXAPARIN SODIUM 40 MG: 100 INJECTION SUBCUTANEOUS at 22:12

## 2024-06-10 RX ADMIN — RIFAXIMIN 550 MG: 550 TABLET ORAL at 22:12

## 2024-06-10 RX ADMIN — PANTOPRAZOLE SODIUM 40 MG: 40 INJECTION, POWDER, FOR SOLUTION INTRAVENOUS at 14:49

## 2024-06-10 ASSESSMENT — PAIN SCALES - GENERAL: PAINLEVEL_OUTOF10: 4

## 2024-06-10 ASSESSMENT — PAIN - FUNCTIONAL ASSESSMENT: PAIN_FUNCTIONAL_ASSESSMENT: PREVENTS OR INTERFERES SOME ACTIVE ACTIVITIES AND ADLS

## 2024-06-10 ASSESSMENT — PAIN DESCRIPTION - DESCRIPTORS: DESCRIPTORS: ACHING

## 2024-06-10 ASSESSMENT — PAIN DESCRIPTION - ONSET: ONSET: ON-GOING

## 2024-06-10 ASSESSMENT — PAIN DESCRIPTION - LOCATION: LOCATION: HIP;BACK

## 2024-06-10 ASSESSMENT — PAIN DESCRIPTION - FREQUENCY: FREQUENCY: CONTINUOUS

## 2024-06-10 ASSESSMENT — PAIN DESCRIPTION - ORIENTATION: ORIENTATION: RIGHT;LOWER

## 2024-06-10 ASSESSMENT — PAIN DESCRIPTION - PAIN TYPE: TYPE: CHRONIC PAIN

## 2024-06-10 NOTE — DISCHARGE INSTRUCTIONS
If you begin to experience any symptoms such as chest pain shortness of breath nausea vomiting dizziness drowsiness abdominal pain loss of consciousness or any other symptoms you find concerning please come to the ED for follow-up evaluation.    If you have been given medication please take them as prescribed. Do not take more medication than recommended at any given time.     Please follow-up with your primary care provider within 5 to 7 days for continued care.     Please feel free return to the hospital if your symptoms worsen or any new concerning symptoms develop.  Follow-up with your primary care physician as needed for all other the concerns.      You have been started on thyroid medication, please follow-up with your PCP regarding follow-up for retesting your thyroid levels.

## 2024-06-10 NOTE — ED NOTES
Chart notes reviewed. Patient was just discharged yesterday from [facility].   Informed by RN that patient will need transport home via stretcher.   Met with patient at bedside. She reported that her sons were here but they must have left. She stated she was discharged yesterday from [facility] and was not ready to go home. EMS had to be called when patient got home because she could not get up the stairs.   Patient resides at home with her son Andrea. She is supposed to have home care starting.   Informed patient SW can arrange a stretcher but she will likely be charged. She asked SW to call her son Phillip.   Contacted Phillip via phone and he reported that he was in the er lobby. Informed him patient is ready for discharge and he said no way is she going home like this and he will come back to speak with the physicians.   Informed  of situation.

## 2024-06-10 NOTE — ED NOTES
Pt to ED by EMS for generalized weakness and fatigue as well as generalized pain. Pt was discharged yesterday from a rehab facility and states she has not been able to move or get up since getting home. Family at bedside state she had an extended hospital stay at [facility] earlier this year for UTI and sepsis. Per family the patient has been too weak to walk since then. Hx of diabetes, hypertension, CAD. Hx of liver cirrhosis, paracentesis in April and May this year. Pt has pressure sores on bilateral posterior lower legs, pt's family state they are concerned for infection. Pt is also c/o nausea and vomiting starting today. On arrival patient is 87% on room air, placed on 3L nc, increased to 93%. Placed on purewick. Patient alert and oriented x4, talking in complete sentences. Respirations even and unlabored. Patient placed on continuous cardiac monitoring, BP cuff, and pulse ox. EKG obtained, blood work obtained. Call light in reach, all needs met at this time

## 2024-06-10 NOTE — ED NOTES
Pt's family at bedside are angry and yelling that the patient cannot be discharged. Pt's two sons at bedside states she is not able to walk, use the bathroom, or able to get into her house. Pt son states, \"If you send her home she will die at home!\" Dr. Perla at bedside to discuss plan of care with patient and family

## 2024-06-10 NOTE — ED NOTES
meals)    RIFAXIMIN (XIFAXAN) 550 MG TABLET    Take 1 tablet by mouth 2 times daily    TAMSULOSIN (FLOMAX) 0.4 MG CAPSULE    Take 1 capsule by mouth daily    TOUJEO MAX SOLOSTAR 300 UNIT/ML SOPN    inject 40 units subcutaneously twice a day    TRAMADOL (ULTRAM) 50 MG TABLET    Take 1 tablet by mouth every 8 hours as needed for Pain.    VITAMIN E 180 MG (400 UNIT) CAPS CAPSULE    Take 1 capsule by mouth daily    ZINC SULFATE (ZINCATE) 220 (50 ZN)  MG CAPSULE - ELEMENTAL ZINC    Take 1 capsule by mouth daily     Orders Placed This Encounter   Medications    ondansetron (ZOFRAN) injection 4 mg    pantoprazole (PROTONIX) 40 mg in sodium chloride (PF) 0.9 % 10 mL injection    lactated ringers bolus 1,000 mL    iopamidol (ISOVUE-370) 76 % injection 75 mL    ondansetron (ZOFRAN) injection 4 mg    ondansetron (ZOFRAN) 4 MG tablet     Sig: Take 1 tablet by mouth every 8 hours as needed for Nausea     Dispense:  20 tablet     Refill:  0       SURGICAL HISTORY       Past Surgical History:   Procedure Laterality Date    ANKLE FRACTURE SURGERY Left 2006    no retained metal    ANOMALOUS VENOUS RETURN REPAIR N/A 2024    CYSTOSCOPY, TRANSURETHRAL RESECTION BLADDER,  EVACUATION OF CLOTS with Catheter Insertion for Continuous Irrigation performed by Jason Lambert MD at [facility] OR    CARDIAC CATHETERIZATION  2006    CATARACT REMOVAL WITH IMPLANT Bilateral 2018     SECTION      x2, ,  \"SADDLE BLOCK\"    CHOLECYSTECTOMY, LAPAROSCOPIC      COLONOSCOPY N/A 2023    COLONOSCOPY WITH BIOPSY performed by Carlos Campos MD at [facility] OR    DILATION AND CURETTAGE OF UTERUS N/A 10/02/2023    ENDOMETRIAL BIOPSY AND EXAM UNDER ANESTHESIA  Failed D&C Hysteroscopy performed by Saud Hardy DO at [facility] OR    DILATION AND CURETTAGE OF UTERUS N/A 2024    EUA, DIAGNOSTIC HYSTEROSCOPY performed by Mena Horn MD at [facility] OR    HYSTEROSCOPY  2024    EUA, DIAGNOSTIC HYSTEROSCOPY    UPPER  GASTROINTESTINAL ENDOSCOPY N/A 4/10/2024    ESOPHAGOGASTRODUODENOSCOPY BIOPSY performed by Enedelia Crow MD at [facility]       PAST MEDICAL HISTORY       Past Medical History:   Diagnosis Date    Abdominal swelling     Claustrophobia     COVID-19 vaccine administered     Diabetes (HCC)     DEXCOM LT ARM    Diabetic retinopathy (HCC) 11/28/2015    Flatus     H/O acute sinusitis     H/O cholelithiasis     Hypertension     Liver cirrhosis (HCC)     LLQ abdominal tenderness     Poor venous access     \"THEY LIKE TO ROLL & RUN AWAY\"    Positive colorectal cancer screening using Cologuard test 07/06/2023    had colonoscopy after this and no cancer was found    Post-menopausal bleeding     RUQ abdominal pain     Tendonitis of shoulder, right 12/09/2021    Tingling     Under care of service provider 12/28/2023    [id number] in [place]-last visit dec 2023    Under care of service provider 12/28/2023    [id number] in [place]-last visit dec 2023    Vaginal bleeding 08/2023    \"SPOTTING, THICKENED UTERINE LINING\"DUE TO HAVE VAGINAL /UTERINE BIOPSY FOR\"    Vaginal candidiasis     Wears glasses     READING       Labs:  Labs Reviewed   CBC - Abnormal; Notable for the following components:       Result Value    RBC 3.15 (*)     Hemoglobin 8.9 (*)     Hematocrit 28.6 (*)     RDW 16.7 (*)     All other components within normal limits   TROPONIN - Abnormal; Notable for the following components:    Troponin, High Sensitivity 27 (*)     All other components within normal limits   COMPREHENSIVE METABOLIC PANEL - Abnormal; Notable for the following components:    Sodium 132 (*)     Glucose 203 (*)     BUN 27 (*)     Creatinine 1.1 (*)     Est, Glom Filt Rate 56 (*)     Albumin 2.7 (*)     Alkaline Phosphatase 700 (*)     AST 63 (*)     All other components within normal limits   CULTURE, BLOOD 1   CULTURE, BLOOD 1   LIPASE   LACTIC ACID   AMMONIA   TROPONIN   URINALYSIS WITH REFLEX TO CULTURE

## 2024-06-10 NOTE — ED PROVIDER NOTES
[facility] ED  Emergency Department  Faculty Sign-Out Addendum     Care of Bobbi Phillips was assumed from previous attending and is being seen for Fatigue  .  The patient's initial evaluation and plan have been discussed with the prior provider who initially evaluated the patient.    Handoff taken on the following patient from prior Attending Physician:    Attestation    I was available and discussed any additional care issues that arose and coordinated the management plans with the resident(s) caring for the patient during my duty period. Any areas of disagreement with resident’s documentation of care or procedures are noted on the chart. I was personally present for the key portions of any/all procedures during my duty period. I have documented in the chart those procedures where I was not present during the key portions.      EMERGENCY DEPARTMENT COURSE / MEDICAL DECISION MAKING:       MEDICATIONS GIVEN:  Orders Placed This Encounter   Medications    ondansetron (ZOFRAN) injection 4 mg    pantoprazole (PROTONIX) 40 mg in sodium chloride (PF) 0.9 % 10 mL injection    lactated ringers bolus 1,000 mL    iopamidol (ISOVUE-370) 76 % injection 75 mL       LABS / RADIOLOGY:     Labs Reviewed   CBC - Abnormal; Notable for the following components:       Result Value    RBC 3.15 (*)     Hemoglobin 8.9 (*)     Hematocrit 28.6 (*)     RDW 16.7 (*)     All other components within normal limits   TROPONIN - Abnormal; Notable for the following components:    Troponin, High Sensitivity 27 (*)     All other components within normal limits   COMPREHENSIVE METABOLIC PANEL - Abnormal; Notable for the following components:    Sodium 132 (*)     Glucose 203 (*)     BUN 27 (*)     Creatinine 1.1 (*)     Est, Glom Filt Rate 56 (*)     Albumin 2.7 (*)     Alkaline Phosphatase 700 (*)     AST 63 (*)     All other components within normal limits   CULTURE, BLOOD 1   CULTURE, BLOOD 1   LIPASE   LACTIC ACID 
      [facility] ED     Emergency Department     Faculty Attestation    I performed a history and physical examination of the patient and discussed management with the resident. I reviewed the resident’s note and agree with the documented findings and plan of care. Any areas of disagreement are noted on the chart. I was personally present for the key portions of any procedures. I have documented in the chart those procedures where I was not present during the key portions. I have reviewed the emergency nurses triage note. I agree with the chief complaint, past medical history, past surgical history, allergies, medications, social and family history as documented unless otherwise noted below. For Physician Assistant/ Nurse Practitioner cases/documentation I have personally evaluated this patient and have completed at least one if not all key elements of the E/M (history, physical exam, and MDM). Additional findings are as noted.    Note Started: 2:20 PM EDT    Patient here by ambulance who provides independent history from EMS as well as her 2 sons to provide independent history.  Ongoing medical issues for the past several months.  History of Rincon with regular paracentesis last approximately 2 to 3 weeks ago.  Sons were concerned that their mom did not seem quite right today was a little confused and worsening lower extremity wounds that they have been caring for.  Patient has no specific complaints just states she does not feel good denies specifically abdominal pain no fevers.  On exam patient appears chronically ill but not toxic.  Lungs clear heart regular.  Abdomen mild distention but soft not tympanitic no rebound no guarding.  Significant bilateral lower extremity edema with chronic wounds.  Will proceed with septic workup anticipate admission    EKG interpretation: Sinus tachycardia 105 normal intervals normal axis no acute ST or T changes.  Low voltage throughout similar to prior on 5/13/2024 
     [facility] ED  Emergency Department Encounter  Emergency Medicine Resident     Pt Name:Bobbi Phillips  MRN: [id number]  Birthdate 1955  Date of evaluation: 6/10/24  PCP:  Stacie Doty MD  Note Started: 2:33 PM EDT      CHIEF COMPLAINT       Chief Complaint   Patient presents with    Fatigue       HISTORY OF PRESENT ILLNESS  (Location/Symptom, Timing/Onset, Context/Setting, Quality, Duration, Modifying Factors, Severity.)      Bobbi Phillips is a  68-year-old female who was discharged from a rehabilitation facility yesterday. She has been experiencing significant weakness and is unable to ambulate at baseline. Her sons were unable to move her at all. She has wounds on the back of her lower leg. The patient has been vomiting and reports feeling nauseous. She denies any recent illnesses other than the wounds and has not been on antibiotics recently. The patient’s glucose level was recorded at 259 by EMS. She denies chest pain, abdominal pain, and difficulty breathing. She reports feeling cold.    The patient has a history of cirrhosis, specifically decompensated nonalcoholic steatohepatitis (BNONER) cirrhosis, and has undergone paracentesis in the past, though not on a regular basis. On chart review she received IR paracentesis once in april and may.  She was hospitalized last month for hepatic encephalopathy.  Fluid culture last month of ascitic fluid was not remarkable for any growth.     PAST MEDICAL / SURGICAL / SOCIAL / FAMILY HISTORY      has a past medical history of Abdominal swelling, Claustrophobia, COVID-19 vaccine administered, Diabetes (HCC), Diabetic retinopathy (HCC), Flatus, H/O acute sinusitis, H/O cholelithiasis, Hypertension, Liver cirrhosis (HCC), LLQ abdominal tenderness, Poor venous access, Positive colorectal cancer screening using Cologuard test, Post-menopausal bleeding, RUQ abdominal pain, Tendonitis of shoulder, right, Tingling, Under care of service 
wife covid + pt has had exposure hx of Afib takes Eliquis placed on 4L N/C

## 2024-06-10 NOTE — CARE COORDINATION
Case Management Assessment  Initial Evaluation    Date/Time of Evaluation: 6/10/2024 6:00 PM  Assessment Completed by: Laz Russell RN    If patient is discharged prior to next notation, then this note serves as note for discharge by case management.    Patient Name: Bobbi Phillips                   YOB: 1955  Diagnosis: Nausea & vomiting [R11.2]                   Date / Time: 6/10/2024  2:12 PM    Patient Admission Status: Observation   Readmission Risk (Low < 19, Mod (19-27), High > 27): Readmission Risk Score: 26.4    Current PCP: Stacie Doty MD  PCP verified by CM? (P) Yes    Chart Reviewed: Yes      History Provided by: (P) Patient  Patient Orientation: (P) Alert and Oriented    Patient Cognition: (P) Alert    Hospitalization in the last 30 days (Readmission):  No    If yes, Readmission Assessment in  Navigator will be completed.    Advance Directives:      Code Status: Prior   Patient's Primary Decision Maker is: (P) Legal Next of Kin    Primary Decision Maker: Phillip Peters *HIPAA* - Child - [phone]    Primary Decision Maker: TYLER PETERS *HIPAA* - Child - [phone]    Discharge Planning:    Patient lives with: (P) Children Type of Home: (P) House  Primary Care Giver: (P) Other (Comment) (dc'd from SNF yesterday)  Patient Support Systems include: (P) Family Members, Children   Current Financial resources: (P) Medicare  Current community resources: (P) ECF/Home Care ([facility] was supposed to start today)  Current services prior to admission: (P) Home Care (was supposed to start today)            Current DME: (P) Walker, Bedside Commode, Shower Chair            Type of Home Care services:       ADLS  Prior functional level: (P) Assistance with the following:, Bathing, Dressing, Toileting, Cooking, Housework, Mobility, Shopping  Current functional level: (P) Assistance with the following:, Bathing, Dressing, Toileting, Cooking, Housework, Shopping, Mobility    PT AM-PAC:    and her family were provided with a choice of provider and agrees with the discharge plan. Freedom of choice list with basic dialogue that supports the patient's individualized plan of care/goals and shares the quality data associated with the providers was provided to:     Patient Representative Name:       The Patient and/or Patient Representative Agree with the Discharge Plan? (P) Yes    Laz Russell RN  Case Management Department  Ph: [phone]

## 2024-06-10 NOTE — ED NOTES
Pt requesting to go home. Pt states, \"I don't want to stay here, I just want to go home.\" Dr. Perla at bedside to discuss with patient

## 2024-06-10 NOTE — CARE COORDINATION
frame and to schedule as a hospital f/u.    Care Transition Nurse reviewed medical action plan with patient who verbalized understanding. The patient was given an opportunity to ask questions and does not have any further questions or concerns at this time. Were discharge instructions available to patient? Yes. Reviewed appropriate site of care based on symptoms and resources available to patient including: PCP  Specialist  Home health  When to call 911. The patient agrees to contact the PCP office for questions related to their healthcare.     Advance Care Planning:   Does patient have an Advance Directive: not on file.    Medication reconciliation was performed with patient, who verbalizes understanding of administration of home medications. Medications reviewed, 1111F entered: yes    Was patient discharged with a pulse oximeter? no    Non-face-to-face services provided:  Education of patient/family/caregiver/guardian to support self-management-How to scheduled PCP f/u    Care Transitions 24 Hour Call    Care Transitions Interventions     Discussed follow-up appointments. If no appointment was previously scheduled, appointment scheduling offered: Yes.   Is follow up appointment scheduled within 7 days of discharge? Patient declined assistance to schedule PCP f/u..    Follow Up  No future appointments.    Care Transition Nurse provided contact information.  Plan for follow-up call in 3-5 days based on severity of symptoms and risk factors.  Plan for next call: symptom management-pain, status of BLE edema and open areas, new or worsening symptoms.    Danni Maya RN

## 2024-06-11 ENCOUNTER — APPOINTMENT (OUTPATIENT)
Dept: ULTRASOUND IMAGING | Age: 69
DRG: 683 | End: 2024-06-11
Payer: MEDICARE

## 2024-06-11 ENCOUNTER — APPOINTMENT (OUTPATIENT)
Dept: CT IMAGING | Age: 69
DRG: 683 | End: 2024-06-11
Payer: MEDICARE

## 2024-06-11 PROBLEM — B19.20 DECOMPENSATED CIRRHOSIS RELATED TO HEPATITIS C VIRUS (HCV) (HCC): Status: ACTIVE | Noted: 2024-06-11

## 2024-06-11 PROBLEM — K74.69 DECOMPENSATED CIRRHOSIS RELATED TO HEPATITIS C VIRUS (HCV) (HCC): Status: ACTIVE | Noted: 2024-06-11

## 2024-06-11 LAB
25(OH)D3 SERPL-MCNC: 26.3 NG/ML (ref 30–100)
ALBUMIN SERPL-MCNC: 2.2 G/DL (ref 3.5–5.2)
ALBUMIN/GLOB SERPL: 1 {RATIO} (ref 1–2.5)
ALP SERPL-CCNC: 471 U/L (ref 35–104)
ALT SERPL-CCNC: 13 U/L (ref 10–35)
ANION GAP SERPL CALCULATED.3IONS-SCNC: 12 MMOL/L (ref 9–16)
AST SERPL-CCNC: 38 U/L (ref 10–35)
BASOPHILS # BLD: 0 K/UL (ref 0–0.2)
BASOPHILS NFR BLD: 0 % (ref 0–2)
BILIRUB SERPL-MCNC: 0.4 MG/DL (ref 0–1.2)
BNP SERPL-MCNC: 381 PG/ML (ref 0–300)
BUN SERPL-MCNC: 34 MG/DL (ref 8–23)
C3 SERPL-MCNC: 144 MG/DL (ref 90–180)
C4 SERPL-MCNC: 19 MG/DL (ref 10–40)
CALCIUM SERPL-MCNC: 7.9 MG/DL (ref 8.6–10.4)
CHLORIDE SERPL-SCNC: 100 MMOL/L (ref 98–107)
CO2 SERPL-SCNC: 22 MMOL/L (ref 20–31)
CREAT SERPL-MCNC: 1.6 MG/DL (ref 0.5–0.9)
EKG ATRIAL RATE: 105 BPM
EKG P AXIS: 74 DEGREES
EKG P-R INTERVAL: 148 MS
EKG Q-T INTERVAL: 326 MS
EKG QRS DURATION: 80 MS
EKG QTC CALCULATION (BAZETT): 430 MS
EKG R AXIS: 0 DEGREES
EKG T AXIS: 42 DEGREES
EKG VENTRICULAR RATE: 105 BPM
EOSINOPHIL # BLD: 0.2 K/UL (ref 0–0.4)
EOSINOPHILS RELATIVE PERCENT: 3 % (ref 1–4)
ERYTHROCYTE [DISTWIDTH] IN BLOOD BY AUTOMATED COUNT: 16.3 % (ref 11.8–14.4)
GFR, ESTIMATED: 34 ML/MIN/1.73M2
GLUCOSE BLD-MCNC: 196 MG/DL (ref 65–105)
GLUCOSE BLD-MCNC: 209 MG/DL (ref 65–105)
GLUCOSE BLD-MCNC: 226 MG/DL (ref 65–105)
GLUCOSE BLD-MCNC: 241 MG/DL (ref 65–105)
GLUCOSE SERPL-MCNC: 203 MG/DL (ref 74–99)
HCT VFR BLD AUTO: 26.6 % (ref 36.3–47.1)
HGB BLD-MCNC: 8.2 G/DL (ref 11.9–15.1)
IMM GRANULOCYTES # BLD AUTO: 0 K/UL (ref 0–0.3)
IMM GRANULOCYTES NFR BLD: 0 %
INR PPP: 1.3
LACTIC ACID, SEPSIS WHOLE BLOOD: 3 MMOL/L (ref 0.5–1.9)
LACTIC ACID, WHOLE BLOOD: 3.1 MMOL/L (ref 0.7–2.1)
LYMPHOCYTES NFR BLD: 0.46 K/UL (ref 1–4.8)
LYMPHOCYTES RELATIVE PERCENT: 7 % (ref 24–44)
MCH RBC QN AUTO: 28.1 PG (ref 25.2–33.5)
MCHC RBC AUTO-ENTMCNC: 30.8 G/DL (ref 28.4–34.8)
MCV RBC AUTO: 91.1 FL (ref 82.6–102.9)
MONOCYTES NFR BLD: 0.33 K/UL (ref 0.1–0.8)
MONOCYTES NFR BLD: 5 % (ref 1–7)
MORPHOLOGY: ABNORMAL
NEUTROPHILS NFR BLD: 85 % (ref 36–66)
NEUTS SEG NFR BLD: 5.61 K/UL (ref 1.8–7.7)
NRBC BLD-RTO: 0 PER 100 WBC
PLATELET # BLD AUTO: 206 K/UL (ref 138–453)
PMV BLD AUTO: 9.7 FL (ref 8.1–13.5)
POTASSIUM SERPL-SCNC: 4.1 MMOL/L (ref 3.7–5.3)
PROT SERPL-MCNC: 5.8 G/DL (ref 6.6–8.7)
PROTHROMBIN TIME: 16 SEC (ref 11.7–14.9)
RBC # BLD AUTO: 2.92 M/UL (ref 3.95–5.11)
SODIUM SERPL-SCNC: 134 MMOL/L (ref 136–145)
T4 FREE SERPL-MCNC: 0.9 NG/DL (ref 0.92–1.68)
TROPONIN I SERPL HS-MCNC: 24 NG/L (ref 0–14)
TSH SERPL DL<=0.05 MIU/L-ACNC: 18.1 UIU/ML (ref 0.27–4.2)
WBC OTHER # BLD: 6.6 K/UL (ref 3.5–11.3)

## 2024-06-11 PROCEDURE — 76705 ECHO EXAM OF ABDOMEN: CPT

## 2024-06-11 PROCEDURE — 36415 COLL VENOUS BLD VENIPUNCTURE: CPT

## 2024-06-11 PROCEDURE — 80074 ACUTE HEPATITIS PANEL: CPT

## 2024-06-11 PROCEDURE — 81001 URINALYSIS AUTO W/SCOPE: CPT

## 2024-06-11 PROCEDURE — 82570 ASSAY OF URINE CREATININE: CPT

## 2024-06-11 PROCEDURE — 84156 ASSAY OF PROTEIN URINE: CPT

## 2024-06-11 PROCEDURE — 97116 GAIT TRAINING THERAPY: CPT

## 2024-06-11 PROCEDURE — 96376 TX/PRO/DX INJ SAME DRUG ADON: CPT

## 2024-06-11 PROCEDURE — 80053 COMPREHEN METABOLIC PANEL: CPT

## 2024-06-11 PROCEDURE — 82306 VITAMIN D 25 HYDROXY: CPT

## 2024-06-11 PROCEDURE — 84300 ASSAY OF URINE SODIUM: CPT

## 2024-06-11 PROCEDURE — 83605 ASSAY OF LACTIC ACID: CPT

## 2024-06-11 PROCEDURE — 96372 THER/PROPH/DIAG INJ SC/IM: CPT

## 2024-06-11 PROCEDURE — 6360000002 HC RX W HCPCS: Performed by: STUDENT IN AN ORGANIZED HEALTH CARE EDUCATION/TRAINING PROGRAM

## 2024-06-11 PROCEDURE — 97166 OT EVAL MOD COMPLEX 45 MIN: CPT

## 2024-06-11 PROCEDURE — 72131 CT LUMBAR SPINE W/O DYE: CPT

## 2024-06-11 PROCEDURE — 82436 ASSAY OF URINE CHLORIDE: CPT

## 2024-06-11 PROCEDURE — 83521 IG LIGHT CHAINS FREE EACH: CPT

## 2024-06-11 PROCEDURE — 84165 PROTEIN E-PHORESIS SERUM: CPT

## 2024-06-11 PROCEDURE — 84443 ASSAY THYROID STIM HORMONE: CPT

## 2024-06-11 PROCEDURE — 2580000003 HC RX 258: Performed by: STUDENT IN AN ORGANIZED HEALTH CARE EDUCATION/TRAINING PROGRAM

## 2024-06-11 PROCEDURE — 86334 IMMUNOFIX E-PHORESIS SERUM: CPT

## 2024-06-11 PROCEDURE — 97162 PT EVAL MOD COMPLEX 30 MIN: CPT

## 2024-06-11 PROCEDURE — 1200000000 HC SEMI PRIVATE

## 2024-06-11 PROCEDURE — 85027 COMPLETE CBC AUTOMATED: CPT

## 2024-06-11 PROCEDURE — 72128 CT CHEST SPINE W/O DYE: CPT

## 2024-06-11 PROCEDURE — 84166 PROTEIN E-PHORESIS/URINE/CSF: CPT

## 2024-06-11 PROCEDURE — 6370000000 HC RX 637 (ALT 250 FOR IP)

## 2024-06-11 PROCEDURE — 99223 1ST HOSP IP/OBS HIGH 75: CPT | Performed by: INTERNAL MEDICINE

## 2024-06-11 PROCEDURE — 86038 ANTINUCLEAR ANTIBODIES: CPT

## 2024-06-11 PROCEDURE — 6370000000 HC RX 637 (ALT 250 FOR IP): Performed by: STUDENT IN AN ORGANIZED HEALTH CARE EDUCATION/TRAINING PROGRAM

## 2024-06-11 PROCEDURE — 97535 SELF CARE MNGMENT TRAINING: CPT

## 2024-06-11 PROCEDURE — 86160 COMPLEMENT ANTIGEN: CPT

## 2024-06-11 PROCEDURE — 86225 DNA ANTIBODY NATIVE: CPT

## 2024-06-11 PROCEDURE — 84439 ASSAY OF FREE THYROXINE: CPT

## 2024-06-11 PROCEDURE — 82947 ASSAY GLUCOSE BLOOD QUANT: CPT

## 2024-06-11 PROCEDURE — 84155 ASSAY OF PROTEIN SERUM: CPT

## 2024-06-11 PROCEDURE — C9113 INJ PANTOPRAZOLE SODIUM, VIA: HCPCS | Performed by: STUDENT IN AN ORGANIZED HEALTH CARE EDUCATION/TRAINING PROGRAM

## 2024-06-11 PROCEDURE — 85610 PROTHROMBIN TIME: CPT

## 2024-06-11 RX ORDER — BUMETANIDE 1 MG/1
1 TABLET ORAL DAILY
Status: DISCONTINUED | OUTPATIENT
Start: 2024-06-11 | End: 2024-06-19 | Stop reason: HOSPADM

## 2024-06-11 RX ORDER — TAMSULOSIN HYDROCHLORIDE 0.4 MG/1
0.4 CAPSULE ORAL DAILY
Status: DISCONTINUED | OUTPATIENT
Start: 2024-06-11 | End: 2024-06-19 | Stop reason: HOSPADM

## 2024-06-11 RX ORDER — VITAMIN B COMPLEX
4000 TABLET ORAL DAILY
Status: DISCONTINUED | OUTPATIENT
Start: 2024-06-11 | End: 2024-06-19 | Stop reason: HOSPADM

## 2024-06-11 RX ORDER — GABAPENTIN 100 MG/1
100 CAPSULE ORAL 3 TIMES DAILY
Status: DISCONTINUED | OUTPATIENT
Start: 2024-06-11 | End: 2024-06-19 | Stop reason: HOSPADM

## 2024-06-11 RX ORDER — ATORVASTATIN CALCIUM 20 MG/1
10 TABLET, FILM COATED ORAL DAILY
Status: DISCONTINUED | OUTPATIENT
Start: 2024-06-11 | End: 2024-06-19 | Stop reason: HOSPADM

## 2024-06-11 RX ORDER — HYDROXYZINE HYDROCHLORIDE 10 MG/1
10 TABLET, FILM COATED ORAL ONCE
Status: COMPLETED | OUTPATIENT
Start: 2024-06-11 | End: 2024-06-11

## 2024-06-11 RX ORDER — TRAMADOL HYDROCHLORIDE 50 MG/1
50 TABLET ORAL EVERY 8 HOURS PRN
Status: DISCONTINUED | OUTPATIENT
Start: 2024-06-11 | End: 2024-06-19 | Stop reason: HOSPADM

## 2024-06-11 RX ORDER — LANOLIN ALCOHOL/MO/W.PET/CERES
6 CREAM (GRAM) TOPICAL NIGHTLY PRN
Status: DISCONTINUED | OUTPATIENT
Start: 2024-06-11 | End: 2024-06-19 | Stop reason: HOSPADM

## 2024-06-11 RX ORDER — INSULIN GLARGINE 100 [IU]/ML
20 INJECTION, SOLUTION SUBCUTANEOUS NIGHTLY
Status: DISCONTINUED | OUTPATIENT
Start: 2024-06-11 | End: 2024-06-19

## 2024-06-11 RX ORDER — SODIUM CHLORIDE 9 MG/ML
INJECTION, SOLUTION INTRAVENOUS CONTINUOUS
Status: DISCONTINUED | OUTPATIENT
Start: 2024-06-11 | End: 2024-06-11

## 2024-06-11 RX ORDER — SODIUM CHLORIDE 9 MG/ML
INJECTION, SOLUTION INTRAVENOUS CONTINUOUS
Status: ACTIVE | OUTPATIENT
Start: 2024-06-11 | End: 2024-06-12

## 2024-06-11 RX ORDER — ASPIRIN 81 MG/1
81 TABLET ORAL DAILY
Status: DISCONTINUED | OUTPATIENT
Start: 2024-06-11 | End: 2024-06-19 | Stop reason: HOSPADM

## 2024-06-11 RX ORDER — LACTULOSE 10 G/15ML
20 SOLUTION ORAL 3 TIMES DAILY
Status: DISCONTINUED | OUTPATIENT
Start: 2024-06-11 | End: 2024-06-19 | Stop reason: HOSPADM

## 2024-06-11 RX ADMIN — TRAMADOL HYDROCHLORIDE 50 MG: 50 TABLET ORAL at 11:31

## 2024-06-11 RX ADMIN — PANTOPRAZOLE SODIUM 40 MG: 40 INJECTION, POWDER, FOR SOLUTION INTRAVENOUS at 07:58

## 2024-06-11 RX ADMIN — INSULIN LISPRO 4 UNITS: 100 INJECTION, SOLUTION INTRAVENOUS; SUBCUTANEOUS at 11:32

## 2024-06-11 RX ADMIN — SODIUM CHLORIDE, PRESERVATIVE FREE 10 ML: 5 INJECTION INTRAVENOUS at 08:04

## 2024-06-11 RX ADMIN — ASPIRIN 81 MG: 81 TABLET, COATED ORAL at 07:58

## 2024-06-11 RX ADMIN — METOPROLOL TARTRATE 25 MG: 25 TABLET, FILM COATED ORAL at 07:58

## 2024-06-11 RX ADMIN — LACTULOSE 20 G: 20 SOLUTION ORAL at 16:25

## 2024-06-11 RX ADMIN — RIFAXIMIN 550 MG: 550 TABLET ORAL at 09:14

## 2024-06-11 RX ADMIN — TAMSULOSIN HYDROCHLORIDE 0.4 MG: 0.4 CAPSULE ORAL at 07:58

## 2024-06-11 RX ADMIN — TRAMADOL HYDROCHLORIDE 50 MG: 50 TABLET ORAL at 20:59

## 2024-06-11 RX ADMIN — Medication 6 MG: at 20:59

## 2024-06-11 RX ADMIN — HYDROXYZINE HYDROCHLORIDE 10 MG: 10 TABLET, FILM COATED ORAL at 22:19

## 2024-06-11 RX ADMIN — CHOLECALCIFEROL TAB 25 MCG (1000 UNIT) 4000 UNITS: 25 TAB at 07:58

## 2024-06-11 RX ADMIN — GABAPENTIN 100 MG: 100 CAPSULE ORAL at 14:23

## 2024-06-11 RX ADMIN — RIFAXIMIN 550 MG: 550 TABLET ORAL at 21:00

## 2024-06-11 RX ADMIN — PANTOPRAZOLE SODIUM 40 MG: 40 TABLET, DELAYED RELEASE ORAL at 06:16

## 2024-06-11 RX ADMIN — GABAPENTIN 100 MG: 100 CAPSULE ORAL at 07:58

## 2024-06-11 RX ADMIN — INSULIN LISPRO 4 UNITS: 100 INJECTION, SOLUTION INTRAVENOUS; SUBCUTANEOUS at 17:03

## 2024-06-11 RX ADMIN — GABAPENTIN 100 MG: 100 CAPSULE ORAL at 21:00

## 2024-06-11 RX ADMIN — INSULIN GLARGINE 20 UNITS: 100 INJECTION, SOLUTION SUBCUTANEOUS at 21:49

## 2024-06-11 RX ADMIN — ENOXAPARIN SODIUM 40 MG: 100 INJECTION SUBCUTANEOUS at 08:04

## 2024-06-11 RX ADMIN — ATORVASTATIN CALCIUM 10 MG: 20 TABLET, FILM COATED ORAL at 07:58

## 2024-06-11 RX ADMIN — BUMETANIDE 1 MG: 1 TABLET ORAL at 16:25

## 2024-06-11 ASSESSMENT — PAIN SCALES - GENERAL
PAINLEVEL_OUTOF10: 7
PAINLEVEL_OUTOF10: 7
PAINLEVEL_OUTOF10: 5
PAINLEVEL_OUTOF10: 4
PAINLEVEL_OUTOF10: 7

## 2024-06-11 ASSESSMENT — PAIN DESCRIPTION - DESCRIPTORS
DESCRIPTORS: SORE
DESCRIPTORS: THROBBING;SORE

## 2024-06-11 ASSESSMENT — PAIN SCALES - WONG BAKER: WONGBAKER_NUMERICALRESPONSE: HURTS LITTLE MORE

## 2024-06-11 ASSESSMENT — PAIN DESCRIPTION - LOCATION
LOCATION: LEG
LOCATION: BACK;LEG

## 2024-06-11 ASSESSMENT — PAIN DESCRIPTION - ORIENTATION
ORIENTATION: LOWER;MID
ORIENTATION: LEFT;RIGHT

## 2024-06-11 NOTE — PROGRESS NOTES
Physical Therapy  Facility/Department: [facility] OBSERVATION UNIT  Physical Therapy Initial Assessment    Name: Bobbi Phillips  : 1955  MRN: [id number]  Date of Service: 2024  Chief Complaint   Patient presents with    Fatigue      Discharge Recommendations:  Patient would benefit from continued therapy after discharge   PT Equipment Recommendations  Equipment Needed: No      Patient Diagnosis(es): The primary encounter diagnosis was Other fatigue. A diagnosis of Nausea and vomiting, unspecified vomiting type was also pertinent to this visit.  Past Medical History:  has a past medical history of Abdominal swelling, Claustrophobia, COVID-19 vaccine administered, Diabetes (HCC), Diabetic retinopathy (HCC), Flatus, H/O acute sinusitis, H/O cholelithiasis, Hypertension, Liver cirrhosis (HCC), LLQ abdominal tenderness, Poor venous access, Positive colorectal cancer screening using Cologuard test, Post-menopausal bleeding, RUQ abdominal pain, Tendonitis of shoulder, right, Tingling, Under care of service provider, Under care of service provider, Vaginal bleeding, Vaginal candidiasis, and Wears glasses.  Past Surgical History:  has a past surgical history that includes Cholecystectomy, laparoscopic ();  section; Ankle fracture surgery (Left, ); Cataract removal with implant (Bilateral, ); Colonoscopy (N/A, 2023); Dilation and curettage of uterus (N/A, 10/02/2023); Cardiac catheterization (); hysteroscopy (2024); Dilation and curettage of uterus (N/A, 2024); Upper gastrointestinal endoscopy (N/A, 4/10/2024); and Anomalous venous return repair (N/A, 2024).    Assessment   Body Structures, Functions, Activity Limitations Requiring Skilled Therapeutic Intervention: Decreased functional mobility ;Decreased endurance;Decreased balance;Decreased strength;Decreased coordination  Assessment: Pt with mobility deficits requiring mod-A to perform bed mobility, CGA to ambulate 80

## 2024-06-11 NOTE — H&P
motor deficits   MUSCULOSKELETAL: no clubbing, cyanosis or edema   SKIN: Bilateral lower extremity weeping wounds with mild peripheral edema       DIFFERENTIAL DIAGNOSIS/MDM:     FROM ED MEDICAL DECISION MAKING NOTE:   68-year-old female, presenting with vomiting. History of Rincon cirrhosis, requires regular paracenteses for therapeutic purposes. Most recent paracentesis did not show any evidence of infection. Labs at baseline with the exception of slightly elevated troponin suggesting KIM with reduced GFR, likely secondary to dehydration. No evidence of obvious infection. Patient does not appear peritoneal. Patient is otherwise hemodynamically stable. On reevaluation patient is symptomatically improved. Patient is refusing CT imaging, requesting to leave at this point. I discussed at length the risks of leaving without having imaging done and further testing. Sons of patient at bedside. Expressed concern for unsafe living conditions on discharge, will have patient evaluated by PT/OT and admitted for observation.     DIAGNOSTIC RESULTS     EKG: All EKG's are interpreted by the Observation Physician who either signs or Co-signs this chart in the absence of a cardiologist.    EKG Interpretation    Interpreted by observation physician    Ventricular rate 105 bpm  QRS 80  QTc 430  Normal axis/slight leftward axis  Normal intervals  No acute ST elevations or depressions consistent with acute ischemia or infarction  When compared to previous EKG in patient's chart, remarkable change     All EKG's are interpreted by the Emergency Department Physician who either signs or Co-signs this chart in the absence of a cardiologist.    RADIOLOGY:   I directly visualized the following  images and reviewed the radiologist interpretations:    XR CHEST PORTABLE    Result Date: 6/10/2024  EXAMINATION: ONE XRAY VIEW OF THE CHEST 6/10/2024 2:46 pm COMPARISON: 05/13/2024 HISTORY: ORDERING SYSTEM PROVIDED HISTORY: irregular HR, vomiting, eval  SNF  IV hydration  Supportive care  Continue home medications and pain control  Monitor vitals, labs, and imaging  DISPO: pending consults and clinical improvement    CONSULTS:    None    PROCEDURES:  Not indicated       PATIENT REFERRED TO:    [facility] ED  [address] [id number] [phone]  Go to   As needed    Stacie Doty MD  [address], [address]  [place] [address] [phone]    Schedule an appointment as soon as possible for a visit in 3 days        --  Caleb Pedraza MD   Observation Physician    This dictation was generated by voice recognition computer software.  Although all attempts are made to edit the dictation for accuracy, there may be errors in the transcription that are not intended.

## 2024-06-11 NOTE — TRANSITION OF CARE
CDU Transfer Summary        Patient:  Bobbi Phillips  YOB: 1955    MRN: [id number]   Acct: [id number]    Primary Care Physician: Stacie Doty MD    Admit date:  6/10/2024  2:12 PM  Transfer date: 06/11/24 12:19 PM    Transfer Diagnoses:     1.)  Generalized weakness   -PT/OT   -Will be requiring placement to SNF versus rehab  2.)  Ascites   -Pending results of abdominal ultrasound to further evaluate for fluid burden  3.)  Chronic lower extremity edema with weeping wounds   -Wound care consult   -Daily dressing changes       Medication List        START taking these medications      ondansetron 4 MG tablet  Commonly known as: Zofran  Take 1 tablet by mouth every 8 hours as needed for Nausea            CONTINUE taking these medications      aspirin 81 MG EC tablet     atorvastatin 10 MG tablet  Commonly known as: LIPITOR  take 1 tablet by mouth once daily     B-D UF III MINI PEN NEEDLES 31G X 5 MM Misc  Generic drug: Insulin Pen Needle  use 1 PEN NEEDLE to inject MEDICATION subcutaneously four times a day with HUMALOG     bumetanide 1 MG tablet  Commonly known as: BUMEX  Take 1 tablet by mouth daily     cyclobenzaprine 5 MG tablet  Commonly known as: FLEXERIL     Dexcom G7 Sensor Misc  Use to monitor sugars 6-8 times a day and monitor for high and low sugars.     FREESTYLE TEST STRIPS strip  Generic drug: blood glucose test strips  use 1 TEST STRIP to TEST BLOOD SUGAR three times a day     gabapentin 100 MG capsule  Commonly known as: NEURONTIN  Take 1 capsule by mouth 3 times daily for 10 days.     HumaLOG KwikPen 100 UNIT/ML Sopn  Generic drug: insulin lispro (1 Unit Dial)  inject PER SLIDING SCALE BEFORE MEALS AND AT BEDTIME 4 TIMES DAILY, MAX 25 UNITS DAILY     Lancets Misc  Test 4 times daily for uncontrolled BS     Magnesium 500 MG Caps  Take 1 capsule by mouth in the morning and at bedtime     melatonin 3 MG Tabs tablet  Take 2 tablets by mouth nightly as needed (insomnia)

## 2024-06-11 NOTE — CARE COORDINATION
Transitional planning: Phone call from Delma with [facility]. They had been set up to see patient after discharged from [facility] and can take back if does not go back there on discharge-will need new referral.

## 2024-06-11 NOTE — PLAN OF CARE
Problem: Safety - Adult  Goal: Free from fall injury  6/11/2024 1504 by Beti Fagan RN  Outcome: Progressing  6/11/2024 0411 by bP Correa RN  Outcome: Progressing     Problem: Chronic Conditions and Co-morbidities  Goal: Patient's chronic conditions and co-morbidity symptoms are monitored and maintained or improved  6/11/2024 1504 by Beti Fagan RN  Outcome: Progressing  6/11/2024 0411 by Pb Correa RN  Outcome: Progressing     Problem: Discharge Planning  Goal: Discharge to home or other facility with appropriate resources  6/11/2024 1504 by Beti Fagan RN  Outcome: Progressing  6/11/2024 0411 by Pb Correa RN  Outcome: Progressing     Problem: Skin/Tissue Integrity  Goal: Absence of new skin breakdown  Description: 1.  Monitor for areas of redness and/or skin breakdown  2.  Assess vascular access sites hourly  3.  Every 4-6 hours minimum:  Change oxygen saturation probe site  4.  Every 4-6 hours:  If on nasal continuous positive airway pressure, respiratory therapy assess nares and determine need for appliance change or resting period.  6/11/2024 1504 by Beti Fagan RN  Outcome: Progressing  6/11/2024 0411 by Pb Correa RN  Outcome: Progressing     Problem: ABCDS Injury Assessment  Goal: Absence of physical injury  6/11/2024 1504 by Beti Fagan RN  Outcome: Progressing  6/11/2024 0411 by Pb Correa RN  Outcome: Progressing     Problem: Pain  Goal: Verbalizes/displays adequate comfort level or baseline comfort level  6/11/2024 1504 by Beti Fagan RN  Outcome: Progressing  6/11/2024 0411 by Pb Correa RN  Outcome: Progressing

## 2024-06-11 NOTE — H&P
[facility]     Department of Internal Medicine - Staff Internal Medicine Teaching Service          ADMISSION NOTE/HISTORY AND PHYSICAL EXAMINATION   Date: 6/12/2024  Patient Name: Bobbi Phillips  Date of admission: 6/10/2024  2:12 PM  YOB: 1955  PCP: Stacie Doty MD  History Obtained From:  patient, electronic medical record    CHIEF COMPLAINT     Chief complaint: Difficulty ambulating    HISTORY OF PRESENTING ILLNESS     The patient is a pleasant 68 y.o. female with a PMHx significant for     Hepatic cirrhosis  Hypertension  Dyslipidemia  CKD  Type 2 diabetes mellitus      presents with a chief complaint of difficulty ambulating at home.  Patient states she has been admitted in send the [facility] before multiple times due to her liver problems and urinary tract infection and was discharged to rehab/SNF.  Patient was discharged to home earlier than she is supposed to be and she was not able to ambulate at home so she called EMS.    Patient states she has difficulty walking around and having problems climbing the stairs that she was almost immobile at her home when she was discharged from facility.  Patient also complains of lower back pain which is nonradiating and not aggravated or relieved by anything.  Patient denies any focal weakness or numbness in the extremities.  Patient also complains of distended abdomen which has been gradually increasing since her last paracentesis almost 2 weeks ago.  Patient denies any fever, nausea, vomiting, chest pain, shortness of breath, dizziness, abdominal pain, change in bowel or bladder habits.    Patient was initially admitted in observation, eventually transferred to internal medicine care for further evaluation        Review of Systems:  General ROS: Completed and except as mentioned above were negative   HEENT ROS: Completed and except as mentioned above were negative   Allergy and Immunology ROS:  Completed and

## 2024-06-11 NOTE — PROGRESS NOTES
[facility]  CDU / OBSERVATION ENCOUNTER  ATTENDING NOTE       I performed a history and physical examination of the patient and discussed management with the resident or midlevel provider. I reviewed the resident or midlevel provider's note and agree with the documented findings and plan of care. Any areas of disagreement are noted on the chart. I was personally present for the key portions of any procedures. I have documented in the chart those procedures where I was not present during the key portions. I have reviewed the nurses notes. I agree with the chief complaint, past medical history, past surgical history, allergies, medications, social and family history as documented unless otherwise noted below.    The Family history, social history, and ROS are effectively unchanged since admission unless noted elsewhere in the chart.    This patient was placed in the observation unit for reevaluation for possible admission to the hospital    Patient discharged from [facility] yesterday.  Patient was arriving home and was unable to move about and her son started having difficulty caring for her.  Patient effectively failing outpatient treatment.  Patient brought to the emergency department for reevaluation.    Patient has ongoing medical issues for months.  Patient has had regular paracentesis over the past 2 to 3 weeks.  Patient seemed confused and had worsening lower extremity wounds that have been cared for chronically.  Patient has abdominal distention.  Patient was slightly tachycardic on arrival.    Per ED physician documentation patient stated she was ready to go but patient's family was concerned about ability to care for her at home.    ED course  Per nurses notes:  Pt's family at bedside are angry and yelling that the patient cannot be discharged. Pt's two sons at bedside states she is not able to walk, use the bathroom, or able to get into her house. Pt son states, \"If you send her home she

## 2024-06-11 NOTE — PROGRESS NOTES
Occupational Therapy  Facility/Department: [facility] OBSERVATION UNIT  Occupational Therapy Initial Assessment    Name: Bobbi Phillips  : 1955  MRN: [id number]  Date of Service: 2024    Discharge Recommendations:   Further therapy recommended at discharge.          Patient Diagnosis(es): The primary encounter diagnosis was Other fatigue. A diagnosis of Nausea and vomiting, unspecified vomiting type was also pertinent to this visit.  Past Medical History:  has a past medical history of Abdominal swelling, Claustrophobia, COVID-19 vaccine administered, Diabetes (HCC), Diabetic retinopathy (HCC), Flatus, H/O acute sinusitis, H/O cholelithiasis, Hypertension, Liver cirrhosis (HCC), LLQ abdominal tenderness, Poor venous access, Positive colorectal cancer screening using Cologuard test, Post-menopausal bleeding, RUQ abdominal pain, Tendonitis of shoulder, right, Tingling, Under care of service provider, Under care of service provider, Vaginal bleeding, Vaginal candidiasis, and Wears glasses.  Past Surgical History:  has a past surgical history that includes Cholecystectomy, laparoscopic ();  section; Ankle fracture surgery (Left, ); Cataract removal with implant (Bilateral, ); Colonoscopy (N/A, 2023); Dilation and curettage of uterus (N/A, 10/02/2023); Cardiac catheterization (); hysteroscopy (2024); Dilation and curettage of uterus (N/A, 2024); Upper gastrointestinal endoscopy (N/A, 4/10/2024); and Anomalous venous return repair (N/A, 2024).           Assessment   Performance deficits / Impairments: Decreased functional mobility ;Decreased ADL status;Decreased endurance;Decreased high-level IADLs;Decreased balance;Decreased strength  Assessment: pt reported significant concerns with returning to prior living arrangement d/t no  assist. pt would benefit from continued acute OT, as well as at discharge before returning to prior living arrangement in order to maintain

## 2024-06-12 ENCOUNTER — APPOINTMENT (OUTPATIENT)
Dept: ULTRASOUND IMAGING | Age: 69
DRG: 683 | End: 2024-06-12
Payer: MEDICARE

## 2024-06-12 PROBLEM — M46.26 OSTEOMYELITIS OF LUMBAR SPINE (HCC): Status: ACTIVE | Noted: 2024-06-12

## 2024-06-12 PROBLEM — R26.9 GAIT DISORDER: Status: ACTIVE | Noted: 2024-06-12

## 2024-06-12 PROBLEM — R53.83 OTHER FATIGUE: Status: ACTIVE | Noted: 2024-06-12

## 2024-06-12 PROBLEM — R53.81 DEBILITY: Status: ACTIVE | Noted: 2024-06-12

## 2024-06-12 LAB
ALBUMIN SERPL-MCNC: 2.2 G/DL (ref 3.5–5.2)
ALBUMIN/GLOB SERPL: 1 {RATIO} (ref 1–2.5)
ALP SERPL-CCNC: 442 U/L (ref 35–104)
ALT SERPL-CCNC: 14 U/L (ref 10–35)
ANION GAP SERPL CALCULATED.3IONS-SCNC: 10 MMOL/L (ref 9–16)
AST SERPL-CCNC: 38 U/L (ref 10–35)
BACTERIA URNS QL MICRO: ABNORMAL
BASOPHILS # BLD: <0.03 K/UL (ref 0–0.2)
BASOPHILS NFR BLD: 0 % (ref 0–2)
BILIRUB SERPL-MCNC: 0.3 MG/DL (ref 0–1.2)
BILIRUB UR QL STRIP: NEGATIVE
BUN SERPL-MCNC: 35 MG/DL (ref 8–23)
CALCIUM SERPL-MCNC: 8 MG/DL (ref 8.6–10.4)
CASTS #/AREA URNS LPF: ABNORMAL /LPF (ref 0–2)
CHLORIDE SERPL-SCNC: 99 MMOL/L (ref 98–107)
CHLORIDE UR-SCNC: 35 MMOL/L
CLARITY UR: ABNORMAL
CO2 SERPL-SCNC: 23 MMOL/L (ref 20–31)
COLOR UR: ABNORMAL
CREAT SERPL-MCNC: 1.6 MG/DL (ref 0.5–0.9)
CREAT UR-MCNC: 127 MG/DL (ref 28–217)
CRP SERPL HS-MCNC: 148 MG/L (ref 0–5)
EOSINOPHIL # BLD: 0.33 K/UL (ref 0–0.44)
EOSINOPHILS RELATIVE PERCENT: 6 % (ref 1–4)
EPI CELLS #/AREA URNS HPF: ABNORMAL /HPF (ref 0–5)
ERYTHROCYTE [DISTWIDTH] IN BLOOD BY AUTOMATED COUNT: 16.5 % (ref 11.8–14.4)
ERYTHROCYTE [SEDIMENTATION RATE] IN BLOOD BY PHOTOMETRIC METHOD: 57 MM/HR (ref 0–30)
FREE KAPPA/LAMBDA RATIO: 1.93 (ref 0.22–1.74)
GFR, ESTIMATED: 34 ML/MIN/1.73M2
GLUCOSE BLD-MCNC: 116 MG/DL (ref 65–105)
GLUCOSE BLD-MCNC: 174 MG/DL (ref 65–105)
GLUCOSE BLD-MCNC: 210 MG/DL (ref 65–105)
GLUCOSE SERPL-MCNC: 122 MG/DL (ref 74–99)
GLUCOSE UR STRIP-MCNC: NEGATIVE MG/DL
HAV IGM SERPL QL IA: NONREACTIVE
HBV CORE IGM SERPL QL IA: NONREACTIVE
HBV SURFACE AG SERPL QL IA: NONREACTIVE
HCT VFR BLD AUTO: 26.8 % (ref 36.3–47.1)
HCV AB SERPL QL IA: NONREACTIVE
HGB BLD-MCNC: 8.1 G/DL (ref 11.9–15.1)
HGB UR QL STRIP.AUTO: ABNORMAL
IMM GRANULOCYTES # BLD AUTO: <0.03 K/UL (ref 0–0.3)
IMM GRANULOCYTES NFR BLD: 0 %
KAPPA LC FREE SER-MCNC: 167 MG/L
KETONES UR STRIP-MCNC: ABNORMAL MG/DL
LACTIC ACID, WHOLE BLOOD: 1.6 MMOL/L (ref 0.7–2.1)
LACTIC ACID, WHOLE BLOOD: 2.5 MMOL/L (ref 0.7–2.1)
LAMBDA LC FREE SERPL-MCNC: 86.4 MG/L (ref 4.2–27.7)
LEUKOCYTE ESTERASE UR QL STRIP: ABNORMAL
LYMPHOCYTES NFR BLD: 0.82 K/UL (ref 1.1–3.7)
LYMPHOCYTES RELATIVE PERCENT: 15 % (ref 24–43)
MCH RBC QN AUTO: 28.1 PG (ref 25.2–33.5)
MCHC RBC AUTO-ENTMCNC: 30.2 G/DL (ref 28.4–34.8)
MCV RBC AUTO: 93.1 FL (ref 82.6–102.9)
MONOCYTES NFR BLD: 0.61 K/UL (ref 0.1–1.2)
MONOCYTES NFR BLD: 11 % (ref 3–12)
MRSA, DNA, NASAL: NEGATIVE
NEUTROPHILS NFR BLD: 68 % (ref 36–65)
NEUTS SEG NFR BLD: 3.64 K/UL (ref 1.5–8.1)
NITRITE UR QL STRIP: POSITIVE
NRBC BLD-RTO: 0 PER 100 WBC
PH UR STRIP: 5.5 [PH] (ref 5–8)
PLATELET # BLD AUTO: 209 K/UL (ref 138–453)
PMV BLD AUTO: 9.8 FL (ref 8.1–13.5)
POTASSIUM SERPL-SCNC: 4 MMOL/L (ref 3.7–5.3)
PROT SERPL-MCNC: 5.9 G/DL (ref 6.6–8.7)
PROT UR STRIP-MCNC: ABNORMAL MG/DL
PTH-INTACT SERPL-MCNC: 32 PG/ML (ref 15–65)
RBC # BLD AUTO: 2.88 M/UL (ref 3.95–5.11)
RBC # BLD: ABNORMAL 10*6/UL
RBC #/AREA URNS HPF: ABNORMAL /HPF (ref 0–2)
SODIUM SERPL-SCNC: 132 MMOL/L (ref 136–145)
SODIUM UR-SCNC: 57 MMOL/L
SP GR UR STRIP: 1.01 (ref 1–1.03)
SPECIMEN DESCRIPTION: NORMAL
TOTAL PROTEIN, URINE: 213 MG/DL
URINE TOTAL PROTEIN CREATININE RATIO: 1.68
UROBILINOGEN UR STRIP-ACNC: NORMAL EU/DL (ref 0–1)
WBC #/AREA URNS HPF: ABNORMAL /HPF (ref 0–5)
WBC OTHER # BLD: 5.4 K/UL (ref 3.5–11.3)

## 2024-06-12 PROCEDURE — 85025 COMPLETE CBC W/AUTO DIFF WBC: CPT

## 2024-06-12 PROCEDURE — 82947 ASSAY GLUCOSE BLOOD QUANT: CPT

## 2024-06-12 PROCEDURE — 6370000000 HC RX 637 (ALT 250 FOR IP)

## 2024-06-12 PROCEDURE — 83970 ASSAY OF PARATHORMONE: CPT

## 2024-06-12 PROCEDURE — 2580000003 HC RX 258: Performed by: INTERNAL MEDICINE

## 2024-06-12 PROCEDURE — 2580000003 HC RX 258: Performed by: STUDENT IN AN ORGANIZED HEALTH CARE EDUCATION/TRAINING PROGRAM

## 2024-06-12 PROCEDURE — 87086 URINE CULTURE/COLONY COUNT: CPT

## 2024-06-12 PROCEDURE — 99233 SBSQ HOSP IP/OBS HIGH 50: CPT | Performed by: INTERNAL MEDICINE

## 2024-06-12 PROCEDURE — 99222 1ST HOSP IP/OBS MODERATE 55: CPT | Performed by: INTERNAL MEDICINE

## 2024-06-12 PROCEDURE — APPSS30 APP SPLIT SHARED TIME 16-30 MINUTES: Performed by: NURSE PRACTITIONER

## 2024-06-12 PROCEDURE — 85652 RBC SED RATE AUTOMATED: CPT

## 2024-06-12 PROCEDURE — 2580000003 HC RX 258

## 2024-06-12 PROCEDURE — 76775 US EXAM ABDO BACK WALL LIM: CPT

## 2024-06-12 PROCEDURE — 99223 1ST HOSP IP/OBS HIGH 75: CPT | Performed by: INTERNAL MEDICINE

## 2024-06-12 PROCEDURE — 87077 CULTURE AEROBIC IDENTIFY: CPT

## 2024-06-12 PROCEDURE — 80053 COMPREHEN METABOLIC PANEL: CPT

## 2024-06-12 PROCEDURE — 87186 SC STD MICRODIL/AGAR DIL: CPT

## 2024-06-12 PROCEDURE — 1200000000 HC SEMI PRIVATE

## 2024-06-12 PROCEDURE — 83605 ASSAY OF LACTIC ACID: CPT

## 2024-06-12 PROCEDURE — 87184 SC STD DISK METHOD PER PLATE: CPT

## 2024-06-12 PROCEDURE — 6370000000 HC RX 637 (ALT 250 FOR IP): Performed by: STUDENT IN AN ORGANIZED HEALTH CARE EDUCATION/TRAINING PROGRAM

## 2024-06-12 PROCEDURE — 87641 MR-STAPH DNA AMP PROBE: CPT

## 2024-06-12 PROCEDURE — 36415 COLL VENOUS BLD VENIPUNCTURE: CPT

## 2024-06-12 PROCEDURE — 99222 1ST HOSP IP/OBS MODERATE 55: CPT | Performed by: STUDENT IN AN ORGANIZED HEALTH CARE EDUCATION/TRAINING PROGRAM

## 2024-06-12 PROCEDURE — 86140 C-REACTIVE PROTEIN: CPT

## 2024-06-12 PROCEDURE — 6360000002 HC RX W HCPCS: Performed by: STUDENT IN AN ORGANIZED HEALTH CARE EDUCATION/TRAINING PROGRAM

## 2024-06-12 RX ORDER — LEVOTHYROXINE SODIUM 0.12 MG/1
125 TABLET ORAL DAILY
Status: DISCONTINUED | OUTPATIENT
Start: 2024-06-12 | End: 2024-06-19 | Stop reason: HOSPADM

## 2024-06-12 RX ORDER — SODIUM CHLORIDE 9 MG/ML
INJECTION, SOLUTION INTRAVENOUS CONTINUOUS
Status: DISCONTINUED | OUTPATIENT
Start: 2024-06-12 | End: 2024-06-14

## 2024-06-12 RX ADMIN — INSULIN LISPRO 4 UNITS: 100 INJECTION, SOLUTION INTRAVENOUS; SUBCUTANEOUS at 18:12

## 2024-06-12 RX ADMIN — ATORVASTATIN CALCIUM 10 MG: 20 TABLET, FILM COATED ORAL at 07:48

## 2024-06-12 RX ADMIN — GABAPENTIN 100 MG: 100 CAPSULE ORAL at 22:08

## 2024-06-12 RX ADMIN — LEVOTHYROXINE SODIUM 125 MCG: 0.12 TABLET ORAL at 07:48

## 2024-06-12 RX ADMIN — TRAMADOL HYDROCHLORIDE 50 MG: 50 TABLET ORAL at 22:08

## 2024-06-12 RX ADMIN — RIFAXIMIN 550 MG: 550 TABLET ORAL at 23:05

## 2024-06-12 RX ADMIN — ENOXAPARIN SODIUM 40 MG: 100 INJECTION SUBCUTANEOUS at 07:50

## 2024-06-12 RX ADMIN — Medication 6 MG: at 22:08

## 2024-06-12 RX ADMIN — ASPIRIN 81 MG: 81 TABLET, COATED ORAL at 07:48

## 2024-06-12 RX ADMIN — PANTOPRAZOLE SODIUM 40 MG: 40 TABLET, DELAYED RELEASE ORAL at 07:48

## 2024-06-12 RX ADMIN — CHOLECALCIFEROL TAB 25 MCG (1000 UNIT) 4000 UNITS: 25 TAB at 07:48

## 2024-06-12 RX ADMIN — PANTOPRAZOLE SODIUM 40 MG: 40 TABLET, DELAYED RELEASE ORAL at 15:34

## 2024-06-12 RX ADMIN — SODIUM CHLORIDE, PRESERVATIVE FREE 10 ML: 5 INJECTION INTRAVENOUS at 21:45

## 2024-06-12 RX ADMIN — INSULIN GLARGINE 20 UNITS: 100 INJECTION, SOLUTION SUBCUTANEOUS at 23:06

## 2024-06-12 RX ADMIN — GABAPENTIN 100 MG: 100 CAPSULE ORAL at 07:48

## 2024-06-12 RX ADMIN — GABAPENTIN 100 MG: 100 CAPSULE ORAL at 14:06

## 2024-06-12 RX ADMIN — SODIUM CHLORIDE: 9 INJECTION, SOLUTION INTRAVENOUS at 13:14

## 2024-06-12 RX ADMIN — SODIUM CHLORIDE: 9 INJECTION, SOLUTION INTRAVENOUS at 00:42

## 2024-06-12 RX ADMIN — TRAMADOL HYDROCHLORIDE 50 MG: 50 TABLET ORAL at 10:45

## 2024-06-12 RX ADMIN — RIFAXIMIN 550 MG: 550 TABLET ORAL at 07:48

## 2024-06-12 RX ADMIN — SODIUM CHLORIDE, PRESERVATIVE FREE 10 ML: 5 INJECTION INTRAVENOUS at 07:55

## 2024-06-12 RX ADMIN — TAMSULOSIN HYDROCHLORIDE 0.4 MG: 0.4 CAPSULE ORAL at 07:48

## 2024-06-12 ASSESSMENT — PAIN DESCRIPTION - LOCATION
LOCATION: BACK
LOCATION: BACK;HIP
LOCATION: BACK
LOCATION: BACK

## 2024-06-12 ASSESSMENT — PAIN SCALES - WONG BAKER
WONGBAKER_NUMERICALRESPONSE: HURTS WORST
WONGBAKER_NUMERICALRESPONSE: HURTS LITTLE MORE

## 2024-06-12 ASSESSMENT — PAIN DESCRIPTION - DESCRIPTORS
DESCRIPTORS: STABBING;THROBBING
DESCRIPTORS: ACHING;DISCOMFORT
DESCRIPTORS: ACHING

## 2024-06-12 ASSESSMENT — PAIN DESCRIPTION - PAIN TYPE
TYPE: CHRONIC PAIN
TYPE: CHRONIC PAIN

## 2024-06-12 ASSESSMENT — PAIN SCALES - GENERAL
PAINLEVEL_OUTOF10: 4
PAINLEVEL_OUTOF10: 9
PAINLEVEL_OUTOF10: 9
PAINLEVEL_OUTOF10: 6
PAINLEVEL_OUTOF10: 7
PAINLEVEL_OUTOF10: 7

## 2024-06-12 ASSESSMENT — PAIN DESCRIPTION - ORIENTATION
ORIENTATION: MID;LOWER
ORIENTATION: MID;LOWER;RIGHT
ORIENTATION: MID;LOWER
ORIENTATION: LOWER

## 2024-06-12 NOTE — CONSULTS
Nephrology Consult Note    Reason for Consult:  acute kidney injury    Requesting Physician:  Eri    Chief Complaint:  weakness after coming home from [facility]CF  History Obtained From:  patient, electronic medical record    History of Present Illness:              This is a 68 y.o. female who presents with weakness after she said she was forced out of the ECF by insurance as they would not pay for any further stay.  She states that forced her to come back to the hospital because of her severe weakness.  She has a history of cirrhosis of the liver secondary to fatty liver disease, although it is unclear to me whether or not she has had a liver biopsy, per her description.  She notes that recently she developed a significant amount of swelling in her extremities and her belly that was helped with use of diuretics.  She states her edema of her extremities at one time caused oozing of fluid, but that is much better now.  She has some loose dressings over her lower legs..     The patient a prescription creatinine back in May of 2024 was 0.7-0.8 range at its best.  The creatinine peaked at 1.7 on 4/27/2024 as she did have a previous acute kidney injury episode.  The creatinine was 1.1 on 6/10/24 and then up to 1.6 yesterday and then 1.6 today, 6/12/2024.   The patient has been seen by Dr. Cordova and Davis in April and May of 2024 4 hyponatremia and acute kidney injury. In April 2024 she presented with gross hematuria and anasarca and distal gastritis and duodenitis noted on CT scan of the abdomen and pelvis. She then underwent cystoscopy on 4/29/2024 with clot evacuation and transurethral resection of a bladder tumor. Pathology showed mostly blood clots with a bit of urothelium and granulation tissue but no bladder tumor tissue found. Serum sodium levels in late April and early May of 2024 showed generally the levels in the 126-129 range although there was one episode of 123.  She also has a history of primary hypertension,  Running Out of Food in the Last Year: Never true     Ran Out of Food in the Last Year: Never true   Transportation Needs: No Transportation Needs (6/10/2024)    PRAPARE - Transportation     Lack of Transportation (Medical): No     Lack of Transportation (Non-Medical): No   Physical Activity: Insufficiently Active (2023)    Exercise Vital Sign     Days of Exercise per Week: 2 days     Minutes of Exercise per Session: 30 min   Stress: Not on file   Social Connections: Not on file   Intimate Partner Violence: Not on file   Housing Stability: Low Risk  (6/10/2024)    Housing Stability Vital Sign     Unable to Pay for Housing in the Last Year: No     Number of Places Lived in the Last Year: 1     Unstable Housing in the Last Year: No       Family History:   Family History   Problem Relation Age of Onset    Breast Cancer Mother 42    Diabetes Mother     Stroke Mother     Heart Disease Father     Cancer Father         prostate    Cancer Sister         cervical    Breast Cancer Sister 71    Diabetes Other         multiple relatives on Mom's side       Review of Systems:    Pertinent positives stated above in HPI. All other systems were reviewed and were negative.    Objective:  CURRENT TEMPERATURE:  Temp: 98.2 °F (36.8 °C)  MAXIMUM TEMPERATURE OVER 24HRS:  Temp (24hrs), Av.2 °F (36.8 °C), Min:98.2 °F (36.8 °C), Max:98.2 °F (36.8 °C)    CURRENT RESPIRATORY RATE:  Respirations: 16  CURRENT PULSE:  Pulse: 80  CURRENT BLOOD PRESSURE:  BP: (!) 102/54  24HR BLOOD PRESSURE RANGE:  Systolic (24hrs), Av , Min:102 , Max:106   ; Diastolic (24hrs), Av, Min:45, Max:54    24HR INTAKE/OUTPUT:  No intake or output data in the 24 hours ending 24 0811    Physical Exam:  General appearance:Awake, alert, in no acute distress, weak and tired  Skin: warm and dry, no rash or erythema  Eyes: conjunctivae pale and sclera anicteric  ENT: :no thrush, moist mucous membranes    Neck: no JVD, midline trachea and no accessory

## 2024-06-12 NOTE — PROGRESS NOTES
[facility]  Internal Medicine Teaching Residency Program  Inpatient Daily Progress Note  ______________________________________________________________________________    Patient: Bobbi Phillips  YOB: 1955   MRN:[id number]    Acct: [id number]     Room: OBS 31/31-1  Admit date: 6/10/2024  Today's date: 06/12/24  Number of days in the hospital: 1    SUBJECTIVE   CC: Fatigue    Pt examined at bedside. Chart & results reviewed.   -VSS, pt is saturating well on room air   -Still has back pain  -labs reviewed   -no acute events overnight.   -Patient denies headache, vision problems, chest pain  , SOB, wheezing, abdominal pain, changes in bowel habits, constipation, changes in urinary habits, lower extremity swelling, nausea , vomiting, and diarrhea     ROS:  General ROS: Completed and except as mentioned above were negative   HEENT ROS: Completed and except as mentioned above were negative   Allergy and Immunology ROS:  Completed and except as mentioned above were negative  Hematological and Lymphatic ROS:  Completed and except as mentioned above were negative  Respiratory ROS:  Completed and except as mentioned above were negative  Cardiovascular ROS:  Completed and except as mentioned above were negative  Gastrointestinal ROS: Completed and except as mentioned above were negative  Genito-Urinary ROS:  Completed and except as mentioned above were negative  Musculoskeletal ROS:  Completed and except as mentioned above were negative  Neurological ROS:  Completed and except as mentioned above were negative  Skin & Dermatological ROS:  Completed and except as mentioned above were negative  Psychological ROS:  Completed and except as mentioned above were negative  BRIEF HISTORY     The patient is a pleasant 68 y.o. female with a PMHx significant for      Hepatic cirrhosis  Hypertension  Dyslipidemia  CKD  Type 2 diabetes mellitus        presents with a chief complaint

## 2024-06-12 NOTE — PLAN OF CARE
MRI Tech called to obtain MRI checklist. No checklist filled out due to patient having high anxiety.     Nurse called back to alert MRI Tech that patient is refusing MRI.   Ordering physician aware.   Orders to be discontinue due to patient refusing.

## 2024-06-12 NOTE — CONSULTS
Physical Medicine & Rehabilitation  Consult Note      Admitting Physician:  Ray Hwang MD    Primary Care Provider:  Stacie Doty MD     Reason for Consult:  Acute Inpatient Rehabilitation    Chief Complaint:  Weakness    History of Present Illness:  Referring Provider is requesting an evaluation for appropriate placement upon discharge from acute care. History from chart review and patient.    Bobbi Phillips is a 68 y.o. right-handed female with history of liver cirrhosis, HTN, HLD, type 2 diabetes, CKD admitted to [facility] on 6/10/2024.      She initially presented with weakness and inability to ambulate.  Per records, she had been discharged from skilled nursing facility the day prior to presentation.  She has been treated for KIM on CKD and new diagnosis of hypothyroidism.  Neurosurgery was consulted for back pain and possible L1-L2 discitis/osteomyelitis on CT imaging.  She declined MRI.  ID was consulted and recommended monitoring off antibiotics at this time but also IR-guided biopsy of L1-L2 for culture and cytology.    She reports ongoing low back pain as well as hip pain.  She notes chronic numbness/tingling in the fingertips and feet.    Review of Systems:  Review of Systems   Constitutional:  Negative for fever.   Respiratory:  Negative for shortness of breath.    Cardiovascular:  Negative for chest pain.   Gastrointestinal:  Negative for abdominal pain.   Musculoskeletal:  Positive for back pain and joint pain.   Neurological:  Positive for sensory change.      Premorbid function:  Needing assistance with lower body bathing, IADLs (independent prior to 1 month ago)    Current function:    Physical Therapy    Restrictions/Precautions: Up as Tolerated    Bed mobility  Supine to Sit: Minimal assistance, 2 Person assistance  Sit to Supine: Moderate assistance  Scooting: Minimal assistance  Bed Mobility Comments: HOB elevated ~30 degrees with use of bedrails.  Verbal cues for  TECHNOLOGIST PROVIDED HISTORY: eval ascites FINDINGS: Cirrhosis. Ascites is seen in all 4 quadrants, of small to moderate volume.     Cirrhosis with small to moderate ascites.     XR CHEST PORTABLE    Result Date: 6/10/2024  EXAMINATION: ONE XRAY VIEW OF THE CHEST 6/10/2024 2:46 pm COMPARISON: 05/13/2024 HISTORY: ORDERING SYSTEM PROVIDED HISTORY: irregular HR, vomiting, eval perf TECHNOLOGIST PROVIDED HISTORY: irregular HR, vomiting, eval perf FINDINGS: Stable cardiomediastinal silhouette.  Mild vascular congestion.  No focal consolidation.  Elevated right hemidiaphragm unchanged.  No pneumothorax. Degenerative changes in the spine.     Stable cardiomegaly.  Mild vascular congestion.  No focal consolidation.       Impression:    Debility  Newly diagnosed hypothyroidism  Possible L1-L2 discitis/osteomyelitis  KIM on CKD  Anemia  Hyponatremia  Liver cirrhosis  HTN  HLD  Type 2 diabetes  Morbid obesity    Recommendations:    Diagnosis:  Debility  Therapy: Has PT/OT needs  Medical Necessity: As above  Support: Lives with son  Rehab Recommendation:  The patient does not meet criteria for acute inpatient rehabilitation at this time, as she is too high-functioning.  DVT Prophylaxis: Lovenox      It was my pleasure to evaluate Bobbi Phillips today.  Please call with questions.    Kate Castellanos MD

## 2024-06-12 NOTE — PROGRESS NOTES
Pt ambulated to bathroom with walker and assist. Pt urinated and had BM. No urine found on bladder scan post void

## 2024-06-12 NOTE — PLAN OF CARE
NO ACUTE NEUROSURGICAL INTERVENTION AT THIS TIME    NEUROSURGERY TO SIGN OFF     Please contact Neurosurgery with any questions or acute changes in neurological exam  Electronically signed by JOE Navarro NP on 6/12/2024 at 10:35 AM

## 2024-06-12 NOTE — CONSULTS
[facility] - Initial Consult Note  Today's Date and Time: 6/12/2024, 7:02 AM    Impression :   Concern for L1-2 discitis/osteomyelitis with evidence of bone loss  Lower extremity weakness  Lower extremity wounds  Ascites  BONNER with regular paracentesis  Hypothyroidism  CKD  DM II  CAD  HTN  Hx of recurrent UTI    Recommendations:   Monitor off antibiotics at this time  Follow-up on MRI imaging  Suggest IR guided biopsy of L1-L2  for culture and cytology  Neurosurgery evaluation    Medical Decision Making/Summary/Discussion:6/12/2024     Daily Elements of Decision Making provided by Consulting Physician:    Note: I have independently performed the steps listed below as part of the medical decision making and evaluation.    Review of current Problems:  Today I am seeing the patient for the following problems:  Concern for L1-2 discitis/osteomyelitis  Lower extremity weakness  Lower extremity wounds  Ascites  BONNER with regular paracentesis  Hypothyroidism  CKD  DM II  CAD  HTN  Hx of recurrent UTI  Evaluation of Patient:  Evaluated because of back pain and lower extremity weakness  Ascites   L1-L2 diskitis and osteomyelitis  Please see daily details in Interval Changes Section  Changes in physical exam:  Back pain and lower extremity weakness  Ascites   L1-L2 diskitis and osteomyelitis  Please see daily details in Physical Exam section and in Interval Changes Section  Changes in ROS:  Unchanged  Please see daily details in Review of Symptoms section and in Interval Changes Section  Discussion with Referring Physician or Service  Dr. SHRADDHA Hwang  Laboratory and Radiologic Studies with personal review of radiologic studies  Laboratory  Radiology  Microbiology  Please see Details in daily Interval Changes Section devoted to Lab, Micro and Radiology and in Medical Decision Laboratory, Imaging and Cultures sections.  Review of Infection Control and Prevention measures:  Universal  faecium  24: pending  Blood:  6/10/24: No growth  Sputum :    Wound:    MRSA Nares:  24: Pending    Imaging:    CT  24            I have personally reviewed the past medical history, past surgical history, medications, social history, and family history, and I have updated the database accordingly.  Past Medical History:     Past Medical History:   Diagnosis Date    Abdominal swelling     Claustrophobia     COVID-19 vaccine administered     Diabetes (HCC)     DEXCOM LT ARM    Diabetic retinopathy (HCC) 2015    Flatus     H/O acute sinusitis     H/O cholelithiasis     Hypertension     Liver cirrhosis (HCC)     LLQ abdominal tenderness     Poor venous access     \"THEY LIKE TO ROLL & RUN AWAY\"    Positive colorectal cancer screening using Cologuard test 2023    had colonoscopy after this and no cancer was found    Post-menopausal bleeding     RUQ abdominal pain     Tendonitis of shoulder, right 2021    Tingling     Under care of service provider 2023    mfx-izcjfyska-jvlucdlkeh in [place]-last visit dec 2023    Under care of service provider 2023    gbivol-vgifwshv-xlezrx st in [place]-last visit dec 2023    Vaginal bleeding 2023    \"SPOTTING, THICKENED UTERINE LINING\"DUE TO HAVE VAGINAL /UTERINE BIOPSY FOR\"    Vaginal candidiasis     Wears glasses     READING       Past Surgical  History:     Past Surgical History:   Procedure Laterality Date    ANKLE FRACTURE SURGERY Left     no retained metal    ANOMALOUS VENOUS RETURN REPAIR N/A 2024    CYSTOSCOPY, TRANSURETHRAL RESECTION BLADDER,  EVACUATION OF CLOTS with Catheter Insertion for Continuous Irrigation performed by Jason Lambert MD at [facility] OR    CARDIAC CATHETERIZATION  2006    CATARACT REMOVAL WITH IMPLANT Bilateral 2018     SECTION      x2, ,  \"SADDLE BLOCK\"    CHOLECYSTECTOMY, LAPAROSCOPIC  2012    COLONOSCOPY N/A 2023    COLONOSCOPY WITH BIOPSY performed by Carlos Campos MD at

## 2024-06-12 NOTE — PLAN OF CARE
Problem: Safety - Adult  Goal: Free from fall injury  6/12/2024 1704 by Betsey Salinas, RN  Outcome: Progressing     Problem: Chronic Conditions and Co-morbidities  Goal: Patient's chronic conditions and co-morbidity symptoms are monitored and maintained or improved  6/12/2024 1704 by Betsey Salinas, RN  Outcome: Progressing     Problem: Discharge Planning  Goal: Discharge to home or other facility with appropriate resources  6/12/2024 1704 by Betsey Salinas, RN  Outcome: Progressing

## 2024-06-12 NOTE — CONSULTS
Department of Neurosurgery                                            Nurse Practitioner Consult Note      Reason for Consult:  back pain leg weakness, osteo L1-2  Requesting Physician:    Neurosurgeon:   [] Dr. Jimenez  [] Dr. Nielson  [] Dr. Ramos  [x] Dr. Benz        History Obtained From:  patient    CHIEF COMPLAINT:         Chief Complaint   Patient presents with    Fatigue       HISTORY OF PRESENT ILLNESS:       The patient is a 68 y.o. female who  has history of liver cirrhosis with paracentesis who presents with bilateral leg swelling and back pain, she was sent from the facility she is currently at. She reports some back when she up upright but it subsides when she is walking. CT lumbar showing findings concerning for L1-2 discitis/osteo. Patient is refusing MRI thoracic and lumbar spine. CRP and ESR no completed       She has full strength in both legs slight weakness on left dorsiflexion, sensation intact   4+ pitting edema     No fevers chills night sweats   PAST MEDICAL HISTORY :       Past Medical History:        Diagnosis Date    Abdominal swelling     Claustrophobia     COVID-19 vaccine administered     Diabetes (HCC)     DEXCOM LT ARM    Diabetic retinopathy (HCC) 11/28/2015    Flatus     H/O acute sinusitis     H/O cholelithiasis     Hypertension     Liver cirrhosis (HCC)     LLQ abdominal tenderness     Poor venous access     \"THEY LIKE TO ROLL & RUN AWAY\"    Positive colorectal cancer screening using Cologuard test 07/06/2023    had colonoscopy after this and no cancer was found    Post-menopausal bleeding     RUQ abdominal pain     Tendonitis of shoulder, right 12/09/2021    Tingling     Under care of service provider 12/28/2023    yxw-htdrellrf-bbhwindsyu in [place]-last visit dec 2023    Under care of service provider 12/28/2023    uuukrp-mspwsvfq-bqoutr st in [place]-last visit dec 2023    Vaginal bleeding 08/2023    \"SPOTTING, THICKENED UTERINE LINING\"DUE TO HAVE VAGINAL  Expenses: Not hard at all   Food Insecurity: No Food Insecurity (6/10/2024)    Hunger Vital Sign     Worried About Running Out of Food in the Last Year: Never true     Ran Out of Food in the Last Year: Never true   Transportation Needs: No Transportation Needs (6/10/2024)    PRAPARE - Transportation     Lack of Transportation (Medical): No     Lack of Transportation (Non-Medical): No   Physical Activity: Insufficiently Active (6/26/2023)    Exercise Vital Sign     Days of Exercise per Week: 2 days     Minutes of Exercise per Session: 30 min   Stress: Not on file   Social Connections: Not on file   Intimate Partner Violence: Not on file   Housing Stability: Low Risk  (6/10/2024)    Housing Stability Vital Sign     Unable to Pay for Housing in the Last Year: No     Number of Places Lived in the Last Year: 1     Unstable Housing in the Last Year: No       Family History:       Problem Relation Age of Onset    Breast Cancer Mother 42    Diabetes Mother     Stroke Mother     Heart Disease Father     Cancer Father         prostate    Cancer Sister         cervical    Breast Cancer Sister 71    Diabetes Other         multiple relatives on Mom's side       Allergies:  Tylenol [acetaminophen]    Home Medications:  Prior to Admission medications    Medication Sig Start Date End Date Taking? Authorizing Provider   ondansetron (ZOFRAN) 4 MG tablet Take 1 tablet by mouth every 8 hours as needed for Nausea 6/10/24  Yes Sylvester Perla DO   Magnesium 500 MG CAPS Take 1 capsule by mouth in the morning and at bedtime  Patient not taking: Reported on 6/10/2024 5/17/24   Germania Osborne MD   cyclobenzaprine (FLEXERIL) 5 MG tablet Take 1 tablet by mouth 3 times daily as needed for Muscle spasms  Patient not taking: Reported on 6/10/2024    ProviderJose MD   traMADol (ULTRAM) 50 MG tablet Take 1 tablet by mouth every 8 hours as needed for Pain.    Jose Ames MD   bumetanide (BUMEX) 1 MG tablet Take 1 tablet by

## 2024-06-12 NOTE — PROGRESS NOTES
Patient has BLE weeping sores, present on admission. Dressing changed. Awaiting wounds care . Pt also has unstagable coccyx wound that was present on admission. Meplix applied to area.

## 2024-06-13 ENCOUNTER — APPOINTMENT (OUTPATIENT)
Dept: GENERAL RADIOLOGY | Age: 69
DRG: 683 | End: 2024-06-13
Payer: MEDICARE

## 2024-06-13 LAB
ALBUMIN PERCENT: 34 % (ref 45–65)
ALBUMIN SERPL-MCNC: 2 G/DL (ref 3.2–5.2)
ALBUMIN SERPL-MCNC: 2.3 G/DL (ref 3.5–5.2)
ALBUMIN/GLOB SERPL: 1 {RATIO} (ref 1–2.5)
ALP SERPL-CCNC: 427 U/L (ref 35–104)
ALPHA 2 PERCENT: 13 % (ref 6–13)
ALPHA1 GLOB SERPL ELPH-MCNC: 0.4 G/DL (ref 0.1–0.4)
ALPHA1 GLOB SERPL ELPH-MCNC: 6 % (ref 3–6)
ALPHA2 GLOB SERPL ELPH-MCNC: 0.8 G/DL (ref 0.5–0.9)
ALT SERPL-CCNC: 10 U/L (ref 10–35)
ANA SER QL IA: ABNORMAL
ANION GAP SERPL CALCULATED.3IONS-SCNC: 10 MMOL/L (ref 9–16)
ANION GAP SERPL CALCULATED.3IONS-SCNC: 9 MMOL/L (ref 9–16)
AST SERPL-CCNC: 34 U/L (ref 10–35)
B-GLOBULIN SERPL ELPH-MCNC: 1.1 G/DL (ref 0.5–1.1)
B-GLOBULIN SERPL ELPH-MCNC: 18 % (ref 11–19)
BASOPHILS # BLD: 0.03 K/UL (ref 0–0.2)
BASOPHILS NFR BLD: 1 % (ref 0–2)
BILIRUB SERPL-MCNC: 0.3 MG/DL (ref 0–1.2)
BUN SERPL-MCNC: 37 MG/DL (ref 8–23)
BUN SERPL-MCNC: 38 MG/DL (ref 8–23)
CALCIUM SERPL-MCNC: 7.7 MG/DL (ref 8.6–10.4)
CALCIUM SERPL-MCNC: 8.1 MG/DL (ref 8.6–10.4)
CHLORIDE SERPL-SCNC: 99 MMOL/L (ref 98–107)
CHLORIDE SERPL-SCNC: 99 MMOL/L (ref 98–107)
CO2 SERPL-SCNC: 23 MMOL/L (ref 20–31)
CO2 SERPL-SCNC: 23 MMOL/L (ref 20–31)
CREAT SERPL-MCNC: 1.7 MG/DL (ref 0.5–0.9)
CREAT SERPL-MCNC: 1.7 MG/DL (ref 0.5–0.9)
CREAT UR-MCNC: 87.8 MG/DL (ref 28–217)
DSDNA IGG SER QL IA: 2.5 IU/ML
EOSINOPHIL # BLD: 0.33 K/UL (ref 0–0.44)
EOSINOPHILS RELATIVE PERCENT: 8 % (ref 1–4)
ERYTHROCYTE [DISTWIDTH] IN BLOOD BY AUTOMATED COUNT: 16.3 % (ref 11.8–14.4)
GAMMA GLOB SERPL ELPH-MCNC: 1.6 G/DL (ref 0.5–1.5)
GAMMA GLOBULIN %: 28 % (ref 9–20)
GFR, ESTIMATED: 31 ML/MIN/1.73M2
GFR, ESTIMATED: 32 ML/MIN/1.73M2
GLUCOSE BLD-MCNC: 118 MG/DL (ref 65–105)
GLUCOSE BLD-MCNC: 157 MG/DL (ref 65–105)
GLUCOSE BLD-MCNC: 190 MG/DL (ref 65–105)
GLUCOSE BLD-MCNC: 195 MG/DL (ref 65–105)
GLUCOSE SERPL-MCNC: 146 MG/DL (ref 74–99)
GLUCOSE SERPL-MCNC: 209 MG/DL (ref 74–99)
HCT VFR BLD AUTO: 27.3 % (ref 36.3–47.1)
HGB BLD-MCNC: 8.5 G/DL (ref 11.9–15.1)
IMM GRANULOCYTES # BLD AUTO: <0.03 K/UL (ref 0–0.3)
IMM GRANULOCYTES NFR BLD: 0 %
ITYP INTERPRETATION: NORMAL
LYMPHOCYTES NFR BLD: 0.77 K/UL (ref 1.1–3.7)
LYMPHOCYTES RELATIVE PERCENT: 18 % (ref 24–43)
MCH RBC QN AUTO: 28.2 PG (ref 25.2–33.5)
MCHC RBC AUTO-ENTMCNC: 31.1 G/DL (ref 28.4–34.8)
MCV RBC AUTO: 90.7 FL (ref 82.6–102.9)
MONOCYTES NFR BLD: 0.5 K/UL (ref 0.1–1.2)
MONOCYTES NFR BLD: 11 % (ref 3–12)
NEUTROPHILS NFR BLD: 62 % (ref 36–65)
NEUTS SEG NFR BLD: 2.76 K/UL (ref 1.5–8.1)
NRBC BLD-RTO: 0 PER 100 WBC
NUCLEAR IGG SER IA-RTO: 0.7 U/ML
PATH REV: NORMAL
PATHOLOGIST: ABNORMAL
PLATELET # BLD AUTO: 209 K/UL (ref 138–453)
PMV BLD AUTO: 9.7 FL (ref 8.1–13.5)
POTASSIUM SERPL-SCNC: 4.1 MMOL/L (ref 3.7–5.3)
POTASSIUM SERPL-SCNC: 4.5 MMOL/L (ref 3.7–5.3)
PROT PATTERN SERPL ELPH-IMP: ABNORMAL
PROT SERPL-MCNC: 5.7 G/DL (ref 6.6–8.7)
PROT SERPL-MCNC: 6.2 G/DL (ref 6.6–8.7)
RBC # BLD AUTO: 3.01 M/UL (ref 3.95–5.11)
RBC # BLD: ABNORMAL 10*6/UL
SODIUM SERPL-SCNC: 131 MMOL/L (ref 136–145)
SODIUM SERPL-SCNC: 132 MMOL/L (ref 136–145)
SODIUM UR-SCNC: 50 MMOL/L
TOTAL PROT. SUM,%: 99 % (ref 98–102)
TOTAL PROT. SUM: 5.9 G/DL (ref 6.3–8.2)
WBC OTHER # BLD: 4.4 K/UL (ref 3.5–11.3)

## 2024-06-13 PROCEDURE — 1200000000 HC SEMI PRIVATE

## 2024-06-13 PROCEDURE — 97530 THERAPEUTIC ACTIVITIES: CPT

## 2024-06-13 PROCEDURE — 85025 COMPLETE CBC W/AUTO DIFF WBC: CPT

## 2024-06-13 PROCEDURE — 2580000003 HC RX 258: Performed by: STUDENT IN AN ORGANIZED HEALTH CARE EDUCATION/TRAINING PROGRAM

## 2024-06-13 PROCEDURE — 6370000000 HC RX 637 (ALT 250 FOR IP)

## 2024-06-13 PROCEDURE — 6360000002 HC RX W HCPCS

## 2024-06-13 PROCEDURE — 99232 SBSQ HOSP IP/OBS MODERATE 35: CPT | Performed by: INTERNAL MEDICINE

## 2024-06-13 PROCEDURE — 99213 OFFICE O/P EST LOW 20 MIN: CPT

## 2024-06-13 PROCEDURE — 6370000000 HC RX 637 (ALT 250 FOR IP): Performed by: STUDENT IN AN ORGANIZED HEALTH CARE EDUCATION/TRAINING PROGRAM

## 2024-06-13 PROCEDURE — 80053 COMPREHEN METABOLIC PANEL: CPT

## 2024-06-13 PROCEDURE — 82947 ASSAY GLUCOSE BLOOD QUANT: CPT

## 2024-06-13 PROCEDURE — 84300 ASSAY OF URINE SODIUM: CPT

## 2024-06-13 PROCEDURE — 36415 COLL VENOUS BLD VENIPUNCTURE: CPT

## 2024-06-13 PROCEDURE — 2580000003 HC RX 258: Performed by: INTERNAL MEDICINE

## 2024-06-13 PROCEDURE — 80048 BASIC METABOLIC PNL TOTAL CA: CPT

## 2024-06-13 PROCEDURE — 82570 ASSAY OF URINE CREATININE: CPT

## 2024-06-13 RX ORDER — HEPARIN SODIUM 5000 [USP'U]/ML
5000 INJECTION, SOLUTION INTRAVENOUS; SUBCUTANEOUS EVERY 8 HOURS SCHEDULED
Status: DISCONTINUED | OUTPATIENT
Start: 2024-06-13 | End: 2024-06-19 | Stop reason: HOSPADM

## 2024-06-13 RX ADMIN — INSULIN GLARGINE 20 UNITS: 100 INJECTION, SOLUTION SUBCUTANEOUS at 21:08

## 2024-06-13 RX ADMIN — LEVOTHYROXINE SODIUM 125 MCG: 0.12 TABLET ORAL at 06:38

## 2024-06-13 RX ADMIN — ATORVASTATIN CALCIUM 10 MG: 20 TABLET, FILM COATED ORAL at 08:09

## 2024-06-13 RX ADMIN — ASPIRIN 81 MG: 81 TABLET, COATED ORAL at 08:09

## 2024-06-13 RX ADMIN — GABAPENTIN 100 MG: 100 CAPSULE ORAL at 08:10

## 2024-06-13 RX ADMIN — GABAPENTIN 100 MG: 100 CAPSULE ORAL at 21:08

## 2024-06-13 RX ADMIN — TRAMADOL HYDROCHLORIDE 50 MG: 50 TABLET ORAL at 21:08

## 2024-06-13 RX ADMIN — HEPARIN SODIUM 5000 UNITS: 5000 INJECTION INTRAVENOUS; SUBCUTANEOUS at 08:10

## 2024-06-13 RX ADMIN — CHOLECALCIFEROL TAB 25 MCG (1000 UNIT) 4000 UNITS: 25 TAB at 08:10

## 2024-06-13 RX ADMIN — HEPARIN SODIUM 5000 UNITS: 5000 INJECTION INTRAVENOUS; SUBCUTANEOUS at 21:08

## 2024-06-13 RX ADMIN — TRAMADOL HYDROCHLORIDE 50 MG: 50 TABLET ORAL at 14:51

## 2024-06-13 RX ADMIN — RIFAXIMIN 550 MG: 550 TABLET ORAL at 08:10

## 2024-06-13 RX ADMIN — PANTOPRAZOLE SODIUM 40 MG: 40 TABLET, DELAYED RELEASE ORAL at 14:51

## 2024-06-13 RX ADMIN — RIFAXIMIN 550 MG: 550 TABLET ORAL at 21:10

## 2024-06-13 RX ADMIN — SODIUM CHLORIDE, PRESERVATIVE FREE 10 ML: 5 INJECTION INTRAVENOUS at 21:10

## 2024-06-13 RX ADMIN — PANTOPRAZOLE SODIUM 40 MG: 40 TABLET, DELAYED RELEASE ORAL at 06:38

## 2024-06-13 RX ADMIN — Medication 6 MG: at 21:08

## 2024-06-13 RX ADMIN — GABAPENTIN 100 MG: 100 CAPSULE ORAL at 14:50

## 2024-06-13 RX ADMIN — SODIUM CHLORIDE: 9 INJECTION, SOLUTION INTRAVENOUS at 06:42

## 2024-06-13 RX ADMIN — TAMSULOSIN HYDROCHLORIDE 0.4 MG: 0.4 CAPSULE ORAL at 08:10

## 2024-06-13 ASSESSMENT — PAIN SCALES - GENERAL
PAINLEVEL_OUTOF10: 5
PAINLEVEL_OUTOF10: 4
PAINLEVEL_OUTOF10: 8
PAINLEVEL_OUTOF10: 8

## 2024-06-13 ASSESSMENT — PAIN DESCRIPTION - ORIENTATION
ORIENTATION: RIGHT;LEFT
ORIENTATION: RIGHT;LEFT

## 2024-06-13 ASSESSMENT — PAIN DESCRIPTION - PAIN TYPE
TYPE: CHRONIC PAIN
TYPE: CHRONIC PAIN

## 2024-06-13 ASSESSMENT — PAIN DESCRIPTION - DESCRIPTORS
DESCRIPTORS: ACHING
DESCRIPTORS: THROBBING

## 2024-06-13 ASSESSMENT — PAIN DESCRIPTION - LOCATION
LOCATION: COCCYX
LOCATION: LEG
LOCATION: LEG

## 2024-06-13 NOTE — PROGRESS NOTES
Physical Therapy  Facility/Department: [address] ORTHO/MED SURG  Physical Therapy Treatment Note    Name: Bobbi Phillips  : 1955  MRN: [id number]  Date of Service: 2024    Discharge Recommendations:  Patient would benefit from continued therapy after discharge   PT Equipment Recommendations  Equipment Needed: No      Patient Diagnosis(es): The primary encounter diagnosis was Other fatigue. A diagnosis of Nausea and vomiting, unspecified vomiting type was also pertinent to this visit.  Past Medical History:  has a past medical history of Abdominal swelling, Claustrophobia, COVID-19 vaccine administered, Diabetes (HCC), Diabetic retinopathy (HCC), Flatus, H/O acute sinusitis, H/O cholelithiasis, Hypertension, Liver cirrhosis (HCC), LLQ abdominal tenderness, Poor venous access, Positive colorectal cancer screening using Cologuard test, Post-menopausal bleeding, RUQ abdominal pain, Tendonitis of shoulder, right, Tingling, Under care of service provider, Under care of service provider, Vaginal bleeding, Vaginal candidiasis, and Wears glasses.  Past Surgical History:  has a past surgical history that includes Cholecystectomy, laparoscopic ();  section; Ankle fracture surgery (Left, ); Cataract removal with implant (Bilateral, ); Colonoscopy (N/A, 2023); Dilation and curettage of uterus (N/A, 10/02/2023); Cardiac catheterization (); hysteroscopy (2024); Dilation and curettage of uterus (N/A, 2024); Upper gastrointestinal endoscopy (N/A, 4/10/2024); and Anomalous venous return repair (N/A, 2024).    Assessment   Body Structures, Functions, Activity Limitations Requiring Skilled Therapeutic Intervention: Decreased functional mobility ;Decreased endurance;Decreased balance;Decreased strength;Decreased coordination  Assessment: Pt with mobility deficits requiring min assist to perform bed mobility, CGA to ambulate 100 feet with a RW.  Pt presents with  BLE weakness and

## 2024-06-13 NOTE — PROGRESS NOTES
(porcine) injection 5,000 Units, 3 times per day  levothyroxine (SYNTHROID) tablet 125 mcg, Daily  0.9 % sodium chloride infusion, Continuous  aspirin EC tablet 81 mg, Daily  atorvastatin (LIPITOR) tablet 10 mg, Daily  Vitamin D (CHOLECALCIFEROL) tablet 4,000 Units, Daily  tamsulosin (FLOMAX) capsule 0.4 mg, Daily  melatonin tablet 6 mg, Nightly PRN  gabapentin (NEURONTIN) capsule 100 mg, TID  traMADol (ULTRAM) tablet 50 mg, Q8H PRN  [Held by provider] bumetanide (BUMEX) tablet 1 mg, Daily  insulin glargine (LANTUS) injection vial 20 Units, Nightly  [Held by provider] lactulose (CHRONULAC) 10 GM/15ML solution 20 g, TID  iopamidol (ISOVUE-370) 76 % injection 75 mL, ONCE PRN  pantoprazole (PROTONIX) tablet 40 mg, BID AC  rifAXIMin (XIFAXAN) tablet 550 mg, BID  glucose chewable tablet 16 g, PRN  dextrose bolus 10% 125 mL, PRN   Or  dextrose bolus 10% 250 mL, PRN  glucagon injection 1 mg, PRN  dextrose 10 % infusion, Continuous PRN  insulin lispro (HUMALOG,ADMELOG) injection vial 0-16 Units, TID WC  insulin lispro (HUMALOG,ADMELOG) injection vial 0-4 Units, Nightly  [Held by provider] metoprolol tartrate (LOPRESSOR) tablet 25 mg, BID  sodium chloride flush 0.9 % injection 5-40 mL, 2 times per day  sodium chloride flush 0.9 % injection 5-40 mL, PRN  0.9 % sodium chloride infusion, PRN  acetaminophen (TYLENOL) tablet 650 mg, Q4H PRN  ondansetron (ZOFRAN-ODT) disintegrating tablet 4 mg, Q8H PRN   Or  ondansetron (ZOFRAN) injection 4 mg, Q6H PRN          LABS      CBC:   Recent Labs     06/11/24  1841 06/12/24  0433 06/13/24  0611   WBC 6.6 5.4 4.4   RBC 2.92* 2.88* 3.01*   HGB 8.2* 8.1* 8.5*   HCT 26.6* 26.8* 27.3*   MCV 91.1 93.1 90.7   MCH 28.1 28.1 28.2   MCHC 30.8 30.2 31.1   RDW 16.3* 16.5* 16.3*    209 209   MPV 9.7 9.8 9.7      BMP:   Recent Labs     06/11/24  1604 06/12/24  0433 06/13/24  0611   * 132* 132*   K 4.1 4.0 4.1    99 99   CO2 22 23 23   BUN 34* 35* 37*   CREATININE 1.6* 1.6* 1.7*    GLUCOSE 203* 122* 146*   CALCIUM 7.9* 8.0* 8.1*      IRON:    Lab Results   Component Value Date/Time    IRON 131 05/14/2024 07:47 AM     IRON SATURATION:  No components found for: \"LABIRON\"  TIBC:    Lab Results   Component Value Date/Time    TIBC Can not be calculated 05/14/2024 07:47 AM     FERRITIN:    Lab Results   Component Value Date/Time    FERRITIN 123 05/14/2024 07:47 AM     BOSTON:   Lab Results   Component Value Date    BOSTON EQUIVOCAL (A) 06/11/2024       SPEP:   Lab Results   Component Value Date/Time    PROT 6.9 01/30/2013 09:17 AM    ALBCAL 2.0 06/11/2024 10:01 PM    ALBPCT 34 06/11/2024 10:01 PM    LABALPH 0.4 06/11/2024 10:01 PM    LABALPH 0.8 06/11/2024 10:01 PM    A1PCT 6 06/11/2024 10:01 PM    A2PCT 13 06/11/2024 10:01 PM    BETAPCT 18 06/11/2024 10:01 PM    GAMGLOB 1.6 06/11/2024 10:01 PM    GGPCT 28 06/11/2024 10:01 PM    PATH PENDING 06/11/2024 10:01 PM     UPEP:   Lab Results   Component Value Date/Time     06/11/2024 01:19 PM      HEPBSAG:  Lab Results   Component Value Date/Time    HEPBSAG NONREACTIVE 06/11/2024 10:01 PM     HEPCAB:  Lab Results   Component Value Date/Time    HEPCAB NONREACTIVE 06/11/2024 10:01 PM     C3:   Lab Results   Component Value Date    C3 144 06/11/2024     C4:   Lab Results   Component Value Date    C4 19 06/11/2024   URINE POTASSIUM:    Lab Results   Component Value Date/Time    KUR 12.9 04/09/2024 12:16 AM   URINE OSMOLARITY:    Lab Results   Component Value Date/Time    OSMOU 398 04/15/2024 03:47 PM     URINE PROTEIN:    Lab Results   Component Value Date/Time     06/11/2024 01:19 PM     URINALYSIS:  U/A:   Lab Results   Component Value Date/Time    NITRU POSITIVE 06/11/2024 01:19 PM    COLORU Dark Yellow 06/11/2024 01:19 PM    PHUR 5.5 06/11/2024 01:19 PM    PHUR 7.0 04/27/2024 12:15 PM    WBCUA TOO NUMEROUS TO COUNT 06/11/2024 01:19 PM    RBCUA 10 TO 20 06/11/2024 01:19 PM    BACTERIA MANY 06/11/2024 01:19 PM    SPECGRAV >1.030 06/26/2023 04:06 PM

## 2024-06-13 NOTE — PROGRESS NOTES
Patient tearful/crying about getting into the bed from the chair, reports her back is so hurtful she just can't tolerate being in the bed.

## 2024-06-13 NOTE — PROGRESS NOTES
[facility] Wound Ostomy  Nurse  Consult Note       NAME:  Bobbi Phillips  MEDICAL RECORD NUMBER:  [id number]  AGE: 68 y.o.   GENDER: female  : 1955  TODAY'S DATE:  2024    Subjective   Reason for WOC Nurse Evaluation and Assessment: \"bedbound\"      Bobbi Phillips is a 68 y.o. female referred by:   [x] Physician  [] Nursing  [] Other:     Wound Identification:  Wound Type: venous and pressure  Contributing Factors: edema, chronic pressure, shear force, and obesity    Wound History:   Patient presents with coccygeal Stage 3 pressure injury; bilateral lower extremity pitting edema up to thighs with ruptured blisters approx 10 cm in diameter to bilat. medial calves.  Current Wound Care Treatment:    Removed roll gauze from lower legs and silicone bordered foam from lumbar back.  Patient is very tearful and anxious; afraid of being hurt by tight ACE wraps; encouraged to give it a try in order to reduce edema.    Patient Goal of Care:  [x] Wound Healing  [] Odor Control  [] Palliative Care  [] Pain Control   [] Other:         PAST MEDICAL HISTORY        Diagnosis Date    Abdominal swelling     Claustrophobia     COVID-19 vaccine administered     Diabetes (HCC)     DEXCOM LT ARM    Diabetic retinopathy (HCC) 2015    Flatus     H/O acute sinusitis     H/O cholelithiasis     Hypertension     Liver cirrhosis (HCC)     LLQ abdominal tenderness     Poor venous access     \"THEY LIKE TO ROLL & RUN AWAY\"    Positive colorectal cancer screening using Cologuard test 2023    had colonoscopy after this and no cancer was found    Post-menopausal bleeding     RUQ abdominal pain     Tendonitis of shoulder, right 2021    Tingling     Under care of service provider 2023    yfy-hgidwkyje-xnbmudsjnj in [place]-last visit dec 2023    Under care of service provider 2023    yboiwx-psmrvaoh-jjtubw st in [place]-last visit dec 2023    Vaginal bleeding 2023    \"SPOTTING, THICKENED UTERINE LINING\"DUE TO  facility-administered medications on file prior to encounter.     Current Outpatient Medications on File Prior to Encounter   Medication Sig Dispense Refill    Magnesium 500 MG CAPS Take 1 capsule by mouth in the morning and at bedtime (Patient not taking: Reported on 6/10/2024) 60 capsule 0    cyclobenzaprine (FLEXERIL) 5 MG tablet Take 1 tablet by mouth 3 times daily as needed for Muscle spasms (Patient not taking: Reported on 6/10/2024)      traMADol (ULTRAM) 50 MG tablet Take 1 tablet by mouth every 8 hours as needed for Pain.      bumetanide (BUMEX) 1 MG tablet Take 1 tablet by mouth daily 30 tablet 0    gabapentin (NEURONTIN) 100 MG capsule Take 1 capsule by mouth 3 times daily for 10 days. 30 capsule 0    metoprolol tartrate (LOPRESSOR) 25 MG tablet Take 1 tablet by mouth 2 times daily 60 tablet 3    miconazole (MICOTIN) 2 % powder Apply topically 2 times daily. 45 g 1    melatonin 3 MG TABS tablet Take 2 tablets by mouth nightly as needed (insomnia) 30 tablet 0    zinc sulfate (ZINCATE) 220 (50 Zn)  mg capsule - elemental zinc Take 1 capsule by mouth daily 30 capsule 3    rifAXIMin (XIFAXAN) 550 MG tablet Take 1 tablet by mouth 2 times daily 42 tablet 0    tamsulosin (FLOMAX) 0.4 MG capsule Take 1 capsule by mouth daily 30 capsule 3    pantoprazole (PROTONIX) 40 MG tablet Take 1 tablet by mouth 2 times daily (before meals) 30 tablet 3    OZEMPIC, 0.25 OR 0.5 MG/DOSE, 2 MG/3ML SOPN INJECT 0.5 MG SUBCUTANEOUSLY ONCE A WEEK 6 mL 0    atorvastatin (LIPITOR) 10 MG tablet take 1 tablet by mouth once daily 90 tablet 3    HUMALOG KWIKPEN 100 UNIT/ML SOPN inject PER SLIDING SCALE BEFORE MEALS AND AT BEDTIME 4 TIMES DAILY, MAX 25 UNITS DAILY 15 mL 3    FREESTYLE TEST STRIPS strip use 1 TEST STRIP to TEST BLOOD SUGAR three times a day (Patient not taking: Reported on 6/10/2024) 300 strip 2    TOUJEO MAX SOLOSTAR 300 UNIT/ML SOPN inject 40 units subcutaneously twice a day 12 mL 2    Continuous Blood Gluc Sensor

## 2024-06-13 NOTE — PROGRESS NOTES
Capillary refill takes less than 2 seconds.   Neurological:      General: No focal deficit present.      Mental Status: She is alert and oriented to person, place, and time.           Medications:  Scheduled Medications:    [Held by provider] cefTRIAXone (ROCEPHIN) IV  1,000 mg IntraVENous Q24H    heparin (porcine)  5,000 Units SubCUTAneous 3 times per day    levothyroxine  125 mcg Oral Daily    aspirin  81 mg Oral Daily    atorvastatin  10 mg Oral Daily    Vitamin D  4,000 Units Oral Daily    tamsulosin  0.4 mg Oral Daily    gabapentin  100 mg Oral TID    [Held by provider] bumetanide  1 mg Oral Daily    insulin glargine  20 Units SubCUTAneous Nightly    [Held by provider] lactulose  20 g Oral TID    pantoprazole  40 mg Oral BID AC    rifAXIMin  550 mg Oral BID    insulin lispro  0-16 Units SubCUTAneous TID WC    insulin lispro  0-4 Units SubCUTAneous Nightly    [Held by provider] metoprolol tartrate  25 mg Oral BID    sodium chloride flush  5-40 mL IntraVENous 2 times per day     Continuous Infusions:    sodium chloride 75 mL/hr at 06/13/24 0642    dextrose      sodium chloride       PRN Medicationsmelatonin, 6 mg, Nightly PRN  traMADol, 50 mg, Q8H PRN  iopamidol, 75 mL, ONCE PRN  glucose, 4 tablet, PRN  dextrose bolus, 125 mL, PRN   Or  dextrose bolus, 250 mL, PRN  glucagon (rDNA), 1 mg, PRN  dextrose, , Continuous PRN  sodium chloride flush, 5-40 mL, PRN  sodium chloride, , PRN  acetaminophen, 650 mg, Q4H PRN  ondansetron, 4 mg, Q8H PRN   Or  ondansetron, 4 mg, Q6H PRN        Diagnostic Labs:  CBC:   Recent Labs     06/11/24  1841 06/12/24  0433 06/13/24  0611   WBC 6.6 5.4 4.4   RBC 2.92* 2.88* 3.01*   HGB 8.2* 8.1* 8.5*   HCT 26.6* 26.8* 27.3*   MCV 91.1 93.1 90.7   RDW 16.3* 16.5* 16.3*    209 209       BMP:   Recent Labs     06/11/24  1604 06/12/24  0433 06/13/24  0611   * 132* 132*   K 4.1 4.0 4.1    99 99   CO2 22 23 23   BUN 34* 35* 37*   CREATININE 1.6* 1.6* 1.7*       BNP: No results  and the key elements of all parts of the encounter have been performed by me .   I agree with the assessment, plan and orders as documented by the resident.          [place] [address].   Phone [phone]   Fax: [phone]  Answering Service: [phone]

## 2024-06-13 NOTE — PLAN OF CARE
Problem: Safety - Adult  Goal: Free from fall injury  6/13/2024 1808 by Maria A Levi, RN  Outcome: Progressing  6/13/2024 0532 by Jaylene Funez RN  Outcome: Progressing  Flowsheets (Taken 6/13/2024 0532)  Free From Fall Injury: Instruct family/caregiver on patient safety     Problem: Chronic Conditions and Co-morbidities  Goal: Patient's chronic conditions and co-morbidity symptoms are monitored and maintained or improved  6/13/2024 1808 by Maria A Levi, RN  Outcome: Progressing  6/13/2024 0532 by Jaylene Funez RN  Outcome: Progressing  Flowsheets (Taken 6/12/2024 2000)  Care Plan - Patient's Chronic Conditions and Co-Morbidity Symptoms are Monitored and Maintained or Improved:   Monitor and assess patient's chronic conditions and comorbid symptoms for stability, deterioration, or improvement   Collaborate with multidisciplinary team to address chronic and comorbid conditions and prevent exacerbation or deterioration   Update acute care plan with appropriate goals if chronic or comorbid symptoms are exacerbated and prevent overall improvement and discharge     Problem: Discharge Planning  Goal: Discharge to home or other facility with appropriate resources  6/13/2024 1808 by Maria A Levi, RN  Outcome: Progressing  6/13/2024 0532 by Jaylene Funez RN  Outcome: Progressing  Flowsheets (Taken 6/12/2024 2000)  Discharge to home or other facility with appropriate resources:   Identify barriers to discharge with patient and caregiver   Arrange for needed discharge resources and transportation as appropriate   Identify discharge learning needs (meds, wound care, etc)     Problem: Skin/Tissue Integrity  Goal: Absence of new skin breakdown  Description: 1.  Monitor for areas of redness and/or skin breakdown  2.  Assess vascular access sites hourly  3.  Every 4-6 hours minimum:  Change oxygen saturation probe site  4.  Every 4-6 hours:  If on nasal continuous positive airway pressure, respiratory therapy assess

## 2024-06-13 NOTE — CARE COORDINATION
Transition planning    Received call from Dayana at [facility], she states they are able to accept patient and will continue to follow.    1240 Called and left VM for Dee at [facility] re: referral and if they are able to accept patient, requested return call.

## 2024-06-13 NOTE — DISCHARGE INSTR - COC
Continuity of Care Form    Patient Name: Bobbi Phillips   :  1955  MRN:  [id number]    Admit date:  6/10/2024  Discharge date:  24    Code Status Order: Full Code   Advance Directives:     Admitting Physician:  Ray Hwang MD  PCP: Stacie Doty MD    Discharging Nurse: EMILY Leggett  Discharging Hospital Unit/Room#: 0235/0235-01  Discharging Unit Phone Number: [phone]    Emergency Contact:   Extended Emergency Contact Information  Primary Emergency Contact: Phillip Peters *HIPAA*  Home Phone: [phone]  Work Phone: [phone]  Mobile Phone: [phone]  Relation: Child  Secondary Emergency Contact: TYLER PETERS *HIPAA*  Home Phone: [phone]  Work Phone: [phone]  Mobile Phone: [phone]  Relation: Child  Preferred language: English   needed? No    Past Surgical History:  Past Surgical History:   Procedure Laterality Date    ANKLE FRACTURE SURGERY Left 2006    no retained metal    ANOMALOUS VENOUS RETURN REPAIR N/A 2024    CYSTOSCOPY, TRANSURETHRAL RESECTION BLADDER,  EVACUATION OF CLOTS with Catheter Insertion for Continuous Irrigation performed by Jason Lambert MD at [facility] OR    CARDIAC CATHETERIZATION  2006    CATARACT REMOVAL WITH IMPLANT Bilateral 2018     SECTION      x2, ,  \"SADDLE BLOCK\"    CHOLECYSTECTOMY, LAPAROSCOPIC  2012    COLONOSCOPY N/A 2023    COLONOSCOPY WITH BIOPSY performed by Carlos Campos MD at [facility] OR    DILATION AND CURETTAGE OF UTERUS N/A 10/02/2023    ENDOMETRIAL BIOPSY AND EXAM UNDER ANESTHESIA  Failed D&C Hysteroscopy performed by Saud Hardy DO at [facility] OR    DILATION AND CURETTAGE OF UTERUS N/A 2024    EUA, DIAGNOSTIC HYSTEROSCOPY performed by Mena Horn MD at [facility] OR    HYSTEROSCOPY  2024    EUA, DIAGNOSTIC HYSTEROSCOPY    IR PERC ASPIRATION INTERVERT DISC  2024    IR PERC ASPIRATION INTERVERT DISC 2024 [facility] SPECIAL PROCEDURES    UPPER GASTROINTESTINAL ENDOSCOPY

## 2024-06-13 NOTE — PLAN OF CARE
Problem: Safety - Adult  Goal: Free from fall injury  6/13/2024 0532 by Jaylene Funez RN  Outcome: Progressing  Flowsheets (Taken 6/13/2024 0532)  Free From Fall Injury: Instruct family/caregiver on patient safety  6/12/2024 1704 by Betsey Salinas RN  Outcome: Progressing     Problem: Chronic Conditions and Co-morbidities  Goal: Patient's chronic conditions and co-morbidity symptoms are monitored and maintained or improved  6/13/2024 0532 by Jaylene Funez RN  Outcome: Progressing  Flowsheets (Taken 6/12/2024 2000)  Care Plan - Patient's Chronic Conditions and Co-Morbidity Symptoms are Monitored and Maintained or Improved:   Monitor and assess patient's chronic conditions and comorbid symptoms for stability, deterioration, or improvement   Collaborate with multidisciplinary team to address chronic and comorbid conditions and prevent exacerbation or deterioration   Update acute care plan with appropriate goals if chronic or comorbid symptoms are exacerbated and prevent overall improvement and discharge  6/12/2024 1704 by Betsey Salinas RN  Outcome: Progressing     Problem: Discharge Planning  Goal: Discharge to home or other facility with appropriate resources  6/13/2024 0532 by Jaylene Funez RN  Outcome: Progressing  Flowsheets (Taken 6/12/2024 2000)  Discharge to home or other facility with appropriate resources:   Identify barriers to discharge with patient and caregiver   Arrange for needed discharge resources and transportation as appropriate   Identify discharge learning needs (meds, wound care, etc)  6/12/2024 1704 by Betsey Salinas RN  Outcome: Progressing     Problem: Skin/Tissue Integrity  Goal: Absence of new skin breakdown  Description: 1.  Monitor for areas of redness and/or skin breakdown  2.  Assess vascular access sites hourly  3.  Every 4-6 hours minimum:  Change oxygen saturation probe site  4.  Every 4-6 hours:  If on nasal continuous positive airway pressure, respiratory therapy assess

## 2024-06-14 ENCOUNTER — APPOINTMENT (OUTPATIENT)
Dept: INTERVENTIONAL RADIOLOGY/VASCULAR | Age: 69
DRG: 683 | End: 2024-06-14
Payer: MEDICARE

## 2024-06-14 ENCOUNTER — APPOINTMENT (OUTPATIENT)
Dept: GENERAL RADIOLOGY | Age: 69
DRG: 683 | End: 2024-06-14
Payer: MEDICARE

## 2024-06-14 LAB
ALBUMIN SERPL-MCNC: 2.2 G/DL (ref 3.5–5.2)
ALBUMIN/GLOB SERPL: 1 {RATIO} (ref 1–2.5)
ALP SERPL-CCNC: 433 U/L (ref 35–104)
ALT SERPL-CCNC: 9 U/L (ref 10–35)
ANION GAP SERPL CALCULATED.3IONS-SCNC: 9 MMOL/L (ref 9–16)
AST SERPL-CCNC: 31 U/L (ref 10–35)
BILIRUB SERPL-MCNC: 0.3 MG/DL (ref 0–1.2)
BUN SERPL-MCNC: 39 MG/DL (ref 8–23)
CALCIUM SERPL-MCNC: 7.9 MG/DL (ref 8.6–10.4)
CHLORIDE SERPL-SCNC: 100 MMOL/L (ref 98–107)
CO2 SERPL-SCNC: 22 MMOL/L (ref 20–31)
CREAT SERPL-MCNC: 1.6 MG/DL (ref 0.5–0.9)
CRP SERPL HS-MCNC: 98.4 MG/L (ref 0–5)
GFR, ESTIMATED: 34 ML/MIN/1.73M2
GLUCOSE BLD-MCNC: 171 MG/DL (ref 65–105)
GLUCOSE BLD-MCNC: 181 MG/DL (ref 65–105)
GLUCOSE BLD-MCNC: 245 MG/DL (ref 65–105)
GLUCOSE SERPL-MCNC: 136 MG/DL (ref 74–99)
P E INTERPRETATION, U: NORMAL
PATHOLOGIST: NORMAL
POTASSIUM SERPL-SCNC: 4.4 MMOL/L (ref 3.7–5.3)
PROT SERPL-MCNC: 6.2 G/DL (ref 6.6–8.7)
SODIUM SERPL-SCNC: 131 MMOL/L (ref 136–145)
SPECIMEN TYPE: NORMAL
T4 FREE SERPL-MCNC: 1 NG/DL (ref 0.92–1.68)
TSH SERPL DL<=0.05 MIU/L-ACNC: 10.3 UIU/ML (ref 0.27–4.2)
URINE TOTAL PROTEIN: 210 MG/DL

## 2024-06-14 PROCEDURE — 0S923ZX DRAINAGE OF LUMBAR VERTEBRAL DISC, PERCUTANEOUS APPROACH, DIAGNOSTIC: ICD-10-PCS | Performed by: RADIOLOGY

## 2024-06-14 PROCEDURE — 88173 CYTOPATH EVAL FNA REPORT: CPT

## 2024-06-14 PROCEDURE — 87205 SMEAR GRAM STAIN: CPT

## 2024-06-14 PROCEDURE — 1200000000 HC SEMI PRIVATE

## 2024-06-14 PROCEDURE — 82947 ASSAY GLUCOSE BLOOD QUANT: CPT

## 2024-06-14 PROCEDURE — 87070 CULTURE OTHR SPECIMN AEROBIC: CPT

## 2024-06-14 PROCEDURE — 6370000000 HC RX 637 (ALT 250 FOR IP)

## 2024-06-14 PROCEDURE — 2580000003 HC RX 258: Performed by: STUDENT IN AN ORGANIZED HEALTH CARE EDUCATION/TRAINING PROGRAM

## 2024-06-14 PROCEDURE — 97535 SELF CARE MNGMENT TRAINING: CPT

## 2024-06-14 PROCEDURE — 97530 THERAPEUTIC ACTIVITIES: CPT

## 2024-06-14 PROCEDURE — 62267 INTERDISCAL PERQ ASPIR DX: CPT

## 2024-06-14 PROCEDURE — 99232 SBSQ HOSP IP/OBS MODERATE 35: CPT | Performed by: INTERNAL MEDICINE

## 2024-06-14 PROCEDURE — 2580000003 HC RX 258: Performed by: INTERNAL MEDICINE

## 2024-06-14 PROCEDURE — 77003 FLUOROGUIDE FOR SPINE INJECT: CPT

## 2024-06-14 PROCEDURE — 87206 SMEAR FLUORESCENT/ACID STAI: CPT

## 2024-06-14 PROCEDURE — 87102 FUNGUS ISOLATION CULTURE: CPT

## 2024-06-14 PROCEDURE — 6360000002 HC RX W HCPCS: Performed by: INTERNAL MEDICINE

## 2024-06-14 PROCEDURE — 36415 COLL VENOUS BLD VENIPUNCTURE: CPT

## 2024-06-14 PROCEDURE — 6370000000 HC RX 637 (ALT 250 FOR IP): Performed by: STUDENT IN AN ORGANIZED HEALTH CARE EDUCATION/TRAINING PROGRAM

## 2024-06-14 PROCEDURE — 88305 TISSUE EXAM BY PATHOLOGIST: CPT

## 2024-06-14 PROCEDURE — 74018 RADEX ABDOMEN 1 VIEW: CPT

## 2024-06-14 PROCEDURE — 80053 COMPREHEN METABOLIC PANEL: CPT

## 2024-06-14 PROCEDURE — 87075 CULTR BACTERIA EXCEPT BLOOD: CPT

## 2024-06-14 PROCEDURE — 6360000002 HC RX W HCPCS

## 2024-06-14 PROCEDURE — 84443 ASSAY THYROID STIM HORMONE: CPT

## 2024-06-14 PROCEDURE — 84439 ASSAY OF FREE THYROXINE: CPT

## 2024-06-14 PROCEDURE — 86140 C-REACTIVE PROTEIN: CPT

## 2024-06-14 RX ADMIN — CHOLECALCIFEROL TAB 25 MCG (1000 UNIT) 4000 UNITS: 25 TAB at 10:22

## 2024-06-14 RX ADMIN — GABAPENTIN 100 MG: 100 CAPSULE ORAL at 21:03

## 2024-06-14 RX ADMIN — INSULIN GLARGINE 20 UNITS: 100 INJECTION, SOLUTION SUBCUTANEOUS at 21:03

## 2024-06-14 RX ADMIN — PANTOPRAZOLE SODIUM 40 MG: 40 TABLET, DELAYED RELEASE ORAL at 06:38

## 2024-06-14 RX ADMIN — GABAPENTIN 100 MG: 100 CAPSULE ORAL at 10:22

## 2024-06-14 RX ADMIN — RIFAXIMIN 550 MG: 550 TABLET ORAL at 10:30

## 2024-06-14 RX ADMIN — PIPERACILLIN AND TAZOBACTAM 3375 MG: 3; .375 INJECTION, POWDER, LYOPHILIZED, FOR SOLUTION INTRAVENOUS at 21:03

## 2024-06-14 RX ADMIN — ATORVASTATIN CALCIUM 10 MG: 20 TABLET, FILM COATED ORAL at 10:22

## 2024-06-14 RX ADMIN — GABAPENTIN 100 MG: 100 CAPSULE ORAL at 15:47

## 2024-06-14 RX ADMIN — HEPARIN SODIUM 5000 UNITS: 5000 INJECTION INTRAVENOUS; SUBCUTANEOUS at 21:07

## 2024-06-14 RX ADMIN — LEVOTHYROXINE SODIUM 125 MCG: 0.12 TABLET ORAL at 06:38

## 2024-06-14 RX ADMIN — SODIUM CHLORIDE: 9 INJECTION, SOLUTION INTRAVENOUS at 06:41

## 2024-06-14 RX ADMIN — TAMSULOSIN HYDROCHLORIDE 0.4 MG: 0.4 CAPSULE ORAL at 10:22

## 2024-06-14 RX ADMIN — PANTOPRAZOLE SODIUM 40 MG: 40 TABLET, DELAYED RELEASE ORAL at 15:48

## 2024-06-14 RX ADMIN — SODIUM CHLORIDE, PRESERVATIVE FREE 10 ML: 5 INJECTION INTRAVENOUS at 21:00

## 2024-06-14 RX ADMIN — RIFAXIMIN 550 MG: 550 TABLET ORAL at 21:03

## 2024-06-14 RX ADMIN — TRAMADOL HYDROCHLORIDE 50 MG: 50 TABLET ORAL at 21:12

## 2024-06-14 ASSESSMENT — PAIN DESCRIPTION - ONSET: ONSET: ON-GOING

## 2024-06-14 ASSESSMENT — PAIN DESCRIPTION - DESCRIPTORS
DESCRIPTORS: ACHING
DESCRIPTORS: TENDER;SORE;STABBING

## 2024-06-14 ASSESSMENT — PAIN - FUNCTIONAL ASSESSMENT: PAIN_FUNCTIONAL_ASSESSMENT: PREVENTS OR INTERFERES SOME ACTIVE ACTIVITIES AND ADLS

## 2024-06-14 ASSESSMENT — PAIN DESCRIPTION - ORIENTATION
ORIENTATION: RIGHT;LOWER
ORIENTATION: RIGHT;LEFT;LOWER

## 2024-06-14 ASSESSMENT — PAIN DESCRIPTION - LOCATION
LOCATION: LEG
LOCATION: BACK;HIP

## 2024-06-14 ASSESSMENT — PAIN DESCRIPTION - FREQUENCY: FREQUENCY: CONTINUOUS

## 2024-06-14 ASSESSMENT — PAIN SCALES - GENERAL
PAINLEVEL_OUTOF10: 5
PAINLEVEL_OUTOF10: 8
PAINLEVEL_OUTOF10: 4

## 2024-06-14 NOTE — BRIEF OP NOTE
Brief Postoperative Note    Bobbi Phillips  YOB: 1955  [id number]    Pre-operative Diagnosis: L1/2 destructive changes, concern for osteomyelitis    Post-operative Diagnosis: Same    Procedure: Disk aspiration    Anesthesia: Local    Surgeons/Assistants: Jace Terrell MD     Estimated Blood Loss: minimal    Complications: none immediate    Specimens: were obtained    Findings: 18 G errol needle placed into L1/2 disk space yielding ~3 ml serosanguinous fluid.      Electronically signed by Jace Terrell MD on 6/14/2024 at 6:22 PM

## 2024-06-14 NOTE — PROGRESS NOTES
[facility] - Progress Note    Today's Date and Time: 6/14/2024, 7:28 AM    Impression :   Concern for L1-2 discitis/osteomyelitis with evidence of bone loss  Lower extremity weakness  Lower extremity wounds  Ascites  BONNER with regular paracentesis  Hypothyroidism  CKD  DM II  CAD  HTN  Hx of recurrent UTI    Recommendations:     S/P IR guided biopsy of L1-L2  for culture and cytology  Start Zosyn 3.3 gm IV q 12 hr post biopsy and pending culture data  Urine with mixed rao: Enterococcus faecium, Klebsiella, Enterobacter  Neurosurgery evaluation    Medical Decision Making/Summary/Discussion:6/14/2024     Daily Elements of Decision Making provided by Consulting Physician:    Note: I have independently performed the steps listed below as part of the medical decision making and evaluation.    Review of current Problems:  Today I am seeing the patient for the following problems:  Concern for L1-2 discitis/osteomyelitis  Lower extremity weakness  Lower extremity wounds  Ascites  BONNER with regular paracentesis  Hypothyroidism  CKD  DM II  CAD  HTN  Hx of recurrent UTI  Evaluation of Patient:  Evaluated because of back pain and lower extremity weakness  Ascites   L1-L2 diskitis and osteomyelitis  Please see daily details in Interval Changes Section  Changes in physical exam:  Back pain and lower extremity weakness  Ascites   L1-L2 diskitis and osteomyelitis  Please see daily details in Physical Exam section and in Interval Changes Section  Changes in ROS:  Unchanged  Please see daily details in Review of Symptoms section and in Interval Changes Section  Discussion with Referring Physician or Service  Dr. SHRADDHA Hwang  Laboratory and Radiologic Studies with personal review of radiologic studies  Laboratory  Radiology  Microbiology  Please see Details in daily Interval Changes Section devoted to Lab, Micro and Radiology and in Medical Decision Laboratory, Imaging and Cultures  [[id number]] Collected: 06/10/24 1443    Order Status: Completed Specimen: Blood Updated: 06/13/24 1459     Specimen Description .BLOOD     Special Requests R FOREARM 0.5ML     Culture NO GROWTH 3 DAYS          Culture, Urine  Order: [id number]  Status: Final result       Visible to patient: No (not released)       Next appt: None    Specimen Information: Urine, clean catch   1 Result Note      Component    Specimen Description .CLEAN CATCH URINE   Culture KLEBSIELLA PNEUMONIAE >100,000 CFU/ML Abnormal    [place]ing [facility]        Susceptibility    Klebsiella pneumoniae (1)    Antibiotic Interpretation Microscan Method Status    ampicillin Resistant >=32 BACTERIAL SUSCEPTIBILITY PANEL LIAM Final    ceFAZolin Sensitive <=4 BACTERIAL SUSCEPTIBILITY PANEL LIAM Final     Cefazolin sensitivity results can be used to predict the effectiveness of oral  cephalosporins (eg. Cephalexin) in uncomplicated Urinary Tract Infections due to E. coli, K.   pneumoniae, and P. mirabilis       cefTRIAXone Sensitive <=0.25 BACTERIAL SUSCEPTIBILITY PANEL LIAM Final    Confirmatory Extended Spectrum Beta-Lactamase Sensitive NEGATIVE BACTERIAL SUSCEPTIBILITY PANEL LIAM Final    gentamicin Sensitive <=1 BACTERIAL SUSCEPTIBILITY PANEL LIAM Final    levofloxacin Sensitive <=0.12 BACTERIAL SUSCEPTIBILITY PANEL LIAM Final    nitrofurantoin Intermediate 64 BACTERIAL SUSCEPTIBILITY PANEL LIAM Final    piperacillin-tazobactam Intermediate 32 BACTERIAL SUSCEPTIBILITY PANEL LIAM Final    tobramycin Sensitive <=1 BACTERIAL SUSCEPTIBILITY PANEL LIAM Final    trimethoprim-sulfamethoxazole Sensitive <=20 BACTERIAL SUSCEPTIBILITY PANEL LIAM                  Contains abnormal data Culture, Urine  Order: [id number]  Status: Final result       Visible to patient: No (not released)       Next appt: None    Specimen Information: Urine, clean catch   0 Result Notes      Component    Specimen Description .CLEAN CATCH URINE   Culture ENTEROCOCCUS

## 2024-06-14 NOTE — PROGRESS NOTES
SUBJECTIVE    Patient was seen and examined.  Patient was in her recliner.  She was alert and awake x 3.  She remains hemodynamically stable. She has multiple organisms noted on her urine culture.  She is on Zosyn.  Her chest pain or shortness of breath.  Chronic edema of the lower extremities and has wraps in place.  She denies any nausea vomiting or diarrhea.  There is no abdominal distention.      Labs show sodium 131 potassium 4.4 chloride 100 CO2 2299.6 and albumin 2.2.fractional excretion of sodium is less than 1, indicating a prerenal picture, although that could also reflect some renal hyperperfusion in the setting of her cirrhosis which is secondary to BONNER.     She is denies any diarrhea.  Denies any confusion  She did not receive any CT scan with contrast  Her renal ultrasound is unremarkable.      OBJECTIVE      CURRENT TEMPERATURE:  Temp: 97.7 °F (36.5 °C)  MAXIMUM TEMPERATURE OVER 24HRS:  Temp (24hrs), Av.8 °F (36.6 °C), Min:97.5 °F (36.4 °C), Max:98.3 °F (36.8 °C)    CURRENT RESPIRATORY RATE:  Respirations: 17  CURRENT PULSE:  Pulse: 79  CURRENT BLOOD PRESSURE:  BP: 122/64  24HR BLOOD PRESSURE RANGE:  Systolic (24hrs), Av , Min:118 , Max:155   ; Diastolic (24hrs), Av, Min:57, Max:67    24HR INTAKE/OUTPUT:    Intake/Output Summary (Last 24 hours) at 2024 1126  Last data filed at 2024 0400  Gross per 24 hour   Intake 1757.5 ml   Output 200 ml   Net 1557.5 ml     WEIGHT :Patient Vitals for the past 96 hrs (Last 3 readings):   Weight   06/10/24 1952 107.8 kg (237 lb 10.5 oz)     PHYSICAL EXAM      GENERAL APPEARANCE: Awake and alert x 3 and on room air  SKIN: Warm to touch and no erythema   EYES: pale conjunctiva  ENT: moist mucous membranes  NECK: No JVD with midline trachea and no accessory muscle use  PULMONARY: Bilateral air entry and clear  CADRDIOVASCULAR: S1 and S2 audible no S3   ABDOMEN: positive ascites  EXTREMITIES: 2+ bilateral lower extremity edema with wraps in  RBCUA 10 TO 20 06/11/2024 01:19 PM    BACTERIA MANY 06/11/2024 01:19 PM    SPECGRAV >1.030 06/26/2023 04:06 PM    LEUKOCYTESUR LARGE 06/11/2024 01:19 PM    UROBILINOGEN Normal 06/11/2024 01:19 PM    BILIRUBINUR NEGATIVE 06/11/2024 01:19 PM    BLOODU large 06/26/2023 04:06 PM    GLUCOSEU NEGATIVE 06/11/2024 01:19 PM    KETUA TRACE 06/11/2024 01:19 PM     ANTIGBM:No results found for: \"GBMABIGG\"      RADIOLOGY      Reviewed as available.      ASSESSMENT      Assessment:  Acute kidney injury in the presence of advanced liver disease and UTI with prerenal picture with Brittni, although this could reflect renal hyperperfusion in the setting of cirrhosis   Urinary tract infection with multiple organisms, on Zosyn  History of cirrhosis in the setting of BONNER with portal hypertension.  Last paracentesis was 2 to 3 weeks ago.  4. History of primary hypertension the patient's blood pressure currently low  5. Right lower extremity edema          Plan:  1.  No role for maintenance IV fluids at this time  2.  Continue treating UTI  3.  Follow labs     Basim Martinez M.D.  [facility]  6/14/2024

## 2024-06-14 NOTE — PROGRESS NOTES
chief complaint of difficulty ambulating at home.  Patient states she has been admitted in send the [facility] before multiple times due to her liver problems and urinary tract infection and was discharged to rehab/SNF.  Patient was discharged to home earlier than she is supposed to be and she was not able to ambulate at home so she called EMS.     Patient states she has difficulty walking around and having problems climbing the stairs that she was almost immobile at her home when she was discharged from facility.  Patient also complains of lower back pain which is nonradiating and not aggravated or relieved by anything.  Patient denies any focal weakness or numbness in the extremities.  Patient also complains of distended abdomen which has been gradually increasing since her last paracentesis almost 2 weeks ago.  Patient denies any fever, nausea, vomiting, chest pain, shortness of breath, dizziness, abdominal pain, change in bowel or bladder habits.     Patient was initially admitted in observation, eventually transferred to internal medicine care for further evaluation      OBJECTIVE     Vital Signs:  /62   Pulse 88   Temp 97.9 °F (36.6 °C) (Oral)   Resp 17   Ht 1.499 m (4' 11\")   Wt 107.8 kg (237 lb 10.5 oz)   SpO2 92%   BMI 48.00 kg/m²     Temp (24hrs), Av.9 °F (36.6 °C), Min:97.8 °F (36.6 °C), Max:97.9 °F (36.6 °C)    In: 571   Out: 875 [Urine:875]    Physical Exam:  Physical Exam  Constitutional:       Appearance: Normal appearance.   Eyes:      Pupils: Pupils are equal, round, and reactive to light.   Cardiovascular:      Rate and Rhythm: Normal rate and regular rhythm.      Pulses: Normal pulses.      Heart sounds: Normal heart sounds.   Pulmonary:      Effort: Pulmonary effort is normal.      Breath sounds: Normal breath sounds.   Abdominal:      General: There is distension.   Musculoskeletal:      Right lower leg: Edema present.      Left lower leg: Edema present.   Skin:     Capillary  Refill: Capillary refill takes less than 2 seconds.   Neurological:      General: No focal deficit present.      Mental Status: She is alert and oriented to person, place, and time.           Medications:  Scheduled Medications:    [Held by provider] cefTRIAXone (ROCEPHIN) IV  1,000 mg IntraVENous Q24H    heparin (porcine)  5,000 Units SubCUTAneous 3 times per day    levothyroxine  125 mcg Oral Daily    aspirin  81 mg Oral Daily    atorvastatin  10 mg Oral Daily    Vitamin D  4,000 Units Oral Daily    tamsulosin  0.4 mg Oral Daily    gabapentin  100 mg Oral TID    [Held by provider] bumetanide  1 mg Oral Daily    insulin glargine  20 Units SubCUTAneous Nightly    [Held by provider] lactulose  20 g Oral TID    pantoprazole  40 mg Oral BID AC    rifAXIMin  550 mg Oral BID    insulin lispro  0-16 Units SubCUTAneous TID WC    insulin lispro  0-4 Units SubCUTAneous Nightly    [Held by provider] metoprolol tartrate  25 mg Oral BID    sodium chloride flush  5-40 mL IntraVENous 2 times per day     Continuous Infusions:    dextrose      sodium chloride       PRN Medicationsmelatonin, 6 mg, Nightly PRN  traMADol, 50 mg, Q8H PRN  iopamidol, 75 mL, ONCE PRN  glucose, 4 tablet, PRN  dextrose bolus, 125 mL, PRN   Or  dextrose bolus, 250 mL, PRN  glucagon (rDNA), 1 mg, PRN  dextrose, , Continuous PRN  sodium chloride flush, 5-40 mL, PRN  sodium chloride, , PRN  acetaminophen, 650 mg, Q4H PRN  ondansetron, 4 mg, Q8H PRN   Or  ondansetron, 4 mg, Q6H PRN        Diagnostic Labs:  CBC:   Recent Labs     06/11/24  1841 06/12/24  0433 06/13/24  0611   WBC 6.6 5.4 4.4   RBC 2.92* 2.88* 3.01*   HGB 8.2* 8.1* 8.5*   HCT 26.6* 26.8* 27.3*   MCV 91.1 93.1 90.7   RDW 16.3* 16.5* 16.3*    209 209       BMP:   Recent Labs     06/13/24  0611 06/13/24  2158 06/14/24  0551   * 131* 131*   K 4.1 4.5 4.4   CL 99 99 100   CO2 23 23 22   BUN 37* 38* 39*   CREATININE 1.7* 1.7* 1.6*       BNP: No results for input(s): \"BNP\" in the last 72

## 2024-06-14 NOTE — CARE COORDINATION
Transitonal Planning  Spoke with Sanaz at [place] pt was just with them in order to come back it would cost pt $412 a day oop and would need 30 days in advance total 12,360 unless pt had additonal needs like wound iv abx etc.    Spoke with pt onformed her of the above  provided her with SNF list for additional choices.                   Post Acute Facility/Agency List     Provided patient with the following list, the list includes the overall star ratings obtained from CMS per the Medicare Web site (www.Medicare.gov):     [] Long Term Acute Care Facilities  [] Acute Inpatient Rehabilitation Facilities  [x] Skilled Nursing Facilities  [] Home Care    Provided verbal instructions on how to utilize the QR Code to obtain additional detailed star ratings from www.Medicare.gov     offered to print and provide the detailed list:    [x]Accepted   [x]Declined    The following printed detailed lists were provided:       Await choices

## 2024-06-14 NOTE — PROGRESS NOTES
Here for L  1-2 disc aspiration. Consent done. Prone on table. Dr Terrell present. Back is prepped, draped and numbed. Access obtained and specimen obtained. Obtained 3 ml of sero/sang fluid. Bandaid to back. Specimen sent.

## 2024-06-14 NOTE — PROGRESS NOTES
Message sent to Dr. Cain to call [facility] @ [phone] with what labs he would like from the Lumbar aspirate specimen received from IR, per phone call from Microbiology.

## 2024-06-14 NOTE — PROGRESS NOTES
Occupational Therapy  Facility/Department: [address] ORTHO/MED SURG  Occupational Therapy Daily Treatment Note    Name: Bobbi Phillips  : 1955  MRN: [id number]  Date of Service: 2024    Discharge Recommendations:     OT Equipment Recommendations  Equipment Needed: Yes  Mobility Devices: ADL Assistive Devices  ADL Assistive Devices: Sock-Aid Hard;Long-handled Shoe Horn  Other: CTA as pt progresses. Pt reported having reacher at home.       Patient Diagnosis(es): The primary encounter diagnosis was Other fatigue. A diagnosis of Nausea and vomiting, unspecified vomiting type was also pertinent to this visit.  Past Medical History:  has a past medical history of Abdominal swelling, Claustrophobia, COVID-19 vaccine administered, Diabetes (HCC), Diabetic retinopathy (HCC), Flatus, H/O acute sinusitis, H/O cholelithiasis, Hypertension, Liver cirrhosis (HCC), LLQ abdominal tenderness, Poor venous access, Positive colorectal cancer screening using Cologuard test, Post-menopausal bleeding, RUQ abdominal pain, Tendonitis of shoulder, right, Tingling, Under care of service provider, Under care of service provider, Vaginal bleeding, Vaginal candidiasis, and Wears glasses.  Past Surgical History:  has a past surgical history that includes Cholecystectomy, laparoscopic ();  section; Ankle fracture surgery (Left, ); Cataract removal with implant (Bilateral, ); Colonoscopy (N/A, 2023); Dilation and curettage of uterus (N/A, 10/02/2023); Cardiac catheterization (); hysteroscopy (2024); Dilation and curettage of uterus (N/A, 2024); Upper gastrointestinal endoscopy (N/A, 4/10/2024); and Anomalous venous return repair (N/A, 2024).      Assessment   Performance deficits / Impairments: Decreased functional mobility ;Decreased ADL status;Decreased endurance;Decreased high-level IADLs;Decreased balance;Decreased strength  Assessment: Pt would benefit from continued skilled OT to  while standing at recliner using RW for support and CGA for standing balance, MOD A to pull up d/t decreased strength. Pt required increased time/effort d/t fatigue. Writer provided demonstration of all AE used prior to pt attempting use.  Toileting: Maximum assistance;Adaptive equipment;Setup;Increased time to complete  Toileting Skilled Clinical Factors: Pt stand at toilet with support from toilet rails and SBA for standing balance, required MIN A for gown management prior to sitting. Pt completed urination while seated on toilet. Pt stand at toilet and required MAX A for perineal hygiene d/t limited functional reach and decreased standing balance.    Functional Mobility: Contact guard assistance;Increased time to complete;Adaptive equipment  Functional Mobility Skilled Clinical Factors: Pt completed functional mobility from recliner>toilet>sink>recliner (household distance) with use of RW and CGA, required assist for IV pole management. Pt demos slow steady pace throughout    Additional Comments: Upon arrival of therapist pt supine in recliner, agreeable to therapy. Pt engaged in functional transfers/mobility, ADL care, AE education, and retired to seated in recliner with all needs met and call light within reach. Pt fatigued quickly, however highly motivated for OOB tasks. Required significant assist for LB ADL care d/t decreased strength/balance and limited functional reach.        Bed mobility  Supine to Sit: Unable to assess  Sit to Supine: Unable to assess  Scooting: Contact guard assistance  Bed Mobility Comments: Pt started and concluded session in recliner, scooting assessed in chair. Required verbal instructions to sequence scooting retro in chair with good return. Increased time/effort to complete.        Cognition  Overall Cognitive Status: WFL  Orientation  Overall Orientation Status: Within Functional Limits  Orientation Level: Oriented X4       Education Given To: Patient  Education Provided: Role

## 2024-06-15 PROBLEM — R60.0 BILATERAL LOWER EXTREMITY EDEMA: Status: ACTIVE | Noted: 2024-06-15

## 2024-06-15 PROBLEM — Z87.19 HISTORY OF CIRRHOSIS OF LIVER: Status: ACTIVE | Noted: 2024-06-15

## 2024-06-15 LAB
ALBUMIN SERPL-MCNC: 2.2 G/DL (ref 3.5–5.2)
ALBUMIN/GLOB SERPL: 1 {RATIO} (ref 1–2.5)
ALP SERPL-CCNC: 424 U/L (ref 35–104)
ALT SERPL-CCNC: 6 U/L (ref 10–35)
ANION GAP SERPL CALCULATED.3IONS-SCNC: 9 MMOL/L (ref 9–16)
AST SERPL-CCNC: 29 U/L (ref 10–35)
BILIRUB SERPL-MCNC: 0.3 MG/DL (ref 0–1.2)
BUN SERPL-MCNC: 38 MG/DL (ref 8–23)
CALCIUM SERPL-MCNC: 8.2 MG/DL (ref 8.6–10.4)
CHLORIDE SERPL-SCNC: 100 MMOL/L (ref 98–107)
CO2 SERPL-SCNC: 24 MMOL/L (ref 20–31)
CREAT SERPL-MCNC: 1.5 MG/DL (ref 0.5–0.9)
GFR, ESTIMATED: 36 ML/MIN/1.73M2
GLUCOSE BLD-MCNC: 176 MG/DL (ref 65–105)
GLUCOSE BLD-MCNC: 181 MG/DL (ref 65–105)
GLUCOSE BLD-MCNC: 218 MG/DL (ref 65–105)
GLUCOSE BLD-MCNC: 242 MG/DL (ref 65–105)
GLUCOSE SERPL-MCNC: 171 MG/DL (ref 74–99)
MICROORGANISM SPEC CULT: NORMAL
MICROORGANISM SPEC CULT: NORMAL
POTASSIUM SERPL-SCNC: 4.4 MMOL/L (ref 3.7–5.3)
PROT SERPL-MCNC: 5.9 G/DL (ref 6.6–8.7)
SERVICE CMNT-IMP: NORMAL
SERVICE CMNT-IMP: NORMAL
SODIUM SERPL-SCNC: 133 MMOL/L (ref 136–145)
SPECIMEN DESCRIPTION: NORMAL
SPECIMEN DESCRIPTION: NORMAL

## 2024-06-15 PROCEDURE — 80053 COMPREHEN METABOLIC PANEL: CPT

## 2024-06-15 PROCEDURE — 2580000003 HC RX 258: Performed by: INTERNAL MEDICINE

## 2024-06-15 PROCEDURE — 82947 ASSAY GLUCOSE BLOOD QUANT: CPT

## 2024-06-15 PROCEDURE — 6370000000 HC RX 637 (ALT 250 FOR IP)

## 2024-06-15 PROCEDURE — 99232 SBSQ HOSP IP/OBS MODERATE 35: CPT | Performed by: INTERNAL MEDICINE

## 2024-06-15 PROCEDURE — 2580000003 HC RX 258: Performed by: STUDENT IN AN ORGANIZED HEALTH CARE EDUCATION/TRAINING PROGRAM

## 2024-06-15 PROCEDURE — 36415 COLL VENOUS BLD VENIPUNCTURE: CPT

## 2024-06-15 PROCEDURE — 6360000002 HC RX W HCPCS: Performed by: INTERNAL MEDICINE

## 2024-06-15 PROCEDURE — 6360000002 HC RX W HCPCS

## 2024-06-15 PROCEDURE — 1200000000 HC SEMI PRIVATE

## 2024-06-15 PROCEDURE — 6370000000 HC RX 637 (ALT 250 FOR IP): Performed by: STUDENT IN AN ORGANIZED HEALTH CARE EDUCATION/TRAINING PROGRAM

## 2024-06-15 RX ADMIN — HEPARIN SODIUM 5000 UNITS: 5000 INJECTION INTRAVENOUS; SUBCUTANEOUS at 15:42

## 2024-06-15 RX ADMIN — CHOLECALCIFEROL TAB 25 MCG (1000 UNIT) 4000 UNITS: 25 TAB at 08:56

## 2024-06-15 RX ADMIN — Medication 6 MG: at 20:35

## 2024-06-15 RX ADMIN — GABAPENTIN 100 MG: 100 CAPSULE ORAL at 08:57

## 2024-06-15 RX ADMIN — ATORVASTATIN CALCIUM 10 MG: 20 TABLET, FILM COATED ORAL at 08:56

## 2024-06-15 RX ADMIN — TAMSULOSIN HYDROCHLORIDE 0.4 MG: 0.4 CAPSULE ORAL at 08:57

## 2024-06-15 RX ADMIN — PIPERACILLIN AND TAZOBACTAM 3375 MG: 3; .375 INJECTION, POWDER, LYOPHILIZED, FOR SOLUTION INTRAVENOUS at 08:56

## 2024-06-15 RX ADMIN — TRAMADOL HYDROCHLORIDE 50 MG: 50 TABLET ORAL at 20:35

## 2024-06-15 RX ADMIN — ASPIRIN 81 MG: 81 TABLET, COATED ORAL at 08:56

## 2024-06-15 RX ADMIN — HEPARIN SODIUM 5000 UNITS: 5000 INJECTION INTRAVENOUS; SUBCUTANEOUS at 06:15

## 2024-06-15 RX ADMIN — INSULIN GLARGINE 20 UNITS: 100 INJECTION, SOLUTION SUBCUTANEOUS at 20:38

## 2024-06-15 RX ADMIN — SODIUM CHLORIDE, PRESERVATIVE FREE 10 ML: 5 INJECTION INTRAVENOUS at 08:52

## 2024-06-15 RX ADMIN — LEVOTHYROXINE SODIUM 125 MCG: 0.12 TABLET ORAL at 06:15

## 2024-06-15 RX ADMIN — GABAPENTIN 100 MG: 100 CAPSULE ORAL at 20:35

## 2024-06-15 RX ADMIN — PANTOPRAZOLE SODIUM 40 MG: 40 TABLET, DELAYED RELEASE ORAL at 06:15

## 2024-06-15 RX ADMIN — PIPERACILLIN AND TAZOBACTAM 3375 MG: 3; .375 INJECTION, POWDER, LYOPHILIZED, FOR SOLUTION INTRAVENOUS at 20:45

## 2024-06-15 RX ADMIN — GABAPENTIN 100 MG: 100 CAPSULE ORAL at 15:42

## 2024-06-15 RX ADMIN — RIFAXIMIN 550 MG: 550 TABLET ORAL at 08:56

## 2024-06-15 RX ADMIN — HEPARIN SODIUM 5000 UNITS: 5000 INJECTION INTRAVENOUS; SUBCUTANEOUS at 20:36

## 2024-06-15 RX ADMIN — PANTOPRAZOLE SODIUM 40 MG: 40 TABLET, DELAYED RELEASE ORAL at 15:42

## 2024-06-15 RX ADMIN — RIFAXIMIN 550 MG: 550 TABLET ORAL at 20:43

## 2024-06-15 RX ADMIN — INSULIN LISPRO 4 UNITS: 100 INJECTION, SOLUTION INTRAVENOUS; SUBCUTANEOUS at 11:57

## 2024-06-15 ASSESSMENT — PAIN SCALES - GENERAL
PAINLEVEL_OUTOF10: 5
PAINLEVEL_OUTOF10: 7

## 2024-06-15 ASSESSMENT — PAIN DESCRIPTION - LOCATION: LOCATION: BACK

## 2024-06-15 ASSESSMENT — PAIN DESCRIPTION - DESCRIPTORS: DESCRIPTORS: ACHING;CRAMPING

## 2024-06-15 ASSESSMENT — PAIN - FUNCTIONAL ASSESSMENT: PAIN_FUNCTIONAL_ASSESSMENT: PREVENTS OR INTERFERES SOME ACTIVE ACTIVITIES AND ADLS

## 2024-06-15 ASSESSMENT — PAIN DESCRIPTION - ORIENTATION: ORIENTATION: LOWER

## 2024-06-15 NOTE — PROGRESS NOTES
[facility]  Internal Medicine Teaching Residency Program  Inpatient Daily Progress Note  ______________________________________________________________________________    Patient: Bobbi Phillips  YOB: 1955   MRN:[id number]    Acct: [id number]     Room: 0235/0235-01  Admit date: 6/10/2024  Today's date: 06/15/24  Number of days in the hospital: 4    SUBJECTIVE   CC: Fatigue    Pt examined at bedside. Chart & results reviewed.   -VSS, pt is saturating well on room air   -Still has back pain but better  -labs reviewed   -no acute events overnight.   -Patient denies headache, vision problems, chest pain  , SOB, wheezing, abdominal pain, changes in bowel habits, constipation, changes in urinary habits, lower extremity swelling, nausea , vomiting, and diarrhea     ROS:  General ROS: Completed and except as mentioned above were negative   HEENT ROS: Completed and except as mentioned above were negative   Allergy and Immunology ROS:  Completed and except as mentioned above were negative  Hematological and Lymphatic ROS:  Completed and except as mentioned above were negative  Respiratory ROS:  Completed and except as mentioned above were negative  Cardiovascular ROS:  Completed and except as mentioned above were negative  Gastrointestinal ROS: Completed and except as mentioned above were negative  Genito-Urinary ROS:  Completed and except as mentioned above were negative  Musculoskeletal ROS:  Completed and except as mentioned above were negative  Neurological ROS:  Completed and except as mentioned above were negative  Skin & Dermatological ROS:  Completed and except as mentioned above were negative  Psychological ROS:  Completed and except as mentioned above were negative  BRIEF HISTORY     The patient is a pleasant 68 y.o. female with a PMHx significant for      Hepatic cirrhosis  Hypertension  Dyslipidemia  CKD  Type 2 diabetes mellitus        presents with a  (HCV) (HCC)    Other fatigue    Osteomyelitis of lumbar spine (HCC)    Debility  Resolved Problems:    * No resolved hospital problems. *        IMPRESSION  Patient is a 68-year-old female with past medical history significant for liver cirrhosis, hypertension, dyslipidemia, CKD, type 2 diabetes mellitus presented to the hospital with difficulty walking.        6/12/24     Difficulty walking-generalized weakness.   Lower back pain- Bilateral foraminal stenoses from L2-3 through the L4-5 and on the left at L5-S1. concerning for L1-2 discitis/osteomyelitis .S/p Disk aspiration   KIM on CKD-likely prerenal.  FeNa 0.7 %. Concern for HRS  Hepatic cirrhosis-likely BONNER/NAFLD.  MELD NA score 18  Newly diagnosed hypothyroidism  UTI  Hypertension  Dyslipidemia  Type 2 diabetes mellitus   PM&R consult.  PT OT.  Lumbar spine stenosis with concern for osteomyelitis.ID and Neurosurgery on board. Patient refused MRI. Plan for IR guided biopsy.F/u cxs    Nephrology consulted.  Appreciate recommendations.  Ultrasound abdomen showing mild to moderate ascites.  Continue lactulose and rifaximin due to history of hepatic encephalopathy.Starting the patient on levothyroxine 125mcg daily  Continue zosyn.  Currently blood pressure soft.  Will resume antihypertensive if needed.  Continue ASA and Lipitor 10 Mg.   Starting the patient on Lantus 20 units nightly and high-dose sliding scale.            DVT ppx: Lovenox  GI ppx: Protonix     PT/OT/SW  Discharge Planning: Monitoring patient    Geoff Elias MD  PGY-1, Internal Medicine Resident  [facility]         6/15/2024, 8:44 AM    Attestation and add on       I have discussed the care of Bobbi Phillips , including pertinent history and exam findings,      6/15/24    with the resident.  I have seen and examined the patient and the key elements of all parts of the encounter have been performed by me .   I agree with the assessment, plan

## 2024-06-15 NOTE — PLAN OF CARE
Problem: Safety - Adult  Goal: Free from fall injury  6/15/2024 0633 by Pete Navarro, RN  Outcome: Progressing     Problem: Skin/Tissue Integrity  Goal: Absence of new skin breakdown  Description: 1.  Monitor for areas of redness and/or skin breakdown  2.  Assess vascular access sites hourly  3.  Every 4-6 hours minimum:  Change oxygen saturation probe site  4.  Every 4-6 hours:  If on nasal continuous positive airway pressure, respiratory therapy assess nares and determine need for appliance change or resting period.  6/15/2024 0633 by Pete Navarro, RN  Outcome: Progressing     Problem: ABCDS Injury Assessment  Goal: Absence of physical injury  6/15/2024 0633 by Pete Navarro, RN  Outcome: Progressing     Problem: Pain  Goal: Verbalizes/displays adequate comfort level or baseline comfort level  6/15/2024 0633 by Pete Navarro, RN  Outcome: Progressing

## 2024-06-15 NOTE — PROGRESS NOTES
provider] bumetanide  1 mg Oral Daily    insulin glargine  20 Units SubCUTAneous Nightly    [Held by provider] lactulose  20 g Oral TID    pantoprazole  40 mg Oral BID AC    rifAXIMin  550 mg Oral BID    insulin lispro  0-16 Units SubCUTAneous TID WC    insulin lispro  0-4 Units SubCUTAneous Nightly    sodium chloride flush  5-40 mL IntraVENous 2 times per day     Continuous Infusions:    dextrose      sodium chloride       PRN Meds:  melatonin, traMADol, iopamidol, glucose, dextrose bolus **OR** dextrose bolus, glucagon (rDNA), dextrose, sodium chloride flush, sodium chloride, acetaminophen, ondansetron **OR** ondansetron    Physical Examination     General:  AAO x 3, speaking in full sentences, no accessory muscle use.  Chest:   Bilateral vesicular breath sounds, no rales or wheezes.  Cardiac:  S1 S2 RR, + murmurs, gallops or rubs, JVP not raised.  Abdomen: Obese, Soft, non-tender, non distended, BS audible.  SKIN:  BLE inaccessible 2/2 ace wraps  Extremities:  Positive some edema, no clubbing, No cyanosis  Neuro:  AAO x 3, No FND.     Labs      Recent Labs     06/13/24  0611   WBC 4.4   RBC 3.01*   HGB 8.5*   HCT 27.3*   MCV 90.7   MCH 28.2   MCHC 31.1   RDW 16.3*      MPV 9.7      BMP:   Recent Labs     06/13/24  2158 06/14/24  0551 06/15/24  0540   * 131* 133*   K 4.5 4.4 4.4   CL 99 100 100   CO2 23 22 24   BUN 38* 39* 38*   CREATININE 1.7* 1.6* 1.5*   GLUCOSE 209* 136* 171*   CALCIUM 7.7* 7.9* 8.2*        PTH:     Lab Results   Component Value Date/Time    IPTH 32.0 06/12/2024 06:44 AM         Urinalysis/Chemistries      Lab Results   Component Value Date/Time    NITRU POSITIVE 06/11/2024 01:19 PM    COLORU Dark Yellow 06/11/2024 01:19 PM    PHUR 5.5 06/11/2024 01:19 PM    PHUR 7.0 04/27/2024 12:15 PM    WBCUA TOO NUMEROUS TO COUNT 06/11/2024 01:19 PM    RBCUA 10 TO 20 06/11/2024 01:19 PM    BACTERIA MANY 06/11/2024 01:19 PM    SPECGRAV >1.030 06/26/2023 04:06 PM    LEUKOCYTESUR LARGE 06/11/2024  on Zosyn  History of cirrhosis in the setting of BONNER with portal hypertension.  Last paracentesis was 2 to 3 weeks ago.  History of primary hypertension the patient's blood pressure currently low  Right lower extremity edema  HFpEF-most recent echo completed on 4/4/2024 showed an EF of 75 to 80% with mild tricuspid regurgitation, moderate pulmonary hypertension with an RVSP of 48 mmHg and a small pericardial effusion.    Plan   Continue off IV fluids, encourage oral intake  Bumex 2 mg IV.    UTI treatment per infectious disease  BMP in AM.  Will continue to follow    Nutrition   Renal Diet/TF      Thank you. Please call with any questions.    JOE Peck - NP     [facility].      Attending Physician Statement  I have discussed the care of this patient, including pertinent history and exam findings, with the Resident/CNP. I have seen and examined the patient myself. I have reviewed and edited the key elements of all parts of the encounter with the Resident/CNP.  I agree with the assessment, plan and orders as documented by the Resident/CNP. In addition patient is seen and examined.  Continue to monitor off IV fluids.  Will give a dose of Bumex 2 mg IV.  BMP in AM.  Will follow-up with    Jai Banks MD   [facility]    This note is created with the assistance of a speech-recognition program. While intending to generate a document that actually reflects the content of the visit, no guarantees can be provided that every mistake has been identified and corrected by editing.

## 2024-06-16 LAB
ALBUMIN SERPL-MCNC: 2.1 G/DL (ref 3.5–5.2)
ALBUMIN/GLOB SERPL: 1 {RATIO} (ref 1–2.5)
ALP SERPL-CCNC: 501 U/L (ref 35–104)
ALT SERPL-CCNC: 9 U/L (ref 10–35)
ANION GAP SERPL CALCULATED.3IONS-SCNC: 8 MMOL/L (ref 9–16)
AST SERPL-CCNC: 37 U/L (ref 10–35)
BILIRUB SERPL-MCNC: 0.4 MG/DL (ref 0–1.2)
BUN SERPL-MCNC: 38 MG/DL (ref 8–23)
CALCIUM SERPL-MCNC: 8.1 MG/DL (ref 8.6–10.4)
CHLORIDE SERPL-SCNC: 101 MMOL/L (ref 98–107)
CO2 SERPL-SCNC: 23 MMOL/L (ref 20–31)
CREAT SERPL-MCNC: 1.6 MG/DL (ref 0.5–0.9)
GFR, ESTIMATED: 34 ML/MIN/1.73M2
GLUCOSE BLD-MCNC: 121 MG/DL (ref 65–105)
GLUCOSE BLD-MCNC: 130 MG/DL (ref 65–105)
GLUCOSE BLD-MCNC: 148 MG/DL (ref 65–105)
GLUCOSE BLD-MCNC: 178 MG/DL (ref 65–105)
GLUCOSE SERPL-MCNC: 138 MG/DL (ref 74–99)
MICROORGANISM SPEC CULT: ABNORMAL
POTASSIUM SERPL-SCNC: 4.4 MMOL/L (ref 3.7–5.3)
PROT SERPL-MCNC: 6.1 G/DL (ref 6.6–8.7)
SERVICE CMNT-IMP: ABNORMAL
SODIUM SERPL-SCNC: 132 MMOL/L (ref 136–145)
SPECIMEN DESCRIPTION: ABNORMAL

## 2024-06-16 PROCEDURE — 99232 SBSQ HOSP IP/OBS MODERATE 35: CPT | Performed by: INTERNAL MEDICINE

## 2024-06-16 PROCEDURE — 82947 ASSAY GLUCOSE BLOOD QUANT: CPT

## 2024-06-16 PROCEDURE — 2580000003 HC RX 258: Performed by: INTERNAL MEDICINE

## 2024-06-16 PROCEDURE — 6370000000 HC RX 637 (ALT 250 FOR IP): Performed by: STUDENT IN AN ORGANIZED HEALTH CARE EDUCATION/TRAINING PROGRAM

## 2024-06-16 PROCEDURE — 6360000002 HC RX W HCPCS

## 2024-06-16 PROCEDURE — 2580000003 HC RX 258: Performed by: STUDENT IN AN ORGANIZED HEALTH CARE EDUCATION/TRAINING PROGRAM

## 2024-06-16 PROCEDURE — 36415 COLL VENOUS BLD VENIPUNCTURE: CPT

## 2024-06-16 PROCEDURE — 6370000000 HC RX 637 (ALT 250 FOR IP)

## 2024-06-16 PROCEDURE — 1200000000 HC SEMI PRIVATE

## 2024-06-16 PROCEDURE — 6360000002 HC RX W HCPCS: Performed by: INTERNAL MEDICINE

## 2024-06-16 PROCEDURE — 80053 COMPREHEN METABOLIC PANEL: CPT

## 2024-06-16 RX ADMIN — INSULIN GLARGINE 20 UNITS: 100 INJECTION, SOLUTION SUBCUTANEOUS at 21:41

## 2024-06-16 RX ADMIN — GABAPENTIN 100 MG: 100 CAPSULE ORAL at 13:37

## 2024-06-16 RX ADMIN — TRAMADOL HYDROCHLORIDE 50 MG: 50 TABLET ORAL at 21:45

## 2024-06-16 RX ADMIN — HEPARIN SODIUM 5000 UNITS: 5000 INJECTION INTRAVENOUS; SUBCUTANEOUS at 21:39

## 2024-06-16 RX ADMIN — PIPERACILLIN AND TAZOBACTAM 3375 MG: 3; .375 INJECTION, POWDER, LYOPHILIZED, FOR SOLUTION INTRAVENOUS at 08:17

## 2024-06-16 RX ADMIN — BUMETANIDE 1 MG: 1 TABLET ORAL at 08:07

## 2024-06-16 RX ADMIN — PIPERACILLIN AND TAZOBACTAM 3375 MG: 3; .375 INJECTION, POWDER, LYOPHILIZED, FOR SOLUTION INTRAVENOUS at 15:46

## 2024-06-16 RX ADMIN — RIFAXIMIN 550 MG: 550 TABLET ORAL at 08:08

## 2024-06-16 RX ADMIN — PANTOPRAZOLE SODIUM 40 MG: 40 TABLET, DELAYED RELEASE ORAL at 05:44

## 2024-06-16 RX ADMIN — CHOLECALCIFEROL TAB 25 MCG (1000 UNIT) 4000 UNITS: 25 TAB at 08:08

## 2024-06-16 RX ADMIN — GABAPENTIN 100 MG: 100 CAPSULE ORAL at 20:23

## 2024-06-16 RX ADMIN — TRAMADOL HYDROCHLORIDE 50 MG: 50 TABLET ORAL at 05:47

## 2024-06-16 RX ADMIN — SODIUM CHLORIDE, PRESERVATIVE FREE 10 ML: 5 INJECTION INTRAVENOUS at 08:08

## 2024-06-16 RX ADMIN — LEVOTHYROXINE SODIUM 125 MCG: 0.12 TABLET ORAL at 05:44

## 2024-06-16 RX ADMIN — ASPIRIN 81 MG: 81 TABLET, COATED ORAL at 08:07

## 2024-06-16 RX ADMIN — ATORVASTATIN CALCIUM 10 MG: 20 TABLET, FILM COATED ORAL at 08:07

## 2024-06-16 RX ADMIN — HEPARIN SODIUM 5000 UNITS: 5000 INJECTION INTRAVENOUS; SUBCUTANEOUS at 13:36

## 2024-06-16 RX ADMIN — RIFAXIMIN 550 MG: 550 TABLET ORAL at 21:43

## 2024-06-16 RX ADMIN — HEPARIN SODIUM 5000 UNITS: 5000 INJECTION INTRAVENOUS; SUBCUTANEOUS at 05:44

## 2024-06-16 RX ADMIN — GABAPENTIN 100 MG: 100 CAPSULE ORAL at 08:07

## 2024-06-16 RX ADMIN — TAMSULOSIN HYDROCHLORIDE 0.4 MG: 0.4 CAPSULE ORAL at 08:07

## 2024-06-16 RX ADMIN — PANTOPRAZOLE SODIUM 40 MG: 40 TABLET, DELAYED RELEASE ORAL at 15:43

## 2024-06-16 RX ADMIN — SODIUM CHLORIDE, PRESERVATIVE FREE 10 ML: 5 INJECTION INTRAVENOUS at 20:25

## 2024-06-16 ASSESSMENT — PAIN DESCRIPTION - LOCATION
LOCATION: LEG
LOCATION: HIP

## 2024-06-16 ASSESSMENT — PAIN DESCRIPTION - DESCRIPTORS
DESCRIPTORS: PRESSURE
DESCRIPTORS: ACHING

## 2024-06-16 ASSESSMENT — PAIN SCALES - GENERAL
PAINLEVEL_OUTOF10: 6
PAINLEVEL_OUTOF10: 0
PAINLEVEL_OUTOF10: 0
PAINLEVEL_OUTOF10: 7

## 2024-06-16 ASSESSMENT — PAIN DESCRIPTION - ORIENTATION
ORIENTATION: RIGHT
ORIENTATION: RIGHT;LEFT

## 2024-06-16 ASSESSMENT — PAIN - FUNCTIONAL ASSESSMENT
PAIN_FUNCTIONAL_ASSESSMENT: ACTIVITIES ARE NOT PREVENTED
PAIN_FUNCTIONAL_ASSESSMENT: PREVENTS OR INTERFERES SOME ACTIVE ACTIVITIES AND ADLS

## 2024-06-16 NOTE — PROGRESS NOTES
of cirrhosis of liver    Bilateral lower extremity edema  Resolved Problems:    * No resolved hospital problems. *        IMPRESSION  Patient is a 68-year-old female with past medical history significant for liver cirrhosis, hypertension, dyslipidemia, CKD, type 2 diabetes mellitus presented to the hospital with difficulty walking.        6/12/24     Difficulty walking-generalized weakness.   Lower back pain- Bilateral foraminal stenoses from L2-3 through the L4-5 and on the left at L5-S1. concerning for L1-2 discitis/osteomyelitis .S/p Disk aspiration   KIM on CKD-likely prerenal.  FeNa 0.7 %. Concern for HRS  Hepatic cirrhosis-likely BONNER/NAFLD.  MELD NA score 18  Newly diagnosed hypothyroidism  UTI  Hypertension  Dyslipidemia  Type 2 diabetes mellitus   PM&R consult.  PT OT.  Lumbar spine stenosis with concern for osteomyelitis.ID and Neurosurgery on board. Patient refused MRI. Plan for IR guided biopsy.F/u cxs    Nephrology consulted.  Appreciate recommendations Resume Bumex.  Ultrasound abdomen showing mild to moderate ascites.  Continue lactulose and rifaximin due to history of hepatic encephalopathy.Starting the patient on levothyroxine 125mcg daily  Continue zosyn.  Currently blood pressure soft.  Will resume antihypertensive if needed.  Continue ASA and Lipitor 10 Mg.   Starting the patient on Lantus 20 units nightly and high-dose sliding scale.            DVT ppx: Lovenox  GI ppx: Protonix     PT/OT/SW  Discharge Planning: Monitoring patient    Geoff Elias MD  PGY-1, Internal Medicine Resident  [facility] [place]         6/16/2024, 9:40 AM    Attestation and add on       I have discussed the care of Bobbi Phillips , including pertinent history and exam findings,      6/16/24    with the resident.  I have seen and examined the patient and the key elements of all parts of the encounter have been performed by me .   I agree with the assessment, plan and orders  as documented by the resident.       Difficulty walking-generalized weakness.   Lower back pain- Bilateral foraminal stenoses from L2-3 through the L4-5 and on the left at L5-S1. concerning for L1-2 discitis/osteomyelitis .S/p Disk aspiration   KIM on CKD-likely prerenal.  FeNa 0.7 %. Concern for HRS  Hepatic cirrhosis-likely BONNER/NAFLD.  MELD NA score 18  Newly diagnosed hypothyroidism  UTI  Hypertension  Dyslipidemia  Type 2 diabetes mellitus  On zosyn        [place] [address].   Phone [phone]   Fax: [phone]  Answering Service: [phone]

## 2024-06-16 NOTE — PROGRESS NOTES
Nephrology Progress Note    Patient:  Bobbi Phillips; 68 y.o. MRN# [id number]  Location:  0235/0235-01  Attending:  Ray Hwang MD  Admit Date:  6/10/2024   Hospital Day: 5    Subjective   Patient  admitted on 6/10/2024 for difficulty with ambulation.  Found to have multi organism UTI.  We have been consulted for acute kidney injury     Patient seen and evaluated in room, no acute events overnight  Vital signs stable overnight  Tolerating abx for complicated UTI  Renal function continues to show improvement, creatinine currently 1.5, with a baseline between 1.0 and 1.4.  BMP results from today pending.  UOP in the last 24 hours documented as about x 3 unmeasured urine occurrences, patient received a dose of Bumex 2 mg IV push yesterday.    Recent Labs     24  2158 24  0551 06/15/24  0540   * 131* 133*   K 4.5 4.4 4.4   CL 99 100 100   CO2 23 22 24   BUN 38* 39* 38*   CREATININE 1.7* 1.6* 1.5*   GLUCOSE 209* 136* 171*   CALCIUM 7.7* 7.9* 8.2*       Objective   VS: BP (!) 129/50   Pulse 97   Temp 98.4 °F (36.9 °C) (Oral)   Resp 18   Ht 1.499 m (4' 11\")   Wt 108.8 kg (239 lb 13.8 oz)   SpO2 93%   BMI 48.45 kg/m²   MAXIMUM TEMPERATURE OVER 24 HRS:  Temp (24hrs), Av.2 °F (36.8 °C), Min:97.9 °F (36.6 °C), Max:98.4 °F (36.9 °C)    24 HR BLOOD PRESSURE RANGE:  Systolic (24hrs), Av , Min:129 , Max:140   ; Diastolic (24hrs), Av, Min:43, Max:50    24 HR INTAKE/OUTPUT:    Intake/Output Summary (Last 24 hours) at 2024 0635  Last data filed at 6/15/2024 1800  Gross per 24 hour   Intake 180 ml   Output --   Net 180 ml     WEIGHT: Patient Vitals for the past 96 hrs (Last 3 readings):   Weight   24 0550 108.8 kg (239 lb 13.8 oz)   06/15/24 0600 109 kg (240 lb 4.8 oz)       Current Medications    Scheduled Meds:    piperacillin-tazobactam  3,375 mg IntraVENous Q12H    heparin (porcine)  5,000 Units SubCUTAneous 3 times per day    levothyroxine  125 mcg Oral Daily    aspirin  81 mg

## 2024-06-16 NOTE — PROGRESS NOTES
Pharmacy Note     Renal Dose Adjustment    Bobbi Phillips is a 68 y.o. female. Pharmacist assessment of renally cleared medications.    Recent Labs     06/15/24  0540 06/16/24  0945   BUN 38* 38*       Recent Labs     06/15/24  0540 06/16/24  0945   CREATININE 1.5* 1.6*       Estimated Creatinine Clearance: 38 mL/min (A) (based on SCr of 1.6 mg/dL (H)).    Height:   Ht Readings from Last 1 Encounters:   06/10/24 1.499 m (4' 11\")     Weight:  Wt Readings from Last 1 Encounters:   06/16/24 108.8 kg (239 lb 13.8 oz)       The following medication dose has been adjusted based upon renal function per P&T Guidelines:             Piperacillin-tazobactam changed from 3375 mg IV over 240 minutes  every 12 hours to 3375 mg IV over 240 minutes every 8 hours.

## 2024-06-16 NOTE — CARE COORDINATION
Transitional Planning  Patient provided 3 preferences for snf: [facility] at [facility], [place], [facility] A at [place].  Referrals placed for all.

## 2024-06-16 NOTE — PLAN OF CARE
Problem: Safety - Adult  Goal: Free from fall injury  6/16/2024 1519 by Nickie St, RN  Outcome: Progressing  Flowsheets (Taken 6/16/2024 0930)  Free From Fall Injury:   Instruct family/caregiver on patient safety   Based on caregiver fall risk screen, instruct family/caregiver to ask for assistance with transferring infant if caregiver noted to have fall risk factors  6/16/2024 0507 by Pete Navarro, RN  Outcome: Progressing     Problem: Chronic Conditions and Co-morbidities  Goal: Patient's chronic conditions and co-morbidity symptoms are monitored and maintained or improved  Outcome: Progressing  Flowsheets (Taken 6/16/2024 0803)  Care Plan - Patient's Chronic Conditions and Co-Morbidity Symptoms are Monitored and Maintained or Improved:   Monitor and assess patient's chronic conditions and comorbid symptoms for stability, deterioration, or improvement   Collaborate with multidisciplinary team to address chronic and comorbid conditions and prevent exacerbation or deterioration   Update acute care plan with appropriate goals if chronic or comorbid symptoms are exacerbated and prevent overall improvement and discharge     Problem: Discharge Planning  Goal: Discharge to home or other facility with appropriate resources  Outcome: Progressing  Flowsheets (Taken 6/16/2024 0803)  Discharge to home or other facility with appropriate resources:   Identify barriers to discharge with patient and caregiver   Arrange for needed discharge resources and transportation as appropriate   Identify discharge learning needs (meds, wound care, etc)   Refer to discharge planning if patient needs post-hospital services based on physician order or complex needs related to functional status, cognitive ability or social support system     Problem: Skin/Tissue Integrity  Goal: Absence of new skin breakdown  Description: 1.  Monitor for areas of redness and/or skin breakdown  2.  Assess vascular access sites hourly  3.  Every 4-6  hours minimum:  Change oxygen saturation probe site  4.  Every 4-6 hours:  If on nasal continuous positive airway pressure, respiratory therapy assess nares and determine need for appliance change or resting period.  6/16/2024 1519 by Nickie St RN  Outcome: Progressing  6/16/2024 0507 by Pete Navarro RN  Outcome: Progressing     Problem: ABCDS Injury Assessment  Goal: Absence of physical injury  6/16/2024 1519 by Nickie St RN  Outcome: Progressing  Flowsheets (Taken 6/16/2024 0930)  Absence of Physical Injury: Implement safety measures based on patient assessment  6/16/2024 0507 by Pete Navarro RN  Outcome: Progressing     Problem: Pain  Goal: Verbalizes/displays adequate comfort level or baseline comfort level  6/16/2024 1519 by Nickie St, RN  Outcome: Progressing  6/16/2024 0507 by Pete Navarro RN  Outcome: Progressing

## 2024-06-17 ENCOUNTER — APPOINTMENT (OUTPATIENT)
Dept: ULTRASOUND IMAGING | Age: 69
DRG: 683 | End: 2024-06-17
Payer: MEDICARE

## 2024-06-17 PROBLEM — K76.6 PORTAL HYPERTENSION (HCC): Status: ACTIVE | Noted: 2024-06-17

## 2024-06-17 PROBLEM — R18.8 OTHER ASCITES: Status: ACTIVE | Noted: 2024-06-17

## 2024-06-17 PROBLEM — K75.81 NASH (NONALCOHOLIC STEATOHEPATITIS): Status: ACTIVE | Noted: 2022-03-10

## 2024-06-17 LAB
ALBUMIN SERPL-MCNC: 2.4 G/DL (ref 3.5–5.2)
ALBUMIN/GLOB SERPL: 1 {RATIO} (ref 1–2.5)
ALP SERPL-CCNC: 485 U/L (ref 35–104)
ALT SERPL-CCNC: 8 U/L (ref 10–35)
ANION GAP SERPL CALCULATED.3IONS-SCNC: 10 MMOL/L (ref 9–16)
AST SERPL-CCNC: 36 U/L (ref 10–35)
BASOPHILS # BLD: 0.05 K/UL (ref 0–0.2)
BASOPHILS NFR BLD: 1 % (ref 0–2)
BILIRUB SERPL-MCNC: 0.4 MG/DL (ref 0–1.2)
BUN SERPL-MCNC: 34 MG/DL (ref 8–23)
CALCIUM SERPL-MCNC: 8.3 MG/DL (ref 8.6–10.4)
CASE NUMBER:: NORMAL
CHLORIDE SERPL-SCNC: 101 MMOL/L (ref 98–107)
CO2 SERPL-SCNC: 24 MMOL/L (ref 20–31)
CREAT SERPL-MCNC: 1.4 MG/DL (ref 0.5–0.9)
CRP SERPL HS-MCNC: 95.7 MG/L (ref 0–5)
EOSINOPHIL # BLD: 0.29 K/UL (ref 0–0.44)
EOSINOPHILS RELATIVE PERCENT: 8 % (ref 1–4)
ERYTHROCYTE [DISTWIDTH] IN BLOOD BY AUTOMATED COUNT: 16.8 % (ref 11.8–14.4)
GFR, ESTIMATED: 41 ML/MIN/1.73M2
GLUCOSE BLD-MCNC: 127 MG/DL (ref 65–105)
GLUCOSE BLD-MCNC: 180 MG/DL (ref 65–105)
GLUCOSE BLD-MCNC: 213 MG/DL (ref 65–105)
GLUCOSE BLD-MCNC: 282 MG/DL (ref 65–105)
GLUCOSE SERPL-MCNC: 137 MG/DL (ref 74–99)
HCT VFR BLD AUTO: 30.3 % (ref 36.3–47.1)
HGB BLD-MCNC: 9.5 G/DL (ref 11.9–15.1)
IMM GRANULOCYTES # BLD AUTO: 0.08 K/UL (ref 0–0.3)
IMM GRANULOCYTES NFR BLD: 2 %
INTERVENTION: NORMAL
LYMPHOCYTES NFR BLD: 0.74 K/UL (ref 1.1–3.7)
LYMPHOCYTES RELATIVE PERCENT: 20 % (ref 24–43)
MCH RBC QN AUTO: 28 PG (ref 25.2–33.5)
MCHC RBC AUTO-ENTMCNC: 31.4 G/DL (ref 28.4–34.8)
MCV RBC AUTO: 89.4 FL (ref 82.6–102.9)
MICROORGANISM SPEC CULT: NORMAL
MICROORGANISM/AGENT SPEC: NORMAL
MONOCYTES NFR BLD: 0.37 K/UL (ref 0.1–1.2)
MONOCYTES NFR BLD: 10 % (ref 3–12)
NEUTROPHILS NFR BLD: 59 % (ref 36–65)
NEUTS SEG NFR BLD: 2.1 K/UL (ref 1.5–8.1)
NRBC BLD-RTO: 0 PER 100 WBC
PLATELET # BLD AUTO: 231 K/UL (ref 138–453)
PMV BLD AUTO: 9.8 FL (ref 8.1–13.5)
POTASSIUM SERPL-SCNC: 4.4 MMOL/L (ref 3.7–5.3)
PROT SERPL-MCNC: 6.3 G/DL (ref 6.6–8.7)
RBC # BLD AUTO: 3.39 M/UL (ref 3.95–5.11)
RBC # BLD: ABNORMAL 10*6/UL
SERVICE CMNT-IMP: NORMAL
SODIUM SERPL-SCNC: 135 MMOL/L (ref 136–145)
SPECIMEN DESCRIPTION: NORMAL
SPECIMEN DESCRIPTION: NORMAL
WBC OTHER # BLD: 3.6 K/UL (ref 3.5–11.3)

## 2024-06-17 PROCEDURE — 6370000000 HC RX 637 (ALT 250 FOR IP)

## 2024-06-17 PROCEDURE — 6370000000 HC RX 637 (ALT 250 FOR IP): Performed by: INTERNAL MEDICINE

## 2024-06-17 PROCEDURE — 99232 SBSQ HOSP IP/OBS MODERATE 35: CPT | Performed by: INTERNAL MEDICINE

## 2024-06-17 PROCEDURE — 0W9G3ZZ DRAINAGE OF PERITONEAL CAVITY, PERCUTANEOUS APPROACH: ICD-10-PCS | Performed by: RADIOLOGY

## 2024-06-17 PROCEDURE — 2580000003 HC RX 258: Performed by: STUDENT IN AN ORGANIZED HEALTH CARE EDUCATION/TRAINING PROGRAM

## 2024-06-17 PROCEDURE — 82947 ASSAY GLUCOSE BLOOD QUANT: CPT

## 2024-06-17 PROCEDURE — 49083 ABD PARACENTESIS W/IMAGING: CPT

## 2024-06-17 PROCEDURE — 97116 GAIT TRAINING THERAPY: CPT

## 2024-06-17 PROCEDURE — 97535 SELF CARE MNGMENT TRAINING: CPT

## 2024-06-17 PROCEDURE — 6360000002 HC RX W HCPCS

## 2024-06-17 PROCEDURE — 36415 COLL VENOUS BLD VENIPUNCTURE: CPT

## 2024-06-17 PROCEDURE — 6360000002 HC RX W HCPCS: Performed by: INTERNAL MEDICINE

## 2024-06-17 PROCEDURE — 6370000000 HC RX 637 (ALT 250 FOR IP): Performed by: STUDENT IN AN ORGANIZED HEALTH CARE EDUCATION/TRAINING PROGRAM

## 2024-06-17 PROCEDURE — 85025 COMPLETE CBC W/AUTO DIFF WBC: CPT

## 2024-06-17 PROCEDURE — 1200000000 HC SEMI PRIVATE

## 2024-06-17 PROCEDURE — 2580000003 HC RX 258: Performed by: INTERNAL MEDICINE

## 2024-06-17 PROCEDURE — 86140 C-REACTIVE PROTEIN: CPT

## 2024-06-17 PROCEDURE — 97110 THERAPEUTIC EXERCISES: CPT

## 2024-06-17 PROCEDURE — 2709999900 US GUIDED PARACENTESIS

## 2024-06-17 PROCEDURE — 80053 COMPREHEN METABOLIC PANEL: CPT

## 2024-06-17 RX ORDER — SPIRONOLACTONE 25 MG/1
25 TABLET ORAL DAILY
Status: DISCONTINUED | OUTPATIENT
Start: 2024-06-17 | End: 2024-06-19 | Stop reason: HOSPADM

## 2024-06-17 RX ADMIN — PIPERACILLIN AND TAZOBACTAM 3375 MG: 3; .375 INJECTION, POWDER, LYOPHILIZED, FOR SOLUTION INTRAVENOUS at 08:59

## 2024-06-17 RX ADMIN — INSULIN GLARGINE 20 UNITS: 100 INJECTION, SOLUTION SUBCUTANEOUS at 20:45

## 2024-06-17 RX ADMIN — LACTULOSE 20 G: 20 SOLUTION ORAL at 13:34

## 2024-06-17 RX ADMIN — INSULIN LISPRO 4 UNITS: 100 INJECTION, SOLUTION INTRAVENOUS; SUBCUTANEOUS at 17:54

## 2024-06-17 RX ADMIN — HEPARIN SODIUM 5000 UNITS: 5000 INJECTION INTRAVENOUS; SUBCUTANEOUS at 05:44

## 2024-06-17 RX ADMIN — CHOLECALCIFEROL TAB 25 MCG (1000 UNIT) 4000 UNITS: 25 TAB at 08:43

## 2024-06-17 RX ADMIN — Medication 6 MG: at 20:33

## 2024-06-17 RX ADMIN — RIFAXIMIN 550 MG: 550 TABLET ORAL at 20:32

## 2024-06-17 RX ADMIN — GABAPENTIN 100 MG: 100 CAPSULE ORAL at 20:32

## 2024-06-17 RX ADMIN — SODIUM CHLORIDE, PRESERVATIVE FREE 10 ML: 5 INJECTION INTRAVENOUS at 08:49

## 2024-06-17 RX ADMIN — BUMETANIDE 1 MG: 1 TABLET ORAL at 08:43

## 2024-06-17 RX ADMIN — INSULIN LISPRO 4 UNITS: 100 INJECTION, SOLUTION INTRAVENOUS; SUBCUTANEOUS at 20:45

## 2024-06-17 RX ADMIN — HEPARIN SODIUM 5000 UNITS: 5000 INJECTION INTRAVENOUS; SUBCUTANEOUS at 13:34

## 2024-06-17 RX ADMIN — RIFAXIMIN 550 MG: 550 TABLET ORAL at 08:43

## 2024-06-17 RX ADMIN — SPIRONOLACTONE 25 MG: 25 TABLET, FILM COATED ORAL at 10:07

## 2024-06-17 RX ADMIN — LEVOTHYROXINE SODIUM 125 MCG: 0.12 TABLET ORAL at 05:44

## 2024-06-17 RX ADMIN — TRAMADOL HYDROCHLORIDE 50 MG: 50 TABLET ORAL at 14:29

## 2024-06-17 RX ADMIN — TAMSULOSIN HYDROCHLORIDE 0.4 MG: 0.4 CAPSULE ORAL at 08:43

## 2024-06-17 RX ADMIN — ASPIRIN 81 MG: 81 TABLET, COATED ORAL at 08:44

## 2024-06-17 RX ADMIN — LACTULOSE 20 G: 20 SOLUTION ORAL at 08:48

## 2024-06-17 RX ADMIN — ATORVASTATIN CALCIUM 10 MG: 20 TABLET, FILM COATED ORAL at 08:43

## 2024-06-17 RX ADMIN — HEPARIN SODIUM 5000 UNITS: 5000 INJECTION INTRAVENOUS; SUBCUTANEOUS at 22:14

## 2024-06-17 RX ADMIN — GABAPENTIN 100 MG: 100 CAPSULE ORAL at 13:35

## 2024-06-17 RX ADMIN — PANTOPRAZOLE SODIUM 40 MG: 40 TABLET, DELAYED RELEASE ORAL at 05:44

## 2024-06-17 RX ADMIN — PIPERACILLIN AND TAZOBACTAM 3375 MG: 3; .375 INJECTION, POWDER, LYOPHILIZED, FOR SOLUTION INTRAVENOUS at 17:25

## 2024-06-17 RX ADMIN — PANTOPRAZOLE SODIUM 40 MG: 40 TABLET, DELAYED RELEASE ORAL at 17:26

## 2024-06-17 RX ADMIN — PIPERACILLIN AND TAZOBACTAM 3375 MG: 3; .375 INJECTION, POWDER, LYOPHILIZED, FOR SOLUTION INTRAVENOUS at 00:04

## 2024-06-17 RX ADMIN — TRAMADOL HYDROCHLORIDE 50 MG: 50 TABLET ORAL at 22:18

## 2024-06-17 RX ADMIN — GABAPENTIN 100 MG: 100 CAPSULE ORAL at 08:44

## 2024-06-17 ASSESSMENT — PAIN DESCRIPTION - LOCATION: LOCATION: BACK

## 2024-06-17 ASSESSMENT — PAIN SCALES - GENERAL
PAINLEVEL_OUTOF10: 8
PAINLEVEL_OUTOF10: 5
PAINLEVEL_OUTOF10: 9
PAINLEVEL_OUTOF10: 7
PAINLEVEL_OUTOF10: 5

## 2024-06-17 ASSESSMENT — PAIN DESCRIPTION - DESCRIPTORS: DESCRIPTORS: THROBBING

## 2024-06-17 NOTE — PROGRESS NOTES
standing with LUE on b-rail and RUE on RW for a time before bringing LUE to RW.  Ambulation  Surface: Level tile  Device: Rolling Walker  Assistance: Minimal assistance  Quality of Gait: Fair stability, heavy reliance on RW, mild trunk sway, no true LOB.  Gait Deviations: Decreased step length  Distance: amb 3 ft bed-recliner  More Ambulation?: No  Stairs/Curb  Stairs?: No     Balance  Posture: Good  Sitting - Static: Good  Sitting - Dynamic: Fair  Standing - Static: Fair  Standing - Dynamic: Fair  Comments: standing balance assessed while using a RW.  Exercise Treatment: supine BLE AAROM ex's X 10:  SLR's, SKTC with manually resisted extension, seated BLE LAQ's and marches AROM X 15        AM-PAC - Mobility    AM-PAC Basic Mobility - Inpatient   How much help is needed turning from your back to your side while in a flat bed without using bedrails?: A Lot  How much help is needed moving from lying on your back to sitting on the side of a flat bed without using bedrails?: A Lot  How much help is needed moving to and from a bed to a chair?: A Little  How much help is needed standing up from a chair using your arms?: A Little  How much help is needed walking in hospital room?: A Little  How much help is needed climbing 3-5 steps with a railing?: Total  AM-PAC Inpatient Mobility Raw Score : 14  AM-PAC Inpatient T-Scale Score : 38.1  Mobility Inpatient CMS 0-100% Score: 61.29  Mobility Inpatient CMS G-Code Modifier : CL    Goals  Short Term Goals  Time Frame for Short Term Goals: 14 visits  Short Term Goal 1: Pt will perform sit<>stand transfer with supervision.  Short Term Goal 2: Pt will ambulate 300 feet with least restrictive AD and supervision.  Short Term Goal 3: Pt will demonstrate good- dynamic standing balance to decrease fall risk.  Short Term Goal 4: Pt will negotiate 4 stairs with a unilateral handrails and SBA to allow the pt to enter prior living arrangements.  Short Term Goal 5: Pt will perform supine<>sit

## 2024-06-17 NOTE — PROGRESS NOTES
Nephrology Progress Note      SUBJECTIVE      Patient seen and examined this morning.  She is awake and alert, no acute issues overnight.  Tells me she feels like she has more edema and also feels that she is holding more fluid in her abdomen.  Serum creatinine is down to 1.4  Previous cardiac echo shows preserved LV function  Currently receiving antibiotics for a UTI (urine culture growing Klebsiella, Enterococcus and Enterobacter)     OBJECTIVE      CURRENT TEMPERATURE:  Temp: 97.8 °F (36.6 °C)  MAXIMUM TEMPERATURE OVER 24HRS:  Temp (24hrs), Av.1 °F (36.7 °C), Min:97.8 °F (36.6 °C), Max:98.4 °F (36.9 °C)    CURRENT RESPIRATORY RATE:  Respirations: 16  CURRENT PULSE:  Pulse: 81  CURRENT BLOOD PRESSURE:  BP: (!) 151/124  24HR BLOOD PRESSURE RANGE:  Systolic (24hrs), Av , Min:129 , Max:151   ; Diastolic (24hrs), Av, Min:67, Max:124    24HR INTAKE/OUTPUT:    Intake/Output Summary (Last 24 hours) at 2024 0923  Last data filed at 2024 0505  Gross per 24 hour   Intake 768.58 ml   Output --   Net 768.58 ml     WEIGHT :Patient Vitals for the past 96 hrs (Last 3 readings):   Weight   24 0549 109.7 kg (241 lb 13.5 oz)   24 0550 108.8 kg (239 lb 13.8 oz)   06/15/24 0600 109 kg (240 lb 4.8 oz)     PHYSICAL EXAM      GENERAL APPEARANCE:Awake, alert, in no acute distress  SKIN: warm and dry, no rash or erythema  EYES: conjunctivae normal and sclera anicteric  ENT: no thrush no pharyngeal congestion   NECK:   No JVD. No carotid bruits and no carotid lymphadenopathy .  PULMONARY: lungs are clear on auscultation. No Wheezing, no ronchi .   CADRDIOVASCULAR: S1 and S2 normal NO S3 and NO S4 . No rubs , no murmur.   ABDOMEN: soft nontender, bowel sounds present, no organomegaly, + ascites.       EXTREMITIES: + edema     CURRENT MEDICATIONS      piperacillin-tazobactam (ZOSYN) 3,375 mg in sodium chloride 0.9 % 50 mL IVPB (mini-bag), Q8H  heparin (porcine) injection 5,000 Units, 3 times per  additional renal recommendations, will sign off.      Please do not hesitate to call with questions.    This note is created with the assistance of a speech-recognition program. While intending to generate a document that actually reflects the content of the visit, no guarantees can be provided that every mistake has been identified and corrected by editing    Electronically signed by Robert Lyle MD on 6/17/2024 at 9:23 AM

## 2024-06-17 NOTE — BRIEF OP NOTE
Brief Postoperative Note for Paracentesis    Bobbi Phillips  YOB: 1955  [id number]    Pre-operative Diagnosis:  Ascites     Post-operative Diagnosis: Same    Procedure: Ultrasound guided paracentesis     Anesthesia: 1% Lidocaine     Surgeons/Assistants: Jesse Gill PA-C    Complications: none    EBL: Minimal    Specimens: Were obtained    Ultrasound guided paracentesis performed. 4100 ml clear yellow fluid obtained.  Dressing applied.     Electronically signed by LOUISA Frias on 6/17/2024 at 4:30 PM

## 2024-06-17 NOTE — PROGRESS NOTES
[facility]  Internal Medicine Teaching Residency Program  Inpatient Daily Progress Note  ______________________________________________________________________________    Patient: Bobbi Phillips  YOB: 1955   MRN:[id number]    Acct: [id number]     Room: 0235/0235-01  Admit date: 6/10/2024  Today's date: 06/17/24  Number of days in the hospital: 6    SUBJECTIVE   CC: Fatigue    Pt examined at bedside. Chart & results reviewed.   -VSS, pt is saturating well on room air   -Still has back pain but better  -labs reviewed   -no acute events overnight.   -Patient denies headache, vision problems, chest pain  , SOB, wheezing, abdominal pain, changes in bowel habits, constipation, changes in urinary habits, lower extremity swelling, nausea , vomiting, and diarrhea     ROS:  General ROS: Completed and except as mentioned above were negative   HEENT ROS: Completed and except as mentioned above were negative   Allergy and Immunology ROS:  Completed and except as mentioned above were negative  Hematological and Lymphatic ROS:  Completed and except as mentioned above were negative  Respiratory ROS:  Completed and except as mentioned above were negative  Cardiovascular ROS:  Completed and except as mentioned above were negative  Gastrointestinal ROS: Completed and except as mentioned above were negative  Genito-Urinary ROS:  Completed and except as mentioned above were negative  Musculoskeletal ROS:  Completed and except as mentioned above were negative  Neurological ROS:  Completed and except as mentioned above were negative  Skin & Dermatological ROS:  Completed and except as mentioned above were negative  Psychological ROS:  Completed and except as mentioned above were negative  BRIEF HISTORY     The patient is a pleasant 68 y.o. female with a PMHx significant for      Hepatic cirrhosis  Hypertension  Dyslipidemia  CKD  Type 2 diabetes mellitus        presents with a  \"CKMB\", \"CKMBINDEX\", \"TROPONINI\" in the last 72 hours.    Invalid input(s): \"CKTOTAL;3\"  FASTING LIPID PANEL:  Lab Results   Component Value Date    CHOL 188 12/15/2023    HDL 87 12/28/2023    TRIG 79 12/15/2023     LIVER PROFILE:   Recent Labs     06/15/24  0540 06/16/24  0945 06/17/24  0555   AST 29 37* 36*   ALT 6* 9* 8*   BILITOT 0.3 0.4 0.4   ALKPHOS 424* 501* 485*        MICROBIOLOGY:   Lab Results   Component Value Date/Time    CULTURE PENDING 06/14/2024 08:07 PM       Imaging:    US RENAL LIMITED    Result Date: 6/12/2024  1. Unremarkable ultrasound of the kidneys. 2. Ascites.     CT LUMBAR SPINE WO CONTRAST    Result Date: 6/12/2024  1. No acute osseous abnormality of the thoracic or lumbar spine. 2. Findings are concerning for L1-2 discitis/osteomyelitis.  Recommend MRI of the lumbar spine with and without contrast for further evaluation. 3. Diffuse thoracolumbar spine degenerative changes with possibly significant bilateral foraminal stenoses from L2-3 through the L4-5 and on the left at L5-S1.     CT THORACIC SPINE WO CONTRAST    Result Date: 6/12/2024  1. No acute osseous abnormality of the thoracic or lumbar spine. 2. Findings are concerning for L1-2 discitis/osteomyelitis.  Recommend MRI of the lumbar spine with and without contrast for further evaluation. 3. Diffuse thoracolumbar spine degenerative changes with possibly significant bilateral foraminal stenoses from L2-3 through the L4-5 and on the left at L5-S1.     US ABDOMEN LIMITED    Result Date: 6/11/2024  Cirrhosis with small to moderate ascites.     XR CHEST PORTABLE    Result Date: 6/10/2024  Stable cardiomegaly.  Mild vascular congestion.  No focal consolidation.       ASSESSMENT & PLAN     Principal Problem:    Gait disorder  Active Problems:    Anxiety    BONNER (nonalcoholic steatohepatitis)    Type 2 diabetes mellitus with chronic kidney disease    History of recent neurosurgical procedure    Nausea & vomiting    Decompensated cirrhosis

## 2024-06-17 NOTE — PROGRESS NOTES
Patient Tolerated treatment well;Patient limited by fatigue  Activity Tolerance Comments: pt motivated to participate in therapy        Plan   Occupational Therapy Plan  Times Per Week: 4x/wk  Current Treatment Recommendations: Strengthening, Balance training, Functional mobility training, Endurance training, Equipment evaluation, education, & procurement, Safety education & training, Self-Care / ADL, Home management training     Restrictions  Restrictions/Precautions  Restrictions/Precautions: Up as Tolerated  Required Braces or Orthoses?: No    Subjective   General  Chart Reviewed: Yes  Patient assessed for rehabilitation services?: Yes  Response to previous treatment: Patient with no complaints from previous session  Family / Caregiver Present: No  General Comment  Comments: Pt and RN agreeable to therapy this day. Pt seated in chair at start of session and retired to seated in chair at session end with call light in reach, chair alarm on and all needs met. Pt c/o 7.5/10 pain with activity at back, decreasing once returned to seated. Pt repositioned for comfort.         Objective        Safety Devices  Type of Devices: Gait belt;Call light within reach;Left in chair;Nurse notified  Restraints  Restraints Initially in Place: No  Balance  Sitting: Without support (Pt tolerated approx 12-14 min static/dynamic sitting at toilet SUP)  Standing: With support (Static/dynamic standing with RW tolerating approx 4-5 min CGA utilizing 1-2 hand support)  Gait  Gait Training: Yes (chair><bathroom utilizing RW demo 0 LOB however unsteady, required increased time and Min A for walker management. Min v/c on walker placement and body positioning)  Overall Level of Assistance: Minimum assistance  Toilet Transfers  Toilet - Technique: Ambulating  Equipment Used: Raised toilet seat with rails  Toilet Transfer: Minimal assistance  Toilet Transfers Comments: utilizing grab bars     ADL  Grooming: Modified independent ;Setup  Grooming  Skilled Clinical Factors: face washing facilitated seated in chair  Toileting: Maximum assistance;Setup;Increased time to complete  Toileting Skilled Clinical Factors: Min A for toilet transfer utilizing RW and grab bars, Max A for brief management standing with RW, Max A for personal hygiene standing with RW utilizing 2 hand support on RW.  Additional Comments: Throughout session pt limited per pain, decreased endurance, limited functional reach and decreased strength. Further ADLs not attempted d/t pt fatigue. Increased time required for toileting task     Activity Tolerance  Activity Tolerance: Patient limited by fatigue;Patient limited by endurance  Bed mobility  Bed Mobility Comments: JACK pt seated in chair at start/end of session  Transfers  Sit to stand: Moderate assistance  Stand to sit: Minimal assistance  Transfer Comments: utilizing RW requiring min v/c on proper hand placement     Cognition  Overall Cognitive Status: WFL  Orientation  Overall Orientation Status: Within Functional Limits  Orientation Level: Oriented X4                  Education Given To: Patient  Education Provided: Role of Therapy;Transfer Training;Precautions  Education Provided Comments: proper hand and foot placement; balance maintence; walker management; proper body postioning; safety precautions-F carry over  Education Method: Verbal  Barriers to Learning: None  Education Outcome: Continued education needed             AM-PAC - ADL  AM-PAC Daily Activity - Inpatient   How much help is needed for putting on and taking off regular lower body clothing?: A Lot  How much help is needed for bathing (which includes washing, rinsing, drying)?: A Lot  How much help is needed for toileting (which includes using toilet, bedpan, or urinal)?: A Lot  How much help is needed for putting on and taking off regular upper body clothing?: None  How much help is needed for taking care of personal grooming?: None  How much help for eating meals?:

## 2024-06-17 NOTE — PROGRESS NOTES
name: Not on file    Number of children: Not on file    Years of education: Not on file    Highest education level: Not on file   Occupational History    Not on file   Tobacco Use    Smoking status: Never    Smokeless tobacco: Never   Vaping Use    Vaping Use: Never used   Substance and Sexual Activity    Alcohol use: Never     Alcohol/week: 0.0 standard drinks of alcohol    Drug use: Never    Sexual activity: Not Currently   Other Topics Concern    Not on file   Social History Narrative    Not on file     Social Determinants of Health     Financial Resource Strain: Low Risk  (6/26/2023)    Overall Financial Resource Strain (CARDIA)     Difficulty of Paying Living Expenses: Not hard at all   Food Insecurity: No Food Insecurity (6/10/2024)    Hunger Vital Sign     Worried About Running Out of Food in the Last Year: Never true     Ran Out of Food in the Last Year: Never true   Transportation Needs: No Transportation Needs (6/10/2024)    PRAPARE - Transportation     Lack of Transportation (Medical): No     Lack of Transportation (Non-Medical): No   Physical Activity: Insufficiently Active (6/26/2023)    Exercise Vital Sign     Days of Exercise per Week: 2 days     Minutes of Exercise per Session: 30 min   Stress: Not on file   Social Connections: Not on file   Intimate Partner Violence: Not on file   Housing Stability: Low Risk  (6/10/2024)    Housing Stability Vital Sign     Unable to Pay for Housing in the Last Year: No     Number of Places Lived in the Last Year: 1     Unstable Housing in the Last Year: No       Family History:     Family History   Problem Relation Age of Onset    Breast Cancer Mother 42    Diabetes Mother     Stroke Mother     Heart Disease Father     Cancer Father         prostate    Cancer Sister         cervical    Breast Cancer Sister 71    Diabetes Other         multiple relatives on Mom's side        Allergies:   Tylenol [acetaminophen]     Review of Systems:   Constitutional: No fevers or  identify patients   needing enhanced precautions.    The test is not intended to identify patients with staphylococcal infections.  Results   should not be used to guide or monitor treatment for MRSA infections.         Culture, Blood 1 [[id number]] Collected: 06/10/24 1443    Order Status: Completed Specimen: Blood Updated: 06/15/24 1459     Specimen Description .BLOOD     Special Requests LT 6ML     Culture NO GROWTH 5 DAYS    Culture, Blood 1 [[id number]] Collected: 06/10/24 1443    Order Status: Completed Specimen: Blood Updated: 06/15/24 1508     Specimen Description .BLOOD     Special Requests R FOREARM 0.5ML     Culture NO GROWTH 5 DAYS          Culture, Urine  Order: [id number]  Status: Final result       Visible to patient: No (not released)       Next appt: None    Specimen Information: Urine, clean catch   1 Result Note      Component    Specimen Description .CLEAN CATCH URINE   Culture KLEBSIELLA PNEUMONIAE >100,000 CFU/ML Abnormal    Resulting Agency [facility] [place]        Susceptibility    Klebsiella pneumoniae (1)    Antibiotic Interpretation Microscan Method Status    ampicillin Resistant >=32 BACTERIAL SUSCEPTIBILITY PANEL LIAM Final    ceFAZolin Sensitive <=4 BACTERIAL SUSCEPTIBILITY PANEL LIAM Final     Cefazolin sensitivity results can be used to predict the effectiveness of oral  cephalosporins (eg. Cephalexin) in uncomplicated Urinary Tract Infections due to E. coli, K.   pneumoniae, and P. mirabilis       cefTRIAXone Sensitive <=0.25 BACTERIAL SUSCEPTIBILITY PANEL LIAM Final    Confirmatory Extended Spectrum Beta-Lactamase Sensitive NEGATIVE BACTERIAL SUSCEPTIBILITY PANEL LIAM Final    gentamicin Sensitive <=1 BACTERIAL SUSCEPTIBILITY PANEL LIAM Final    levofloxacin Sensitive <=0.12 BACTERIAL SUSCEPTIBILITY PANEL LIAM Final    nitrofurantoin Intermediate 64 BACTERIAL SUSCEPTIBILITY PANEL LIAM Final    piperacillin-tazobactam Intermediate 32 BACTERIAL SUSCEPTIBILITY PANEL LIAM Final

## 2024-06-17 NOTE — PROGRESS NOTES
CDU Transfer Summary        Patient:  Bobbi Phillips  YOB: 1955    MRN: [id number]   Acct: [id number]    Primary Care Physician: Stacie Doty MD    Admit date:  6/10/2024  2:12 PM  Transfer date: 06/11/24 12:19 PM    Transfer Diagnoses:     1.)  Generalized weakness   -PT/OT   -Will be requiring placement to SNF versus rehab  2.)  Ascites   -Pending results of abdominal ultrasound to further evaluate for fluid burden  3.)  Chronic lower extremity edema with weeping wounds   -Wound care consult   -Daily dressing changes       Medication List        START taking these medications      ondansetron 4 MG tablet  Commonly known as: Zofran  Take 1 tablet by mouth every 8 hours as needed for Nausea            CONTINUE taking these medications      aspirin 81 MG EC tablet     atorvastatin 10 MG tablet  Commonly known as: LIPITOR  take 1 tablet by mouth once daily     B-D UF III MINI PEN NEEDLES 31G X 5 MM Misc  Generic drug: Insulin Pen Needle  use 1 PEN NEEDLE to inject MEDICATION subcutaneously four times a day with HUMALOG     bumetanide 1 MG tablet  Commonly known as: BUMEX  Take 1 tablet by mouth daily     cyclobenzaprine 5 MG tablet  Commonly known as: FLEXERIL     Dexcom G7 Sensor Misc  Use to monitor sugars 6-8 times a day and monitor for high and low sugars.     FREESTYLE TEST STRIPS strip  Generic drug: blood glucose test strips  use 1 TEST STRIP to TEST BLOOD SUGAR three times a day     gabapentin 100 MG capsule  Commonly known as: NEURONTIN  Take 1 capsule by mouth 3 times daily for 10 days.     HumaLOG KwikPen 100 UNIT/ML Sopn  Generic drug: insulin lispro (1 Unit Dial)  inject PER SLIDING SCALE BEFORE MEALS AND AT BEDTIME 4 TIMES DAILY, MAX 25 UNITS DAILY     Lancets Misc  Test 4 times daily for uncontrolled BS     Magnesium 500 MG Caps  Take 1 capsule by mouth in the morning and at bedtime     melatonin 3 MG Tabs tablet  Take 2 tablets by mouth nightly as needed (insomnia)

## 2024-06-17 NOTE — PLAN OF CARE
Problem: Safety - Adult  Goal: Free from fall injury  6/17/2024 0505 by Asia Petit RN  Outcome: Progressing     Problem: Chronic Conditions and Co-morbidities  Goal: Patient's chronic conditions and co-morbidity symptoms are monitored and maintained or improved  6/17/2024 0505 by Asia Petit RN  Outcome: Progressing     Problem: Discharge Planning  Goal: Discharge to home or other facility with appropriate resources  6/17/2024 0505 by Asia Petit RN  Outcome: Progressing     Problem: Skin/Tissue Integrity  Goal: Absence of new skin breakdown  Description: 1.  Monitor for areas of redness and/or skin breakdown  2.  Assess vascular access sites hourly  3.  Every 4-6 hours minimum:  Change oxygen saturation probe site  4.  Every 4-6 hours:  If on nasal continuous positive airway pressure, respiratory therapy assess nares and determine need for appliance change or resting period.  6/17/2024 0505 by Asia Petit RN  Outcome: Progressing     Problem: ABCDS Injury Assessment  Goal: Absence of physical injury  6/17/2024 0505 by Asia Petit RN  Outcome: Progressing     Problem: Pain  Goal: Verbalizes/displays adequate comfort level or baseline comfort level  6/17/2024 0505 by Asia Petit RN  Outcome: Progressing

## 2024-06-17 NOTE — PLAN OF CARE
system     Problem: Skin/Tissue Integrity  Goal: Absence of new skin breakdown  Description: 1.  Monitor for areas of redness and/or skin breakdown  2.  Assess vascular access sites hourly  3.  Every 4-6 hours minimum:  Change oxygen saturation probe site  4.  Every 4-6 hours:  If on nasal continuous positive airway pressure, respiratory therapy assess nares and determine need for appliance change or resting period.  6/17/2024 0505 by Asia Petit, RN  Outcome: Progressing  6/16/2024 1519 by Nickie St, RN  Outcome: Progressing

## 2024-06-17 NOTE — PLAN OF CARE
Problem: Safety - Adult  Goal: Free from fall injury  6/17/2024 1508 by Macey Escamilla RN  Outcome: Progressing  6/17/2024 0505 by Asia Petit RN  Outcome: Progressing     Problem: Chronic Conditions and Co-morbidities  Goal: Patient's chronic conditions and co-morbidity symptoms are monitored and maintained or improved  6/17/2024 1508 by Macey Escamilla RN  Outcome: Progressing  6/17/2024 0505 by Asia Petit RN  Outcome: Progressing     Problem: Discharge Planning  Goal: Discharge to home or other facility with appropriate resources  6/17/2024 1508 by Macey Escamilla RN  Outcome: Progressing  6/17/2024 0505 by Asia Petit RN  Outcome: Progressing     Problem: Skin/Tissue Integrity  Goal: Absence of new skin breakdown  Description: 1.  Monitor for areas of redness and/or skin breakdown  2.  Assess vascular access sites hourly  3.  Every 4-6 hours minimum:  Change oxygen saturation probe site  4.  Every 4-6 hours:  If on nasal continuous positive airway pressure, respiratory therapy assess nares and determine need for appliance change or resting period.  6/17/2024 1508 by Macey Escamilla RN  Outcome: Progressing  6/17/2024 0505 by Asia Petit RN  Outcome: Progressing     Problem: ABCDS Injury Assessment  Goal: Absence of physical injury  6/17/2024 1508 by Macey Escamilla RN  Outcome: Progressing  6/17/2024 0505 by Asia Petit RN  Outcome: Progressing     Problem: Pain  Goal: Verbalizes/displays adequate comfort level or baseline comfort level  6/17/2024 1508 by Macey Escamilla RN  Outcome: Progressing  6/17/2024 0505 by Asia Petit RN  Outcome: Progressing

## 2024-06-17 NOTE — PROGRESS NOTES
PT HAD  PARACENTESIS TODAY. 4.1 LITERS OF YELLOW ASCITIC FLUID COLLECTED. 2X2 GAUZE AND TEGADERM TO CATHETER SITE. DRY AND INTACT.  PT TOLERATED WELL. BP STABLE THROUGHOUT PROCEDURE. REPORT CALLED. READY FOR TRANSPORT.

## 2024-06-17 NOTE — CARE COORDINATION
Transitional Planning  Phone call from Vangie at [facility]- can accept at either facility.  Spoke to patient she states [facility] her [facility] choice.  Vangie will start precert for [facility].  PT at bedside.

## 2024-06-18 LAB
ALBUMIN SERPL-MCNC: 2.2 G/DL (ref 3.5–5.2)
ALBUMIN/GLOB SERPL: 1 {RATIO} (ref 1–2.5)
ALP SERPL-CCNC: 481 U/L (ref 35–104)
ALT SERPL-CCNC: 8 U/L (ref 10–35)
ANION GAP SERPL CALCULATED.3IONS-SCNC: 9 MMOL/L (ref 9–16)
AST SERPL-CCNC: 40 U/L (ref 10–35)
BASOPHILS # BLD: 0.05 K/UL (ref 0–0.2)
BASOPHILS NFR BLD: 1 % (ref 0–2)
BILIRUB SERPL-MCNC: 0.4 MG/DL (ref 0–1.2)
BUN SERPL-MCNC: 30 MG/DL (ref 8–23)
CALCIUM SERPL-MCNC: 8.3 MG/DL (ref 8.6–10.4)
CHLORIDE SERPL-SCNC: 102 MMOL/L (ref 98–107)
CO2 SERPL-SCNC: 25 MMOL/L (ref 20–31)
CREAT SERPL-MCNC: 1.4 MG/DL (ref 0.5–0.9)
EOSINOPHIL # BLD: 0.25 K/UL (ref 0–0.44)
EOSINOPHILS RELATIVE PERCENT: 6 % (ref 1–4)
ERYTHROCYTE [DISTWIDTH] IN BLOOD BY AUTOMATED COUNT: 17 % (ref 11.8–14.4)
GFR, ESTIMATED: 41 ML/MIN/1.73M2
GLUCOSE BLD-MCNC: 155 MG/DL (ref 65–105)
GLUCOSE BLD-MCNC: 160 MG/DL (ref 65–105)
GLUCOSE BLD-MCNC: 249 MG/DL (ref 65–105)
GLUCOSE BLD-MCNC: 270 MG/DL (ref 65–105)
GLUCOSE SERPL-MCNC: 162 MG/DL (ref 74–99)
HCT VFR BLD AUTO: 30.8 % (ref 36.3–47.1)
HGB BLD-MCNC: 9.7 G/DL (ref 11.9–15.1)
IMM GRANULOCYTES # BLD AUTO: 0.17 K/UL (ref 0–0.3)
IMM GRANULOCYTES NFR BLD: 4 %
LYMPHOCYTES NFR BLD: 0.8 K/UL (ref 1.1–3.7)
LYMPHOCYTES RELATIVE PERCENT: 19 % (ref 24–43)
MCH RBC QN AUTO: 28.4 PG (ref 25.2–33.5)
MCHC RBC AUTO-ENTMCNC: 31.5 G/DL (ref 28.4–34.8)
MCV RBC AUTO: 90.1 FL (ref 82.6–102.9)
MONOCYTES NFR BLD: 0.41 K/UL (ref 0.1–1.2)
MONOCYTES NFR BLD: 10 % (ref 3–12)
NEUTROPHILS NFR BLD: 60 % (ref 36–65)
NEUTS SEG NFR BLD: 2.63 K/UL (ref 1.5–8.1)
NRBC BLD-RTO: 0 PER 100 WBC
PLATELET # BLD AUTO: 212 K/UL (ref 138–453)
PMV BLD AUTO: 9 FL (ref 8.1–13.5)
POTASSIUM SERPL-SCNC: 3.8 MMOL/L (ref 3.7–5.3)
PROT SERPL-MCNC: 6.2 G/DL (ref 6.6–8.7)
RBC # BLD AUTO: 3.42 M/UL (ref 3.95–5.11)
RBC # BLD: ABNORMAL 10*6/UL
SODIUM SERPL-SCNC: 136 MMOL/L (ref 136–145)
SURGICAL PATHOLOGY REPORT: NORMAL
WBC OTHER # BLD: 4.3 K/UL (ref 3.5–11.3)

## 2024-06-18 PROCEDURE — 97530 THERAPEUTIC ACTIVITIES: CPT

## 2024-06-18 PROCEDURE — 6370000000 HC RX 637 (ALT 250 FOR IP): Performed by: STUDENT IN AN ORGANIZED HEALTH CARE EDUCATION/TRAINING PROGRAM

## 2024-06-18 PROCEDURE — 6370000000 HC RX 637 (ALT 250 FOR IP): Performed by: INTERNAL MEDICINE

## 2024-06-18 PROCEDURE — 97116 GAIT TRAINING THERAPY: CPT

## 2024-06-18 PROCEDURE — 2580000003 HC RX 258: Performed by: STUDENT IN AN ORGANIZED HEALTH CARE EDUCATION/TRAINING PROGRAM

## 2024-06-18 PROCEDURE — 6360000002 HC RX W HCPCS: Performed by: INTERNAL MEDICINE

## 2024-06-18 PROCEDURE — 2580000003 HC RX 258: Performed by: INTERNAL MEDICINE

## 2024-06-18 PROCEDURE — 82947 ASSAY GLUCOSE BLOOD QUANT: CPT

## 2024-06-18 PROCEDURE — 80053 COMPREHEN METABOLIC PANEL: CPT

## 2024-06-18 PROCEDURE — 1200000000 HC SEMI PRIVATE

## 2024-06-18 PROCEDURE — 6370000000 HC RX 637 (ALT 250 FOR IP)

## 2024-06-18 PROCEDURE — 6360000002 HC RX W HCPCS: Performed by: STUDENT IN AN ORGANIZED HEALTH CARE EDUCATION/TRAINING PROGRAM

## 2024-06-18 PROCEDURE — 85025 COMPLETE CBC W/AUTO DIFF WBC: CPT

## 2024-06-18 PROCEDURE — 99232 SBSQ HOSP IP/OBS MODERATE 35: CPT | Performed by: INTERNAL MEDICINE

## 2024-06-18 PROCEDURE — 6360000002 HC RX W HCPCS

## 2024-06-18 PROCEDURE — 99213 OFFICE O/P EST LOW 20 MIN: CPT

## 2024-06-18 PROCEDURE — 36415 COLL VENOUS BLD VENIPUNCTURE: CPT

## 2024-06-18 PROCEDURE — 97110 THERAPEUTIC EXERCISES: CPT

## 2024-06-18 RX ORDER — CIPROFLOXACIN 500 MG/1
750 TABLET, FILM COATED ORAL 2 TIMES DAILY
Qty: 126 TABLET | Refills: 0 | Status: SHIPPED | OUTPATIENT
Start: 2024-06-19 | End: 2024-07-31

## 2024-06-18 RX ORDER — CIPROFLOXACIN 500 MG/1
750 TABLET, FILM COATED ORAL EVERY 12 HOURS SCHEDULED
Status: DISCONTINUED | OUTPATIENT
Start: 2024-06-18 | End: 2024-06-19 | Stop reason: HOSPADM

## 2024-06-18 RX ADMIN — ASPIRIN 81 MG: 81 TABLET, COATED ORAL at 08:34

## 2024-06-18 RX ADMIN — PANTOPRAZOLE SODIUM 40 MG: 40 TABLET, DELAYED RELEASE ORAL at 15:36

## 2024-06-18 RX ADMIN — CIPROFLOXACIN 750 MG: 500 TABLET ORAL at 11:44

## 2024-06-18 RX ADMIN — BUMETANIDE 1 MG: 1 TABLET ORAL at 08:35

## 2024-06-18 RX ADMIN — GABAPENTIN 100 MG: 100 CAPSULE ORAL at 14:13

## 2024-06-18 RX ADMIN — HEPARIN SODIUM 5000 UNITS: 5000 INJECTION INTRAVENOUS; SUBCUTANEOUS at 14:13

## 2024-06-18 RX ADMIN — RIFAXIMIN 550 MG: 550 TABLET ORAL at 20:25

## 2024-06-18 RX ADMIN — RIFAXIMIN 550 MG: 550 TABLET ORAL at 08:35

## 2024-06-18 RX ADMIN — PIPERACILLIN AND TAZOBACTAM 3375 MG: 3; .375 INJECTION, POWDER, LYOPHILIZED, FOR SOLUTION INTRAVENOUS at 08:32

## 2024-06-18 RX ADMIN — SODIUM CHLORIDE, PRESERVATIVE FREE 10 ML: 5 INJECTION INTRAVENOUS at 20:28

## 2024-06-18 RX ADMIN — SPIRONOLACTONE 25 MG: 25 TABLET, FILM COATED ORAL at 08:35

## 2024-06-18 RX ADMIN — PIPERACILLIN AND TAZOBACTAM 3375 MG: 3; .375 INJECTION, POWDER, LYOPHILIZED, FOR SOLUTION INTRAVENOUS at 00:13

## 2024-06-18 RX ADMIN — PANTOPRAZOLE SODIUM 40 MG: 40 TABLET, DELAYED RELEASE ORAL at 07:09

## 2024-06-18 RX ADMIN — ACETAMINOPHEN 650 MG: 325 TABLET ORAL at 20:25

## 2024-06-18 RX ADMIN — CIPROFLOXACIN 750 MG: 500 TABLET ORAL at 20:25

## 2024-06-18 RX ADMIN — TAMSULOSIN HYDROCHLORIDE 0.4 MG: 0.4 CAPSULE ORAL at 08:35

## 2024-06-18 RX ADMIN — GABAPENTIN 100 MG: 100 CAPSULE ORAL at 20:25

## 2024-06-18 RX ADMIN — INSULIN LISPRO 8 UNITS: 100 INJECTION, SOLUTION INTRAVENOUS; SUBCUTANEOUS at 16:51

## 2024-06-18 RX ADMIN — LEVOTHYROXINE SODIUM 125 MCG: 0.12 TABLET ORAL at 06:34

## 2024-06-18 RX ADMIN — GABAPENTIN 100 MG: 100 CAPSULE ORAL at 08:35

## 2024-06-18 RX ADMIN — HEPARIN SODIUM 5000 UNITS: 5000 INJECTION INTRAVENOUS; SUBCUTANEOUS at 06:37

## 2024-06-18 RX ADMIN — CHOLECALCIFEROL TAB 25 MCG (1000 UNIT) 4000 UNITS: 25 TAB at 08:35

## 2024-06-18 RX ADMIN — HEPARIN SODIUM 5000 UNITS: 5000 INJECTION INTRAVENOUS; SUBCUTANEOUS at 20:26

## 2024-06-18 RX ADMIN — INSULIN LISPRO 4 UNITS: 100 INJECTION, SOLUTION INTRAVENOUS; SUBCUTANEOUS at 11:48

## 2024-06-18 RX ADMIN — ATORVASTATIN CALCIUM 10 MG: 20 TABLET, FILM COATED ORAL at 08:35

## 2024-06-18 RX ADMIN — ONDANSETRON 4 MG: 2 INJECTION INTRAMUSCULAR; INTRAVENOUS at 21:01

## 2024-06-18 RX ADMIN — INSULIN GLARGINE 20 UNITS: 100 INJECTION, SOLUTION SUBCUTANEOUS at 20:26

## 2024-06-18 ASSESSMENT — PAIN SCALES - GENERAL
PAINLEVEL_OUTOF10: 2
PAINLEVEL_OUTOF10: 2

## 2024-06-18 ASSESSMENT — PAIN DESCRIPTION - ORIENTATION: ORIENTATION: RIGHT;LEFT

## 2024-06-18 ASSESSMENT — PAIN DESCRIPTION - LOCATION
LOCATION: HEAD
LOCATION: GENERALIZED;LEG

## 2024-06-18 ASSESSMENT — PAIN DESCRIPTION - DESCRIPTORS: DESCRIPTORS: ACHING

## 2024-06-18 NOTE — PLAN OF CARE
Problem: Safety - Adult  Goal: Free from fall injury  6/18/2024 0544 by Patricia Sewell RN  Outcome: Progressing     Problem: Chronic Conditions and Co-morbidities  Goal: Patient's chronic conditions and co-morbidity symptoms are monitored and maintained or improved  6/18/2024 0544 by Patricia Sewell RN  Outcome: Progressing     Problem: Discharge Planning  Goal: Discharge to home or other facility with appropriate resources  6/18/2024 0544 by Patricia Sewell RN  Outcome: Progressing     Problem: Skin/Tissue Integrity  Goal: Absence of new skin breakdown  Description: 1.  Monitor for areas of redness and/or skin breakdown  2.  Assess vascular access sites hourly  3.  Every 4-6 hours minimum:  Change oxygen saturation probe site  4.  Every 4-6 hours:  If on nasal continuous positive airway pressure, respiratory therapy assess nares and determine need for appliance change or resting period.  6/18/2024 0544 by Patricia Sewell RN  Outcome: Progressing     Problem: ABCDS Injury Assessment  Goal: Absence of physical injury  6/18/2024 0544 by Patricia Sewell RN  Outcome: Progressing     Problem: Pain  Goal: Verbalizes/displays adequate comfort level or baseline comfort level  6/18/2024 0544 by Patricia Sewell RN  Outcome: Progressing

## 2024-06-18 NOTE — CARE COORDINATION
Transitional Planning  Left voicemail for Patricia with [facility]     10:10  Received call back from Patricia they can accept and will start precert today

## 2024-06-18 NOTE — PROGRESS NOTES
Comprehensive Nutrition Assessment    Type and Reason for Visit:  RD Nutrition Re-Screen/LOS    Nutrition Recommendations/Plan:   Modify diet to CCHO-moderate; MILI.   Start glucerna ONS once a day  Monitor intakes, ONS, weight, labs, GI status, fluid status, meal time behavior, skin integrity.     Malnutrition Assessment:  Malnutrition Status:  Mild malnutrition (at least) (06/18/24 1523)    Context:  Acute Illness     Findings of the 6 clinical characteristics of malnutrition:  Energy Intake:  75% or less of estimated energy requirements for 7 or more days  Weight Loss:  No significant weight loss     Body Fat Loss:  Unable to assess     Muscle Mass Loss:  Unable to assess    Fluid Accumulation:  Mild Extremities   Strength:  Not Performed    Nutrition Assessment:    Adm gait disorder. PMH: T2DM, decompensated cirrhosis r/t HCV. Pt sitting in chair at time of visit. Endorses good appetite. Reports decreased intake recently r/t unable to finish meals from fullness. Reports portion sizes are too large at lunc and dinner and too salty. Consuming about ~50% of meals. Nursing documentation indicates variable intake, 1-100% meal intake. Pt reports cool/cold foods are tolerated better. Ordering cottage cheese at meals for protein and cold source of food. Discussed importance of protein for healing. Pt asked about ONS. She previously drank Glucerna ONS but none recently. Reports no changes in appetite or intake PTA. Prefers chocolate ONS. Glu 162-249 mg/dL. Meds: bumex, lantus, humalog SS, lactulose, cipro.    Nutrition Related Findings:    Meds/labs reviewed Wound Type: Pressure Injury, Multiple, Venous Stasis       Current Nutrition Intake & Therapies:    Average Meal Intake: 51-75%  Average Supplements Intake: None Ordered  ADULT DIET; Regular; 3 carb choices (45 gm/meal); Low Fat/Low Chol/High Fiber/MILI; Low Sodium (2 gm); High Fiber    Anthropometric Measures:  Height: 149.9 cm (4' 11.02\")  Ideal Body Weight

## 2024-06-18 NOTE — PROGRESS NOTES
[facility] Contin[facility] Nurse  Follow Up      NAME:  Bobbi Phillips  MEDICAL RECORD NUMBER:  [id number]  AGE: 68 y.o.   GENDER: female  : 1955  TODAY'S DATE:  2024    Subjective:     Reason for [facility] Nurse Evaluation and Assessment: \"bedbound\"      Bobbi Phillips is a 68 y.o. female referred by:   [x] Physician  [] Nursing  [] Other:      Wound Identification:  Wound Type: venous and pressure  Contributing Factors: edema, chronic pressure, shear force, and obesity     Wound History:   Patient presents with coccygeal Stage 3 pressure injury; bilateral lower extremity pitting edema up to thighs with ruptured blisters approx 10 cm in diameter to bilat. medial calves.        BLE dressings and ACE wraps changed yesterday. Sacral dressing changed \"later yesterday\" per patient.   Patient reports she loosened the ACE wraps overnight and prefers her thighs not be wrapped as it limits her mobility.     Objective:      /69   Pulse 81   Temp 97.6 °F (36.4 °C) (Oral)   Resp 16   Ht 1.499 m (4' 11\")   Wt 109.7 kg (241 lb 13.5 oz)   SpO2 99%   BMI 48.85 kg/m²   Jeremias Risk Score: Jeremias Scale Score: 16    LABS    CBC:   Lab Results   Component Value Date/Time    WBC 4.3 2024 08:27 AM    RBC 3.42 2024 08:27 AM    RBC 4.45 2012 10:18 AM    HGB 9.7 2024 08:27 AM    HCT 30.8 2024 08:27 AM     CMP:  Albumin:  No results found for: \"LABALBU\"  PT/INR:    Lab Results   Component Value Date/Time    PROTIME 16.0 2024 04:04 PM    INR 1.3 2024 04:04 PM     HgBA1c:    Lab Results   Component Value Date/Time    LABA1C 8.6 2023 09:13 AM     PTT: No components found for: \"LABPTT\"      Assessment:       Measurements:     24 0930   Wound 24 Coccyx   Date First Assessed/Time First Assessed: 24 1900   Primary Wound Type: Pressure Injury  Location: Coccyx   Wound Etiology Pressure Stage 3   Dressing Status Clean;Dry;Intact   Dressing/Treatment Hydrofiber  Ag;Foam   Dressing Change Due 06/20/24   Wound 06/13/24 Leg Right;Lower;Medial   Date First Assessed: 06/13/24   Primary Wound Type: Venous Ulcer  Location: Leg  Wound Location Orientation: Right;Lower;Medial   Wound Etiology Venous   Dressing Status Clean;Dry;Intact  (roll gauze and ACE wrap changed)   Dressing/Treatment Foam;Roll gauze;Ace wrap   Dressing Change Due 06/19/24   Wound 06/13/24 Leg Left;Lower;Medial   Date First Assessed: 06/13/24   Primary Wound Type: Venous Ulcer  Location: Leg  Wound Location Orientation: Left;Lower;Medial   Wound Etiology Venous   Dressing Status Clean;Dry;Intact  (roll gauze, ACE wrap chnaged)   Dressing/Treatment Foam;Roll gauze;Ace wrap   Dressing Change Due 06/19/24   Wound 06/13/24 Foot Right;Dorsal   Date First Assessed: 06/13/24   Present on Original Admission: Yes  Primary Wound Type: Venous Ulcer  Location: Foot  Wound Location Orientation: Right;Dorsal   Wound Etiology Venous   Dressing Status New dressing applied   Wound Cleansed Cleansed with saline   Dressing/Treatment Petroleum impregnated gauze;Roll gauze;Ace wrap   Dressing Change Due 06/19/24   Wound Assessment Superficial;Pink/red   Drainage Amount None (dry)   Odor None   Brooke-wound Assessment Edematous          ACE wraps and roll gauze removed from the BLE. Right foot dressing replaced-wound nearly healed. Bilateral calf silicone foam dressings left undisturbed. No shadow or strikethrough of drainage noted.     Response to treatment:  Well tolerated by patient.         Plan:   Plan of Care:   COCCYX:  Change dressing every other day.  Rinse with saline.  Apply a strip of Opticell AG to fill wound depth and secured with a silicone bordered foam dressing.     BILATERAL LOWER LEGS:  Change dressings daily.  Apply Optifoam large sacrum silicone bordered to the open wounds.  Apply roll gauze and light ACE toes to knees.      [x]Turn and reposition every 2 hours while in bed.     [x] Float heels off of bed with

## 2024-06-18 NOTE — PROGRESS NOTES
[facility]  Internal Medicine Teaching Residency Program  Inpatient Daily Progress Note  ______________________________________________________________________________    Patient: Bobbi Phillips  YOB: 1955   MRN:[id number]    Acct: [id number]     Room: 0235/0235-01  Admit date: 6/10/2024  Today's date: 06/18/24  Number of days in the hospital: 7    SUBJECTIVE   CC: Fatigue    Patient seen and examined at bedside this a.m., chart and vitals reviewed.  - Patient saturating well on room air  - Patient had paracentesis yesterday with 4.1 L removed, no acute abdominal complaints today.  - Patient is currently on antibiotics, awaiting recommendations from infectious disease on duration prior to discharge.  - Continuing Bumex 1 mg daily  - Aldactone started 25 mg  - Nephrology signed off  - Awaiting precertification for SNF placement.    ROS:  General ROS: Completed and except as mentioned above were negative   HEENT ROS: Completed and except as mentioned above were negative   Allergy and Immunology ROS:  Completed and except as mentioned above were negative  Hematological and Lymphatic ROS:  Completed and except as mentioned above were negative  Respiratory ROS:  Completed and except as mentioned above were negative  Cardiovascular ROS:  Completed and except as mentioned above were negative  Gastrointestinal ROS: Completed and except as mentioned above were negative  Genito-Urinary ROS:  Completed and except as mentioned above were negative  Musculoskeletal ROS:  Completed and except as mentioned above were negative  Neurological ROS:  Completed and except as mentioned above were negative  Skin & Dermatological ROS:  Completed and except as mentioned above were negative  Psychological ROS:  Completed and except as mentioned above were negative  BRIEF HISTORY     The patient is a pleasant 68 y.o. female with a PMHx significant for      Hepatic  leg: Edema present.      Left lower leg: Edema present.   Skin:     Capillary Refill: Capillary refill takes less than 2 seconds.   Neurological:      General: No focal deficit present.      Mental Status: She is alert and oriented to person, place, and time.           Medications:  Scheduled Medications:    spironolactone  25 mg Oral Daily    piperacillin-tazobactam  3,375 mg IntraVENous Q8H    heparin (porcine)  5,000 Units SubCUTAneous 3 times per day    levothyroxine  125 mcg Oral Daily    aspirin  81 mg Oral Daily    atorvastatin  10 mg Oral Daily    Vitamin D  4,000 Units Oral Daily    tamsulosin  0.4 mg Oral Daily    gabapentin  100 mg Oral TID    bumetanide  1 mg Oral Daily    insulin glargine  20 Units SubCUTAneous Nightly    lactulose  20 g Oral TID    pantoprazole  40 mg Oral BID AC    rifAXIMin  550 mg Oral BID    insulin lispro  0-16 Units SubCUTAneous TID WC    insulin lispro  0-4 Units SubCUTAneous Nightly    sodium chloride flush  5-40 mL IntraVENous 2 times per day     Continuous Infusions:    dextrose      sodium chloride       PRN Medicationsmelatonin, 6 mg, Nightly PRN  traMADol, 50 mg, Q8H PRN  iopamidol, 75 mL, ONCE PRN  glucose, 4 tablet, PRN  dextrose bolus, 125 mL, PRN   Or  dextrose bolus, 250 mL, PRN  glucagon (rDNA), 1 mg, PRN  dextrose, , Continuous PRN  sodium chloride flush, 5-40 mL, PRN  sodium chloride, , PRN  acetaminophen, 650 mg, Q4H PRN  ondansetron, 4 mg, Q8H PRN   Or  ondansetron, 4 mg, Q6H PRN        Diagnostic Labs:  CBC:   Recent Labs     06/17/24  0555 06/18/24  0827   WBC 3.6 4.3   RBC 3.39* 3.42*   HGB 9.5* 9.7*   HCT 30.3* 30.8*   MCV 89.4 90.1   RDW 16.8* 17.0*    212       BMP:   Recent Labs     06/16/24  0945 06/17/24  0555 06/18/24  0827   * 135* 136   K 4.4 4.4 3.8    101 102   CO2 23 24 25   BUN 38* 34* 30*   CREATININE 1.6* 1.4* 1.4*       BNP: No results for input(s): \"BNP\" in the last 72 hours.  PT/INR:   No results for input(s): \"PROTIME\",

## 2024-06-18 NOTE — PROGRESS NOTES
[facility] - Progress Note    Today's Date and Time: 6/18/2024, 7:14 AM    Impression :   Concern for L1-2 discitis/osteomyelitis with evidence of bone loss  Lower extremity weakness  Lower extremity wounds  Ascites  BONNER with regular paracentesis  Hypothyroidism  CKD  DM II  CAD  HTN  Hx of recurrent UTI    Recommendations:     IV Zosyn continues until discharge  Cipro 750 mg po BID x 6 weeks. Stop date 7-30-24 for L1-L2 osteomyelitis  S/P IR guided biopsy of L1-L2  for culture and cytology  -No growth on culture thus far  Office f/up in 4 weeks with Dr Cain for infection. Please call [phone] for appointment   Follow up with NS for Lumbar osteomyelitis    Urine with mixed rao: Enterococcus faecium, Klebsiella, Enterobacter  Treatment completed  S/p paracentesis on 6/17/24 with 4.1 L removed    Medical Decision Making/Summary/Discussion:6/18/2024     Daily Elements of Decision Making provided by Consulting Physician:    Note: I have independently performed the steps listed below as part of the medical decision making and evaluation.    Review of current Problems:  Today I am seeing the patient for the following problems:  Concern for L1-2 discitis/osteomyelitis  Lower extremity weakness  Lower extremity wounds  Ascites  BONNER with regular paracentesis  Hypothyroidism  CKD  DM II  CAD  HTN  Hx of recurrent UTI  Evaluation of Patient:  Evaluated because of back pain and lower extremity weakness  Ascites   L1-L2 diskitis and osteomyelitis  Please see daily details in Interval Changes Section  Changes in physical exam:  Back pain and lower extremity weakness  Ascites   L1-L2 diskitis and osteomyelitis  Please see daily details in Physical Exam section and in Interval Changes Section  Changes in ROS:  Unchanged  Please see daily details in Review of Symptoms section and in Interval Changes Section  Discussion with Referring Physician or Service  Dr. SHRADDHA Hwang  Laboratory  APRN    ATTESTATION:    I have discussed the case, including pertinent history and exam findings with the APRN. I have evaluated the  History, physical findings and pictures of the patient and the key elements of the encounter have been performed by me. I have reviewed the laboratory data, other diagnostic studies and discussed them with the APRN. I have updated the medical record where necessary.    I agree with the assessment, plan and orders as documented by the APRN.    In addition diagnostic and decision making elements, performed by the Attending Physician, are included in the Diagnostic and Decision Making Section of the text.    Bunny Cain MD               Office: [phone]

## 2024-06-18 NOTE — PROGRESS NOTES
Physical Therapy  Facility/Department: [address] ORTHO/MED SURG  Physical Therapy   Name: Bobbi Phillips  : 1955  MRN: [id number]  Date of Service: 2024    Discharge Recommendations:  Patient would benefit from continued therapy after discharge       Patient Diagnosis(es): The primary encounter diagnosis was Other fatigue. A diagnosis of Nausea and vomiting, unspecified vomiting type was also pertinent to this visit.  Past Medical History:  has a past medical history of Abdominal swelling, Claustrophobia, COVID-19 vaccine administered, Diabetes (HCC), Diabetic retinopathy (HCC), Flatus, H/O acute sinusitis, H/O cholelithiasis, Hypertension, Liver cirrhosis (HCC), LLQ abdominal tenderness, Poor venous access, Positive colorectal cancer screening using Cologuard test, Post-menopausal bleeding, RUQ abdominal pain, Tendonitis of shoulder, right, Tingling, Under care of service provider, Under care of service provider, Vaginal bleeding, Vaginal candidiasis, and Wears glasses.  Past Surgical History:  has a past surgical history that includes Cholecystectomy, laparoscopic ();  section; Ankle fracture surgery (Left, ); Cataract removal with implant (Bilateral, ); Colonoscopy (N/A, 2023); Dilation and curettage of uterus (N/A, 10/02/2023); Cardiac catheterization (); hysteroscopy (2024); Dilation and curettage of uterus (N/A, 2024); Upper gastrointestinal endoscopy (N/A, 4/10/2024); Anomalous venous return repair (N/A, 2024); and IR PERC ASPIRATION INTERVERT DISC (2024).    Assessment   Body Structures, Functions, Activity Limitations Requiring Skilled Therapeutic Intervention: Decreased functional mobility ;Decreased endurance;Decreased balance;Decreased strength;Decreased coordination  Assessment: pt amb 150 ft with RW & min assist of 1. pt is very cooperative & could benefit from cont therapy @ this time as pt is not safe to amb without assist.  Requires PT  needed moving from lying on your back to sitting on the side of a flat bed without using bedrails?: A Lot  How much help is needed moving to and from a bed to a chair?: A Little  How much help is needed standing up from a chair using your arms?: A Little  How much help is needed walking in hospital room?: A Little  How much help is needed climbing 3-5 steps with a railing?: Total  AM-PAC Inpatient Mobility Raw Score : 14  AM-PAC Inpatient T-Scale Score : 38.1  Mobility Inpatient CMS 0-100% Score: 61.29  Mobility Inpatient CMS G-Code Modifier : CL    Goals  Short Term Goals  Time Frame for Short Term Goals: 14 visits  Short Term Goal 1: Pt will perform sit<>stand transfer with supervision.  Short Term Goal 2: Pt will ambulate 300 feet with least restrictive AD and supervision.  Short Term Goal 3: Pt will demonstrate good- dynamic standing balance to decrease fall risk.  Short Term Goal 4: Pt will negotiate 4 stairs with a unilateral handrails and SBA to allow the pt to enter prior living arrangements.  Short Term Goal 5: Pt will perform supine<>sit transfer with SBA.     Education  Patient Education  Education Given To: Patient  Education Provided: Role of Therapy;Plan of Care;Transfer Training;Fall Prevention Strategies  Education Method: Verbal  Barriers to Learning: None  Education Outcome: Verbalized understanding      Therapy Time   Individual Concurrent Group Co-treatment   Time In 1320         Time Out 1359         Minutes 39                 JEAN CARLOS ALONSO, PTA

## 2024-06-19 VITALS
HEIGHT: 59 IN | DIASTOLIC BLOOD PRESSURE: 48 MMHG | OXYGEN SATURATION: 100 % | HEART RATE: 89 BPM | TEMPERATURE: 98.1 F | SYSTOLIC BLOOD PRESSURE: 138 MMHG | BODY MASS INDEX: 48.76 KG/M2 | WEIGHT: 241.84 LBS | RESPIRATION RATE: 20 BRPM

## 2024-06-19 LAB
ALBUMIN SERPL-MCNC: 2.2 G/DL (ref 3.5–5.2)
ALBUMIN/GLOB SERPL: 1 {RATIO} (ref 1–2.5)
ALP SERPL-CCNC: 496 U/L (ref 35–104)
ALT SERPL-CCNC: 13 U/L (ref 10–35)
ANION GAP SERPL CALCULATED.3IONS-SCNC: 11 MMOL/L (ref 9–16)
AST SERPL-CCNC: 46 U/L (ref 10–35)
BASOPHILS # BLD: <0.03 K/UL (ref 0–0.2)
BASOPHILS NFR BLD: 0 % (ref 0–2)
BILIRUB SERPL-MCNC: 0.3 MG/DL (ref 0–1.2)
BUN SERPL-MCNC: 28 MG/DL (ref 8–23)
CALCIUM SERPL-MCNC: 8.1 MG/DL (ref 8.6–10.4)
CHLORIDE SERPL-SCNC: 99 MMOL/L (ref 98–107)
CO2 SERPL-SCNC: 23 MMOL/L (ref 20–31)
CREAT SERPL-MCNC: 1.4 MG/DL (ref 0.5–0.9)
EOSINOPHIL # BLD: 0.12 K/UL (ref 0–0.44)
EOSINOPHILS RELATIVE PERCENT: 3 % (ref 1–4)
ERYTHROCYTE [DISTWIDTH] IN BLOOD BY AUTOMATED COUNT: 17.1 % (ref 11.8–14.4)
FOLATE SERPL-MCNC: 6.3 NG/ML (ref 4.8–24.2)
GFR, ESTIMATED: 41 ML/MIN/1.73M2
GLUCOSE BLD-MCNC: 165 MG/DL (ref 65–105)
GLUCOSE BLD-MCNC: 183 MG/DL (ref 65–105)
GLUCOSE BLD-MCNC: 262 MG/DL (ref 65–105)
GLUCOSE SERPL-MCNC: 248 MG/DL (ref 74–99)
HCT VFR BLD AUTO: 30.1 % (ref 36.3–47.1)
HGB BLD-MCNC: 9.1 G/DL (ref 11.9–15.1)
IMM GRANULOCYTES # BLD AUTO: 0.08 K/UL (ref 0–0.3)
IMM GRANULOCYTES NFR BLD: 2 %
INTERVENTION: NORMAL
LYMPHOCYTES NFR BLD: 0.79 K/UL (ref 1.1–3.7)
LYMPHOCYTES RELATIVE PERCENT: 18 % (ref 24–43)
MCH RBC QN AUTO: 28.1 PG (ref 25.2–33.5)
MCHC RBC AUTO-ENTMCNC: 30.2 G/DL (ref 28.4–34.8)
MCV RBC AUTO: 92.9 FL (ref 82.6–102.9)
MICROORGANISM SPEC CULT: NORMAL
MICROORGANISM/AGENT SPEC: NORMAL
MICROORGANISM/AGENT SPEC: NORMAL
MONOCYTES NFR BLD: 0.24 K/UL (ref 0.1–1.2)
MONOCYTES NFR BLD: 5 % (ref 3–12)
NEUTROPHILS NFR BLD: 72 % (ref 36–65)
NEUTS SEG NFR BLD: 3.27 K/UL (ref 1.5–8.1)
NRBC BLD-RTO: 0 PER 100 WBC
PLATELET # BLD AUTO: 213 K/UL (ref 138–453)
PMV BLD AUTO: 9.5 FL (ref 8.1–13.5)
POTASSIUM SERPL-SCNC: 4 MMOL/L (ref 3.7–5.3)
PROT SERPL-MCNC: 6.2 G/DL (ref 6.6–8.7)
RBC # BLD AUTO: 3.24 M/UL (ref 3.95–5.11)
RBC # BLD: ABNORMAL 10*6/UL
SERVICE CMNT-IMP: NORMAL
SODIUM SERPL-SCNC: 133 MMOL/L (ref 136–145)
SPECIMEN DESCRIPTION: NORMAL
VIT B12 SERPL-MCNC: 772 PG/ML (ref 232–1245)
WBC OTHER # BLD: 4.5 K/UL (ref 3.5–11.3)

## 2024-06-19 PROCEDURE — 97535 SELF CARE MNGMENT TRAINING: CPT

## 2024-06-19 PROCEDURE — 6360000002 HC RX W HCPCS

## 2024-06-19 PROCEDURE — 6370000000 HC RX 637 (ALT 250 FOR IP)

## 2024-06-19 PROCEDURE — 80053 COMPREHEN METABOLIC PANEL: CPT

## 2024-06-19 PROCEDURE — 6370000000 HC RX 637 (ALT 250 FOR IP): Performed by: STUDENT IN AN ORGANIZED HEALTH CARE EDUCATION/TRAINING PROGRAM

## 2024-06-19 PROCEDURE — 2580000003 HC RX 258: Performed by: STUDENT IN AN ORGANIZED HEALTH CARE EDUCATION/TRAINING PROGRAM

## 2024-06-19 PROCEDURE — 6370000000 HC RX 637 (ALT 250 FOR IP): Performed by: INTERNAL MEDICINE

## 2024-06-19 PROCEDURE — 82746 ASSAY OF FOLIC ACID SERUM: CPT

## 2024-06-19 PROCEDURE — 85025 COMPLETE CBC W/AUTO DIFF WBC: CPT

## 2024-06-19 PROCEDURE — 97110 THERAPEUTIC EXERCISES: CPT

## 2024-06-19 PROCEDURE — 97116 GAIT TRAINING THERAPY: CPT

## 2024-06-19 PROCEDURE — 97530 THERAPEUTIC ACTIVITIES: CPT

## 2024-06-19 PROCEDURE — 82607 VITAMIN B-12: CPT

## 2024-06-19 PROCEDURE — 99232 SBSQ HOSP IP/OBS MODERATE 35: CPT | Performed by: INTERNAL MEDICINE

## 2024-06-19 PROCEDURE — 82947 ASSAY GLUCOSE BLOOD QUANT: CPT

## 2024-06-19 PROCEDURE — 36415 COLL VENOUS BLD VENIPUNCTURE: CPT

## 2024-06-19 RX ORDER — SPIRONOLACTONE 25 MG/1
25 TABLET ORAL DAILY
Qty: 30 TABLET | Refills: 1 | Status: SHIPPED | OUTPATIENT
Start: 2024-06-20

## 2024-06-19 RX ORDER — INSULIN GLARGINE 100 [IU]/ML
25 INJECTION, SOLUTION SUBCUTANEOUS NIGHTLY
Status: DISCONTINUED | OUTPATIENT
Start: 2024-06-19 | End: 2024-06-19 | Stop reason: HOSPADM

## 2024-06-19 RX ORDER — LEVOTHYROXINE SODIUM 0.12 MG/1
125 TABLET ORAL DAILY
Qty: 30 TABLET | Refills: 1 | Status: SHIPPED | OUTPATIENT
Start: 2024-06-20

## 2024-06-19 RX ORDER — TRAMADOL HYDROCHLORIDE 50 MG/1
50 TABLET ORAL EVERY 8 HOURS PRN
Qty: 15 TABLET | Refills: 0 | Status: SHIPPED | OUTPATIENT
Start: 2024-06-19 | End: 2024-06-24

## 2024-06-19 RX ADMIN — CHOLECALCIFEROL TAB 25 MCG (1000 UNIT) 4000 UNITS: 25 TAB at 08:05

## 2024-06-19 RX ADMIN — PANTOPRAZOLE SODIUM 40 MG: 40 TABLET, DELAYED RELEASE ORAL at 16:58

## 2024-06-19 RX ADMIN — ATORVASTATIN CALCIUM 10 MG: 20 TABLET, FILM COATED ORAL at 08:07

## 2024-06-19 RX ADMIN — ASPIRIN 81 MG: 81 TABLET, COATED ORAL at 08:08

## 2024-06-19 RX ADMIN — TAMSULOSIN HYDROCHLORIDE 0.4 MG: 0.4 CAPSULE ORAL at 08:06

## 2024-06-19 RX ADMIN — GABAPENTIN 100 MG: 100 CAPSULE ORAL at 14:40

## 2024-06-19 RX ADMIN — INSULIN LISPRO 8 UNITS: 100 INJECTION, SOLUTION INTRAVENOUS; SUBCUTANEOUS at 11:54

## 2024-06-19 RX ADMIN — CIPROFLOXACIN 750 MG: 500 TABLET ORAL at 08:08

## 2024-06-19 RX ADMIN — PANTOPRAZOLE SODIUM 40 MG: 40 TABLET, DELAYED RELEASE ORAL at 05:46

## 2024-06-19 RX ADMIN — LEVOTHYROXINE SODIUM 125 MCG: 0.12 TABLET ORAL at 05:46

## 2024-06-19 RX ADMIN — GABAPENTIN 100 MG: 100 CAPSULE ORAL at 08:07

## 2024-06-19 RX ADMIN — RIFAXIMIN 550 MG: 550 TABLET ORAL at 08:09

## 2024-06-19 RX ADMIN — SPIRONOLACTONE 25 MG: 25 TABLET, FILM COATED ORAL at 08:10

## 2024-06-19 RX ADMIN — HEPARIN SODIUM 5000 UNITS: 5000 INJECTION INTRAVENOUS; SUBCUTANEOUS at 14:40

## 2024-06-19 RX ADMIN — BUMETANIDE 1 MG: 1 TABLET ORAL at 08:06

## 2024-06-19 RX ADMIN — HEPARIN SODIUM 5000 UNITS: 5000 INJECTION INTRAVENOUS; SUBCUTANEOUS at 05:46

## 2024-06-19 RX ADMIN — SODIUM CHLORIDE, PRESERVATIVE FREE 10 ML: 5 INJECTION INTRAVENOUS at 08:11

## 2024-06-19 NOTE — PROGRESS NOTES
Physical Therapy  Facility/Department: [address] ORTHO/MED SURG  Physical Therapy  Name: Bobbi Phillips  : 1955  MRN: [id number]  Date of Service: 2024    Discharge Recommendations:  Patient would benefit from continued therapy after discharge      Patient Diagnosis(es): The primary encounter diagnosis was Other fatigue. A diagnosis of Nausea and vomiting, unspecified vomiting type was also pertinent to this visit.  Past Medical History:  has a past medical history of Abdominal swelling, Claustrophobia, COVID-19 vaccine administered, Diabetes (HCC), Diabetic retinopathy (HCC), Flatus, H/O acute sinusitis, H/O cholelithiasis, Hypertension, Liver cirrhosis (HCC), LLQ abdominal tenderness, Poor venous access, Positive colorectal cancer screening using Cologuard test, Post-menopausal bleeding, RUQ abdominal pain, Tendonitis of shoulder, right, Tingling, Under care of service provider, Under care of service provider, Vaginal bleeding, Vaginal candidiasis, and Wears glasses.  Past Surgical History:  has a past surgical history that includes Cholecystectomy, laparoscopic ();  section; Ankle fracture surgery (Left, ); Cataract removal with implant (Bilateral, ); Colonoscopy (N/A, 2023); Dilation and curettage of uterus (N/A, 10/02/2023); Cardiac catheterization (); hysteroscopy (2024); Dilation and curettage of uterus (N/A, 2024); Upper gastrointestinal endoscopy (N/A, 4/10/2024); Anomalous venous return repair (N/A, 2024); and IR PERC ASPIRATION INTERVERT DISC (2024).    Assessment   Body Structures, Functions, Activity Limitations Requiring Skilled Therapeutic Intervention: Decreased functional mobility ;Decreased endurance;Decreased balance;Decreased strength;Decreased coordination  Assessment: pt amb 175 ft with RW & min assist of 1. pt is very cooperative & could benefit from cont therapy @ this time as pt is not safe to amb without assist.  Requires PT  Follow-Up: Yes     Plan   Physical Therapy Plan  General Plan:  (5-6x/week)  Current Treatment Recommendations: Strengthening, Balance training, Functional mobility training, Transfer training, Gait training, Safety education & training, Home exercise program, Therapeutic activities, Patient/Caregiver education & training, Stair training, Equipment evaluation, education, & procurement, Endurance training  Safety Devices  Type of Devices: Gait belt, Call light within reach, Left in chair, Nurse notified, Chair alarm in place  Restraints  Restraints Initially in Place: No     Restrictions  Restrictions/Precautions  Restrictions/Precautions: Up as Tolerated, General Precautions  Required Braces or Orthoses?: No     Subjective   General  Chart Reviewed: Yes  Patient assessed for rehabilitation services?: Yes  Response To Previous Treatment: Patient with no complaints from previous session.  Follows Commands: Within Functional Limits  General Comment  Comments: Upon arrival pt seated in recliner  Subjective  Subjective: pt agreeable to participate with therapy. pt denies c/o pain before & after therapy.     Social/Functional History  Social/Functional History  Lives With: Son  Type of Home: House  Home Layout: Two level, Able to Live on Main level with bedroom/bathroom  Home Access: Stairs to enter with rails  Entrance Stairs - Number of Steps: 4  Entrance Stairs - Rails: Both (can only reach one side)  Bathroom Shower/Tub: Walk-in shower  Bathroom Toilet: Standard  Bathroom Equipment: Shower chair  Home Equipment: Rollator, Walker - Rolling, Cane (Pt reports typically using no DME prior to original admission to [facility], since then typically uses RW)  ADL Assistance: Needs assistance (Independent prior to original admission to [facility], assistance required with BLE bathing/dressing and toileting since being at SNF)  Toileting: Needs assistance (Independent prior to 1 month ago.)  Homemaking Assistance: Independent

## 2024-06-19 NOTE — CARE COORDINATION
Transition planning    Received VM from Patricia at [facility] [phone], they have precert and can accept patient.    PS message sent to resident Dr. SAVANNAH Elias, updated on precert, asked for discharge order if patient is ready, JEAN PAUL to be completed and signed and paper script for Tramadol.    1400 Transport request sent to [facility] network.    1445 Spoke with Judy at [facility], transport set for 6:00 pm. Updated Patricia at [facility], patient's RN Betsey and patient.    Report #  [phone]  Fax # [phone]    Faxed AVS and script for Tramadol to [facility].    1510 HENS completed.

## 2024-06-19 NOTE — PROGRESS NOTES
[facility]  Internal Medicine Teaching Residency Program  Inpatient Daily Progress Note  ______________________________________________________________________________    Patient: Bobbi Phillips  YOB: 1955   MRN:[id number]    Acct: [id number]     Room: 0235/0235-01  Admit date: 6/10/2024  Today's date: 06/19/24  Number of days in the hospital: 8    SUBJECTIVE   CC: Fatigue    Patient seen and examined at bedside this a.m., chart and vitals reviewed.  - Patient saturating well on room air  - Continuing Bumex 1 mg daily  - Aldactone started 25 mg  - Nephrology signed off  - Awaiting precertification for SNF placement.    ROS:  General ROS: Completed and except as mentioned above were negative   HEENT ROS: Completed and except as mentioned above were negative   Allergy and Immunology ROS:  Completed and except as mentioned above were negative  Hematological and Lymphatic ROS:  Completed and except as mentioned above were negative  Respiratory ROS:  Completed and except as mentioned above were negative  Cardiovascular ROS:  Completed and except as mentioned above were negative  Gastrointestinal ROS: Completed and except as mentioned above were negative  Genito-Urinary ROS:  Completed and except as mentioned above were negative  Musculoskeletal ROS:  Completed and except as mentioned above were negative  Neurological ROS:  Completed and except as mentioned above were negative  Skin & Dermatological ROS:  Completed and except as mentioned above were negative  Psychological ROS:  Completed and except as mentioned above were negative  BRIEF HISTORY     The patient is a pleasant 68 y.o. female with a PMHx significant for      Hepatic cirrhosis  Hypertension  Dyslipidemia  CKD  Type 2 diabetes mellitus        presents with a chief complaint of difficulty ambulating at home.  Patient states she has been admitted in send the hospital before multiple times due to her

## 2024-06-19 NOTE — PROGRESS NOTES
Occupational Therapy  Facility/Department: [address] ORTHO/MED SURG  Occupational Therapy Daily Treatment Note    Name: Bobbi Phillips  : 1955  MRN: [id number]  Date of Service: 2024    Discharge Recommendations:  Patient would benefit from continued therapy after discharge     Patient Diagnosis(es): The primary encounter diagnosis was Other fatigue. A diagnosis of Nausea and vomiting, unspecified vomiting type was also pertinent to this visit.  Past Medical History:  has a past medical history of Abdominal swelling, Claustrophobia, COVID-19 vaccine administered, Diabetes (HCC), Diabetic retinopathy (HCC), Flatus, H/O acute sinusitis, H/O cholelithiasis, Hypertension, Liver cirrhosis (HCC), LLQ abdominal tenderness, Poor venous access, Positive colorectal cancer screening using Cologuard test, Post-menopausal bleeding, RUQ abdominal pain, Tendonitis of shoulder, right, Tingling, Under care of service provider, Under care of service provider, Vaginal bleeding, Vaginal candidiasis, and Wears glasses.  Past Surgical History:  has a past surgical history that includes Cholecystectomy, laparoscopic ();  section; Ankle fracture surgery (Left, ); Cataract removal with implant (Bilateral, ); Colonoscopy (N/A, 2023); Dilation and curettage of uterus (N/A, 10/02/2023); Cardiac catheterization (); hysteroscopy (2024); Dilation and curettage of uterus (N/A, 2024); Upper gastrointestinal endoscopy (N/A, 4/10/2024); Anomalous venous return repair (N/A, 2024); and IR PERC ASPIRATION INTERVERT DISC (2024).       Assessment   Performance deficits / Impairments: Decreased functional mobility ;Decreased ADL status;Decreased endurance;Decreased high-level IADLs;Decreased balance;Decreased strength  Assessment: Pt limited by the above noted deficits impacting pt's functional mobility/ADL performance. Pt is expected to require skilled OT services during their acute

## 2024-06-19 NOTE — PROGRESS NOTES
shortness of breath or chest pain. She does have chronic numbness and tingling in her lower extremities from neuropathy.,     Upon evaluation in the ED, the patient's vital signs were stable on room air and she was afebrile.     Labwork was significant for creatinine of 1.6, lactic acid 3.0, , alk phos 442, TSH 18.1, Hgb 8.1, kappa free light chains 167,lambda free light chains 86.4    Blood cultures have yielded no growth thus far.    Abdominal US showed cirrhosis with small volume ascites.     CT lumbar/thoracic spine revealed diffuse thoracolumbar spine degenerative changes with possibly significant bilateral foraminal stenoses from L2-3 through the L4-5 and on the left at L5-S1 as well as concern for L1-2 discitis/osteomyelitis.    MRI of the spine was ordered.     She has been off antibiotics.    Neurosurgery, Nephrology, PM&R and ID have been consulted.     CXR 6/10/24:  Stable cardiomegaly.  Mild vascular congestion. No focal consolidation.     CT Lumbar and thoracic spine 6/12/24:  1. No acute osseous abnormality of the thoracic or lumbar spine.  2. Findings are concerning for L1-2 discitis/osteomyelitis.  Recommend MRI of  the lumbar spine with and without contrast for further evaluation.  3. Diffuse thoracolumbar spine degenerative changes with possibly significant bilateral foraminal stenoses from L2-3 through the L4-5 and on the left at L5-S1.    Abdominal US 6/12/24:  Cirrhosis with small to moderate ascites.     CURRENT EVALUATION- DAILY INTERVAL CHANGES 6/19/2024  BP (!) 149/55   Pulse 90   Temp 98.1 °F (36.7 °C) (Oral)   Resp 18   Ht 1.499 m (4' 11.02\")   Wt 109.7 kg (241 lb 13.5 oz)   SpO2 98%   BMI 48.82 kg/m²     Afebrile  VS stable on room air    The patient is alert and oriented on room air.  She is complaining of residual lower back pain.  No growth thus far on spinal aspirate  She has clear progression of L1-L2 osteomyelitis between CT scan of 5-13-24 and 6-11-24.    She    ampicillin Resistant >=32 BACTERIAL SUSCEPTIBILITY PANEL LIAM Final    ceFAZolin Sensitive <=4 BACTERIAL SUSCEPTIBILITY PANEL LIAM Final     Cefazolin sensitivity results can be used to predict the effectiveness of oral  cephalosporins (eg. Cephalexin) in uncomplicated Urinary Tract Infections due to E. coli, K.   pneumoniae, and P. mirabilis       cefTRIAXone Sensitive <=0.25 BACTERIAL SUSCEPTIBILITY PANEL LIAM Final    Confirmatory Extended Spectrum Beta-Lactamase Sensitive NEGATIVE BACTERIAL SUSCEPTIBILITY PANEL LIAM Final    gentamicin Sensitive <=1 BACTERIAL SUSCEPTIBILITY PANEL LIAM Final    levofloxacin Sensitive <=0.12 BACTERIAL SUSCEPTIBILITY PANEL LIAM Final    nitrofurantoin Intermediate 64 BACTERIAL SUSCEPTIBILITY PANEL LIAM Final    piperacillin-tazobactam Intermediate 32 BACTERIAL SUSCEPTIBILITY PANEL LIAM Final    tobramycin Sensitive <=1 BACTERIAL SUSCEPTIBILITY PANEL LIAM Final    trimethoprim-sulfamethoxazole Sensitive <=20 BACTERIAL SUSCEPTIBILITY PANEL LIAM                  Contains abnormal data Culture, Urine  Order: [id number]  Status: Final result       Visible to patient: No (not released)       Next appt: None    Specimen Information: Urine, clean catch   0 Result Notes      Component    Specimen Description .CLEAN CATCH URINE   Culture ENTEROCOCCUS FAECIUM >100,000 CFU/ML Identification by MALDI-TOF Abnormal    [place]ing [facility]        Susceptibility    Enterococcus faecium (1)      Antibiotic Interpretation Microscan Method Status    ampicillin Resistant >=32 BACTERIAL SUSCEPTIBILITY PANEL LIAM Final    ciprofloxacin Resistant >=8 BACTERIAL SUSCEPTIBILITY PANEL LIAM Final    levofloxacin Resistant >=8 BACTERIAL SUSCEPTIBILITY PANEL LIAM Final    nitrofurantoin Intermediate 64 BACTERIAL SUSCEPTIBILITY PANEL LIAM Final    tetracycline Resistant >=16 BACTERIAL SUSCEPTIBILITY PANEL LIAM Final    vancomycin Sensitive <=0.5 BACTERIAL SUSCEPTIBILITY PANEL LIAM

## 2024-06-23 NOTE — DISCHARGE SUMMARY
(ZOFRAN) 4 MG tablet Take 1 tablet by mouth every 8 hours as needed for Nausea, Disp-20 tablet, R-0Print           CONTINUE these medications which have CHANGED    Details   traMADol (ULTRAM) 50 MG tablet Take 1 tablet by mouth every 8 hours as needed for Pain for up to 5 days. Max Daily Amount: 150 mg, Disp-15 tablet, R-0Print           CONTINUE these medications which have NOT CHANGED    Details   Magnesium 500 MG CAPS Take 1 capsule by mouth in the morning and at bedtime, Disp-60 capsule, R-0Normal      cyclobenzaprine (FLEXERIL) 5 MG tablet Take 1 tablet by mouth 3 times daily as needed for Muscle spasmsHistorical Med      bumetanide (BUMEX) 1 MG tablet Take 1 tablet by mouth daily, Disp-30 tablet, R-0Normal      gabapentin (NEURONTIN) 100 MG capsule Take 1 capsule by mouth 3 times daily for 10 days., Disp-30 capsule, R-0Print      metoprolol tartrate (LOPRESSOR) 25 MG tablet Take 1 tablet by mouth 2 times daily, Disp-60 tablet, R-3Normal      miconazole (MICOTIN) 2 % powder Apply topically 2 times daily., Disp-45 g, R-1, Normal      melatonin 3 MG TABS tablet Take 2 tablets by mouth nightly as needed (insomnia), Disp-30 tablet, R-0Normal      zinc sulfate (ZINCATE) 220 (50 Zn)  mg capsule - elemental zinc Take 1 capsule by mouth daily, Disp-30 capsule, R-3OTC      rifAXIMin (XIFAXAN) 550 MG tablet Take 1 tablet by mouth 2 times daily, Disp-42 tablet, R-0Normal      tamsulosin (FLOMAX) 0.4 MG capsule Take 1 capsule by mouth daily, Disp-30 capsule, R-3Normal      pantoprazole (PROTONIX) 40 MG tablet Take 1 tablet by mouth 2 times daily (before meals), Disp-30 tablet, R-3Normal      OZEMPIC, 0.25 OR 0.5 MG/DOSE, 2 MG/3ML SOPN INJECT 0.5 MG SUBCUTANEOUSLY ONCE A WEEK, Disp-6 mL, R-0Normal      atorvastatin (LIPITOR) 10 MG tablet take 1 tablet by mouth once daily, Disp-90 tablet, R-3Normal      HUMALOG KWIKPEN 100 UNIT/ML SOPN inject PER SLIDING SCALE BEFORE MEALS AND AT BEDTIME 4 TIMES DAILY, MAX 25 UNITS

## 2024-06-24 LAB
MICROORGANISM SPEC CULT: NORMAL
MICROORGANISM/AGENT SPEC: NORMAL
SERVICE CMNT-IMP: NORMAL
SPECIMEN DESCRIPTION: NORMAL

## 2024-07-19 ENCOUNTER — HOSPITAL ENCOUNTER (OUTPATIENT)
Age: 69
Discharge: HOME | End: 2024-07-19
Payer: MEDICARE

## 2024-07-19 ENCOUNTER — HOSPITAL ENCOUNTER (OUTPATIENT)
Dept: ULTRASOUND IMAGING | Age: 69
End: 2024-07-19
Payer: MEDICARE

## 2024-07-19 VITALS
RESPIRATION RATE: 16 BRPM | TEMPERATURE: 97.2 F | DIASTOLIC BLOOD PRESSURE: 60 MMHG | SYSTOLIC BLOOD PRESSURE: 117 MMHG | HEART RATE: 92 BPM | OXYGEN SATURATION: 95 %

## 2024-07-19 DIAGNOSIS — R18.8 OTHER ASCITES: Primary | ICD-10-CM

## 2024-07-19 DIAGNOSIS — R18.8 OTHER ASCITES: ICD-10-CM

## 2024-07-19 LAB
GLUCOSE BLD-MCNC: 118 MG/DL (ref 65–105)
INR PPP: 1.3
PLATELET # BLD AUTO: 267 K/UL (ref 138–453)
PROTHROMBIN TIME: 15.7 SEC (ref 11.7–14.9)

## 2024-07-19 PROCEDURE — 85610 PROTHROMBIN TIME: CPT

## 2024-07-19 PROCEDURE — 7100000010 HC PHASE II RECOVERY - FIRST 15 MIN

## 2024-07-19 PROCEDURE — 6360000002 HC RX W HCPCS: Performed by: PHYSICIAN ASSISTANT

## 2024-07-19 PROCEDURE — 49083 ABD PARACENTESIS W/IMAGING: CPT

## 2024-07-19 PROCEDURE — P9047 ALBUMIN (HUMAN), 25%, 50ML: HCPCS | Performed by: PHYSICIAN ASSISTANT

## 2024-07-19 PROCEDURE — 85049 AUTOMATED PLATELET COUNT: CPT

## 2024-07-19 PROCEDURE — 7100000011 HC PHASE II RECOVERY - ADDTL 15 MIN

## 2024-07-19 PROCEDURE — 82947 ASSAY GLUCOSE BLOOD QUANT: CPT

## 2024-07-19 PROCEDURE — 36415 COLL VENOUS BLD VENIPUNCTURE: CPT

## 2024-07-19 RX ORDER — ALBUMIN (HUMAN) 12.5 G/50ML
50 SOLUTION INTRAVENOUS ONCE
Status: COMPLETED | OUTPATIENT
Start: 2024-07-19 | End: 2024-07-19

## 2024-07-19 RX ADMIN — ALBUMIN (HUMAN) 50 G: 0.25 INJECTION, SOLUTION INTRAVENOUS at 16:05

## 2024-07-19 ASSESSMENT — PAIN SCALES - GENERAL
PAINLEVEL_OUTOF10: 0

## 2024-07-19 NOTE — BRIEF OP NOTE
Brief Postoperative Note for Paracentesis    Bobbi Phillips  YOB: 1955  [id number]    Pre-operative Diagnosis:  Ascites     Post-operative Diagnosis: Same    Procedure: Ultrasound guided paracentesis     Anesthesia: 1% Lidocaine     Surgeons/Assistants: Jesse Gill PA-C    Complications: none    EBL: Minimal    Specimens: Were obtained    Ultrasound guided paracentesis performed. 5100 ml clear yellow fluid obtained.  Dressing applied.     Electronically signed by LOUISA Frias on 7/19/2024 at 3:24 PM

## 2024-07-19 NOTE — PROGRESS NOTES
Patient here for ultrasound paracentesis. Consent signed. Jesse JASSO in room. Ultrasound done to abdomen. Right side of abdomen prepped, draped and numbed with lidocaine. Needle in without difficulty.  L of yellow fluid drained. Yueh out, post scan done and dressing on. Patient discharged to recovery for albumin. Patient tolerated well.

## 2024-07-22 NOTE — PROGRESS NOTES
[facility] - Office Visit Progress Note    Today's Date: 07/25/2024    Impression :   Concern for L1-2 discitis/osteomyelitis with evidence of bone loss  Lower extremity weakness  Lower extremity wounds  Ascites  BONNER with regular paracentesis  Hypothyroidism  CKD  DM II  CAD  HTN  Hx of recurrent UTI    Recommendations:     Patient will complete Cipro 750 mg po BID x 6 weeks.  Start date 6/18/24, stop date 7-30-24 for L1-L2 osteomyelitis  S/P IR guided biopsy of L1-L2  for culture and cytology  -No growth on culture   Follow up with NS for Lumbar osteomyelitis  Referral to podiatry for wound on the dorsum of her left foot.      Medical Decision Making/Summary/Discussion:6/19/2024     Daily Elements of Decision Making provided by Consulting Physician:    Note: I have independently performed the steps listed below as part of the medical decision making and evaluation.    Review of current Problems:  Today I am seeing the patient for the following problems:  Concern for L1-2 discitis/osteomyelitis  Lower extremity weakness  Lower extremity wounds  Ascites  BONNER with regular paracentesis  Hypothyroidism  CKD  DM II  CAD  HTN  Hx of recurrent UTI  Evaluation of Patient:  Evaluated because of back pain and lower extremity weakness  Ascites   L1-L2 diskitis and osteomyelitis  Please see daily details in Interval Changes Section  Changes in physical exam:  Back pain and lower extremity weakness  Ascites   L1-L2 diskitis and osteomyelitis  New Lt foot ulcer on dorsum of foot  Please see daily details in Physical Exam section and in Interval Changes Section  Changes in ROS:  Unchanged  Please see daily details in Review of Symptoms section and in Interval Changes Section  Discussion with Referring Physician or Service  Dr. Craig  Laboratory and Radiologic Studies with personal review of radiologic studies  Laboratory  Radiology  Microbiology  Please see Details in daily Interval

## 2024-07-25 ENCOUNTER — OFFICE VISIT (OUTPATIENT)
Dept: INFECTIOUS DISEASES | Age: 69
End: 2024-07-25
Payer: MEDICARE

## 2024-07-25 VITALS
BODY MASS INDEX: 46.73 KG/M2 | RESPIRATION RATE: 18 BRPM | OXYGEN SATURATION: 96 % | WEIGHT: 231.8 LBS | SYSTOLIC BLOOD PRESSURE: 108 MMHG | DIASTOLIC BLOOD PRESSURE: 50 MMHG | TEMPERATURE: 97.8 F | HEART RATE: 85 BPM | HEIGHT: 59 IN

## 2024-07-25 DIAGNOSIS — K74.60 LIVER CIRRHOSIS SECONDARY TO NASH (HCC): ICD-10-CM

## 2024-07-25 DIAGNOSIS — K74.69 DECOMPENSATED CIRRHOSIS RELATED TO HEPATITIS C VIRUS (HCV) (HCC): ICD-10-CM

## 2024-07-25 DIAGNOSIS — Z79.4 TYPE 2 DIABETES MELLITUS WITH CHRONIC KIDNEY DISEASE, WITH LONG-TERM CURRENT USE OF INSULIN, UNSPECIFIED CKD STAGE (HCC): ICD-10-CM

## 2024-07-25 DIAGNOSIS — M46.26 OSTEOMYELITIS OF LUMBAR SPINE (HCC): ICD-10-CM

## 2024-07-25 DIAGNOSIS — K75.81 LIVER CIRRHOSIS SECONDARY TO NASH (HCC): ICD-10-CM

## 2024-07-25 DIAGNOSIS — E11.22 TYPE 2 DIABETES MELLITUS WITH CHRONIC KIDNEY DISEASE, WITH LONG-TERM CURRENT USE OF INSULIN, UNSPECIFIED CKD STAGE (HCC): ICD-10-CM

## 2024-07-25 DIAGNOSIS — S91.302D OPEN WOUND OF LEFT FOOT, SUBSEQUENT ENCOUNTER: Primary | ICD-10-CM

## 2024-07-25 DIAGNOSIS — B19.20 DECOMPENSATED CIRRHOSIS RELATED TO HEPATITIS C VIRUS (HCV) (HCC): ICD-10-CM

## 2024-07-25 PROCEDURE — 99214 OFFICE O/P EST MOD 30 MIN: CPT | Performed by: INTERNAL MEDICINE

## 2024-07-25 PROCEDURE — 1123F ACP DISCUSS/DSCN MKR DOCD: CPT | Performed by: INTERNAL MEDICINE

## 2024-07-25 PROCEDURE — 3078F DIAST BP <80 MM HG: CPT | Performed by: INTERNAL MEDICINE

## 2024-07-25 PROCEDURE — 3074F SYST BP LT 130 MM HG: CPT | Performed by: INTERNAL MEDICINE

## 2024-07-25 RX ORDER — NYSTATIN 100000 [USP'U]/G
POWDER TOPICAL 4 TIMES DAILY
COMMUNITY

## 2024-07-25 RX ORDER — DIPHENHYDRAMINE HCL 25 MG
50 CAPSULE ORAL 4 TIMES DAILY
COMMUNITY

## 2024-07-25 RX ORDER — TRAMADOL HYDROCHLORIDE 50 MG/1
50 TABLET ORAL EVERY 6 HOURS PRN
COMMUNITY

## 2024-08-29 LAB
AEROBIC CULTURE: ABNORMAL
AMIKACIN: ABNORMAL
AMOXICILLIN + CLAVULANATE: ABNORMAL
AMPICILLIN: ABNORMAL
AMPICILLIN: ABNORMAL
AZTREONAM: ABNORMAL
CEFAZOLIN: ABNORMAL
CEFEPIME: ABNORMAL
CEFOXITIN SCREEN: ABNORMAL
CEFOXITIN: ABNORMAL
CEFTRIAXONE: ABNORMAL
CIPROFLOXACIN: ABNORMAL
CIPROFLOXACIN: ABNORMAL
CLINDAMYCIN: ABNORMAL
CULTURE RESULT: ABNORMAL
CULTURE RESULT: ABNORMAL
DAPTOMYCIN: ABNORMAL
DOXYCYCLINE: ABNORMAL
ERTAPENEM: ABNORMAL
ERYTHROMYCIN: ABNORMAL
GENTAMICIN,HIGH LEVEL: ABNORMAL
GENTAMICIN: ABNORMAL
GENTAMICIN: ABNORMAL
IMIPENEM: ABNORMAL
INDUCIBLE CLINDAMYCIN RESISTANCE: ABNORMAL
LEVOFLOXACIN: ABNORMAL
LEVOFLOXACIN: ABNORMAL
LINEZOLID: ABNORMAL
MOXIFLOXACIN: ABNORMAL
NITROFURANTOIN: ABNORMAL
NITROFURANTOIN: ABNORMAL
OXACILLIN: ABNORMAL
PIPERACILLIN + TAZOBACTAM: ABNORMAL
RIFAMPIN: ABNORMAL
STREPTOMYCIN,HI LEVEL: ABNORMAL
TETRACYCLINE: ABNORMAL
TIGECYCLINE: ABNORMAL
TIGECYCLINE: ABNORMAL
TOBRAMYCIN: ABNORMAL
TRIMETHOPRIM + SULFAMETHOXAZOLE: ABNORMAL
TRIMETHOPRIM + SULFAMETHOXAZOLE: ABNORMAL
VANCOMYCIN: ABNORMAL

## 2024-08-30 ENCOUNTER — OFFICE VISIT (OUTPATIENT)
Dept: PRIMARY CARE CLINIC | Age: 69
End: 2024-08-30

## 2024-08-30 VITALS — DIASTOLIC BLOOD PRESSURE: 60 MMHG | SYSTOLIC BLOOD PRESSURE: 128 MMHG | HEART RATE: 93 BPM | OXYGEN SATURATION: 98 %

## 2024-08-30 DIAGNOSIS — E46 MALNUTRITION, UNSPECIFIED TYPE (HCC): ICD-10-CM

## 2024-08-30 DIAGNOSIS — R60.0 BILATERAL EDEMA OF LOWER EXTREMITY: ICD-10-CM

## 2024-08-30 DIAGNOSIS — E55.9 VITAMIN D DEFICIENCY: ICD-10-CM

## 2024-08-30 DIAGNOSIS — K75.81 LIVER CIRRHOSIS SECONDARY TO NASH (HCC): Primary | ICD-10-CM

## 2024-08-30 DIAGNOSIS — K74.60 LIVER CIRRHOSIS SECONDARY TO NASH (HCC): Primary | ICD-10-CM

## 2024-08-30 DIAGNOSIS — R53.81 DEBILITY: ICD-10-CM

## 2024-08-30 PROBLEM — N10 ACUTE PYELONEPHRITIS: Status: RESOLVED | Noted: 2024-04-05 | Resolved: 2024-08-30

## 2024-08-30 PROBLEM — N17.9 AKI (ACUTE KIDNEY INJURY) (HCC): Status: RESOLVED | Noted: 2024-04-03 | Resolved: 2024-08-30

## 2024-08-30 RX ORDER — ACYCLOVIR 400 MG/1
TABLET ORAL
Qty: 2 EACH | Refills: 5 | Status: SHIPPED | OUTPATIENT
Start: 2024-08-30

## 2024-08-30 RX ORDER — LANOLIN ALCOHOL/MO/W.PET/CERES
400 CREAM (GRAM) TOPICAL 2 TIMES DAILY
COMMUNITY

## 2024-08-30 RX ORDER — ACYCLOVIR 400 MG/1
TABLET ORAL
Qty: 2 EACH | Refills: 5 | Status: SHIPPED | OUTPATIENT
Start: 2024-08-30 | End: 2024-08-30 | Stop reason: SDUPTHER

## 2024-08-30 RX ORDER — DOCUSATE SODIUM 100 MG/1
CAPSULE, LIQUID FILLED ORAL
COMMUNITY
Start: 2024-07-06

## 2024-08-30 SDOH — ECONOMIC STABILITY: INCOME INSECURITY: HOW HARD IS IT FOR YOU TO PAY FOR THE VERY BASICS LIKE FOOD, HOUSING, MEDICAL CARE, AND HEATING?: NOT HARD AT ALL

## 2024-08-30 SDOH — ECONOMIC STABILITY: FOOD INSECURITY: WITHIN THE PAST 12 MONTHS, YOU WORRIED THAT YOUR FOOD WOULD RUN OUT BEFORE YOU GOT MONEY TO BUY MORE.: NEVER TRUE

## 2024-08-30 SDOH — ECONOMIC STABILITY: FOOD INSECURITY: WITHIN THE PAST 12 MONTHS, THE FOOD YOU BOUGHT JUST DIDN'T LAST AND YOU DIDN'T HAVE MONEY TO GET MORE.: NEVER TRUE

## 2024-08-30 ASSESSMENT — PATIENT HEALTH QUESTIONNAIRE - PHQ9
SUM OF ALL RESPONSES TO PHQ QUESTIONS 1-9: 0
SUM OF ALL RESPONSES TO PHQ QUESTIONS 1-9: 0
SUM OF ALL RESPONSES TO PHQ9 QUESTIONS 1 & 2: 0
SUM OF ALL RESPONSES TO PHQ QUESTIONS 1-9: 0
1. LITTLE INTEREST OR PLEASURE IN DOING THINGS: NOT AT ALL
SUM OF ALL RESPONSES TO PHQ QUESTIONS 1-9: 0
2. FEELING DOWN, DEPRESSED OR HOPELESS: NOT AT ALL

## 2024-08-30 NOTE — PROGRESS NOTES
11 (L) and Hematocrit 34 (L)  CMP (12/28/23): Glucose 194 (H). Normal Creatinine 0.7  TSH (12/28/23): Normal   HgBA1C (08/09/19): 12% (H) => 10.1% (H) (10/26/20) => 8.6% (H) (12/28/23)  Lipid panel (12/28/23): Normal  Vitamin D (12/15/23): Normal  Urine Culture (09/08/23): E.coli    ASSESSMENT:      69yo in menopause (age 55), with symptoms of post-menopausal bleeding. Pelvic ultrasound demonstrates findings of a 17mm endometrial stripe, with internal vascularity. Endometrial sampling unsuccessful to date, attributed to cervix stenosis. Unsuccessful Dilation & Curettage due to an inability to access the endometrial cavity (01/08/24). She presents to determine further management.    PLAN:     With permission from the patient, the entire discussion has been held in the presence of the patient's sister    Postmenopausal bleeding   The patient reports to have experience vaginal bleeding  Pelvic ultrasound (01/22/24) and MRI pelvis (01/29/24) confirm perisstently thickened endometrium.  In office EMB (08/11/23) attempted and aborted due to patient discomfort, Hysteroscopy D&C attempted by Ob/Gyn (10/02/23) failed to obtain any endometrial tissue.  Patient underwent Hysteroscopy, D&C with myself 01/08/24 and access to the endometrial cavity was unable to be obtained due to significant scarring of the endocervical canal and lower uterine segment. No specimen obtained.   Recommendations were made to proceed with a Robotic assisted hysterectomy + Bilateral salpingo-oophorectomy + Possible staging + Any other indicated procedures. The surgical procedure has been discussed in detail with the patient using both verbal and visual descriptions. All of the various options and associated rationale of the surgical procedure have been discussed in detail with the patient. She has been advised of the risks, benefits and alternatives to the procedure. She has been informed of the risks of surgery, including but not limited to:

## 2024-08-30 NOTE — PROGRESS NOTES
[facility]  [facility]  [address]  [place] [address]  Dept: [phone]    Bobbi Phillips is a 68 y.o. female Established patient, who presents today for her medical conditions/complaints as noted below.      Chief Complaint   Patient presents with    Follow-Up from Hospital     Patient has been in the hospital since the end of March. Released 1 week ago. NON TCM. Patient want to discuss medications (flomax)       HPI:   Pt fell off her couch in march and was found to have pyelonephritis and urinary retention.  Went to [place] for rehab and then had to go back for hematuria.  Had blood transfusion.  Had cysto with evacuation of clots and had severe fluid retention. Needed paracentesis multiple times and wrapping of her legs.  Was discharged and home for 1 day and went back to [facility].  Had kidney infection and then went to [facility] for therapy .  MRSA in her foot.  Was started on xifaxin and is also on vancomycin.      Has a few wounds on her legs and feet that home nurse is coming out to change 3 times a week. Taking another course of vancomycin as it was sent to the pharmacy on d/c from [facility].     Sugars are running pretty good.  Using insulin and watching her numbers with the Dexcom.  Having some low sugars so is eating when it drops.      Due to debility and SOB if she lays flat due to her cirrhosis and fluid retention pt requires a hospital bed with rails.  Pt is able to manage her ADLs better on her own if she is able to get in and out of bed with the rails and elevate the head of bed for breathing and comfort.     Reviewed prior notes:  GI    Reviewed previous:  Labs, Imaging, and Hospital Records    No components found for: \"LDLCHOLESTEROL\", \"LDLCALC\"    (goal LDL is <100)   AST (U/L)   Date Value   06/19/2024 46 (H)     ALT (U/L)   Date Value   06/19/2024 13     BUN (mg/dL)   Date Value   06/19/2024 28 (H)     Hemoglobin A1C (%)   Date Value  (DEXCOM G7 SENSOR) MISC; Use to monitor sugars 6-8 times a day and monitor for high and low sugars., Disp-2 each, R-5Normal  -     CBC with Auto Differential; Future  -     Comprehensive Metabolic Panel; Future  -     Magnesium; Future  -     Vitamin D 25 Hydroxy; Future  3. Bilateral edema of lower extremity  Assessment & Plan:  Continue same dressing changes   Orders:  -     Continuous Glucose Sensor (DEXCOM G7 SENSOR) MISC; Use to monitor sugars 6-8 times a day and monitor for high and low sugars., Disp-2 each, R-5Normal  -     CBC with Auto Differential; Future  -     Comprehensive Metabolic Panel; Future  -     Magnesium; Future  -     Vitamin D 25 Hydroxy; Future  4. Debility  Assessment & Plan:  Getting home therapy for strengthening and balance. Walking with walker   Orders:  -     Continuous Glucose Sensor (DEXCOM G7 SENSOR) MISC; Use to monitor sugars 6-8 times a day and monitor for high and low sugars., Disp-2 each, R-5Normal  -     CBC with Auto Differential; Future  -     Comprehensive Metabolic Panel; Future  -     Magnesium; Future  -     Vitamin D 25 Hydroxy; Future  5. Vitamin D deficiency  -     Continuous Glucose Sensor (DEXCOM G7 SENSOR) MISC; Use to monitor sugars 6-8 times a day and monitor for high and low sugars., Disp-2 each, R-5Normal  -     CBC with Auto Differential; Future  -     Comprehensive Metabolic Panel; Future  -     Magnesium; Future  -     Vitamin D 25 Hydroxy; Future       No follow-ups on file.  Data Unavailable     Orders Placed This Encounter   Procedures    CBC with Auto Differential     Standing Status:   Future     Standing Expiration Date:   8/31/2025    Comprehensive Metabolic Panel     Standing Status:   Future     Standing Expiration Date:   8/31/2025    Magnesium     Standing Status:   Future     Standing Expiration Date:   8/30/2025    Vitamin D 25 Hydroxy     Standing Status:   Future     Standing Expiration Date:   8/30/2025     Orders Placed This Encounter

## 2024-08-30 NOTE — ASSESSMENT & PLAN NOTE
Continue xifaxin (did not tolerate lactulose).  Continue to monitor and f/u with liver specialist.

## 2024-09-06 ENCOUNTER — TELEPHONE (OUTPATIENT)
Dept: PRIMARY CARE CLINIC | Age: 69
End: 2024-09-06

## 2024-09-06 DIAGNOSIS — E55.9 VITAMIN D DEFICIENCY: ICD-10-CM

## 2024-09-06 DIAGNOSIS — K74.60 LIVER CIRRHOSIS SECONDARY TO NASH (HCC): ICD-10-CM

## 2024-09-06 DIAGNOSIS — R53.81 DEBILITY: ICD-10-CM

## 2024-09-06 DIAGNOSIS — E46 MALNUTRITION, UNSPECIFIED TYPE (HCC): ICD-10-CM

## 2024-09-06 DIAGNOSIS — K75.81 LIVER CIRRHOSIS SECONDARY TO NASH (HCC): ICD-10-CM

## 2024-09-06 DIAGNOSIS — R60.0 BILATERAL EDEMA OF LOWER EXTREMITY: ICD-10-CM

## 2024-09-06 LAB
ALBUMIN/GLOBULIN RATIO: 1 (ref 1–2.5)
ALBUMIN: 2.8 G/DL (ref 3.5–5.2)
ALP BLD-CCNC: 297 U/L (ref 35–104)
ALT SERPL-CCNC: 11 U/L (ref 10–35)
ANION GAP SERPL CALCULATED.3IONS-SCNC: 10 MMOL/L (ref 9–16)
AST SERPL-CCNC: 37 U/L (ref 10–35)
BASOPHILS ABSOLUTE: 0.05 K/UL (ref 0–0.2)
BASOPHILS RELATIVE PERCENT: 1 % (ref 0–2)
BILIRUB SERPL-MCNC: 0.6 MG/DL (ref 0–1.2)
BUN BLDV-MCNC: 22 MG/DL (ref 8–23)
CALCIUM SERPL-MCNC: 8.5 MG/DL (ref 8.6–10.4)
CHLORIDE BLD-SCNC: 102 MMOL/L (ref 98–107)
CO2: 25 MMOL/L (ref 20–31)
CREAT SERPL-MCNC: 1.1 MG/DL (ref 0.5–0.9)
EOSINOPHILS ABSOLUTE: 0.14 K/UL (ref 0–0.44)
EOSINOPHILS RELATIVE PERCENT: 3 % (ref 1–4)
GFR, ESTIMATED: 53 ML/MIN/1.73M2
GLUCOSE BLD-MCNC: 87 MG/DL (ref 74–99)
HCT VFR BLD CALC: 28.4 % (ref 36.3–47.1)
HEMOGLOBIN: 8.8 G/DL (ref 11.9–15.1)
IMMATURE GRANULOCYTES %: 0 %
IMMATURE GRANULOCYTES ABSOLUTE: <0.03 K/UL (ref 0–0.3)
LYMPHOCYTES ABSOLUTE: 0.74 K/UL (ref 1.1–3.7)
LYMPHOCYTES RELATIVE PERCENT: 15 % (ref 24–43)
MAGNESIUM: 1.3 MG/DL (ref 1.6–2.4)
MCH RBC QN AUTO: 27.3 PG (ref 25.2–33.5)
MCHC RBC AUTO-ENTMCNC: 31 G/DL (ref 28.4–34.8)
MCV RBC AUTO: 88.2 FL (ref 82.6–102.9)
MONOCYTES ABSOLUTE: 0.4 K/UL (ref 0.1–1.2)
MONOCYTES RELATIVE PERCENT: 8 % (ref 3–12)
NEUTROPHILS ABSOLUTE: 3.55 K/UL (ref 1.5–8.1)
NEUTROPHILS RELATIVE PERCENT: 73 % (ref 36–65)
NRBC AUTOMATED: 0 PER 100 WBC
PDW BLD-RTO: 17.5 % (ref 11.8–14.4)
PLATELET # BLD: 219 K/UL (ref 138–453)
PMV BLD AUTO: 10.4 FL (ref 8.1–13.5)
POTASSIUM SERPL-SCNC: 4.4 MMOL/L (ref 3.7–5.3)
RBC # BLD: 3.22 M/UL (ref 3.95–5.11)
RBC # BLD: ABNORMAL 10*6/UL
SODIUM BLD-SCNC: 137 MMOL/L (ref 136–145)
TOTAL PROTEIN: 7.4 G/DL (ref 6.6–8.7)
VITAMIN D 25-HYDROXY: 40.7 NG/ML (ref 30–100)
WBC # BLD: 4.9 K/UL (ref 3.5–11.3)

## 2024-09-06 NOTE — TELEPHONE ENCOUNTER
Patient called stating she is having vaginal bleeding (spotting,) started yesterday morning. Patient stated she has no other symptoms. I have her scheduled with Dr Hess today.    Please advise.

## 2024-09-06 NOTE — TELEPHONE ENCOUNTER
Called patient to notify her that her appointment today at 220pm was being cancelled. Dr. Doty says that patient should go to see her OB-GYN for the vaginal bleeding issues that she is having. Patient did not answer, so I called her son to let him know as per HIPAA.     Phillip (son) is going to contact patient and have her call the office if she needs further explanation. Explained to Phillip that patient should see her OB-GYN for the issues she is having.

## 2024-09-10 ENCOUNTER — OFFICE VISIT (OUTPATIENT)
Dept: INFECTIOUS DISEASES | Age: 69
End: 2024-09-10
Payer: MEDICARE

## 2024-09-10 VITALS
OXYGEN SATURATION: 98 % | BODY MASS INDEX: 36.29 KG/M2 | RESPIRATION RATE: 19 BRPM | SYSTOLIC BLOOD PRESSURE: 139 MMHG | HEIGHT: 59 IN | WEIGHT: 180 LBS | DIASTOLIC BLOOD PRESSURE: 70 MMHG | HEART RATE: 87 BPM

## 2024-09-10 DIAGNOSIS — K75.81 LIVER CIRRHOSIS SECONDARY TO NASH (HCC): ICD-10-CM

## 2024-09-10 DIAGNOSIS — E11.22 TYPE 2 DIABETES MELLITUS WITH CHRONIC KIDNEY DISEASE, WITH LONG-TERM CURRENT USE OF INSULIN, UNSPECIFIED CKD STAGE (HCC): ICD-10-CM

## 2024-09-10 DIAGNOSIS — K74.60 LIVER CIRRHOSIS SECONDARY TO NASH (HCC): ICD-10-CM

## 2024-09-10 DIAGNOSIS — K74.69 DECOMPENSATED CIRRHOSIS RELATED TO HEPATITIS C VIRUS (HCV) (HCC): ICD-10-CM

## 2024-09-10 DIAGNOSIS — Z79.4 TYPE 2 DIABETES MELLITUS WITH CHRONIC KIDNEY DISEASE, WITH LONG-TERM CURRENT USE OF INSULIN, UNSPECIFIED CKD STAGE (HCC): ICD-10-CM

## 2024-09-10 DIAGNOSIS — M46.26 OSTEOMYELITIS OF LUMBAR SPINE (HCC): ICD-10-CM

## 2024-09-10 DIAGNOSIS — B19.20 DECOMPENSATED CIRRHOSIS RELATED TO HEPATITIS C VIRUS (HCV) (HCC): ICD-10-CM

## 2024-09-10 DIAGNOSIS — S91.302D OPEN WOUND OF LEFT FOOT, SUBSEQUENT ENCOUNTER: Primary | ICD-10-CM

## 2024-09-10 PROCEDURE — 3075F SYST BP GE 130 - 139MM HG: CPT | Performed by: INTERNAL MEDICINE

## 2024-09-10 PROCEDURE — 1123F ACP DISCUSS/DSCN MKR DOCD: CPT | Performed by: INTERNAL MEDICINE

## 2024-09-10 PROCEDURE — 3078F DIAST BP <80 MM HG: CPT | Performed by: INTERNAL MEDICINE

## 2024-09-10 PROCEDURE — 99213 OFFICE O/P EST LOW 20 MIN: CPT | Performed by: INTERNAL MEDICINE

## 2024-09-10 RX ORDER — DOXYCYCLINE 100 MG/1
CAPSULE ORAL
COMMUNITY
Start: 2024-08-27

## 2024-09-10 RX ORDER — ERYTHROMYCIN 5 MG/G
OINTMENT OPHTHALMIC
COMMUNITY
Start: 2024-08-28

## 2024-09-10 RX ORDER — DOXYCYCLINE HYCLATE 100 MG
100 TABLET ORAL 2 TIMES DAILY
Qty: 28 TABLET | Refills: 0 | Status: SHIPPED | OUTPATIENT
Start: 2024-09-10 | End: 2024-09-24

## 2024-09-10 RX ORDER — OFLOXACIN 3 MG/ML
SOLUTION/ DROPS OPHTHALMIC
COMMUNITY
Start: 2024-08-28

## 2024-09-12 DIAGNOSIS — E83.42 HYPOMAGNESEMIA: Primary | ICD-10-CM

## 2024-09-12 RX ORDER — MAGNESIUM SULFATE HEPTAHYDRATE 40 MG/ML
2000 INJECTION, SOLUTION INTRAVENOUS ONCE
Qty: 50 ML | Refills: 0 | Status: SHIPPED | OUTPATIENT
Start: 2024-09-12 | End: 2024-09-12

## 2024-09-12 RX ORDER — LANOLIN ALCOHOL/MO/W.PET/CERES
400 CREAM (GRAM) TOPICAL 3 TIMES DAILY
Qty: 30 TABLET | Refills: 2 | Status: SHIPPED | OUTPATIENT
Start: 2024-09-12 | End: 2024-10-12

## 2024-09-13 DIAGNOSIS — E83.42 HYPOMAGNESEMIA: Primary | ICD-10-CM

## 2024-09-13 RX ORDER — SODIUM CHLORIDE 0.9 % (FLUSH) 0.9 %
5-40 SYRINGE (ML) INJECTION PRN
OUTPATIENT
Start: 2024-09-13

## 2024-09-13 RX ORDER — SODIUM CHLORIDE 9 MG/ML
INJECTION, SOLUTION INTRAVENOUS CONTINUOUS
OUTPATIENT
Start: 2024-09-13

## 2024-09-13 RX ORDER — ALBUTEROL SULFATE 90 UG/1
4 AEROSOL, METERED RESPIRATORY (INHALATION) PRN
OUTPATIENT
Start: 2024-09-13

## 2024-09-13 RX ORDER — HEPARIN 100 UNIT/ML
500 SYRINGE INTRAVENOUS PRN
OUTPATIENT
Start: 2024-09-13

## 2024-09-13 RX ORDER — ACETAMINOPHEN 325 MG/1
650 TABLET ORAL
OUTPATIENT
Start: 2024-09-13

## 2024-09-13 RX ORDER — MAGNESIUM SULFATE HEPTAHYDRATE 40 MG/ML
2000 INJECTION, SOLUTION INTRAVENOUS ONCE
Start: 2024-09-13

## 2024-09-13 RX ORDER — EPINEPHRINE 1 MG/ML
0.3 INJECTION, SOLUTION, CONCENTRATE INTRAVENOUS PRN
OUTPATIENT
Start: 2024-09-13

## 2024-09-13 RX ORDER — ONDANSETRON 2 MG/ML
8 INJECTION INTRAMUSCULAR; INTRAVENOUS
OUTPATIENT
Start: 2024-09-13

## 2024-09-13 RX ORDER — SODIUM CHLORIDE 9 MG/ML
5-250 INJECTION, SOLUTION INTRAVENOUS PRN
OUTPATIENT
Start: 2024-09-13

## 2024-09-13 RX ORDER — DIPHENHYDRAMINE HYDROCHLORIDE 50 MG/ML
50 INJECTION INTRAMUSCULAR; INTRAVENOUS
OUTPATIENT
Start: 2024-09-13

## 2024-09-19 ENCOUNTER — HOSPITAL ENCOUNTER (OUTPATIENT)
Dept: INFUSION THERAPY | Age: 69
Setting detail: INFUSION SERIES
Discharge: HOME | End: 2024-09-19
Payer: MEDICARE

## 2024-09-19 VITALS
DIASTOLIC BLOOD PRESSURE: 70 MMHG | RESPIRATION RATE: 16 BRPM | HEART RATE: 84 BPM | SYSTOLIC BLOOD PRESSURE: 117 MMHG | OXYGEN SATURATION: 98 % | TEMPERATURE: 97.3 F

## 2024-09-19 DIAGNOSIS — E83.42 HYPOMAGNESEMIA: Primary | ICD-10-CM

## 2024-09-19 PROCEDURE — 96365 THER/PROPH/DIAG IV INF INIT: CPT

## 2024-09-19 PROCEDURE — 96366 THER/PROPH/DIAG IV INF ADDON: CPT

## 2024-09-19 PROCEDURE — 2500000003 HC RX 250 WO HCPCS: Performed by: STUDENT IN AN ORGANIZED HEALTH CARE EDUCATION/TRAINING PROGRAM

## 2024-09-19 RX ORDER — MAGNESIUM SULFATE HEPTAHYDRATE 40 MG/ML
2000 INJECTION, SOLUTION INTRAVENOUS ONCE
Status: CANCELLED
Start: 2024-09-19

## 2024-09-19 RX ORDER — SODIUM CHLORIDE 9 MG/ML
5-250 INJECTION, SOLUTION INTRAVENOUS PRN
OUTPATIENT
Start: 2024-09-19

## 2024-09-19 RX ORDER — ONDANSETRON 2 MG/ML
8 INJECTION INTRAMUSCULAR; INTRAVENOUS
OUTPATIENT
Start: 2024-09-19

## 2024-09-19 RX ORDER — EPINEPHRINE 1 MG/ML
0.3 INJECTION, SOLUTION, CONCENTRATE INTRAVENOUS PRN
OUTPATIENT
Start: 2024-09-19

## 2024-09-19 RX ORDER — SODIUM CHLORIDE 0.9 % (FLUSH) 0.9 %
5-40 SYRINGE (ML) INJECTION PRN
OUTPATIENT
Start: 2024-09-19

## 2024-09-19 RX ORDER — DIPHENHYDRAMINE HYDROCHLORIDE 50 MG/ML
50 INJECTION INTRAMUSCULAR; INTRAVENOUS
OUTPATIENT
Start: 2024-09-19

## 2024-09-19 RX ORDER — HEPARIN 100 UNIT/ML
500 SYRINGE INTRAVENOUS PRN
OUTPATIENT
Start: 2024-09-19

## 2024-09-19 RX ORDER — ACETAMINOPHEN 325 MG/1
650 TABLET ORAL
OUTPATIENT
Start: 2024-09-19

## 2024-09-19 RX ORDER — ALBUTEROL SULFATE 90 UG/1
4 INHALANT RESPIRATORY (INHALATION) PRN
OUTPATIENT
Start: 2024-09-19

## 2024-09-19 RX ORDER — MAGNESIUM SULFATE HEPTAHYDRATE 40 MG/ML
2000 INJECTION, SOLUTION INTRAVENOUS ONCE
Status: COMPLETED | OUTPATIENT
Start: 2024-09-19 | End: 2024-09-19

## 2024-09-19 RX ORDER — SODIUM CHLORIDE 9 MG/ML
INJECTION, SOLUTION INTRAVENOUS CONTINUOUS
OUTPATIENT
Start: 2024-09-19

## 2024-09-19 RX ADMIN — MAGNESIUM SULFATE HEPTAHYDRATE 2000 MG: 40 INJECTION, SOLUTION INTRAVENOUS at 13:33

## 2024-09-23 RX ORDER — SPIRONOLACTONE 25 MG/1
25 TABLET ORAL DAILY
Qty: 30 TABLET | Refills: 5 | Status: SHIPPED | OUTPATIENT
Start: 2024-09-23

## 2024-09-23 RX ORDER — LEVOTHYROXINE SODIUM 125 UG/1
125 TABLET ORAL DAILY
Qty: 30 TABLET | Refills: 5 | Status: SHIPPED | OUTPATIENT
Start: 2024-09-23

## 2024-09-23 RX ORDER — BUMETANIDE 1 MG/1
1 TABLET ORAL DAILY
Qty: 30 TABLET | Refills: 5 | Status: SHIPPED | OUTPATIENT
Start: 2024-09-23 | End: 2025-03-22

## 2024-09-23 RX ORDER — ATORVASTATIN CALCIUM 10 MG/1
10 TABLET, FILM COATED ORAL DAILY
Qty: 30 TABLET | Refills: 5 | Status: SHIPPED | OUTPATIENT
Start: 2024-09-23

## 2024-10-09 ENCOUNTER — TELEPHONE (OUTPATIENT)
Dept: PRIMARY CARE CLINIC | Age: 69
End: 2024-10-09

## 2024-10-09 RX ORDER — TAMSULOSIN HYDROCHLORIDE 0.4 MG/1
0.4 CAPSULE ORAL DAILY
Qty: 30 CAPSULE | Refills: 5 | Status: SHIPPED | OUTPATIENT
Start: 2024-10-09

## 2024-10-09 NOTE — TELEPHONE ENCOUNTER
Patient states she is starting to fill up with fluid, asking what the parameters are for paracentesis? Can she measure her abdomen? Not having any difficulty breathing. Scheduled an appt next Wednesday to discuss more and see if it is needed. Please advise

## 2024-10-10 ENCOUNTER — OFFICE VISIT (OUTPATIENT)
Dept: INFECTIOUS DISEASES | Age: 69
End: 2024-10-10
Payer: MEDICARE

## 2024-10-10 VITALS
HEART RATE: 79 BPM | WEIGHT: 175 LBS | SYSTOLIC BLOOD PRESSURE: 133 MMHG | HEIGHT: 59 IN | OXYGEN SATURATION: 99 % | BODY MASS INDEX: 35.28 KG/M2 | DIASTOLIC BLOOD PRESSURE: 63 MMHG | RESPIRATION RATE: 18 BRPM | TEMPERATURE: 97 F

## 2024-10-10 DIAGNOSIS — L97.523 NON-PRESSURE CHRONIC ULCER OF OTHER PART OF LEFT FOOT WITH NECROSIS OF MUSCLE (HCC): Primary | ICD-10-CM

## 2024-10-10 DIAGNOSIS — M46.26 OSTEOMYELITIS OF LUMBAR SPINE: ICD-10-CM

## 2024-10-10 DIAGNOSIS — A49.02 MRSA (METHICILLIN RESISTANT STAPHYLOCOCCUS AUREUS) INFECTION: ICD-10-CM

## 2024-10-10 DIAGNOSIS — E11.42 DIABETIC PERIPHERAL NEUROPATHY ASSOCIATED WITH TYPE 2 DIABETES MELLITUS (HCC): ICD-10-CM

## 2024-10-10 DIAGNOSIS — L97.522 NON-PRESSURE CHRONIC ULCER OF OTHER PART OF LEFT FOOT WITH FAT LAYER EXPOSED (HCC): ICD-10-CM

## 2024-10-10 LAB
AEROBIC CULTURE: ABNORMAL
AMPICILLIN: ABNORMAL
CEFOXITIN SCREEN: ABNORMAL
CIPROFLOXACIN: ABNORMAL
CLINDAMYCIN: ABNORMAL
CULTURE RESULT: ABNORMAL
DAPTOMYCIN: ABNORMAL
DOXYCYCLINE: ABNORMAL
ERYTHROMYCIN: ABNORMAL
GENTAMICIN,HIGH LEVEL: ABNORMAL
GENTAMICIN: ABNORMAL
INDUCIBLE CLINDAMYCIN RESISTANCE: ABNORMAL
LEVOFLOXACIN: ABNORMAL
LINEZOLID: ABNORMAL
MOXIFLOXACIN: ABNORMAL
NITROFURANTOIN: ABNORMAL
OXACILLIN: ABNORMAL
RIFAMPIN: ABNORMAL
STREPTOMYCIN,HI LEVEL: ABNORMAL
TETRACYCLINE: ABNORMAL
TIGECYCLINE: ABNORMAL
TRIMETHOPRIM + SULFAMETHOXAZOLE: ABNORMAL
VANCOMYCIN: ABNORMAL

## 2024-10-10 PROCEDURE — 99213 OFFICE O/P EST LOW 20 MIN: CPT | Performed by: INTERNAL MEDICINE

## 2024-10-10 PROCEDURE — 1123F ACP DISCUSS/DSCN MKR DOCD: CPT | Performed by: INTERNAL MEDICINE

## 2024-10-10 PROCEDURE — 3075F SYST BP GE 130 - 139MM HG: CPT | Performed by: INTERNAL MEDICINE

## 2024-10-10 PROCEDURE — 3078F DIAST BP <80 MM HG: CPT | Performed by: INTERNAL MEDICINE

## 2024-10-10 RX ORDER — MUPIROCIN 20 MG/G
OINTMENT TOPICAL
Qty: 15 G | Refills: 1 | Status: SHIPPED | OUTPATIENT
Start: 2024-10-10 | End: 2024-10-17

## 2024-10-10 RX ORDER — LINEZOLID 600 MG/1
600 TABLET, FILM COATED ORAL 2 TIMES DAILY
Qty: 28 TABLET | Refills: 0 | Status: SHIPPED | OUTPATIENT
Start: 2024-10-10 | End: 2024-10-24

## 2024-10-10 NOTE — PROGRESS NOTES
[facility] - Office Visit Progress Note    Today's Date: 10/10/2024  Impression :   Left dorsal foot wound  Non healing  MRSA on culture  Venous insufficiency  History of venous stasis wounds   Prior history of L1-2 discitis/osteomyelitis with evidence of bone loss  Lower extremity weakness  Ascites  BONNER with regular paracentesis  Hypothyroidism  CKD  DM II  CAD  HTN  Hx of recurrent UTI    Recommendations:     Linezolid 600 mg bid for 14 days  Topical mupirocin for open wound with MRSA  Follow up in 1 month  Continue with podiatry follow up and wound care recommendations      Medical Decision Making/Summary/Discussion:6/19/2024     Daily Elements of Decision Making provided by Consulting Physician:    Note: I have independently performed the steps listed below as part of the medical decision making and evaluation.    Review of current Problems:  Today I am seeing the patient for the following problems:  Concern for L1-2 discitis/osteomyelitis  Lower extremity weakness  Lower extremity wounds with non healing wound on dorsum of foot  MRSA infection  Ascites  BONNER with regular paracentesis  Hypothyroidism  CKD  DM II  CAD  HTN  Hx of recurrent UTI  Evaluation of Patient:  Evaluated because of back pain and lower extremity weakness  Ascites   L1-L2 diskitis and osteomyelitis  Spine stable   Please see daily details in Interval Changes Section  Changes in physical exam:  Back pain and lower extremity weakness  Ascites   L1-L2 diskitis and osteomyelitis  New Lt foot ulcer on dorsum of foot  Please see daily details in Physical Exam section and in Interval Changes Section  Changes in ROS:  Unchanged  Please see daily details in Review of Symptoms section and in Interval Changes Section  Discussion with Referring Physician or Service  Dr. Rafa Funez, Podiatry  Laboratory and Radiologic Studies with personal review of radiologic

## 2024-10-10 NOTE — PROGRESS NOTES
[facility] - Office Visit Progress Note    Today's Date: 10/10/2024  Impression :   Left dorsal foot wound  Venous insufficiency  History of venous stasis wounds   Prior history of L1-2 discitis/osteomyelitis with evidence of bone loss  Lower extremity weakness  Ascites  BONNER with regular paracentesis  Hypothyroidism  CKD  DM II  CAD  HTN  Hx of recurrent UTI    Recommendations:     Aerobic culture 10/8/24 grew MRSA that was resistant to doxycyline. Will sent a new prescription for Linezolid 600 mg twice daily   Follow up in 1 month  Continue with podiatry follow up and wound care recommendations      Medical Decision Making/Summary/Discussion:6/19/2024     Daily Elements of Decision Making provided by Consulting Physician:    Note: I have independently performed the steps listed below as part of the medical decision making and evaluation.    Review of current Problems:  Today I am seeing the patient for the following problems:  Concern for L1-2 discitis/osteomyelitis  Lower extremity weakness  Lower extremity wounds  Ascites  BONNER with regular paracentesis  Hypothyroidism  CKD  DM II  CAD  HTN  Hx of recurrent UTI  Evaluation of Patient:  Evaluated because of back pain and lower extremity weakness  Ascites   L1-L2 diskitis and osteomyelitis  Please see daily details in Interval Changes Section  Changes in physical exam:  Back pain and lower extremity weakness  Ascites   L1-L2 diskitis and osteomyelitis  New Lt foot ulcer on dorsum of foot  Please see daily details in Physical Exam section and in Interval Changes Section  Changes in ROS:  Unchanged  Please see daily details in Review of Symptoms section and in Interval Changes Section  Discussion with Referring Physician or Service  Dr. Craig  Laboratory and Radiologic Studies with personal review of radiologic studies  Laboratory  Radiology  Microbiology  Please see Details in daily Interval Changes Section devoted to Lab,

## 2024-10-16 ENCOUNTER — OFFICE VISIT (OUTPATIENT)
Dept: PRIMARY CARE CLINIC | Age: 69
End: 2024-10-16

## 2024-10-16 VITALS
DIASTOLIC BLOOD PRESSURE: 62 MMHG | HEART RATE: 86 BPM | OXYGEN SATURATION: 98 % | WEIGHT: 175.6 LBS | SYSTOLIC BLOOD PRESSURE: 144 MMHG | HEIGHT: 59 IN | BODY MASS INDEX: 35.4 KG/M2

## 2024-10-16 DIAGNOSIS — Z23 NEED FOR VACCINATION: ICD-10-CM

## 2024-10-16 DIAGNOSIS — Z79.4 TYPE 2 DIABETES MELLITUS WITH CHRONIC KIDNEY DISEASE, WITH LONG-TERM CURRENT USE OF INSULIN, UNSPECIFIED CKD STAGE (HCC): Primary | ICD-10-CM

## 2024-10-16 DIAGNOSIS — E11.22 TYPE 2 DIABETES MELLITUS WITH CHRONIC KIDNEY DISEASE, WITH LONG-TERM CURRENT USE OF INSULIN, UNSPECIFIED CKD STAGE (HCC): Primary | ICD-10-CM

## 2024-10-16 DIAGNOSIS — K75.81 LIVER CIRRHOSIS SECONDARY TO NASH (HCC): ICD-10-CM

## 2024-10-16 DIAGNOSIS — K74.60 LIVER CIRRHOSIS SECONDARY TO NASH (HCC): ICD-10-CM

## 2024-10-16 DIAGNOSIS — E66.812 OBESITY, CLASS II, BMI 35-39.9: ICD-10-CM

## 2024-10-16 LAB — HBA1C MFR BLD: 5.8 %

## 2024-10-16 RX ORDER — INSULIN GLARGINE 300 U/ML
INJECTION, SOLUTION SUBCUTANEOUS
Qty: 12 ML | Refills: 2
Start: 2024-10-16

## 2024-10-16 ASSESSMENT — PATIENT HEALTH QUESTIONNAIRE - PHQ9
SUM OF ALL RESPONSES TO PHQ QUESTIONS 1-9: 0
SUM OF ALL RESPONSES TO PHQ9 QUESTIONS 1 & 2: 0
2. FEELING DOWN, DEPRESSED OR HOPELESS: NOT AT ALL
SUM OF ALL RESPONSES TO PHQ QUESTIONS 1-9: 0
SUM OF ALL RESPONSES TO PHQ QUESTIONS 1-9: 0
1. LITTLE INTEREST OR PLEASURE IN DOING THINGS: NOT AT ALL
SUM OF ALL RESPONSES TO PHQ QUESTIONS 1-9: 0

## 2024-10-16 ASSESSMENT — ENCOUNTER SYMPTOMS
ABDOMINAL PAIN: 0
ABDOMINAL DISTENTION: 1
SHORTNESS OF BREATH: 0

## 2024-10-16 NOTE — PROGRESS NOTES
Skin:  Positive for wound.   Neurological:  Negative for dizziness and light-headedness.       Objective:     BP (!) 144/62   Pulse 86   Ht 1.499 m (4' 11.02\")   Wt 79.7 kg (175 lb 9.6 oz)   SpO2 98%   BMI 35.45 kg/m²   Physical Exam  Vitals and nursing note reviewed.   Constitutional:       General: She is not in acute distress.     Appearance: She is well-developed. She is not ill-appearing.   HENT:      Head: Normocephalic and atraumatic.      Right Ear: External ear normal.      Left Ear: External ear normal.   Eyes:      General: No scleral icterus.        Right eye: No discharge.         Left eye: No discharge.      Conjunctiva/sclera: Conjunctivae normal.   Neck:      Thyroid: No thyromegaly.      Trachea: No tracheal deviation.   Cardiovascular:      Rate and Rhythm: Normal rate and regular rhythm.      Heart sounds: Normal heart sounds.   Pulmonary:      Effort: Pulmonary effort is normal. No respiratory distress.      Breath sounds: Normal breath sounds. No wheezing.   Lymphadenopathy:      Cervical: No cervical adenopathy.   Skin:     General: Skin is warm.      Findings: No rash.   Neurological:      Mental Status: She is alert and oriented to person, place, and time.   Psychiatric:         Mood and Affect: Mood normal.         Behavior: Behavior normal.         Thought Content: Thought content normal.         Assessment/Plan:   1. Type 2 diabetes mellitus with chronic kidney disease, with long-term current use of insulin, unspecified CKD stage (HCC)  -     POCT glycosylated hemoglobin (Hb A1C)  2. Need for vaccination  -     Influenza, FLUAD Trivalent, (age 65 y+), IM, Preservative Free, 0.5mL  3. Obesity, Class II, BMI 35-39.9  Assessment & Plan:  Continues to lose weight- not eating much- monitor for nutrition.    4. Liver cirrhosis secondary to BONNER (HCC)  Assessment & Plan:  Continue to monior and f/u with GI.  Paracentesis when having SOB or pain.       Return in about 3 months (around 
Improved.

## 2024-10-25 ENCOUNTER — TELEPHONE (OUTPATIENT)
Dept: PRIMARY CARE CLINIC | Age: 69
End: 2024-10-25

## 2024-10-25 DIAGNOSIS — K75.81 LIVER CIRRHOSIS SECONDARY TO NASH (HCC): Primary | ICD-10-CM

## 2024-10-25 DIAGNOSIS — K74.60 LIVER CIRRHOSIS SECONDARY TO NASH (HCC): Primary | ICD-10-CM

## 2024-10-25 NOTE — TELEPHONE ENCOUNTER
Patient states she is uncomfortable and it's getting harder to breathe at night. Asking for paracentesis order. Would like to get done at [place]

## 2024-11-07 DIAGNOSIS — E83.42 HYPOMAGNESEMIA: ICD-10-CM

## 2024-11-07 RX ORDER — GABAPENTIN 300 MG/1
300 CAPSULE ORAL 3 TIMES DAILY
Qty: 90 CAPSULE | Refills: 1 | Status: SHIPPED | OUTPATIENT
Start: 2024-11-07 | End: 2025-05-06

## 2024-11-07 RX ORDER — LANOLIN ALCOHOL/MO/W.PET/CERES
400 CREAM (GRAM) TOPICAL 3 TIMES DAILY
Qty: 30 TABLET | Refills: 0 | Status: SHIPPED | OUTPATIENT
Start: 2024-11-07

## 2024-11-13 ENCOUNTER — HOSPITAL ENCOUNTER (OUTPATIENT)
Dept: INTERVENTIONAL RADIOLOGY/VASCULAR | Age: 69
Discharge: HOME | End: 2024-11-15
Payer: MEDICARE

## 2024-11-13 VITALS
BODY MASS INDEX: 36.37 KG/M2 | HEIGHT: 59 IN | HEART RATE: 86 BPM | DIASTOLIC BLOOD PRESSURE: 60 MMHG | SYSTOLIC BLOOD PRESSURE: 149 MMHG | OXYGEN SATURATION: 97 % | TEMPERATURE: 97.1 F | WEIGHT: 180.4 LBS | RESPIRATION RATE: 18 BRPM

## 2024-11-13 DIAGNOSIS — K74.60 LIVER CIRRHOSIS SECONDARY TO NASH (HCC): ICD-10-CM

## 2024-11-13 DIAGNOSIS — K75.81 LIVER CIRRHOSIS SECONDARY TO NASH (HCC): ICD-10-CM

## 2024-11-13 LAB
INR PPP: 1.2
PARTIAL THROMBOPLASTIN TIME: 35.3 SEC (ref 24–36)
PLATELET # BLD AUTO: 158 K/UL (ref 150–450)
PROTHROMBIN TIME: 15.4 SEC (ref 11.8–14.6)

## 2024-11-13 PROCEDURE — 2709999900 IR US GUIDED PARACENTESIS

## 2024-11-13 PROCEDURE — 6360000002 HC RX W HCPCS: Performed by: RADIOLOGY

## 2024-11-13 PROCEDURE — 85730 THROMBOPLASTIN TIME PARTIAL: CPT

## 2024-11-13 PROCEDURE — 85049 AUTOMATED PLATELET COUNT: CPT

## 2024-11-13 PROCEDURE — 49083 ABD PARACENTESIS W/IMAGING: CPT

## 2024-11-13 PROCEDURE — 36415 COLL VENOUS BLD VENIPUNCTURE: CPT

## 2024-11-13 PROCEDURE — P9047 ALBUMIN (HUMAN), 25%, 50ML: HCPCS | Performed by: RADIOLOGY

## 2024-11-13 PROCEDURE — 85610 PROTHROMBIN TIME: CPT

## 2024-11-13 RX ORDER — ALBUMIN (HUMAN) 12.5 G/50ML
SOLUTION INTRAVENOUS CONTINUOUS PRN
Status: COMPLETED | OUTPATIENT
Start: 2024-11-13 | End: 2024-11-13

## 2024-11-13 RX ORDER — ALBUMIN (HUMAN) 12.5 G/50ML
25 SOLUTION INTRAVENOUS ONCE
Status: COMPLETED | OUTPATIENT
Start: 2024-11-13 | End: 2024-11-13

## 2024-11-13 RX ADMIN — ALBUMIN (HUMAN) 50 G: 0.25 INJECTION, SOLUTION INTRAVENOUS at 15:34

## 2024-11-13 RX ADMIN — ALBUMIN (HUMAN) 25 G: 0.25 INJECTION, SOLUTION INTRAVENOUS at 16:06

## 2024-11-13 ASSESSMENT — ENCOUNTER SYMPTOMS
DIARRHEA: 1
SHORTNESS OF BREATH: 1
COUGH: 1
CONSTIPATION: 0
ABDOMINAL PAIN: 0
ABDOMINAL DISTENTION: 1
NAUSEA: 0
SORE THROAT: 0
APNEA: 0
TROUBLE SWALLOWING: 0
BACK PAIN: 1
VOMITING: 0

## 2024-11-13 ASSESSMENT — PAIN - FUNCTIONAL ASSESSMENT
PAIN_FUNCTIONAL_ASSESSMENT: NONE - DENIES PAIN
PAIN_FUNCTIONAL_ASSESSMENT: NONE - DENIES PAIN

## 2024-11-13 NOTE — PROGRESS NOTES
Patient tolerated procedure well without distress.  10.5 L of clear, yellow fluid removed. Area cleansed & dry dressing applied. Transport notified to take patient back to [facility].  EMILY Monaco updated.    
Patient tolerated procedure well without distress.  L of clear, yellow fluid removed. Area cleansed & dry dressing applied. Transport notified to take patient back to [facility].  ,RN updated.    
1

## 2024-11-13 NOTE — BRIEF OP NOTE
Brief Postoperative Note for Paracentesis    Bobbi Phillips  YOB: 1955  [id number]    Pre-operative Diagnosis:  Ascites     Post-operative Diagnosis: Same    Procedure: Ultrasound guided paracentesis     Anesthesia: 1% Lidocaine     Surgeons/Assistants: Dr. Aparicio    Complications: none    EBL: Minimal    Specimens: Were obtained    Ultrasound guided paracentesis performed along the left quadrant. 10.5 L clear yellow fluid obtained.  Dressing applied. Patient given 75 g of IV albumin post procedure. Patient tolerated procedure well.    Electronically signed by Hillary Lowery PA-C on 11/13/2024 at 3:25 PM

## 2024-11-13 NOTE — H&P
HISTORY and PHYSICAL  [facility]       NAME:  Bobbi Phillips  MRN: [id number]   YOB: 1955   Date: 11/13/2024   Age: 69 y.o.  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 5100 ml clear yellow fluid obtained.  Dressing applied.  Pt has liver cirrhosis Dx  12 years ago.  Patient complains of and abdominal distention, shortness of breath, leg swelling.    Denies recent fever/chills.  Denies chest pain.  Reports ongoing SOBOE and at rest.  C/o clear phlegm productive cough    Review of additional significant medical hx: DM, HTN,   (See chart for additional detail, including current medications /see ROS for current S/S):     NPO status: juice at 1030 am  Medications taken TODAY (with sip of water): gabapentin   Anticoagulation status: ASA last dose 5 days ago    Denies personal hx of blood clots.  Personal hx of MRSA infection. Left foot  Denies any personal or family hx of previous complications w/anesthesia.    PAST MEDICAL HISTORY     Past Medical History:   Diagnosis Date    Abdominal swelling     Acute pyelonephritis 04/05/2024    KIM (acute kidney injury) (HCC) 04/03/2024    Claustrophobia     COVID-19 vaccine administered     Diabetes (HCC)     DEXCOM LT ARM    Diabetic retinopathy (HCC) 11/28/2015    Flatus     H/O acute sinusitis     H/O cholelithiasis     Hypertension     Liver cirrhosis (HCC)     LLQ abdominal tenderness     Poor venous access     \"THEY LIKE TO ROLL & RUN AWAY\"    Positive colorectal cancer screening using Cologuard test 07/06/2023    had colonoscopy after this and no cancer was found    Post-menopausal bleeding     RUQ abdominal pain

## 2024-11-13 NOTE — DISCHARGE INSTRUCTIONS
DISCHARGE INSTRUCTIONS FOR PARACENTESIS    In order to continue your care at home, please follow the instructions below.    Medications    Use over-the-counter pain medication as directed on bottle for pain control    Post Procedure Site/Care/Activity  For up to 2 days after the procedure, you may have a small amount of clear fluid coming out of the site where the needle was inserted, especially if you had a lot of fluid removed. You may need to change the bandage on the site.    Do not lie totally flat until morning.  You can do your normal activities after the procedure, unless instructed differently.      Call your doctor or seek immediate medical care if:   You have symptoms of infection, such as increased pain, swelling, warmth, or redness, red streaks or pus.  An oral temperature (by mouth) is 101 degrees or higher (fever), chills, fever.  You are dizzy or lightheaded, or you feel like you may faint.   You have new or worse belly pain.    Watch closely for changes in your health, and be sure to contact your doctor if:   Fluid builds up in your belly again.   You do not get better as expected.      IF YOU CANNOT REACH THE DOCTOR, GO TO THE NEAREST EMERGENCY ROOM OR CALL 911    Phone: Interventional Radiology   [phone] ()  After hours Radiology   [phone]

## 2024-11-14 ENCOUNTER — OFFICE VISIT (OUTPATIENT)
Dept: INFECTIOUS DISEASES | Age: 69
End: 2024-11-14

## 2024-11-14 VITALS
DIASTOLIC BLOOD PRESSURE: 62 MMHG | TEMPERATURE: 97.2 F | OXYGEN SATURATION: 98 % | RESPIRATION RATE: 18 BRPM | SYSTOLIC BLOOD PRESSURE: 121 MMHG | HEIGHT: 59 IN | BODY MASS INDEX: 32.13 KG/M2 | WEIGHT: 159.4 LBS | HEART RATE: 92 BPM

## 2024-11-14 DIAGNOSIS — A49.02 MRSA (METHICILLIN RESISTANT STAPHYLOCOCCUS AUREUS) INFECTION: ICD-10-CM

## 2024-11-14 DIAGNOSIS — L97.523 NON-PRESSURE CHRONIC ULCER OF OTHER PART OF LEFT FOOT WITH NECROSIS OF MUSCLE (HCC): Primary | ICD-10-CM

## 2024-11-14 DIAGNOSIS — K74.60 LIVER CIRRHOSIS SECONDARY TO NASH (HCC): ICD-10-CM

## 2024-11-14 DIAGNOSIS — Z79.4 TYPE 2 DIABETES MELLITUS WITH CHRONIC KIDNEY DISEASE, WITH LONG-TERM CURRENT USE OF INSULIN, UNSPECIFIED CKD STAGE (HCC): ICD-10-CM

## 2024-11-14 DIAGNOSIS — E11.22 TYPE 2 DIABETES MELLITUS WITH CHRONIC KIDNEY DISEASE, WITH LONG-TERM CURRENT USE OF INSULIN, UNSPECIFIED CKD STAGE (HCC): ICD-10-CM

## 2024-11-14 DIAGNOSIS — K75.81 LIVER CIRRHOSIS SECONDARY TO NASH (HCC): ICD-10-CM

## 2024-11-14 DIAGNOSIS — E11.42 DIABETIC PERIPHERAL NEUROPATHY ASSOCIATED WITH TYPE 2 DIABETES MELLITUS (HCC): ICD-10-CM

## 2024-11-14 DIAGNOSIS — M46.26 OSTEOMYELITIS OF LUMBAR SPINE: ICD-10-CM

## 2024-11-14 RX ORDER — MEDICAL SUPPLY, MISCELLANEOUS
2 EACH MISCELLANEOUS EVERY 12 HOURS
Qty: 2 EACH | Refills: 0 | Status: SHIPPED | OUTPATIENT
Start: 2024-11-14 | End: 2024-12-14

## 2024-11-14 NOTE — PROGRESS NOTES
[facility] - Office Visit Progress Note    Today's Date: 11/14/2024  Impression :   Left dorsal foot wound  Non healing  MRSA on culture  Venous insufficiency  History of venous stasis wounds   Prior history of L1-2 discitis/osteomyelitis with evidence of bone loss  Lower extremity weakness  Ascites  BONNER with regular paracentesis  Hypothyroidism  CKD  DM II  CAD  HTN  Hx of recurrent UTI    Recommendations:     Linezolid 600 mg bid for 14 days  Topical mupirocin for open wound with MRSA  Follow up in 1 month  Continue with podiatry follow up and wound care recommendations      Medical Decision Making/Summary/Discussion:6/19/2024     Daily Elements of Decision Making provided by Consulting Physician:    Note: I have independently performed the steps listed below as part of the medical decision making and evaluation.    Review of current Problems:  Today I am seeing the patient for the following problems:  Concern for L1-2 discitis/osteomyelitis  Lower extremity weakness  Lower extremity wounds with non healing wound on dorsum of foot  MRSA infection  Ascites  BONNER with regular paracentesis  Hypothyroidism  CKD  DM II  CAD  HTN  Hx of recurrent UTI  Evaluation of Patient:  Evaluated because of back pain and lower extremity weakness  Ascites   L1-L2 diskitis and osteomyelitis  Spine stable   Please see daily details in Interval Changes Section  Changes in physical exam:  Back pain and lower extremity weakness  Ascites   L1-L2 diskitis and osteomyelitis  New Lt foot ulcer on dorsum of foot  Please see daily details in Physical Exam section and in Interval Changes Section  Changes in ROS:  Unchanged  Please see daily details in Review of Symptoms section and in Interval Changes Section  Discussion with Referring Physician or Service  Dr. Rafa Funez, Podiatry  Laboratory and Radiologic Studies with personal review of radiologic 
    Susceptibility    Klebsiella pneumoniae (1)    Antibiotic Interpretation Microscan Method Status    ampicillin Resistant >=32 BACTERIAL SUSCEPTIBILITY PANEL LIAM Final    ceFAZolin Sensitive <=4 BACTERIAL SUSCEPTIBILITY PANEL LIAM Final     Cefazolin sensitivity results can be used to predict the effectiveness of oral  cephalosporins (eg. Cephalexin) in uncomplicated Urinary Tract Infections due to E. coli, K.   pneumoniae, and P. mirabilis       cefTRIAXone Sensitive <=0.25 BACTERIAL SUSCEPTIBILITY PANEL LIAM Final    Confirmatory Extended Spectrum Beta-Lactamase Sensitive NEGATIVE BACTERIAL SUSCEPTIBILITY PANEL LIAM Final    gentamicin Sensitive <=1 BACTERIAL SUSCEPTIBILITY PANEL LIAM Final    levofloxacin Sensitive <=0.12 BACTERIAL SUSCEPTIBILITY PANEL LIAM Final    nitrofurantoin Intermediate 64 BACTERIAL SUSCEPTIBILITY PANEL LIAM Final    piperacillin-tazobactam Intermediate 32 BACTERIAL SUSCEPTIBILITY PANEL LIAM Final    tobramycin Sensitive <=1 BACTERIAL SUSCEPTIBILITY PANEL LIAM Final    trimethoprim-sulfamethoxazole Sensitive <=20 BACTERIAL SUSCEPTIBILITY PANEL LIAM                  Contains abnormal data Culture, Urine  Order: [id number]  Status: Final result       Visible to patient: No (not released)       Next appt: None    Specimen Information: Urine, clean catch   0 Result Notes      Component    Specimen Description .CLEAN CATCH URINE   Culture ENTEROCOCCUS FAECIUM >100,000 CFU/ML Identification by MALDI-TOF [facility]    Resulting Agency [facility]        Susceptibility    Enterococcus faecium (1)      Antibiotic Interpretation Microscan Method Status    ampicillin Resistant >=32 BACTERIAL SUSCEPTIBILITY PANEL LIAM Final    ciprofloxacin Resistant >=8 BACTERIAL SUSCEPTIBILITY PANEL LIAM Final    levofloxacin Resistant >=8 BACTERIAL SUSCEPTIBILITY PANEL LIAM Final    nitrofurantoin Intermediate 64 BACTERIAL SUSCEPTIBILITY PANEL LIAM Final    tetracycline Resistant >=16 BACTERIAL SUSCEPTIBILITY

## 2024-12-17 ENCOUNTER — OFFICE VISIT (OUTPATIENT)
Dept: PRIMARY CARE CLINIC | Age: 69
End: 2024-12-17

## 2024-12-17 VITALS
BODY MASS INDEX: 38.75 KG/M2 | SYSTOLIC BLOOD PRESSURE: 154 MMHG | HEART RATE: 81 BPM | WEIGHT: 192.2 LBS | OXYGEN SATURATION: 100 % | HEIGHT: 59 IN | DIASTOLIC BLOOD PRESSURE: 66 MMHG

## 2024-12-17 DIAGNOSIS — E78.2 MIXED HYPERLIPIDEMIA: ICD-10-CM

## 2024-12-17 DIAGNOSIS — R18.8 OTHER ASCITES: Primary | ICD-10-CM

## 2024-12-17 DIAGNOSIS — K75.81 LIVER CIRRHOSIS SECONDARY TO NASH (HCC): Primary | ICD-10-CM

## 2024-12-17 DIAGNOSIS — E11.65 TYPE 2 DIABETES MELLITUS WITH HYPERGLYCEMIA, WITH LONG-TERM CURRENT USE OF INSULIN (HCC): ICD-10-CM

## 2024-12-17 DIAGNOSIS — Z79.4 TYPE 2 DIABETES MELLITUS WITH HYPERGLYCEMIA, WITH LONG-TERM CURRENT USE OF INSULIN (HCC): ICD-10-CM

## 2024-12-17 DIAGNOSIS — Z79.4 TYPE 2 DIABETES MELLITUS WITH CHRONIC KIDNEY DISEASE, WITH LONG-TERM CURRENT USE OF INSULIN, UNSPECIFIED CKD STAGE (HCC): ICD-10-CM

## 2024-12-17 DIAGNOSIS — K74.60 LIVER CIRRHOSIS SECONDARY TO NASH (HCC): Primary | ICD-10-CM

## 2024-12-17 DIAGNOSIS — R60.0 BILATERAL EDEMA OF LOWER EXTREMITY: ICD-10-CM

## 2024-12-17 DIAGNOSIS — K75.81 LIVER CIRRHOSIS SECONDARY TO NASH (HCC): ICD-10-CM

## 2024-12-17 DIAGNOSIS — Z12.31 ENCOUNTER FOR SCREENING MAMMOGRAM FOR MALIGNANT NEOPLASM OF BREAST: ICD-10-CM

## 2024-12-17 DIAGNOSIS — K59.00 ACUTE CONSTIPATION: ICD-10-CM

## 2024-12-17 DIAGNOSIS — E11.22 TYPE 2 DIABETES MELLITUS WITH CHRONIC KIDNEY DISEASE, WITH LONG-TERM CURRENT USE OF INSULIN, UNSPECIFIED CKD STAGE (HCC): ICD-10-CM

## 2024-12-17 DIAGNOSIS — R18.8 OTHER ASCITES: ICD-10-CM

## 2024-12-17 DIAGNOSIS — K74.60 LIVER CIRRHOSIS SECONDARY TO NASH (HCC): ICD-10-CM

## 2024-12-17 LAB
ANION GAP SERPL CALCULATED.3IONS-SCNC: 9 MMOL/L (ref 9–16)
BUN BLDV-MCNC: 41 MG/DL (ref 8–23)
CALCIUM SERPL-MCNC: 8.4 MG/DL (ref 8.6–10.4)
CHLORIDE BLD-SCNC: 108 MMOL/L (ref 98–107)
CO2: 22 MMOL/L (ref 20–31)
CREAT SERPL-MCNC: 1.3 MG/DL (ref 0.6–0.9)
GFR, ESTIMATED: 44 ML/MIN/1.73M2
GLUCOSE BLD-MCNC: 182 MG/DL (ref 74–99)
POTASSIUM SERPL-SCNC: 4.5 MMOL/L (ref 3.7–5.3)
SODIUM BLD-SCNC: 139 MMOL/L (ref 136–145)

## 2024-12-17 RX ORDER — SPIRONOLACTONE 25 MG/1
50 TABLET ORAL DAILY
Qty: 60 TABLET | Refills: 1 | Status: SHIPPED | OUTPATIENT
Start: 2024-12-17

## 2024-12-17 RX ORDER — LACTULOSE 10 G/15ML
10 SOLUTION ORAL EVERY EVENING
Qty: 237 ML | Refills: 0 | Status: SHIPPED | OUTPATIENT
Start: 2024-12-17

## 2024-12-17 ASSESSMENT — ENCOUNTER SYMPTOMS
ABDOMINAL PAIN: 0
COUGH: 0
CHEST TIGHTNESS: 0
SORE THROAT: 0
DIARRHEA: 0
CONSTIPATION: 0
ABDOMINAL DISTENTION: 0
SHORTNESS OF BREATH: 0

## 2024-12-17 NOTE — PROGRESS NOTES
lack of time for rest and elevation. She has resumed driving and has been unable to obtain compression stockings from her pharmacy, as they only stock over-the-counter options. She recently acquired some compression stockings, but they caused significant discomfort and bruising due to their tight fit when worn through the night.     She has been experiencing diarrhea-like bowel movements since her discharge from the hospital, describing them as small and hard. She has previously taken lactulose and believes she may have some at home.    She is due for her annual mammogram in January 2025, which she typically has performed at [facility].    MEDICATIONS  spironolactone, Xifaxan, insulin, Bumex      Reviewed prior notes None  Reviewed previous Labs    No components found for: \"LDLCHOLESTEROL\", \"LDLCALC\"    (goal LDL is <100)   AST (U/L)   Date Value   09/06/2024 37 (H)     ALT (U/L)   Date Value   09/06/2024 11     BUN (mg/dL)   Date Value   09/06/2024 22     Hemoglobin A1C (%)   Date Value   10/16/2024 5.8     TSH (uIU/mL)   Date Value   06/14/2024 10.30 (H)     BP Readings from Last 3 Encounters:   12/17/24 (!) 154/66   11/13/24 (!) 149/60   11/14/24 121/62          (goal 120/80)  Hemoglobin A1C   Date Value Ref Range Status   10/16/2024 5.8 % Final     Past Medical History:   Diagnosis Date    Abdominal swelling     Acute pyelonephritis 04/05/2024    KIM (acute kidney injury) (HCC) 04/03/2024    Claustrophobia     COVID-19 vaccine administered     Diabetes (HCC)     DEXCOM LT ARM    Diabetic retinopathy (HCC) 11/28/2015    Flatus     H/O acute sinusitis     H/O cholelithiasis     Hypertension     Liver cirrhosis (HCC)     LLQ abdominal tenderness     Poor venous access     \"THEY LIKE TO ROLL & RUN AWAY\"    Positive colorectal cancer screening using Cologuard test 07/06/2023    had colonoscopy after this and no cancer was found    Post-menopausal bleeding     RUQ abdominal pain     Tendonitis of shoulder, right

## 2024-12-18 RX ORDER — SEMAGLUTIDE 0.68 MG/ML
INJECTION, SOLUTION SUBCUTANEOUS
Qty: 6 ML | Refills: 0 | Status: SHIPPED | OUTPATIENT
Start: 2024-12-18

## 2024-12-18 NOTE — RESULT ENCOUNTER NOTE
The results showed potassium is okay but kidney function is slightly lower. Avoid NSAids and recheck kidney function one week after increasing spironolactone dose.

## 2024-12-20 ENCOUNTER — HOSPITAL ENCOUNTER (OUTPATIENT)
Age: 69
Setting detail: SPECIMEN
Discharge: HOME | End: 2024-12-20

## 2024-12-20 ENCOUNTER — OFFICE VISIT (OUTPATIENT)
Age: 69
End: 2024-12-20
Payer: MEDICARE

## 2024-12-20 VITALS
DIASTOLIC BLOOD PRESSURE: 58 MMHG | SYSTOLIC BLOOD PRESSURE: 116 MMHG | HEIGHT: 59 IN | OXYGEN SATURATION: 98 % | HEART RATE: 83 BPM | WEIGHT: 193 LBS | BODY MASS INDEX: 38.91 KG/M2

## 2024-12-20 DIAGNOSIS — K74.60 CIRRHOSIS OF LIVER WITH ASCITES, UNSPECIFIED HEPATIC CIRRHOSIS TYPE (HCC): Primary | ICD-10-CM

## 2024-12-20 DIAGNOSIS — R18.8 CIRRHOSIS OF LIVER WITH ASCITES, UNSPECIFIED HEPATIC CIRRHOSIS TYPE (HCC): ICD-10-CM

## 2024-12-20 DIAGNOSIS — K74.60 CIRRHOSIS OF LIVER WITH ASCITES, UNSPECIFIED HEPATIC CIRRHOSIS TYPE (HCC): ICD-10-CM

## 2024-12-20 DIAGNOSIS — R18.8 CIRRHOSIS OF LIVER WITH ASCITES, UNSPECIFIED HEPATIC CIRRHOSIS TYPE (HCC): Primary | ICD-10-CM

## 2024-12-20 LAB
ALBUMIN SERPL-MCNC: 2.7 G/DL (ref 3.5–5.2)
ALBUMIN/GLOB SERPL: 0.6 {RATIO} (ref 1–2.5)
ALP SERPL-CCNC: 408 U/L (ref 35–104)
ALT SERPL-CCNC: 18 U/L (ref 10–35)
AST SERPL-CCNC: 44 U/L (ref 10–35)
BILIRUB DIRECT SERPL-MCNC: 0.4 MG/DL (ref 0–0.2)
BILIRUB INDIRECT SERPL-MCNC: 0.3 MG/DL (ref 0–1)
BILIRUB SERPL-MCNC: 0.7 MG/DL (ref 0–1.2)
GLOBULIN SER CALC-MCNC: 4.5 G/DL
PROT SERPL-MCNC: 7.2 G/DL (ref 6.6–8.7)

## 2024-12-20 PROCEDURE — 99205 OFFICE O/P NEW HI 60 MIN: CPT | Performed by: TRANSPLANT SURGERY

## 2024-12-20 PROCEDURE — 3074F SYST BP LT 130 MM HG: CPT | Performed by: TRANSPLANT SURGERY

## 2024-12-20 PROCEDURE — 1159F MED LIST DOCD IN RCRD: CPT | Performed by: TRANSPLANT SURGERY

## 2024-12-20 PROCEDURE — 3078F DIAST BP <80 MM HG: CPT | Performed by: TRANSPLANT SURGERY

## 2024-12-20 PROCEDURE — 1123F ACP DISCUSS/DSCN MKR DOCD: CPT | Performed by: TRANSPLANT SURGERY

## 2024-12-20 NOTE — PROGRESS NOTES
Hepatobiliary Surgery Faculty Note  Markell Rodriguez MD, FACS      Date of Visit: 12/20/2024   Chief Complaint:  Chief Complaint   Patient presents with    Consultation     New patient Liver cirrhosis        Referring physician:   LOUISA Mcclain     HPI:    Ms. Bobbi Phillips is a 69-year-old female with known history of cirrhosis secondary to metabolic dysfunction associated liver disease with severe portal hypertension, estimated MELD 3.0 score ranges 9-11. The manifestation of her liver disease include portal hypertension, thrombocytopenia, leg edema, ascites (on spironolactone and bumetanide) requiring paracentesis since March 2024. Her last paracentesis was about 4 weeks ago, 10 liters.     She denies any episode of hepatic encephalopathy and her cardiac echo (4/2024) showed an EF 75-80%.  CT scan in 4/2024- cirrhosis with splenomegaly, ascites and patent portal vein, hepatic artery and hepatic vein.    The patient was referred for a surgical consultation regarding refractory ascites.       ALLERGIES:  Allergies   Allergen Reactions    Tylenol [Acetaminophen] Other (See Comments)     intolerance due to cirrhosis        CURRENT MEDICATIONS:  Current Outpatient Medications   Medication Sig Dispense Refill    OZEMPIC, 0.25 OR 0.5 MG/DOSE, 2 MG/3ML SOPN INJECT 0.5 MILLIGRAMS SUBCUTANEOUSLY ONCE A WEEK 6 mL 0    lactulose (CHRONULAC) 10 GM/15ML solution Take 15 mLs by mouth every evening 237 mL 0    spironolactone (ALDACTONE) 25 MG tablet Take 2 tablets by mouth daily 60 tablet 1    magnesium oxide (MAG-OX) 400 (240 Mg) MG tablet TAKE 1 TABLET BY MOUTH 3 TIMES A DAY 30 tablet 0    gabapentin (NEURONTIN) 300 MG capsule Take 1 capsule by mouth 3 times daily for 180 days. Intended supply: 90 days 90 capsule 1    Insulin Glargine, 2 Unit Dial, (TOUJEO MAX SOLOSTAR) 300 UNIT/ML concentrated injection pen 28 units in a.m. and 30 at hs. 12 mL 2    tamsulosin (FLOMAX) 0.4 MG capsule Take 1 capsule by mouth daily 30

## 2024-12-23 ENCOUNTER — HOSPITAL ENCOUNTER (EMERGENCY)
Age: 69
Discharge: HOME | End: 2024-12-23
Attending: EMERGENCY MEDICINE
Payer: MEDICARE

## 2024-12-23 ENCOUNTER — APPOINTMENT (OUTPATIENT)
Dept: GENERAL RADIOLOGY | Age: 69
End: 2024-12-23
Payer: MEDICARE

## 2024-12-23 ENCOUNTER — APPOINTMENT (OUTPATIENT)
Dept: INTERVENTIONAL RADIOLOGY/VASCULAR | Age: 69
End: 2024-12-23
Attending: EMERGENCY MEDICINE
Payer: MEDICARE

## 2024-12-23 VITALS
WEIGHT: 193 LBS | HEART RATE: 83 BPM | DIASTOLIC BLOOD PRESSURE: 92 MMHG | RESPIRATION RATE: 16 BRPM | HEIGHT: 59 IN | TEMPERATURE: 97.7 F | SYSTOLIC BLOOD PRESSURE: 148 MMHG | BODY MASS INDEX: 38.91 KG/M2 | OXYGEN SATURATION: 98 %

## 2024-12-23 DIAGNOSIS — R18.8 OTHER ASCITES: Primary | ICD-10-CM

## 2024-12-23 LAB
ALBUMIN SERPL-MCNC: 2.7 G/DL (ref 3.5–5.2)
ALP SERPL-CCNC: 397 U/L (ref 35–104)
ALT SERPL-CCNC: 19 U/L (ref 10–35)
ANION GAP SERPL CALCULATED.3IONS-SCNC: 9 MMOL/L (ref 9–16)
AST SERPL-CCNC: 43 U/L (ref 10–35)
BASOPHILS # BLD: 0.04 K/UL (ref 0–0.2)
BASOPHILS NFR BLD: 1 % (ref 0–2)
BILIRUB SERPL-MCNC: 0.4 MG/DL (ref 0–1.2)
BUN SERPL-MCNC: 38 MG/DL (ref 8–23)
CALCIUM SERPL-MCNC: 8.7 MG/DL (ref 8.6–10.4)
CHLORIDE SERPL-SCNC: 109 MMOL/L (ref 98–107)
CO2 SERPL-SCNC: 23 MMOL/L (ref 20–31)
CREAT SERPL-MCNC: 1.4 MG/DL (ref 0.7–1.2)
EOSINOPHIL # BLD: 0.11 K/UL (ref 0–0.4)
EOSINOPHILS RELATIVE PERCENT: 3 % (ref 0–4)
ERYTHROCYTE [DISTWIDTH] IN BLOOD BY AUTOMATED COUNT: 18.6 % (ref 11.5–14.9)
GFR, ESTIMATED: 40 ML/MIN/1.73M2
GLUCOSE SERPL-MCNC: 149 MG/DL (ref 74–99)
HCT VFR BLD AUTO: 24 % (ref 36–46)
HGB BLD-MCNC: 7.9 G/DL (ref 12–16)
INR PPP: 1.2
LYMPHOCYTES NFR BLD: 0.43 K/UL (ref 1–4.8)
LYMPHOCYTES RELATIVE PERCENT: 12 % (ref 24–44)
MAGNESIUM SERPL-MCNC: 1.7 MG/DL (ref 1.6–2.4)
MCH RBC QN AUTO: 30 PG (ref 26–34)
MCHC RBC AUTO-ENTMCNC: 32.9 G/DL (ref 31–37)
MCV RBC AUTO: 91.4 FL (ref 80–100)
MONOCYTES NFR BLD: 0.25 K/UL (ref 0.1–1.3)
MONOCYTES NFR BLD: 7 % (ref 1–7)
MORPHOLOGY: ABNORMAL
NEUTROPHILS NFR BLD: 77 % (ref 36–66)
NEUTS SEG NFR BLD: 2.77 K/UL (ref 1.3–9.1)
PLATELET # BLD AUTO: 178 K/UL (ref 150–450)
PMV BLD AUTO: 7.4 FL (ref 6–12)
POTASSIUM SERPL-SCNC: 4.1 MMOL/L (ref 3.7–5.3)
PROT SERPL-MCNC: 7.1 G/DL (ref 6.6–8.7)
PROTHROMBIN TIME: 15.9 SEC (ref 11.8–14.6)
RBC # BLD AUTO: 2.63 M/UL (ref 4–5.2)
SODIUM SERPL-SCNC: 141 MMOL/L (ref 136–145)
TROPONIN I SERPL HS-MCNC: 42 NG/L (ref 0–14)
TROPONIN I SERPL HS-MCNC: 45 NG/L (ref 0–14)
WBC OTHER # BLD: 3.6 K/UL (ref 3.5–11)

## 2024-12-23 PROCEDURE — 80053 COMPREHEN METABOLIC PANEL: CPT

## 2024-12-23 PROCEDURE — 2709999900 IR US GUIDED PARACENTESIS

## 2024-12-23 PROCEDURE — 85610 PROTHROMBIN TIME: CPT

## 2024-12-23 PROCEDURE — 6360000002 HC RX W HCPCS: Performed by: RADIOLOGY

## 2024-12-23 PROCEDURE — 71045 X-RAY EXAM CHEST 1 VIEW: CPT

## 2024-12-23 PROCEDURE — P9047 ALBUMIN (HUMAN), 25%, 50ML: HCPCS | Performed by: RADIOLOGY

## 2024-12-23 PROCEDURE — 36415 COLL VENOUS BLD VENIPUNCTURE: CPT

## 2024-12-23 PROCEDURE — 93005 ELECTROCARDIOGRAM TRACING: CPT | Performed by: EMERGENCY MEDICINE

## 2024-12-23 PROCEDURE — 84484 ASSAY OF TROPONIN QUANT: CPT

## 2024-12-23 PROCEDURE — 49083 ABD PARACENTESIS W/IMAGING: CPT

## 2024-12-23 PROCEDURE — 83735 ASSAY OF MAGNESIUM: CPT

## 2024-12-23 PROCEDURE — 85025 COMPLETE CBC W/AUTO DIFF WBC: CPT

## 2024-12-23 PROCEDURE — 99285 EMERGENCY DEPT VISIT HI MDM: CPT

## 2024-12-23 RX ORDER — ALBUMIN (HUMAN) 12.5 G/50ML
SOLUTION INTRAVENOUS CONTINUOUS PRN
Status: COMPLETED | OUTPATIENT
Start: 2024-12-23 | End: 2024-12-23

## 2024-12-23 RX ADMIN — ALBUMIN (HUMAN) 75 G: 0.25 INJECTION, SOLUTION INTRAVENOUS at 14:46

## 2024-12-23 ASSESSMENT — ENCOUNTER SYMPTOMS
EYE PAIN: 0
SHORTNESS OF BREATH: 1
COLOR CHANGE: 0
ABDOMINAL DISTENTION: 1
ABDOMINAL PAIN: 0
BACK PAIN: 0

## 2024-12-23 NOTE — ED PROVIDER NOTES
[facility]  [facility] [address]  [phone]    As needed, If symptoms worsen      DO John Sommers Nathan R, DO  12/23/24 0052

## 2024-12-23 NOTE — OR NURSING
Patient brought to the IR suite via cart, after consent obtained, patient placed on the procedure table, monitoring equipment hooked up.  Patient then positioned/prepped/draped

## 2024-12-23 NOTE — PROGRESS NOTES
Patient tolerated procedure well without distress. 10.2 L of clear, yellow fluid removed. Area cleansed & dry dressing applied. Transported patient back to ER-9.  EMILY Castillo updated.

## 2024-12-23 NOTE — BRIEF OP NOTE
Brief Postoperative Note for Paracentesis    Bobbi Phillips  YOB: 1955  [id number]    Pre-operative Diagnosis:  Ascites     Post-operative Diagnosis: Same    Procedure: Ultrasound guided paracentesis     Anesthesia: 1% Lidocaine     Surgeons/Assistants: Hillary Lowery PA-C and Dr. Mata    Complications: none    EBL: Minimal    Specimens: Were obtained    Ultrasound guided paracentesis performed along the RUQ. 10.2 Liters of clear yellow fluid obtained.  Dressing applied. IV albumin 75 g ordered. Patient tolerated procedure well.    Electronically signed by Hillary Lowery PA-C on 12/23/2024 at 3:24 PM

## 2024-12-24 LAB
EKG ATRIAL RATE: 79 BPM
EKG P AXIS: 70 DEGREES
EKG P-R INTERVAL: 166 MS
EKG Q-T INTERVAL: 368 MS
EKG QRS DURATION: 82 MS
EKG QTC CALCULATION (BAZETT): 421 MS
EKG R AXIS: -14 DEGREES
EKG T AXIS: 19 DEGREES
EKG VENTRICULAR RATE: 79 BPM

## 2024-12-24 PROCEDURE — 93010 ELECTROCARDIOGRAM REPORT: CPT | Performed by: INTERNAL MEDICINE

## 2024-12-26 ENCOUNTER — CARE COORDINATION (OUTPATIENT)
Dept: CARE COORDINATION | Age: 69
End: 2024-12-26

## 2024-12-26 NOTE — CARE COORDINATION
Ambulatory Care Coordination Note     12/26/2024 10:47 AM     Patient outreach attempt by this ACM today to offer care management services. ACM was unable to reach the patient by telephone today;   No voicemail on either phone     ACM: Arline Lofton RN     Care Summary Note: ED visit 12/23 for ascites, had paracentesis with 10.2 L fluid drained.    PCP/Specialist follow up:   Future Appointments         Provider Specialty Dept Phone    1/23/2025 11:00 AM Stacie Doty MD Primary Care [phone]            Follow Up:   Plan for next ACM outreach in approximately  3-4 days  to complete:  - outreach attempt to offer care management services.

## (undated) DEVICE — Device

## (undated) DEVICE — APPLICATOR ENDOSCP L34CM W/ S STL CANN PLAS OBT STYL FOR

## (undated) DEVICE — BLADDER EVACUATOR: Brand: UROVAC BLADDER EVACUATOR

## (undated) DEVICE — ENDO KIT W/SYRINGE: Brand: MEDLINE INDUSTRIES, INC.

## (undated) DEVICE — TIP COVER ACCESSORY

## (undated) DEVICE — GOWN,SIRUS,NONRNF,SETINSLV,XL,20/CS: Brand: MEDLINE

## (undated) DEVICE — STRAP,CATHETER,ELASTIC,HOOK&LOOP: Brand: MEDLINE

## (undated) DEVICE — TUBING, SUCTION, 9/32" X 20', STRAIGHT: Brand: MEDLINE INDUSTRIES, INC.

## (undated) DEVICE — COVER OR TBL W40XL90IN ABSRB STD AND GRIPPY BK SAHARA

## (undated) DEVICE — DEFENDO AIR WATER SUCTION AND BIOPSY VALVE KIT FOR  OLYMPUS: Brand: DEFENDO AIR/WATER/SUCTION AND BIOPSY VALVE

## (undated) DEVICE — DRAPE,REIN 53X77,STERILE: Brand: MEDLINE

## (undated) DEVICE — SET,IRRIGATION,CYSTO/TUR,90": Brand: MEDLINE

## (undated) DEVICE — STRAP,POSITIONING,KNEE/BODY,FOAM,4X60": Brand: MEDLINE

## (undated) DEVICE — 3000ML,PRESSURE INFUSER W/STOPCOCK: Brand: MEDLINE

## (undated) DEVICE — COUNTER NDL 40 COUNT HLD 70 FOAM BLK ADH W/ MAG

## (undated) DEVICE — LIGASURE BLUNT TIP 37CM FT10 COMPATIBLE: Brand: MEDLINE

## (undated) DEVICE — GLOVE SURG SZ 55 CRM LTX FREE POLYISOPRENE POLYMER BEAD ANTI

## (undated) DEVICE — SYSTEM WST MGMT W/ CAP AVETA

## (undated) DEVICE — ST CHARLES PERI-GYN: Brand: MEDLINE INDUSTRIES, INC.

## (undated) DEVICE — SYSTEM WST MGMT AVETA

## (undated) DEVICE — TOWEL,OR,DSP,ST,NATURAL,DLX,4/PK,20PK/CS: Brand: MEDLINE

## (undated) DEVICE — 1LYRTR 16FR10ML100%SIL UMS SNP: Brand: MEDLINE INDUSTRIES, INC.

## (undated) DEVICE — GLOVE SURG SZ 75 L12IN FNGR THK79MIL GRN LTX FREE

## (undated) DEVICE — INSUFFLATION NEEDLE TO ESTABLISH PNEUMOPERITONEUM.: Brand: INSUFFLATION NEEDLE

## (undated) DEVICE — SEAL

## (undated) DEVICE — GAUZE,SPONGE,4"X4",16PLY,XRAY,STRL,LF: Brand: MEDLINE

## (undated) DEVICE — CONTAINER,SPECIMEN,4OZ,OR STRL: Brand: MEDLINE

## (undated) DEVICE — ARM DRAPE

## (undated) DEVICE — TRI-LUMEN FILTERED TUBE SET WITH ACTIVATED CHARCOAL FILTER: Brand: AIRSEAL

## (undated) DEVICE — NEEDLE SPINAL 22GA L3.5IN SPINOCAN

## (undated) DEVICE — SOLUTION IRRIG 3000ML 0.9% SOD CHL USP UROMATIC PLAS CONT

## (undated) DEVICE — SHEET,DRAPE,70X100,STERILE: Brand: MEDLINE

## (undated) DEVICE — SOCK SPEC SHT 4.5 IN UNIV CANSTR COMPATIBILITY

## (undated) DEVICE — SINGLE PORT MANIFOLD: Brand: NEPTUNE 2

## (undated) DEVICE — COUNTER NDL 10 COUNT HLD 20 FOAM BLK SGL MAG

## (undated) DEVICE — GLOVE ORANGE PI 7 1/2   MSG9075

## (undated) DEVICE — ST CHARLES CYSTO: Brand: MEDLINE INDUSTRIES, INC.

## (undated) DEVICE — GLOVE SURG SZ 65 L12IN FNGR THK79MIL GRN LTX FREE

## (undated) DEVICE — SOLUTION SCRB 4OZ 4% CHG H2O AIDED FOR PREOPERATIVE SKIN

## (undated) DEVICE — Y-TYPE TUR/BLADDER IRRIGATION SET, REGULATING CLAMP

## (undated) DEVICE — SYRINGE MED 5ML STD CLR PLAS LUERLOCK TIP N CTRL DISP

## (undated) DEVICE — GLOVE ORANGE PI 8   MSG9080

## (undated) DEVICE — CUTTING LOOP, BIPOLAR, 0.30MM, 24/26 FR.: Brand: N.A.

## (undated) DEVICE — DRAINBAG,ANTI-REFLUX TOWER,L/F,2000ML,LL: Brand: MEDLINE

## (undated) DEVICE — NEEDLE HYPO 25GA L1.5IN BLU POLYPR HUB S STL REG BVL STR

## (undated) DEVICE — STRAP ARMBRD W1.5XL32IN FOAM STR YET SFT W/ HK AND LOOP

## (undated) DEVICE — PTFE COATED BLADE 6': Brand: MEDLINE

## (undated) DEVICE — HYSTEROSCOPE RIGID CORAL AVETA DISP

## (undated) DEVICE — AIRSEAL 12 MM ACCESS PORT AND PALM GRIP OBTURATOR WITH BLADELESS OPTICAL TIP, 120 MM LENGTH: Brand: AIRSEAL

## (undated) DEVICE — SVMMC GYN ROBOTIC PK

## (undated) DEVICE — SYRINGE, LUER LOCK, 10ML: Brand: MEDLINE

## (undated) DEVICE — GOWN,AURORA,NONREINFORCED,LARGE: Brand: MEDLINE

## (undated) DEVICE — BLADELESS OBTURATOR: Brand: WECK VISTA

## (undated) DEVICE — SVMMC GYN MIN PK

## (undated) DEVICE — CATHETER URETH 22FR BLLN 30CC 3 W F SPEC INF CTRL BARDX

## (undated) DEVICE — SUTURE VCRL SZ 0 L27IN ABSRB UD L36MM CT-1 1/2 CIR J260H

## (undated) DEVICE — DRAPE,UNDRBUT,WHT GRAD PCH,CAPPORT,20/CS: Brand: MEDLINE

## (undated) DEVICE — MARKER,SKIN,WI/RULER AND LABELS: Brand: MEDLINE

## (undated) DEVICE — TRAP,MUCUS SPECIMEN, 80CC: Brand: MEDLINE

## (undated) DEVICE — YANKAUER,FLEXIBLE HANDLE,REGLR CAPACITY: Brand: MEDLINE INDUSTRIES, INC.

## (undated) DEVICE — BITEBLOCK 54FR W/ DENT RIM BLOX

## (undated) DEVICE — TROCAR: Brand: KII FIOS FIRST ENTRY

## (undated) DEVICE — ELECTRODE PT RET AD L9FT HI MOIST COND ADH HYDRGEL CORDED

## (undated) DEVICE — PAD PT POS 36 IN SURGYPAD DISP

## (undated) DEVICE — SOLUTION IRRIG 1000ML STRL H2O USP PLAS POUR BTL

## (undated) DEVICE — 3M™ IOBAN™ 2 ANTIMICROBIAL INCISE DRAPE 6650EZ: Brand: IOBAN™ 2

## (undated) DEVICE — FORCEPS BX L240CM JAW DIA2.4MM ORNG L CAP W/ NDL DISP RAD

## (undated) DEVICE — APPLICATOR MEDICATED 26 CC SOLUTION HI LT ORNG CHLORAPREP

## (undated) DEVICE — SOLUTION ANTIFOG VIS SYS CLEARIFY LAPSCP

## (undated) DEVICE — SYRINGE,PISTON,IRRIGATION,60ML,STERILE: Brand: MEDLINE

## (undated) DEVICE — KIT CLN UP LIN W/ STD SAHARA TBL SHT 40X60IN DRAW/LIFT SHT

## (undated) DEVICE — SUTURE MCRYL SZ 4-0 L18IN ABSRB UD L16MM PC-3 3/8 CIR PRIM Y845G

## (undated) DEVICE — GARMENT,MEDLINE,DVT,INT,CALF,MED, GEN2: Brand: MEDLINE

## (undated) DEVICE — COVER,LIGHT HANDLE,FLX,2/PK: Brand: MEDLINE INDUSTRIES, INC.

## (undated) DEVICE — ELECTRO LUBE IS A SINGLE PATIENT USE DEVICE THAT IS INTENDED TO BE USED ON ELECTROSURGICAL ELECTRODES TO REDUCE STICKING.: Brand: KEY SURGICAL ELECTRO LUBE

## (undated) DEVICE — PROTECTOR ULN NRV PUR FOAM HK LOOP STRP ANATOMICALLY

## (undated) DEVICE — LIQUIBAND RAPID ADHESIVE 36/CS 0.8ML: Brand: MEDLINE

## (undated) DEVICE — SOLUTION IV 1000ML 0.9% SOD CHL PH 5 INJ USP VIAFLX PLAS